# Patient Record
Sex: FEMALE | Race: WHITE | Employment: OTHER | URBAN - METROPOLITAN AREA
[De-identification: names, ages, dates, MRNs, and addresses within clinical notes are randomized per-mention and may not be internally consistent; named-entity substitution may affect disease eponyms.]

---

## 2017-03-20 ENCOUNTER — HOSPITAL ENCOUNTER (OUTPATIENT)
Dept: GENERAL RADIOLOGY | Age: 72
Discharge: HOME | End: 2017-03-20
Attending: INTERNAL MEDICINE | Admitting: INTERNAL MEDICINE

## 2017-03-20 DIAGNOSIS — M25.462 SWELLING OF LEFT KNEE JOINT: ICD-10-CM

## 2017-03-20 DIAGNOSIS — M25.562 LEFT KNEE PAIN, UNSPECIFIED CHRONICITY: ICD-10-CM

## 2017-07-11 ENCOUNTER — HOSPITAL ENCOUNTER (OUTPATIENT)
Dept: GENERAL RADIOLOGY | Age: 72
Discharge: HOME | End: 2017-07-11
Attending: INTERNAL MEDICINE | Admitting: INTERNAL MEDICINE

## 2017-07-11 DIAGNOSIS — R07.81 RIB PAIN: ICD-10-CM

## 2017-08-22 ENCOUNTER — HOSPITAL ENCOUNTER (OUTPATIENT)
Dept: GENERAL RADIOLOGY | Age: 72
Discharge: HOME | End: 2017-08-22
Attending: INTERNAL MEDICINE | Admitting: INTERNAL MEDICINE

## 2017-08-22 DIAGNOSIS — R05.9 COUGH: ICD-10-CM

## 2017-09-07 ENCOUNTER — HOSPITAL ENCOUNTER (OUTPATIENT)
Dept: ULTRASOUND IMAGING | Age: 72
Discharge: HOME | End: 2017-09-07
Attending: INTERNAL MEDICINE | Admitting: INTERNAL MEDICINE

## 2017-09-07 DIAGNOSIS — M79.605 PAIN AND SWELLING OF LOWER EXTREMITY, LEFT: ICD-10-CM

## 2017-09-07 DIAGNOSIS — M79.89 PAIN AND SWELLING OF LOWER EXTREMITY, RIGHT: ICD-10-CM

## 2017-09-07 DIAGNOSIS — M79.604 PAIN AND SWELLING OF LOWER EXTREMITY, RIGHT: ICD-10-CM

## 2017-09-07 DIAGNOSIS — M79.89 PAIN AND SWELLING OF LOWER EXTREMITY, LEFT: ICD-10-CM

## 2017-09-07 DIAGNOSIS — M79.89 OTHER DISORDERS OF SOFT TISSUE: ICD-10-CM

## 2017-09-25 ENCOUNTER — HOSPITAL ENCOUNTER (OUTPATIENT)
Dept: GENERAL RADIOLOGY | Age: 72
Discharge: HOME | End: 2017-09-25
Attending: INTERNAL MEDICINE | Admitting: INTERNAL MEDICINE

## 2017-09-25 DIAGNOSIS — J18.9 PNEUMONIA OF LEFT LOWER LOBE DUE TO INFECTIOUS ORGANISM: ICD-10-CM

## 2017-09-27 ENCOUNTER — HOSPITAL ENCOUNTER (OUTPATIENT)
Dept: WOMENS IMAGING | Age: 72
Discharge: HOME | End: 2017-09-27
Attending: INTERNAL MEDICINE | Admitting: INTERNAL MEDICINE

## 2017-09-27 DIAGNOSIS — N95.1 PERIMENOPAUSE: ICD-10-CM

## 2017-09-27 DIAGNOSIS — Z12.31 ENCOUNTER FOR SCREENING MAMMOGRAM FOR BREAST CANCER: ICD-10-CM

## 2017-10-10 ENCOUNTER — HOSPITAL ENCOUNTER (OUTPATIENT)
Dept: CARDIOLOGY | Age: 72
Discharge: HOME | End: 2017-10-10
Attending: INTERNAL MEDICINE | Admitting: INTERNAL MEDICINE

## 2017-10-10 DIAGNOSIS — I27.0 PRIMARY PULMONARY HYPERTENSION (HCC): ICD-10-CM

## 2017-10-10 LAB
LV EF: 50 %
LVEF MODALITY: NORMAL

## 2017-11-29 ENCOUNTER — HOSPITAL ENCOUNTER (OUTPATIENT)
Dept: GENERAL RADIOLOGY | Age: 72
Discharge: HOME | End: 2017-11-29
Attending: INTERNAL MEDICINE | Admitting: INTERNAL MEDICINE

## 2017-11-29 DIAGNOSIS — J18.9 PNEUMONIA DUE TO INFECTIOUS ORGANISM, UNSPECIFIED LATERALITY, UNSPECIFIED PART OF LUNG: ICD-10-CM

## 2017-12-07 ENCOUNTER — HOSPITAL ENCOUNTER (OUTPATIENT)
Dept: CT IMAGING | Age: 72
Discharge: HOME | End: 2017-12-07
Attending: INTERNAL MEDICINE | Admitting: INTERNAL MEDICINE

## 2017-12-07 DIAGNOSIS — R93.89 ABNORMAL FINDING ON RADIOLOGY EXAM: ICD-10-CM

## 2017-12-07 RX ORDER — SODIUM CHLORIDE 0.9 % (FLUSH) 0.9 %
10 SYRINGE (ML) INJECTION ONCE
Status: COMPLETED | OUTPATIENT
Start: 2017-12-07 | End: 2017-12-07

## 2017-12-07 RX ADMIN — Medication 10 ML: at 14:34

## 2017-12-20 ENCOUNTER — HOSPITAL ENCOUNTER (OUTPATIENT)
Dept: CT IMAGING | Age: 72
Discharge: HOME | End: 2017-12-20
Attending: INTERNAL MEDICINE | Admitting: INTERNAL MEDICINE

## 2017-12-20 DIAGNOSIS — N28.89 RIGHT RENAL MASS: ICD-10-CM

## 2017-12-20 DIAGNOSIS — D41.20: ICD-10-CM

## 2017-12-20 LAB
GFR AFRICAN AMERICAN: >60 ML/MIN/1.73M2
GFR NON-AFRICAN AMERICAN: >60 ML/MIN/1.73M2
POC CREATININE: 0.6 MG/DL (ref 0.6–1.1)

## 2017-12-20 RX ORDER — SODIUM CHLORIDE 0.9 % (FLUSH) 0.9 %
10 SYRINGE (ML) INJECTION ONCE
Status: COMPLETED | OUTPATIENT
Start: 2017-12-20 | End: 2017-12-20

## 2017-12-20 RX ADMIN — Medication 10 ML: at 12:09

## 2018-04-23 ENCOUNTER — HOSPITAL ENCOUNTER (OUTPATIENT)
Dept: ULTRASOUND IMAGING | Age: 73
Discharge: HOME | End: 2018-04-23
Attending: INTERNAL MEDICINE | Admitting: INTERNAL MEDICINE

## 2018-04-23 DIAGNOSIS — M79.89 SWELLING OF LIMB: ICD-10-CM

## 2018-04-23 DIAGNOSIS — M79.89 OTHER SPECIFIED SOFT TISSUE DISORDERS (CODE): ICD-10-CM

## 2018-04-23 DIAGNOSIS — R52 PAIN: ICD-10-CM

## 2018-05-31 ENCOUNTER — HOSPITAL ENCOUNTER (OUTPATIENT)
Dept: OTHER | Age: 73
Discharge: HOME | End: 2018-05-31
Attending: ORTHOPAEDIC SURGERY | Admitting: ORTHOPAEDIC SURGERY

## 2018-06-07 ENCOUNTER — PROCEDURE VISIT (OUTPATIENT)
Dept: CARDIOLOGY CLINIC | Age: 73
End: 2018-06-07

## 2018-06-07 ENCOUNTER — OFFICE VISIT (OUTPATIENT)
Dept: CARDIOLOGY CLINIC | Age: 73
End: 2018-06-07

## 2018-06-07 VITALS
WEIGHT: 179.2 LBS | BODY MASS INDEX: 30.59 KG/M2 | DIASTOLIC BLOOD PRESSURE: 80 MMHG | SYSTOLIC BLOOD PRESSURE: 134 MMHG | HEART RATE: 72 BPM | HEIGHT: 64 IN

## 2018-06-07 DIAGNOSIS — I10 ESSENTIAL HYPERTENSION: ICD-10-CM

## 2018-06-07 DIAGNOSIS — E78.2 MIXED HYPERLIPIDEMIA: ICD-10-CM

## 2018-06-07 DIAGNOSIS — I48.91 ATRIAL FIBRILLATION, NEW ONSET (HCC): ICD-10-CM

## 2018-06-07 DIAGNOSIS — I38 VHD (VALVULAR HEART DISEASE): ICD-10-CM

## 2018-06-07 DIAGNOSIS — R94.31 ABNORMAL EKG: ICD-10-CM

## 2018-06-07 DIAGNOSIS — R94.31 ABNORMAL EKG: Primary | ICD-10-CM

## 2018-06-07 LAB
LV EF: 47 %
LV EF: 55 %
LVEF MODALITY: NORMAL
LVEF MODALITY: NORMAL

## 2018-06-07 PROCEDURE — A9500 TC99M SESTAMIBI: HCPCS | Performed by: INTERNAL MEDICINE

## 2018-06-07 PROCEDURE — 99204 OFFICE O/P NEW MOD 45 MIN: CPT | Performed by: INTERNAL MEDICINE

## 2018-06-07 PROCEDURE — 93015 CV STRESS TEST SUPVJ I&R: CPT | Performed by: INTERNAL MEDICINE

## 2018-06-07 PROCEDURE — 93306 TTE W/DOPPLER COMPLETE: CPT | Performed by: INTERNAL MEDICINE

## 2018-06-07 PROCEDURE — G8417 CALC BMI ABV UP PARAM F/U: HCPCS | Performed by: INTERNAL MEDICINE

## 2018-06-07 PROCEDURE — 78452 HT MUSCLE IMAGE SPECT MULT: CPT | Performed by: INTERNAL MEDICINE

## 2018-06-07 PROCEDURE — 1123F ACP DISCUSS/DSCN MKR DOCD: CPT | Performed by: INTERNAL MEDICINE

## 2018-06-07 PROCEDURE — G8399 PT W/DXA RESULTS DOCUMENT: HCPCS | Performed by: INTERNAL MEDICINE

## 2018-06-07 PROCEDURE — 1090F PRES/ABSN URINE INCON ASSESS: CPT | Performed by: INTERNAL MEDICINE

## 2018-06-07 PROCEDURE — 3017F COLORECTAL CA SCREEN DOC REV: CPT | Performed by: INTERNAL MEDICINE

## 2018-06-07 PROCEDURE — 1036F TOBACCO NON-USER: CPT | Performed by: INTERNAL MEDICINE

## 2018-06-07 PROCEDURE — G8427 DOCREV CUR MEDS BY ELIG CLIN: HCPCS | Performed by: INTERNAL MEDICINE

## 2018-06-07 PROCEDURE — 4040F PNEUMOC VAC/ADMIN/RCVD: CPT | Performed by: INTERNAL MEDICINE

## 2018-06-07 RX ORDER — NAPROXEN 500 MG/1
TABLET ORAL
Refills: 5 | Status: ON HOLD | COMMUNITY
Start: 2018-05-28 | End: 2018-06-25 | Stop reason: HOSPADM

## 2018-06-07 RX ORDER — HYDROCHLOROTHIAZIDE 12.5 MG/1
TABLET ORAL
Refills: 5 | Status: ON HOLD | COMMUNITY
Start: 2018-05-06 | End: 2018-07-09

## 2018-06-07 RX ORDER — CITALOPRAM 40 MG/1
TABLET ORAL
Refills: 5 | COMMUNITY
Start: 2018-05-28

## 2018-06-07 RX ORDER — MELATONIN
1 DAILY
COMMUNITY

## 2018-06-07 RX ORDER — ASPIRIN 325 MG
325 TABLET ORAL DAILY
Status: ON HOLD | COMMUNITY
End: 2018-07-20 | Stop reason: HOSPADM

## 2018-06-07 RX ORDER — TRAMADOL HYDROCHLORIDE 50 MG/1
50 TABLET ORAL EVERY 6 HOURS PRN
COMMUNITY
End: 2021-01-03 | Stop reason: ALTCHOICE

## 2018-06-07 RX ORDER — DONEPEZIL HYDROCHLORIDE 5 MG/1
10 TABLET, FILM COATED ORAL DAILY
Refills: 2 | COMMUNITY
Start: 2018-05-28

## 2018-06-07 RX ORDER — ROSUVASTATIN CALCIUM 40 MG/1
40 TABLET, COATED ORAL EVERY EVENING
COMMUNITY
End: 2022-04-19 | Stop reason: SDUPTHER

## 2018-06-07 RX ORDER — DOXAZOSIN MESYLATE 1 MG/1
TABLET ORAL
Refills: 5 | Status: ON HOLD | COMMUNITY
Start: 2018-05-28 | End: 2018-07-09

## 2018-06-11 ENCOUNTER — TELEPHONE (OUTPATIENT)
Dept: CARDIOLOGY CLINIC | Age: 73
End: 2018-06-11

## 2018-06-18 ENCOUNTER — OFFICE VISIT (OUTPATIENT)
Dept: CARDIOLOGY CLINIC | Age: 73
End: 2018-06-18

## 2018-06-18 VITALS
BODY MASS INDEX: 30.66 KG/M2 | SYSTOLIC BLOOD PRESSURE: 132 MMHG | HEIGHT: 64 IN | HEART RATE: 82 BPM | WEIGHT: 179.6 LBS | DIASTOLIC BLOOD PRESSURE: 80 MMHG

## 2018-06-18 DIAGNOSIS — I48.91 ATRIAL FIBRILLATION, NEW ONSET (HCC): Primary | ICD-10-CM

## 2018-06-18 DIAGNOSIS — I10 ESSENTIAL HYPERTENSION: ICD-10-CM

## 2018-06-18 PROCEDURE — 1123F ACP DISCUSS/DSCN MKR DOCD: CPT | Performed by: INTERNAL MEDICINE

## 2018-06-18 PROCEDURE — 3017F COLORECTAL CA SCREEN DOC REV: CPT | Performed by: INTERNAL MEDICINE

## 2018-06-18 PROCEDURE — G8399 PT W/DXA RESULTS DOCUMENT: HCPCS | Performed by: INTERNAL MEDICINE

## 2018-06-18 PROCEDURE — 1090F PRES/ABSN URINE INCON ASSESS: CPT | Performed by: INTERNAL MEDICINE

## 2018-06-18 PROCEDURE — 99214 OFFICE O/P EST MOD 30 MIN: CPT | Performed by: INTERNAL MEDICINE

## 2018-06-18 PROCEDURE — 1036F TOBACCO NON-USER: CPT | Performed by: INTERNAL MEDICINE

## 2018-06-18 PROCEDURE — 4040F PNEUMOC VAC/ADMIN/RCVD: CPT | Performed by: INTERNAL MEDICINE

## 2018-06-18 PROCEDURE — G8417 CALC BMI ABV UP PARAM F/U: HCPCS | Performed by: INTERNAL MEDICINE

## 2018-06-18 PROCEDURE — G8427 DOCREV CUR MEDS BY ELIG CLIN: HCPCS | Performed by: INTERNAL MEDICINE

## 2018-06-22 ENCOUNTER — HOSPITAL ENCOUNTER (OUTPATIENT)
Dept: GENERAL RADIOLOGY | Age: 73
Discharge: HOME | End: 2018-06-22
Attending: INTERNAL MEDICINE | Admitting: INTERNAL MEDICINE

## 2018-06-22 DIAGNOSIS — Z01.818 PRE-PROCEDURAL EXAMINATION: ICD-10-CM

## 2018-06-22 LAB
ANION GAP SERPL CALCULATED.3IONS-SCNC: 17 MMOL/L (ref 4–16)
APTT: 44.5 SECONDS (ref 21.2–33)
BASOPHILS ABSOLUTE: 0 K/CU MM
BASOPHILS RELATIVE PERCENT: 0.7 % (ref 0–1)
BUN BLDV-MCNC: 16 MG/DL (ref 6–23)
CALCIUM SERPL-MCNC: 9.8 MG/DL (ref 8.3–10.6)
CHLORIDE BLD-SCNC: 96 MMOL/L (ref 99–110)
CO2: 23 MMOL/L (ref 21–32)
CREAT SERPL-MCNC: 0.7 MG/DL (ref 0.6–1.1)
DIFFERENTIAL TYPE: ABNORMAL
EKG ATRIAL RATE: 326 BPM
EKG DIAGNOSIS: NORMAL
EKG Q-T INTERVAL: 416 MS
EKG QRS DURATION: 90 MS
EKG QTC CALCULATION (BAZETT): 452 MS
EKG R AXIS: 65 DEGREES
EKG T AXIS: 69 DEGREES
EKG VENTRICULAR RATE: 71 BPM
EOSINOPHILS ABSOLUTE: 0.1 K/CU MM
EOSINOPHILS RELATIVE PERCENT: 1.1 % (ref 0–3)
GFR AFRICAN AMERICAN: >60 ML/MIN/1.73M2
GFR NON-AFRICAN AMERICAN: >60 ML/MIN/1.73M2
GLUCOSE BLD-MCNC: 67 MG/DL (ref 70–99)
HCT VFR BLD CALC: 38.9 % (ref 37–47)
HEMOGLOBIN: 12.5 GM/DL (ref 12.5–16)
IMMATURE NEUTROPHIL %: 0.2 % (ref 0–0.43)
INR BLD: 1.32 INDEX
LYMPHOCYTES ABSOLUTE: 1.7 K/CU MM
LYMPHOCYTES RELATIVE PERCENT: 31.3 % (ref 24–44)
MCH RBC QN AUTO: 27.8 PG (ref 27–31)
MCHC RBC AUTO-ENTMCNC: 32.1 % (ref 32–36)
MCV RBC AUTO: 86.4 FL (ref 78–100)
MONOCYTES ABSOLUTE: 0.4 K/CU MM
MONOCYTES RELATIVE PERCENT: 8 % (ref 0–4)
NUCLEATED RBC %: 0 %
PDW BLD-RTO: 12.7 % (ref 11.7–14.9)
PLATELET # BLD: 191 K/CU MM (ref 140–440)
PMV BLD AUTO: 9.5 FL (ref 7.5–11.1)
POTASSIUM SERPL-SCNC: 4.5 MMOL/L (ref 3.5–5.1)
PROTHROMBIN TIME: 15 SECONDS (ref 9.12–12.5)
RBC # BLD: 4.5 M/CU MM (ref 4.2–5.4)
SEGMENTED NEUTROPHILS ABSOLUTE COUNT: 3.1 K/CU MM
SEGMENTED NEUTROPHILS RELATIVE PERCENT: 58.7 % (ref 36–66)
SODIUM BLD-SCNC: 136 MMOL/L (ref 135–145)
TOTAL IMMATURE NEUTOROPHIL: 0.01 K/CU MM
TOTAL NUCLEATED RBC: 0 K/CU MM
WBC # BLD: 5.4 K/CU MM (ref 4–10.5)

## 2018-06-29 ENCOUNTER — HOSPITAL ENCOUNTER (OUTPATIENT)
Dept: PULMONOLOGY | Age: 73
Discharge: HOME | End: 2018-06-29
Attending: SURGERY | Admitting: SURGERY

## 2018-06-29 ENCOUNTER — HOSPITAL ENCOUNTER (OUTPATIENT)
Dept: PREADMISSION TESTING | Age: 73
Discharge: HOME | End: 2018-06-29
Attending: SURGERY | Admitting: SURGERY

## 2018-06-29 VITALS
DIASTOLIC BLOOD PRESSURE: 81 MMHG | WEIGHT: 179 LBS | RESPIRATION RATE: 16 BRPM | SYSTOLIC BLOOD PRESSURE: 161 MMHG | HEIGHT: 63 IN | HEART RATE: 76 BPM | TEMPERATURE: 97.8 F | OXYGEN SATURATION: 99 % | BODY MASS INDEX: 31.71 KG/M2

## 2018-06-29 RX ORDER — CHLORHEXIDINE GLUCONATE 0.12 MG/ML
30 RINSE ORAL ONCE
Status: CANCELLED | OUTPATIENT
Start: 2018-07-03

## 2018-06-29 RX ORDER — CARVEDILOL 3.12 MG/1
3.12 TABLET ORAL ONCE
Status: CANCELLED | OUTPATIENT
Start: 2018-07-03

## 2018-07-03 PROBLEM — I25.10 CAD IN NATIVE ARTERY: Status: ACTIVE | Noted: 2018-07-03

## 2018-07-10 PROBLEM — I10 UNCOMPLICATED HYPERTENSION: Status: ACTIVE | Noted: 2018-07-03

## 2018-07-10 PROBLEM — I97.130 CHF FOLLOWING CARDIAC SURGERY, POSTOP: Status: ACTIVE | Noted: 2018-07-10

## 2018-07-10 PROBLEM — D62 ACUTE BLOOD LOSS ANEMIA: Status: ACTIVE | Noted: 2018-07-10

## 2018-07-10 PROBLEM — R26.9 GAIT DISTURBANCE: Status: ACTIVE | Noted: 2018-07-10

## 2018-07-10 PROBLEM — I48.0 PAROXYSMAL ATRIAL FIBRILLATION (HCC): Status: ACTIVE | Noted: 2018-06-07

## 2018-07-10 PROBLEM — R52 UNCONTROLLED PAIN: Status: ACTIVE | Noted: 2018-07-10

## 2018-07-18 PROBLEM — J18.9 PNEUMONIA DUE TO INFECTIOUS ORGANISM: Status: ACTIVE | Noted: 2018-07-18

## 2018-07-31 ENCOUNTER — HOSPITAL ENCOUNTER (OUTPATIENT)
Dept: OTHER | Age: 73
Discharge: HOME | End: 2018-09-20
Attending: INTERNAL MEDICINE | Admitting: INTERNAL MEDICINE

## 2018-08-06 ENCOUNTER — TELEPHONE (OUTPATIENT)
Dept: CARDIOLOGY CLINIC | Age: 73
End: 2018-08-06

## 2018-08-06 NOTE — TELEPHONE ENCOUNTER
Patient called requesting 5 mg samples of Eliquis, I advised patient that if available samples should be ready for  by tomorrow afternoon, please call with any problems at ph# [phone].

## 2018-08-11 DIAGNOSIS — I48.0 PAROXYSMAL ATRIAL FIBRILLATION (HCC): Primary | ICD-10-CM

## 2018-08-14 ENCOUNTER — OFFICE VISIT (OUTPATIENT)
Dept: CARDIOLOGY CLINIC | Age: 73
End: 2018-08-14

## 2018-08-14 VITALS
WEIGHT: 169.8 LBS | SYSTOLIC BLOOD PRESSURE: 120 MMHG | BODY MASS INDEX: 28.99 KG/M2 | DIASTOLIC BLOOD PRESSURE: 70 MMHG | HEIGHT: 64 IN | HEART RATE: 80 BPM

## 2018-08-14 DIAGNOSIS — Z95.1 S/P CABG (CORONARY ARTERY BYPASS GRAFT): Primary | ICD-10-CM

## 2018-08-14 DIAGNOSIS — I38 VHD (VALVULAR HEART DISEASE): ICD-10-CM

## 2018-08-14 DIAGNOSIS — I48.0 PAROXYSMAL ATRIAL FIBRILLATION (HCC): ICD-10-CM

## 2018-08-14 PROCEDURE — 1111F DSCHRG MED/CURRENT MED MERGE: CPT | Performed by: INTERNAL MEDICINE

## 2018-08-14 PROCEDURE — 1090F PRES/ABSN URINE INCON ASSESS: CPT | Performed by: INTERNAL MEDICINE

## 2018-08-14 PROCEDURE — G8598 ASA/ANTIPLAT THER USED: HCPCS | Performed by: INTERNAL MEDICINE

## 2018-08-14 PROCEDURE — 4040F PNEUMOC VAC/ADMIN/RCVD: CPT | Performed by: INTERNAL MEDICINE

## 2018-08-14 PROCEDURE — 99214 OFFICE O/P EST MOD 30 MIN: CPT | Performed by: INTERNAL MEDICINE

## 2018-08-14 PROCEDURE — 3017F COLORECTAL CA SCREEN DOC REV: CPT | Performed by: INTERNAL MEDICINE

## 2018-08-14 PROCEDURE — 1101F PT FALLS ASSESS-DOCD LE1/YR: CPT | Performed by: INTERNAL MEDICINE

## 2018-08-14 PROCEDURE — G8427 DOCREV CUR MEDS BY ELIG CLIN: HCPCS | Performed by: INTERNAL MEDICINE

## 2018-08-14 PROCEDURE — 93000 ELECTROCARDIOGRAM COMPLETE: CPT | Performed by: INTERNAL MEDICINE

## 2018-08-14 PROCEDURE — 1123F ACP DISCUSS/DSCN MKR DOCD: CPT | Performed by: INTERNAL MEDICINE

## 2018-08-14 PROCEDURE — G8417 CALC BMI ABV UP PARAM F/U: HCPCS | Performed by: INTERNAL MEDICINE

## 2018-08-14 PROCEDURE — 1036F TOBACCO NON-USER: CPT | Performed by: INTERNAL MEDICINE

## 2018-08-14 PROCEDURE — G8399 PT W/DXA RESULTS DOCUMENT: HCPCS | Performed by: INTERNAL MEDICINE

## 2018-08-14 NOTE — PROGRESS NOTES
CARDIOLOGY NOTE      8/14/2018    RE: Alysa Dias  (1945)                               TO:  Dr. Monica Bautista MD      Thank you for involving me in taking care of your  patient Alysa Dias, who is a  74y.o. year old      female with past medical  history of  htn, hyperlipidimea , CAD, recent CABG,CABG x 3 LIMA to LAD, SVG to OM, SVG to PDA, mitral valve re is  seen today Patient  during this  visit  Has no cardiac comaplins  C/o Rt sided CP    Vitals:    08/14/18 1406   BP: 120/70   Pulse: 80       Current Outpatient Prescriptions   Medication Sig Dispense Refill    apixaban (ELIQUIS) 5 MG TABS tablet Take 1 tablet by mouth 2 times daily for 6 doses 6 tablet 0    apixaban (ELIQUIS) 5 MG TABS tablet Take 1 tablet by mouth 2 times daily START ONCE OK WITH CTS 56 tablet 0    aspirin 81 MG EC tablet Take 1 tablet by mouth daily 30 tablet 3    furosemide (LASIX) 20 MG tablet Take 1 tablet by mouth daily 30 tablet 0    docusate sodium (COLACE, DULCOLAX) 100 MG CAPS Take 100 mg by mouth 2 times daily      polyethylene glycol (GLYCOLAX) packet Take 17 g by mouth daily as needed for Constipation 527 g 1    senna (SENOKOT) 8.6 MG tablet Take 2 tablets by mouth 2 times daily 120 tablet 0    Multiple Vitamins-Minerals (THERAPEUTIC MULTIVITAMIN-MINERALS) tablet Take 1 tablet by mouth daily      metFORMIN (GLUCOPHAGE) 500 MG tablet Take 500 mg by mouth 2 times daily (with meals)      donepezil (ARICEPT) 5 MG tablet TAKE 1 TABLET BY MOUTH EVERY DAY  2    citalopram (CELEXA) 40 MG tablet TAKE 1 TABLET EVERY DAY FOR ANXIETY AND STRESS  5    Cholecalciferol (VITAMIN D3) 5000 units TABS Take 1 tablet by mouth daily       CALCIUM CARBONATE PO Take 500 mg by mouth daily       traMADol (ULTRAM) 50 MG tablet Take 50 mg by mouth every 6 hours as needed for Pain. Loren Alvarado rosuvastatin (CRESTOR) 40 MG tablet Take 40 mg by mouth every evening       No current facility-administered medications for this visit. Allergies: Patient has no known allergies.   Past Medical History:   Diagnosis Date    Anxiety     Arthritis     knees    Atrial fibrillation ([facility] [address]     CAD (coronary artery disease)     Sees Dr. Belkis Kumari    Diabetes mellitus ([facility] [address]    Eze Davistery H/O cardiac catheterization 2018    severe 3 vessel disease, refer to CV surgery for CABG and MAZE    H/O cardiovascular stress test 2018    EF47%  fixed perfusion defect ant wall, pt in afib    H/O echocardiogram 2018    EF55% mod MR    Hyperlipidemia     Hypertension     Memory loss     on Aricept    Missing teeth, acquired     Pneumonia     pneumococcal pneumonia twice at end of     Risk for falls     uses walker    TIA (transient ischemic attack)     family doctors said she has had mini-strokes    Urinary leakage     UTI (urinary tract infection)     recent --just stopped antibiotic last week as of -18    Wears glasses      Past Surgical History:   Procedure Laterality Date    CARDIAC CATHETERIZATION  2018    EYE SURGERY Bilateral 2018    Cataracts    THORACENTESIS Bilateral 2018    IR Dr. Destinee Sol, right 56, left 26 all s/s    TONSILLECTOMY      WISDOM TOOTH EXTRACTION       Family History   Problem Relation Age of Onset   Eze Mallory Stroke Mother     Heart Disease Father     Early Death Father     Breast Cancer Sister    Eze Davisteralex Parkinsonism Brother     Heart Disease Sister     Other Sister         fibromyalgia    Diabetes Sister     Early Death Brother          at birth     Social History   Substance Use Topics    Smoking status: Never Smoker    Smokeless tobacco: Never Used    Alcohol use No          Review of systems:  HEENT: Neg  Card:SOB, CP   GI;Neg  : Neg  Neuro: Neg  Psych: Neg  Derm: Neg  MS; Neg  All: Documented  Constitutional: Neg    Objective:      Physical Exam:  /70   Pulse 80   Ht 5' 4\" (1.626 m)   Wt 169 lb 12.8 oz (77 kg)   BMI 29.15 kg/m²   Wt Readings from Last 3 Encounters:   08/14/18 169 lb 12.8 oz (77 kg)   08/07/18 167 lb (75.8 kg)   07/21/18 179 lb (81.2 kg)     Body mass index is 29.15 kg/m². GENERAL - Alert, oriented, pleasant, in no apparent distress. Head unremarkable  Eyes  Not injected conjunctiva  ENT  normal mucosa  Neck - Supple. No jugular venous distention noted. No carotid bruits. Cardiovascular  Normal S1 and S2 without obvious murmur or gallop. Extremities - No cyanosis, clubbing, or significant edema. Pulmonary  No respiratory distress. No wheezes or rales. Pulses: Bilateral radial and pedal pulses normal  Abdomen  no tenderness  Musculoskeletal  normal strength  Neurologic    There are  no gross focal neurologic abnormalities. Skin-  No rash  Affect; normal mood    DATA:  No results found for: CKTOTAL, CKMB, CKMBINDEX, TROPONINI  BNP:  No results found for: BNP  PT/INR:  No results found for: PTINR  Lab Results   Component Value Date    LABA1C 5.8 06/25/2018     No results found for: CHOL, TRIG, HDL, LDLCALC, LDLDIRECT  No results found for: ALT, AST  TSH:  No results found for: TSH    QUALITY MEASURES:    CHOL LOWERING:  No- if No Why  ANTIPLATELET:  No - if No why  BETA BLOCKER    no  IF  NO WHY  SMOKING HISTORY No COUNSELLED  No  ATRIAL FIBRILLATION  Yes   ANTICOAG: Yes        ssessment/Recommendations:                -     CORONARY ARTERY DISEASE:  asymptomatic     All available  tests in chart reviewed. Management discussed . Testing ordered  no                               CABG x 3 LIMA to LAD, SVG to OM, SVG to PDA, mitral valve re  rehab    - A flutter   . chadsvasc of 3 , on eliquis  EP cons    -  Hypertension: Patients blood pressure is normal. Patient is advised about low sodium diet. Present medical regimen will not be changed. -  LIPID MANAGEMENT:  Available lipid  lab data reviewed  and patient was given dietary advice. NCEP- ATP III guidelines reviewed with patient.     -   Changes  in medicines made: No         - Wait on knee surgery                     Callie Diaz MD, 8/14/2018 2:22 PM

## 2018-08-14 NOTE — PATIENT INSTRUCTIONS
Please remember to bring all medication bottles or a medication list with you to your appointment. If you have any questions, please call our office at [phone].

## 2018-08-16 ENCOUNTER — TELEPHONE (OUTPATIENT)
Dept: CARDIOLOGY CLINIC | Age: 73
End: 2018-08-16

## 2018-08-28 ENCOUNTER — PROCEDURE VISIT (OUTPATIENT)
Dept: CARDIOLOGY CLINIC | Age: 73
End: 2018-08-28

## 2018-08-28 DIAGNOSIS — I38 VHD (VALVULAR HEART DISEASE): Primary | ICD-10-CM

## 2018-08-28 DIAGNOSIS — Z95.1 S/P CABG (CORONARY ARTERY BYPASS GRAFT): ICD-10-CM

## 2018-08-28 DIAGNOSIS — I48.0 PAROXYSMAL ATRIAL FIBRILLATION (HCC): ICD-10-CM

## 2018-08-28 LAB
LV EF: 50 %
LVEF MODALITY: NORMAL

## 2018-08-28 PROCEDURE — 93306 TTE W/DOPPLER COMPLETE: CPT | Performed by: INTERNAL MEDICINE

## 2018-08-30 ENCOUNTER — TELEPHONE (OUTPATIENT)
Dept: CARDIOLOGY CLINIC | Age: 73
End: 2018-08-30

## 2018-08-30 NOTE — TELEPHONE ENCOUNTER
Left Ventricular size is Normal .   LV function is normal, EF 50%.   Normal left ventricular wall thickness.   Diastolic dysfunction could not be evaluated due to arrhythmia.   Mildly dilated left atrium.   Sclerotic, but non-stenotic aortic valve.   Mild aortic regurgitation is noted with a pressure half time of 536 msec.   Mitral valve repair noted.   Mild tricuspid regurgitation.   Right ventricular systolic pressure of 35 mm Hg consistent with mild   pulmonary hypertension.   No evidence of pericardial effusion.     Results to sister

## 2018-08-31 ENCOUNTER — INITIAL CONSULT (OUTPATIENT)
Dept: CARDIOLOGY CLINIC | Age: 73
End: 2018-08-31

## 2018-08-31 DIAGNOSIS — I47.1 PAT (PAROXYSMAL ATRIAL TACHYCARDIA) (HCC): Primary | ICD-10-CM

## 2018-08-31 PROCEDURE — G8427 DOCREV CUR MEDS BY ELIG CLIN: HCPCS | Performed by: INTERNAL MEDICINE

## 2018-08-31 PROCEDURE — G8417 CALC BMI ABV UP PARAM F/U: HCPCS | Performed by: INTERNAL MEDICINE

## 2018-08-31 PROCEDURE — 1090F PRES/ABSN URINE INCON ASSESS: CPT | Performed by: INTERNAL MEDICINE

## 2018-08-31 PROCEDURE — 93000 ELECTROCARDIOGRAM COMPLETE: CPT | Performed by: INTERNAL MEDICINE

## 2018-08-31 PROCEDURE — 1036F TOBACCO NON-USER: CPT | Performed by: INTERNAL MEDICINE

## 2018-08-31 PROCEDURE — 4040F PNEUMOC VAC/ADMIN/RCVD: CPT | Performed by: INTERNAL MEDICINE

## 2018-08-31 PROCEDURE — G8399 PT W/DXA RESULTS DOCUMENT: HCPCS | Performed by: INTERNAL MEDICINE

## 2018-08-31 PROCEDURE — 1101F PT FALLS ASSESS-DOCD LE1/YR: CPT | Performed by: INTERNAL MEDICINE

## 2018-08-31 PROCEDURE — G8598 ASA/ANTIPLAT THER USED: HCPCS | Performed by: INTERNAL MEDICINE

## 2018-08-31 PROCEDURE — 99204 OFFICE O/P NEW MOD 45 MIN: CPT | Performed by: INTERNAL MEDICINE

## 2018-08-31 PROCEDURE — 1123F ACP DISCUSS/DSCN MKR DOCD: CPT | Performed by: INTERNAL MEDICINE

## 2018-08-31 PROCEDURE — 3017F COLORECTAL CA SCREEN DOC REV: CPT | Performed by: INTERNAL MEDICINE

## 2018-08-31 RX ORDER — CEFADROXIL 500 MG/1
500 CAPSULE ORAL 2 TIMES DAILY
COMMUNITY
End: 2019-11-20

## 2018-08-31 NOTE — PROGRESS NOTES
daily 60 tablet 11     No current facility-administered medications on file prior to visit. Review of Systems:   Review of Systems   Constitutional: Positive for fatigue. Negative for activity change, chills and fever. HENT: Negative for congestion, ear pain and tinnitus. Eyes: Negative for photophobia, pain and visual disturbance. Respiratory: Positive for shortness of breath. Negative for cough, chest tightness and wheezing. Cardiovascular: Positive for palpitations. Negative for chest pain and leg swelling. Gastrointestinal: Negative for abdominal pain, blood in stool, nausea and vomiting. Endocrine: Negative for cold intolerance and heat intolerance. Genitourinary: Negative for dysuria, flank pain and hematuria. Musculoskeletal: Positive for arthralgias. Negative for back pain, myalgias and neck stiffness. Skin: Negative for color change and rash. Allergic/Immunologic: Negative for food allergies. Neurological: Negative for dizziness, light-headedness, numbness and headaches. Hematological: Does not bruise/bleed easily. Psychiatric/Behavioral: Negative for agitation, behavioral problems and confusion. Physical Examination:    /68   Pulse 72   Temp 98 °F (36.7 °C)   Resp 12   Ht 5' 4\" (1.626 m)   Wt 168 lb 12.8 oz (76.6 kg)   SpO2 98%   BMI 28.97 kg/m²    Wt Readings from Last 3 Encounters:   08/31/18 168 lb 12.8 oz (76.6 kg)   08/14/18 169 lb 12.8 oz (77 kg)   08/07/18 167 lb (75.8 kg)     Body mass index is 28.97 kg/m². Physical Exam   Constitutional: She is oriented to person, place, and time and well-developed, well-nourished, and in no distress. HENT:   Head: Normocephalic and atraumatic. Eyes: Pupils are equal, round, and reactive to light. Conjunctivae and EOM are normal. Right eye exhibits no discharge. Neck: Normal range of motion. No JVD present. No thyromegaly present. Cardiovascular: Normal rate and regular rhythm.   Exam reveals no

## 2018-09-09 VITALS
HEART RATE: 72 BPM | DIASTOLIC BLOOD PRESSURE: 68 MMHG | RESPIRATION RATE: 12 BRPM | BODY MASS INDEX: 28.82 KG/M2 | TEMPERATURE: 98 F | HEIGHT: 64 IN | OXYGEN SATURATION: 98 % | SYSTOLIC BLOOD PRESSURE: 124 MMHG | WEIGHT: 168.8 LBS

## 2018-09-09 ASSESSMENT — ENCOUNTER SYMPTOMS
NAUSEA: 0
WHEEZING: 0
COUGH: 0
VOMITING: 0
CHEST TIGHTNESS: 0
SHORTNESS OF BREATH: 1
COLOR CHANGE: 0
BACK PAIN: 0
ABDOMINAL PAIN: 0
EYE PAIN: 0
PHOTOPHOBIA: 0
BLOOD IN STOOL: 0

## 2018-10-01 ENCOUNTER — HOSPITAL ENCOUNTER (OUTPATIENT)
Dept: CARDIAC REHAB | Age: 73
Setting detail: THERAPIES SERIES
Discharge: HOME | End: 2018-10-01
Payer: MEDICARE

## 2018-10-01 ENCOUNTER — TELEPHONE (OUTPATIENT)
Dept: CARDIOLOGY CLINIC | Age: 73
End: 2018-10-01

## 2018-10-01 LAB
GLUCOSE BLD-MCNC: 87 MG/DL (ref 70–99)
GLUCOSE BLD-MCNC: 90 MG/DL (ref 70–99)

## 2018-10-01 PROCEDURE — 93798 PHYS/QHP OP CAR RHAB W/ECG: CPT

## 2018-10-01 PROCEDURE — 82962 GLUCOSE BLOOD TEST: CPT

## 2018-10-02 ENCOUNTER — APPOINTMENT (OUTPATIENT)
Dept: CARDIAC REHAB | Age: 73
End: 2018-10-02
Payer: MEDICARE

## 2018-10-04 ENCOUNTER — APPOINTMENT (OUTPATIENT)
Dept: CARDIAC REHAB | Age: 73
End: 2018-10-04
Payer: MEDICARE

## 2018-10-08 ENCOUNTER — APPOINTMENT (OUTPATIENT)
Dept: CARDIAC REHAB | Age: 73
End: 2018-10-08
Payer: MEDICARE

## 2018-10-09 ENCOUNTER — APPOINTMENT (OUTPATIENT)
Dept: CARDIAC REHAB | Age: 73
End: 2018-10-09
Payer: MEDICARE

## 2018-10-11 ENCOUNTER — APPOINTMENT (OUTPATIENT)
Dept: CARDIAC REHAB | Age: 73
End: 2018-10-11
Payer: MEDICARE

## 2018-10-15 ENCOUNTER — APPOINTMENT (OUTPATIENT)
Dept: CARDIAC REHAB | Age: 73
End: 2018-10-15
Payer: MEDICARE

## 2018-10-16 ENCOUNTER — TELEPHONE (OUTPATIENT)
Dept: CARDIOLOGY CLINIC | Age: 73
End: 2018-10-16

## 2018-10-16 ENCOUNTER — APPOINTMENT (OUTPATIENT)
Dept: CARDIAC REHAB | Age: 73
End: 2018-10-16
Payer: MEDICARE

## 2018-10-16 DIAGNOSIS — I48.0 PAROXYSMAL ATRIAL FIBRILLATION (HCC): Primary | ICD-10-CM

## 2018-10-16 DIAGNOSIS — I49.9 CARDIAC ARRHYTHMIA, UNSPECIFIED CARDIAC ARRHYTHMIA TYPE: ICD-10-CM

## 2018-10-18 ENCOUNTER — APPOINTMENT (OUTPATIENT)
Dept: CARDIAC REHAB | Age: 73
End: 2018-10-18
Payer: MEDICARE

## 2018-10-22 ENCOUNTER — APPOINTMENT (OUTPATIENT)
Dept: CARDIAC REHAB | Age: 73
End: 2018-10-22
Payer: MEDICARE

## 2018-10-23 ENCOUNTER — APPOINTMENT (OUTPATIENT)
Dept: CARDIAC REHAB | Age: 73
End: 2018-10-23
Payer: MEDICARE

## 2018-10-23 ENCOUNTER — NURSE ONLY (OUTPATIENT)
Dept: CARDIOLOGY CLINIC | Age: 73
End: 2018-10-23
Payer: MEDICARE

## 2018-10-23 DIAGNOSIS — I48.0 PAROXYSMAL ATRIAL FIBRILLATION (HCC): ICD-10-CM

## 2018-10-23 DIAGNOSIS — I48.0 PAF (PAROXYSMAL ATRIAL FIBRILLATION) (HCC): Primary | ICD-10-CM

## 2018-10-23 DIAGNOSIS — I49.9 CARDIAC ARRHYTHMIA, UNSPECIFIED CARDIAC ARRHYTHMIA TYPE: ICD-10-CM

## 2018-10-23 PROCEDURE — 0296T PR EXT ECG > 48HR TO 21 DAY RCRD W/CONECT INTL RCRD: CPT | Performed by: INTERNAL MEDICINE

## 2018-10-23 NOTE — PROGRESS NOTES
Applied Zio monitor for 14 days. XP#[id number]. Instructed patient to avoid showering in the first 24 hours. Press the button on the monitor when you feel a symptom and write it in your diary. Do not submerge in water. No lotion or power near the patch. Please return the monitor in the box provided. Patient verbalized understanding.

## 2018-10-25 ENCOUNTER — APPOINTMENT (OUTPATIENT)
Dept: CARDIAC REHAB | Age: 73
End: 2018-10-25
Payer: MEDICARE

## 2018-10-29 ENCOUNTER — APPOINTMENT (OUTPATIENT)
Dept: CARDIAC REHAB | Age: 73
End: 2018-10-29
Payer: MEDICARE

## 2018-10-30 ENCOUNTER — APPOINTMENT (OUTPATIENT)
Dept: CARDIAC REHAB | Age: 73
End: 2018-10-30
Payer: MEDICARE

## 2018-11-28 PROCEDURE — 0298T PR EXT ECG > 48HR TO 21 DAY REVIEW AND INTERPRETATN: CPT | Performed by: INTERNAL MEDICINE

## 2018-11-28 NOTE — TELEPHONE ENCOUNTER
Patient needs samples of Eliquis 5 mg and she needs them them to be ready this afternoon 11/28/2018 because she is completely out of her meds.

## 2018-11-29 ENCOUNTER — TELEPHONE (OUTPATIENT)
Dept: CARDIOLOGY CLINIC | Age: 73
End: 2018-11-29

## 2018-12-05 ENCOUNTER — OFFICE VISIT (OUTPATIENT)
Dept: CARDIOLOGY CLINIC | Age: 73
End: 2018-12-05
Payer: MEDICARE

## 2018-12-05 VITALS
BODY MASS INDEX: 28.34 KG/M2 | HEIGHT: 64 IN | SYSTOLIC BLOOD PRESSURE: 122 MMHG | HEART RATE: 52 BPM | DIASTOLIC BLOOD PRESSURE: 70 MMHG | WEIGHT: 166 LBS

## 2018-12-05 DIAGNOSIS — I49.5 TACHYCARDIA-BRADYCARDIA (HCC): Primary | ICD-10-CM

## 2018-12-05 PROCEDURE — G8399 PT W/DXA RESULTS DOCUMENT: HCPCS | Performed by: INTERNAL MEDICINE

## 2018-12-05 PROCEDURE — G8484 FLU IMMUNIZE NO ADMIN: HCPCS | Performed by: INTERNAL MEDICINE

## 2018-12-05 PROCEDURE — 4040F PNEUMOC VAC/ADMIN/RCVD: CPT | Performed by: INTERNAL MEDICINE

## 2018-12-05 PROCEDURE — 1123F ACP DISCUSS/DSCN MKR DOCD: CPT | Performed by: INTERNAL MEDICINE

## 2018-12-05 PROCEDURE — 3017F COLORECTAL CA SCREEN DOC REV: CPT | Performed by: INTERNAL MEDICINE

## 2018-12-05 PROCEDURE — G8417 CALC BMI ABV UP PARAM F/U: HCPCS | Performed by: INTERNAL MEDICINE

## 2018-12-05 PROCEDURE — 1101F PT FALLS ASSESS-DOCD LE1/YR: CPT | Performed by: INTERNAL MEDICINE

## 2018-12-05 PROCEDURE — G8427 DOCREV CUR MEDS BY ELIG CLIN: HCPCS | Performed by: INTERNAL MEDICINE

## 2018-12-05 PROCEDURE — 99213 OFFICE O/P EST LOW 20 MIN: CPT | Performed by: INTERNAL MEDICINE

## 2018-12-05 PROCEDURE — 1090F PRES/ABSN URINE INCON ASSESS: CPT | Performed by: INTERNAL MEDICINE

## 2018-12-05 PROCEDURE — G8598 ASA/ANTIPLAT THER USED: HCPCS | Performed by: INTERNAL MEDICINE

## 2018-12-05 PROCEDURE — 1036F TOBACCO NON-USER: CPT | Performed by: INTERNAL MEDICINE

## 2018-12-05 ASSESSMENT — ENCOUNTER SYMPTOMS
VOMITING: 0
PHOTOPHOBIA: 0
NAUSEA: 0
COUGH: 0
EYE PAIN: 0
BACK PAIN: 0
COLOR CHANGE: 0
WHEEZING: 0
BLOOD IN STOOL: 0
CHEST TIGHTNESS: 0
ABDOMINAL PAIN: 0
SHORTNESS OF BREATH: 1

## 2018-12-05 NOTE — PROGRESS NOTES
ANXIETY AND STRESS  5    Cholecalciferol (VITAMIN D3) 5000 units TABS Take 1 tablet by mouth daily       CALCIUM CARBONATE PO Take 500 mg by mouth daily       traMADol (ULTRAM) 50 MG tablet Take 50 mg by mouth every 6 hours as needed for Pain. Myron Fisher rosuvastatin (CRESTOR) 40 MG tablet Take 40 mg by mouth every evening       No current facility-administered medications on file prior to visit. Review of Systems:   Review of Systems   Constitutional: Positive for fatigue. Negative for activity change, chills and fever. HENT: Negative for congestion, ear pain and tinnitus. Eyes: Negative for photophobia, pain and visual disturbance. Respiratory: Positive for shortness of breath. Negative for cough, chest tightness and wheezing. Cardiovascular: Positive for palpitations. Negative for chest pain and leg swelling. Gastrointestinal: Negative for abdominal pain, blood in stool, nausea and vomiting. Endocrine: Negative for cold intolerance and heat intolerance. Genitourinary: Negative for dysuria, flank pain and hematuria. Musculoskeletal: Positive for arthralgias. Negative for back pain, myalgias and neck stiffness. Skin: Negative for color change and rash. Allergic/Immunologic: Negative for food allergies. Neurological: Negative for dizziness, light-headedness, numbness and headaches. Hematological: Does not bruise/bleed easily. Psychiatric/Behavioral: Negative for agitation, behavioral problems and confusion. Physical Examination:    /70   Pulse 52   Ht 5' 4\" (1.626 m)   Wt 166 lb (75.3 kg)   BMI 28.49 kg/m²    Wt Readings from Last 3 Encounters:   12/05/18 166 lb (75.3 kg)   08/31/18 168 lb 12.8 oz (76.6 kg)   08/14/18 169 lb 12.8 oz (77 kg)     Body mass index is 28.49 kg/m². Physical Exam   Constitutional: She is oriented to person, place, and time and well-developed, well-nourished, and in no distress. HENT:   Head: Normocephalic and atraumatic.    Eyes: first  Then consider medical therapy      Thanks again for allowing me to participate in care of this patient. Please call me if you have any questions. With best regards.       Nataliya Mckinnon MD

## 2018-12-07 ENCOUNTER — HOSPITAL ENCOUNTER (OUTPATIENT)
Age: 73
Discharge: HOME | End: 2018-12-07
Payer: MEDICARE

## 2018-12-07 ENCOUNTER — HOSPITAL ENCOUNTER (OUTPATIENT)
Dept: GENERAL RADIOLOGY | Age: 73
Discharge: HOME | End: 2018-12-07
Payer: MEDICARE

## 2018-12-07 DIAGNOSIS — Z01.811 PRE-OP CHEST EXAM: ICD-10-CM

## 2018-12-07 LAB
ANION GAP SERPL CALCULATED.3IONS-SCNC: 11 MMOL/L (ref 4–16)
APTT: 54.5 SECONDS (ref 21.2–33)
BUN BLDV-MCNC: 15 MG/DL (ref 6–23)
CALCIUM SERPL-MCNC: 10.1 MG/DL (ref 8.3–10.6)
CHLORIDE BLD-SCNC: 97 MMOL/L (ref 99–110)
CO2: 32 MMOL/L (ref 21–32)
CREAT SERPL-MCNC: 0.8 MG/DL (ref 0.6–1.1)
GFR AFRICAN AMERICAN: >60 ML/MIN/1.73M2
GFR NON-AFRICAN AMERICAN: >60 ML/MIN/1.73M2
GLUCOSE BLD-MCNC: 84 MG/DL (ref 70–99)
HCT VFR BLD CALC: 40.2 % (ref 37–47)
HEMOGLOBIN: 12.5 GM/DL (ref 12.5–16)
INR BLD: 1.52 INDEX
MAGNESIUM: 2.1 MG/DL (ref 1.8–2.4)
MCH RBC QN AUTO: 25.8 PG (ref 27–31)
MCHC RBC AUTO-ENTMCNC: 31.1 % (ref 32–36)
MCV RBC AUTO: 83.1 FL (ref 78–100)
PDW BLD-RTO: 13.6 % (ref 11.7–14.9)
PHOSPHORUS: 3.7 MG/DL (ref 2.5–4.9)
PLATELET # BLD: 218 K/CU MM (ref 140–440)
PMV BLD AUTO: 9.3 FL (ref 7.5–11.1)
POTASSIUM SERPL-SCNC: 3.4 MMOL/L (ref 3.5–5.1)
PROTHROMBIN TIME: 17.3 SECONDS (ref 9.12–12.5)
RBC # BLD: 4.84 M/CU MM (ref 4.2–5.4)
SODIUM BLD-SCNC: 140 MMOL/L (ref 135–145)
WBC # BLD: 4.2 K/CU MM (ref 4–10.5)

## 2018-12-07 PROCEDURE — 71046 X-RAY EXAM CHEST 2 VIEWS: CPT

## 2018-12-07 PROCEDURE — 85730 THROMBOPLASTIN TIME PARTIAL: CPT

## 2018-12-07 PROCEDURE — 83735 ASSAY OF MAGNESIUM: CPT

## 2018-12-07 PROCEDURE — 85610 PROTHROMBIN TIME: CPT

## 2018-12-07 PROCEDURE — 84100 ASSAY OF PHOSPHORUS: CPT

## 2018-12-07 PROCEDURE — 36415 COLL VENOUS BLD VENIPUNCTURE: CPT

## 2018-12-07 PROCEDURE — 80048 BASIC METABOLIC PNL TOTAL CA: CPT

## 2018-12-07 PROCEDURE — 85027 COMPLETE CBC AUTOMATED: CPT

## 2018-12-11 ENCOUNTER — HOSPITAL ENCOUNTER (OUTPATIENT)
Dept: CARDIAC CATH/INVASIVE PROCEDURES | Age: 73
Discharge: HOME | End: 2018-12-12
Attending: INTERNAL MEDICINE | Admitting: INTERNAL MEDICINE
Payer: MEDICARE

## 2018-12-11 ENCOUNTER — APPOINTMENT (OUTPATIENT)
Dept: GENERAL RADIOLOGY | Age: 73
End: 2018-12-11
Attending: INTERNAL MEDICINE
Payer: MEDICARE

## 2018-12-11 PROBLEM — I49.5 SSS (SICK SINUS SYNDROME) (HCC): Status: ACTIVE | Noted: 2018-12-11

## 2018-12-11 LAB — GLUCOSE BLD-MCNC: 102 MG/DL (ref 70–99)

## 2018-12-11 PROCEDURE — 6370000000 HC RX 637 (ALT 250 FOR IP): Performed by: INTERNAL MEDICINE

## 2018-12-11 PROCEDURE — C1785 PMKR, DUAL, RATE-RESP: HCPCS

## 2018-12-11 PROCEDURE — 86901 BLOOD TYPING SEROLOGIC RH(D): CPT

## 2018-12-11 PROCEDURE — 2580000003 HC RX 258: Performed by: INTERNAL MEDICINE

## 2018-12-11 PROCEDURE — 33208 INSRT HEART PM ATRIAL & VENT: CPT | Performed by: INTERNAL MEDICINE

## 2018-12-11 PROCEDURE — 6360000004 HC RX CONTRAST MEDICATION

## 2018-12-11 PROCEDURE — 33208 INSRT HEART PM ATRIAL & VENT: CPT

## 2018-12-11 PROCEDURE — 6360000002 HC RX W HCPCS

## 2018-12-11 PROCEDURE — 94761 N-INVAS EAR/PLS OXIMETRY MLT: CPT

## 2018-12-11 PROCEDURE — 71045 X-RAY EXAM CHEST 1 VIEW: CPT

## 2018-12-11 PROCEDURE — 82962 GLUCOSE BLOOD TEST: CPT

## 2018-12-11 PROCEDURE — C1898 LEAD, PMKR, OTHER THAN TRANS: HCPCS

## 2018-12-11 PROCEDURE — C1894 INTRO/SHEATH, NON-LASER: HCPCS

## 2018-12-11 PROCEDURE — 2709999900 HC NON-CHARGEABLE SUPPLY

## 2018-12-11 PROCEDURE — G0378 HOSPITAL OBSERVATION PER HR: HCPCS

## 2018-12-11 PROCEDURE — 86850 RBC ANTIBODY SCREEN: CPT

## 2018-12-11 PROCEDURE — 2500000003 HC RX 250 WO HCPCS

## 2018-12-11 PROCEDURE — 86900 BLOOD TYPING SEROLOGIC ABO: CPT

## 2018-12-11 RX ORDER — DONEPEZIL HYDROCHLORIDE 5 MG/1
1 TABLET, FILM COATED ORAL DAILY
Status: DISCONTINUED | OUTPATIENT
Start: 2018-12-11 | End: 2018-12-11 | Stop reason: ALTCHOICE

## 2018-12-11 RX ORDER — SODIUM CHLORIDE 0.9 % (FLUSH) 0.9 %
10 SYRINGE (ML) INJECTION EVERY 12 HOURS SCHEDULED
Status: DISCONTINUED | OUTPATIENT
Start: 2018-12-11 | End: 2018-12-12 | Stop reason: HOSPADM

## 2018-12-11 RX ORDER — ATORVASTATIN CALCIUM 40 MG/1
80 TABLET, FILM COATED ORAL EVERY EVENING
Status: DISCONTINUED | OUTPATIENT
Start: 2018-12-11 | End: 2018-12-12 | Stop reason: HOSPADM

## 2018-12-11 RX ORDER — SODIUM CHLORIDE 0.9 % (FLUSH) 0.9 %
10 SYRINGE (ML) INJECTION PRN
Status: DISCONTINUED | OUTPATIENT
Start: 2018-12-11 | End: 2018-12-12 | Stop reason: HOSPADM

## 2018-12-11 RX ORDER — TRAMADOL HYDROCHLORIDE 50 MG/1
50 TABLET ORAL EVERY 6 HOURS PRN
Status: DISCONTINUED | OUTPATIENT
Start: 2018-12-11 | End: 2018-12-12 | Stop reason: HOSPADM

## 2018-12-11 RX ORDER — DOCUSATE SODIUM 100 MG/1
100 CAPSULE, LIQUID FILLED ORAL 2 TIMES DAILY
Status: DISCONTINUED | OUTPATIENT
Start: 2018-12-11 | End: 2018-12-12 | Stop reason: HOSPADM

## 2018-12-11 RX ORDER — POTASSIUM CHLORIDE 20 MEQ/1
40 TABLET, EXTENDED RELEASE ORAL ONCE
Status: COMPLETED | OUTPATIENT
Start: 2018-12-11 | End: 2018-12-11

## 2018-12-11 RX ORDER — CITALOPRAM 20 MG/1
20 TABLET ORAL DAILY
Status: DISCONTINUED | OUTPATIENT
Start: 2018-12-11 | End: 2018-12-12 | Stop reason: HOSPADM

## 2018-12-11 RX ORDER — FUROSEMIDE 20 MG/1
20 TABLET ORAL DAILY
Status: DISCONTINUED | OUTPATIENT
Start: 2018-12-11 | End: 2018-12-12 | Stop reason: HOSPADM

## 2018-12-11 RX ORDER — CEFAZOLIN SODIUM 1 G/50ML
1 INJECTION, SOLUTION INTRAVENOUS EVERY 8 HOURS
Status: COMPLETED | OUTPATIENT
Start: 2018-12-12 | End: 2018-12-12

## 2018-12-11 RX ORDER — M-VIT,TX,IRON,MINS/CALC/FOLIC 27MG-0.4MG
1 TABLET ORAL DAILY
Status: DISCONTINUED | OUTPATIENT
Start: 2018-12-11 | End: 2018-12-12 | Stop reason: HOSPADM

## 2018-12-11 RX ORDER — ACETAMINOPHEN 325 MG/1
650 TABLET ORAL EVERY 8 HOURS PRN
Status: DISCONTINUED | OUTPATIENT
Start: 2018-12-11 | End: 2018-12-12 | Stop reason: HOSPADM

## 2018-12-11 RX ORDER — ASPIRIN 81 MG/1
81 TABLET ORAL DAILY
Status: DISCONTINUED | OUTPATIENT
Start: 2018-12-12 | End: 2018-12-12 | Stop reason: HOSPADM

## 2018-12-11 RX ORDER — DONEPEZIL HYDROCHLORIDE 5 MG/1
5 TABLET, FILM COATED ORAL NIGHTLY
Status: DISCONTINUED | OUTPATIENT
Start: 2018-12-11 | End: 2018-12-12 | Stop reason: HOSPADM

## 2018-12-11 RX ADMIN — POTASSIUM CHLORIDE 40 MEQ: 1500 TABLET, EXTENDED RELEASE ORAL at 13:56

## 2018-12-11 RX ADMIN — ATORVASTATIN CALCIUM 80 MG: 40 TABLET, FILM COATED ORAL at 18:56

## 2018-12-11 RX ADMIN — DOCUSATE SODIUM 100 MG: 100 CAPSULE, LIQUID FILLED ORAL at 19:08

## 2018-12-11 RX ADMIN — DONEPEZIL HYDROCHLORIDE 5 MG: 5 TABLET, FILM COATED ORAL at 19:08

## 2018-12-11 RX ADMIN — SODIUM CHLORIDE, PRESERVATIVE FREE 10 ML: 5 INJECTION INTRAVENOUS at 19:08

## 2018-12-11 ASSESSMENT — PAIN SCALES - GENERAL: PAINLEVEL_OUTOF10: 0

## 2018-12-11 NOTE — OP NOTE
[facility]     Electrophysiology Procedure Note       Date of Procedure: 12/11/2018  Patient's Name: Derek Fonseca  YOB: 1945   Medical Record Number: [id number]    Procedure Performed by: Waylon Kang MD    Procedures performed:  · Insertion of right ventricular pacing lead under fluoroscopy. · Insertion of right atrial lead under fluoroscopy  · Insertion of a Dual chamber Pacemaker. · Electronic analysis of lead and device. · IV sedation. · Selective venography of left upper extremity. Sedation : Versed, Fentanyl      Indication of the procedure: Tachy ceasar episodes    Brief History:      Derek Fonseca is a 74 y.o. female with CAD sp CABG, Mitral valve repair - tachy ceasar episodes here for pacemaker implantation      Details of procedure: The patient was brought to the electrophysiology laboratory in stable condition. The patient was in a fasting and non-sedated state. The risks, benefits and alternatives of the procedure were discussed with the patient. The risks including, but not limited to, the risks of vascular injury, bleeding, infection, device malfunction, lead dislodgement, radiation exposure, injury to cardiac and surrounding structures (including pneumothorax), stroke, myocardial infarction and death were discussed in detail. The patient opted to proceed with the device implantation. Written informed consent was signed and placed in the chart. Prophylactic antibiotic was given. The patient was prepped and draped in a sterile fashion. A timeout protocol was completed to identify the patient and the procedure being performed. IV sedation was provided with IV Versed, Fentanyl. Left upper extremity venogram was obtained to assess the patency of the subclavian vein and for access under fluoroscopic guidance. An incision was made in the left pectoral area after administration of lidocaine.  Using electrocautery and blunt dissection, a pocket was

## 2018-12-11 NOTE — H&P
Electrophysiology H&p Note      Reason for consultation:  ATRIAL ARRHYTHMIA    Chief complaint : Shortness of breath    Referring physician:  Ike Maynard      Primary care physician: Hali Kent MD      History of Present Illness: Today visit (12/11/18)    Patient here for Pacemaker implantation. No change in H&P noted from previous clinic visit. Previous visit (12/5/2018)    Patient here for follow up. Patient reporting no energy and short of breath with exertion. No palpitations feeling. Has chest pain at the site of incision. Patient had on and off dizziness. Previous visit:    Chief Complaint   Patient presents with    Atrial Flutter      referred patient for possible A flutter CABGx3. Patient denies CP, but does report soreness to area following reent ECHO. Patient denies palpitations, dizziness. Does report swelling in left knee as well as SOB on exertion. Also reports feeling tired and weak. She had double pnuemonia last winter. No cardiac hx prior to CABG x3 recently and dx of Afib. May drink a glass of hot tea once or twice weekly. Denies syncope  Denies fever or chills      Past medical history:   Past Medical History:   Diagnosis Date    Anxiety     Arthritis     knees    Atrial fibrillation ([facility] [address]     CAD (coronary artery disease)     Sees Dr. Michelle Almodovar    Diabetes mellitus ([facility] [address]     H/O cardiac catheterization 06/25/2018    severe 3 vessel disease, refer to CV surgery for CABG and MAZE    H/O cardiovascular stress test 06/06/2018    EF47%  fixed perfusion defect ant wall, pt in afib    H/O echocardiogram 06/06/2018    EF55% mod MR    H/O echocardiogram 08/28/2018    EF50% sclerotic non stenotic AV, mold AR,TR, phrn, MV repair noted    History of Holter monitoring 10/23/2018    Multiple PAF episodes noted. Tachy-Dinesh episodes noted.     Hyperlipidemia     Hypertension     Memory loss     on Aricept    Missing teeth,

## 2018-12-12 VITALS
HEIGHT: 64 IN | OXYGEN SATURATION: 98 % | WEIGHT: 165 LBS | BODY MASS INDEX: 28.17 KG/M2 | TEMPERATURE: 98.6 F | SYSTOLIC BLOOD PRESSURE: 123 MMHG | HEART RATE: 69 BPM | DIASTOLIC BLOOD PRESSURE: 50 MMHG | RESPIRATION RATE: 10 BRPM

## 2018-12-12 LAB
ALBUMIN SERPL-MCNC: 4.5 GM/DL (ref 3.4–5)
ANION GAP SERPL CALCULATED.3IONS-SCNC: 14 MMOL/L (ref 4–16)
BUN BLDV-MCNC: 13 MG/DL (ref 6–23)
CALCIUM SERPL-MCNC: 9.7 MG/DL (ref 8.3–10.6)
CHLORIDE BLD-SCNC: 99 MMOL/L (ref 99–110)
CO2: 27 MMOL/L (ref 21–32)
CREAT SERPL-MCNC: 0.8 MG/DL (ref 0.6–1.1)
GFR AFRICAN AMERICAN: >60 ML/MIN/1.73M2
GFR NON-AFRICAN AMERICAN: >60 ML/MIN/1.73M2
GLUCOSE BLD-MCNC: 96 MG/DL (ref 70–99)
HCT VFR BLD CALC: 34.7 % (ref 37–47)
HEMOGLOBIN: 11.5 GM/DL (ref 12.5–16)
MAGNESIUM: 2.2 MG/DL (ref 1.8–2.4)
MCH RBC QN AUTO: 26.5 PG (ref 27–31)
MCHC RBC AUTO-ENTMCNC: 33.1 % (ref 32–36)
MCV RBC AUTO: 80 FL (ref 78–100)
PDW BLD-RTO: 13.7 % (ref 11.7–14.9)
PHOSPHORUS: 4 MG/DL (ref 2.5–4.9)
PLATELET # BLD: 195 K/CU MM (ref 140–440)
PMV BLD AUTO: 9.4 FL (ref 7.5–11.1)
POTASSIUM SERPL-SCNC: 3.5 MMOL/L (ref 3.5–5.1)
RBC # BLD: 4.34 M/CU MM (ref 4.2–5.4)
SODIUM BLD-SCNC: 140 MMOL/L (ref 135–145)
WBC # BLD: 6.5 K/CU MM (ref 4–10.5)

## 2018-12-12 PROCEDURE — 84100 ASSAY OF PHOSPHORUS: CPT

## 2018-12-12 PROCEDURE — 6370000000 HC RX 637 (ALT 250 FOR IP): Performed by: INTERNAL MEDICINE

## 2018-12-12 PROCEDURE — 90670 PCV13 VACCINE IM: CPT | Performed by: INTERNAL MEDICINE

## 2018-12-12 PROCEDURE — 80069 RENAL FUNCTION PANEL: CPT

## 2018-12-12 PROCEDURE — 85027 COMPLETE CBC AUTOMATED: CPT

## 2018-12-12 PROCEDURE — 83735 ASSAY OF MAGNESIUM: CPT

## 2018-12-12 PROCEDURE — G0378 HOSPITAL OBSERVATION PER HR: HCPCS

## 2018-12-12 PROCEDURE — 36415 COLL VENOUS BLD VENIPUNCTURE: CPT

## 2018-12-12 PROCEDURE — 99024 POSTOP FOLLOW-UP VISIT: CPT | Performed by: NURSE PRACTITIONER

## 2018-12-12 PROCEDURE — 2580000003 HC RX 258: Performed by: INTERNAL MEDICINE

## 2018-12-12 PROCEDURE — 6360000002 HC RX W HCPCS: Performed by: INTERNAL MEDICINE

## 2018-12-12 PROCEDURE — 6370000000 HC RX 637 (ALT 250 FOR IP): Performed by: NURSE PRACTITIONER

## 2018-12-12 PROCEDURE — 93280 PM DEVICE PROGR EVAL DUAL: CPT | Performed by: INTERNAL MEDICINE

## 2018-12-12 PROCEDURE — G0009 ADMIN PNEUMOCOCCAL VACCINE: HCPCS | Performed by: INTERNAL MEDICINE

## 2018-12-12 RX ORDER — DIPHENHYDRAMINE HCL 25 MG
25 TABLET ORAL EVERY 6 HOURS PRN
Status: DISCONTINUED | OUTPATIENT
Start: 2018-12-12 | End: 2018-12-12 | Stop reason: HOSPADM

## 2018-12-12 RX ORDER — METOPROLOL TARTRATE 50 MG/1
50 TABLET, FILM COATED ORAL 2 TIMES DAILY
Status: DISCONTINUED | OUTPATIENT
Start: 2018-12-12 | End: 2018-12-12 | Stop reason: HOSPADM

## 2018-12-12 RX ORDER — METOPROLOL TARTRATE 50 MG/1
50 TABLET, FILM COATED ORAL 2 TIMES DAILY
Qty: 180 TABLET | Refills: 1 | Status: SHIPPED | OUTPATIENT
Start: 2018-12-12 | End: 2020-11-16 | Stop reason: DRUGHIGH

## 2018-12-12 RX ADMIN — FUROSEMIDE 20 MG: 20 TABLET ORAL at 08:18

## 2018-12-12 RX ADMIN — DOCUSATE SODIUM 100 MG: 100 CAPSULE, LIQUID FILLED ORAL at 08:18

## 2018-12-12 RX ADMIN — DIPHENHYDRAMINE HCL 25 MG: 25 TABLET ORAL at 01:29

## 2018-12-12 RX ADMIN — PNEUMOCOCCAL 13-VALENT CONJUGATE VACCINE 0.5 ML: 2.2; 2.2; 2.2; 2.2; 2.2; 4.4; 2.2; 2.2; 2.2; 2.2; 2.2; 2.2; 2.2 INJECTION, SUSPENSION INTRAMUSCULAR at 10:46

## 2018-12-12 RX ADMIN — SODIUM CHLORIDE, PRESERVATIVE FREE 10 ML: 5 INJECTION INTRAVENOUS at 08:18

## 2018-12-12 RX ADMIN — CEFAZOLIN SODIUM 1 G: 1 INJECTION, SOLUTION INTRAVENOUS at 01:29

## 2018-12-12 RX ADMIN — MULTIPLE VITAMINS W/ MINERALS TAB 1 TABLET: TAB at 08:18

## 2018-12-12 RX ADMIN — Medication 5000 UNITS: at 08:18

## 2018-12-12 RX ADMIN — ASPIRIN 81 MG: 81 TABLET, COATED ORAL at 08:18

## 2018-12-12 RX ADMIN — METOPROLOL TARTRATE 50 MG: 50 TABLET ORAL at 10:48

## 2018-12-12 RX ADMIN — TRAMADOL HYDROCHLORIDE 50 MG: 50 TABLET, FILM COATED ORAL at 01:29

## 2018-12-12 RX ADMIN — CITALOPRAM 20 MG: 20 TABLET, FILM COATED ORAL at 08:19

## 2018-12-12 RX ADMIN — CEFAZOLIN SODIUM 1 G: 1 INJECTION, SOLUTION INTRAVENOUS at 08:22

## 2018-12-12 ASSESSMENT — PAIN SCALES - GENERAL: PAINLEVEL_OUTOF10: 8

## 2018-12-17 ENCOUNTER — TELEPHONE (OUTPATIENT)
Dept: CARDIOLOGY CLINIC | Age: 73
End: 2018-12-17

## 2018-12-21 ENCOUNTER — NURSE ONLY (OUTPATIENT)
Dept: CARDIOLOGY CLINIC | Age: 73
End: 2018-12-21

## 2018-12-21 ENCOUNTER — TELEPHONE (OUTPATIENT)
Dept: CARDIOLOGY CLINIC | Age: 73
End: 2018-12-21

## 2018-12-21 VITALS — TEMPERATURE: 98.1 F

## 2018-12-21 DIAGNOSIS — Z95.0 STATUS POST PLACEMENT OF CARDIAC PACEMAKER: Primary | ICD-10-CM

## 2018-12-21 PROCEDURE — 99024 POSTOP FOLLOW-UP VISIT: CPT | Performed by: INTERNAL MEDICINE

## 2018-12-21 NOTE — PROGRESS NOTES
Patient seen for site check post pacer implant. Dressing removed. No signs of inflammation or infection noted. Edges well approximated. Patient has no complaints of pain or discomfort. Single steri strip applied. Patient instructed to no lift arm higher than shoulder level. Instructions given on the use of [employer] monitor.

## 2019-01-03 ENCOUNTER — TELEPHONE (OUTPATIENT)
Dept: CARDIOLOGY CLINIC | Age: 74
End: 2019-01-03

## 2019-01-18 ENCOUNTER — HOSPITAL ENCOUNTER (OUTPATIENT)
Dept: GENERAL RADIOLOGY | Age: 74
Discharge: HOME | End: 2019-01-18
Payer: MEDICARE

## 2019-01-18 ENCOUNTER — HOSPITAL ENCOUNTER (OUTPATIENT)
Age: 74
Discharge: HOME | End: 2019-01-18
Payer: MEDICARE

## 2019-01-18 DIAGNOSIS — R05.9 COUGH: ICD-10-CM

## 2019-01-18 PROCEDURE — 71046 X-RAY EXAM CHEST 2 VIEWS: CPT

## 2019-01-22 ENCOUNTER — TELEPHONE (OUTPATIENT)
Dept: CARDIOLOGY CLINIC | Age: 74
End: 2019-01-22

## 2019-02-05 ENCOUNTER — OFFICE VISIT (OUTPATIENT)
Dept: CARDIOLOGY CLINIC | Age: 74
End: 2019-02-05
Payer: MEDICARE

## 2019-02-05 VITALS
HEART RATE: 66 BPM | BODY MASS INDEX: 28.53 KG/M2 | HEIGHT: 63 IN | WEIGHT: 161 LBS | DIASTOLIC BLOOD PRESSURE: 70 MMHG | SYSTOLIC BLOOD PRESSURE: 120 MMHG

## 2019-02-05 DIAGNOSIS — Z95.0 S/P PLACEMENT OF CARDIAC PACEMAKER: Primary | ICD-10-CM

## 2019-02-05 DIAGNOSIS — I49.5 TACHYCARDIA-BRADYCARDIA (HCC): ICD-10-CM

## 2019-02-05 PROCEDURE — 1101F PT FALLS ASSESS-DOCD LE1/YR: CPT | Performed by: NURSE PRACTITIONER

## 2019-02-05 PROCEDURE — 99212 OFFICE O/P EST SF 10 MIN: CPT | Performed by: NURSE PRACTITIONER

## 2019-02-05 PROCEDURE — 4040F PNEUMOC VAC/ADMIN/RCVD: CPT | Performed by: NURSE PRACTITIONER

## 2019-02-05 PROCEDURE — 3017F COLORECTAL CA SCREEN DOC REV: CPT | Performed by: NURSE PRACTITIONER

## 2019-02-05 PROCEDURE — 1036F TOBACCO NON-USER: CPT | Performed by: NURSE PRACTITIONER

## 2019-02-05 PROCEDURE — G8428 CUR MEDS NOT DOCUMENT: HCPCS | Performed by: NURSE PRACTITIONER

## 2019-02-05 PROCEDURE — 1090F PRES/ABSN URINE INCON ASSESS: CPT | Performed by: NURSE PRACTITIONER

## 2019-02-05 PROCEDURE — G8484 FLU IMMUNIZE NO ADMIN: HCPCS | Performed by: NURSE PRACTITIONER

## 2019-02-05 PROCEDURE — 1123F ACP DISCUSS/DSCN MKR DOCD: CPT | Performed by: NURSE PRACTITIONER

## 2019-02-05 PROCEDURE — G8417 CALC BMI ABV UP PARAM F/U: HCPCS | Performed by: NURSE PRACTITIONER

## 2019-02-05 PROCEDURE — G8598 ASA/ANTIPLAT THER USED: HCPCS | Performed by: NURSE PRACTITIONER

## 2019-02-05 PROCEDURE — G8399 PT W/DXA RESULTS DOCUMENT: HCPCS | Performed by: NURSE PRACTITIONER

## 2019-02-05 ASSESSMENT — ENCOUNTER SYMPTOMS
CHEST TIGHTNESS: 0
ABDOMINAL PAIN: 0
COUGH: 0
COLOR CHANGE: 0
VOMITING: 0
WHEEZING: 0
NAUSEA: 0
EYE PAIN: 0
BACK PAIN: 0
SHORTNESS OF BREATH: 0
PHOTOPHOBIA: 0
BLOOD IN STOOL: 0

## 2019-02-20 ENCOUNTER — TELEPHONE (OUTPATIENT)
Dept: CARDIOLOGY CLINIC | Age: 74
End: 2019-02-20

## 2019-03-14 ENCOUNTER — TELEPHONE (OUTPATIENT)
Dept: CARDIOLOGY CLINIC | Age: 74
End: 2019-03-14

## 2019-04-12 ENCOUNTER — TELEPHONE (OUTPATIENT)
Dept: CARDIOLOGY CLINIC | Age: 74
End: 2019-04-12

## 2019-05-06 ENCOUNTER — OFFICE VISIT (OUTPATIENT)
Dept: CARDIOLOGY CLINIC | Age: 74
End: 2019-05-06
Payer: MEDICARE

## 2019-05-06 VITALS
BODY MASS INDEX: 28.7 KG/M2 | DIASTOLIC BLOOD PRESSURE: 78 MMHG | SYSTOLIC BLOOD PRESSURE: 120 MMHG | HEIGHT: 63 IN | WEIGHT: 162 LBS | HEART RATE: 70 BPM

## 2019-05-06 DIAGNOSIS — I48.0 PAROXYSMAL ATRIAL FIBRILLATION (HCC): ICD-10-CM

## 2019-05-06 DIAGNOSIS — Z95.1 S/P CABG (CORONARY ARTERY BYPASS GRAFT): Primary | ICD-10-CM

## 2019-05-06 PROCEDURE — 4040F PNEUMOC VAC/ADMIN/RCVD: CPT | Performed by: INTERNAL MEDICINE

## 2019-05-06 PROCEDURE — G8417 CALC BMI ABV UP PARAM F/U: HCPCS | Performed by: INTERNAL MEDICINE

## 2019-05-06 PROCEDURE — G8427 DOCREV CUR MEDS BY ELIG CLIN: HCPCS | Performed by: INTERNAL MEDICINE

## 2019-05-06 PROCEDURE — 1123F ACP DISCUSS/DSCN MKR DOCD: CPT | Performed by: INTERNAL MEDICINE

## 2019-05-06 PROCEDURE — G8399 PT W/DXA RESULTS DOCUMENT: HCPCS | Performed by: INTERNAL MEDICINE

## 2019-05-06 PROCEDURE — 1036F TOBACCO NON-USER: CPT | Performed by: INTERNAL MEDICINE

## 2019-05-06 PROCEDURE — 1090F PRES/ABSN URINE INCON ASSESS: CPT | Performed by: INTERNAL MEDICINE

## 2019-05-06 PROCEDURE — G8598 ASA/ANTIPLAT THER USED: HCPCS | Performed by: INTERNAL MEDICINE

## 2019-05-06 PROCEDURE — 99214 OFFICE O/P EST MOD 30 MIN: CPT | Performed by: INTERNAL MEDICINE

## 2019-05-06 RX ORDER — ACETAMINOPHEN 325 MG/1
650 TABLET ORAL EVERY 6 HOURS PRN
COMMUNITY
End: 2019-11-20

## 2019-05-06 RX ORDER — ESTRADIOL 0.1 MG/G
2 CREAM VAGINAL 3 TIMES DAILY PRN
COMMUNITY
End: 2022-11-02

## 2019-05-06 RX ORDER — LANOLIN ALCOHOL/MO/W.PET/CERES
3 CREAM (GRAM) TOPICAL DAILY
COMMUNITY

## 2019-05-06 NOTE — PROGRESS NOTES
[id number] Score for Atrial Fibrillation Stroke Risk   Risk   Factors  Component Value   C CHF Yes 1   H HTN Yes 1   A2 Age >= 76 Yes,  (75 y.o.) 2   D DM No 0   S2 Prior Stroke/TIA No 0   V Vascular Disease No 0   A Age 74-69 No,  (75 y.o.) 0   Sc Sex female 1    [id number]  Score  5   Score last updated 3/9/89 7:80 PM    Click here for a link to the UpToDate guideline \"Atrial Fibrillation: Anticoagulation therapy to prevent embolization    Disclaimer: Risk Score calculation is dependent on accuracy of patient problem list and past encounter diagnosis.

## 2019-05-06 NOTE — PROGRESS NOTES
CARDIOLOGY NOTE      5/6/2019    RE: Laurel Zamora  (1945)                               TO:  Dr. Kimberly Anderson MD            Moni Lacy is a 66 y.o. female who was seen today for management of  cad                                    HPI:   Patient is here for    - Coronary artery disease, does not have chest pain. Patient is  compliant with prescribed medicines. - Hyperlipidimea, lipids are in acceptable range. Pt  is  compliant with medicines  - Atrial fibrillation, pt is  compliant with meds.  Patient does not have symptoms from atrial fibrillation  - VHD  MV repair OK  - PPM                The patient does not have cardiac complaints    Laurel Zamora has the following history recorded in care path:  Patient Active Problem List    Diagnosis Date Noted    Angina pectoris ([facility] [address]      Priority: High    S/P placement of cardiac pacemaker     Tachycardia-bradycardia ([facility] [address]     SSS (sick sinus syndrome) ([facility] [address] 12/11/2018    Pneumonia due to infectious organism 07/18/2018    CHF following cardiac surgery, postop 07/10/2018    Acute blood loss anemia 07/10/2018    Uncontrolled pain 07/10/2018    Gait disturbance 07/10/2018    Uncontrolled hypertension 07/03/2018    Paroxysmal atrial fibrillation ([address] 06/07/2018    Essential hypertension 06/07/2018    Mixed hyperlipidemia 06/07/2018    VHD (valvular heart disease) 06/07/2018    Abnormal EKG 06/07/2018     Current Outpatient Medications   Medication Sig Dispense Refill    melatonin 3 MG TABS tablet Take 3 mg by mouth daily      acetaminophen (TYLENOL) 325 MG tablet Take 650 mg by mouth every 6 hours as needed for Pain      estradiol (ESTRACE) 0.1 MG/GM vaginal cream Place 2 g vaginally 3 times daily      apixaban (ELIQUIS) 5 MG TABS tablet Take 1 tablet by mouth 2 times daily 56 tablet 0    metoprolol tartrate (LOPRESSOR) 50 MG tablet Take 1 tablet by mouth 2 times daily 180 tablet 1    cefadroxil (DURICEF) 500 MG capsule Take 500 mg by mouth 2 times daily      aspirin 81 MG EC tablet Take 1 tablet by mouth daily 30 tablet 3    furosemide (LASIX) 20 MG tablet Take 1 tablet by mouth daily (Patient taking differently: Take 20 mg by mouth daily Starting tomorrow 9/1/18, will take every other day.) 30 tablet 0    docusate sodium (COLACE, DULCOLAX) 100 MG CAPS Take 100 mg by mouth 2 times daily      Multiple Vitamins-Minerals (THERAPEUTIC MULTIVITAMIN-MINERALS) tablet Take 1 tablet by mouth daily      metFORMIN (GLUCOPHAGE) 500 MG tablet Take 500 mg by mouth 2 times daily (with meals)      donepezil (ARICEPT) 5 MG tablet TAKE 1 TABLET BY MOUTH EVERY DAY  2    citalopram (CELEXA) 40 MG tablet TAKE 1 TABLET EVERY DAY FOR ANXIETY AND STRESS  5    Cholecalciferol (VITAMIN D3) 5000 units TABS Take 1 tablet by mouth daily       CALCIUM CARBONATE PO Take 500 mg by mouth daily       traMADol (ULTRAM) 50 MG tablet Take 50 mg by mouth every 6 hours as needed for Pain. Nile Dark rosuvastatin (CRESTOR) 40 MG tablet Take 40 mg by mouth every evening       No current facility-administered medications for this visit. Allergies: Patient has no known allergies. Past Medical History:   Diagnosis Date    Anxiety     Arthritis     knees    Atrial fibrillation ([facility] [address]     CAD (coronary artery disease)     Sees Dr. Remi Reynolds    Diabetes mellitus ([facility] [address]     H/O cardiac catheterization 06/25/2018    severe 3 vessel disease, refer to CV surgery for CABG and MAZE    H/O cardiovascular stress test 06/06/2018    EF47%  fixed perfusion defect ant wall, pt in afib    H/O echocardiogram 06/06/2018    EF55% mod MR    H/O echocardiogram 08/28/2018    EF50% sclerotic non stenotic AV, mold AR,TR, phrn, MV repair noted    History of Holter monitoring 10/23/2018    Multiple PAF episodes noted. Tachy-Dinesh episodes noted.     Hyperlipidemia     Hypertension     Memory loss     on Aricept    Missing teeth, acquired     Pneumonia     pneumococcal pneumonia twice at end of 2017    Risk for falls     uses walker    TIA (transient ischemic attack)     family doctors said she has had mini-strokes    Urinary leakage     UTI (urinary tract infection)     recent --just stopped antibiotic last week as of -18    Wears glasses      Past Surgical History:   Procedure Laterality Date    CARDIAC CATHETERIZATION  2018    EYE SURGERY Bilateral 2018    Cataracts    PACEMAKER INSERTION Left 2018    [employer] Kaia XT DR SCHWARTZ SureScan     THORACENTESIS Bilateral 2018    IR Dr. Wendy Tello, right 56, left 26 all s/s    TONSILLECTOMY  1940's    WISDOM TOOTH EXTRACTION        As reviewed   Family History   Problem Relation Age of Onset    Stroke Mother     Heart Disease Father     Early Death Father     Breast Cancer Sister    Dennis Pepper Parkinsonism Brother     Heart Disease Sister     Other Sister         fibromyalgia    Diabetes Sister     Early Death Brother          at birth     Social History     Tobacco Use    Smoking status: Never Smoker    Smokeless tobacco: Never Used   Substance Use Topics    Alcohol use: No      Review of Systems:    Constitutional: Negative for diaphoresis and fatigue  Psychological:Negative for anxiety or depression  HENT: Negative for headaches, nasal congestion, sinus pain or vertigo  Eyes: Negative for visual disturbance.    Endocrine: Negative for polydipsia/polyuria  Respiratory: Negative for shortness of breath  Cardiovascular: Negative for chest pain, dyspnea on exertion, claudication, edema, irregular heartbeat, murmur, palpitations or shortness of breath  Gastrointestinal: Negative for abdominal pain or heartburn  Genito-Urinary: Negative for urinary frequency/urgency  Musculoskeletal: Negative for muscle pain, muscular weakness, negative for pain in arm and leg or swelling in foot and leg  Neurological: Negative for dizziness, headaches, memory loss, numbness/tingling, visual changes, syncope  Dermatological: Negative for rash    Objective:  /78   Pulse 70   Ht 5' 3\" (1.6 m)   Wt 162 lb (73.5 kg)   BMI 28.70 kg/m²   Wt Readings from Last 3 Encounters:   05/06/19 162 lb (73.5 kg)   02/05/19 161 lb (73 kg)   12/12/18 165 lb (74.8 kg)     Body mass index is 28.7 kg/m². GENERAL - Alert, oriented, pleasant, in no apparent distress. EYES: No jaundice, no conjunctival pallor. SKIN: It is warm & dry. No rashes. No Echhymosis    HEENT - No clinically significant abnormalities seen. Neck - Supple. No jugular venous distention noted. No carotid bruits. Cardiovascular - Normal S1 and S2 without obvious murmur or gallop. Extremities - No cyanosis, clubbing, or significant edema. Pulmonary - No respiratory distress. No wheezes or rales. Abdomen - No masses, tenderness, or organomegaly. Musculoskeletal - No significant edema. No joint deformities. No muscle wasting. Neurologic - Cranial nerves II through XII are grossly intact. There were no gross focal neurologic abnormalities. Lab Review   No results found for: CKTOTAL, CKMB, CKMBINDEX, TROPONINT  BNP:  No results found for: BNP  PT/INR:    Lab Results   Component Value Date    INR 1.52 12/07/2018     Lab Results   Component Value Date    LABA1C 5.8 06/25/2018     Lab Results   Component Value Date    WBC 6.5 12/12/2018    HCT 34.7 (L) 12/12/2018    MCV 80.0 12/12/2018     12/12/2018     No results found for: CHOL, TRIG, HDL, LDLCALC, LDLDIRECT, CHOLHDLRATIO  No results found for: ALT, AST  BMP:    Lab Results   Component Value Date     12/12/2018    K 3.5 12/12/2018    CL 99 12/12/2018    CO2 27 12/12/2018    BUN 13 12/12/2018    CREATININE 0.8 12/12/2018     CMP:   Lab Results   Component Value Date     12/12/2018    K 3.5 12/12/2018    CL 99 12/12/2018    CO2 27 12/12/2018    BUN 13 12/12/2018     TSH:  No results found for: TSH, TSHHS      Impression:    No diagnosis found.    Patient Active Problem List Diagnosis Code    Paroxysmal atrial fibrillation (HCC) I48.0    Essential hypertension I10    Mixed hyperlipidemia E78.2    VHD (valvular heart disease) I38    Abnormal EKG R94.31    Angina pectoris (HCC) I20.9    Uncontrolled hypertension I10    CHF following cardiac surgery, postop I97.130    Acute blood loss anemia D62    Uncontrolled pain R52    Gait disturbance R26.9    Pneumonia due to infectious organism J18.9    SSS (sick sinus syndrome) ([facility]) I49.5    S/P placement of cardiac pacemaker Z95.0    Tachycardia-bradycardia ([facility]) I49.5       Assessment & Plan:               -     CORONARY ARTERY DISEASE:  asymptomatic     All available  tests in chart reviewed. Management discussed . Testing ordered  no                               -  LIPID MANAGEMENT:  Available lipid  lab data reviewed  and patient was given dietary advice. NCEP- ATP III guidelines reviewed with patient. -   Changes  in medicines made: No          - A Fib   On anticoag          - VHD  SP  MV repair    - PFO closure     - PPM  carelink reviewed  · PACER  ANALYSIS:    Pacer analysis is reviewed and filed in the pacer chart. Analysis is consistent with normal  function with stable leads and appropriate battery status for the age of the device. Remaining average battery life is 13 yrs. Patient is using pacer A pace93%, V pace. 1%. Recommend continued every three month check and follow up office visit as scheduled.     Crystal Morley MD, 5/6/2019 2:10 PM             [facility]manThe [facility] [place]

## 2019-05-13 ENCOUNTER — PROCEDURE VISIT (OUTPATIENT)
Dept: CARDIOLOGY CLINIC | Age: 74
End: 2019-05-13
Payer: MEDICARE

## 2019-05-13 DIAGNOSIS — I44.0 AV BLOCK, 1ST DEGREE: ICD-10-CM

## 2019-05-13 DIAGNOSIS — I49.5 SINUS NODE DYSFUNCTION (HCC): ICD-10-CM

## 2019-05-13 DIAGNOSIS — Z95.0 CARDIAC PACEMAKER IN SITU: Primary | ICD-10-CM

## 2019-05-13 PROCEDURE — 93294 REM INTERROG EVL PM/LDLS PM: CPT | Performed by: INTERNAL MEDICINE

## 2019-05-13 PROCEDURE — 93296 REM INTERROG EVL PM/IDS: CPT | Performed by: INTERNAL MEDICINE

## 2019-06-11 ENCOUNTER — TELEPHONE (OUTPATIENT)
Dept: CARDIOLOGY CLINIC | Age: 74
End: 2019-06-11

## 2019-06-11 NOTE — TELEPHONE ENCOUNTER
The patient needs samples of eliquis 5 mg   She will pick them up tomorrow she stated unless were out and I explained that we will give her a call

## 2019-07-11 ENCOUNTER — TELEPHONE (OUTPATIENT)
Dept: CARDIOLOGY CLINIC | Age: 74
End: 2019-07-11

## 2019-08-06 ENCOUNTER — TELEPHONE (OUTPATIENT)
Dept: CARDIOLOGY CLINIC | Age: 74
End: 2019-08-06

## 2019-08-19 ENCOUNTER — PROCEDURE VISIT (OUTPATIENT)
Dept: CARDIOLOGY CLINIC | Age: 74
End: 2019-08-19
Payer: MEDICARE

## 2019-08-19 DIAGNOSIS — Z95.0 CARDIAC PACEMAKER IN SITU: Primary | ICD-10-CM

## 2019-08-19 DIAGNOSIS — I49.5 SINUS NODE DYSFUNCTION (HCC): ICD-10-CM

## 2019-08-19 DIAGNOSIS — I44.0 AV BLOCK, 1ST DEGREE: ICD-10-CM

## 2019-08-19 PROCEDURE — 93296 REM INTERROG EVL PM/IDS: CPT | Performed by: INTERNAL MEDICINE

## 2019-08-19 PROCEDURE — 93294 REM INTERROG EVL PM/LDLS PM: CPT | Performed by: INTERNAL MEDICINE

## 2019-09-03 ENCOUNTER — TELEPHONE (OUTPATIENT)
Dept: CARDIOLOGY CLINIC | Age: 74
End: 2019-09-03

## 2019-09-04 ENCOUNTER — HOSPITAL ENCOUNTER (OUTPATIENT)
Dept: GENERAL RADIOLOGY | Age: 74
Discharge: HOME | End: 2019-09-04
Payer: MEDICARE

## 2019-09-04 DIAGNOSIS — R13.10 PROBLEMS WITH SWALLOWING AND MASTICATION: ICD-10-CM

## 2019-09-04 PROCEDURE — 74220 X-RAY XM ESOPHAGUS 1CNTRST: CPT

## 2019-10-04 ENCOUNTER — TELEPHONE (OUTPATIENT)
Dept: CARDIOLOGY CLINIC | Age: 74
End: 2019-10-04

## 2019-10-11 ENCOUNTER — APPOINTMENT (OUTPATIENT)
Dept: GENERAL RADIOLOGY | Age: 74
End: 2019-10-11
Payer: MEDICARE

## 2019-10-11 ENCOUNTER — HOSPITAL ENCOUNTER (EMERGENCY)
Age: 74
Discharge: HOME | End: 2019-10-11
Attending: EMERGENCY MEDICINE
Payer: MEDICARE

## 2019-10-11 ENCOUNTER — APPOINTMENT (OUTPATIENT)
Dept: ULTRASOUND IMAGING | Age: 74
End: 2019-10-11
Payer: MEDICARE

## 2019-10-11 VITALS
RESPIRATION RATE: 16 BRPM | BODY MASS INDEX: 29.19 KG/M2 | OXYGEN SATURATION: 100 % | HEART RATE: 62 BPM | HEIGHT: 64 IN | DIASTOLIC BLOOD PRESSURE: 95 MMHG | TEMPERATURE: 98.2 F | WEIGHT: 171 LBS | SYSTOLIC BLOOD PRESSURE: 138 MMHG

## 2019-10-11 DIAGNOSIS — M79.604 RIGHT LEG PAIN: Primary | ICD-10-CM

## 2019-10-11 PROCEDURE — 99283 EMERGENCY DEPT VISIT LOW MDM: CPT

## 2019-10-11 PROCEDURE — 73560 X-RAY EXAM OF KNEE 1 OR 2: CPT

## 2019-10-11 PROCEDURE — 6370000000 HC RX 637 (ALT 250 FOR IP): Performed by: EMERGENCY MEDICINE

## 2019-10-11 PROCEDURE — 93971 EXTREMITY STUDY: CPT

## 2019-10-11 RX ORDER — ACETAMINOPHEN 325 MG/1
650 TABLET ORAL ONCE
Status: COMPLETED | OUTPATIENT
Start: 2019-10-11 | End: 2019-10-11

## 2019-10-11 RX ADMIN — ACETAMINOPHEN 650 MG: 325 TABLET ORAL at 17:41

## 2019-10-11 ASSESSMENT — PAIN SCALES - GENERAL
PAINLEVEL_OUTOF10: 7
PAINLEVEL_OUTOF10: 7

## 2019-10-11 ASSESSMENT — PAIN DESCRIPTION - ORIENTATION: ORIENTATION: RIGHT

## 2019-10-11 ASSESSMENT — PAIN DESCRIPTION - LOCATION: LOCATION: LEG

## 2019-10-11 ASSESSMENT — PAIN DESCRIPTION - PAIN TYPE: TYPE: CHRONIC PAIN;ACUTE PAIN

## 2019-10-29 ENCOUNTER — TELEPHONE (OUTPATIENT)
Dept: CARDIOLOGY CLINIC | Age: 74
End: 2019-10-29

## 2019-11-20 ENCOUNTER — OFFICE VISIT (OUTPATIENT)
Dept: CARDIOLOGY CLINIC | Age: 74
End: 2019-11-20
Payer: MEDICARE

## 2019-11-20 VITALS
WEIGHT: 178 LBS | BODY MASS INDEX: 30.39 KG/M2 | DIASTOLIC BLOOD PRESSURE: 70 MMHG | SYSTOLIC BLOOD PRESSURE: 130 MMHG | HEART RATE: 68 BPM | HEIGHT: 64 IN

## 2019-11-20 DIAGNOSIS — I48.0 PAROXYSMAL ATRIAL FIBRILLATION (HCC): Primary | ICD-10-CM

## 2019-11-20 PROCEDURE — G8417 CALC BMI ABV UP PARAM F/U: HCPCS | Performed by: INTERNAL MEDICINE

## 2019-11-20 PROCEDURE — G8399 PT W/DXA RESULTS DOCUMENT: HCPCS | Performed by: INTERNAL MEDICINE

## 2019-11-20 PROCEDURE — G8427 DOCREV CUR MEDS BY ELIG CLIN: HCPCS | Performed by: INTERNAL MEDICINE

## 2019-11-20 PROCEDURE — 99214 OFFICE O/P EST MOD 30 MIN: CPT | Performed by: INTERNAL MEDICINE

## 2019-11-20 PROCEDURE — 4040F PNEUMOC VAC/ADMIN/RCVD: CPT | Performed by: INTERNAL MEDICINE

## 2019-11-20 PROCEDURE — 1090F PRES/ABSN URINE INCON ASSESS: CPT | Performed by: INTERNAL MEDICINE

## 2019-11-20 PROCEDURE — G8484 FLU IMMUNIZE NO ADMIN: HCPCS | Performed by: INTERNAL MEDICINE

## 2019-11-20 PROCEDURE — 1123F ACP DISCUSS/DSCN MKR DOCD: CPT | Performed by: INTERNAL MEDICINE

## 2019-11-20 PROCEDURE — G8598 ASA/ANTIPLAT THER USED: HCPCS | Performed by: INTERNAL MEDICINE

## 2019-11-20 PROCEDURE — 93000 ELECTROCARDIOGRAM COMPLETE: CPT | Performed by: INTERNAL MEDICINE

## 2019-11-20 PROCEDURE — 1036F TOBACCO NON-USER: CPT | Performed by: INTERNAL MEDICINE

## 2019-11-20 RX ORDER — OMEPRAZOLE 40 MG/1
40 CAPSULE, DELAYED RELEASE ORAL DAILY
COMMUNITY

## 2019-11-20 RX ORDER — FAMOTIDINE 40 MG/1
40 TABLET, FILM COATED ORAL NIGHTLY PRN
COMMUNITY
Start: 2019-10-31 | End: 2021-02-03 | Stop reason: ALTCHOICE

## 2019-11-20 RX ORDER — IPRATROPIUM BROMIDE 21 UG/1
1 SPRAY, METERED NASAL AS NEEDED
COMMUNITY
Start: 2019-09-27

## 2019-11-20 RX ORDER — DULOXETIN HYDROCHLORIDE 60 MG/1
60 CAPSULE, DELAYED RELEASE ORAL DAILY
COMMUNITY
Start: 2019-09-17 | End: 2021-02-03 | Stop reason: HOSPADM

## 2019-11-20 RX ORDER — GABAPENTIN 600 MG/1
TABLET ORAL
Status: ON HOLD | COMMUNITY
Start: 2019-10-30 | End: 2019-12-05 | Stop reason: HOSPADM

## 2019-11-20 RX ORDER — METOCLOPRAMIDE 5 MG/1
5 TABLET ORAL 3 TIMES DAILY
COMMUNITY
End: 2021-02-03 | Stop reason: ALTCHOICE

## 2019-11-20 RX ORDER — PREGABALIN 150 MG/1
150 CAPSULE ORAL 2 TIMES DAILY
Status: ON HOLD | COMMUNITY
Start: 2019-10-30 | End: 2022-07-12 | Stop reason: HOSPADM

## 2019-11-25 ENCOUNTER — PROCEDURE VISIT (OUTPATIENT)
Dept: CARDIOLOGY CLINIC | Age: 74
End: 2019-11-25
Payer: MEDICARE

## 2019-11-25 ENCOUNTER — TELEPHONE (OUTPATIENT)
Dept: CARDIOLOGY CLINIC | Age: 74
End: 2019-11-25

## 2019-11-25 DIAGNOSIS — Z95.0 CARDIAC PACEMAKER IN SITU: Primary | ICD-10-CM

## 2019-11-25 DIAGNOSIS — I44.0 AV BLOCK, 1ST DEGREE: ICD-10-CM

## 2019-11-25 DIAGNOSIS — I49.5 SINUS NODE DYSFUNCTION (HCC): ICD-10-CM

## 2019-11-25 PROCEDURE — 93294 REM INTERROG EVL PM/LDLS PM: CPT | Performed by: INTERNAL MEDICINE

## 2019-11-25 PROCEDURE — 93296 REM INTERROG EVL PM/IDS: CPT | Performed by: INTERNAL MEDICINE

## 2019-11-30 ENCOUNTER — HOSPITAL ENCOUNTER (EMERGENCY)
Age: 74
Discharge: HOME | End: 2019-11-30
Attending: EMERGENCY MEDICINE
Payer: MEDICARE

## 2019-11-30 ENCOUNTER — APPOINTMENT (OUTPATIENT)
Dept: CT IMAGING | Age: 74
End: 2019-11-30
Payer: MEDICARE

## 2019-11-30 VITALS
SYSTOLIC BLOOD PRESSURE: 121 MMHG | DIASTOLIC BLOOD PRESSURE: 68 MMHG | OXYGEN SATURATION: 100 % | WEIGHT: 175 LBS | HEIGHT: 64 IN | BODY MASS INDEX: 29.88 KG/M2 | TEMPERATURE: 97.5 F | HEART RATE: 61 BPM | RESPIRATION RATE: 14 BRPM
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
## 2019-11-30 DIAGNOSIS — S00.03XA HEMATOMA OF FRONTAL SCALP, INITIAL ENCOUNTER: ICD-10-CM

## 2019-11-30 PROCEDURE — 72125 CT NECK SPINE W/O DYE: CPT

## 2019-11-30 PROCEDURE — 6370000000 HC RX 637 (ALT 250 FOR IP): Performed by: EMERGENCY MEDICINE

## 2019-11-30 PROCEDURE — 99284 EMERGENCY DEPT VISIT MOD MDM: CPT

## 2019-11-30 PROCEDURE — 70450 CT HEAD/BRAIN W/O DYE: CPT

## 2019-11-30 RX ORDER — ACETAMINOPHEN 325 MG/1
TABLET ORAL
Status: DISCONTINUED
Start: 2019-11-30 | End: 2019-11-30 | Stop reason: HOSPADM

## 2019-11-30 RX ORDER — ACETAMINOPHEN 325 MG/1
650 TABLET ORAL ONCE
Status: COMPLETED | OUTPATIENT
Start: 2019-11-30 | End: 2019-11-30

## 2019-11-30 RX ADMIN — ACETAMINOPHEN 650 MG: 325 TABLET ORAL at 15:38

## 2019-11-30 ASSESSMENT — PAIN SCALES - GENERAL: PAINLEVEL_OUTOF10: 6

## 2019-12-04 ENCOUNTER — APPOINTMENT (OUTPATIENT)
Dept: CT IMAGING | Age: 74
End: 2019-12-04
Payer: MEDICARE

## 2019-12-04 ENCOUNTER — HOSPITAL ENCOUNTER (OUTPATIENT)
Age: 74
Setting detail: OBSERVATION
Discharge: HOME | End: 2019-12-05
Attending: EMERGENCY MEDICINE | Admitting: INTERNAL MEDICINE
Payer: MEDICARE

## 2019-12-04 DIAGNOSIS — H53.8 BLURRY VISION, RIGHT EYE: ICD-10-CM

## 2019-12-04 DIAGNOSIS — R42 DIZZINESS: Primary | ICD-10-CM
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
## 2019-12-04 PROCEDURE — G0378 HOSPITAL OBSERVATION PER HR: HCPCS

## 2019-12-04 PROCEDURE — 70450 CT HEAD/BRAIN W/O DYE: CPT

## 2019-12-04 PROCEDURE — 99285 EMERGENCY DEPT VISIT HI MDM: CPT

## 2019-12-04 PROCEDURE — 72125 CT NECK SPINE W/O DYE: CPT

## 2019-12-04 PROCEDURE — 70486 CT MAXILLOFACIAL W/O DYE: CPT

## 2019-12-04 RX ORDER — ASPIRIN 81 MG/1
81 TABLET ORAL DAILY
Status: DISCONTINUED | OUTPATIENT
Start: 2019-12-05 | End: 2019-12-05 | Stop reason: HOSPADM

## 2019-12-04 RX ORDER — DEXTROSE MONOHYDRATE 25 G/50ML
12.5 INJECTION, SOLUTION INTRAVENOUS PRN
Status: DISCONTINUED | OUTPATIENT
Start: 2019-12-04 | End: 2019-12-05 | Stop reason: HOSPADM

## 2019-12-04 RX ORDER — ROSUVASTATIN CALCIUM 20 MG/1
40 TABLET, COATED ORAL EVERY EVENING
Status: DISCONTINUED | OUTPATIENT
Start: 2019-12-04 | End: 2019-12-05 | Stop reason: HOSPADM

## 2019-12-04 RX ORDER — FAMOTIDINE 20 MG/1
20 TABLET, FILM COATED ORAL DAILY
Status: DISCONTINUED | OUTPATIENT
Start: 2019-12-05 | End: 2019-12-05 | Stop reason: HOSPADM

## 2019-12-04 RX ORDER — DONEPEZIL HYDROCHLORIDE 5 MG/1
5 TABLET, FILM COATED ORAL DAILY
Status: DISCONTINUED | OUTPATIENT
Start: 2019-12-05 | End: 2019-12-05 | Stop reason: HOSPADM

## 2019-12-04 RX ORDER — DULOXETIN HYDROCHLORIDE 30 MG/1
60 CAPSULE, DELAYED RELEASE ORAL DAILY
Status: DISCONTINUED | OUTPATIENT
Start: 2019-12-05 | End: 2019-12-05 | Stop reason: HOSPADM

## 2019-12-04 RX ORDER — PANTOPRAZOLE SODIUM 40 MG/1
40 TABLET, DELAYED RELEASE ORAL
Status: DISCONTINUED | OUTPATIENT
Start: 2019-12-05 | End: 2019-12-05 | Stop reason: HOSPADM

## 2019-12-04 RX ORDER — CITALOPRAM 40 MG/1
40 TABLET ORAL DAILY
Status: DISCONTINUED | OUTPATIENT
Start: 2019-12-05 | End: 2019-12-05 | Stop reason: HOSPADM

## 2019-12-04 RX ORDER — DOCUSATE SODIUM 100 MG/1
100 CAPSULE, LIQUID FILLED ORAL 2 TIMES DAILY
Status: DISCONTINUED | OUTPATIENT
Start: 2019-12-04 | End: 2019-12-05 | Stop reason: HOSPADM

## 2019-12-04 RX ORDER — TRAMADOL HYDROCHLORIDE 50 MG/1
50 TABLET ORAL EVERY 6 HOURS PRN
Status: DISCONTINUED | OUTPATIENT
Start: 2019-12-04 | End: 2019-12-05 | Stop reason: HOSPADM

## 2019-12-04 RX ORDER — METOPROLOL TARTRATE 50 MG/1
50 TABLET, FILM COATED ORAL 2 TIMES DAILY
Status: DISCONTINUED | OUTPATIENT
Start: 2019-12-04 | End: 2019-12-05 | Stop reason: HOSPADM

## 2019-12-04 RX ORDER — DEXTROSE MONOHYDRATE 50 MG/ML
100 INJECTION, SOLUTION INTRAVENOUS PRN
Status: DISCONTINUED | OUTPATIENT
Start: 2019-12-04 | End: 2019-12-05 | Stop reason: HOSPADM

## 2019-12-04 RX ORDER — LANOLIN ALCOHOL/MO/W.PET/CERES
3 CREAM (GRAM) TOPICAL NIGHTLY
Status: DISCONTINUED | OUTPATIENT
Start: 2019-12-04 | End: 2019-12-05 | Stop reason: HOSPADM

## 2019-12-04 RX ORDER — CALCIUM CARBONATE 500(1250)
500 TABLET ORAL DAILY
Status: DISCONTINUED | OUTPATIENT
Start: 2019-12-05 | End: 2019-12-05 | Stop reason: HOSPADM

## 2019-12-04 RX ORDER — NICOTINE POLACRILEX 4 MG
15 LOZENGE BUCCAL PRN
Status: DISCONTINUED | OUTPATIENT
Start: 2019-12-04 | End: 2019-12-05 | Stop reason: HOSPADM

## 2019-12-04 ASSESSMENT — VISUAL ACUITY
OU: 20/40
OS: 20/36

## 2019-12-05 ENCOUNTER — APPOINTMENT (OUTPATIENT)
Dept: MRI IMAGING | Age: 74
End: 2019-12-05
Payer: MEDICARE

## 2019-12-05 VITALS
HEART RATE: 84 BPM | OXYGEN SATURATION: 96 % | SYSTOLIC BLOOD PRESSURE: 155 MMHG | RESPIRATION RATE: 18 BRPM | DIASTOLIC BLOOD PRESSURE: 73 MMHG | TEMPERATURE: 98 F | BODY MASS INDEX: 30.64 KG/M2 | HEIGHT: 64 IN | WEIGHT: 179.45 LBS

## 2019-12-05 LAB
ANION GAP SERPL CALCULATED.3IONS-SCNC: 10 MMOL/L (ref 4–16)
BASOPHILS ABSOLUTE: 0.1 K/CU MM
BASOPHILS RELATIVE PERCENT: 0.8 % (ref 0–1)
BUN BLDV-MCNC: 13 MG/DL (ref 6–23)
CALCIUM SERPL-MCNC: 9.5 MG/DL (ref 8.3–10.6)
CHLORIDE BLD-SCNC: 100 MMOL/L (ref 99–110)
CO2: 30 MMOL/L (ref 21–32)
CREAT SERPL-MCNC: 0.8 MG/DL (ref 0.6–1.1)
DIFFERENTIAL TYPE: ABNORMAL
EOSINOPHILS ABSOLUTE: 0.1 K/CU MM
EOSINOPHILS RELATIVE PERCENT: 2.4 % (ref 0–3)
ESTIMATED AVERAGE GLUCOSE: 123 MG/DL
GFR AFRICAN AMERICAN: >60 ML/MIN/1.73M2
GFR NON-AFRICAN AMERICAN: >60 ML/MIN/1.73M2
GLUCOSE BLD-MCNC: 104 MG/DL (ref 70–99)
GLUCOSE BLD-MCNC: 127 MG/DL (ref 70–99)
GLUCOSE BLD-MCNC: 91 MG/DL (ref 70–99)
GLUCOSE BLD-MCNC: 99 MG/DL (ref 70–99)
HBA1C MFR BLD: 5.9 % (ref 4.2–6.3)
HCT VFR BLD CALC: 33.4 % (ref 37–47)
HEMOGLOBIN: 10.9 GM/DL (ref 12.5–16)
IMMATURE NEUTROPHIL %: 0.5 % (ref 0–0.43)
LYMPHOCYTES ABSOLUTE: 2 K/CU MM
LYMPHOCYTES RELATIVE PERCENT: 33.7 % (ref 24–44)
MAGNESIUM: 2 MG/DL (ref 1.8–2.4)
MCH RBC QN AUTO: 27 PG (ref 27–31)
MCHC RBC AUTO-ENTMCNC: 32.6 % (ref 32–36)
MCV RBC AUTO: 82.9 FL (ref 78–100)
MONOCYTES ABSOLUTE: 0.5 K/CU MM
MONOCYTES RELATIVE PERCENT: 7.9 % (ref 0–4)
NUCLEATED RBC %: 0 %
PDW BLD-RTO: 12.9 % (ref 11.7–14.9)
PLATELET # BLD: 166 K/CU MM (ref 140–440)
PMV BLD AUTO: 9.1 FL (ref 7.5–11.1)
POTASSIUM SERPL-SCNC: 3.9 MMOL/L (ref 3.5–5.1)
RBC # BLD: 4.03 M/CU MM (ref 4.2–5.4)
SEGMENTED NEUTROPHILS ABSOLUTE COUNT: 3.3 K/CU MM
SEGMENTED NEUTROPHILS RELATIVE PERCENT: 54.7 % (ref 36–66)
SODIUM BLD-SCNC: 140 MMOL/L (ref 135–145)
TOTAL IMMATURE NEUTOROPHIL: 0.03 K/CU MM
TOTAL NUCLEATED RBC: 0 K/CU MM
WBC # BLD: 5.9 K/CU MM (ref 4–10.5)

## 2019-12-05 PROCEDURE — 6370000000 HC RX 637 (ALT 250 FOR IP): Performed by: INTERNAL MEDICINE

## 2019-12-05 PROCEDURE — G0378 HOSPITAL OBSERVATION PER HR: HCPCS

## 2019-12-05 PROCEDURE — 70551 MRI BRAIN STEM W/O DYE: CPT

## 2019-12-05 PROCEDURE — 82962 GLUCOSE BLOOD TEST: CPT

## 2019-12-05 PROCEDURE — 99205 OFFICE O/P NEW HI 60 MIN: CPT | Performed by: PSYCHIATRY & NEUROLOGY

## 2019-12-05 PROCEDURE — 97530 THERAPEUTIC ACTIVITIES: CPT

## 2019-12-05 PROCEDURE — 94761 N-INVAS EAR/PLS OXIMETRY MLT: CPT

## 2019-12-05 PROCEDURE — 83735 ASSAY OF MAGNESIUM: CPT

## 2019-12-05 PROCEDURE — 85025 COMPLETE CBC W/AUTO DIFF WBC: CPT

## 2019-12-05 PROCEDURE — 80048 BASIC METABOLIC PNL TOTAL CA: CPT

## 2019-12-05 PROCEDURE — 83036 HEMOGLOBIN GLYCOSYLATED A1C: CPT

## 2019-12-05 PROCEDURE — 97162 PT EVAL MOD COMPLEX 30 MIN: CPT

## 2019-12-05 PROCEDURE — 36415 COLL VENOUS BLD VENIPUNCTURE: CPT

## 2019-12-05 RX ADMIN — ROSUVASTATIN CALCIUM 40 MG: 20 TABLET, COATED ORAL at 00:17

## 2019-12-05 RX ADMIN — METOPROLOL TARTRATE 50 MG: 50 TABLET, FILM COATED ORAL at 00:17

## 2019-12-05 RX ADMIN — CALCIUM 500 MG: 500 TABLET ORAL at 09:25

## 2019-12-05 RX ADMIN — CITALOPRAM HYDROBROMIDE 40 MG: 40 TABLET ORAL at 09:25

## 2019-12-05 RX ADMIN — Medication 5000 UNITS: at 09:24

## 2019-12-05 RX ADMIN — DONEPEZIL HYDROCHLORIDE 5 MG: 5 TABLET, FILM COATED ORAL at 09:24

## 2019-12-05 RX ADMIN — METOPROLOL TARTRATE 50 MG: 50 TABLET, FILM COATED ORAL at 09:25

## 2019-12-05 RX ADMIN — MELATONIN 3 MG: at 00:17

## 2019-12-05 RX ADMIN — FAMOTIDINE 20 MG: 20 TABLET, FILM COATED ORAL at 09:24

## 2019-12-05 RX ADMIN — DULOXETINE HYDROCHLORIDE 60 MG: 30 CAPSULE, DELAYED RELEASE ORAL at 09:24

## 2019-12-05 RX ADMIN — ASPIRIN 81 MG: 81 TABLET, COATED ORAL at 09:25

## 2019-12-05 RX ADMIN — PANTOPRAZOLE SODIUM 40 MG: 40 TABLET, DELAYED RELEASE ORAL at 05:14

## 2019-12-05 ASSESSMENT — PAIN SCALES - GENERAL: PAINLEVEL_OUTOF10: 0

## 2019-12-09 ENCOUNTER — TELEPHONE (OUTPATIENT)
Dept: CARDIOLOGY CLINIC | Age: 74
End: 2019-12-09

## 2020-01-24 ENCOUNTER — TELEPHONE (OUTPATIENT)
Dept: CARDIOLOGY CLINIC | Age: 75
End: 2020-01-24

## 2020-02-10 ENCOUNTER — TELEPHONE (OUTPATIENT)
Dept: CARDIOLOGY CLINIC | Age: 75
End: 2020-02-10

## 2020-02-12 ENCOUNTER — TELEPHONE (OUTPATIENT)
Dept: CARDIOLOGY CLINIC | Age: 75
End: 2020-02-12

## 2020-02-13 ENCOUNTER — TELEPHONE (OUTPATIENT)
Dept: CARDIOLOGY CLINIC | Age: 75
End: 2020-02-13

## 2020-03-02 ENCOUNTER — PROCEDURE VISIT (OUTPATIENT)
Dept: CARDIOLOGY CLINIC | Age: 75
End: 2020-03-02
Payer: MEDICARE

## 2020-03-02 PROCEDURE — 93294 REM INTERROG EVL PM/LDLS PM: CPT | Performed by: INTERNAL MEDICINE

## 2020-03-02 PROCEDURE — 93296 REM INTERROG EVL PM/IDS: CPT | Performed by: INTERNAL MEDICINE

## 2020-03-07 ENCOUNTER — OFFICE VISIT (OUTPATIENT)
Dept: CARDIOLOGY CLINIC | Age: 75
End: 2020-03-07
Payer: MEDICARE

## 2020-03-07 ENCOUNTER — TELEPHONE (OUTPATIENT)
Dept: CARDIOLOGY CLINIC | Age: 75
End: 2020-03-07

## 2020-03-07 VITALS
DIASTOLIC BLOOD PRESSURE: 60 MMHG | SYSTOLIC BLOOD PRESSURE: 112 MMHG | HEIGHT: 64 IN | WEIGHT: 177.8 LBS | HEART RATE: 61 BPM | BODY MASS INDEX: 30.35 KG/M2

## 2020-03-07 PROCEDURE — G8399 PT W/DXA RESULTS DOCUMENT: HCPCS | Performed by: INTERNAL MEDICINE

## 2020-03-07 PROCEDURE — 1036F TOBACCO NON-USER: CPT | Performed by: INTERNAL MEDICINE

## 2020-03-07 PROCEDURE — 1123F ACP DISCUSS/DSCN MKR DOCD: CPT | Performed by: INTERNAL MEDICINE

## 2020-03-07 PROCEDURE — G8427 DOCREV CUR MEDS BY ELIG CLIN: HCPCS | Performed by: INTERNAL MEDICINE

## 2020-03-07 PROCEDURE — G8484 FLU IMMUNIZE NO ADMIN: HCPCS | Performed by: INTERNAL MEDICINE

## 2020-03-07 PROCEDURE — 4040F PNEUMOC VAC/ADMIN/RCVD: CPT | Performed by: INTERNAL MEDICINE

## 2020-03-07 PROCEDURE — 99214 OFFICE O/P EST MOD 30 MIN: CPT | Performed by: INTERNAL MEDICINE

## 2020-03-07 PROCEDURE — 1090F PRES/ABSN URINE INCON ASSESS: CPT | Performed by: INTERNAL MEDICINE

## 2020-03-07 PROCEDURE — 93000 ELECTROCARDIOGRAM COMPLETE: CPT | Performed by: INTERNAL MEDICINE

## 2020-03-07 PROCEDURE — G8417 CALC BMI ABV UP PARAM F/U: HCPCS | Performed by: INTERNAL MEDICINE

## 2020-03-07 RX ORDER — ACETAMINOPHEN 325 MG/1
650 TABLET ORAL EVERY 6 HOURS PRN
COMMUNITY
End: 2020-12-01

## 2020-03-07 RX ORDER — GABAPENTIN 600 MG/1
600 TABLET ORAL 3 TIMES DAILY
Status: ON HOLD | COMMUNITY
End: 2022-07-12 | Stop reason: SDUPTHER

## 2020-03-07 NOTE — PROGRESS NOTES
CARDIOLOGY NOTE      3/7/2020    RE: [place] Service  (1945)                               TO:  Dr. Monica Brower MD            Hao Hair is a 66 y.o. female who was seen today for management of  cad            Here for preop cl                        HPI:   Patient is here for    - Coronary artery disease, does not have chest pain. Patient is  compliant with prescribed medicines. - Hypertension,is  well controlled, pt is  compliant with medicines  - Hyperlipidimea, lipids are in acceptable range. Pt  is  compliant with medicines  - PPM  stable  - VHD sp MVR stable                The patient does not have cardiac complaints          [place] Service has the following history recorded in care path:  Patient Active Problem List    Diagnosis Date Noted    Angina pectoris [facility])      Priority: High    Fall at home, subsequent encounter 12/04/2019    S/P placement of cardiac pacemaker     Tachycardia-bradycardia ([address]     SSS (sick sinus syndrome) ([facility] [address] 12/11/2018    Pneumonia due to infectious organism 07/18/2018    CHF following cardiac surgery, postop 07/10/2018    Acute blood loss anemia 07/10/2018    Uncontrolled pain 07/10/2018    Gait disturbance 07/10/2018    Uncontrolled hypertension 07/03/2018    Paroxysmal atrial fibrillation ([address] 06/07/2018    Essential hypertension 06/07/2018    Mixed hyperlipidemia 06/07/2018    VHD (valvular heart disease) 06/07/2018    Abnormal EKG 06/07/2018     Current Outpatient Medications   Medication Sig Dispense Refill    apixaban (ELIQUIS) 5 MG TABS tablet Take 1 tablet by mouth 2 times daily 56 tablet 0    metoclopramide (REGLAN) 5 MG tablet Take 5 mg by mouth 4 times daily      pregabalin (LYRICA) 50 MG capsule 75 mg 2 times daily.        DULoxetine (CYMBALTA) 60 MG extended release capsule       famotidine (PEPCID) 40 MG tablet       ipratropium (ATROVENT) 0.03 % nasal spray       methylcellulose (CITRUCEL) 500 MG TABS Take by mouth      omeprazole (PRILOSEC) 40 MG delayed release capsule Take 40 mg by mouth daily      melatonin 3 MG TABS tablet Take 3 mg by mouth daily      estradiol (ESTRACE) 0.1 MG/GM vaginal cream Place 2 g vaginally 3 times daily as needed       metoprolol tartrate (LOPRESSOR) 50 MG tablet Take 1 tablet by mouth 2 times daily 180 tablet 1    aspirin 81 MG EC tablet Take 1 tablet by mouth daily 30 tablet 3    furosemide (LASIX) 20 MG tablet Take 1 tablet by mouth daily (Patient taking differently: Take 20 mg by mouth daily Starting tomorrow 9/1/18, will take every other day.) 30 tablet 0    docusate sodium (COLACE, DULCOLAX) 100 MG CAPS Take 100 mg by mouth 2 times daily      metFORMIN (GLUCOPHAGE) 500 MG tablet Take 500 mg by mouth 2 times daily (with meals)      donepezil (ARICEPT) 5 MG tablet TAKE 1 TABLET BY MOUTH EVERY DAY  2    citalopram (CELEXA) 40 MG tablet TAKE 1 TABLET EVERY DAY FOR ANXIETY AND STRESS  5    Cholecalciferol (VITAMIN D3) 5000 units TABS Take 1 tablet by mouth daily       CALCIUM CARBONATE PO Take 500 mg by mouth daily       traMADol (ULTRAM) 50 MG tablet Take 50 mg by mouth every 6 hours as needed for Pain. April Tee rosuvastatin (CRESTOR) 40 MG tablet Take 40 mg by mouth every evening       No current facility-administered medications for this visit. Allergies: Patient has no known allergies.   Past Medical History:   Diagnosis Date    Anxiety     Arthritis     knees    Atrial fibrillation ([facility] [address]     CAD (coronary artery disease)     Sees Dr. Elijah Mullins    Diabetes mellitus ([facility] [address]     H/O cardiac catheterization 06/25/2018    severe 3 vessel disease, refer to CV surgery for CABG and MAZE    H/O cardiovascular stress test 06/06/2018    EF47%  fixed perfusion defect ant wall, pt in afib    H/O echocardiogram 06/06/2018    EF55% mod MR    H/O echocardiogram 08/28/2018    EF50% sclerotic non stenotic AV, mold AR,TR, phrn, MV repair noted    H/O three vessel coronary artery bypass 2018    Dr. Piter Monson History of Holter monitoring 10/23/2018    Multiple PAF episodes noted. Tachy-Dinesh episodes noted.  Hyperlipidemia     Hypertension     Memory loss     on Aricept    Missing teeth, acquired     Pneumonia     pneumococcal pneumonia twice at end of 2017   [address] for falls     uses walker    TIA (transient ischemic attack)     family doctors said she has had mini-strokes    Urinary leakage     UTI (urinary tract infection)     recent --just stopped antibiotic last week as of 18    Wears glasses      Past Surgical History:   Procedure Laterality Date    CABG WITH MITRAL VALVE REPAIR  2018    CABG X 3 [place]>LAD, SVG>CX M1, SVG>M2, MVR repair w/#28 CG ring, PFO closure, MAZE    CARDIAC CATHETERIZATION  2018    EYE SURGERY Bilateral 2018    Cataracts    MITRAL VALVE MAZE PROCEDURE  2018    Dr. Vale Chatman Left 2018    [employer] Kaia XT DR SCHWARTZ SureScan     THORACENTESIS Bilateral 2018    IR Dr. Krystian Hudson, right 56, left 26 all s/s    TONSILLECTOMY  1940's    WISDOM TOOTH EXTRACTION        As reviewed   Family History   Problem Relation Age of Onset    Stroke Mother     Heart Disease Father     Early Death Father     Breast Cancer Sister    Bedelia Fruits Parkinsonism Brother     Heart Disease Sister     Other Sister         fibromyalgia    Diabetes Sister     Early Death Brother          at birth     Social History     Tobacco Use    Smoking status: Never Smoker    Smokeless tobacco: Never Used   Substance Use Topics    Alcohol use: No      Review of Systems:    Constitutional: Negative for diaphoresis and fatigue  Psychological:Negative for anxiety or depression  HENT: Negative for headaches, nasal congestion, sinus pain or vertigo  Eyes: Negative for visual disturbance.    Endocrine: Negative for polydipsia/polyuria  Respiratory: Negative for shortness of breath  Cardiovascular: Negative for chest pain, dyspnea on exertion, claudication, edema, irregular heartbeat, murmur, palpitations or shortness of breath  Gastrointestinal: Negative for abdominal pain or heartburn  Genito-Urinary: Negative for urinary frequency/urgency  Musculoskeletal: Negative for muscle pain, muscular weakness, negative for pain in arm and leg or swelling in foot and leg  Neurological: Negative for dizziness, headaches, memory loss, numbness/tingling, visual changes, syncope  Dermatological: Negative for rash    Objective: There were no vitals taken for this visit. Wt Readings from Last 3 Encounters:   12/05/19 179 lb 7.3 oz (81.4 kg)   11/30/19 175 lb (79.4 kg)   11/20/19 178 lb (80.7 kg)     There is no height or weight on file to calculate BMI. GENERAL - Alert, oriented, pleasant, in no apparent distress. EYES: No jaundice, no conjunctival pallor. SKIN: It is warm & dry. No rashes. No Echhymosis    HEENT - No clinically significant abnormalities seen. Neck - Supple. No jugular venous distention noted. No carotid bruits. Cardiovascular - Normal S1 and S2 without obvious murmur or gallop. Extremities - No cyanosis, clubbing, or significant edema. Pulmonary - No respiratory distress. No wheezes or rales. Abdomen - No masses, tenderness, or organomegaly. Musculoskeletal - No significant edema. No joint deformities. No muscle wasting. Neurologic - Cranial nerves II through XII are grossly intact. There were no gross focal neurologic abnormalities.     Lab Review   No results found for: CKTOTAL, CKMB, CKMBINDEX, TROPONINT  BNP:  No results found for: BNP  PT/INR:    Lab Results   Component Value Date    INR 1.52 12/07/2018     Lab Results   Component Value Date    LABA1C 5.9 12/05/2019    LABA1C 5.8 06/25/2018     Lab Results   Component Value Date    WBC 5.9 12/05/2019    HCT 33.4 (L) 12/05/2019    MCV 82.9 12/05/2019     12/05/2019     No results found for: CHOL, TRIG, HDL, LDLCALC, LDLDIRECT, CHOLHDLRATIO  No results found for: ALT, AST  BMP:    Lab Results   Component Value Date     12/05/2019    K 3.9 12/05/2019     12/05/2019    CO2 30 12/05/2019    BUN 13 12/05/2019    CREATININE 0.8 12/05/2019     CMP:   Lab Results   Component Value Date     12/05/2019    K 3.9 12/05/2019     12/05/2019    CO2 30 12/05/2019    BUN 13 12/05/2019     TSH:  No results found for: TSH, TSHHS    QUALITY MEASURES REVIEWED:    Impression:    No diagnosis found. Patient Active Problem List   Diagnosis Code    [facility]oxysmal [facility] ([facility] I48.0    Essential hypertension I10    Mixed hyperlipidemia E78.2    VHD (valvular heart disease) I38    Abnormal EKG R94.31    Angina pectoris ([facility]) I20.9    Uncontrolled hypertension I10    CHF following cardiac surgery, postop I97.130    Acute blood loss anemia D62    Uncontrolled pain R52    Gait disturbance R26.9    Pneumonia due to infectious organism J18.9    SSS (sick sinus syndrome) ([facility]) I49.5    S/P placement of cardiac pacemaker Z95.0    Tachycardia-bradycardia ([address] I49.5    Fall at home, subsequent encounter W19. XXXD, Y92.009       Assessment & Plan:    - Echo and marlo for preop cl           -     CORONARY ARTERY DISEASE:  asymptomatic     All available  tests in chart reviewed. Management discussed . Testing ordered  no                                 -  Hypertension: Patients blood pressure is normal. Patient is advised about low sodium diet. Present medical regimen will not be changed. -  LIPID MANAGEMENT:  Available lipid  lab data reviewed  and patient was given dietary advice. NCEP- ATP III guidelines reviewed with patient. -   Changes  in medicines made:  No                                - PPM  carelink revieweed    - SP  MVR  stable            [facility]

## 2020-03-07 NOTE — LETTER
CLINICAL STAFF DOCUMENTATION    Charmaine Carrington  1945  [id number]    Have you had any Chest Pain - No    Have you had any Shortness of Breath - No    Have you had any dizziness - No    Have you had any palpitations - No    Do you have any edema - yes knee     Do you have a surgery or procedure scheduled in the near future -yes   If Yes- DO EKG  If Yes - Who is the surgery with?  Dr Tena Mata   Phone number of physician   Fax number of physician   Type of surgery Total Knee replacement left knee  BE SURE TO ASK PATIENT IF THEY NEED MEDICATION REFILLS

## 2020-03-13 ENCOUNTER — PROCEDURE VISIT (OUTPATIENT)
Dept: CARDIOLOGY CLINIC | Age: 75
End: 2020-03-13

## 2020-03-13 ENCOUNTER — PROCEDURE VISIT (OUTPATIENT)
Dept: CARDIOLOGY CLINIC | Age: 75
End: 2020-03-13
Payer: MEDICARE

## 2020-03-13 LAB
LV EF: 53 %
LV EF: 55 %
LVEF MODALITY: NORMAL
LVEF MODALITY: NORMAL

## 2020-03-13 PROCEDURE — 93306 TTE W/DOPPLER COMPLETE: CPT | Performed by: INTERNAL MEDICINE

## 2020-03-13 PROCEDURE — A9500 TC99M SESTAMIBI: HCPCS | Performed by: INTERNAL MEDICINE

## 2020-03-13 PROCEDURE — 78452 HT MUSCLE IMAGE SPECT MULT: CPT | Performed by: INTERNAL MEDICINE

## 2020-03-13 PROCEDURE — 93015 CV STRESS TEST SUPVJ I&R: CPT | Performed by: INTERNAL MEDICINE

## 2020-03-17 ENCOUNTER — TELEPHONE (OUTPATIENT)
Dept: CARDIOLOGY CLINIC | Age: 75
End: 2020-03-17

## 2020-03-18 ENCOUNTER — TELEPHONE (OUTPATIENT)
Dept: CARDIOLOGY CLINIC | Age: 75
End: 2020-03-18

## 2020-04-06 ENCOUNTER — TELEPHONE (OUTPATIENT)
Dept: CARDIOLOGY CLINIC | Age: 75
End: 2020-04-06

## 2020-06-08 ENCOUNTER — PROCEDURE VISIT (OUTPATIENT)
Dept: CARDIOLOGY CLINIC | Age: 75
End: 2020-06-08
Payer: MEDICARE

## 2020-06-08 PROCEDURE — 93296 REM INTERROG EVL PM/IDS: CPT | Performed by: INTERNAL MEDICINE

## 2020-06-08 PROCEDURE — 93294 REM INTERROG EVL PM/LDLS PM: CPT | Performed by: INTERNAL MEDICINE

## 2020-06-16 ENCOUNTER — OFFICE VISIT (OUTPATIENT)
Dept: CARDIOLOGY CLINIC | Age: 75
End: 2020-06-16
Payer: MEDICARE

## 2020-06-16 VITALS
WEIGHT: 176.4 LBS | HEART RATE: 73 BPM | HEIGHT: 64 IN | BODY MASS INDEX: 30.11 KG/M2 | DIASTOLIC BLOOD PRESSURE: 76 MMHG | SYSTOLIC BLOOD PRESSURE: 118 MMHG

## 2020-06-16 PROCEDURE — 1036F TOBACCO NON-USER: CPT | Performed by: INTERNAL MEDICINE

## 2020-06-16 PROCEDURE — 4040F PNEUMOC VAC/ADMIN/RCVD: CPT | Performed by: INTERNAL MEDICINE

## 2020-06-16 PROCEDURE — 99214 OFFICE O/P EST MOD 30 MIN: CPT | Performed by: INTERNAL MEDICINE

## 2020-06-16 PROCEDURE — G8399 PT W/DXA RESULTS DOCUMENT: HCPCS | Performed by: INTERNAL MEDICINE

## 2020-06-16 PROCEDURE — 1090F PRES/ABSN URINE INCON ASSESS: CPT | Performed by: INTERNAL MEDICINE

## 2020-06-16 PROCEDURE — G8427 DOCREV CUR MEDS BY ELIG CLIN: HCPCS | Performed by: INTERNAL MEDICINE

## 2020-06-16 PROCEDURE — 1123F ACP DISCUSS/DSCN MKR DOCD: CPT | Performed by: INTERNAL MEDICINE

## 2020-06-16 PROCEDURE — G8417 CALC BMI ABV UP PARAM F/U: HCPCS | Performed by: INTERNAL MEDICINE

## 2020-09-13 PROCEDURE — 93296 REM INTERROG EVL PM/IDS: CPT | Performed by: INTERNAL MEDICINE

## 2020-09-13 PROCEDURE — 93294 REM INTERROG EVL PM/LDLS PM: CPT | Performed by: INTERNAL MEDICINE

## 2020-09-14 ENCOUNTER — PROCEDURE VISIT (OUTPATIENT)
Dept: CARDIOLOGY CLINIC | Age: 75
End: 2020-09-14
Payer: MEDICARE

## 2020-11-03 PROBLEM — I10 ESSENTIAL HYPERTENSION: Status: RESOLVED | Noted: 2018-06-07 | Resolved: 2020-11-03

## 2020-11-16 ENCOUNTER — OFFICE VISIT (OUTPATIENT)
Dept: CARDIOLOGY CLINIC | Age: 75
End: 2020-11-16
Payer: MEDICARE

## 2020-11-16 VITALS
SYSTOLIC BLOOD PRESSURE: 124 MMHG | BODY MASS INDEX: 27.26 KG/M2 | HEIGHT: 66 IN | HEART RATE: 66 BPM | WEIGHT: 169.6 LBS | DIASTOLIC BLOOD PRESSURE: 80 MMHG

## 2020-11-16 PROCEDURE — G8399 PT W/DXA RESULTS DOCUMENT: HCPCS | Performed by: INTERNAL MEDICINE

## 2020-11-16 PROCEDURE — 99214 OFFICE O/P EST MOD 30 MIN: CPT | Performed by: INTERNAL MEDICINE

## 2020-11-16 PROCEDURE — 93000 ELECTROCARDIOGRAM COMPLETE: CPT | Performed by: INTERNAL MEDICINE

## 2020-11-16 PROCEDURE — 4040F PNEUMOC VAC/ADMIN/RCVD: CPT | Performed by: INTERNAL MEDICINE

## 2020-11-16 PROCEDURE — 1123F ACP DISCUSS/DSCN MKR DOCD: CPT | Performed by: INTERNAL MEDICINE

## 2020-11-16 PROCEDURE — G8417 CALC BMI ABV UP PARAM F/U: HCPCS | Performed by: INTERNAL MEDICINE

## 2020-11-16 PROCEDURE — 1090F PRES/ABSN URINE INCON ASSESS: CPT | Performed by: INTERNAL MEDICINE

## 2020-11-16 PROCEDURE — 1036F TOBACCO NON-USER: CPT | Performed by: INTERNAL MEDICINE

## 2020-11-16 PROCEDURE — G8428 CUR MEDS NOT DOCUMENT: HCPCS | Performed by: INTERNAL MEDICINE

## 2020-11-16 PROCEDURE — G8484 FLU IMMUNIZE NO ADMIN: HCPCS | Performed by: INTERNAL MEDICINE

## 2020-11-16 RX ORDER — METOPROLOL TARTRATE 50 MG/1
25 TABLET, FILM COATED ORAL 2 TIMES DAILY
Qty: 180 TABLET | Refills: 1 | Status: SHIPPED
Start: 2020-11-16 | End: 2022-04-19 | Stop reason: SDUPTHER

## 2020-11-16 NOTE — PROGRESS NOTES
[id number] Score for Atrial Fibrillation Stroke Risk   Risk   Factors  Component Value   C CHF Yes 1   H HTN Yes 1   A2 Age >= 76 Yes,  (77 y.o.) 2   D DM No 0   S2 Prior Stroke/TIA No 0   V Vascular Disease No 0   A Age 74-69 No,  (77 y.o.) 0   Sc Sex female 1    [id number]  Score  5   Score last updated 11/16/20 4:32 PM EST

## 2020-11-16 NOTE — PROGRESS NOTES
CARDIOLOGY NOTE      11/16/2020    RE: Yomaira Mckeon  (1945)                               TO:  Dr. Everardo Wan MD            Silvia Portillo is a 66 y.o. female who was seen today for management of  cad                           :   Patient is here for    fre falls has no LOC  - Coronary artery disease, does not have chest pain. Patient is  compliant with prescribed medicines. - Hypertension,is  well controlled, pt is  compliant with medicines  - Hyperlipidimea, lipids are in acceptable range. Pt  is  compliant with medicines  - PPM  Stable  - Diabetes mellitus, blood glucose level is  well controlled. Pt is compliant with meds and diet  - VHD sp MVR stable                The patient does not have cardiac complaints          Yomaira Mckeon has the following history recorded in care path:  Patient Active Problem List    Diagnosis Date Noted    Angina pectoris [facility])      Priority: High    Fall at home, subsequent encounter 12/04/2019    S/P placement of cardiac pacemaker     Tachycardia-bradycardia ([address]     SSS (sick sinus syndrome) ([facility] 12/11/2018    Pneumonia due to infectious organism 07/18/2018    CHF following cardiac surgery, postop 07/10/2018    Acute blood loss anemia 07/10/2018    Uncontrolled pain 07/10/2018    Gait disturbance 07/10/2018    Uncontrolled hypertension 07/03/2018    Paroxysmal atrial fibrillation (HCC) 06/07/2018    Mixed hyperlipidemia 06/07/2018    VHD (valvular heart disease) 06/07/2018    Abnormal EKG 06/07/2018     Current Outpatient Medications   Medication Sig Dispense Refill    metoprolol tartrate (LOPRESSOR) 50 MG tablet Take 0.5 tablets by mouth 2 times daily 180 tablet 1    apixaban (ELIQUIS) 5 MG TABS tablet Take 1 tablet by mouth 2 times daily 42 tablet 0    gabapentin (NEURONTIN) 600 MG tablet Take 600 mg by mouth 3 times daily.       metoclopramide (REGLAN) 5 MG tablet Take 5 mg by mouth 3 times daily       pregabalin (LYRICA) 75 MG capsule 75 mg 2 times daily.  DULoxetine (CYMBALTA) 60 MG extended release capsule Take 60 mg by mouth daily       famotidine (PEPCID) 40 MG tablet Take 40 mg by mouth nightly as needed       ipratropium (ATROVENT) 0.03 % nasal spray       methylcellulose (CITRUCEL) 500 MG TABS Take by mouth      omeprazole (PRILOSEC) 40 MG delayed release capsule Take 40 mg by mouth daily      melatonin 3 MG TABS tablet Take 3 mg by mouth daily      estradiol (ESTRACE) 0.1 MG/GM vaginal cream Place 2 g vaginally 3 times daily as needed       aspirin 81 MG EC tablet Take 1 tablet by mouth daily 30 tablet 3    furosemide (LASIX) 20 MG tablet Take 1 tablet by mouth daily (Patient taking differently: Take 20 mg by mouth daily Starting tomorrow 9/1/18, will take every other day.) 30 tablet 0    docusate sodium (COLACE, DULCOLAX) 100 MG CAPS Take 100 mg by mouth 2 times daily      metFORMIN (GLUCOPHAGE) 500 MG tablet Take 500 mg by mouth 2 times daily (with meals)      donepezil (ARICEPT) 5 MG tablet Take 10 mg by mouth daily   2    citalopram (CELEXA) 40 MG tablet TAKE 1 TABLET EVERY DAY FOR ANXIETY AND STRESS  5    Cholecalciferol (VITAMIN D3) 5000 units TABS Take 1 tablet by mouth daily       CALCIUM CARBONATE PO Take 500 mg by mouth daily       traMADol (ULTRAM) 50 MG tablet Take 50 mg by mouth every 6 hours as needed for Pain. [place] rosuvastatin (CRESTOR) 40 MG tablet Take 40 mg by mouth every evening      acetaminophen (TYLENOL) 325 MG tablet Take 650 mg by mouth every 6 hours as needed for Pain       No current facility-administered medications for this visit. Allergies: Patient has no known allergies.   Past Medical History:   Diagnosis Date    Anxiety     Arthritis     knees    Atrial fibrillation (HCC)     CAD (coronary artery disease)     Sees Dr. Miranda Skinner    Diabetes mellitus [facility]     H/O cardiac catheterization 06/25/2018    severe 3 vessel disease, refer to CV surgery for CABG and MAZE    H/O cardiovascular stress test 2018    EF47%  fixed perfusion defect ant wall, pt in afib    H/O echocardiogram 2018    EF55% mod MR    H/O echocardiogram 2018    EF 50-55%, Mild : AR, MR & TR.    H/O echocardiogram 2020    EF 55%, Breast artifact noted.  H/O three vessel coronary artery bypass 2018    Dr. Alma Laura History of Holter monitoring 10/23/2018    Multiple PAF episodes noted. Tachy-Dinesh episodes noted.  History of nuclear stress test 2020    EF 55%, Breast artifact.     Hyperlipidemia     Hypertension     Memory loss     on Aricept    Missing teeth, acquired     Pneumonia     pneumococcal pneumonia twice at end of     Risk for falls     uses walker    TIA (transient ischemic attack)     family doctors said she has had mini-strokes    Urinary leakage     UTI (urinary tract infection)     recent --just stopped antibiotic last week as of -    Wears glasses      Past Surgical History:   Procedure Laterality Date    CABG WITH MITRAL VALVE REPAIR  2018    CABG X 3 [place]>LAD, SVG>CX M1, SVG>M2, MVR repair w/#28 CG ring, PFO closure, MAZE    CARDIAC CATHETERIZATION  2018    EYE SURGERY Bilateral 2018    Cataracts    MITRAL VALVE MAZE PROCEDURE  2018    Dr. Bipin Robertson Left 2018    [employer] [place] XT DR SCHWARTZ SureScan     THORACENTESIS Bilateral 2018    IR Dr. Jorge Roche, right 56, left 26 all s/s    TONSILLECTOMY  1940's    WISDOM TOOTH EXTRACTION        As reviewed   Family History   Problem Relation Age of Onset    Stroke Mother     Heart Disease Father     Early Death Father     Breast Cancer Sister    Mode Milligan Parkinsonism Brother     Heart Disease Sister     Other Sister         fibromyalgia    Diabetes Sister     Early Death Brother          at birth     Social History     Tobacco Use    Smoking status: Never Smoker    Smokeless tobacco: Never Used   Substance Use Topics    Alcohol use: No      Review of Systems:    Constitutional: Negative for diaphoresis and fatigue  Psychological:Negative for anxiety or depression  HENT: Negative for headaches, nasal congestion, sinus pain or vertigo  Eyes: Negative for visual disturbance. Endocrine: Negative for polydipsia/polyuria  Respiratory: Negative for shortness of breath  Cardiovascular: Negative for chest pain, dyspnea on exertion, claudication, edema, irregular heartbeat, murmur, palpitations or shortness of breath  Gastrointestinal: Negative for abdominal pain or heartburn  Genito-Urinary: Negative for urinary frequency/urgency  Musculoskeletal: Negative for muscle pain, muscular weakness, negative for pain in arm and leg or swelling in foot and leg  Neurological: fall  Dermatological: Negative for rash    Objective:  /80 (Position: Sitting)   Pulse 66   Ht 5' 6\" (1.676 m)   Wt 169 lb 9.6 oz (76.9 kg)   BMI 27.37 kg/m²   Wt Readings from Last 3 Encounters:   11/16/20 169 lb 9.6 oz (76.9 kg)   06/16/20 176 lb 6.4 oz (80 kg)   03/07/20 177 lb 12.8 oz (80.6 kg)     Body mass index is 27.37 kg/m². GENERAL - Alert, oriented, pleasant, in no apparent distress. EYES: No jaundice, no conjunctival pallor. SKIN: It is warm & dry. No rashes. No Echhymosis    HEENT - No clinically significant abnormalities seen. Neck - Supple. No jugular venous distention noted. No carotid bruits. Cardiovascular - Normal S1 and S2 without obvious murmur or gallop. Extremities - No cyanosis, clubbing, or significant edema. Pulmonary - No respiratory distress. No wheezes or rales. Abdomen - No masses, tenderness, or organomegaly. Musculoskeletal - No significant edema. No joint deformities. No muscle wasting. Neurologic - Cranial nerves II through XII are grossly intact. There were no gross focal neurologic abnormalities.     Lab Review   No results found for: CKTOTAL, CKMB, CKMBINDEX, TROPONINT  BNP:  No results found for: BNP  PT/INR:    Lab Results   Component Value Date    INR 1.52 12/07/2018     Lab Results   Component Value Date    LABA1C 5.9 12/05/2019    LABA1C 5.8 06/25/2018     Lab Results   Component Value Date    WBC 5.9 12/05/2019    HCT 33.4 (L) 12/05/2019    MCV 82.9 12/05/2019     12/05/2019     No results found for: CHOL, TRIG, HDL, LDLCALC, LDLDIRECT, CHOLHDLRATIO  No results found for: ALT, AST  BMP:    Lab Results   Component Value Date     12/05/2019    K 3.9 12/05/2019     12/05/2019    CO2 30 12/05/2019    BUN 13 12/05/2019    CREATININE 0.8 12/05/2019     CMP:   Lab Results   Component Value Date     12/05/2019    K 3.9 12/05/2019     12/05/2019    CO2 30 12/05/2019    BUN 13 12/05/2019     TSH:  No results found for: TSH, TSHHS    QUALITY MEASURES REVIEWED:    Impression:    1. Paroxysmal atrial fibrillation [facility])       Patient Active Problem List   Diagnosis Code    Paroxysmal atrial fibrillation ([facility]) I48.0    Mixed hyperlipidemia E78.2    VHD (valvular heart disease) I38    Abnormal EKG R94.31    Angina pectoris ([facility]) I20.9    Uncontrolled hypertension I10    CHF following cardiac surgery, postop I97.130    Acute blood loss anemia D62    Uncontrolled pain R52    Gait disturbance R26.9    Pneumonia due to infectious organism J18.9    SSS (sick sinus syndrome) ([facility]) I49.5    S/P placement of cardiac pacemaker Z95.0    Tachycardia-bradycardia ([address] I49.5    Fall at home, subsequent encounter W19. XXXD, Y92.009       Assessment & Plan:               -     CORONARY ARTERY DISEASE:  asymptomatic     All available  tests in chart reviewed. Management discussed . Testing ordered  no                                 -  Hypertension: Patients blood pressure is normal. Patient is advised about low sodium diet. Present medical regimen will not be changed.            -  freq falls: CT per PCP  Check orthos  Mildly  , decrease BB               -  LIPID MANAGEMENT:  Available lipid  lab data reviewed  and patient was given dietary advice. NCEP- ATP III guidelines reviewed with patient. -   Changes  in medicines made: No                                - PPM   Check reviewed  12.1 yrs    - SP  MVR  stable      -   DIABETES MELLITUS: Available pertinent lab data reviewed   and  patient was given dietary advice . Advised to check blood glucose level on a regular basis. -   Changes  in medicines made:  Mary Jane Mccall MA  [facility]

## 2020-12-01 ENCOUNTER — APPOINTMENT (OUTPATIENT)
Dept: GENERAL RADIOLOGY | Age: 75
End: 2020-12-01
Payer: MEDICARE

## 2020-12-01 ENCOUNTER — APPOINTMENT (OUTPATIENT)
Dept: CT IMAGING | Age: 75
End: 2020-12-01
Payer: MEDICARE

## 2020-12-01 ENCOUNTER — HOSPITAL ENCOUNTER (EMERGENCY)
Age: 75
Discharge: HOME | End: 2020-12-01
Payer: MEDICARE

## 2020-12-01 VITALS
HEART RATE: 65 BPM | RESPIRATION RATE: 18 BRPM | TEMPERATURE: 98.1 F | OXYGEN SATURATION: 100 % | DIASTOLIC BLOOD PRESSURE: 63 MMHG | SYSTOLIC BLOOD PRESSURE: 114 MMHG

## 2020-12-01 PROCEDURE — 73502 X-RAY EXAM HIP UNI 2-3 VIEWS: CPT

## 2020-12-01 PROCEDURE — 99285 EMERGENCY DEPT VISIT HI MDM: CPT

## 2020-12-01 PROCEDURE — 70450 CT HEAD/BRAIN W/O DYE: CPT

## 2020-12-01 PROCEDURE — 6370000000 HC RX 637 (ALT 250 FOR IP): Performed by: PHYSICIAN ASSISTANT

## 2020-12-01 PROCEDURE — 72125 CT NECK SPINE W/O DYE: CPT

## 2020-12-01 PROCEDURE — 73564 X-RAY EXAM KNEE 4 OR MORE: CPT

## 2020-12-01 PROCEDURE — 71045 X-RAY EXAM CHEST 1 VIEW: CPT

## 2020-12-01 RX ORDER — ACETAMINOPHEN 500 MG
500 TABLET ORAL ONCE
Status: COMPLETED | OUTPATIENT
Start: 2020-12-01 | End: 2020-12-01

## 2020-12-01 RX ORDER — LIDOCAINE 4 G/G
1 PATCH TOPICAL DAILY PRN
Qty: 15 PATCH | Refills: 0 | Status: SHIPPED | OUTPATIENT
Start: 2020-12-01 | End: 2020-12-31

## 2020-12-01 RX ORDER — ACETAMINOPHEN 500 MG
500 TABLET ORAL EVERY 6 HOURS PRN
Qty: 20 TABLET | Refills: 0 | Status: SHIPPED | OUTPATIENT
Start: 2020-12-01

## 2020-12-01 RX ORDER — LIDOCAINE 4 G/G
1 PATCH TOPICAL ONCE
Status: DISCONTINUED | OUTPATIENT
Start: 2020-12-01 | End: 2020-12-01 | Stop reason: HOSPADM

## 2020-12-01 RX ADMIN — ACETAMINOPHEN 500 MG: 500 TABLET ORAL at 19:44

## 2020-12-01 ASSESSMENT — PAIN SCALES - GENERAL: PAINLEVEL_OUTOF10: 10

## 2020-12-01 NOTE — ED PROVIDER NOTES
additional information     PAST MEDICAL & SURGICAL HISTORY    Past Medical History:   Diagnosis Date    Anxiety     Arthritis     knees    Atrial fibrillation ([facility]     CAD (coronary artery disease)     Sees Dr. Antwan Lacy    Diabetes mellitus ([facility]     H/O cardiac catheterization 06/25/2018    severe 3 vessel disease, refer to CV surgery for CABG and MAZE    H/O cardiovascular stress test 06/06/2018    EF47%  fixed perfusion defect ant wall, pt in afib    H/O echocardiogram 06/06/2018    EF55% mod MR    H/O echocardiogram 08/28/2018    EF 50-55%, Mild : AR, MR & TR.    H/O echocardiogram 03/13/2020    EF 55%, Breast artifact noted. 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 William Jacob History of Holter monitoring 10/23/2018    Multiple PAF episodes noted. Tachy-Dinesh episodes noted.  History of nuclear stress test 03/13/2020    EF 55%, Breast artifact.     Hyperlipidemia     Hypertension     Memory loss     on Aricept    Missing teeth, acquired     Pneumonia     pneumococcal pneumonia twice at end of 2017    Risk for falls     uses walker    TIA (transient ischemic attack)     family doctors said she has had mini-strokes    Urinary leakage     UTI (urinary tract infection)     recent --just stopped antibiotic last week as of 6-29-18    Wears glasses      Past Surgical History:   Procedure Laterality Date    CABG WITH MITRAL VALVE REPAIR  07/09/2018    CABG X 3 [place]>LAD, SVG>CX M1, SVG>M2, MVR repair w/#28 CG ring, PFO closure, MAZE    CARDIAC CATHETERIZATION  06/25/2018    EYE SURGERY Bilateral 2018    Cataracts    MITRAL VALVE MAZE PROCEDURE  07/19/2018    Dr. Rene Deng Left 12/11/2018    [employer] [place] XT DR LANA Bartlett     THORACENTESIS Bilateral 07/13/2018    IR Dr. Andrew Brain, right 1300, left 900 all s/s    TONSILLECTOMY  1940's    WISDOM TOOTH EXTRACTION         CURRENT MEDICATIONS    Current Outpatient Rx   Medication Sig Dispense Refill    acetaminophen (APAP EXTRA STRENGTH) 500 MG tablet Take 1 tablet by mouth every 6 hours as needed for Pain 20 tablet 0    lidocaine 4 % external patch Place 1 patch onto the skin daily as needed (for pain) 12 hrs on, 12 hrs off. 15 patch 0    metoprolol tartrate (LOPRESSOR) 50 MG tablet Take 0.5 tablets by mouth 2 times daily 180 tablet 1    apixaban (ELIQUIS) 5 MG TABS tablet Take 1 tablet by mouth 2 times daily 42 tablet 0    gabapentin (NEURONTIN) 600 MG tablet Take 600 mg by mouth 3 times daily.  metoclopramide (REGLAN) 5 MG tablet Take 5 mg by mouth 3 times daily       pregabalin (LYRICA) 75 MG capsule 75 mg 2 times daily.        DULoxetine (CYMBALTA) 60 MG extended release capsule Take 60 mg by mouth daily       famotidine (PEPCID) 40 MG tablet Take 40 mg by mouth nightly as needed       ipratropium (ATROVENT) 0.03 % nasal spray       methylcellulose (CITRUCEL) 500 MG TABS Take by mouth      omeprazole (PRILOSEC) 40 MG delayed release capsule Take 40 mg by mouth daily      melatonin 3 MG TABS tablet Take 3 mg by mouth daily      estradiol (ESTRACE) 0.1 MG/GM vaginal cream Place 2 g vaginally 3 times daily as needed       aspirin 81 MG EC tablet Take 1 tablet by mouth daily 30 tablet 3    furosemide (LASIX) 20 MG tablet Take 1 tablet by mouth daily (Patient taking differently: Take 20 mg by mouth daily Starting tomorrow 9/1/18, will take every other day.) 30 tablet 0    docusate sodium (COLACE, DULCOLAX) 100 MG CAPS Take 100 mg by mouth 2 times daily      metFORMIN (GLUCOPHAGE) 500 MG tablet Take 500 mg by mouth 2 times daily (with meals)      donepezil (ARICEPT) 5 MG tablet Take 10 mg by mouth daily   2    citalopram (CELEXA) 40 MG tablet TAKE 1 TABLET EVERY DAY FOR ANXIETY AND STRESS  5    Cholecalciferol (VITAMIN D3) 5000 units TABS Take 1 tablet by mouth daily       CALCIUM CARBONATE PO Take 500 mg by mouth daily       traMADol (ULTRAM) 50 MG tablet Take 50 mg by mouth every 6 hours Strength 5/5 in upper and lower extremities bilaterally  - Normal finger to nose test bilaterally  - Rapid alternating movements intact  - Light touch sensation intact throughout. - Upper and lower extremity DTRs 2+ bilaterally. - No truncal ataxia    PSYCHIATRIC: Normal mood and affect. RADIOLOGY/PROCEDURES    CT CERVICAL SPINE WO CONTRAST   Final Result   No acute abnormality of the cervical spine. CT HEAD WO CONTRAST   Final Result   No acute intracranial abnormality. Small old focal lacunar type infarct in the right cerebellar hemisphere. Moderate vermian and moderate cerebral cortical atrophy. Large areas of age related white matter changes as described above. XR HIP 2-3 VW W PELVIS LEFT   Final Result   No evidence of an acute injury. XR CHEST PORTABLE   Final Result   Stable chest x-ray. No acute cardiopulmonary disease or evidence of an acute   injury. XR KNEE LEFT (MIN 4 VIEWS)   Final Result   Osteopenia. Small left knee effusion without fracture or cortical erosion. Moderate to marked degenerative osteo arthritic changes involve the   patellofemoral and femoral tibial joint compartments. ED COURSE & MEDICAL DECISION MAKING       Vital signs and nursing notes reviewed during ED course. I have independently evaluated this patient. Supervising physician present in the Emergency Department, available for consultation, throughout entirety of patient care. History and exam is consistent with musculoskeletal pain of left knee after mechanical fall. I discussed the possibility of internal derangement the pain may be simply from contusion. While in the ED today, CT head and CT cervical spine were obtained as patient fell given her age and that she is on aspirin. No acute abnormalities on CT imaging per radiologist report. Chest xray obtained today to screen for chest injury as fall was not witnessed.   Per radiologist report chest x-ray reveals no acute cardiopulmonary process. No acute bony finding. No pneumothorax, no consolidation concerning for pneumonia, no pleural effusion. Pelvis imaging obtained as fall was unwitnessed to screen for injury and reveals no acute fracture or dislocation. Left knee xray obtained and reveals no acute osseous abnormality but does show small left knee effusion without fracture or cortical erosion. While in the ED, patient received topical lidocaine patch and Tylenol with improvement in pain. Patient now reports. Affected extremity is distally neurovascularly intact. I have low clinical suspicion for septic joint, nerve injury, vascular injury. Patient is able to ambulate in the ED and feels better and wants to go home. Presentation not consistent with DVT. Patient received prescriptions for topical lidocaine patches and Tylenol- we discussed medications. Patient is nontoxic appearing. Vital signs stable. Patient is stable for outpatient management at this time. All pertinent Lab data and radiographic results reviewed with patient at bedside. The patient and/or the family were informed of the results of any tests/labs/imaging, the treatment plan, and time was allotted to answer questions. Diagnosis, disposition, and plan discussed in detail with patient and/or family who understands and agrees. Patient agrees to follow up with PCP for recheck in 2-3 days, and agrees to follow-up with her orthopedist, Dr. Jane Payton, if patient has no improvement over the next 5-7 days. Patient agrees to return to emergency department if symptoms worsen or any new symptoms develop including but not limited to numbness, tingling, weakness, pain out of proportion, pallor of limb, coolness of limb, slow cap refill (brisk cap refill was demonstrated to patient today), warmth of joints, redness of joints, fever, chills. Clinical  IMPRESSION    1. Acute pain of left knee    2.  Fall on same level, unspecified, initial encounter    3. Effusion, left knee          Disposition referral (if applicable): Krystina Aranda MD  [address] [place] [address]  [phone]    Call today  Recheck in 2-3 days    Glory Doyle MD  [address] [place] [address],[address]  [facility] [phone]      [address] in 5-7 days, As needed    [facility] Emergency Department  [place] [id number]  [phone]  Go to   Return to ED if symptoms worsen or new symptoms      Disposition medications (if applicable):  Discharge Medication List as of 12/1/2020  9:40 PM      START taking these medications    Details   lidocaine 4 % external patch Place 1 patch onto the skin daily as needed (for pain) 12 hrs on, 12 hrs off., Transdermal, DAILY PRN Starting Tue 12/1/2020, Until Thu 12/31/2020, For 30 days, Disp-15 patch,R-0, Print               Comment: Please note this report has been produced using speech recognition software and may contain errors related to that system including errors in grammar, punctuation, and spelling, as well as words and phrases that may be inappropriate. If there are any questions or concerns please feel free to contact the dictating provider for clarification.          Lolis Do PA-C  12/01/20 [id number]

## 2020-12-02 NOTE — ED NOTES
Pt stood at the bedside with this nurse and states her knee felt better. She walked a few steps to the bedside commode and states she doesn't have any pain in her knee.      Pascual Rivera RN  12/01/20 9515

## 2020-12-02 NOTE — ED NOTES
Sister Sasha Glover called for a ride home. She said she would be on her way.       Kenneth Arreguin RN  12/01/20 0497

## 2020-12-02 NOTE — ED NOTES
Discharge instructions understood as stated by patient. All questions answered.      Dalila aKtz RN  12/01/20 9019

## 2020-12-29 NOTE — TELEPHONE ENCOUNTER
Patient called requesting samples of Eliquis, I advised patient that if available samples should be ready for  by tomorrow afternoon, please call with any problems at ph# [phone].

## 2020-12-30 ENCOUNTER — TELEPHONE (OUTPATIENT)
Dept: CARDIOLOGY CLINIC | Age: 75
End: 2020-12-30

## 2020-12-30 ENCOUNTER — PROCEDURE VISIT (OUTPATIENT)
Dept: CARDIOLOGY CLINIC | Age: 75
End: 2020-12-30
Payer: MEDICARE

## 2020-12-30 PROCEDURE — 93294 REM INTERROG EVL PM/LDLS PM: CPT | Performed by: INTERNAL MEDICINE

## 2020-12-30 PROCEDURE — 93296 REM INTERROG EVL PM/IDS: CPT | Performed by: INTERNAL MEDICINE

## 2020-12-30 NOTE — LETTER
Cardiology [address]  Phone: [phone]  Fax: [phone]    12/30/2020        Eber Pleitez  [address] [facility] [address]            Dear Joby Vincent: This is your [facility] schedule. You can elise your calendar with these dates. Remember that your device is wireless and should automatically do these checks while you are sleeping. If for any reason I do not get your transmission then I will call you and ask that you send a manual transmission. If you have any questions or concerns, please call and ask for ERNESTO ABERNATHY at [phone]. Thank you.

## 2021-01-03 ENCOUNTER — APPOINTMENT (OUTPATIENT)
Dept: GENERAL RADIOLOGY | Age: 76
End: 2021-01-03
Payer: COMMERCIAL

## 2021-01-03 ENCOUNTER — APPOINTMENT (OUTPATIENT)
Dept: CT IMAGING | Age: 76
End: 2021-01-03
Payer: COMMERCIAL

## 2021-01-03 ENCOUNTER — HOSPITAL ENCOUNTER (EMERGENCY)
Age: 76
Discharge: HOME | End: 2021-01-03
Attending: EMERGENCY MEDICINE
Payer: COMMERCIAL

## 2021-01-03 VITALS
HEIGHT: 65 IN | OXYGEN SATURATION: 100 % | DIASTOLIC BLOOD PRESSURE: 61 MMHG | HEART RATE: 61 BPM | SYSTOLIC BLOOD PRESSURE: 129 MMHG | BODY MASS INDEX: 32.32 KG/M2 | WEIGHT: 194 LBS | TEMPERATURE: 98.3 F | RESPIRATION RATE: 18 BRPM

## 2021-01-03 DIAGNOSIS — S42.214A CLOSED NONDISPLACED FRACTURE OF SURGICAL NECK OF RIGHT HUMERUS, UNSPECIFIED FRACTURE MORPHOLOGY, INITIAL ENCOUNTER: Primary | ICD-10-CM

## 2021-01-03 LAB
ALBUMIN SERPL-MCNC: 4.1 GM/DL (ref 3.4–5)
ALP BLD-CCNC: 90 IU/L (ref 40–129)
ALT SERPL-CCNC: 11 U/L (ref 10–40)
ANION GAP SERPL CALCULATED.3IONS-SCNC: 10 MMOL/L (ref 4–16)
AST SERPL-CCNC: 21 IU/L (ref 15–37)
BASOPHILS ABSOLUTE: 0 K/CU MM
BASOPHILS RELATIVE PERCENT: 0.4 % (ref 0–1)
BILIRUB SERPL-MCNC: 0.7 MG/DL (ref 0–1)
BUN BLDV-MCNC: 15 MG/DL (ref 6–23)
CALCIUM SERPL-MCNC: 9.6 MG/DL (ref 8.3–10.6)
CHLORIDE BLD-SCNC: 102 MMOL/L (ref 99–110)
CO2: 27 MMOL/L (ref 21–32)
CREAT SERPL-MCNC: 0.9 MG/DL (ref 0.6–1.1)
DIFFERENTIAL TYPE: ABNORMAL
EOSINOPHILS ABSOLUTE: 0.1 K/CU MM
EOSINOPHILS RELATIVE PERCENT: 1.1 % (ref 0–3)
GFR AFRICAN AMERICAN: >60 ML/MIN/1.73M2
GFR NON-AFRICAN AMERICAN: >60 ML/MIN/1.73M2
GLUCOSE BLD-MCNC: 77 MG/DL (ref 70–99)
HCT VFR BLD CALC: 32.4 % (ref 37–47)
HEMOGLOBIN: 10.6 GM/DL (ref 12.5–16)
IMMATURE NEUTROPHIL %: 0.4 % (ref 0–0.43)
LYMPHOCYTES ABSOLUTE: 1 K/CU MM
LYMPHOCYTES RELATIVE PERCENT: 13.4 % (ref 24–44)
MCH RBC QN AUTO: 27.9 PG (ref 27–31)
MCHC RBC AUTO-ENTMCNC: 32.7 % (ref 32–36)
MCV RBC AUTO: 85.3 FL (ref 78–100)
MONOCYTES ABSOLUTE: 0.3 K/CU MM
MONOCYTES RELATIVE PERCENT: 4.4 % (ref 0–4)
NUCLEATED RBC %: 0 %
PDW BLD-RTO: 13.3 % (ref 11.7–14.9)
PLATELET # BLD: 166 K/CU MM (ref 140–440)
PMV BLD AUTO: 9.2 FL (ref 7.5–11.1)
POTASSIUM SERPL-SCNC: 3.9 MMOL/L (ref 3.5–5.1)
RBC # BLD: 3.8 M/CU MM (ref 4.2–5.4)
SEGMENTED NEUTROPHILS ABSOLUTE COUNT: 6 K/CU MM
SEGMENTED NEUTROPHILS RELATIVE PERCENT: 80.3 % (ref 36–66)
SODIUM BLD-SCNC: 139 MMOL/L (ref 135–145)
TOTAL IMMATURE NEUTOROPHIL: 0.03 K/CU MM
TOTAL NUCLEATED RBC: 0 K/CU MM
TOTAL PROTEIN: 6.6 GM/DL (ref 6.4–8.2)
WBC # BLD: 7.5 K/CU MM (ref 4–10.5)

## 2021-01-03 PROCEDURE — 71045 X-RAY EXAM CHEST 1 VIEW: CPT

## 2021-01-03 PROCEDURE — 70450 CT HEAD/BRAIN W/O DYE: CPT

## 2021-01-03 PROCEDURE — 72170 X-RAY EXAM OF PELVIS: CPT

## 2021-01-03 PROCEDURE — 73030 X-RAY EXAM OF SHOULDER: CPT

## 2021-01-03 PROCEDURE — 99285 EMERGENCY DEPT VISIT HI MDM: CPT

## 2021-01-03 PROCEDURE — 72125 CT NECK SPINE W/O DYE: CPT

## 2021-01-03 PROCEDURE — 6370000000 HC RX 637 (ALT 250 FOR IP): Performed by: EMERGENCY MEDICINE

## 2021-01-03 PROCEDURE — 85025 COMPLETE CBC W/AUTO DIFF WBC: CPT

## 2021-01-03 PROCEDURE — 80053 COMPREHEN METABOLIC PANEL: CPT

## 2021-01-03 RX ORDER — HYDROCODONE BITARTRATE AND ACETAMINOPHEN 5; 325 MG/1; MG/1
1 TABLET ORAL ONCE
Status: COMPLETED | OUTPATIENT
Start: 2021-01-03 | End: 2021-01-03

## 2021-01-03 RX ORDER — HYDROCODONE BITARTRATE AND ACETAMINOPHEN 5; 325 MG/1; MG/1
1 TABLET ORAL EVERY 6 HOURS PRN
Qty: 12 TABLET | Refills: 0 | Status: SHIPPED | OUTPATIENT
Start: 2021-01-03 | End: 2021-01-06

## 2021-01-03 RX ADMIN — HYDROCODONE BITARTRATE AND ACETAMINOPHEN 1 TABLET: 5; 325 TABLET ORAL at 20:55

## 2021-01-03 ASSESSMENT — PAIN SCALES - GENERAL: PAINLEVEL_OUTOF10: 0

## 2021-01-03 ASSESSMENT — ENCOUNTER SYMPTOMS
EYE DISCHARGE: 0
EYE PAIN: 0
SHORTNESS OF BREATH: 0
RHINORRHEA: 0
ABDOMINAL PAIN: 0
BACK PAIN: 0
SORE THROAT: 0
NAUSEA: 0
VOMITING: 0
COUGH: 0

## 2021-01-03 NOTE — ED PROVIDER NOTES
Negative for back pain and neck pain. Right arm pain   Skin: Negative for pallor and wound. Neurological: Negative for dizziness, facial asymmetry, light-headedness, numbness and headaches. Psychiatric/Behavioral: Negative for confusion. Except as noted above the remainder of the review of systems was reviewed and negative. PAST MEDICAL HISTORY     Past Medical History:   Diagnosis Date    Anxiety     Arthritis     knees    Atrial fibrillation ([facility]     CAD (coronary artery disease)     Sees Dr. Jairon Randle    Diabetes mellitus ([facility] [facility]     H/O cardiac catheterization 06/25/2018    severe 3 vessel disease, refer to CV surgery for CABG and MAZE    H/O cardiovascular stress test 06/06/2018    EF47%  fixed perfusion defect ant wall, pt in afib    H/O echocardiogram 06/06/2018    EF55% mod MR    H/O echocardiogram 08/28/2018    EF 50-55%, Mild : AR, MR & TR.    H/O echocardiogram 03/13/2020    EF 55%, Breast artifact noted. 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 Joanna Low History of Holter monitoring 10/23/2018    Multiple PAF episodes noted. Tachy-Dinesh episodes noted.  History of nuclear stress test 03/13/2020    EF 55%, Breast artifact.  Hyperlipidemia     Hypertension     Memory loss     on Aricept    Missing teeth, acquired     Pneumonia     pneumococcal pneumonia twice at end of 2017    Risk for falls     uses walker    TIA (transient ischemic attack)     family doctors said she has had mini-strokes    Urinary leakage     UTI (urinary tract infection)     recent --just stopped antibiotic last week as of 6-29-18    Wears glasses        Prior to Admission medications    Medication Sig Start Date End Date Taking? Authorizing Provider   HYDROcodone-acetaminophen (NORCO) 5-325 MG per tablet Take 1 tablet by mouth every 6 hours as needed for Pain for up to 3 days. Intended supply: 3 days.  Take lowest dose possible to manage pain 1/3/21 1/6/21 Yes Emiliano Hawkins Raquel West MD   apixaban (ELIQUIS) 5 MG TABS tablet Take 1 tablet by mouth 2 times daily 12/30/20   Jason Eduardo MD   acetaminophen (APAP EXTRA STRENGTH) 500 MG tablet Take 1 tablet by mouth every 6 hours as needed for Pain 12/1/20   Xiomara Jenkins PA-C   metoprolol tartrate (LOPRESSOR) 50 MG tablet Take 0.5 tablets by mouth 2 times daily 11/16/20   Jason Eduardo MD   gabapentin (NEURONTIN) 600 MG tablet Take 600 mg by mouth 3 times daily. Historical Provider, MD   metoclopramide (REGLAN) 5 MG tablet Take 5 mg by mouth 3 times daily     Historical Provider, MD   pregabalin (LYRICA) 75 MG capsule 75 mg 2 times daily. 10/30/19   Historical Provider, MD   DULoxetine (CYMBALTA) 60 MG extended release capsule Take 60 mg by mouth daily  9/17/19   Historical Provider, MD   famotidine (PEPCID) 40 MG tablet Take 40 mg by mouth nightly as needed  10/31/19   Historical Provider, MD   ipratropium (ATROVENT) 0.03 % nasal spray  9/27/19   Historical Provider, MD   methylcellulose (CITRUCEL) 500 MG TABS Take by mouth    Historical Provider, MD   omeprazole (PRILOSEC) 40 MG delayed release capsule Take 40 mg by mouth daily    Historical Provider, MD   melatonin 3 MG TABS tablet Take 3 mg by mouth daily    Historical Provider, MD   estradiol (ESTRACE) 0.1 MG/GM vaginal cream Place 2 g vaginally 3 times daily as needed     Historical Provider, MD   aspirin 81 MG EC tablet Take 1 tablet by mouth daily 7/21/18   TRISTON Morales MD   furosemide (LASIX) 20 MG tablet Take 1 tablet by mouth daily  Patient taking differently: Take 20 mg by mouth daily Starting tomorrow 9/1/18, will take every other day.  7/21/18   Aaliyah Tripp MD   docusate sodium (COLACE, DULCOLAX) 100 MG CAPS Take 100 mg by mouth 2 times daily 7/20/18   TRISTON Morales MD   metFORMIN (GLUCOPHAGE) 500 MG tablet Take 500 mg by mouth 2 times daily (with meals)    Historical Provider, MD   donepezil (ARICEPT) 5 MG tablet Take 10 mg by mouth daily  5/28/18 Historical Provider, MD   citalopram (CELEXA) 40 MG tablet TAKE 1 TABLET EVERY DAY FOR ANXIETY AND STRESS 5/28/18   Historical Provider, MD   Cholecalciferol (VITAMIN D3) 5000 units TABS Take 1 tablet by mouth daily     Historical Provider, MD   CALCIUM CARBONATE PO Take 500 mg by mouth daily     Historical Provider, MD   rosuvastatin (CRESTOR) 40 MG tablet Take 40 mg by mouth every evening    Historical Provider, MD        Patient Active Problem List   Diagnosis    Paroxysmal atrial fibrillation (HCC)    Mixed hyperlipidemia    VHD (valvular heart disease)    Abnormal EKG    Angina pectoris (HCC)    Uncontrolled hypertension    CHF following cardiac surgery, postop    Acute blood loss anemia    Uncontrolled pain    Gait disturbance    Pneumonia due to infectious organism    SSS (sick sinus syndrome) ([address]    S/P placement of cardiac pacemaker    [facility]cardia[facility]adycardia ([facility] [address] [facility]    Fall at home, subsequent encounter         SURGICAL HISTORY       Past Surgical History:   Procedure Laterality Date    CABG WITH MITRAL VALVE REPAIR  07/09/2018    CABG X 3 [place]>LAD, SVG>CX M1, SVG>M2, MVR repair w/#28 CG ring, PFO closure, MAZE    CARDIAC CATHETERIZATION  06/25/2018    EYE SURGERY Bilateral 2018    Cataracts    MITRAL VALVE MAZE PROCEDURE  07/19/2018    Dr. Anjali Cheung Left 12/11/2018    [employer] [facility] XT DR MRI [facility]     THORACENTESIS Bilateral 07/13/2018    IR Dr. Shasta Lundborg, right 1300, left 900 all s/s    TONSILLECTOMY  1940's    WISDOM TOOTH EXTRACTION           CURRENT MEDICATIONS       Discharge Medication List as of 1/3/2021  9:00 PM      CONTINUE these medications which have NOT CHANGED    Details   apixaban (ELIQUIS) 5 MG TABS tablet Take 1 tablet by mouth 2 times daily, Disp-42 tablet, R-0JOHN# [id number] EXP:9/22  3 boxes #42Sample      acetaminophen (APAP EXTRA STRENGTH) 500 MG tablet Take 1 tablet by mouth every 6 hours as needed for Pain, Disp-20 tablet,R-0Print metoprolol tartrate (LOPRESSOR) 50 MG tablet Take 0.5 tablets by mouth 2 times daily, Disp-180 tablet,R-1Adjust Sig      gabapentin (NEURONTIN) 600 MG tablet Take 600 mg by mouth 3 times daily. Historical Med      metoclopramide (REGLAN) 5 MG tablet Take 5 mg by mouth 3 times daily Historical Med      pregabalin (LYRICA) 75 MG capsule 75 mg 2 times daily. Historical Med      DULoxetine (CYMBALTA) 60 MG extended release capsule Take 60 mg by mouth daily Historical Med      famotidine (PEPCID) 40 MG tablet Take 40 mg by mouth nightly as needed Historical Med      ipratropium (ATROVENT) 0.03 % nasal spray Historical Med      methylcellulose (CITRUCEL) 500 MG TABS Take by mouthHistorical Med      omeprazole (PRILOSEC) 40 MG delayed release capsule Take 40 mg by mouth dailyHistorical Med      melatonin 3 MG TABS tablet Take 3 mg by mouth dailyHistorical Med      estradiol (ESTRACE) 0.1 MG/GM vaginal cream Place 2 g vaginally 3 times daily as needed Historical Med      aspirin 81 MG EC tablet Take 1 tablet by mouth daily, Disp-30 tablet, R-3OTC      furosemide (LASIX) 20 MG tablet Take 1 tablet by mouth daily, Disp-30 tablet, R-0Print      docusate sodium (COLACE, DULCOLAX) 100 MG CAPS Take 100 mg by mouth 2 times dailyOTC      metFORMIN (GLUCOPHAGE) 500 MG tablet Take 500 mg by mouth 2 times daily (with meals)Historical Med      donepezil (ARICEPT) 5 MG tablet Take 10 mg by mouth daily , R-2Historical Med      citalopram (CELEXA) 40 MG tablet TAKE 1 TABLET EVERY DAY FOR ANXIETY AND STRESS, R-5Historical Med      Cholecalciferol (VITAMIN D3) 5000 units TABS Take 1 tablet by mouth daily Historical Med      CALCIUM CARBONATE PO Take 500 mg by mouth daily Historical Med      rosuvastatin (CRESTOR) 40 MG tablet Take 40 mg by mouth every eveningHistorical Med             ALLERGIES     Patient has no known allergies.     FAMILY HISTORY       Family History   Problem Relation Age of Onset    Stroke Mother     Heart Disease Father     Early Death Father     Breast Cancer Sister    [facility] Parkinsonism Brother     Heart Disease Sister     Other Sister         fibromyalgia    Diabetes Sister     Early Death Brother          at birth          SOCIAL HISTORY       Social History     Socioeconomic History    Marital status: Single     Spouse name: Not on file    Number of children: Not on file    Years of education: Not on file    Highest education level: Not on file   Occupational History    Not on file   Social Needs    Financial resource strain: Not on file    Food insecurity     Worry: Not on file     Inability: Not on file   [employer] needs     Medical: Not on file     Non-medical: Not on file   Tobacco Use    Smoking status: Never Smoker    Smokeless tobacco: Never Used   Substance and Sexual Activity    Alcohol use: No    Drug use: No    Sexual activity: Not on file   Lifestyle    Physical activity     Days per week: Not on file     Minutes per session: Not on file    Stress: Not on file   Relationships    Social connections     Talks on phone: Not on file     Gets together: Not on file     Attends Muslim service: Not on file     Active member of club or organization: Not on file     Attends meetings of clubs or organizations: Not on file     Relationship status: Not on file    Intimate partner violence     Fear of current or ex partner: Not on file     Emotionally abused: Not on file     Physically abused: Not on file     Forced sexual activity: Not on file   Other Topics Concern    Not on file   Social History Narrative    Not on file       SCREENINGS    [place] Coma Scale  Eye Opening: Spontaneous  Best Verbal Response: Oriented  Best Motor Response: Obeys commands  [place] Coma Scale Score: 15          PHYSICAL EXAM    (up to 7 for level 4, 8 or more for level 5)     ED Triage Vitals   BP Temp Temp Source Pulse Resp SpO2 Height Weight   21 1728 21 1729 21 1729 21 1729 01/03/21 1729 01/03/21 1729 01/03/21 1729 01/03/21 1729   115/68 98.3 °F (36.8 °C) Oral 61 18 100 % 5' 5\" (1.651 m) 194 lb (88 kg)       Physical Exam  Vitals signs reviewed. Constitutional:       Appearance: She is not toxic-appearing. HENT:      Head: Normocephalic and atraumatic. Nose: Nose normal. No congestion or rhinorrhea. Mouth/Throat:      Mouth: Mucous membranes are moist.      Pharynx: No oropharyngeal exudate or posterior oropharyngeal erythema. Eyes:      General:         Right eye: No discharge. Left eye: No discharge. Extraocular Movements: Extraocular movements intact. Pupils: Pupils are equal, round, and reactive to light. Neck:      Musculoskeletal: Normal range of motion and neck supple. No muscular tenderness. Comments: No midline cervical spine tenderness to palpation  Cardiovascular:      Rate and Rhythm: Normal rate. Heart sounds: No friction rub. No gallop. Pulmonary:      Effort: Pulmonary effort is normal.      Breath sounds: No stridor. Comments: B/l breath sounds  Respirations unlabored  Chest:      Chest wall: No tenderness. Abdominal:      Palpations: Abdomen is soft. Tenderness: There is no abdominal tenderness. There is no guarding. Comments: Abdomen soft, non-tender, non-peritoneal  No guarding on abdominal exam   Musculoskeletal:         General: Tenderness and deformity present. Comments: Pelvis stable  RUE tenderness to palpation; deformity noted at right humerus  RUE neurovascularly intact with 2+ radial pulses   Skin:     General: Skin is warm. Capillary Refill: Capillary refill takes less than 2 seconds. Findings: No erythema or rash. Neurological:      General: No focal deficit present. Mental Status: She is alert. Mental status is at baseline.       Comments: GCS 15  Moving all extremities spontaneously         DIAGNOSTIC RESULTS     Labs Reviewed   CBC WITH AUTO DIFFERENTIAL - Abnormal; Notable for the following components:       Result Value    RBC 3.80 (*)     Hemoglobin 10.6 (*)     Hematocrit 32.4 (*)     Segs Relative 80.3 (*)     Lymphocytes % 13.4 (*)     Monocytes % 4.4 (*)     All other components within normal limits   COMPREHENSIVE METABOLIC PANEL W/ REFLEX TO MG FOR LOW K              RADIOLOGY:     Non-plain film images such as CT, Ultrasound and MRI are read by the radiologist. Plain radiographic images are visualized and preliminarily interpreted by the emergency physician. Interpretation per the Radiologist below, if available at the time of this note:    XR PELVIS (1-2 VIEWS)   Final Result   1. No acute pelvic fracture identified. XR CHEST PORTABLE   Final Result   1. No acute cardiopulmonary process identified. 2. Acute fracture of the proximal right humerus centered at the surgical neck   with extension to involve the greater tuberosity. XR SHOULDER RIGHT (MIN 2 VIEWS)   Final Result   1. No acute cardiopulmonary process identified. 2. Acute fracture of the proximal right humerus centered at the surgical neck   with extension to involve the greater tuberosity. CT CERVICAL SPINE WO CONTRAST   Final Result   No acute abnormality of the cervical spine. CT HEAD WO CONTRAST   Final Result   No acute intracranial abnormality. Chronic microvascular ischemic changes and global cerebral atrophy. ED BEDSIDE ULTRASOUND:   Performed by ED Physician Georgi Alaniz MD       LABS:  Labs Reviewed   CBC WITH AUTO DIFFERENTIAL - Abnormal; Notable for the following components:       Result Value    RBC 3.80 (*)     Hemoglobin 10.6 (*)     Hematocrit 32.4 (*)     Segs Relative 80.3 (*)     Lymphocytes % 13.4 (*)     Monocytes % 4.4 (*)     All other components within normal limits   COMPREHENSIVE METABOLIC PANEL W/ REFLEX TO MG FOR LOW K       All other labs were within normal range or not returned as of this dictation.     EMERGENCY DEPARTMENT COURSE and DIFFERENTIAL DIAGNOSIS/MDM:   Vitals:    Vitals:    01/03/21 1932 01/03/21 1958 01/03/21 2031 01/03/21 2050   BP: (!) 141/90  129/61    Pulse: 60 62 60 61   Resp:       Temp:       TempSrc:       SpO2: 99% 100% 95% 100%   Weight:       Height:               MDM  Number of Diagnoses or Management Options  Closed nondisplaced fracture of surgical neck of right humerus, unspecified fracture morphology, initial encounter  Diagnosis management comments: 66 yof presents with right arm pain after mechanical fall. She does use a walker at baseline. She was using her walker outside and stepped onto a curb and fell and injured her right arm. She is left arm dominant. She presents to the emergency department evaluation. She did not have a loss of consciousness. She is not amnestic about events. She presents with unremarkable vitals. She does have noted deformity in her right upper arm. Right upper extremity is neurovascular intact. No overlying skin issues or tears. No clinical findings of any kind of open injury. She no midline cervical spine tenderness to palpation. She is on Eliquis. Did obtain x-rays of chest, pelvis, humerus as well as CT of the head and C-spine. Imaging does show a right humerus fracture. Imaging is otherwise nonacute from a traumatic perspective. Results are discussed with patient. Given that she does use the walker at baseline we did discuss appropriate disposition. She would like to go home. Case management was consulted. Patient does live at home with a family member and the phone number is comfortable with helping patient with her ADLs. Home health is also being arranged. Patient treated for pain in the emergency department. She does have an orthopedic physician with whom she will follow. She will be discharged home. Return precautions for worsening concerning symptoms are discussed.   She is discharged home in stable condition.          -  Patient seen and evaluated in the emergency department. -  Triage and nursing notes reviewed and incorporated. -  Old chart records reviewed and incorporated. -  Work-up included:  See above  -  Results discussed with patient. CONSULTS:  IP CONSULT TO CASE MANAGEMENT  IP CONSULT TO HOME CARE NEEDS    PROCEDURES:  None performed unless otherwise noted below     Procedures        FINAL IMPRESSION      1. Closed nondisplaced fracture of surgical neck of right humerus, unspecified fracture morphology, initial encounter          DISPOSITION/PLAN   DISPOSITION Decision To Discharge 01/03/2021 09:21:51 PM      PATIENT REFERRED TO:  Manjinder German MD  [address] [place] [address] 51 [phone]    Schedule an appointment as soon as possible for a visit in 2 days      [address]  [address] [place] [address] Dr  [phone]            DISCHARGE MEDICATIONS:  Discharge Medication List as of 1/3/2021  9:00 PM      START taking these medications    Details   HYDROcodone-acetaminophen (NORCO) 5-325 MG per tablet Take 1 tablet by mouth every 6 hours as needed for Pain for up to 3 days. Intended supply: 3 days. Take lowest dose possible to manage pain, Disp-12 tablet, R-0Print             ED Provider Disposition Time  DISPOSITION Decision To Discharge 01/03/2021 09:21:51 PM      Appropriate personal protective equipment was worn during the patient's evaluation. These included surgical, eye protection, surgical mask or in 95 respirator and gloves. The patient was also placed in a surgical mask for the prevention of possible spread of respiratory viral illnesses. The Patient was instructed to read the package inserts with any medication that was prescribed. Major potential reactions and medication interactions were discussed. The Patient understands that there are numerous possible adverse reactions not covered.     The patient was also instructed to arrange follow-up with his or her primary care provider for review of any pending labwork or incidental findings on any radiology results that were obtained. All efforts were made to discuss any incidental findings that require further monitoring. Controlled Substances Monitoring:     No flowsheet data found.     (Please note that portions of this note were completed with a voice recognition program.  Efforts were made to edit the dictations but occasionally words are mis-transcribed.)    Aureliano Saleem MD (electronically signed)  Attending Emergency Physician           Aureliano Saleem MD  01/03/21 8369

## 2021-01-04 NOTE — ED NOTES
Pts sister Radha Bliss can be reached at [phone]. Pt lives with sister.       Marco A Bass  01/03/21 1940

## 2021-01-04 NOTE — ED NOTES
Case management at [facility]     Johnna Brady, [facility]  01/03/21 2017

## 2021-01-04 NOTE — CARE COORDINATION
CM received consult from Dr Gloria Molina for patient in trauma room #3 to assist with discharge planning. CM met with patient to begin discharge planning. CM introduced self and CM role. Patient states she presents to ER with c/o head injury following a fall. Patient states she was trying to walk into the garage and she caught her foot causing her to fall. Patient denies loss of consciousness. Patient has PCP and insurance which assists with medication affordability. Patient states she lives with her sisters Og Law and Kelsey Arteaga) in a condo in [facility]. Patient has the following DME: wheelchair, walker, shower seat, grab bars and bedside commode. Patient states one sister drives her to and from doctor's appointments and the other sister cooks. Patient states \"we take care of each other\". CM discussed possible HHC with patient. Patient states she would like to discuss with her sister prior to deciding on agency. 2005 CM provided patient with portable telephone to speak with sister. CM will check back with patient to determine agency choice. 2025 CM in room with patient to determine choice for [facility] agency. Patient states she forgot to ask her sister what agency to choose. States CM can contact Kelsey Arteaga at [phone] discuss agency choices. 2030 CM placed telephone call to Kelsey Arteaga to discuss [facility]. States she cannot remember name of agency, but states [facility] agency was through the hospital. CM discussed [address] [place] [address] Rd. Marcelina and patient in agreement with [address] [place] [address] Rd. 2692 CM contacted [address] [place] [address] Rd [phone] to provide referral. CM faxed H&P, facesheet, and [facility] order [phone].

## 2021-01-19 NOTE — TELEPHONE ENCOUNTER
Patient called requesting samples of Eliquis, patient was advised that samples should be ready for  by tomorrow afternoon, please call with any problems at ph# [phone].

## 2021-02-03 ENCOUNTER — OFFICE VISIT (OUTPATIENT)
Dept: CARDIOLOGY CLINIC | Age: 76
End: 2021-02-03
Payer: MEDICARE

## 2021-02-03 VITALS — SYSTOLIC BLOOD PRESSURE: 132 MMHG | DIASTOLIC BLOOD PRESSURE: 68 MMHG | TEMPERATURE: 96.8 F | HEART RATE: 68 BPM

## 2021-02-03 DIAGNOSIS — Z95.0 CARDIAC PACEMAKER IN SITU: Primary | ICD-10-CM

## 2021-02-03 DIAGNOSIS — I48.0 PAROXYSMAL ATRIAL FIBRILLATION (HCC): ICD-10-CM

## 2021-02-03 PROCEDURE — 99214 OFFICE O/P EST MOD 30 MIN: CPT | Performed by: INTERNAL MEDICINE

## 2021-02-03 PROCEDURE — 1036F TOBACCO NON-USER: CPT | Performed by: INTERNAL MEDICINE

## 2021-02-03 PROCEDURE — G8417 CALC BMI ABV UP PARAM F/U: HCPCS | Performed by: INTERNAL MEDICINE

## 2021-02-03 PROCEDURE — G8484 FLU IMMUNIZE NO ADMIN: HCPCS | Performed by: INTERNAL MEDICINE

## 2021-02-03 PROCEDURE — G8427 DOCREV CUR MEDS BY ELIG CLIN: HCPCS | Performed by: INTERNAL MEDICINE

## 2021-02-03 PROCEDURE — 1090F PRES/ABSN URINE INCON ASSESS: CPT | Performed by: INTERNAL MEDICINE

## 2021-02-03 PROCEDURE — 1123F ACP DISCUSS/DSCN MKR DOCD: CPT | Performed by: INTERNAL MEDICINE

## 2021-02-03 PROCEDURE — 4040F PNEUMOC VAC/ADMIN/RCVD: CPT | Performed by: INTERNAL MEDICINE

## 2021-02-03 PROCEDURE — G8399 PT W/DXA RESULTS DOCUMENT: HCPCS | Performed by: INTERNAL MEDICINE

## 2021-02-03 RX ORDER — POTASSIUM CHLORIDE 1.5 G/1.77G
20 POWDER, FOR SOLUTION ORAL DAILY
COMMUNITY

## 2021-02-03 RX ORDER — SENNA PLUS 8.6 MG/1
1 TABLET ORAL DAILY
COMMUNITY

## 2021-02-03 NOTE — PROGRESS NOTES
Sonia Calle  is a  Established patient  ,66 y.o.   female here for evaluation of the following chief complaint(s):    cad        SUBJECTIVE/OBJECTIVE:  HPI : h/o  Cad, htn, hyperlipidimea, VHD, MVR now here  Has no cardiac complains    Review of Systems    # shoulder    Vitals:    02/03/21 1339   BP: 132/68   Pulse: 68   Temp: 96.8 °F (36 °C)   TempSrc: Temporal     /68   Pulse 68   Temp 96.8 °F (36 °C) (Temporal)   No flowsheet data found. Wt Readings from Last 3 Encounters:   01/03/21 194 lb (88 kg)   11/16/20 169 lb 9.6 oz (76.9 kg)   06/16/20 176 lb 6.4 oz (80 kg)     There is no height or weight on file to calculate BMI. Physical Exam     Neck: JVD      Lungs : clear    Cardio : Si and S2 audilble      Ext: edema      All pertinent data reviewed      Meds : reviewed         Tests ordered        ASSESSMENT/PLAN:               -     CORONARY ARTERY DISEASE:  asymptomatic     All available  tests in chart reviewed. Management discussed . Testing ordered  no                                 -  Hypertension: Patients blood pressure is normal. Patient is advised about low sodium diet. Present medical regimen will not be changed. - VHD  SP  MVR      - PPM:  carelink reviewed    - PAF on anticoag   Has been falling, decrease 2.5 BID  Risks DW pt and family      -   DIABETES MELLITUS: Available pertinent lab data reviewed   and  patient was given dietary advice . Advised to check blood glucose level on a regular basis. -   Changes  in medicines made: No                  -  LIPID MANAGEMENT:  Importance of lipid levels discussed with patient   and patient was given dietary advice. NCEP- ATP III guidelines reviewed with patient. -   Changes  in medicines made: No                                    An electronic signature was used to authenticate this note.     --Tania Callahan MD

## 2021-02-08 ENCOUNTER — TELEPHONE (OUTPATIENT)
Dept: CARDIOLOGY CLINIC | Age: 76
End: 2021-02-08

## 2021-03-22 ENCOUNTER — TELEPHONE (OUTPATIENT)
Dept: CARDIOLOGY CLINIC | Age: 76
End: 2021-03-22

## 2021-04-08 ENCOUNTER — PROCEDURE VISIT (OUTPATIENT)
Dept: CARDIOLOGY CLINIC | Age: 76
End: 2021-04-08
Payer: MEDICARE

## 2021-04-08 DIAGNOSIS — Z95.0 CARDIAC PACEMAKER IN SITU: Primary | ICD-10-CM

## 2021-04-08 DIAGNOSIS — I49.5 SINUS NODE DYSFUNCTION (HCC): ICD-10-CM

## 2021-04-08 DIAGNOSIS — I44.0 AV BLOCK, 1ST DEGREE: ICD-10-CM

## 2021-05-10 ENCOUNTER — TELEPHONE (OUTPATIENT)
Dept: CARDIOLOGY CLINIC | Age: 76
End: 2021-05-10

## 2021-06-06 ENCOUNTER — APPOINTMENT (OUTPATIENT)
Dept: CT IMAGING | Age: 76
DRG: 086 | End: 2021-06-06
Payer: MEDICARE

## 2021-06-06 ENCOUNTER — APPOINTMENT (OUTPATIENT)
Dept: GENERAL RADIOLOGY | Age: 76
DRG: 086 | End: 2021-06-06
Payer: MEDICARE

## 2021-06-06 ENCOUNTER — HOSPITAL ENCOUNTER (EMERGENCY)
Age: 76
Discharge: ANOTHER HOSPITAL | DRG: 086 | End: 2021-06-06
Attending: EMERGENCY MEDICINE | Admitting: INTERNAL MEDICINE
Payer: MEDICARE

## 2021-06-06 VITALS
BODY MASS INDEX: 30.48 KG/M2 | TEMPERATURE: 98.1 F | OXYGEN SATURATION: 96 % | SYSTOLIC BLOOD PRESSURE: 119 MMHG | HEIGHT: 63 IN | DIASTOLIC BLOOD PRESSURE: 67 MMHG | WEIGHT: 172 LBS | HEART RATE: 62 BPM | RESPIRATION RATE: 17 BRPM

## 2021-06-06 DIAGNOSIS — S09.8XXA BLUNT HEAD TRAUMA, INITIAL ENCOUNTER: ICD-10-CM

## 2021-06-06 DIAGNOSIS — S06.320A: ICD-10-CM

## 2021-06-06 DIAGNOSIS — D68.9 COAGULOPATHY (HCC): ICD-10-CM

## 2021-06-06 DIAGNOSIS — S32.010A CLOSED WEDGE COMPRESSION FRACTURE OF L1 VERTEBRA, INITIAL ENCOUNTER (HCC): ICD-10-CM
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
## 2021-06-06 DIAGNOSIS — I16.0 HYPERTENSIVE URGENCY: ICD-10-CM

## 2021-06-06 PROBLEM — E87.1 HYPONATREMIA: Status: ACTIVE | Noted: 2021-06-06

## 2021-06-06 PROBLEM — I62.9 ACUTE INTRACRANIAL HEMORRHAGE (HCC): Status: ACTIVE | Noted: 2021-06-06

## 2021-06-06 LAB
ALBUMIN SERPL-MCNC: 4.1 GM/DL (ref 3.4–5)
ALP BLD-CCNC: 90 IU/L (ref 40–129)
ALT SERPL-CCNC: 8 U/L (ref 10–40)
ANION GAP SERPL CALCULATED.3IONS-SCNC: 13 MMOL/L (ref 4–16)
APTT: 44 SECONDS (ref 25.1–37.1)
AST SERPL-CCNC: 18 IU/L (ref 15–37)
BASOPHILS ABSOLUTE: 0 K/CU MM
BASOPHILS RELATIVE PERCENT: 0.3 % (ref 0–1)
BILIRUB SERPL-MCNC: 0.6 MG/DL (ref 0–1)
BUN BLDV-MCNC: 12 MG/DL (ref 6–23)
CALCIUM SERPL-MCNC: 9.2 MG/DL (ref 8.3–10.6)
CHLORIDE BLD-SCNC: 95 MMOL/L (ref 99–110)
CO2: 23 MMOL/L (ref 21–32)
CREAT SERPL-MCNC: 0.6 MG/DL (ref 0.6–1.1)
DIFFERENTIAL TYPE: ABNORMAL
EOSINOPHILS ABSOLUTE: 0 K/CU MM
EOSINOPHILS RELATIVE PERCENT: 0 % (ref 0–3)
ESTIMATED AVERAGE GLUCOSE: 114 MG/DL
GFR AFRICAN AMERICAN: >60 ML/MIN/1.73M2
GFR NON-AFRICAN AMERICAN: >60 ML/MIN/1.73M2
GLUCOSE BLD-MCNC: 106 MG/DL (ref 70–99)
GLUCOSE BLD-MCNC: 141 MG/DL
GLUCOSE BLD-MCNC: 141 MG/DL (ref 70–99)
HBA1C MFR BLD: 5.6 % (ref 4.2–6.3)
HCT VFR BLD CALC: 31.6 % (ref 37–47)
HEMOGLOBIN: 10.6 GM/DL (ref 12.5–16)
IMMATURE NEUTROPHIL %: 0.5 % (ref 0–0.43)
INR BLD: 1.33 INDEX
LYMPHOCYTES ABSOLUTE: 0.9 K/CU MM
LYMPHOCYTES RELATIVE PERCENT: 13.3 % (ref 24–44)
MCH RBC QN AUTO: 27.1 PG (ref 27–31)
MCHC RBC AUTO-ENTMCNC: 33.5 % (ref 32–36)
MCV RBC AUTO: 80.8 FL (ref 78–100)
MONOCYTES ABSOLUTE: 0.3 K/CU MM
MONOCYTES RELATIVE PERCENT: 4.9 % (ref 0–4)
NUCLEATED RBC %: 0 %
PDW BLD-RTO: 13.8 % (ref 11.7–14.9)
PLATELET # BLD: 189 K/CU MM (ref 140–440)
PMV BLD AUTO: 9.8 FL (ref 7.5–11.1)
POTASSIUM SERPL-SCNC: 4.1 MMOL/L (ref 3.5–5.1)
PROTHROMBIN TIME: 16.1 SECONDS (ref 11.7–14.5)
RBC # BLD: 3.91 M/CU MM (ref 4.2–5.4)
SEGMENTED NEUTROPHILS ABSOLUTE COUNT: 5.3 K/CU MM
SEGMENTED NEUTROPHILS RELATIVE PERCENT: 81 % (ref 36–66)
SODIUM BLD-SCNC: 131 MMOL/L (ref 135–145)
TOTAL IMMATURE NEUTOROPHIL: 0.03 K/CU MM
TOTAL NUCLEATED RBC: 0 K/CU MM
TOTAL PROTEIN: 6.2 GM/DL (ref 6.4–8.2)
WBC # BLD: 6.5 K/CU MM (ref 4–10.5)

## 2021-06-06 PROCEDURE — 72131 CT LUMBAR SPINE W/O DYE: CPT

## 2021-06-06 PROCEDURE — 6370000000 HC RX 637 (ALT 250 FOR IP): Performed by: INTERNAL MEDICINE

## 2021-06-06 PROCEDURE — 1200000000 HC SEMI PRIVATE

## 2021-06-06 PROCEDURE — 71046 X-RAY EXAM CHEST 2 VIEWS: CPT

## 2021-06-06 PROCEDURE — 80053 COMPREHEN METABOLIC PANEL: CPT

## 2021-06-06 PROCEDURE — C9132 KCENTRA, PER I.U.: HCPCS | Performed by: EMERGENCY MEDICINE

## 2021-06-06 PROCEDURE — 85610 PROTHROMBIN TIME: CPT

## 2021-06-06 PROCEDURE — 99223 1ST HOSP IP/OBS HIGH 75: CPT | Performed by: INTERNAL MEDICINE

## 2021-06-06 PROCEDURE — 82962 GLUCOSE BLOOD TEST: CPT

## 2021-06-06 PROCEDURE — 94761 N-INVAS EAR/PLS OXIMETRY MLT: CPT

## 2021-06-06 PROCEDURE — 85730 THROMBOPLASTIN TIME PARTIAL: CPT

## 2021-06-06 PROCEDURE — 83036 HEMOGLOBIN GLYCOSYLATED A1C: CPT

## 2021-06-06 PROCEDURE — 70450 CT HEAD/BRAIN W/O DYE: CPT

## 2021-06-06 PROCEDURE — 85025 COMPLETE CBC W/AUTO DIFF WBC: CPT

## 2021-06-06 PROCEDURE — 96365 THER/PROPH/DIAG IV INF INIT: CPT

## 2021-06-06 PROCEDURE — 6360000002 HC RX W HCPCS: Performed by: EMERGENCY MEDICINE

## 2021-06-06 PROCEDURE — 72170 X-RAY EXAM OF PELVIS: CPT

## 2021-06-06 PROCEDURE — 2580000003 HC RX 258: Performed by: EMERGENCY MEDICINE

## 2021-06-06 PROCEDURE — 99283 EMERGENCY DEPT VISIT LOW MDM: CPT

## 2021-06-06 PROCEDURE — 72125 CT NECK SPINE W/O DYE: CPT

## 2021-06-06 PROCEDURE — 96368 THER/DIAG CONCURRENT INF: CPT

## 2021-06-06 PROCEDURE — 2580000003 HC RX 258: Performed by: INTERNAL MEDICINE

## 2021-06-06 PROCEDURE — 2500000003 HC RX 250 WO HCPCS: Performed by: INTERNAL MEDICINE

## 2021-06-06 RX ORDER — SODIUM CHLORIDE 9 MG/ML
25 INJECTION, SOLUTION INTRAVENOUS PRN
Status: DISCONTINUED | OUTPATIENT
Start: 2021-06-06 | End: 2021-06-07 | Stop reason: HOSPADM

## 2021-06-06 RX ORDER — ONDANSETRON 2 MG/ML
4 INJECTION INTRAMUSCULAR; INTRAVENOUS EVERY 6 HOURS PRN
Status: DISCONTINUED | OUTPATIENT
Start: 2021-06-06 | End: 2021-06-07 | Stop reason: HOSPADM

## 2021-06-06 RX ORDER — NICOTINE POLACRILEX 4 MG
15 LOZENGE BUCCAL PRN
Status: DISCONTINUED | OUTPATIENT
Start: 2021-06-06 | End: 2021-06-06 | Stop reason: SDUPTHER

## 2021-06-06 RX ORDER — METOPROLOL TARTRATE 50 MG/1
25 TABLET, FILM COATED ORAL 2 TIMES DAILY
Status: DISCONTINUED | OUTPATIENT
Start: 2021-06-06 | End: 2021-06-07 | Stop reason: HOSPADM

## 2021-06-06 RX ORDER — ALBUTEROL SULFATE 90 UG/1
2 AEROSOL, METERED RESPIRATORY (INHALATION)
Status: DISCONTINUED | OUTPATIENT
Start: 2021-06-06 | End: 2021-06-07 | Stop reason: HOSPADM

## 2021-06-06 RX ORDER — ONDANSETRON 4 MG/1
4 TABLET, ORALLY DISINTEGRATING ORAL EVERY 8 HOURS PRN
Status: DISCONTINUED | OUTPATIENT
Start: 2021-06-06 | End: 2021-06-06 | Stop reason: SDUPTHER

## 2021-06-06 RX ORDER — LANOLIN ALCOHOL/MO/W.PET/CERES
3 CREAM (GRAM) TOPICAL DAILY
Status: DISCONTINUED | OUTPATIENT
Start: 2021-06-06 | End: 2021-06-07 | Stop reason: HOSPADM

## 2021-06-06 RX ORDER — DEXTROSE MONOHYDRATE 50 MG/ML
100 INJECTION, SOLUTION INTRAVENOUS PRN
Status: DISCONTINUED | OUTPATIENT
Start: 2021-06-06 | End: 2021-06-06 | Stop reason: SDUPTHER

## 2021-06-06 RX ORDER — LIDOCAINE 4 G/G
2 PATCH TOPICAL DAILY
Status: DISCONTINUED | OUTPATIENT
Start: 2021-06-06 | End: 2021-06-07 | Stop reason: HOSPADM

## 2021-06-06 RX ORDER — ONDANSETRON 4 MG/1
4 TABLET, ORALLY DISINTEGRATING ORAL EVERY 8 HOURS PRN
Status: DISCONTINUED | OUTPATIENT
Start: 2021-06-06 | End: 2021-06-07 | Stop reason: HOSPADM

## 2021-06-06 RX ORDER — MAGNESIUM SULFATE IN WATER 40 MG/ML
2000 INJECTION, SOLUTION INTRAVENOUS PRN
Status: DISCONTINUED | OUTPATIENT
Start: 2021-06-06 | End: 2021-06-07 | Stop reason: HOSPADM

## 2021-06-06 RX ORDER — SODIUM CHLORIDE 0.9 % (FLUSH) 0.9 %
5-40 SYRINGE (ML) INJECTION PRN
Status: DISCONTINUED | OUTPATIENT
Start: 2021-06-06 | End: 2021-06-07 | Stop reason: HOSPADM

## 2021-06-06 RX ORDER — PREDNISONE 10 MG/1
10 TABLET ORAL DAILY
Status: DISCONTINUED | OUTPATIENT
Start: 2021-06-06 | End: 2021-06-07 | Stop reason: HOSPADM

## 2021-06-06 RX ORDER — POTASSIUM CHLORIDE 29.8 MG/ML
20 INJECTION INTRAVENOUS PRN
Status: DISCONTINUED | OUTPATIENT
Start: 2021-06-06 | End: 2021-06-07 | Stop reason: HOSPADM

## 2021-06-06 RX ORDER — SENNA AND DOCUSATE SODIUM 50; 8.6 MG/1; MG/1
2 TABLET, FILM COATED ORAL 2 TIMES DAILY
Status: DISCONTINUED | OUTPATIENT
Start: 2021-06-06 | End: 2021-06-07 | Stop reason: HOSPADM

## 2021-06-06 RX ORDER — FUROSEMIDE 20 MG/1
20 TABLET ORAL DAILY
Status: DISCONTINUED | OUTPATIENT
Start: 2021-06-06 | End: 2021-06-07 | Stop reason: HOSPADM

## 2021-06-06 RX ORDER — DEXTROSE MONOHYDRATE 25 G/50ML
12.5 INJECTION, SOLUTION INTRAVENOUS PRN
Status: DISCONTINUED | OUTPATIENT
Start: 2021-06-06 | End: 2021-06-07 | Stop reason: HOSPADM

## 2021-06-06 RX ORDER — PANTOPRAZOLE SODIUM 40 MG/1
40 TABLET, DELAYED RELEASE ORAL
Status: DISCONTINUED | OUTPATIENT
Start: 2021-06-07 | End: 2021-06-07 | Stop reason: HOSPADM

## 2021-06-06 RX ORDER — ONDANSETRON 2 MG/ML
4 INJECTION INTRAMUSCULAR; INTRAVENOUS EVERY 6 HOURS PRN
Status: DISCONTINUED | OUTPATIENT
Start: 2021-06-06 | End: 2021-06-06 | Stop reason: SDUPTHER

## 2021-06-06 RX ORDER — MORPHINE SULFATE 4 MG/ML
2 INJECTION, SOLUTION INTRAMUSCULAR; INTRAVENOUS
Status: DISCONTINUED | OUTPATIENT
Start: 2021-06-06 | End: 2021-06-07 | Stop reason: HOSPADM

## 2021-06-06 RX ORDER — SODIUM CHLORIDE 9 MG/ML
50 INJECTION, SOLUTION INTRAVENOUS ONCE
Status: COMPLETED | OUTPATIENT
Start: 2021-06-06 | End: 2021-06-06

## 2021-06-06 RX ORDER — CALCIUM CARBONATE 200(500)MG
1000 TABLET,CHEWABLE ORAL DAILY
Status: DISCONTINUED | OUTPATIENT
Start: 2021-06-06 | End: 2021-06-07 | Stop reason: HOSPADM

## 2021-06-06 RX ORDER — GABAPENTIN 300 MG/1
600 CAPSULE ORAL 3 TIMES DAILY
Status: DISCONTINUED | OUTPATIENT
Start: 2021-06-06 | End: 2021-06-07 | Stop reason: HOSPADM

## 2021-06-06 RX ORDER — DONEPEZIL HYDROCHLORIDE 10 MG/1
10 TABLET, FILM COATED ORAL DAILY
Status: DISCONTINUED | OUTPATIENT
Start: 2021-06-06 | End: 2021-06-07 | Stop reason: HOSPADM

## 2021-06-06 RX ORDER — ACETAMINOPHEN 325 MG/1
650 TABLET ORAL EVERY 6 HOURS PRN
Status: DISCONTINUED | OUTPATIENT
Start: 2021-06-06 | End: 2021-06-07 | Stop reason: HOSPADM

## 2021-06-06 RX ORDER — POTASSIUM CHLORIDE 20 MEQ/1
20 TABLET, EXTENDED RELEASE ORAL DAILY
Status: DISCONTINUED | OUTPATIENT
Start: 2021-06-06 | End: 2021-06-07 | Stop reason: HOSPADM

## 2021-06-06 RX ORDER — ROSUVASTATIN CALCIUM 40 MG/1
40 TABLET, COATED ORAL EVERY EVENING
Status: DISCONTINUED | OUTPATIENT
Start: 2021-06-06 | End: 2021-06-07 | Stop reason: HOSPADM

## 2021-06-06 RX ORDER — ACETAMINOPHEN 650 MG/1
650 SUPPOSITORY RECTAL EVERY 6 HOURS PRN
Status: DISCONTINUED | OUTPATIENT
Start: 2021-06-06 | End: 2021-06-07 | Stop reason: HOSPADM

## 2021-06-06 RX ORDER — CITALOPRAM 40 MG/1
40 TABLET ORAL DAILY
Status: DISCONTINUED | OUTPATIENT
Start: 2021-06-06 | End: 2021-06-07 | Stop reason: HOSPADM

## 2021-06-06 RX ORDER — DEXTROSE MONOHYDRATE 50 MG/ML
100 INJECTION, SOLUTION INTRAVENOUS PRN
Status: DISCONTINUED | OUTPATIENT
Start: 2021-06-06 | End: 2021-06-07 | Stop reason: HOSPADM

## 2021-06-06 RX ORDER — DEXTROSE MONOHYDRATE 25 G/50ML
12.5 INJECTION, SOLUTION INTRAVENOUS PRN
Status: DISCONTINUED | OUTPATIENT
Start: 2021-06-06 | End: 2021-06-06 | Stop reason: SDUPTHER

## 2021-06-06 RX ORDER — ALBUTEROL SULFATE 2.5 MG/3ML
2.5 SOLUTION RESPIRATORY (INHALATION) EVERY 4 HOURS
Status: DISCONTINUED | OUTPATIENT
Start: 2021-06-06 | End: 2021-06-06

## 2021-06-06 RX ORDER — SODIUM CHLORIDE 0.9 % (FLUSH) 0.9 %
5-40 SYRINGE (ML) INJECTION EVERY 12 HOURS SCHEDULED
Status: DISCONTINUED | OUTPATIENT
Start: 2021-06-06 | End: 2021-06-07 | Stop reason: HOSPADM

## 2021-06-06 RX ORDER — CYCLOBENZAPRINE HCL 10 MG
5 TABLET ORAL 3 TIMES DAILY PRN
Status: DISCONTINUED | OUTPATIENT
Start: 2021-06-06 | End: 2021-06-07 | Stop reason: HOSPADM

## 2021-06-06 RX ORDER — NICOTINE POLACRILEX 4 MG
15 LOZENGE BUCCAL PRN
Status: DISCONTINUED | OUTPATIENT
Start: 2021-06-06 | End: 2021-06-07 | Stop reason: HOSPADM

## 2021-06-06 RX ORDER — PREGABALIN 75 MG/1
150 CAPSULE ORAL 2 TIMES DAILY
Status: DISCONTINUED | OUTPATIENT
Start: 2021-06-06 | End: 2021-06-07 | Stop reason: HOSPADM

## 2021-06-06 RX ADMIN — GABAPENTIN 600 MG: 300 CAPSULE ORAL at 16:54

## 2021-06-06 RX ADMIN — Medication 1000 UNITS: at 16:54

## 2021-06-06 RX ADMIN — LEVETIRACETAM 1000 MG: 100 INJECTION, SOLUTION INTRAVENOUS at 13:40

## 2021-06-06 RX ADMIN — POTASSIUM CHLORIDE 20 MEQ: 1500 TABLET, EXTENDED RELEASE ORAL at 16:54

## 2021-06-06 RX ADMIN — DONEPEZIL HYDROCHLORIDE 10 MG: 10 TABLET, FILM COATED ORAL at 22:12

## 2021-06-06 RX ADMIN — PREDNISONE 10 MG: 10 TABLET ORAL at 16:54

## 2021-06-06 RX ADMIN — FUROSEMIDE 20 MG: 20 TABLET ORAL at 16:54

## 2021-06-06 RX ADMIN — SODIUM CHLORIDE 50 ML: 9 INJECTION, SOLUTION INTRAVENOUS at 13:53

## 2021-06-06 RX ADMIN — PROTHROMBIN, COAGULATION FACTOR VII HUMAN, COAGULATION FACTOR IX HUMAN, COAGULATION FACTOR X HUMAN, PROTEIN C, PROTEIN S HUMAN, AND WATER 3900 UNITS: KIT at 13:48

## 2021-06-06 RX ADMIN — DEXTROSE MONOHYDRATE 5 MG/HR: 50 INJECTION, SOLUTION INTRAVENOUS at 17:39

## 2021-06-06 RX ADMIN — CALCIUM CARBONATE (ANTACID) CHEW TAB 500 MG 1000 MG: 500 CHEW TAB at 22:02

## 2021-06-06 ASSESSMENT — PAIN DESCRIPTION - FREQUENCY: FREQUENCY: INTERMITTENT

## 2021-06-06 ASSESSMENT — PAIN DESCRIPTION - LOCATION: LOCATION: BACK;HIP

## 2021-06-06 ASSESSMENT — PAIN DESCRIPTION - PAIN TYPE: TYPE: ACUTE PAIN

## 2021-06-06 ASSESSMENT — PAIN SCALES - GENERAL: PAINLEVEL_OUTOF10: 6

## 2021-06-06 NOTE — RT PROTOCOL NOTE
RT Inhaler-Nebulizer Bronchodilator Protocol Note    There is a bronchodilator order in the chart from a provider indicating to follow the RT Bronchodilator Protocol and there is an Initiate RT Bronchodilator Protocol order as well (see protocol at bottom of note). The findings from the last RT Protocol Assessment were as follows:  Smoking: Non smoker  Surgical Status: No surgery  Xray: Clear  Respiratory Pattern: RR 12-20  Mental Status: Alert and Oriented  Breath Sounds: Clear  Cough: Strong, spontaneous, non-productive  Activity Level: Walking with assistance  Oxygen Requirement: Room Air - 2LNC/28% or home setting  Indication for Bronchodilator Therapy: None  Bronchodilator Assessment Score: 0    Aerosolized bronchodilator medication orders have been revised according to the RT Bronchodilator Protocol. RT Inhaler-Nebulizer Bronchodilator Protocol:    Respiratory Therapist to perform RT Therapy Protocol Assessment then follow the protocol. No Indications - adjust the frequency to every 6 hours PRN wheezing or bronchospasm, if no treatments needed after 48 hours then discontinue using Per Protocol order mode. If indication present, adjust the RT bronchodilator orders based on the Bronchodilator Assessment Score as follows:    0-6 - enter or revise RT bronchodilator order to Albuterol Inhaler order with frequency of every 2 hours PRN for wheezing or increased work of breathing using Per Protocol order mode. If Albuterol Inhaler not tolerated or not effective, then discontinue the Albuterol Inhaler order and enter Albuterol Nebulizer order with same frequency and PRN reasons. Repeat RT Therapy Protocol Assessment as needed. 7-10 - discontinue any other Inpatient aerosolized bronchodilator medication orders and enter or revise two Albuterol Inhaler orders, one with BID frequency and one with frequency of every 2 hours PRN wheezing or increased work of breathing using Per Protocol order mode. Repeat RT Therapy Protocol Assessment with second treatment then BID and as needed. If Albuterol Inhaler not tolerated or not effective, then discontinue the Albuterol Inhaler orders and enter two Albuterol Nebulizer orders with same frequencies and PRN reasons. 11-13 - discontinue any other Inpatient aerosolized bronchodilator medication orders and enter DuoNeb Nebulizer orders QID frequency and an Albuterol Nebulizer order every 2 hours PRN wheezing or increased work of breathing using Per Protocol order mode. Repeat RT Therapy Protocol Assessment with second treatment then QID and as needed. Greater than 13 - discontinue any other Inpatient bronchodilator aerosolized medication orders and enter DuoNeb Nebulizer order every 4 hours frequency and Albuterol Nebulizer every 2 hours PRN wheezing or increased work of breathing using Per Protocol order mode. Repeat RT Therapy Protocol Assessment with second treatment then every 4 hours and as needed. RT to enter RT Home Evaluation for COPD & MDI Assessment order using Per Protocol order mode.     Electronically signed by Brian Conley RCP on 6/6/2021 at 4:33 PM

## 2021-06-06 NOTE — ED TRIAGE NOTES
Pt arrived via medic. Pt fell on Wednesday on two separate occasion but did not pass out. Pt said her back hurts today so she called the squad. Pt does take blood thinners. Pt currently alert and oriented x4.

## 2021-06-06 NOTE — H&P
Department of Internal Medicine  [facility] MEDICINE  Attending Physician History and Physical    PCP: Bertin Harris MD  MRN: [id number]    CHIEF COMPLAINT:  Intractable Lower Back Pain. Reason for Admission: For further work-up and management of intracranial hemorrhage. History Obtained From:  patient, family member - sister, electronic medical record, ED provider. HISTORY OF PRESENT ILLNESS:    The patient Bala Salguero is a 64 y.o. female with significant past medical history of essential hypertension on antihypertensive regimen, dyslipidemia on statin, diabetes type 2 on oral hypoglycemics, CAD status post CABG and MVR in 2018, Alzheimer's dementia, paroxysmal A. fib on Eliquis; who presents with worsening lower back pain after a fall on Wednesday, 5 days ago. Patient sister was at the bedside and gave most of the history. Patient had had a fracture of the right shoulder and therefore was wheelchair-bound. However according to the sister at the bedside, one of her sisters had wanted the patient to be active and therefore took away her wheelchair. Patient sustained falls on 2 different locations on Wednesday. She did have swelling of the right scalp and no other complaints. However over the days, she had been complaining of severe low back pain. Patient was brought to the emergency department today and work-up did show compressive L1 fracture and right-sided care for hematoma with intraparenchymal hemorrhage and intraventricular hemorrhage. Of note patient denied any headache, neck pain or stiffness, blurry vision or diplopia, dizziness or diaphoresis, nausea or vomiting. Patient was given a dose of Kcentra by the ED provider, and CT findings was discussed with neurosurgeon Dr. Elba Mccabe who directed patient to be managed as:  Obtain repeat CT brain in 6 hours, maintain patient on Keppra twice daily and Cardene drip with parameters.   Patient was subsequently admitted to the hospitalist service for further work-up and management. Past Medical History:        Diagnosis Date    Anxiety     Arthritis     knees    Atrial fibrillation ([facility] [address]     CAD (coronary artery disease)     Sees Dr. Bri Tee    Diabetes mellitus ([facility] [address]     H/O cardiac catheterization 06/25/2018    severe 3 vessel disease, refer to CV surgery for CABG and MAZE    H/O cardiovascular stress test 06/06/2018    EF47%  fixed perfusion defect ant wall, pt in afib    H/O echocardiogram 06/06/2018    EF55% mod MR    H/O echocardiogram 08/28/2018    EF 50-55%, Mild : AR, MR & TR.    H/O echocardiogram 03/13/2020    EF 55%, Breast artifact noted. 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 Sailaja Ibrahim History of Holter monitoring 10/23/2018    Multiple PAF episodes noted. Tachy-Dinesh episodes noted.  History of nuclear stress test 03/13/2020    EF 55%, Breast artifact.     Hyperlipidemia     Hypertension     Memory loss     on Aricept    Missing teeth, acquired     Pneumonia     pneumococcal pneumonia twice at end of 2017    Risk for falls     uses walker    TIA (transient ischemic attack)     family doctors said she has had mini-strokes    Urinary leakage     UTI (urinary tract infection)     recent --just stopped antibiotic last week as of 6-29-18    Wears glasses        Past Surgical History:        Procedure Laterality Date    CABG WITH MITRAL VALVE REPAIR  07/09/2018    CABG X 3 [place]>LAD, SVG>CX M1, SVG>M2, MVR repair w/#28 CG ring, PFO closure, MAZE    CARDIAC CATHETERIZATION  06/25/2018    EYE SURGERY Bilateral 2018    Cataracts    MITRAL VALVE MAZE PROCEDURE  07/19/2018    Dr. Lj Pepe Left 12/11/2018    [employer] [facility] XT DR LANA Bartlett     THORACENTESIS Bilateral 07/13/2018    IR Dr. Noe Mead, right 1300, left 900 all s/s    TONSILLECTOMY  1940's    WISDOM TOOTH EXTRACTION         Immunizations:              Influenza:  Up-to-date            Pneumococcal Polysaccharide: Ordered    Medications Prior to Admission:    Prior to Admission medications    Medication Sig Start Date End Date Taking? Authorizing Provider   apixaban (ELIQUIS) 5 MG TABS tablet Take 1 tablet by mouth 2 times daily 6/1/21   Abby Avilez MD   potassium chloride (KLOR-CON) 20 MEQ packet Take 20 mEq by mouth daily    Historical Provider, MD   senna (SENOKOT) 8.6 MG tablet Take 1 tablet by mouth daily    Historical Provider, MD   acetaminophen (APAP EXTRA STRENGTH) 500 MG tablet Take 1 tablet by mouth every 6 hours as needed for Pain 12/1/20   Cheng Fernandes PA-C   metoprolol tartrate (LOPRESSOR) 50 MG tablet Take 0.5 tablets by mouth 2 times daily 11/16/20   Abby Avilez MD   gabapentin (NEURONTIN) 600 MG tablet Take 600 mg by mouth 3 times daily. Historical Provider, MD   pregabalin (LYRICA) 150 MG capsule 150 mg 2 times daily.   10/30/19   Historical Provider, MD   ipratropium (ATROVENT) 0.03 % nasal spray  9/27/19   Historical Provider, MD   methylcellulose (CITRUCEL) 500 MG TABS Take 2,000 mg by mouth daily prn    Historical Provider, MD   omeprazole (PRILOSEC) 40 MG delayed release capsule Take 40 mg by mouth daily    Historical Provider, MD   melatonin 3 MG TABS tablet Take 3 mg by mouth daily    Historical Provider, MD   estradiol (ESTRACE) 0.1 MG/GM vaginal cream Place 2 g vaginally 3 times daily as needed     Historical Provider, MD   aspirin 81 MG EC tablet Take 1 tablet by mouth daily 7/21/18   C Ashly Villa MD   furosemide (LASIX) 20 MG tablet Take 1 tablet by mouth daily 7/21/18   C Ashly Villa MD   docusate sodium (COLACE, DULCOLAX) 100 MG CAPS Take 100 mg by mouth 2 times daily 7/20/18   C Ashly Villa MD   metFORMIN (GLUCOPHAGE) 500 MG tablet Take 500 mg by mouth 2 times daily (with meals)    Historical Provider, MD   donepezil (ARICEPT) 5 MG tablet Take 10 mg by mouth daily  5/28/18   Historical Provider, MD   citalopram (CELEXA) 40 MG tablet TAKE 1 TABLET EVERY DAY FOR ANXIETY AND STRESS 18   Historical Provider, MD   vitamin D3 (CHOLECALCIFEROL) 25 MCG (1000 UT) TABS tablet Take 1 tablet by mouth daily     Historical Provider, MD   CALCIUM CARBONATE PO Take 1,000 mg by mouth daily     Historical Provider, MD   rosuvastatin (CRESTOR) 40 MG tablet Take 40 mg by mouth every evening    Historical Provider, MD       Allergies:  Patient has no known allergies. Social History:   TOBACCO:   reports that she has never smoked. She has never used smokeless tobacco.  ETOH:   reports no history of alcohol use. DRUGS:   reports no history of drug use. DOMESTIC VIOLENCE:  no  ACTIVITIES OF DAILY LIVING:  Patient is not able to bathe self, dress self and use toilet without assistance. INSTRUMENTAL ACTIVITIES OF DAILY LIVING:  Patient is unable to. prepare meals, do housework and handle finances. OCCUPATION:  Retired  Patient currently lives with family: Sister    Code Status:  Full code    Family History:       Problem Relation Age of Onset    Stroke Mother     Heart Disease Father     Early Death Father     Breast Cancer Sister     Parkinsonism Brother     Heart Disease Sister     Other Sister         fibromyalgia    Diabetes Sister     Early Death Brother          at birth       Review of Systems:  ALL SYSTEMS WERE REVIEWED AND WERE UNREMARKABLE, WITH THE PERTINENT POSITIVES AND NEGATIVES AS DOCUMENTED IN THE HPI. PHYSICAL EXAM:  Vital Signs: BP (!) 145/59   Pulse 60   Temp 98.1 °F (36.7 °C) (Oral)   Resp 15   Ht 5' 3\" (1.6 m)   Wt 172 lb (78 kg)   SpO2 94%   BMI 30.47 kg/m²     General appearance: alert, appears older than stated age, cooperative, distracted, fatigued, no distress, pale and slowed mentation  Head: scalp contusion, Cephalohematoma involving the right temporal and parietal scalp tracking into the right side of the neck  Eyes: conjunctivae/corneas clear. PERRL, EOM's intact. Fundi benign. , Drooping of the right upper eyelid  Ears: normal TM's and external ear canals both ears  Nose: Nares normal. Septum midline. Mucosa normal. No drainage or sinus tenderness. Throat: lips, mucosa, and tongue normal; teeth and gums normal  Neck: no adenopathy, no carotid bruit, no JVD, supple, symmetrical, trachea midline, thyroid not enlarged, symmetric, no tenderness/mass/nodules and Hematoma of the right side  Back: symmetric, no curvature. ROM normal. No CVA tenderness. , Tenderness on palpating the midline upper back  Lungs: clear to auscultation bilaterally  Heart: regular rate and rhythm, S1, S2 normal, no murmur, click, rub or gallop  Abdomen: soft, non-tender; bowel sounds normal; no masses,  no organomegaly  Extremities: extremities normal, atraumatic, no cyanosis or edema  Pulses: 2+ and symmetric  Skin: Skin color, texture, turgor normal. No rashes or lesions  Neurologic: Slowed mentation but patient was alert and oriented to self and place as well as time, no focal neurological deficits observed      DATA:  Old records have been requested  CBC with Differential:    Lab Results   Component Value Date    WBC 6.5 06/06/2021    RBC 3.91 06/06/2021    HGB 10.6 06/06/2021    HCT 31.6 06/06/2021     06/06/2021    MCV 80.8 06/06/2021    MCH 27.1 06/06/2021    MCHC 33.5 06/06/2021    RDW 13.8 06/06/2021    SEGSPCT 81.0 06/06/2021    LYMPHOPCT 13.3 06/06/2021    MONOPCT 4.9 06/06/2021    BASOPCT 0.3 06/06/2021    MONOSABS 0.3 06/06/2021    LYMPHSABS 0.9 06/06/2021    EOSABS 0.0 06/06/2021    BASOSABS 0.0 06/06/2021    DIFFTYPE AUTOMATED DIFFERENTIAL 06/06/2021     CMP:    Lab Results   Component Value Date     06/06/2021    K 4.1 06/06/2021    CL 95 06/06/2021    CO2 23 06/06/2021    BUN 12 06/06/2021    CREATININE 0.6 06/06/2021    GFRAA >60 06/06/2021    LABGLOM >60 06/06/2021    GLUCOSE 106 06/06/2021    PROT 6.2 06/06/2021    LABALBU 4.1 06/06/2021    CALCIUM 9.2 06/06/2021    BILITOT 0.6 06/06/2021    ALKPHOS 90 06/06/2021    AST 18 06/06/2021    ALT 8 06/06/2021 Ionized Calcium:    Lab Results   Component Value Date    IONCA 1.09 07/06/2018     Magnesium:    Lab Results   Component Value Date    MG 2.0 12/05/2019     Phosphorus:    Lab Results   Component Value Date    PHOS 4.0 12/12/2018     PT/INR:    Lab Results   Component Value Date    PROTIME 16.1 06/06/2021    INR 1.33 06/06/2021     PTT:    Lab Results   Component Value Date    APTT 44.0 06/06/2021   [APTT}  Troponin:  No results found for: TROPONINI  U/A:    Lab Results   Component Value Date    COLORU STRAW 06/29/2018    PROTEINU NEGATIVE 06/29/2018    WBCUA 4 06/29/2018    RBCUA <1 06/29/2018    TRICHOMONAS NONE SEEN 06/29/2018    BACTERIA RARE 06/29/2018    CLARITYU CLEAR 06/29/2018    SPECGRAV 1.005 06/29/2018    LEUKOCYTESUR SMALL 06/29/2018    UROBILINOGEN NORMAL 06/29/2018    BILIRUBINUR NEGATIVE 06/29/2018    BLOODU NEGATIVE 06/29/2018       ASSESSMENT / PLAN:   Principal Problem:    Acute intracranial hemorrhage ([address]  Patient was given a dose of Kcentra at the ED, hold all anticoagulation and antiplatelets, follow-up repeat CT brain in 6 hours, neurosurgeon Dr. Katja Jeong to follow with further recommendations, continue supportive management, admit patient to the ICU. Optimize blood pressure control with Cardene drip as recommended by neurosurgeon. Also maintain patient on Keppra for seizure prevention as also recommended by neurosurgeon. Compression fracture of L1 lumbar vertebra [facility])  Neurosurgery consult following, maintain patient on narcotic pain medications, empiric steroids, Lidoderm patch, as needed muscle relaxation with Flexeril. May benefit from kyphoplasty if pain not improved. Active Problems:    Paroxysmal atrial fibrillation (HCC)  Rate is currently under control, hold Eliquis until cleared by neurosurgery consult, closely monitor for control of heart rate. Essential hypertension  Resume home antihypertensive regimen, optimize blood pressure control.       Mixed hyperlipidemia  Resume home dose of statin, follow-up lipid panel. SSS (sick sinus syndrome) ([facility] [address]  Status post pacemaker placement with good capture, monitor patient on telemetry. CAD status post CABG/MVR  Patient is hemodynamically medically stable and currently asymptomatic. Resume home regimen but hold antiplatelets because of the intracranial bleed. Hyponatremia  Mild hyponatremia and patient asymptomatic. Probably secondary to dehydration with reference to diuretic use. Continue to monitor closely. Right Humeral Surgical neck fracture  This is an old fracture with nonunion. May need to be followed by the orthopedic surgeon on discharge. Resolved Problems:    * No resolved hospital problems. *      Management as outlined above discussed with patient and sister at the bedside and all questions and concerns were addressed. Patient will be maintained in the ICU at least overnight for close observation and further management. Appreciate input by neurosurgery consult.     Electronically signed by Chuy Lo MD, MD.

## 2021-06-06 NOTE — Clinical Note
Patient Class: Inpatient [101]   REQUIRED: Diagnosis: Acute intracranial hemorrhage ([facility] [address] [[id number]]   Estimated Length of Stay: Estimated stay of more than 2 midnights   Admitting Provider: Manuel Villalobos [[id number]]   Telemetry/Cardiac Monitoring Required?: Yes

## 2021-06-06 NOTE — ED PROVIDER NOTES
Emergency Department Encounter    Patient: Meek Garcia  MRN: [id number]  : 1945  Date of Evaluation: 2021  ED Provider:  Darline Monroe DO    Triage Chief Complaint:   Fall (Two falls on Wednesday and on blood thinners )      [place]:  Meek Garcia is a 64 y.o. female that presents to the emergency department for evaluation of multiple falls. Early Wednesday morning, patient had a fall from standing. She was able to get up and ambulate afterwards. Every Wednesday, patient goes to [employer]  for dinner. Prior to her sister picking out for dinner, patient fell again. Did not make much of symptoms, went out for dinner. Over the past couple days, patient has had worsening back pain which prompted visit to the emergency department today. Does have ecchymosis on the top of the head. Denies syncope, dizziness, lightheadedness. Denies double vision, blurry vision, change in vision but no flashes or floaters. No tinnitus, buzzing, ringing. No facial droop, slurred speech, aphasia. No chest pain or pleuritic pain. Denies abdominal pain, nausea, vomiting. Does endorse low back pain. No loss of bowel bladder function or urine retention. No saddle anesthesia. No recent weight loss. No IV drug use. No fevers or chills. No history of bone or connective tissue disorders. Denies additional precipitating, modifying, alleviating features. ROS - see HPI, below listed is current ROS at time of my eval:  A complete 14 point review of systems was performed and is as dictated above, otherwise negative.     Past Medical History:   Diagnosis Date    Anxiety     Arthritis     knees    Atrial fibrillation ([facility]     CAD (coronary artery disease)     Sees Dr. Torey Muhammad    Diabetes mellitus ([facility]     H/O cardiac catheterization 2018    severe 3 vessel disease, refer to CV surgery for CABG and MAZE    H/O cardiovascular stress test 2018    EF47%  fixed perfusion defect ant wall, pt in afib    H/O echocardiogram 2018    EF55% mod MR    H/O echocardiogram 2018    EF 50-55%, Mild : AR, MR & TR.    H/O echocardiogram 2020    EF 55%, Breast artifact noted.  H/O three vessel coronary artery bypass 2018    Dr. Kaur Morales History of Holter monitoring 10/23/2018    Multiple PAF episodes noted. Tachy-Dinesh episodes noted.  History of nuclear stress test 2020    EF 55%, Breast artifact.     Hyperlipidemia     Hypertension     Memory loss     on Aricept    Missing teeth, acquired     Pneumonia     pneumococcal pneumonia twice at end of     Risk for falls     uses walker    TIA (transient ischemic attack)     family doctors said she has had mini-strokes    Urinary leakage     UTI (urinary tract infection)     recent --just stopped antibiotic last week as of -    Wears glasses        Past Surgical History:   Procedure Laterality Date    CABG WITH MITRAL VALVE REPAIR  2018    CABG X 3 [place]>LAD, SVG>CX M1, SVG>M2, MVR repair w/#28 CG ring, PFO closure, MAZE    CARDIAC CATHETERIZATION  2018    EYE SURGERY Bilateral 2018    Cataracts    MITRAL VALVE MAZE PROCEDURE  2018    Dr. King Kinney Left 2018    [employer] [place] XT DR SCHWARTZ SureScan     THORACENTESIS Bilateral 2018    IR Dr. Khloe Jones, right 56, left 900 all s/s    TONSILLECTOMY  1940's    WISDOM TOOTH EXTRACTION         Family History   Problem Relation Age of Onset   [facility] Stroke Mother     Heart Disease Father     Early Death Father     Breast Cancer Sister    [facility] Parkinsonism Brother     Heart Disease Sister     Other Sister         fibromyalgia    Diabetes Sister     Early Death Brother          at birth       Social History     Socioeconomic History    Marital status: Single     Spouse name: Not on file    Number of children: Not on file    Years of education: Not on file    Highest education level: Not on file   Occupational History    tablet 1    gabapentin (NEURONTIN) 600 MG tablet Take 600 mg by mouth 3 times daily.  pregabalin (LYRICA) 150 MG capsule 150 mg 2 times daily.  ipratropium (ATROVENT) 0.03 % nasal spray       methylcellulose (CITRUCEL) 500 MG TABS Take 2,000 mg by mouth daily prn      omeprazole (PRILOSEC) 40 MG delayed release capsule Take 40 mg by mouth daily      melatonin 3 MG TABS tablet Take 3 mg by mouth daily      estradiol (ESTRACE) 0.1 MG/GM vaginal cream Place 2 g vaginally 3 times daily as needed       aspirin 81 MG EC tablet Take 1 tablet by mouth daily 30 tablet 3    furosemide (LASIX) 20 MG tablet Take 1 tablet by mouth daily 30 tablet 0    docusate sodium (COLACE, DULCOLAX) 100 MG CAPS Take 100 mg by mouth 2 times daily      metFORMIN (GLUCOPHAGE) 500 MG tablet Take 500 mg by mouth 2 times daily (with meals)      donepezil (ARICEPT) 5 MG tablet Take 10 mg by mouth daily   2    citalopram (CELEXA) 40 MG tablet TAKE 1 TABLET EVERY DAY FOR ANXIETY AND STRESS  5    vitamin D3 (CHOLECALCIFEROL) 25 MCG (1000 UT) TABS tablet Take 1 tablet by mouth daily       CALCIUM CARBONATE PO Take 1,000 mg by mouth daily       rosuvastatin (CRESTOR) 40 MG tablet Take 40 mg by mouth every evening         No Known Allergies    Nursing Notes Reviewed      Physical Exam:  Triage VS:    ED Triage Vitals [06/06/21 1218]   Enc Vitals Group      BP (!) 167/63      Pulse 63      Resp 12      Temp 98.1 °F (36.7 °C)      Temp Source Oral      SpO2 96 %      Weight 172 lb (78 kg)      Height 5' 3\" (1.6 m)        My pulse ox interpretation is -  WNL    Primary exam:  Airway: Intact. Speaking in normal voice. Breathing: Spontaneous. Equal chest rise and breath sounds. Circulation: Heart RRR. Pulses 2+. Secondary exam:   GENERAL APPEARANCE: Awake and alert. Cooperative. No acute distress. Following commands and answering questions. GCS is 15. Nontoxic in appearance. HEAD: Normocephalic.   Patient has an area of ecchymosis on the right temporal and upper occipital, lower parietal regions of the scalp. No external masses or lesions. No depressed skull fractures. EYES: Pupils are equal, round, and reactive. EOM's grossly intact. No nystagmus. No gaze deviation. Sclera anicteric. Funduscopic exam reveals no papilledema. No periorbital ecchymosis. No [place] Eyes. ENT: No nasal drainage. Pharynx is clear. Airway is patent. Oral mucosa moist, Tolerates saliva. No Potter sign. No hemotympanum. No CSF otorrhea or rhinorrhea. No missing dentition or dental trauma. No tongue or lip lacerations. No [place] fracture. No Earl sign. NECK: Supple. No nuchal rigidity. Trachea midline. No masses, thyromegaly or lymphadenopathy. No JVD. No carotid thrills or bruits. No midline tenderness to palpation in the cervical spine. CHEST/LUNGS: Lungs clear to auscultation bilaterally. Respirations nonlabored. No flail segments. No chest wall brusing, ecchymosis, or hematoma. No tenting of the skin. HEART: Regular rate and rhythm. Audible S1 and S2. No audible murmurs, rubs, gallops. ABDOMEN: Soft. Nontender. Nondistended. No signs of peritonitis. No guarding or rebound. Normal bowel sounds in all 4 quadrants. No periumbilical or flank ecchymosis. Bowel sounds are auscultated in all 4 quadrants. BACK: No significant cervical or thoracic midline tenderness to palpation. Patient does have tenderness to palpation at the L1-L2 vertebral bodies. No step-offs or deformities. No clinical signs of cord compression or cauda equina syndrome. EXTREMITIES: Upper and lower extremities have no acute deformities and are non-tender to palpation. Good ROM in all 4 extremities. Patient is able to move bilateral upper lower extremities with full strength in hip flexion, knee extension, ankle and great toe plantar dorsiflexion. 2/4 patellar and Achilles deep tendon reflexes. SKIN: Warm and dry and intact. . No acute wounds. No lacerations. No deformities. No open fractures. NEUROLOGICAL: Alert and oriented. No focal or lateralizing neurologic deficits. Cranial nerves II through XII are grossly intact. PERFUSION:  pulses intact and equal in all extremities. Brisk capillary refill.        I have reviewed and interpreted all of the currently available diagnostic results from this visit:    Labs:  Results for orders placed or performed during the hospital encounter of 06/06/21   CBC Auto Differential   Result Value Ref Range    WBC 6.5 4.0 - 10.5 K/CU MM    RBC 3.91 (L) 4.2 - 5.4 M/CU MM    Hemoglobin 10.6 (L) 12.5 - 16.0 GM/DL    Hematocrit 31.6 (L) 37 - 47 %    MCV 80.8 78 - 100 FL    MCH 27.1 27 - 31 PG    MCHC 33.5 32.0 - 36.0 %    RDW 13.8 11.7 - 14.9 %    Platelets 468 872 - 103 K/CU MM    MPV 9.8 7.5 - 11.1 FL    Differential Type AUTOMATED DIFFERENTIAL     Segs Relative 81.0 (H) 36 - 66 %    Lymphocytes % 13.3 (L) 24 - 44 %    Monocytes % 4.9 (H) 0 - 4 %    Eosinophils % 0.0 0 - 3 %    Basophils % 0.3 0 - 1 %    Segs Absolute 5.3 K/CU MM    Lymphocytes Absolute 0.9 K/CU MM    Monocytes Absolute 0.3 K/CU MM    Eosinophils Absolute 0.0 K/CU MM    Basophils Absolute 0.0 K/CU MM    Nucleated RBC % 0.0 %    Total Nucleated RBC 0.0 K/CU MM    Total Immature Neutrophil 0.03 K/CU MM    Immature Neutrophil % 0.5 (H) 0 - 0.43 %   Comprehensive Metabolic Panel w/ Reflex to MG   Result Value Ref Range    Sodium 131 (L) 135 - 145 MMOL/L    Potassium 4.1 3.5 - 5.1 MMOL/L    Chloride 95 (L) 99 - 110 mMol/L    CO2 23 21 - 32 MMOL/L    BUN 12 6 - 23 MG/DL    CREATININE 0.6 0.6 - 1.1 MG/DL    Glucose 106 (H) 70 - 99 MG/DL    Calcium 9.2 8.3 - 10.6 MG/DL    Albumin 4.1 3.4 - 5.0 GM/DL    Total Protein 6.2 (L) 6.4 - 8.2 GM/DL    Total Bilirubin 0.6 0.0 - 1.0 MG/DL    ALT 8 (L) 10 - 40 U/L    AST 18 15 - 37 IU/L    Alkaline Phosphatase 90 40 - 129 IU/L    GFR Non-African American >60 >60 mL/min/1.73m2    GFR African American >60 >60 mL/min/1.73m2 Anion Gap 13 4 - 16   Protime-INR   Result Value Ref Range    Protime 16.1 (H) 11.7 - 14.5 SECONDS    INR 1.33 INDEX   APTT   Result Value Ref Range    aPTT 44.0 (H) 25.1 - 37.1 SECONDS           Radiographs:  XR CHEST (2 VW)    Result Date: 6/6/2021  EXAMINATION: TWO XRAY VIEWS OF THE CHEST; ONE XRAY VIEW OF THE PELVIS 6/6/2021 1:04 pm COMPARISON: Chest radiograph 01/03/2021. HISTORY: ORDERING SYSTEM PROVIDED HISTORY: FFS TECHNOLOGIST PROVIDED HISTORY: Reason for exam:->FFS Reason for Exam: fall Acuity: Acute Type of Exam: Initial Mechanism of Injury: fall Relevant Medical/Surgical History: CAD, A-fib, PNA FINDINGS: Chest: Two views provided. Mild rotation to the left. Stable mediastinal and cardiac silhouettes with enlarged cardiac silhouette with prior valve repair. Stable enlarged pulmonary arteries and aortic/coronary atherosclerosis. Left-sided dual lead pacemaker. Normal lung volumes. No acute consolidation or effusion. No pneumothorax. No interstitial edema or free subdiaphragmatic air. 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 No acute displaced rib fracture. Stable mild upper thoracic kyphosis and mild anterior wedge configuration compared to prior 2019 chest radiograph. Pelvis: Single view provided. Decreased bone mineral density. Lower lumbar degenerative disc and joint disease. Normal bilateral sacroiliac alignment. Normal pubic symphysis alignment. Normal bilateral hip alignment. No acute fracture. 1. No acute traumatic thoracic abnormality. 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 2. Normal pelvic alignment. No acute fracture. XR PELVIS (1-2 VIEWS)    Result Date: 6/6/2021  EXAMINATION: TWO XRAY VIEWS OF THE CHEST; ONE XRAY VIEW OF THE PELVIS 6/6/2021 1:04 pm COMPARISON: Chest radiograph 01/03/2021.  HISTORY: ORDERING SYSTEM PROVIDED HISTORY: FFS TECHNOLOGIST PROVIDED HISTORY: Reason for exam:->FFS Reason for Exam: fall Acuity: Acute Type of Exam: Initial Mechanism of Injury: fall Relevant Medical/Surgical History: CAD, A-fib, PNA FINDINGS: Chest: Two views provided. Mild rotation to the left. Stable mediastinal and cardiac silhouettes with enlarged cardiac silhouette with prior valve repair. Stable enlarged pulmonary arteries and aortic/coronary atherosclerosis. Left-sided dual lead pacemaker. Normal lung volumes. No acute consolidation or effusion. No pneumothorax. No interstitial edema or free subdiaphragmatic air. 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 No acute displaced rib fracture. Stable mild upper thoracic kyphosis and mild anterior wedge configuration compared to prior 2019 chest radiograph. Pelvis: Single view provided. Decreased bone mineral density. Lower lumbar degenerative disc and joint disease. Normal bilateral sacroiliac alignment. Normal pubic symphysis alignment. Normal bilateral hip alignment. No acute fracture. 1. No acute traumatic thoracic abnormality. 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 2. Normal pelvic alignment. No acute fracture. CT Head WO Contrast    Addendum Date: 6/6/2021    ADDENDUM: Critical results were called by Dr. Justin Thibodeaux MD to Dr. Allie Guzman  on 6/6/2021 at 13:28. Result Date: 6/6/2021  EXAMINATION: CT OF THE HEAD WITHOUT CONTRAST  6/6/2021 12:26 pm TECHNIQUE: CT of the head was performed without the administration of intravenous contrast. Dose modulation, iterative reconstruction, and/or weight based adjustment of the mA/kV was utilized to reduce the radiation dose to as low as reasonably achievable. COMPARISON: None. HISTORY: ORDERING SYSTEM PROVIDED HISTORY: [facility] TECHNOLOGIST PROVIDED HISTORY: Reason for exam:->FFS Has a \"code stroke\" or \"stroke alert\" been called? ->No Decision Support Exception - unselect if not a suspected or confirmed emergency medical condition->Emergency Medical Condition (MA) Reason for Exam: FALL  PT ON BLOOD THINNERS Acuity: Acute Type of Exam: Initial FINDINGS: BRAIN/VENTRICLES: Acute intraparenchymal hemorrhage involving the left temporal lobe measuring approximately 1.9 x 2.4 x 1.9 cm (image 26 series 2; image 47 series 602). Small amount of surrounding edema present. Acute blood products are also seen layering within the posterior horns of the lateral ventricles (image 37 series 2). No midline shift or significant mass effect. The gray-white differentiation is maintained without evidence of an acute infarct. There is no evidence of hydrocephalus. There is moderate to severe periventricular and subcortical white matter hypoattenuation most consistent with microvascular ischemic changes. There is mild age appropriate global cerebral atrophy. ORBITS: The visualized portion of the orbits demonstrate no acute abnormality. SINUSES: The visualized paranasal sinuses and mastoid air cells demonstrate no acute abnormality. SOFT TISSUES/SKULL:  No acute abnormality of the visualized skull. Small cephalhematoma of the vertex of the scalp. 1. Acute intraparenchymal hemorrhage involving the left temporal lobe measuring approximately 1.9 x 2.4 x 1.9 cm. 2. Acute intraventricular blood products layering within the lateral ventricles. 3. Small cephalhematoma of the vertex of the scalp. 4. Chronic microvascular ischemic changes and global cerebral atrophy. CT Cervical Spine WO Contrast    Result Date: 6/6/2021  EXAMINATION: CT OF THE CERVICAL SPINE WITHOUT CONTRAST 6/6/2021 12:36 pm TECHNIQUE: CT of the cervical spine was performed without the administration of intravenous contrast. Multiplanar reformatted images are provided for review. Dose modulation, iterative reconstruction, and/or weight based adjustment of the mA/kV was utilized to reduce the radiation dose to as low as reasonably achievable.  COMPARISON: CT cervical spine January 3, 2021 HISTORY: ORDERING SYSTEM PROVIDED HISTORY: FFS TECHNOLOGIST PROVIDED HISTORY: Reason for exam:->FFS Decision Support Exception - unselect if not a suspected or confirmed emergency medical condition->Emergency Medical Condition (MA) Reason for Exam: FALL   PT ON BLOOD THINNERS Acuity: Acute Type of Exam: Initial FINDINGS: BONES/ALIGNMENT: There is no acute fracture or traumatic malalignment of the cervical spine. Mild height loss at the superior endplate of T2 and T3 appears grossly stable when compared with prior CT from January 3, 2021. DEGENERATIVE CHANGES: Stable multilevel degenerative change of the cervical spine solid osseous fusion noted at the posterior elements of C3 through C5. Multilevel moderate to severe facet arthropathy. SOFT TISSUES: There is no prevertebral soft tissue swelling. Partially imaged left-sided cardiac pacer leads in place. Atherosclerotic changes of bilateral carotid bulbs     No acute abnormality of the cervical spine. CT Lumbar Spine WO Contrast    Result Date: 6/6/2021  EXAMINATION: CT OF THE LUMBAR SPINE WITHOUT CONTRAST  6/6/2021 TECHNIQUE: CT of the lumbar spine was performed without the administration of intravenous contrast. Multiplanar reformatted images are provided for review. Dose modulation, iterative reconstruction, and/or weight based adjustment of the mA/kV was utilized to reduce the radiation dose to as low as reasonably achievable. COMPARISON: None HISTORY: ORDERING SYSTEM PROVIDED HISTORY: [employer] TECHNOLOGIST PROVIDED HISTORY: Reason for exam:->FFS Decision Support Exception - unselect if not a suspected or confirmed emergency medical condition->Emergency Medical Condition (MA) Reason for Exam: FALL Acuity: Acute Type of Exam: Initial FINDINGS: BONES/ALIGNMENT: There is normal alignment of the spine. Acute superior endplate compression fracture of the L1 vertebral body with mild height loss. This is estimated at approximately 10%. No retropulsion.   No measuring 1.9 x 2.4 x 1.9 cm. She has some blood products layering in the lateral ventricles. Cephalohematoma of the vertex of the of the scalp. Chronic microvascular ischemic changes and global cerebral atrophy. Patient brought back to the resuscitation bay. Connected to continuous cardiopulmonary monitoring per rhythm strip interpreted by myself demonstrating sinus rhythm. Vital signs are noted. Patient is initially hypertensive 167/63. Pulse of 63. Respirations 12. O2 saturation 96%. Patient has no focal or lateralizing neurologic deficits. She is awake and alert. Following commands and answering questions. GCS is 15. Case is immediately discussed with neurosurgeon on-call Dr. Miguelina Bhardwaj who is agreeable with keeping the patient at our hospital.  Is agreeable with initiating Kcentra for anticoagulant reversal.  Is agreeable with Keppra for seizure prophylaxis. No recommendation for hypertonic saline. No recommendation for emergent surgical intervention. Is agreeable with repeat CT brain at [address] [place] [address]. Ernie Burkitt is initiated. Repeat blood pressure is 167/117. Cardene drip is also initiated with a goal blood pressure below 563 systolic. CT cervical spine read as reports no acute abnormality of the cervical spine. CT lumbar spine without contrast weighted report reads acute superior endplate compression deformity of the L1 vertebral body with approximately 10% height loss. No retropulsion. Chest x-ray radiology report reads no acute traumatic thoracic abnormality. 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 Pelvic x-ray radiology report readsNormal pelvic alignment. No acute fracture. Labs demonstrate no leukocytosis. Patient does have normocytic anemia hemoglobin 10.6. No thrombocytopenia. Electrolytes demonstrate hyponatremia sodium 131. Patient is hypochloremic with a chloride of 95. Glucose 106. AST and ALT are unremarkable.     On repeat evaluations, patient remains hemodynamically stable. Repeat secondary to remained stable with any additional traumatic injuries identified. Results of laboratory graphic data along differential diagnosis and treatment plan are discussed with the patient. Patient presents status post fall on 6/2/2021 work-up is concerning for fall from standing with blunt head trauma. Patient has an intraparenchymal hematoma in the left temporal lobe of the brain. Does have hypertensive urgency, Cardene is seen. Does have Eliquis induced coagulopathy which is reversed with Kcentra. Does also have a closed L1 vertebral body fracture. Has a chronic right proximal humeral surgical neck fracture. Given her symptoms, I recommend admission. Patient is agreeable. Case is discussed with admitting hospitalist Dr. So Stevens who is agreeable with treatment plan and accepts admission. Patient is updated bedside. Questions were answered. Patient is currently in the emergency department, stable condition, waiting admission orders and bed placement. Clinical Impression:  1. Fall from standing, initial encounter    2. Blunt head trauma, initial encounter    3. Intraparenchymal hematoma of left side of brain due to trauma, without loss of consciousness, initial encounter ([facility] [address]    4. Hypertensive urgency    5. Eliquis induced coagulopathy    6. Closed wedge compression fracture of L1 vertebra, initial encounter [facility])        Procedures:  ATLS protocol      Critical Care Note:  Total critical care time provided today was 32 minutes. This excludes seperately billable procedures and family discussion time. Critical care time provided for obtaining history, conducting a physical exam, performing and monitoring interventions, ordering, collecting and interpreting tests, and establishing medical decision-making. There was a potential for life/limb threatening pathology requiring close evaluation and intervention with concern for patient decompensation.       ED Provider Disposition:  DISPOSITION Decision To Admit 06/06/2021 01:33:59 PM        Comment: Please note this report has been produced using speech recognition software and may contain errors related to that system including errors in grammar, punctuation, and spelling, as well as words and phrases that may be inappropriate. Efforts were made to edit the dictations.        [facility],   06/06/21 9987

## 2021-06-06 NOTE — ED NOTES
Pt repositioned and put on bedpan. Pts blood pressure is 150/66. Pt is alert and oriented x4.       Jonh Mills, RN  06/06/21 0621

## 2021-06-07 PROCEDURE — 93296 REM INTERROG EVL PM/IDS: CPT | Performed by: INTERNAL MEDICINE

## 2021-06-07 PROCEDURE — 93294 REM INTERROG EVL PM/LDLS PM: CPT | Performed by: INTERNAL MEDICINE

## 2021-06-07 NOTE — ED NOTES
Hospital at bedside speaking with family regarding transfer.  hospitalist working on [facility], and then [employer], RN  06/06/21 [id number]

## 2021-06-07 NOTE — ED NOTES
MICU 45 min per [facility] transport Team. Radha Roberts called and spoke with [facility] and notified this Inessa Lipscomb.  Nayeli  06/06/21 5002

## 2021-06-07 NOTE — ED NOTES
Pt family member took pt belongings with her. Pt onto [facility] cot and wheeled out MICU without incident.       Mary Gillis RN  06/06/21 3750

## 2021-06-07 NOTE — ED NOTES
[facility] MICU in ED to transport pt to [facility] ER     Lorena Aguillon [facility]  06/06/21 [id number]

## 2021-06-07 NOTE — DISCHARGE SUMMARY
HOSPITALIST DISCHARGE SUMMARY     Patient ID: Elizabeth Pak 1945                 [id number]      PCP:  Dali Lepe MD    Admit date: 6/6/2021   Discharge date: 6/6/2021      Admitting Physician: Grady Conklin MD  Attending Physician(s): Grady Conklin MD, MD;     Discharge Physician: Grady Conklin MD, MD.  Consultant(s): Neurosurgeon Dr. Vanessa Rojo    Admitting Diagnoses: Acute intracranial hemorrhage  Discharge Diagnoses: Principal Problem:    Acute intracranial hemorrhage [facility])  Active Problems:    Paroxysmal atrial fibrillation ([facility])    Essential hypertension    Mixed hyperlipidemia    SSS (sick sinus syndrome) ([facility])    Hyponatremia    Compression fracture of L1 lumbar vertebra ([facility] [address]  Resolved Problems:    * No resolved hospital problems. *    Discharged Condition: fair  Disposition: [facility] in transfer    Procedures: None  Complications: Not applicable    Brief History: Patient apparently had fallen about 5 days ago, however was kept at home until today when patient had severe back pain that could not be controlled. An incidental finding of a right intraparenchymal and intraventricular bleed had been found, patient had been discussed with neurosurgeon and per his direction, patient was being kept in the hospital for close observation with a repeat of the CT brain for further intervention. Hospital Course: I was notified to call and talk to nursing supervisor, who indicated there is no ICU bed available and does not know when a bed will be available for the patient, and therefore the recommendation to transfer the patient out of this facility. A call was placed to the [facility] transfer line with phone number [phone], and patient was accepted to the emergency department under Dr. Sol Mary, from our ED and they are providing transportation. Patient to be transferred out day whenever the transportation team arrives.   Request for pushing the scans obtained in the facility as well as face sheet will be coordinated by the [facility] in the ED and will be transferred to them in the right time. Prognosis: Fair    Physical Examination:   General appearance: alert, appears older than stated age, cooperative, distracted, fatigued, no distress, pale and slowed mentation  Head: scalp contusion, Cephalohematoma involving the right temporal and parietal scalp tracking into the right side of the neck  Eyes: conjunctivae/corneas clear. PERRL, EOM's intact. Fundi benign. , Drooping of the right upper eyelid  Ears: normal TM's and external ear canals both ears  Nose: Nares normal. Septum midline. Mucosa normal. No drainage or sinus tenderness. Throat: lips, mucosa, and tongue normal; teeth and gums normal  Neck: no adenopathy, no carotid bruit, no JVD, supple, symmetrical, trachea midline, thyroid not enlarged, symmetric, no tenderness/mass/nodules and Hematoma of the right side  Back: symmetric, no curvature. ROM normal. No CVA tenderness. , Tenderness on palpating the midline upper back  Lungs: clear to auscultation bilaterally  Heart: regular rate and rhythm, S1, S2 normal, no murmur, click, rub or gallop  Abdomen: soft, non-tender; bowel sounds normal; no masses,  no organomegaly  Extremities: extremities normal, atraumatic, no cyanosis or edema  Pulses: 2+ and symmetric  Skin: Skin color, texture, turgor normal. No rashes or lesions  Neurologic: Slowed mentation but patient was alert and oriented to self and place as well as time, no focal neurological deficits observed    Significant Diagnostic Studies: CT brain:  Impression:        1. Acute left temporal intraparenchymal hematoma stable to slightly increased   in size compared with the previous study.  No significant mass effect.  No   herniation. 2. Small amount of acute hemorrhage layering dependently within the lateral   ventricles without significant change.        Pending Investigation/labs: None    Recent Labs:  CBC with mouth 3 times daily. ipratropium (ATROVENT) 0.03 % nasal spray               melatonin 3 MG TABS tablet  Take 3 mg by mouth daily             metFORMIN (GLUCOPHAGE) 500 MG tablet  Take 500 mg by mouth 2 times daily (with meals)             methylcellulose (CITRUCEL) 500 MG TABS  Take 2,000 mg by mouth daily prn             metoprolol tartrate (LOPRESSOR) 50 MG tablet  Take 0.5 tablets by mouth 2 times daily             omeprazole (PRILOSEC) 40 MG delayed release capsule  Take 40 mg by mouth daily             potassium chloride (KLOR-CON) 20 MEQ packet  Take 20 mEq by mouth daily             pregabalin (LYRICA) 150 MG capsule  150 mg 2 times daily. rosuvastatin (CRESTOR) 40 MG tablet  Take 40 mg by mouth every evening             senna (SENOKOT) 8.6 MG tablet  Take 1 tablet by mouth daily             vitamin D3 (CHOLECALCIFEROL) 25 MCG (1000 UT) TABS tablet  Take 1 tablet by mouth daily                   Activity: Absolute bedrest  Diet: cardiac diet  Wound Care: none needed  Follow-up with: To be determined upon discharge  Services:  To be determined upon discharge      Electronically Signed by: Grady Conklin MD, MD.    Time spent on discharge/dictation: 40 minutes

## 2021-06-22 ENCOUNTER — TELEPHONE (OUTPATIENT)
Dept: CARDIOLOGY CLINIC | Age: 76
End: 2021-06-22

## 2021-07-12 ENCOUNTER — PROCEDURE VISIT (OUTPATIENT)
Dept: CARDIOLOGY CLINIC | Age: 76
End: 2021-07-12

## 2021-07-12 DIAGNOSIS — I49.5 SINUS NODE DYSFUNCTION (HCC): ICD-10-CM

## 2021-07-12 DIAGNOSIS — Z95.0 CARDIAC PACEMAKER IN SITU: Primary | ICD-10-CM

## 2021-07-12 DIAGNOSIS — I44.0 AV BLOCK, 1ST DEGREE: ICD-10-CM

## 2021-07-12 PROCEDURE — 93294 REM INTERROG EVL PM/LDLS PM: CPT | Performed by: INTERNAL MEDICINE

## 2021-07-12 PROCEDURE — 93296 REM INTERROG EVL PM/IDS: CPT | Performed by: INTERNAL MEDICINE

## 2021-08-17 ENCOUNTER — OFFICE VISIT (OUTPATIENT)
Dept: CARDIOLOGY CLINIC | Age: 76
End: 2021-08-17
Payer: MEDICARE

## 2021-08-17 VITALS
HEIGHT: 65 IN | WEIGHT: 164 LBS | BODY MASS INDEX: 27.32 KG/M2 | DIASTOLIC BLOOD PRESSURE: 74 MMHG | HEART RATE: 72 BPM | SYSTOLIC BLOOD PRESSURE: 118 MMHG

## 2021-08-17 DIAGNOSIS — I38 VHD (VALVULAR HEART DISEASE): Primary | ICD-10-CM

## 2021-08-17 PROCEDURE — 1123F ACP DISCUSS/DSCN MKR DOCD: CPT | Performed by: INTERNAL MEDICINE

## 2021-08-17 PROCEDURE — 4040F PNEUMOC VAC/ADMIN/RCVD: CPT | Performed by: INTERNAL MEDICINE

## 2021-08-17 PROCEDURE — G8399 PT W/DXA RESULTS DOCUMENT: HCPCS | Performed by: INTERNAL MEDICINE

## 2021-08-17 PROCEDURE — 1036F TOBACCO NON-USER: CPT | Performed by: INTERNAL MEDICINE

## 2021-08-17 PROCEDURE — G8427 DOCREV CUR MEDS BY ELIG CLIN: HCPCS | Performed by: INTERNAL MEDICINE

## 2021-08-17 PROCEDURE — 99214 OFFICE O/P EST MOD 30 MIN: CPT | Performed by: INTERNAL MEDICINE

## 2021-08-17 PROCEDURE — 1090F PRES/ABSN URINE INCON ASSESS: CPT | Performed by: INTERNAL MEDICINE

## 2021-08-17 PROCEDURE — G8417 CALC BMI ABV UP PARAM F/U: HCPCS | Performed by: INTERNAL MEDICINE

## 2021-08-17 NOTE — PATIENT INSTRUCTIONS
**It is YOUR responsibilty to bring medication bottles and/or updated medication list to [address] [place] [address]. This will allow us to better serve you and all your healthcare needs**      Please be informed that if you contact our office outside of normal business hours the physician on call cannot help with any scheduling or rescheduling issues, procedure instruction questions or any type of medication issue. We advise you for any urgent/emergency that you go to the nearest emergency room!     PLEASE CALL OUR OFFICE DURING NORMAL BUSINESS HOURS    Monday - Friday   8 am to 5 pm    [place]: [place] [address]: [phone]    [place]:  [phone]

## 2021-08-17 NOTE — PROGRESS NOTES
Lena Aldana  is a  Established patient  ,64 y.o.   female here for evaluation of the following chief complaint(s):    cad  Has IP bleed  Had a fall      SUBJECTIVE/OBJECTIVE:  HPI : h/o  Cad, htn, hyperlipidimea, VHD, MVR now here  Has no cardiac complains   had IP bleed  [facility] brain was at [facility]      Review of Systems    No cp    Vitals:    08/17/21 1333   BP: 118/74   Pulse: 72   Weight: 164 lb (74.4 kg)   Height: 5' 5\" (1.651 m)     /74   Pulse 72   Ht 5' 5\" (1.651 m)   Wt 164 lb (74.4 kg)   BMI 27.29 kg/m²   No flowsheet data found. Wt Readings from Last 3 Encounters:   08/17/21 164 lb (74.4 kg)   06/06/21 172 lb (78 kg)   01/03/21 194 lb (88 kg)     Body mass index is 27.29 kg/m². Physical Exam     Neck: JVD      Lungs : clear    Cardio : Si and S2 audilble      Ext: edema      All pertinent data reviewed      Meds : reviewed         Tests ordered    n    ASSESSMENT/PLAN:               -     CORONARY ARTERY DISEASE:  asymptomatic     All available  tests in chart reviewed. Management discussed . Testing ordered  no             On asa                    -  Hypertension: Patients blood pressure is normal. Patient is advised about low sodium diet. Present medical regimen will not be changed. - VHD  SP  MVR  stable      - PPM:  carelink reviewed    - PAF on anticoag   Has been falling, decrease 2.5 BID  Risks DW pt and family    -   DIABETES MELLITUS: Available pertinent lab data reviewed   and  patient was given dietary advice . Advised to check blood glucose level on a regular basis. -   Changes  in medicines made: No        On metfornin                       -  LIPID MANAGEMENT:  Importance of lipid levels discussed with patient   and patient was given dietary advice. NCEP- ATP III guidelines reviewed with patient.     -   Changes  in medicines made: No     On crestor                    Had IP bleed , eliquis and ASA on hold:  Restart after neuro clerance  Acute left temporal intraparenchymal hematoma stable to slightly increased   in size compared with the previous study.  No significant mass effect.  No   herniation. An electronic signature was used to authenticate this note.     --Tisha Solomon MD

## 2021-09-19 ENCOUNTER — HOSPITAL ENCOUNTER (INPATIENT)
Age: 76
LOS: 4 days | Discharge: NURSING, REHAB OR LONG-TERM CARE | DRG: 177 | End: 2021-09-23
Attending: STUDENT IN AN ORGANIZED HEALTH CARE EDUCATION/TRAINING PROGRAM | Admitting: INTERNAL MEDICINE
Payer: MEDICARE

## 2021-09-19 ENCOUNTER — APPOINTMENT (OUTPATIENT)
Dept: CT IMAGING | Age: 76
DRG: 177 | End: 2021-09-19
Payer: MEDICARE

## 2021-09-19 ENCOUNTER — APPOINTMENT (OUTPATIENT)
Dept: GENERAL RADIOLOGY | Age: 76
DRG: 177 | End: 2021-09-19
Payer: MEDICARE

## 2021-09-19 DIAGNOSIS — U07.1 COVID-19: ICD-10-CM

## 2021-09-19 DIAGNOSIS — R53.83 OTHER FATIGUE: Primary | ICD-10-CM
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
## 2021-09-19 PROBLEM — S06.33AA INTRAPARENCHYMAL HEMATOMA OF BRAIN: Status: ACTIVE | Noted: 2021-06-07

## 2021-09-19 LAB
ALBUMIN SERPL-MCNC: 4 GM/DL (ref 3.4–5)
ALP BLD-CCNC: 99 IU/L (ref 40–128)
ALT SERPL-CCNC: 10 U/L (ref 10–40)
ANION GAP SERPL CALCULATED.3IONS-SCNC: 15 MMOL/L (ref 4–16)
AST SERPL-CCNC: 21 IU/L (ref 15–37)
BACTERIA: ABNORMAL /HPF
BASOPHILS ABSOLUTE: 0 K/CU MM
BASOPHILS RELATIVE PERCENT: 0.3 % (ref 0–1)
BILIRUB SERPL-MCNC: 0.4 MG/DL (ref 0–1)
BILIRUBIN URINE: NEGATIVE MG/DL
BLOOD, URINE: ABNORMAL
BUN BLDV-MCNC: 8 MG/DL (ref 6–23)
CALCIUM SERPL-MCNC: 8.6 MG/DL (ref 8.3–10.6)
CHLORIDE BLD-SCNC: 95 MMOL/L (ref 99–110)
CLARITY: ABNORMAL
CO2: 22 MMOL/L (ref 21–32)
COLOR: YELLOW
CREAT SERPL-MCNC: 0.6 MG/DL (ref 0.6–1.1)
DIFFERENTIAL TYPE: ABNORMAL
EOSINOPHILS ABSOLUTE: 0 K/CU MM
EOSINOPHILS RELATIVE PERCENT: 0 % (ref 0–3)
GFR AFRICAN AMERICAN: >60 ML/MIN/1.73M2
GFR NON-AFRICAN AMERICAN: >60 ML/MIN/1.73M2
GLUCOSE BLD-MCNC: 92 MG/DL (ref 70–99)
GLUCOSE BLD-MCNC: 94 MG/DL (ref 70–99)
GLUCOSE, URINE: NEGATIVE MG/DL
HCT VFR BLD CALC: 34 % (ref 37–47)
HEMOGLOBIN: 11 GM/DL (ref 12.5–16)
IMMATURE NEUTROPHIL %: 0.3 % (ref 0–0.43)
INR BLD: 0.9 INDEX
KETONES, URINE: ABNORMAL MG/DL
LEUKOCYTE ESTERASE, URINE: ABNORMAL
LYMPHOCYTES ABSOLUTE: 0.5 K/CU MM
LYMPHOCYTES RELATIVE PERCENT: 16.3 % (ref 24–44)
MCH RBC QN AUTO: 26.6 PG (ref 27–31)
MCHC RBC AUTO-ENTMCNC: 32.4 % (ref 32–36)
MCV RBC AUTO: 82.3 FL (ref 78–100)
MONOCYTES ABSOLUTE: 0.3 K/CU MM
MONOCYTES RELATIVE PERCENT: 8.6 % (ref 0–4)
MUCUS: ABNORMAL HPF
NITRITE URINE, QUANTITATIVE: POSITIVE
NON SQUAM EPI CELLS: <1 /HPF
NUCLEATED RBC %: 0 %
PDW BLD-RTO: 13.9 % (ref 11.7–14.9)
PH, URINE: 5 (ref 5–8)
PLATELET # BLD: 177 K/CU MM (ref 140–440)
PMV BLD AUTO: 9.1 FL (ref 7.5–11.1)
POTASSIUM SERPL-SCNC: 3.7 MMOL/L (ref 3.5–5.1)
PRO-BNP: 2201 PG/ML
PROTEIN UA: 30 MG/DL
PROTHROMBIN TIME: 11.6 SECONDS (ref 11.7–14.5)
RBC # BLD: 4.13 M/CU MM (ref 4.2–5.4)
RBC URINE: 15 /HPF (ref 0–6)
SARS-COV-2, NAAT: DETECTED
SEGMENTED NEUTROPHILS ABSOLUTE COUNT: 2.4 K/CU MM
SEGMENTED NEUTROPHILS RELATIVE PERCENT: 74.5 % (ref 36–66)
SODIUM BLD-SCNC: 132 MMOL/L (ref 135–145)
SOURCE: ABNORMAL
SPECIFIC GRAVITY UA: 1.02 (ref 1–1.03)
SQUAMOUS EPITHELIAL: <1 /HPF
TOTAL IMMATURE NEUTOROPHIL: 0.01 K/CU MM
TOTAL NUCLEATED RBC: 0 K/CU MM
TOTAL PROTEIN: 6.1 GM/DL (ref 6.4–8.2)
TRICHOMONAS: ABNORMAL /HPF
TROPONIN T: <0.01 NG/ML
UROBILINOGEN, URINE: NEGATIVE MG/DL (ref 0.2–1)
WBC # BLD: 3.3 K/CU MM (ref 4–10.5)
WBC UA: 47 /HPF (ref 0–5)

## 2021-09-19 PROCEDURE — 80053 COMPREHEN METABOLIC PANEL: CPT

## 2021-09-19 PROCEDURE — 84484 ASSAY OF TROPONIN QUANT: CPT

## 2021-09-19 PROCEDURE — 81001 URINALYSIS AUTO W/SCOPE: CPT

## 2021-09-19 PROCEDURE — 1200000000 HC SEMI PRIVATE

## 2021-09-19 PROCEDURE — 2580000003 HC RX 258: Performed by: STUDENT IN AN ORGANIZED HEALTH CARE EDUCATION/TRAINING PROGRAM

## 2021-09-19 PROCEDURE — 6360000002 HC RX W HCPCS: Performed by: STUDENT IN AN ORGANIZED HEALTH CARE EDUCATION/TRAINING PROGRAM

## 2021-09-19 PROCEDURE — 71045 X-RAY EXAM CHEST 1 VIEW: CPT

## 2021-09-19 PROCEDURE — 83880 ASSAY OF NATRIURETIC PEPTIDE: CPT

## 2021-09-19 PROCEDURE — 85610 PROTHROMBIN TIME: CPT

## 2021-09-19 PROCEDURE — 83036 HEMOGLOBIN GLYCOSYLATED A1C: CPT

## 2021-09-19 PROCEDURE — 96361 HYDRATE IV INFUSION ADD-ON: CPT

## 2021-09-19 PROCEDURE — 99284 EMERGENCY DEPT VISIT MOD MDM: CPT

## 2021-09-19 PROCEDURE — 87086 URINE CULTURE/COLONY COUNT: CPT

## 2021-09-19 PROCEDURE — 87635 SARS-COV-2 COVID-19 AMP PRB: CPT

## 2021-09-19 PROCEDURE — 93005 ELECTROCARDIOGRAM TRACING: CPT | Performed by: STUDENT IN AN ORGANIZED HEALTH CARE EDUCATION/TRAINING PROGRAM

## 2021-09-19 PROCEDURE — 2580000003 HC RX 258: Performed by: NURSE PRACTITIONER

## 2021-09-19 PROCEDURE — 82962 GLUCOSE BLOOD TEST: CPT

## 2021-09-19 PROCEDURE — 87186 SC STD MICRODIL/AGAR DIL: CPT

## 2021-09-19 PROCEDURE — 96360 HYDRATION IV INFUSION INIT: CPT

## 2021-09-19 PROCEDURE — 6370000000 HC RX 637 (ALT 250 FOR IP): Performed by: NURSE PRACTITIONER

## 2021-09-19 PROCEDURE — 85025 COMPLETE CBC W/AUTO DIFF WBC: CPT

## 2021-09-19 PROCEDURE — 6370000000 HC RX 637 (ALT 250 FOR IP): Performed by: INTERNAL MEDICINE

## 2021-09-19 PROCEDURE — 87077 CULTURE AEROBIC IDENTIFY: CPT

## 2021-09-19 RX ORDER — OXYCODONE HYDROCHLORIDE 5 MG/1
5 TABLET ORAL EVERY 4 HOURS PRN
Status: DISCONTINUED | OUTPATIENT
Start: 2021-09-19 | End: 2021-09-23 | Stop reason: HOSPADM

## 2021-09-19 RX ORDER — DEXTROSE MONOHYDRATE 25 G/50ML
12.5 INJECTION, SOLUTION INTRAVENOUS PRN
Status: DISCONTINUED | OUTPATIENT
Start: 2021-09-19 | End: 2021-09-23 | Stop reason: HOSPADM

## 2021-09-19 RX ORDER — BENZONATATE 100 MG/1
100 CAPSULE ORAL 3 TIMES DAILY PRN
Status: DISCONTINUED | OUTPATIENT
Start: 2021-09-19 | End: 2021-09-23 | Stop reason: HOSPADM

## 2021-09-19 RX ORDER — FUROSEMIDE 20 MG/1
20 TABLET ORAL DAILY
Status: DISCONTINUED | OUTPATIENT
Start: 2021-09-19 | End: 2021-09-19

## 2021-09-19 RX ORDER — ZINC SULFATE 50(220)MG
50 CAPSULE ORAL 2 TIMES DAILY
Status: DISCONTINUED | OUTPATIENT
Start: 2021-09-19 | End: 2021-09-23 | Stop reason: HOSPADM

## 2021-09-19 RX ORDER — ACETAMINOPHEN 325 MG/1
650 TABLET ORAL EVERY 6 HOURS PRN
Status: DISCONTINUED | OUTPATIENT
Start: 2021-09-19 | End: 2021-09-23 | Stop reason: HOSPADM

## 2021-09-19 RX ORDER — ONDANSETRON 4 MG/1
4 TABLET, ORALLY DISINTEGRATING ORAL EVERY 8 HOURS PRN
Status: DISCONTINUED | OUTPATIENT
Start: 2021-09-19 | End: 2021-09-23 | Stop reason: HOSPADM

## 2021-09-19 RX ORDER — ONDANSETRON 2 MG/ML
4 INJECTION INTRAMUSCULAR; INTRAVENOUS EVERY 6 HOURS PRN
Status: DISCONTINUED | OUTPATIENT
Start: 2021-09-19 | End: 2021-09-23 | Stop reason: HOSPADM

## 2021-09-19 RX ORDER — SODIUM CHLORIDE 0.9 % (FLUSH) 0.9 %
5-40 SYRINGE (ML) INJECTION PRN
Status: DISCONTINUED | OUTPATIENT
Start: 2021-09-19 | End: 2021-09-23 | Stop reason: HOSPADM

## 2021-09-19 RX ORDER — DEXAMETHASONE SODIUM PHOSPHATE 10 MG/ML
6 INJECTION, SOLUTION INTRAMUSCULAR; INTRAVENOUS EVERY 24 HOURS
Status: DISCONTINUED | OUTPATIENT
Start: 2021-09-20 | End: 2021-09-19

## 2021-09-19 RX ORDER — OXYCODONE HYDROCHLORIDE 5 MG/1
10 TABLET ORAL EVERY 4 HOURS PRN
Status: DISCONTINUED | OUTPATIENT
Start: 2021-09-19 | End: 2021-09-23 | Stop reason: HOSPADM

## 2021-09-19 RX ORDER — ROSUVASTATIN CALCIUM 40 MG/1
40 TABLET, COATED ORAL EVERY EVENING
Status: DISCONTINUED | OUTPATIENT
Start: 2021-09-19 | End: 2021-09-23 | Stop reason: HOSPADM

## 2021-09-19 RX ORDER — DEXAMETHASONE SODIUM PHOSPHATE 10 MG/ML
6 INJECTION, SOLUTION INTRAMUSCULAR; INTRAVENOUS EVERY 24 HOURS
Status: DISCONTINUED | OUTPATIENT
Start: 2021-09-19 | End: 2021-09-19

## 2021-09-19 RX ORDER — IPRATROPIUM BROMIDE AND ALBUTEROL SULFATE 2.5; .5 MG/3ML; MG/3ML
1 SOLUTION RESPIRATORY (INHALATION)
Status: DISCONTINUED | OUTPATIENT
Start: 2021-09-20 | End: 2021-09-20

## 2021-09-19 RX ORDER — DOCUSATE SODIUM 100 MG/1
100 CAPSULE, LIQUID FILLED ORAL 2 TIMES DAILY
Status: DISCONTINUED | OUTPATIENT
Start: 2021-09-19 | End: 2021-09-23 | Stop reason: HOSPADM

## 2021-09-19 RX ORDER — OXYCODONE HYDROCHLORIDE 5 MG/1
5 TABLET ORAL EVERY 4 HOURS PRN
Status: DISCONTINUED | OUTPATIENT
Start: 2021-09-19 | End: 2021-09-19

## 2021-09-19 RX ORDER — OXYBUTYNIN CHLORIDE 5 MG/1
1 TABLET ORAL DAILY
COMMUNITY
Start: 2021-06-07

## 2021-09-19 RX ORDER — OXYCODONE HYDROCHLORIDE 5 MG/1
5 TABLET ORAL 2 TIMES DAILY PRN
COMMUNITY
Start: 2021-06-07 | End: 2022-08-24

## 2021-09-19 RX ORDER — 0.9 % SODIUM CHLORIDE 0.9 %
1000 INTRAVENOUS SOLUTION INTRAVENOUS ONCE
Status: COMPLETED | OUTPATIENT
Start: 2021-09-19 | End: 2021-09-19

## 2021-09-19 RX ORDER — CALCIUM CARBONATE 200(500)MG
1000 TABLET,CHEWABLE ORAL DAILY
Status: DISCONTINUED | OUTPATIENT
Start: 2021-09-19 | End: 2021-09-23 | Stop reason: HOSPADM

## 2021-09-19 RX ORDER — ASPIRIN 81 MG/1
81 TABLET ORAL DAILY
Status: DISCONTINUED | OUTPATIENT
Start: 2021-09-19 | End: 2021-09-23 | Stop reason: HOSPADM

## 2021-09-19 RX ORDER — ACETAMINOPHEN 650 MG/1
650 SUPPOSITORY RECTAL EVERY 6 HOURS PRN
Status: DISCONTINUED | OUTPATIENT
Start: 2021-09-19 | End: 2021-09-23 | Stop reason: HOSPADM

## 2021-09-19 RX ORDER — NICOTINE POLACRILEX 4 MG
15 LOZENGE BUCCAL PRN
Status: DISCONTINUED | OUTPATIENT
Start: 2021-09-19 | End: 2021-09-23 | Stop reason: HOSPADM

## 2021-09-19 RX ORDER — DONEPEZIL HYDROCHLORIDE 10 MG/1
10 TABLET, FILM COATED ORAL DAILY
Status: DISCONTINUED | OUTPATIENT
Start: 2021-09-19 | End: 2021-09-23 | Stop reason: HOSPADM

## 2021-09-19 RX ORDER — AZITHROMYCIN 250 MG/1
500 TABLET, FILM COATED ORAL EVERY 24 HOURS
Status: DISCONTINUED | OUTPATIENT
Start: 2021-09-19 | End: 2021-09-19

## 2021-09-19 RX ORDER — PANTOPRAZOLE SODIUM 40 MG/1
40 TABLET, DELAYED RELEASE ORAL
Status: DISCONTINUED | OUTPATIENT
Start: 2021-09-20 | End: 2021-09-23 | Stop reason: HOSPADM

## 2021-09-19 RX ORDER — ASCORBIC ACID 500 MG
1000 TABLET ORAL 2 TIMES DAILY
Status: DISCONTINUED | OUTPATIENT
Start: 2021-09-19 | End: 2021-09-23 | Stop reason: HOSPADM

## 2021-09-19 RX ORDER — SODIUM CHLORIDE 9 MG/ML
25 INJECTION, SOLUTION INTRAVENOUS PRN
Status: DISCONTINUED | OUTPATIENT
Start: 2021-09-19 | End: 2021-09-23 | Stop reason: HOSPADM

## 2021-09-19 RX ORDER — OMEPRAZOLE 20 MG/1
20 TABLET, DELAYED RELEASE ORAL DAILY
COMMUNITY
End: 2021-09-19

## 2021-09-19 RX ORDER — DEXTROSE MONOHYDRATE 50 MG/ML
100 INJECTION, SOLUTION INTRAVENOUS PRN
Status: DISCONTINUED | OUTPATIENT
Start: 2021-09-19 | End: 2021-09-23 | Stop reason: HOSPADM

## 2021-09-19 RX ORDER — LANOLIN ALCOHOL/MO/W.PET/CERES
3 CREAM (GRAM) TOPICAL NIGHTLY
Status: DISCONTINUED | OUTPATIENT
Start: 2021-09-19 | End: 2021-09-23 | Stop reason: HOSPADM

## 2021-09-19 RX ORDER — POLYETHYLENE GLYCOL 3350 17 G/17G
17 POWDER, FOR SOLUTION ORAL DAILY
COMMUNITY
Start: 2021-06-08 | End: 2022-11-02

## 2021-09-19 RX ORDER — PREGABALIN 75 MG/1
150 CAPSULE ORAL 2 TIMES DAILY
Status: DISCONTINUED | OUTPATIENT
Start: 2021-09-19 | End: 2021-09-23 | Stop reason: HOSPADM

## 2021-09-19 RX ORDER — METOPROLOL TARTRATE 50 MG/1
25 TABLET, FILM COATED ORAL 2 TIMES DAILY
Status: DISCONTINUED | OUTPATIENT
Start: 2021-09-19 | End: 2021-09-23 | Stop reason: HOSPADM

## 2021-09-19 RX ORDER — POLYETHYLENE GLYCOL 3350 17 G/17G
17 POWDER, FOR SOLUTION ORAL DAILY
Status: DISCONTINUED | OUTPATIENT
Start: 2021-09-19 | End: 2021-09-23 | Stop reason: HOSPADM

## 2021-09-19 RX ORDER — CITALOPRAM 20 MG/1
40 TABLET ORAL DAILY
Status: DISCONTINUED | OUTPATIENT
Start: 2021-09-19 | End: 2021-09-23 | Stop reason: HOSPADM

## 2021-09-19 RX ORDER — ALBUTEROL SULFATE 2.5 MG/3ML
2.5 SOLUTION RESPIRATORY (INHALATION)
Status: DISCONTINUED | OUTPATIENT
Start: 2021-09-19 | End: 2021-09-23 | Stop reason: HOSPADM

## 2021-09-19 RX ORDER — POLYETHYLENE GLYCOL 3350 17 G/17G
17 POWDER, FOR SOLUTION ORAL DAILY PRN
Status: DISCONTINUED | OUTPATIENT
Start: 2021-09-19 | End: 2021-09-23 | Stop reason: HOSPADM

## 2021-09-19 RX ORDER — OXYBUTYNIN CHLORIDE 5 MG/1
5 TABLET ORAL DAILY
Status: DISCONTINUED | OUTPATIENT
Start: 2021-09-19 | End: 2021-09-23 | Stop reason: HOSPADM

## 2021-09-19 RX ORDER — SENNA PLUS 8.6 MG/1
1 TABLET ORAL DAILY
Status: DISCONTINUED | OUTPATIENT
Start: 2021-09-19 | End: 2021-09-23 | Stop reason: HOSPADM

## 2021-09-19 RX ORDER — SODIUM CHLORIDE 0.9 % (FLUSH) 0.9 %
5-40 SYRINGE (ML) INJECTION EVERY 12 HOURS SCHEDULED
Status: DISCONTINUED | OUTPATIENT
Start: 2021-09-19 | End: 2021-09-23 | Stop reason: HOSPADM

## 2021-09-19 RX ADMIN — ROSUVASTATIN 40 MG: 40 TABLET, FILM COATED ORAL at 22:01

## 2021-09-19 RX ADMIN — ACETAMINOPHEN 650 MG: 325 TABLET ORAL at 23:59

## 2021-09-19 RX ADMIN — ZINC SULFATE 220 MG (50 MG) CAPSULE 50 MG: CAPSULE at 22:01

## 2021-09-19 RX ADMIN — OXYCODONE HYDROCHLORIDE AND ACETAMINOPHEN 1000 MG: 500 TABLET ORAL at 22:01

## 2021-09-19 RX ADMIN — APIXABAN 5 MG: 5 TABLET, FILM COATED ORAL at 22:00

## 2021-09-19 RX ADMIN — POTASSIUM BICARBONATE 20 MEQ: 782 TABLET, EFFERVESCENT ORAL at 22:01

## 2021-09-19 RX ADMIN — PREGABALIN 150 MG: 75 CAPSULE ORAL at 22:00

## 2021-09-19 RX ADMIN — Medication 3 MG: at 22:01

## 2021-09-19 RX ADMIN — SODIUM CHLORIDE, PRESERVATIVE FREE 10 ML: 5 INJECTION INTRAVENOUS at 22:18

## 2021-09-19 RX ADMIN — CEFTRIAXONE SODIUM 1000 MG: 1 INJECTION, POWDER, FOR SOLUTION INTRAMUSCULAR; INTRAVENOUS at 22:07

## 2021-09-19 RX ADMIN — METOPROLOL TARTRATE 25 MG: 50 TABLET, FILM COATED ORAL at 22:04

## 2021-09-19 RX ADMIN — SODIUM CHLORIDE 1000 ML: 9 INJECTION, SOLUTION INTRAVENOUS at 17:50

## 2021-09-19 ASSESSMENT — PAIN SCALES - GENERAL
PAINLEVEL_OUTOF10: 4
PAINLEVEL_OUTOF10: 0

## 2021-09-19 ASSESSMENT — ENCOUNTER SYMPTOMS
COUGH: 1
DIARRHEA: 0
NAUSEA: 0
SHORTNESS OF BREATH: 1

## 2021-09-19 NOTE — ED PROVIDER NOTES
EMERGENCY DEPARTMENT ENCOUNTER      CHIEF COMPLAINT    Chief Complaint   Patient presents with    Fatigue    Concern For COVID-19     both sisters are positive     Altered Mental Status     pt's sister states she is confused       HPI    Anup Capps is a 64 y.o. female with history significant for A. fib on Eliquis, CAD status post CABG who presents with fatigue concern for COVID-19 and possible altered mental status. For the last week patient sister was concerned the patient not able to take good care of herself has been eating and drinking very much and has been having multiple episodes of urinary incontinence and both sister has COVID-19 test to be positive and concern patient may be Covid positive as well, sister was concerned the patient may be confused with patient is alert oriented participatory of history and stated that she has been feeling some shortness of breath and some cough and also pain with urination but denies any other symptoms denies any fall or any head injury. REVIEW OF SYSTEMS    Constitutional: Denies chills, fatigue, unexpected weight loss or fever. HENT: Denies sore throat or rhinorrhea. Eyes: Denies vision changes. Respiratory: shortness of breath or cough. Cardiovascular: Denies chest pain, leg swelling or palpitations. Gastrointestinal: Denies abdominal pain, diarrhea, nausea and vomiting. Genitourinary: dysuria no hematuria  Skin: Denies rashes or wounds. MSK: Denies joint pain. Neurologic: Denies headache, lightheadedness, numbness, or weakness.    Hematologic/lymphatic: Denies unexpected weight loss, night sweats  Endocrine: No polyuria, polydipsia, or polyphagia      Pertinent positives and negatives are delineated in HPI and ROS above, all other systems are reviewed and are negative    PAST MEDICAL HISTORY    Past Medical History:   Diagnosis Date    Anxiety     Arthritis     knees    [facility]rial fibrillation ([facility] [address]     CAD (coronary artery disease) Sees Dr. Jason Silva    [facility] mellitus ([facility] [address]     H/O cardiac catheterization 06/25/2018    severe 3 vessel disease, refer to CV surgery for CABG and MAZE    H/O cardiovascular stress test 06/06/2018    EF47%  fixed perfusion defect ant wall, pt in afib    H/O echocardiogram 06/06/2018    EF55% mod MR    H/O echocardiogram 08/28/2018    EF 50-55%, Mild : AR, MR & TR.    H/O echocardiogram 03/13/2020    EF 55%, Breast artifact noted. 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 Anita Barraza History of Holter monitoring 10/23/2018    Multiple PAF episodes noted. Tachy-Dinesh episodes noted.  History of nuclear stress test 03/13/2020    EF 55%, Breast artifact.     Hyperlipidemia     Hypertension     Memory loss     on Aricept    Missing teeth, acquired     Pneumonia     pneumococcal pneumonia twice at end of 2017    Risk for falls     uses walker    TIA (transient ischemic attack)     family doctors said she has had mini-strokes    Urinary leakage     UTI (urinary tract infection)     recent --just stopped antibiotic last week as of 6-29-18    Wears glasses      Medical history reviewed and no pertinent past medical history other than the ones mentioned above    SURGICAL HISTORY    Past Surgical History:   Procedure Laterality Date    CABG WITH MITRAL VALVE REPAIR  07/09/2018    CABG X 3 [place]>LAD, SVG>CX M1, SVG>M2, MVR repair w/#28 CG ring, PFO closure, MAZE    CARDIAC CATHETERIZATION  06/25/2018    EYE SURGERY Bilateral 2018    Cataracts    MITRAL VALVE MAZE PROCEDURE  07/19/2018    Dr. Minoo Rivas Left 12/11/2018    [employer] [facility] XT DR LANA Bartlett     THORACENTESIS Bilateral 07/13/2018    IR Dr. David Saavedra, right 1300, left 900 all s/s    TONSILLECTOMY  1940's    WISDOM TOOTH EXTRACTION       Surgical history reviewed and no pertinent surgical history other than the ones mentioned above    CURRENT MEDICATIONS    Current Outpatient Rx   Medication Sig Dispense Refill    apixaban (ELIQUIS) 5 MG TABS tablet Take 1 tablet by mouth 2 times daily (Patient not taking: Reported on 8/17/2021) 42 tablet 0    potassium chloride (KLOR-CON) 20 MEQ packet Take 20 mEq by mouth daily      senna (SENOKOT) 8.6 MG tablet Take 1 tablet by mouth daily      acetaminophen (APAP EXTRA STRENGTH) 500 MG tablet Take 1 tablet by mouth every 6 hours as needed for Pain 20 tablet 0    metoprolol tartrate (LOPRESSOR) 50 MG tablet Take 0.5 tablets by mouth 2 times daily 180 tablet 1    gabapentin (NEURONTIN) 600 MG tablet Take 600 mg by mouth 3 times daily.  pregabalin (LYRICA) 150 MG capsule 150 mg 2 times daily.        ipratropium (ATROVENT) 0.03 % nasal spray       methylcellulose (CITRUCEL) 500 MG TABS Take 2,000 mg by mouth daily prn      omeprazole (PRILOSEC) 40 MG delayed release capsule Take 40 mg by mouth daily      melatonin 3 MG TABS tablet Take 3 mg by mouth daily      estradiol (ESTRACE) 0.1 MG/GM vaginal cream Place 2 g vaginally 3 times daily as needed       aspirin 81 MG EC tablet Take 1 tablet by mouth daily (Patient not taking: Reported on 8/17/2021) 30 tablet 3    furosemide (LASIX) 20 MG tablet Take 1 tablet by mouth daily 30 tablet 0    docusate sodium (COLACE, DULCOLAX) 100 MG CAPS Take 100 mg by mouth 2 times daily      metFORMIN (GLUCOPHAGE) 500 MG tablet Take 500 mg by mouth 2 times daily (with meals)      donepezil (ARICEPT) 5 MG tablet Take 10 mg by mouth daily   2    citalopram (CELEXA) 40 MG tablet TAKE 1 TABLET EVERY DAY FOR ANXIETY AND STRESS  5    vitamin D3 (CHOLECALCIFEROL) 25 MCG (1000 UT) TABS tablet Take 1 tablet by mouth daily       CALCIUM CARBONATE PO Take 1,000 mg by mouth daily       rosuvastatin (CRESTOR) 40 MG tablet Take 40 mg by mouth every evening       Medication is reviewed    ALLERGIES    No Known Allergies  Allergy is reviewed    FAMILY HISTORY    Family History   Problem Relation Age of Onset    Stroke Mother    [place] Heart Disease Father     Early Death Father     Breast Cancer Sister     Parkinsonism Brother     Heart Disease Sister     Other Sister         fibromyalgia    Diabetes Sister     Early Death Brother          at birth     Family history reviewed and no pertinent family history other than the ones mentioned above    SOCIAL HISTORY    Social History     Socioeconomic History    Marital status: Single     Spouse name: Not on file    Number of children: Not on file    Years of education: Not on file    Highest education level: Not on file   Occupational History    Not on file   Tobacco Use    Smoking status: Never Smoker    Smokeless tobacco: Never Used   Vaping Use    Vaping Use: Never used   Substance and Sexual Activity    Alcohol use: No    Drug use: No    Sexual activity: Not on file   Other Topics Concern    Not on file   Social History Narrative    Not on file     Social Determinants of Health     Financial Resource Strain:     Difficulty of Paying Living Expenses:    Food Insecurity:     Worried About Running Out of Food in the Last Year:     [address] in the Last Year:    Transportation Needs:     Lack of Transportation (Medical):      Lack of Transportation (Non-Medical):    Physical Activity:     Days of Exercise per Week:     Minutes of Exercise per Session:    Stress:     Feeling of Stress :    Social Connections:     Frequency of Communication with Friends and Family:     Frequency of Social Gatherings with Friends and Family:     Attends Anab[employer]ist Services:     Active Member of Clubs or Organizations:     Attends Club or Organization Meetings:     Marital Status:    Intimate Partner Violence:     Fear of Current or Ex-Partner:     Emotionally Abused:     Physically Abused:     Sexually Abused:      Live with her sisters  Alcohol and recreational drug use: Denies  Social history reviewed and no pertinent social history other than the ones mentioned above    PHYSICAL EXAM    Vital Signs:BP (!) 115/48   Pulse 68   Temp 99 °F (37.2 °C) (Oral)   Resp 18   SpO2 94%   I have reviewed the triage vital signs. Constitutional: Well nourished, well developed, appears stated age  Eyes: PERRL, no conjunctival injection  HENT: NCAT, Neck supple without meningismus   CV: RRR, Warm, no edema,   RESP: Normal RR, no increased respiratory efforts, faint crackles on bilateral lung fields in the basilar area, no respiratory distress  GI: soft, non-distended, non-tender  MSK: No gross deformities  Skin: Warm, dry. No rashes  Neuro: Alert, CNs II-XII grossly intact. Moving all 4 extremities, strength sensation normal finger-to-nose normal, oriented x3, CAM negative  Psych: Appropriate mood and affect. Labs:   Labs Reviewed   COVID-19, RAPID   URINE RT REFLEX TO CULTURE   CBC WITH AUTO DIFFERENTIAL   COMPREHENSIVE METABOLIC PANEL W/ REFLEX TO MG FOR LOW K   TROPONIN   BRAIN NATRIURETIC PEPTIDE   PROTIME-INR       Radiology:  XR CHEST PORTABLE    (Results Pending)   CT Head WO Contrast    (Results Pending)       I directly reviewed the images and radiology interpretation    EKG interpreted by myself: See ED course    ED COURSE  Assessment & Medical Decision Making:  Yomaira Mckeon is a 64 y.o. female who presents with possible altered mental status patient actually not altered, is not delirious, but does appear to be and fatigued and uncomfortable appearing, will perform chest x-ray electrolytes cardiac work-up by urinalysis and patient is not altered and with normal neurological exam I do not think a scanning of the head will be necessary, final disposition is pending patient's work-up.     ED Course as of Sep 19 1956   Sun Sep 19, 2021   1901 Pro-BNP(!): 2,201 [YQ]   1928 On bedside ambulation patient was barely able to stand patient is not able to ambulate at all which is not her baseline patient demonstrated some profound weakness, unable to perform the walk of life due to the lack of ambulation in this case patient will require admission for PT OT evaluation, given patient did not really truly desaturate did not give her any steroids for COVID-19, patient did have some pulmonary congestion on x-ray may benefit from echocardiogram just to make sure the heart function did not decline.    [YQ]   1930 \" She has a UTI as well we will give ceftriaxone. [YQ]      ED Course User Index  [YQ] Francine Sampson MD        DDx includes but not limited to:  Hypoperfusion from vital sign abnormalities, endocrine metabolic such as hypoglycemia/electrolyte/dehydration/thyroid, toxic, polypharmacy, structural brain such as bleed/stroke, psychogenic, infectious such as PNA/UTI/meningitis,s    Workup includes but not limited to:x-ray, urine, labs    Treatment includes but not limited to: IV fluids    Critical care time: NA    Impression:   Fatigue  Shortness of breath  Altered mental status, resolved    Disposition: Pending work-up    Update: Patient work-up overall positive for Covid infiltrates on x-ray likely reflective of Covid pneumonia versus volume overload, patient's last echocardiogram was fairly a year ago that was unremarkable, I think is more likely due to pneumonia from COVID-19 will perform bedside walk of life. This note dictated using Dragon medical voice recognition software. Attempts at proofreading were made, but errors may occasionally still occur.            Francine Sampson MD  09/19/21 1085 [facility] 114 MD ERIC  09/19/21 [id number]       Francine Sampson MD  09/19/21 [id number]

## 2021-09-19 NOTE — ED NOTES
Attempted to ambulate patient and she had an incredibly difficult time even getting up into the bed. Pt needed almost all of this RN's ability to get up and then standing was nearly impossible, when she stood up she tried to step forward and this RN did not feel comfortable nearly carrying patient with the walker. Pt sat down and this RN had to full assist her back into the bed. pts oxygen the entire time getting up in bed and standing up, remained above 95%. Pt did not appear in any distress she was just extremely weak. Pt states she doesn't want to stay but her family does not want her to come home because they cannot care for her because they also have covid. Pt then states, ok I guess ill stay for a day or two.       Gwen Simeon, JEFF  09/19/21 1945

## 2021-09-19 NOTE — ED TRIAGE NOTES
Per EMS, pt's sister called d/t pt not \"acting like herself\". Pt' sister reports pt is confused, not eating, and using the bathroom on herself. Pt's two sisters are positive for COVID.

## 2021-09-20 LAB
ALBUMIN SERPL-MCNC: 4.1 GM/DL (ref 3.4–5)
ALP BLD-CCNC: 111 IU/L (ref 40–128)
ALT SERPL-CCNC: 10 U/L (ref 10–40)
ANION GAP SERPL CALCULATED.3IONS-SCNC: 14 MMOL/L (ref 4–16)
AST SERPL-CCNC: 24 IU/L (ref 15–37)
BILIRUB SERPL-MCNC: 0.3 MG/DL (ref 0–1)
BUN BLDV-MCNC: 11 MG/DL (ref 6–23)
CALCIUM SERPL-MCNC: 8.6 MG/DL (ref 8.3–10.6)
CHLORIDE BLD-SCNC: 93 MMOL/L (ref 99–110)
CO2: 23 MMOL/L (ref 21–32)
CREAT SERPL-MCNC: 0.8 MG/DL (ref 0.6–1.1)
D DIMER: 348 NG/ML(DDU)
ESTIMATED AVERAGE GLUCOSE: 111 MG/DL
FIBRINOGEN LEVEL: 394 MG/DL (ref 196.9–442.1)
GFR AFRICAN AMERICAN: >60 ML/MIN/1.73M2
GFR NON-AFRICAN AMERICAN: >60 ML/MIN/1.73M2
GLUCOSE BLD-MCNC: 137 MG/DL (ref 70–99)
GLUCOSE BLD-MCNC: 139 MG/DL (ref 70–99)
GLUCOSE BLD-MCNC: 78 MG/DL (ref 70–99)
GLUCOSE BLD-MCNC: 79 MG/DL (ref 70–99)
HBA1C MFR BLD: 5.5 % (ref 4.2–6.3)
LACTATE: 1.8 MMOL/L (ref 0.4–2)
POTASSIUM SERPL-SCNC: 4 MMOL/L (ref 3.5–5.1)
PROCALCITONIN: 0.12
SODIUM BLD-SCNC: 130 MMOL/L (ref 135–145)
TOTAL PROTEIN: 6.1 GM/DL (ref 6.4–8.2)

## 2021-09-20 PROCEDURE — 82962 GLUCOSE BLOOD TEST: CPT

## 2021-09-20 PROCEDURE — 2700000000 HC OXYGEN THERAPY PER DAY

## 2021-09-20 PROCEDURE — 97162 PT EVAL MOD COMPLEX 30 MIN: CPT

## 2021-09-20 PROCEDURE — 6370000000 HC RX 637 (ALT 250 FOR IP): Performed by: INTERNAL MEDICINE

## 2021-09-20 PROCEDURE — 97530 THERAPEUTIC ACTIVITIES: CPT

## 2021-09-20 PROCEDURE — 6370000000 HC RX 637 (ALT 250 FOR IP): Performed by: NURSE PRACTITIONER

## 2021-09-20 PROCEDURE — 84145 PROCALCITONIN (PCT): CPT

## 2021-09-20 PROCEDURE — 85379 FIBRIN DEGRADATION QUANT: CPT

## 2021-09-20 PROCEDURE — 86141 C-REACTIVE PROTEIN HS: CPT

## 2021-09-20 PROCEDURE — 2580000003 HC RX 258: Performed by: NURSE PRACTITIONER

## 2021-09-20 PROCEDURE — 85384 FIBRINOGEN ACTIVITY: CPT

## 2021-09-20 PROCEDURE — 82728 ASSAY OF FERRITIN: CPT

## 2021-09-20 PROCEDURE — 1200000000 HC SEMI PRIVATE

## 2021-09-20 PROCEDURE — 36415 COLL VENOUS BLD VENIPUNCTURE: CPT

## 2021-09-20 PROCEDURE — 80053 COMPREHEN METABOLIC PANEL: CPT

## 2021-09-20 PROCEDURE — 94761 N-INVAS EAR/PLS OXIMETRY MLT: CPT

## 2021-09-20 PROCEDURE — 97166 OT EVAL MOD COMPLEX 45 MIN: CPT

## 2021-09-20 PROCEDURE — 94640 AIRWAY INHALATION TREATMENT: CPT

## 2021-09-20 PROCEDURE — 6360000002 HC RX W HCPCS: Performed by: NURSE PRACTITIONER

## 2021-09-20 PROCEDURE — 94664 DEMO&/EVAL PT USE INHALER: CPT

## 2021-09-20 PROCEDURE — 83605 ASSAY OF LACTIC ACID: CPT

## 2021-09-20 RX ORDER — IPRATROPIUM BROMIDE AND ALBUTEROL SULFATE 2.5; .5 MG/3ML; MG/3ML
1 SOLUTION RESPIRATORY (INHALATION) EVERY 4 HOURS PRN
Status: DISCONTINUED | OUTPATIENT
Start: 2021-09-20 | End: 2021-09-23 | Stop reason: HOSPADM

## 2021-09-20 RX ORDER — ALBUTEROL SULFATE 90 UG/1
2 AEROSOL, METERED RESPIRATORY (INHALATION) EVERY 4 HOURS
Status: DISCONTINUED | OUTPATIENT
Start: 2021-09-20 | End: 2021-09-21

## 2021-09-20 RX ADMIN — METOPROLOL TARTRATE 25 MG: 50 TABLET, FILM COATED ORAL at 11:44

## 2021-09-20 RX ADMIN — DOCUSATE SODIUM 100 MG: 100 CAPSULE, LIQUID FILLED ORAL at 11:46

## 2021-09-20 RX ADMIN — PREGABALIN 150 MG: 75 CAPSULE ORAL at 21:23

## 2021-09-20 RX ADMIN — PANTOPRAZOLE SODIUM 40 MG: 40 TABLET, DELAYED RELEASE ORAL at 05:23

## 2021-09-20 RX ADMIN — Medication 2 PUFF: at 16:35

## 2021-09-20 RX ADMIN — ASPIRIN 81 MG: 81 TABLET, COATED ORAL at 11:45

## 2021-09-20 RX ADMIN — APIXABAN 5 MG: 5 TABLET, FILM COATED ORAL at 11:47

## 2021-09-20 RX ADMIN — ALBUTEROL SULFATE 2 PUFF: 90 AEROSOL, METERED RESPIRATORY (INHALATION) at 20:19

## 2021-09-20 RX ADMIN — POTASSIUM BICARBONATE 20 MEQ: 782 TABLET, EFFERVESCENT ORAL at 11:47

## 2021-09-20 RX ADMIN — PREGABALIN 150 MG: 75 CAPSULE ORAL at 11:47

## 2021-09-20 RX ADMIN — ZINC SULFATE 220 MG (50 MG) CAPSULE 50 MG: CAPSULE at 21:23

## 2021-09-20 RX ADMIN — SODIUM CHLORIDE, PRESERVATIVE FREE 10 ML: 5 INJECTION INTRAVENOUS at 21:24

## 2021-09-20 RX ADMIN — METOPROLOL TARTRATE 25 MG: 50 TABLET, FILM COATED ORAL at 21:23

## 2021-09-20 RX ADMIN — Medication 2 PUFF: at 11:59

## 2021-09-20 RX ADMIN — Medication 3 MG: at 21:24

## 2021-09-20 RX ADMIN — ALBUTEROL SULFATE 2 PUFF: 90 AEROSOL, METERED RESPIRATORY (INHALATION) at 16:34

## 2021-09-20 RX ADMIN — BENZONATATE 100 MG: 100 CAPSULE ORAL at 11:44

## 2021-09-20 RX ADMIN — CEFTRIAXONE 1000 MG: 1 INJECTION, POWDER, FOR SOLUTION INTRAMUSCULAR; INTRAVENOUS at 21:27

## 2021-09-20 RX ADMIN — DOCUSATE SODIUM 100 MG: 100 CAPSULE, LIQUID FILLED ORAL at 21:23

## 2021-09-20 RX ADMIN — ALBUTEROL SULFATE 2 PUFF: 90 AEROSOL, METERED RESPIRATORY (INHALATION) at 08:32

## 2021-09-20 RX ADMIN — ALBUTEROL SULFATE 2 PUFF: 90 AEROSOL, METERED RESPIRATORY (INHALATION) at 12:00

## 2021-09-20 RX ADMIN — Medication 2 PUFF: at 08:33

## 2021-09-20 RX ADMIN — CITALOPRAM HYDROBROMIDE 40 MG: 20 TABLET ORAL at 11:46

## 2021-09-20 RX ADMIN — APIXABAN 5 MG: 5 TABLET, FILM COATED ORAL at 21:23

## 2021-09-20 RX ADMIN — DONEPEZIL HYDROCHLORIDE 10 MG: 10 TABLET, FILM COATED ORAL at 11:46

## 2021-09-20 RX ADMIN — Medication 1000 UNITS: at 11:48

## 2021-09-20 RX ADMIN — OXYCODONE HYDROCHLORIDE AND ACETAMINOPHEN 1000 MG: 500 TABLET ORAL at 11:47

## 2021-09-20 RX ADMIN — OXYCODONE HYDROCHLORIDE AND ACETAMINOPHEN 1000 MG: 500 TABLET ORAL at 21:23

## 2021-09-20 RX ADMIN — OXYBUTYNIN CHLORIDE 5 MG: 5 TABLET ORAL at 11:47

## 2021-09-20 RX ADMIN — ZINC SULFATE 220 MG (50 MG) CAPSULE 50 MG: CAPSULE at 11:46

## 2021-09-20 RX ADMIN — ROSUVASTATIN 40 MG: 40 TABLET, FILM COATED ORAL at 18:06

## 2021-09-20 RX ADMIN — Medication 2 PUFF: at 20:19

## 2021-09-20 ASSESSMENT — PAIN SCALES - GENERAL
PAINLEVEL_OUTOF10: 0
PAINLEVEL_OUTOF10: 4
PAINLEVEL_OUTOF10: 0

## 2021-09-20 ASSESSMENT — PULMONARY FUNCTION TESTS: PEFR_L/MIN: 0

## 2021-09-20 NOTE — PROGRESS NOTES
Hospitalist Progress Note      Patient:  Ana Carrion    Unit/Bed:3028/3028-A  YOB: 1945  MRN: [id number]   Acct: [de-identified]   PCP: Tania Garcia MD  Date of Admission: 9/19/2021    Assessment and Plan (New + last one as below consecutively):        9/20/21: Imaging, labs, vitals reviewed, urinalysis positive for nitrate and leukocyte esterase, patient currently on ceftriaxone that we will continue, monitor urine culture results to narrow antibiotic choices according to sensitivity/susceptibility results. Need to be evaluated by PT OT for placement. Continue supportive measures for COVID-19 infection. Also Legionella and strep urine and process. Currently on 2 L nasal cannula oxygen satting 98%, continue wean off oxygen as tolerated. Generalized weakness  COVID-19 infection  -IV fluids, hold Lasix, PT/OT  -Check TSH, optimize electrolytes  -Continue IV fluid resuscitation  -Check inflammatory markers. Start supplements. -May be a candidate for antibiotic treatment after discharge     Urinary tract infection  -No specific urinary symptoms but due to the above will trial antibiotics  -Continue Rocephin  -Follow-up culture results    Paroxysmal A. fib on Eliquis  -Resume Eliquis     Diabetes mellitus type 2:  -Hold Metformin and SSI     Continue home medications for: Atherosclerotic heart disease status post CABG  Mitral valve replacement  Alzheimer's dementia    PMH, PSH, SH, FHX reviewed in chart review snap shot in EPIC    CC: UTI, physical debility, acute hypoxic respiratory failure secondary to COVID-19 virus infection. HPI: Initial admission HPI by admitting physician reviewed as below:  56-year-old female who sisters reportedly tested positive for Covid about a week ago, presents to the emergency department with reports of altered mental status per EMS, increased urination and overall weakness.   Patient is a poor historian and her sister is sick at home with her due to be positive for COVID-19 so most information is obtained from review of chart. Denies lightheadedness or dizziness. Denies chest pain but endorses shortness of breath with exertion. For further details and updates please refer to assessment and plan at the beginning of the note. ROS (10 point review of systems completed. Pertinent positives noted. Otherwise ROS is negative) : Shortness of breath  PMH:  Per HPI and reviewed in the chart  SHX: Reviewed in the chart, also the patient  FHX: Reviewed in the chart, also with the patient  Allergies: Reviewed in the chart  Medications:     sodium chloride      dextrose        albuterol sulfate HFA  2 puff Inhalation Q4H    ipratropium  2 puff Inhalation Q4H WA    donepezil  10 mg Oral Daily    calcium carbonate  1,000 mg Oral Daily    rosuvastatin  40 mg Oral QPM    aspirin  81 mg Oral Daily    docusate sodium  100 mg Oral BID    melatonin  3 mg Oral Nightly    pregabalin  150 mg Oral BID    pantoprazole  40 mg Oral QAM AC    metoprolol tartrate  25 mg Oral BID    potassium bicarb-citric acid  20 mEq Oral Daily    senna  1 tablet Oral Daily    apixaban  5 mg Oral BID    oxybutynin  5 mg Oral Daily    polyethylene glycol  17 g Oral Daily    citalopram  40 mg Oral Daily    sodium chloride flush  5-40 mL IntraVENous 2 times per day    insulin lispro  0.08 Units/kg SubCUTAneous TID WC    insulin lispro  0-6 Units SubCUTAneous TID WC    insulin lispro  0-3 Units SubCUTAneous Nightly    cefTRIAXone (ROCEPHIN) IV  1,000 mg IntraVENous Q24H    vitamin C  1,000 mg Oral BID    zinc sulfate  50 mg Oral BID    vitamin D  1,000 Units Oral Daily       Subjective 9/20/2021: Still requiring 2 L nasal cannula oxygen, feeling weak, need PT OT to evaluate the patient for possible placement, continue ceftriaxone and narrow antibiotic choices according to urine culture results. Monitor closely.     For further information please refer to my assessment and plan at the beginning of the note. Nursing notes, labs, imaging, and vitals reviewed. Vital Signs:   Vitals reviewed and compared with the previous ones  BP (!) 113/55   Pulse 60   Temp 98.8 °F (37.1 °C) (Oral)   Resp 23   Ht 5' 6\" (1.676 m)   Wt 163 lb 12.8 oz (74.3 kg)   SpO2 99%   BMI 26.44 kg/m²    No intake or output data in the 24 hours ending 09/20/21 0731     General:   Age-appropriate, in no acute distress, on 2 L nasal cannula oxygen  HEENT:  normocephalic and atraumatic. No scleral icterus. PERRLA. Neck: supple. No thyromegaly. Lungs: Distant breath sounds, no crackles   cardiac: S1, S2, RRR without murmur. No JVD. Abdomen: soft. Increased abdominal girth Nontender. Bowel sounds positive. Extremities:  No clubbing, cyanosis, or edema in all extremities. Vasculature: capillary refill < 3 seconds. Pedal pulses intact bilaterally. Skin:  warm and dry. Not clammy. Psych:  Alert to time, person and place. Communicable. Affect appropriate  Lymph:  No supraclavicular ,and no anterior cervical lymphadenopathy. Neurologic:  No focal deficit. CN II-XII grossly intact. Motor and Sensory capacity intact in all extremities. Labs:   Recent Labs     09/19/21  1745   WBC 3.3*   HGB 11.0*   HCT 34.0*        Recent Labs     09/19/21  1745   *   K 3.7   CL 95*   CO2 22   BUN 8   CREATININE 0.6   CALCIUM 8.6     Recent Labs     09/19/21  1745   AST 21   ALT 10   BILITOT 0.4   ALKPHOS 99     Recent Labs     09/19/21  1745   INR 0.90     No results for input(s): Cade More in the last 72 hours.     Microbiology:    Blood culture #1: No results found for: BC    Blood culture #2:No results found for: [address]    Organism:No results found for: ORG    No results found for: LABGRAM    MRSA culture only:No results found for: [address] [place] [address]    Urine culture: No results found for: LABURIN    Respiratory culture: No results found for: CULTRESP    Aerobic and Anaerobic :  No results found for: LABAERO  No results found for: LABANAE    Urinalysis:      Lab Results   Component Value Date    NITRU POSITIVE 09/19/2021    WBCUA 47 09/19/2021    BACTERIA MANY 09/19/2021    RBCUA 15 09/19/2021    BLOODU SMALL 09/19/2021    SPECGRAV 1.019 09/19/2021       Radiology:  XR CHEST PORTABLE   Final Result   1. Mildly enlarged cardiac silhouette and mild vascular congestion. 2. Unchanged prominence of the left hilum that may represent enlarged   pulmonary arteries or lymphadenopathy. XR CHEST PORTABLE    Result Date: 9/19/2021  EXAMINATION: ONE XRAY VIEW OF THE CHEST 9/19/2021 6:06 pm COMPARISON: Chest radiograph 06/06/2021. HISTORY: ORDERING SYSTEM PROVIDED HISTORY: SOB, COVID exposure TECHNOLOGIST PROVIDED HISTORY: Reason for exam:->SOB, COVID exposure Reason for Exam: Fatigue; Concern For COVID-19; Altered Mental Status Acuity: Acute Type of Exam: Initial FINDINGS: Status post median sternotomy and cardiac valve replacement. The cardiomediastinal silhouette is unchanged with prominence of the left hilum. Mild vascular congestion. No pneumothorax, consolidation, or pleural effusion. 1. Mildly enlarged cardiac silhouette and mild vascular congestion. 2. Unchanged prominence of the left hilum that may represent enlarged pulmonary arteries or lymphadenopathy. Discussed plan with patient and nurse. Patient and nurse verbalized understanding and agree. All questions addressed with concerns. Please excuse my TYPOS!     Electronically signed by Kassie Brooks MD on 9/20/2021 at 7:31 AM

## 2021-09-20 NOTE — CONSULTS
Assistance: Independent  Transfer Assistance: Needs assistance  Additional Comments: Sister Lorraine Osman helps with transfers and showers, household chores    Examination of body systems (includes body structures/functions, activity/participation limitations):  · Observation:  Pt is awake in supine upon arrival  · Vision:  Glasses  · Hearing:  Lummi  · Cardiopulmonary: On supplemental 02 via n/c   · Cognition: WFL, pt is alert and oriented, pleasant, see OT/SLP note for further evaluation. Musculoskeletal  · ROM R/L:  WFL BLE. · Strength R/L:  Generally 4-/5, decreased in function and endurance. · Neuro:  H/o TIA    · Gait pattern: Pt unable at this time d/t fatigue     Mobility:  · Rolling L/R:  Mod Ax2  · Supine to sit:  Mod A x2  · Transfers: Mod x2  · Sitting balance:  Mod-CGA. · Standing balance: Mod A x2. [facility] 6 Clicks Inpatient Mobility:  AM-PAC Inpatient Mobility Raw Score : 11    Treatment:  Bed mobility: PT/OT provides frequent v/c throughout transfer for sequencing and reaching for bed rail for assist to side-lying. Pt requires Mod A x2 via kelsea pad for trunk and hips to EOB d/t weakness and increased difficulty. Pt able to particiapte in scooting to EOB with BUE support and Mod A for advancement. Return to supine end of session Mod A x2 for trunk and LE advancement. Max A x2 for scooting to [address]. Sitting balance: Initial heavy retro lean with Mod A constantly, PT implements reaching tasks for encourage Anterior WS. V/c for increased activation of abdominal muscles with fair carryover. Pt performs scooting to EOB Mod A feet to floor, pt improves sititng balance with \"mini sit-up\" v/c good carryover    STS: Performed x1 STS from EOB with HHA x2 for support and Mod Ax2 d/t LE weakness, with increased v/c and t/c for glute activation, hip extension, upright posture at pt tends to lean posteriorly. Pt able to make minimal improvements with these cues.  Pt tolerates standing x45 seconds at EOB and HHA x2 for stability, pt requires return to seated d/t fatigue, decreased 02 saturation to ~89%, recovers with rest.    Education: PT discusses role of PT, d/c planning     Safety: patient left in supine, call light within reach, RN notified, gait belt used. Assessment:    Pt is a 65 y/o female admitted to ED 9/19 with c/o  Altered mental status and concern for COVID-19. Patient with significant h/o CABG, AFIB, TIA, see chart. Per pt report pt has been performing ADLs/IADLs with assist from sister Hugo Lim. At this time pt appears to be functioning below baseline. Pt is now presenting with impairments in BLE strength, functional endurance, safety awareness, balance, decreased tolerance to mobility, fatigue. At this time pt is unsafe to return home d/t difficulty with mobility and fall risk3. Pt would benefit from skilled PT services in order to address impairments and promote return to PLOF. PT to recommend d/c to SNF for rehab services. Complexity: Moderate  Prognosis: Good, no significant barriers to participation at this time. Plan Times per week: 3+/week, 1 week,   Discharge Recommendations: Subacute/Skilled Nursing Facility  Equipment: Defer     Goals:  Short term goals  Time Frame for Short term goals: 1 week  Short term goal 1: Pt will AMB x5 ft with RW, chair follow, Mod A.   Short term goal 2: Pt will perform x3 STS from EOB to RW with Mod A  Short term goal 3: Pt will perform supine>sit with Mod A x1       Treatment plan:  Bed mobility, transfers, balance, gait, TA, TX    Recommendations for NURSING mobility: Mod A x2 for bed mobility, STS from EOB Mod x2    Time:   Time in: 15:15  Time out: 15:40  Timed treatment minutes: 12  Total time: 25    Electronically signed by:    Clare Marc, PT  0/29/6580, 56:56 PM  PT Lic #: [id number]

## 2021-09-20 NOTE — H&P
HISTORY AND PHYSICAL             Date: 9/19/2021        Patient Name: Aquilino Arriaga     YOB: 1945      Age:  64 y.o. Chief Complaint     Chief Complaint   Patient presents with    Fatigue    Concern For COVID-19     both sisters are positive     Altered Mental Status     pt's sister states she is confused           History Obtained From   electronic medical record, reason patient could not give history:  altered mental status    History of Present Illness   78-year-old female who sisters reportedly tested positive for Covid about a week ago, presents to the emergency department with reports of altered mental status per EMS, increased urination and overall weakness. Patient is a poor historian and her sister is sick at home with her due to be positive for COVID-19 so most information is obtained from review of chart. Denies lightheadedness or dizziness. Denies chest pain but endorses shortness of breath with exertion. Additional 10 point ROS negative unless otherwise noted below       Past Medical History     Past Medical History:   Diagnosis Date    Anxiety     Arthritis     knees    [facility]rial fibrillation [facility] [address]     CAD (coronary artery disease)     Sees Dr. Kusum Casper    Diabetes mellitus ([facility] [address]     H/O cardiac catheterization 06/25/2018    severe 3 vessel disease, refer to CV surgery for CABG and MAZE    H/O cardiovascular stress test 06/06/2018    EF47%  fixed perfusion defect ant wall, pt in afib    H/O echocardiogram 06/06/2018    EF55% mod MR    H/O echocardiogram 08/28/2018    EF 50-55%, Mild : AR, MR & TR.    H/O echocardiogram 03/13/2020    EF 55%, Breast artifact noted. 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 Parr Gerlaw History of Holter monitoring 10/23/2018    Multiple PAF episodes noted. Tachy-Dinesh episodes noted.  History of nuclear stress test 03/13/2020    EF 55%, Breast artifact.     Hyperlipidemia     Hypertension     Memory loss     on Aricept    Missing teeth, acquired     Pneumonia     pneumococcal pneumonia twice at end of 2017    Risk for falls     uses walker    TIA (transient ischemic attack)     family doctors said she has had mini-strokes    Urinary leakage     UTI (urinary tract infection)     recent --just stopped antibiotic last week as of 6-29-18    Wears glasses         Past Surgical History     Past Surgical History:   Procedure Laterality Date    CABG WITH MITRAL VALVE REPAIR  07/09/2018    CABG X 3 [place]ima>LAD, SVG>CX M1, SVG>M2, MVR repair w/#28 CG ring, PFO closure, MAZE    CARDIAC CATHETERIZATION  06/25/2018    EYE SURGERY Bilateral 2018    Cataracts    MITRAL VALVE MAZE PROCEDURE  07/19/2018    Dr. Bipin Robertson Left 12/11/2018    [employer] XT DR LANA SureScan     THORACENTESIS Bilateral 07/13/2018    IR Dr. Jorge Roche, right 1300, left 900 all s/s    TONSILLECTOMY  1940's    WISDOM TOOTH EXTRACTION          Medications Prior to Admission     Prior to Admission medications    Medication Sig Start Date End Date Taking? Authorizing Provider   bisacodyl (DULCOLAX) 5 MG EC tablet Take 5 mg by mouth as needed 6/9/21  Yes Historical Provider, MD   oxybutynin (DITROPAN) 5 MG tablet Take 1 tablet by mouth daily 6/7/21  Yes Historical Provider, MD   oxyCODONE (ROXICODONE) 5 MG immediate release tablet Take 5 mg by mouth 2 times daily as needed.  6/7/21  Yes Historical Provider, MD   polyethylene glycol (GLYCOLAX) 17 GM/SCOOP powder Take 17 g by mouth daily 6/8/21  Yes Historical Provider, MD   apixaban (ELIQUIS) 5 MG TABS tablet Take 1 tablet by mouth 2 times daily  Patient not taking: Reported on 8/17/2021 6/1/21   Zita Kurtz MD   potassium chloride (KLOR-CON) 20 MEQ packet Take 20 mEq by mouth daily    Historical Provider, MD   senna (SENOKOT) 8.6 MG tablet Take 1 tablet by mouth daily    Historical Provider, MD   acetaminophen (APAP EXTRA STRENGTH) 500 MG tablet Take 1 tablet by mouth every 6 hours as Smokeless Tobacco Use  Never Used          Alcohol History     Alcohol Use Status  No          Drug Use     Drug Use Status  No          Sexual Activity     Sexually Active  Not Asked                Family History     Family History   Problem Relation Age of Onset    Stroke Mother     Heart Disease Father     Early Death Father     Breast Cancer Sister     Parkinsonism Brother     Heart Disease Sister     Other Sister         fibromyalgia    Diabetes Sister     Early Death Brother          at birth       Review of Systems   Review of Systems   Unable to perform ROS: Mental status change   Constitutional: Positive for activity change. Respiratory: Positive for cough and shortness of breath. Gastrointestinal: Negative for diarrhea and nausea. Genitourinary: Positive for frequency. Physical Exam   /65   Pulse 62   Temp 99 °F (37.2 °C) (Oral)   Resp 20   SpO2 95%     Physical Exam  Vitals and nursing note reviewed. Constitutional:       General: She is not in acute distress. Appearance: Normal appearance. HENT:      Head: Normocephalic. Cardiovascular:      Pulses: Normal pulses. Pulmonary:      Effort: Pulmonary effort is normal. No tachypnea or respiratory distress. Breath sounds: Examination of the right-upper field reveals wheezing. Examination of the right-lower field reveals rhonchi. Examination of the left-lower field reveals rhonchi. Wheezing and rhonchi present. Abdominal:      General: Abdomen is flat. Bowel sounds are normal. There is no distension. Musculoskeletal:         General: Normal range of motion. Skin:     General: Skin is warm and dry. Capillary Refill: Capillary refill takes 2 to 3 seconds. Neurological:      Mental Status: She is alert and oriented to person, place, and time. She is confused. GCS: GCS eye subscore is 4. GCS verbal subscore is 5. GCS motor subscore is 6. Motor: Weakness present.          Labs      Recent Results (from the past 24 hour(s))   Urinalysis Reflex to Culture    Collection Time: 09/19/21  5:42 PM    Specimen: Urine   Result Value Ref Range    Color, UA YELLOW YELLOW    Clarity, UA HAZY (A) CLEAR    Glucose, Urine NEGATIVE NEGATIVE MG/DL    Bilirubin Urine NEGATIVE NEGATIVE MG/DL    Ketones, Urine SMALL (A) NEGATIVE MG/DL    Specific Gravity, UA 1.019 1.001 - 1.035    Blood, Urine SMALL (A) NEGATIVE    pH, Urine 5.0 5.0 - 8.0    Protein, UA 30 (A) NEGATIVE MG/DL    Urobilinogen, Urine NEGATIVE 0.2 - 1.0 MG/DL    Nitrite Urine, Quantitative POSITIVE (A) NEGATIVE    Leukocyte Esterase, Urine LARGE (A) NEGATIVE    RBC, UA 15 (H) 0 - 6 /HPF    WBC, UA 47 (H) 0 - 5 /HPF    Bacteria, UA MANY (A) NEGATIVE /HPF    Squam Epithel, UA <1 /HPF    Mucus, UA RARE (A) NEGATIVE HPF    Trichomonas, UA NONE SEEN NONE SEEN /HPF    non squam epi cells <1 /HPF   CBC Auto Differential    Collection Time: 09/19/21  5:45 PM   Result Value Ref Range    WBC 3.3 (L) 4.0 - 10.5 K/CU MM    RBC 4.13 (L) 4.2 - 5.4 M/CU MM    Hemoglobin 11.0 (L) 12.5 - 16.0 GM/DL    Hematocrit 34.0 (L) 37 - 47 %    MCV 82.3 78 - 100 FL    MCH 26.6 (L) 27 - 31 PG    MCHC 32.4 32.0 - 36.0 %    RDW 13.9 11.7 - 14.9 %    Platelets 374 044 - 692 K/CU MM    MPV 9.1 7.5 - 11.1 FL    Differential Type AUTOMATED DIFFERENTIAL     Segs Relative 74.5 (H) 36 - 66 %    Lymphocytes % 16.3 (L) 24 - 44 %    Monocytes % 8.6 (H) 0 - 4 %    Eosinophils % 0.0 0 - 3 %    Basophils % 0.3 0 - 1 %    Segs Absolute 2.4 K/CU MM    Lymphocytes Absolute 0.5 K/CU MM    Monocytes Absolute 0.3 K/CU MM    Eosinophils Absolute 0.0 K/CU MM    Basophils Absolute 0.0 K/CU MM    Nucleated RBC % 0.0 %    Total Nucleated RBC 0.0 K/CU MM    Total Immature Neutrophil 0.01 K/CU MM    Immature Neutrophil % 0.3 0 - 0.43 %   Comprehensive Metabolic Panel w/ Reflex to MG    Collection Time: 09/19/21  5:45 PM   Result Value Ref Range    Sodium 132 (L) 135 - 145 MMOL/L    Potassium 3.7 3.5 - 5.1 MMOL/L Chloride 95 (L) 99 - 110 mMol/L    CO2 22 21 - 32 MMOL/L    BUN 8 6 - 23 MG/DL    CREATININE 0.6 0.6 - 1.1 MG/DL    Glucose 94 70 - 99 MG/DL    Calcium 8.6 8.3 - 10.6 MG/DL    Albumin 4.0 3.4 - 5.0 GM/DL    Total Protein 6.1 (L) 6.4 - 8.2 GM/DL    Total Bilirubin 0.4 0.0 - 1.0 MG/DL    ALT 10 10 - 40 U/L    AST 21 15 - 37 IU/L    Alkaline Phosphatase 99 40 - 128 IU/L    GFR Non-African American >60 >60 mL/min/1.73m2    GFR African American >60 >60 mL/min/1.73m2    Anion Gap 15 4 - 16   Troponin    Collection Time: 09/19/21  5:45 PM   Result Value Ref Range    Troponin T <0.010 <0.01 NG/ML   Brain Natriuretic Peptide    Collection Time: 09/19/21  5:45 PM   Result Value Ref Range    Pro-BNP 2,201 (H) <300 PG/ML   Protime-INR    Collection Time: 09/19/21  5:45 PM   Result Value Ref Range    Protime 11.6 (L) 11.7 - 14.5 SECONDS    INR 0.90 INDEX   COVID-19, Rapid    Collection Time: 09/19/21  5:47 PM    Specimen: Nasopharyngeal   Result Value Ref Range    Source THROAT     SARS-CoV-2, NAAT DETECTED (A) NOT DETECTED   EKG 12 Lead    Collection Time: 09/19/21  5:53 PM   Result Value Ref Range    Ventricular Rate 61 BPM    Atrial Rate 61 BPM    P-R Interval 174 ms    QRS Duration 140 ms    Q-T Interval 480 ms    QTc Calculation (Bazett) 483 ms    P Axis 89 degrees    R Axis -1 degrees    T Axis -77 degrees    Diagnosis       Atrial-paced rhythm  Right bundle branch block  T wave abnormality, consider inferolateral ischemia  Abnormal ECG  When compared with ECG of 07-JUL-2018 08:28,  Electronic atrial pacemaker has replaced Sinus rhythm  Right bundle branch block is now present          Imaging/Diagnostics Last 24 Hours   XR CHEST PORTABLE    Result Date: 9/19/2021  EXAMINATION: ONE XRAY VIEW OF THE CHEST 9/19/2021 6:06 pm COMPARISON: Chest radiograph 06/06/2021.  HISTORY: ORDERING SYSTEM PROVIDED HISTORY: SOB, COVID exposure TECHNOLOGIST PROVIDED HISTORY: Reason for exam:->SOB, COVID exposure Reason for Exam: Fatigue; Concern For COVID-19; Altered Mental Status Acuity: Acute Type of Exam: Initial FINDINGS: Status post median sternotomy and cardiac valve replacement. The cardiomediastinal silhouette is unchanged with prominence of the left hilum. Mild vascular congestion. No pneumothorax, consolidation, or pleural effusion. 1. Mildly enlarged cardiac silhouette and mild vascular congestion. 2. Unchanged prominence of the left hilum that may represent enlarged pulmonary arteries or lymphadenopathy. Assessment      Hospital Problems         Last Modified POA    COVID-19 9/19/2021 Yes          Plan   #COVID 23   #Acute hypoxic respiratory failure 2/2 COVID-19 pneumonia   Admit to inpatient, med surg. COVID 19 unit    Symptom onset -approximately 5 days  Imaging chest x-ray with mildly enlarged cardiac silhouette with mild vascular congestion   Procalcitonin pending   Daily CBC, CMP, CRP, INR, D-dimer   Telemetry and continuous pulse ox   Droplet plus precaution   Start Emperic rocephin   -Does not meet criteria for remdesivir or  Decadron at this time      #UTI    -UA positive for blood leukocytes and nitrates, culture sent   -Empiric Rocephin as noted above    #Delirium   -Likely secondary to hypoxia+ UTI. Bebo Smith Patient is alert and oriented for me but is fuzzy on history and is not a good historian.    -Treatment as noted above. Fall precautions  Paroxysmal atrial fibrillation   -Continue home Eliquis, dose recently decreased due to recurrence of falls per Dr. Ni aguilar from August 2021    #Diabetes mellitus   -Hold home Metformin, insulin sliding scale  Continue home medications once otherwise noted above for chronic medical conditions including but not [id number] to hyperlipidemia, coronary artery disease, hypertension, valvular heart disease post MVR,    Diet ADULT DIET;  Regular; 5 carb choices (75 gm/meal)     DVT Prophylaxis [] Lovenox, []  Heparin, [] SCDs, [] Ambulation Eliquis    GI Prophylaxis [x] PPI,  [] H2

## 2021-09-20 NOTE — PROGRESS NOTES
Occupational Therapy  [facility] OCCUPATIONAL THERAPY EVALUATION    History  [place]:  The primary encounter diagnosis was Other fatigue. A diagnosis of COVID-19 was also pertinent to this visit. Restrictions:                           Communication with other providers: PT    Subjective:  Patient states:  \"you girls are good\"  Pain:  No c/o  Patient goal:  home    Occupational profile (relevant social history and personal factors):    Social/Functional History  Lives With: Family (two other sisters)  Type of Home:  ([address] [place] [address] handicapped equipped)  Home Layout: One level  Home Access: Level entry  Bathroom Shower/Tub: Walk-in shower  Bathroom Toilet: Handicap height  Bathroom Equipment: Shower chair  Bathroom Accessibility: Accessible  Home Equipment: Rolling walker, [employer]  ADL Assistance: Independent  Homemaking Assistance: Needs assistance  Homemaking Responsibilities: No  Ambulation Assistance: Independent  Transfer Assistance: Needs assistance  Additional Comments: Sister Vinny Amezquita helps with transfers and showers, household chores    Examination of body systems (includes body structures/functions, activity/participation limitations):  · Orientation: WFL   · Cognition:  WFL for command following and attention, reduced memory  · Observation:  Received pt in bed. Alert and cooperative. .   · Vision:  [employer]   · Hearing:  WFL   · ROM:  WFL BUE  · Strength: WFL BUE  · Sensation: WFL BUE  · Cardiopulm: nasal cannula, VSS    ADLs  Feeding: setup    Grooming: setup in supported sitting    Dressing: UB CGA in seated LB DEP    Bathing: UB CGA in seated LB MaxA    Toileting: MaxA/DEP    *Some ADL determined per observation of actual ADL performance, functional mobility, balance, activity tolerance, and cognition.      AM-PAC 6 click short form for inpatient daily activity:  Raw Score: 15  24/24 = unimpaired  23/24 = 1-20% impaired   20/24-22/24 = 21-40% impaired  15/24-19/24 = 41-59% impaired   10/24-14/24 = 60%-79% impaired  7/24-9/24 = 80%-99% impaired  6/24 = 100% impaired    Functional Mobility  Bed mobility:   Supine to sit: MaxA  Sit to supine: MaxA    Sitting balance: sat EOB ~15 mins, initially has recurrent posterior lean requring ModA to correct, with time and manual facilitation of limb placement pt improves to close SBA and can correct occass posterior lean    Transfers:   Sit to stand: ModAx2 and bilateral hand held up from EOB  Stand to sit: ModAx2 to EOB    Standing balance: ModAx2 ~60 seconds standing EOB , posterior lean, manual facilitation to correct SAURAV, ineffective standing balance to improve SAURAV and COG    Ambulation:  unable    Activity tolerance  WFL, but certainly below baseline    Assessment:  Assessment  Performance deficits / Impairments: Decreased high-level IADLs, Decreased ADL status, Decreased functional mobility , Decreased strength, Decreased endurance, Decreased posture, Decreased balance, Decreased safe awareness  Treatment Diagnosis: covid-19 infection  Prognosis: Good  Decision Making: Medium Complexity  REQUIRES OT FOLLOW UP: Yes  Discharge Recommendations: Subacute/Skilled Nursing Facility        Goals:  By d/c or goals met:     Pt will perform all bed mobility with Dennis in prep for EOB/OOB activity. Pt will perform all functional transfers with Dennis and appropriate use of LRD to bed, toilet, chair in prep for increased functional independence. Pt will perform UB ADLs with CGA to increase functional independence. Pt will perform LB ADLs with ModA to increase functional independence. Pt will perform all aspects of toileting with ModA to increase functional independence. Pt will participate in therex/therax c emphasis on strength, activity tolerance,  safety, NOAH tasks.     Plan:  Plan  Times per week: 2+x      Recommendation for activity with nursing staff:  pivot only    Treatment today:      Therapeutic Activity Training:   Therapeutic activity training was instructed today.  Cues were given for safety, sequence, UE/LE placement, visual cues, and balance. Activities performed today included bed mobility training, sup-sit, sitting balance, sit-stand. Education: Role of OT, OT POC, d/c needs, home safety    Safety: Left in bed with all needs in reach. bed alarm applied. Gait belt used for transfer and mobility. Time in:  1515  Time out:  1540  Timed treatment minutes:  10  Total treatment time:  25    Electronically signed by:    [id number] Jair Wilson, OTR/L, [facility]   [id number]   10:13 AM, 9/21/2021

## 2021-09-20 NOTE — ED NOTES
Bed: ED-26  Expected date:   Expected time:   Means of arrival:   Comments:  [facility] [address], RN  09/19/21 2030

## 2021-09-21 LAB
FERRITIN: 128 NG/ML (ref 15–150)
GLUCOSE BLD-MCNC: 102 MG/DL (ref 70–99)
GLUCOSE BLD-MCNC: 113 MG/DL (ref 70–99)
GLUCOSE BLD-MCNC: 117 MG/DL (ref 70–99)
GLUCOSE BLD-MCNC: 99 MG/DL (ref 70–99)
HIGH SENSITIVE C-REACTIVE PROTEIN: 57.6 MG/L

## 2021-09-21 PROCEDURE — 1200000000 HC SEMI PRIVATE

## 2021-09-21 PROCEDURE — 82962 GLUCOSE BLOOD TEST: CPT

## 2021-09-21 PROCEDURE — 6360000002 HC RX W HCPCS: Performed by: NURSE PRACTITIONER

## 2021-09-21 PROCEDURE — 94618 PULMONARY STRESS TESTING: CPT

## 2021-09-21 PROCEDURE — 6370000000 HC RX 637 (ALT 250 FOR IP): Performed by: NURSE PRACTITIONER

## 2021-09-21 PROCEDURE — 94640 AIRWAY INHALATION TREATMENT: CPT

## 2021-09-21 PROCEDURE — 6370000000 HC RX 637 (ALT 250 FOR IP): Performed by: INTERNAL MEDICINE

## 2021-09-21 PROCEDURE — 6370000000 HC RX 637 (ALT 250 FOR IP): Performed by: FAMILY MEDICINE

## 2021-09-21 PROCEDURE — 2580000003 HC RX 258: Performed by: NURSE PRACTITIONER

## 2021-09-21 PROCEDURE — 94761 N-INVAS EAR/PLS OXIMETRY MLT: CPT

## 2021-09-21 RX ORDER — ALBUTEROL SULFATE 90 UG/1
2 AEROSOL, METERED RESPIRATORY (INHALATION)
Status: DISCONTINUED | OUTPATIENT
Start: 2021-09-21 | End: 2021-09-23 | Stop reason: HOSPADM

## 2021-09-21 RX ADMIN — ZINC SULFATE 220 MG (50 MG) CAPSULE 50 MG: CAPSULE at 09:50

## 2021-09-21 RX ADMIN — OXYCODONE HYDROCHLORIDE AND ACETAMINOPHEN 1000 MG: 500 TABLET ORAL at 09:50

## 2021-09-21 RX ADMIN — CALCIUM CARBONATE 1000 MG: 500 TABLET, CHEWABLE ORAL at 09:49

## 2021-09-21 RX ADMIN — Medication 1000 UNITS: at 09:49

## 2021-09-21 RX ADMIN — ASPIRIN 81 MG: 81 TABLET, COATED ORAL at 09:50

## 2021-09-21 RX ADMIN — APIXABAN 5 MG: 5 TABLET, FILM COATED ORAL at 21:47

## 2021-09-21 RX ADMIN — DOCUSATE SODIUM 100 MG: 100 CAPSULE, LIQUID FILLED ORAL at 21:47

## 2021-09-21 RX ADMIN — METOPROLOL TARTRATE 25 MG: 50 TABLET, FILM COATED ORAL at 09:50

## 2021-09-21 RX ADMIN — CEFTRIAXONE 1000 MG: 1 INJECTION, POWDER, FOR SOLUTION INTRAMUSCULAR; INTRAVENOUS at 21:49

## 2021-09-21 RX ADMIN — PREGABALIN 150 MG: 75 CAPSULE ORAL at 09:49

## 2021-09-21 RX ADMIN — PREGABALIN 150 MG: 75 CAPSULE ORAL at 21:47

## 2021-09-21 RX ADMIN — OXYCODONE HYDROCHLORIDE AND ACETAMINOPHEN 1000 MG: 500 TABLET ORAL at 21:48

## 2021-09-21 RX ADMIN — ALBUTEROL SULFATE 2 PUFF: 90 AEROSOL, METERED RESPIRATORY (INHALATION) at 21:50

## 2021-09-21 RX ADMIN — SENNOSIDES 8.6 MG: 8.6 TABLET, COATED ORAL at 09:50

## 2021-09-21 RX ADMIN — PANTOPRAZOLE SODIUM 40 MG: 40 TABLET, DELAYED RELEASE ORAL at 06:07

## 2021-09-21 RX ADMIN — DOCUSATE SODIUM 100 MG: 100 CAPSULE, LIQUID FILLED ORAL at 09:50

## 2021-09-21 RX ADMIN — ROSUVASTATIN 40 MG: 40 TABLET, FILM COATED ORAL at 17:26

## 2021-09-21 RX ADMIN — Medication 2 PUFF: at 15:16

## 2021-09-21 RX ADMIN — CITALOPRAM HYDROBROMIDE 40 MG: 20 TABLET ORAL at 09:49

## 2021-09-21 RX ADMIN — ACETAMINOPHEN 650 MG: 650 SUPPOSITORY RECTAL at 02:24

## 2021-09-21 RX ADMIN — APIXABAN 5 MG: 5 TABLET, FILM COATED ORAL at 09:49

## 2021-09-21 RX ADMIN — Medication 2 PUFF: at 21:51

## 2021-09-21 RX ADMIN — ZINC SULFATE 220 MG (50 MG) CAPSULE 50 MG: CAPSULE at 21:48

## 2021-09-21 RX ADMIN — ALBUTEROL SULFATE 2 PUFF: 90 AEROSOL, METERED RESPIRATORY (INHALATION) at 15:15

## 2021-09-21 RX ADMIN — OXYBUTYNIN CHLORIDE 5 MG: 5 TABLET ORAL at 09:50

## 2021-09-21 RX ADMIN — POLYETHYLENE GLYCOL (3350) 17 G: 17 POWDER, FOR SOLUTION ORAL at 09:49

## 2021-09-21 RX ADMIN — SODIUM CHLORIDE, PRESERVATIVE FREE 10 ML: 5 INJECTION INTRAVENOUS at 09:48

## 2021-09-21 RX ADMIN — POTASSIUM BICARBONATE 20 MEQ: 782 TABLET, EFFERVESCENT ORAL at 09:49

## 2021-09-21 RX ADMIN — DONEPEZIL HYDROCHLORIDE 10 MG: 10 TABLET, FILM COATED ORAL at 09:50

## 2021-09-21 RX ADMIN — Medication 3 MG: at 21:48

## 2021-09-21 ASSESSMENT — PAIN SCALES - GENERAL
PAINLEVEL_OUTOF10: 0

## 2021-09-21 NOTE — PROGRESS NOTES
9/21/2021 11:28 AM  Patient Room #: 7880/0820-Z  Patient Name: Alexander Moreno    (Step 1 Done by RN if possible otherwise call Pulmonary Diagnostics)  1. Place patient on room air at rest for at least 30 minutes. If patient falls below 88% before 30 minutes then you can record the level and stop. Record room air saturation level _85_ %. If patient is at 88% or below, they will qualify for home oxygen and you can stop. If level does not fall below 88%, fill in level above. If indicated continue to Step 2. Signature:_Janneth Galan, RRT_ Date: __09/21/2021_  (Step 2&3 Done by P)  2. Ambulate patient on room air until saturation falls below 89%. Record level of room air saturation with ambulation___ %. Next, place patient back on ___lpm oxygen and ambulate, record level __%. (Note:  this level must show improvement from room air level done with ambulation.)  If patients saturation on room air with ambulation is 88% or below AND patient shows improvement with oxygen during ambulation, they will qualify for home oxygen and you can stop. If patient does not drop below 89%, then patient should have an overnight oximetry trending on room air to see if level falls below 88%. Complete level in Step 3 below. 3. Room air overnight oximetry level 88 % for___  cumulative minutes. If patients room air oxygen level is < 89% for at least 5 cumulative minutes, patient will qualify for home oxygen and you can stop.         (Attach Night Trending Report)    Complete order below: Diagnosis:__Covid 19__  Home oxygen at:  Length of Need: ? Lifetime X 3 Months     __2_lpm or __%   via  [x] nasal cannula  []mask  [] other:___         [x]continuous [x]  with activity  [x]  Nocturnal   [x] Portable Tanks [x]  Concentrator        Therapist Signature:Janneth Galan, RRT__     Date:  _09/21/2021__  Physician Signature:  __Electronically Signed in EMR_    Date:___  Physician Printed Name:  José Miguel Mcmillan MD NPI: [id number]    [x] Patient Qualifies      [] Patient Does NOT qualify

## 2021-09-21 NOTE — CARE COORDINATION
COVID+ 9/19. O2 on at 1 lmin  CM called  Pt's sister  to initiate a safe discharge plan. Cm introduced self and explained role of CM. CM has been working with Edd Wolf pts sister for her other sister. CM informed that pt lives with her two sisters in a one floor condo. She has a walker. She is assisted for any needed ADL's per her sister. Pt has a PCP. Pt has insurance and able to obtain her medications. Transportation , meals and groceries per sister Sandoval. Currently pt is on O2 at 1 lmin. Discharge plan if pt improves and able to help care for self she could return home. Unfortunately, all three sisters are Covid Positive. The primary caregiver for all of them is now calling her physician for decrease sats. Discharge plan at this time is pending the pts needs at time of discharge. pts sister here in the hospital will be going to [facility]. . CM provided card and encouraged to call for any needs or concern. CM is available if any needs arise.

## 2021-09-21 NOTE — PROGRESS NOTES
Hospital Medicine Progress Note     Date:  9/21/2021    PCP: Alfred Ring MD (Tel: [phone])    Date of Admission: 9/19/2021    Chief complaint:   Chief Complaint   Patient presents with    Fatigue    Concern For COVID-19     both sisters are positive     Altered Mental Status     pt's sister states she is confused         Assessment:  Active Problems:    COVID-19  Resolved Problems:    * No resolved hospital problems. *      Plan:  1. Generalized weakness  Likely secondary to COVID-19 infection/urinary tract infection  PT/OT eval for disposition    2. COVID-19 infection  No hypoxia, currently on room air  No need for Decadron  Continue aspiration precautions  Multivitamin     3. Urinary tract infection   continue Rocephin  Follow urine cultures     4. Paroxysmal A. fib on Eliquis   on Eliquis     5. Diabetes mellitus type 2:   Accu-Chek, sliding scale insulin       Diet: ADULT DIET; Regular; 5 carb choices (75 gm/meal)    Code status: Full Code   ----------  Subjective  Patient seen and examined at bedside, intubated, not in acute distress. Patient appears quite weak today, will get PT/OT evaluation. Objective  Physical exam:  Vitals: BP (!) 118/30   Pulse 59   Temp 99.6 °F (37.6 °C) (Axillary)   Resp 24   Ht 5' 6\" (1.676 m)   Wt 171 lb 1.6 oz (77.6 kg)   SpO2 90%   BMI 27.62 kg/m²   Gen/overall appearance: Not in acute distress. Alert. Head: Normocephalic, atraumatic  Eyes: EOMI, good acuity  ENT: Oral mucosa moist  Neck: No JVD, thyromegaly  CVS: Nml S1S2, no MRG, RRR  Pulm: Clear bilaterally. No crackles/wheezes  Gastrointestinal: Soft, NT/ND, +BS  Musculoskeletal: No edema. Warm  Neuro: No focal deficit. Moves extremity spontaneously. Psychiatry: Appropriate affect. Not agitated. Skin: Warm, dry with normal turgor.  No rash  Capillary refill: Brisk,< 3 seconds   Peripheral Pulses: +2 palpable, equal bilaterally     24HR INTAKE/OUTPUT:      Intake/Output Summary (Last 24 hours) at 9/21/2021 1715  Last data filed at 9/21/2021 0116  Gross per 24 hour   Intake 60 ml   Output --   Net 60 ml     I/O last 3 completed shifts: In: 61 [P.O.:60]  Out: -   No intake/output data recorded.   Meds:    albuterol sulfate HFA  2 puff Inhalation Q4H WA    ipratropium  2 puff Inhalation Q4H WA    donepezil  10 mg Oral Daily    calcium carbonate  1,000 mg Oral Daily    rosuvastatin  40 mg Oral QPM    aspirin  81 mg Oral Daily    docusate sodium  100 mg Oral BID    melatonin  3 mg Oral Nightly    pregabalin  150 mg Oral BID    pantoprazole  40 mg Oral QAM AC    metoprolol tartrate  25 mg Oral BID    potassium bicarb-citric acid  20 mEq Oral Daily    senna  1 tablet Oral Daily    apixaban  5 mg Oral BID    oxybutynin  5 mg Oral Daily    polyethylene glycol  17 g Oral Daily    citalopram  40 mg Oral Daily    sodium chloride flush  5-40 mL IntraVENous 2 times per day    insulin lispro  0.08 Units/kg SubCUTAneous TID WC    insulin lispro  0-6 Units SubCUTAneous TID WC    insulin lispro  0-3 Units SubCUTAneous Nightly    cefTRIAXone (ROCEPHIN) IV  1,000 mg IntraVENous Q24H    vitamin C  1,000 mg Oral BID    zinc sulfate  50 mg Oral BID    vitamin D  1,000 Units Oral Daily     Infusions:    sodium chloride      dextrose       PRN Meds: ipratropium-albuterol, bisacodyl, sodium chloride flush, sodium chloride, ondansetron **OR** ondansetron, polyethylene glycol, acetaminophen **OR** acetaminophen, glucose, dextrose, glucagon (rDNA), dextrose, oxyCODONE **OR** oxyCODONE, albuterol, benzonatate    Labs/imaging:  CBC:   Recent Labs     09/19/21  1745   WBC 3.3*   HGB 11.0*        BMP:    Recent Labs     09/19/21  1745 09/20/21  1806   * 130*   K 3.7 4.0   CL 95* 93*   CO2 22 23   BUN 8 11   CREATININE 0.6 0.8   GLUCOSE 94 139*     Hepatic:   Recent Labs     09/19/21 1745 09/20/21  1806   AST 21 24   ALT 10 10   BILITOT 0.4 0.3   ALKPHOS 99 111       Nonso Ezema, MD  -------------------------------  Rounding hospitalist

## 2021-09-21 NOTE — PROGRESS NOTES
Doctor Please copy and paste below in your progress note per DME requirements. Patient was seen in hospital for  COVID-19,  . I am prescribing oxygen because the diagnosis and testing requires the patient to have oxygen in the home. Conditions will improve or be benefited by oxygen use. The patient is able to perform good mobility and therefore requires the use of a portable oxygen system for ambulation.

## 2021-09-22 ENCOUNTER — APPOINTMENT (OUTPATIENT)
Dept: GENERAL RADIOLOGY | Age: 76
DRG: 177 | End: 2021-09-22
Payer: MEDICARE

## 2021-09-22 LAB
ANION GAP SERPL CALCULATED.3IONS-SCNC: 13 MMOL/L (ref 4–16)
BUN BLDV-MCNC: 12 MG/DL (ref 6–23)
CALCIUM SERPL-MCNC: 8.2 MG/DL (ref 8.3–10.6)
CHLORIDE BLD-SCNC: 93 MMOL/L (ref 99–110)
CO2: 24 MMOL/L (ref 21–32)
CREAT SERPL-MCNC: 0.5 MG/DL (ref 0.6–1.1)
CULTURE: ABNORMAL
CULTURE: ABNORMAL
EKG ATRIAL RATE: 61 BPM
EKG DIAGNOSIS: NORMAL
EKG P AXIS: 89 DEGREES
EKG P-R INTERVAL: 174 MS
EKG Q-T INTERVAL: 480 MS
EKG QRS DURATION: 140 MS
EKG QTC CALCULATION (BAZETT): 483 MS
EKG R AXIS: -1 DEGREES
EKG T AXIS: -77 DEGREES
EKG VENTRICULAR RATE: 61 BPM
GFR AFRICAN AMERICAN: >60 ML/MIN/1.73M2
GFR NON-AFRICAN AMERICAN: >60 ML/MIN/1.73M2
GLUCOSE BLD-MCNC: 117 MG/DL (ref 70–99)
GLUCOSE BLD-MCNC: 118 MG/DL (ref 70–99)
GLUCOSE BLD-MCNC: 150 MG/DL (ref 70–99)
GLUCOSE BLD-MCNC: 157 MG/DL (ref 70–99)
GLUCOSE BLD-MCNC: 89 MG/DL (ref 70–99)
HCT VFR BLD CALC: 32.9 % (ref 37–47)
HEMOGLOBIN: 10.6 GM/DL (ref 12.5–16)
Lab: ABNORMAL
MCH RBC QN AUTO: 26.6 PG (ref 27–31)
MCHC RBC AUTO-ENTMCNC: 32.2 % (ref 32–36)
MCV RBC AUTO: 82.7 FL (ref 78–100)
PDW BLD-RTO: 14.1 % (ref 11.7–14.9)
PLATELET # BLD: 158 K/CU MM (ref 140–440)
PMV BLD AUTO: 9.3 FL (ref 7.5–11.1)
POTASSIUM SERPL-SCNC: 4.3 MMOL/L (ref 3.5–5.1)
RBC # BLD: 3.98 M/CU MM (ref 4.2–5.4)
SODIUM BLD-SCNC: 130 MMOL/L (ref 135–145)
SPECIMEN: ABNORMAL
WBC # BLD: 2.7 K/CU MM (ref 4–10.5)

## 2021-09-22 PROCEDURE — 2700000000 HC OXYGEN THERAPY PER DAY

## 2021-09-22 PROCEDURE — 1200000000 HC SEMI PRIVATE

## 2021-09-22 PROCEDURE — 94761 N-INVAS EAR/PLS OXIMETRY MLT: CPT

## 2021-09-22 PROCEDURE — 2580000003 HC RX 258: Performed by: NURSE PRACTITIONER

## 2021-09-22 PROCEDURE — 80048 BASIC METABOLIC PNL TOTAL CA: CPT

## 2021-09-22 PROCEDURE — 6370000000 HC RX 637 (ALT 250 FOR IP): Performed by: INTERNAL MEDICINE

## 2021-09-22 PROCEDURE — 71045 X-RAY EXAM CHEST 1 VIEW: CPT

## 2021-09-22 PROCEDURE — 36415 COLL VENOUS BLD VENIPUNCTURE: CPT

## 2021-09-22 PROCEDURE — 6360000002 HC RX W HCPCS: Performed by: NURSE PRACTITIONER

## 2021-09-22 PROCEDURE — 6370000000 HC RX 637 (ALT 250 FOR IP): Performed by: NURSE PRACTITIONER

## 2021-09-22 PROCEDURE — 85027 COMPLETE CBC AUTOMATED: CPT

## 2021-09-22 PROCEDURE — 93010 ELECTROCARDIOGRAM REPORT: CPT | Performed by: INTERNAL MEDICINE

## 2021-09-22 PROCEDURE — 6360000002 HC RX W HCPCS: Performed by: HOSPITALIST

## 2021-09-22 PROCEDURE — 82962 GLUCOSE BLOOD TEST: CPT

## 2021-09-22 PROCEDURE — 94640 AIRWAY INHALATION TREATMENT: CPT

## 2021-09-22 RX ORDER — FUROSEMIDE 10 MG/ML
20 INJECTION INTRAMUSCULAR; INTRAVENOUS ONCE
Status: COMPLETED | OUTPATIENT
Start: 2021-09-22 | End: 2021-09-22

## 2021-09-22 RX ORDER — DEXAMETHASONE 4 MG/1
6 TABLET ORAL DAILY
Status: DISCONTINUED | OUTPATIENT
Start: 2021-09-22 | End: 2021-09-23 | Stop reason: HOSPADM

## 2021-09-22 RX ADMIN — DONEPEZIL HYDROCHLORIDE 10 MG: 10 TABLET, FILM COATED ORAL at 09:39

## 2021-09-22 RX ADMIN — Medication 2 PUFF: at 08:51

## 2021-09-22 RX ADMIN — Medication 2 PUFF: at 12:22

## 2021-09-22 RX ADMIN — CEFTRIAXONE 1000 MG: 1 INJECTION, POWDER, FOR SOLUTION INTRAMUSCULAR; INTRAVENOUS at 21:30

## 2021-09-22 RX ADMIN — OXYCODONE HYDROCHLORIDE AND ACETAMINOPHEN 1000 MG: 500 TABLET ORAL at 21:28

## 2021-09-22 RX ADMIN — METOPROLOL TARTRATE 25 MG: 50 TABLET, FILM COATED ORAL at 09:39

## 2021-09-22 RX ADMIN — CITALOPRAM HYDROBROMIDE 40 MG: 20 TABLET ORAL at 09:39

## 2021-09-22 RX ADMIN — PREGABALIN 150 MG: 75 CAPSULE ORAL at 09:40

## 2021-09-22 RX ADMIN — Medication 2 PUFF: at 19:53

## 2021-09-22 RX ADMIN — SODIUM CHLORIDE, PRESERVATIVE FREE 10 ML: 5 INJECTION INTRAVENOUS at 09:38

## 2021-09-22 RX ADMIN — APIXABAN 5 MG: 5 TABLET, FILM COATED ORAL at 09:39

## 2021-09-22 RX ADMIN — INSULIN LISPRO 1 UNITS: 100 INJECTION, SOLUTION INTRAVENOUS; SUBCUTANEOUS at 17:08

## 2021-09-22 RX ADMIN — CALCIUM CARBONATE 1000 MG: 500 TABLET, CHEWABLE ORAL at 09:38

## 2021-09-22 RX ADMIN — DOCUSATE SODIUM 100 MG: 100 CAPSULE, LIQUID FILLED ORAL at 21:28

## 2021-09-22 RX ADMIN — SODIUM CHLORIDE, PRESERVATIVE FREE 10 ML: 5 INJECTION INTRAVENOUS at 21:27

## 2021-09-22 RX ADMIN — DEXAMETHASONE 6 MG: 4 TABLET ORAL at 09:57

## 2021-09-22 RX ADMIN — ROSUVASTATIN 40 MG: 40 TABLET, FILM COATED ORAL at 17:13

## 2021-09-22 RX ADMIN — POTASSIUM BICARBONATE 20 MEQ: 782 TABLET, EFFERVESCENT ORAL at 09:39

## 2021-09-22 RX ADMIN — OXYBUTYNIN CHLORIDE 5 MG: 5 TABLET ORAL at 09:39

## 2021-09-22 RX ADMIN — ZINC SULFATE 220 MG (50 MG) CAPSULE 50 MG: CAPSULE at 21:28

## 2021-09-22 RX ADMIN — Medication 1000 UNITS: at 09:39

## 2021-09-22 RX ADMIN — ZINC SULFATE 220 MG (50 MG) CAPSULE 50 MG: CAPSULE at 09:40

## 2021-09-22 RX ADMIN — ALBUTEROL SULFATE 2 PUFF: 90 AEROSOL, METERED RESPIRATORY (INHALATION) at 19:53

## 2021-09-22 RX ADMIN — Medication 2 PUFF: at 15:42

## 2021-09-22 RX ADMIN — Medication 3 MG: at 21:28

## 2021-09-22 RX ADMIN — ALBUTEROL SULFATE 2 PUFF: 90 AEROSOL, METERED RESPIRATORY (INHALATION) at 08:50

## 2021-09-22 RX ADMIN — SENNOSIDES 8.6 MG: 8.6 TABLET, COATED ORAL at 09:39

## 2021-09-22 RX ADMIN — APIXABAN 5 MG: 5 TABLET, FILM COATED ORAL at 21:28

## 2021-09-22 RX ADMIN — FUROSEMIDE 20 MG: 10 INJECTION, SOLUTION INTRAMUSCULAR; INTRAVENOUS at 09:58

## 2021-09-22 RX ADMIN — ASPIRIN 81 MG: 81 TABLET, COATED ORAL at 09:39

## 2021-09-22 RX ADMIN — ALBUTEROL SULFATE 2 PUFF: 90 AEROSOL, METERED RESPIRATORY (INHALATION) at 12:21

## 2021-09-22 RX ADMIN — PREGABALIN 150 MG: 75 CAPSULE ORAL at 21:28

## 2021-09-22 RX ADMIN — ALBUTEROL SULFATE 2 PUFF: 90 AEROSOL, METERED RESPIRATORY (INHALATION) at 15:41

## 2021-09-22 RX ADMIN — DOCUSATE SODIUM 100 MG: 100 CAPSULE, LIQUID FILLED ORAL at 09:39

## 2021-09-22 RX ADMIN — OXYCODONE HYDROCHLORIDE AND ACETAMINOPHEN 1000 MG: 500 TABLET ORAL at 11:52

## 2021-09-22 RX ADMIN — POLYETHYLENE GLYCOL (3350) 17 G: 17 POWDER, FOR SOLUTION ORAL at 09:38

## 2021-09-22 ASSESSMENT — PAIN SCALES - GENERAL
PAINLEVEL_OUTOF10: 0

## 2021-09-22 ASSESSMENT — PAIN SCALES - WONG BAKER
WONGBAKER_NUMERICALRESPONSE: 0

## 2021-09-22 NOTE — CARE COORDINATION
CM call to Pt room, no answer. CM call to Pt sister Shreyas Narayanan to follow up on discharge planning, agreeable with therapy recommendation of SNF. Pt sister Sd Dai was discharged to [facility] yesterday 9/21. CM call to Gabe Juarez for referral.  Facesheet faxed to [phone]. Pt will not require a precert. CM following.

## 2021-09-22 NOTE — PROGRESS NOTES
Hospital Medicine Progress Note     Date:  9/22/2021    PCP: Austin Yan MD (Tel: [phone])    Date of Admission: 9/19/2021    Chief complaint:   Chief Complaint   Patient presents with    Fatigue    Concern For COVID-19     both sisters are positive     Altered Mental Status     pt's sister states she is confused         Assessment:  Active Problems:    COVID-19  Resolved Problems:    * No resolved hospital problems. *      Plan:    1. Acute respiratory failure with hypoxia  Secondary to COVID-19 pneumonia  Currently on 2 L of nasal cannula oxygen, wean as tolerated  Start on Decadron, IV Lasix  Continue aspiration precautions  Multivitamin    2. Generalized weakness  Likely secondary to COVID-19 infection/urinary tract infection  PT/OT eval for disposition     3. Urinary tract infection   continue Rocephin  Follow urine cultures     4. Paroxysmal A. fib on Eliquis   on Eliquis     5. Diabetes mellitus type 2:   Accu-Chek, sliding scale insulin       Diet: ADULT DIET; Regular; 5 carb choices (75 gm/meal)    Code status: Full Code   ----------  Subjective  Patient seen and examined at bedside, intubated, not in acute distress. Patient appears quite weak today, will get PT/OT evaluation. Objective  Physical exam:  Vitals: BP (!) 107/43   Pulse 62   Temp 98.4 °F (36.9 °C) (Oral)   Resp 22   Ht 5' 6\" (1.676 m)   Wt 171 lb 1.6 oz (77.6 kg)   SpO2 94%   BMI 27.62 kg/m²   Gen/overall appearance: Not in acute distress. Alert. Head: Normocephalic, atraumatic  Eyes: EOMI, good acuity  ENT: Oral mucosa moist  Neck: No JVD, thyromegaly  CVS: Nml S1S2, no MRG, RRR  Pulm: Clear bilaterally. No crackles/wheezes  Gastrointestinal: Soft, NT/ND, +BS  Musculoskeletal: No edema. Warm  Neuro: No focal deficit. Moves extremity spontaneously. Psychiatry: Appropriate affect. Not agitated. Skin: Warm, dry with normal turgor.  No rash  Capillary refill: Brisk,< 3 seconds   Peripheral Pulses: +2 palpable, equal bilaterally     24HR INTAKE/OUTPUT:    No intake or output data in the 24 hours ending 09/22/21 1449  No intake/output data recorded. No intake/output data recorded.   Meds:    dexamethasone  6 mg Oral Daily    albuterol sulfate HFA  2 puff Inhalation Q4H WA    ipratropium  2 puff Inhalation Q4H WA    donepezil  10 mg Oral Daily    calcium carbonate  1,000 mg Oral Daily    rosuvastatin  40 mg Oral QPM    aspirin  81 mg Oral Daily    docusate sodium  100 mg Oral BID    melatonin  3 mg Oral Nightly    pregabalin  150 mg Oral BID    pantoprazole  40 mg Oral QAM AC    metoprolol tartrate  25 mg Oral BID    potassium bicarb-citric acid  20 mEq Oral Daily    senna  1 tablet Oral Daily    apixaban  5 mg Oral BID    oxybutynin  5 mg Oral Daily    polyethylene glycol  17 g Oral Daily    citalopram  40 mg Oral Daily    sodium chloride flush  5-40 mL IntraVENous 2 times per day    insulin lispro  0.08 Units/kg SubCUTAneous TID WC    insulin lispro  0-6 Units SubCUTAneous TID WC    insulin lispro  0-3 Units SubCUTAneous Nightly    cefTRIAXone (ROCEPHIN) IV  1,000 mg IntraVENous Q24H    vitamin C  1,000 mg Oral BID    zinc sulfate  50 mg Oral BID    vitamin D  1,000 Units Oral Daily     Infusions:    sodium chloride      dextrose       PRN Meds: ipratropium-albuterol, bisacodyl, sodium chloride flush, sodium chloride, ondansetron **OR** ondansetron, polyethylene glycol, acetaminophen **OR** acetaminophen, glucose, dextrose, glucagon (rDNA), dextrose, oxyCODONE **OR** oxyCODONE, albuterol, benzonatate    Labs/imaging:  CBC:   Recent Labs     09/19/21 1745 09/22/21  1021   WBC 3.3* 2.7*   HGB 11.0* 10.6*    158     BMP:    Recent Labs     09/19/21 1745 09/20/21  1806 09/22/21  1021   * 130* 130*   K 3.7 4.0 4.3   CL 95* 93* 93*   CO2 22 23 24   BUN 8 11 12   CREATININE 0.6 0.8 0.5*   GLUCOSE 94 139* 117*     Hepatic:   Recent Labs     09/19/21 1745 09/20/21  1806   AST 21 24   ALT 10 10   BILITOT 0.4 0.3   ALKPHOS 99 111       Maliko MD Dominic  -------------------------------  [facility]

## 2021-09-23 VITALS
HEART RATE: 67 BPM | BODY MASS INDEX: 27.5 KG/M2 | OXYGEN SATURATION: 96 % | WEIGHT: 171.1 LBS | RESPIRATION RATE: 21 BRPM | HEIGHT: 66 IN | DIASTOLIC BLOOD PRESSURE: 72 MMHG | SYSTOLIC BLOOD PRESSURE: 154 MMHG | TEMPERATURE: 98.9 F

## 2021-09-23 LAB
GLUCOSE BLD-MCNC: 124 MG/DL (ref 70–99)
GLUCOSE BLD-MCNC: 223 MG/DL (ref 70–99)

## 2021-09-23 PROCEDURE — 94761 N-INVAS EAR/PLS OXIMETRY MLT: CPT

## 2021-09-23 PROCEDURE — 6370000000 HC RX 637 (ALT 250 FOR IP): Performed by: NURSE PRACTITIONER

## 2021-09-23 PROCEDURE — 94640 AIRWAY INHALATION TREATMENT: CPT

## 2021-09-23 PROCEDURE — 82962 GLUCOSE BLOOD TEST: CPT

## 2021-09-23 PROCEDURE — 2700000000 HC OXYGEN THERAPY PER DAY

## 2021-09-23 PROCEDURE — 6370000000 HC RX 637 (ALT 250 FOR IP): Performed by: INTERNAL MEDICINE

## 2021-09-23 PROCEDURE — 2580000003 HC RX 258: Performed by: NURSE PRACTITIONER

## 2021-09-23 PROCEDURE — 6360000002 HC RX W HCPCS: Performed by: HOSPITALIST

## 2021-09-23 RX ORDER — BENZONATATE 100 MG/1
100 CAPSULE ORAL 3 TIMES DAILY PRN
Qty: 15 CAPSULE | Refills: 0 | Status: SHIPPED | OUTPATIENT
Start: 2021-09-23 | End: 2021-09-28

## 2021-09-23 RX ORDER — DEXAMETHASONE 6 MG/1
6 TABLET ORAL DAILY
Qty: 5 TABLET | Refills: 0 | Status: SHIPPED | OUTPATIENT
Start: 2021-09-24 | End: 2021-09-29

## 2021-09-23 RX ADMIN — OXYBUTYNIN CHLORIDE 5 MG: 5 TABLET ORAL at 08:45

## 2021-09-23 RX ADMIN — Medication 2 PUFF: at 08:19

## 2021-09-23 RX ADMIN — ASPIRIN 81 MG: 81 TABLET, COATED ORAL at 08:46

## 2021-09-23 RX ADMIN — APIXABAN 5 MG: 5 TABLET, FILM COATED ORAL at 08:45

## 2021-09-23 RX ADMIN — SENNOSIDES 8.6 MG: 8.6 TABLET, COATED ORAL at 08:45

## 2021-09-23 RX ADMIN — SODIUM CHLORIDE, PRESERVATIVE FREE 5 ML: 5 INJECTION INTRAVENOUS at 08:45

## 2021-09-23 RX ADMIN — METOPROLOL TARTRATE 25 MG: 50 TABLET, FILM COATED ORAL at 08:46

## 2021-09-23 RX ADMIN — CITALOPRAM HYDROBROMIDE 40 MG: 20 TABLET ORAL at 08:46

## 2021-09-23 RX ADMIN — ALBUTEROL SULFATE 2 PUFF: 90 AEROSOL, METERED RESPIRATORY (INHALATION) at 12:26

## 2021-09-23 RX ADMIN — DONEPEZIL HYDROCHLORIDE 10 MG: 10 TABLET, FILM COATED ORAL at 08:45

## 2021-09-23 RX ADMIN — PREGABALIN 150 MG: 75 CAPSULE ORAL at 08:46

## 2021-09-23 RX ADMIN — POTASSIUM BICARBONATE 20 MEQ: 782 TABLET, EFFERVESCENT ORAL at 08:45

## 2021-09-23 RX ADMIN — Medication 2 PUFF: at 12:26

## 2021-09-23 RX ADMIN — ZINC SULFATE 220 MG (50 MG) CAPSULE 50 MG: CAPSULE at 11:15

## 2021-09-23 RX ADMIN — PANTOPRAZOLE SODIUM 40 MG: 40 TABLET, DELAYED RELEASE ORAL at 06:04

## 2021-09-23 RX ADMIN — CALCIUM CARBONATE 1000 MG: 500 TABLET, CHEWABLE ORAL at 08:45

## 2021-09-23 RX ADMIN — POLYETHYLENE GLYCOL (3350) 17 G: 17 POWDER, FOR SOLUTION ORAL at 08:45

## 2021-09-23 RX ADMIN — Medication 1000 UNITS: at 08:45

## 2021-09-23 RX ADMIN — OXYCODONE HYDROCHLORIDE AND ACETAMINOPHEN 1000 MG: 500 TABLET ORAL at 08:45

## 2021-09-23 RX ADMIN — DOCUSATE SODIUM 100 MG: 100 CAPSULE, LIQUID FILLED ORAL at 08:46

## 2021-09-23 RX ADMIN — DEXAMETHASONE 6 MG: 4 TABLET ORAL at 08:46

## 2021-09-23 RX ADMIN — ALBUTEROL SULFATE 2 PUFF: 90 AEROSOL, METERED RESPIRATORY (INHALATION) at 08:19

## 2021-09-23 RX ADMIN — INSULIN LISPRO 2 UNITS: 100 INJECTION, SOLUTION INTRAVENOUS; SUBCUTANEOUS at 12:30

## 2021-09-23 ASSESSMENT — PAIN SCALES - WONG BAKER
WONGBAKER_NUMERICALRESPONSE: 0

## 2021-09-23 ASSESSMENT — PAIN SCALES - GENERAL: PAINLEVEL_OUTOF10: 0

## 2021-09-23 NOTE — CARE COORDINATION
NH Predict Tool-uploaded to Media tab    Prior level of functioning: Lives alone at home with two sisters who also have Covid and one is in the SNF. Pt is MOD I at home. NH Predict tool recommendation: SNF @ 15 days  P.T. recommendation: Skilled PT services (short term SNF)  in order to address impairments and promote return to PLOF. O.T. recommendation: Subacute/Skilled Nursing Facility  Current Discharge plan: SNF for skilled services. Pending referral to [facility].    NH Predict tool reviewed with patient/family: NA   Collaboration with Case Management: No as in alignment    Delmi Galicia RN CTN  [phone]

## 2021-09-23 NOTE — CARE COORDINATION
CM contacted by [facility]. Pt accepted and can dc when medically ready. PS to Dr. Damion Gillespie to update    9:48 AM   Pt on discharge. CM set stretcher transportation with [address] for 1430. CM call to Pt sister Zoila Cat to update,  box full. CM call to [facility], left  to update.

## 2021-09-23 NOTE — DISCHARGE SUMMARY
Discharge Summary    Name:  Radha Mallory /Age/Sex: 1945  (64 y.o. female)   MRN & CSN:  [id number] & [id number] Admission Date/Time: 2021  5:14 PM   Attending:  Michi Powers MD Discharging Physician: Michi Powers MD     Hospital Course:   Radha Mallory is a 64 y.o.  female who presented to the hospital due to altered mental status, was known to have been Covid positive about a week before presentation. Patient's family members also had come down with COVID-19 pneumonia. Patient was admitted to the hospital, noted to have overall generalized weakness. Was also found to have urinary tract infection for which she received antibiotics. Patient was not hypoxic, did not develop extensive respiratory symptoms therefore did not require oxygen or steroids. She has shown improvement, was evaluated by PT/OT, recommended for SNF placement.  has worked on this, currently patient has been accepted to SNF, will be discharged today. Patient does not need to continue antibiotics on discharge. The patient expressed appropriate understanding of and agreement with the discharge recommendations, medications, and plan.      Consults this admission:  Mey Goddard HOSPITALIST    Discharge Instruction:   Follow up appointments:   Primary care physician:  within 2 weeks    Diet:  cardiac diet   Activity: activity as tolerated  Disposition: Discharged to:   []Home, []C, [x]SNF, []Acute Rehab, []Hospice   Condition on discharge: Stable    Discharge Medications:      Ace Guevaraman   Home Medication Instructions CPH:[id number]    Printed on:21 1122   Medication Information                      acetaminophen (APAP EXTRA STRENGTH) 500 MG tablet  Take 1 tablet by mouth every 6 hours as needed for Pain             apixaban (ELIQUIS) 5 MG TABS tablet  Take 1 tablet by mouth 2 times daily             aspirin 81 MG EC tablet  Take 1 tablet by mouth daily             benzonatate (TESSALON) 100 MG capsule  Take 1 capsule by mouth 3 times daily as needed for Cough             bisacodyl (DULCOLAX) 5 MG EC tablet  Take 5 mg by mouth as needed             CALCIUM CARBONATE PO  Take 1,000 mg by mouth daily              citalopram (CELEXA) 40 MG tablet  TAKE 1 TABLET EVERY DAY FOR ANXIETY AND STRESS             dexamethasone (DECADRON) 6 MG tablet  Take 1 tablet by mouth daily for 5 days             docusate sodium (COLACE, DULCOLAX) 100 MG CAPS  Take 100 mg by mouth 2 times daily             donepezil (ARICEPT) 5 MG tablet  Take 10 mg by mouth daily              estradiol (ESTRACE) 0.1 MG/GM vaginal cream  Place 2 g vaginally 3 times daily as needed              furosemide (LASIX) 20 MG tablet  Take 1 tablet by mouth daily             gabapentin (NEURONTIN) 600 MG tablet  Take 600 mg by mouth 3 times daily. ipratropium (ATROVENT) 0.03 % nasal spray               melatonin 3 MG TABS tablet  Take 3 mg by mouth daily             metFORMIN (GLUCOPHAGE) 500 MG tablet  Take 500 mg by mouth 2 times daily (with meals)             methylcellulose (CITRUCEL) 500 MG TABS  Take 2,000 mg by mouth daily prn             metoprolol tartrate (LOPRESSOR) 50 MG tablet  Take 0.5 tablets by mouth 2 times daily             omeprazole (PRILOSEC) 40 MG delayed release capsule  Take 40 mg by mouth daily             oxybutynin (DITROPAN) 5 MG tablet  Take 1 tablet by mouth daily             oxyCODONE (ROXICODONE) 5 MG immediate release tablet  Take 5 mg by mouth 2 times daily as needed. polyethylene glycol (GLYCOLAX) 17 GM/SCOOP powder  Take 17 g by mouth daily             potassium chloride (KLOR-CON) 20 MEQ packet  Take 20 mEq by mouth daily             pregabalin (LYRICA) 150 MG capsule  150 mg 2 times daily.               rosuvastatin (CRESTOR) 40 MG tablet  Take 40 mg by mouth every evening             senna (SENOKOT) 8.6 MG tablet  Take 1 tablet by mouth daily             vitamin D3 (CHOLECALCIFEROL) 25 MCG (1000 UT) TABS tablet  Take 1 tablet by mouth daily                  Objective Findings at Discharge:   BP (!) 154/72   Pulse 67   Temp 98.9 °F (37.2 °C) (Axillary)   Resp 13   Ht 5' 6\" (1.676 m)   Wt 171 lb 1.6 oz (77.6 kg)   SpO2 98%   BMI 27.62 kg/m²            PHYSICAL EXAM   GEN Awake female, sitting upright in bed in no apparent distress. Appears given age. EYES Pupils are equally round. No scleral erythema, discharge, or conjunctivitis. HENT Mucous membranes are moist. Oral pharynx without exudates, no evidence of thrush. NECK Supple, no apparent thyromegaly or masses. RESP Clear to auscultation, no wheezes, rales or rhonchi. Symmetric chest movement while on room air. CARDIO/VASC S1/S2 auscultated. Regular rate without appreciable murmurs, rubs, or gallops. No JVD or carotid bruits. Peripheral pulses equal bilaterally and palpable. No peripheral edema. GI Abdomen is soft without significant tenderness, masses, or guarding. Bowel sounds are normoactive. Rectal exam deferred.  No costovertebral angle tenderness. Normal appearing external genitalia. Rodriguez catheter is not present. HEME/LYMPH No palpable cervical lymphadenopathy and no hepatosplenomegaly. No petechiae or ecchymoses. MSK No gross joint deformities. SKIN Normal coloration, warm, dry. NEURO Cranial nerves appear grossly intact, normal speech, no lateralizing weakness. PSYCH Awake, alert, oriented x 4. Affect appropriate.     BMP/CBC  Recent Labs     09/20/21  1806 09/22/21  1021   * 130*   K 4.0 4.3   CL 93* 93*   CO2 23 24   BUN 11 12   CREATININE 0.8 0.5*   WBC  --  2.7*   HCT  --  32.9*   PLT  --  158         Discharge Time of 38 minutes    Electronically signed by Aisha Pacheco MD on 9/23/2021 at 11:22 AM

## 2021-09-23 NOTE — DISCHARGE INSTR - COC
following cardiac surgery, postop I97.130    Acute blood loss anemia D62    Uncontrolled pain R52    Gait disturbance R26.9    Pneumonia due to infectious organism J18.9    SSS (sick sinus syndrome) ([facility]) I49.5    S/P placement of cardiac pacemaker Z95.0    Tachycardia-bradycardia ([facility] I49.5    Fall at home, subsequent encounter W19. XXXD, Y92.009    Acute intracranial hemorrhage ([facility]) I62.9    Hyponatremia E87.1    Compression fracture of L1 lumbar vertebra ([facility]) S32.010A    Fall W19. XXXA    Intraparenchymal hematoma of brain ([facility] S06.360A    COVID-19 U07.1       Isolation/Infection:   Isolation          Droplet Plus        Patient Infection Status     Infection Onset Added Last Indicated Last Indicated By Review Planned Expiration Resolved Resolved By    COVID-19 21 COVID-19, Rapid 09/26/21 10/03/21      Resolved    COVID-19 Rule Out 21 COVID-19, Rapid (Ordered)   21 Rule-Out Test Resulted          Nurse Assessment:  Last Vital Signs: BP (!) 154/72   Pulse 67   Temp 98.9 °F (37.2 °C) (Axillary)   Resp 13   Ht 5' 6\" (1.676 m)   Wt 171 lb 1.6 oz (77.6 kg)   SpO2 98%   BMI 27.62 kg/m²     Last documented pain score (0-10 scale): Pain Level: 0  Last Weight:   Wt Readings from Last 1 Encounters:   21 171 lb 1.6 oz (77.6 kg)     Mental Status:  oriented, alert and occasional confusion. short term memory issues.     IV Access:  - None    Nursing Mobility/ADLs:  Walking   Dependent  Transfer  Dependent  Bathing  Dependent  Dressing  Dependent  Toileting  Dependent  Feeding  Independent  Med Admin  Assisted  Med Delivery   whole and a few at a time    Wound Care Documentation and Therapy:        Elimination:  Continence:   · Bowel: {YES / EZ:51463}  · Bladder: {YES / G}  Urinary Catheter: None   Colostomy/Ileostomy/Ileal Conduit: {YES / SR:52698}       Date of Last BM: ***  No intake or output data in the 24 hours ending 21 1116  No intake/output data recorded. Safety Concerns:     occasional confusion    Impairments/Disabilities:      Vision    Nutrition Therapy:  Current Nutrition Therapy:   - Oral Diet:  Carb Control 5 carbs/meal (2000kcals/day)    Routes of Feeding: Oral  Liquids: {Slp liquid thickness:51548}  Daily Fluid Restriction: no  Last Modified Barium Swallow with Video (Video Swallowing Test): not done    Treatments at the Time of Hospital Discharge:   Respiratory Treatments: ***  Oxygen Therapy:  is on oxygen at 2 L/min per nasal cannula. Ventilator:    - No ventilator support    Rehab Therapies: {THERAPEUTIC INTERVENTION:2638157103}  Weight Bearing Status/Restrictions: No weight bearing restirctions  Other Medical Equipment (for information only, NOT a DME order):  {EQUIPMENT:604968463}  Other Treatments: ***    Patient's personal belongings (please select all that are sent with patient):  James    RN SIGNATURE:  Electronically signed by Colleen Beck on 9/23/21 at 11:24 AM EDT    CASE MANAGEMENT/SOCIAL WORK SECTION    Inpatient Status Date: ***    Readmission Risk Assessment Score:  Readmission Risk              Risk of Unplanned Readmission:  25           Discharging to Facility/ Agency   · Name:   · Address:  · Phone:  · Fax:    Dialysis Facility (if applicable)   · Name:  · Address:  · Dialysis Schedule:  · Phone:  · Fax:    / signature: {Esignature:[id number]}    PHYSICIAN SECTION    Prognosis: {Prognosis:5671519275}    Condition at Discharge: [address] Patient Condition:434131107}    Rehab Potential (if transferring to Rehab): {Prognosis:1596240851}    Recommended Labs or Other Treatments After Discharge: ***    Physician Certification: I certify the above information and transfer of Ivonne De La Cruz  is necessary for the continuing treatment of the diagnosis listed and that she requires {Admit to Appropriate Level of Care:00750} for {GREATER/LESS:871886352} 30 days.      Update Admission H&P: [facility]908[facility]}    PHYSICIAN SIGNATURE:  {Esignature:[id number]}

## 2021-09-23 NOTE — PROGRESS NOTES
Attempted to call report to [facility]. x4 with no answer. Unable to leave a voicemail. Paper report sent with transport.
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
## 2021-10-20 ENCOUNTER — TELEPHONE (OUTPATIENT)
Dept: CARDIOLOGY CLINIC | Age: 76
End: 2021-10-20

## 2021-10-22 ENCOUNTER — CARE COORDINATION (OUTPATIENT)
Dept: CARE COORDINATION | Age: 76
End: 2021-10-22

## 2021-10-22 NOTE — CARE COORDINATION
Per Nikhil Email:    ACO patient was at [facility] for skilled services & on 10/18 she transferred to [facility] in [place] for transition to LTC level of care. Landon Adams did not issue [facility] & transferred her skilled but she will ultimately stay LTC & only be skilled for a short time. Austin Helms RN  Skilled Inpatient Care Coordinator  M: [phone]  E: [email or web address]

## 2021-11-04 ENCOUNTER — HOSPITAL ENCOUNTER (OUTPATIENT)
Age: 76
Setting detail: SPECIMEN
Discharge: HOME | End: 2021-11-04
Payer: MEDICARE

## 2021-11-04 PROCEDURE — 82270 OCCULT BLOOD FECES: CPT

## 2021-11-05 LAB
HEMOCCULT SP1 STL QL: NEGATIVE
REASON FOR REJECTION: NORMAL
REJECTED TEST: NORMAL

## 2021-11-09 ENCOUNTER — HOSPITAL ENCOUNTER (OUTPATIENT)
Age: 76
Setting detail: SPECIMEN
Discharge: HOME | End: 2021-11-09
Payer: MEDICARE

## 2021-11-09 LAB
ALBUMIN SERPL-MCNC: 2.9 GM/DL (ref 3.4–5)
ALP BLD-CCNC: 365 IU/L (ref 40–129)
ALT SERPL-CCNC: 56 U/L (ref 10–40)
ANION GAP SERPL CALCULATED.3IONS-SCNC: 12 MMOL/L (ref 4–16)
AST SERPL-CCNC: 61 IU/L (ref 15–37)
BILIRUB SERPL-MCNC: 0.5 MG/DL (ref 0–1)
BUN BLDV-MCNC: 5 MG/DL (ref 6–23)
CALCIUM SERPL-MCNC: 8.4 MG/DL (ref 8.3–10.6)
CHLORIDE BLD-SCNC: 98 MMOL/L (ref 99–110)
CO2: 27 MMOL/L (ref 21–32)
CREAT SERPL-MCNC: 0.5 MG/DL (ref 0.6–1.1)
GFR AFRICAN AMERICAN: >60 ML/MIN/1.73M2
GFR NON-AFRICAN AMERICAN: >60 ML/MIN/1.73M2
GLUCOSE BLD-MCNC: 152 MG/DL (ref 70–99)
HCT VFR BLD CALC: 30.1 % (ref 37–47)
HEMOGLOBIN: 9.5 GM/DL (ref 12.5–16)
MCH RBC QN AUTO: 27.2 PG (ref 27–31)
MCHC RBC AUTO-ENTMCNC: 31.6 % (ref 32–36)
MCV RBC AUTO: 86.2 FL (ref 78–100)
PDW BLD-RTO: 15.3 % (ref 11.7–14.9)
PLATELET # BLD: 192 K/CU MM (ref 140–440)
PMV BLD AUTO: 9.2 FL (ref 7.5–11.1)
POTASSIUM SERPL-SCNC: 4.2 MMOL/L (ref 3.5–5.1)
RBC # BLD: 3.49 M/CU MM (ref 4.2–5.4)
SODIUM BLD-SCNC: 137 MMOL/L (ref 135–145)
TOTAL PROTEIN: 5.1 GM/DL (ref 6.4–8.2)
WBC # BLD: 4.1 K/CU MM (ref 4–10.5)

## 2021-11-09 PROCEDURE — 85027 COMPLETE CBC AUTOMATED: CPT

## 2021-11-09 PROCEDURE — 80053 COMPREHEN METABOLIC PANEL: CPT

## 2021-11-09 PROCEDURE — 36415 COLL VENOUS BLD VENIPUNCTURE: CPT

## 2021-11-18 ENCOUNTER — HOSPITAL ENCOUNTER (OUTPATIENT)
Age: 76
Setting detail: SPECIMEN
Discharge: HOME | End: 2021-11-18
Payer: MEDICARE

## 2021-11-18 LAB
ALT SERPL-CCNC: 18 U/L (ref 10–40)
AST SERPL-CCNC: 20 IU/L (ref 15–37)
FERRITIN: 463 NG/ML (ref 15–150)
FOLATE: 15.6 NG/ML (ref 3.1–17.5)
IRON: 28 UG/DL (ref 37–145)
MAGNESIUM: 1.9 MG/DL (ref 1.8–2.4)
PCT TRANSFERRIN: 15 % (ref 10–44)
PREALBUMIN: 16 MG/DL (ref 20–40)
TOTAL IRON BINDING CAPACITY: 181 UG/DL (ref 250–450)
UNSATURATED IRON BINDING CAPACITY: 153 UG/DL (ref 110–370)
VITAMIN B-12: 506.8 PG/ML (ref 211–911)

## 2021-11-18 PROCEDURE — 82728 ASSAY OF FERRITIN: CPT

## 2021-11-18 PROCEDURE — 82746 ASSAY OF FOLIC ACID SERUM: CPT

## 2021-11-18 PROCEDURE — 83735 ASSAY OF MAGNESIUM: CPT

## 2021-11-18 PROCEDURE — 84134 ASSAY OF PREALBUMIN: CPT

## 2021-11-18 PROCEDURE — 84460 ALANINE AMINO (ALT) (SGPT): CPT

## 2021-11-18 PROCEDURE — 84450 TRANSFERASE (AST) (SGOT): CPT

## 2021-11-18 PROCEDURE — 83540 ASSAY OF IRON: CPT

## 2021-11-18 PROCEDURE — 36415 COLL VENOUS BLD VENIPUNCTURE: CPT

## 2021-11-18 PROCEDURE — 82607 VITAMIN B-12: CPT

## 2021-11-18 PROCEDURE — 83550 IRON BINDING TEST: CPT

## 2021-11-22 ENCOUNTER — HOSPITAL ENCOUNTER (OUTPATIENT)
Age: 76
Setting detail: SPECIMEN
Discharge: HOME | End: 2021-11-22
Payer: MEDICARE

## 2021-11-22 LAB
ALBUMIN SERPL-MCNC: 3 GM/DL (ref 3.4–5)
ALP BLD-CCNC: 278 IU/L (ref 40–128)
ALT SERPL-CCNC: 13 U/L (ref 10–40)
ANION GAP SERPL CALCULATED.3IONS-SCNC: 10 MMOL/L (ref 4–16)
AST SERPL-CCNC: 21 IU/L (ref 15–37)
BILIRUB SERPL-MCNC: 0.2 MG/DL (ref 0–1)
BUN BLDV-MCNC: 6 MG/DL (ref 6–23)
CALCIUM SERPL-MCNC: 8.6 MG/DL (ref 8.3–10.6)
CHLORIDE BLD-SCNC: 99 MMOL/L (ref 99–110)
CO2: 28 MMOL/L (ref 21–32)
CREAT SERPL-MCNC: 0.6 MG/DL (ref 0.6–1.1)
GFR AFRICAN AMERICAN: >60 ML/MIN/1.73M2
GFR NON-AFRICAN AMERICAN: >60 ML/MIN/1.73M2
GLUCOSE BLD-MCNC: 77 MG/DL (ref 70–99)
HCT VFR BLD CALC: 31.5 % (ref 37–47)
HEMOGLOBIN: 10 GM/DL (ref 12.5–16)
MCH RBC QN AUTO: 27.2 PG (ref 27–31)
MCHC RBC AUTO-ENTMCNC: 31.7 % (ref 32–36)
MCV RBC AUTO: 85.8 FL (ref 78–100)
PDW BLD-RTO: 15 % (ref 11.7–14.9)
PLATELET # BLD: 244 K/CU MM (ref 140–440)
PMV BLD AUTO: 8.7 FL (ref 7.5–11.1)
POTASSIUM SERPL-SCNC: 4.7 MMOL/L (ref 3.5–5.1)
RBC # BLD: 3.67 M/CU MM (ref 4.2–5.4)
SODIUM BLD-SCNC: 137 MMOL/L (ref 135–145)
TOTAL PROTEIN: 5.2 GM/DL (ref 6.4–8.2)
WBC # BLD: 4.3 K/CU MM (ref 4–10.5)

## 2021-11-22 PROCEDURE — 36415 COLL VENOUS BLD VENIPUNCTURE: CPT

## 2021-11-22 PROCEDURE — 85027 COMPLETE CBC AUTOMATED: CPT

## 2021-11-22 PROCEDURE — 80053 COMPREHEN METABOLIC PANEL: CPT

## 2022-03-18 ENCOUNTER — TELEPHONE (OUTPATIENT)
Dept: CARDIOLOGY CLINIC | Age: 77
End: 2022-03-18

## 2022-03-18 NOTE — TELEPHONE ENCOUNTER
Patient called requesting samples of Eliquis, patient was advised that samples should be ready for  by Monday afternoon, please call with any problems at ph# [phone].

## 2022-04-18 ENCOUNTER — TELEPHONE (OUTPATIENT)
Dept: CARDIOLOGY CLINIC | Age: 77
End: 2022-04-18

## 2022-04-19 ENCOUNTER — OFFICE VISIT (OUTPATIENT)
Dept: CARDIOLOGY CLINIC | Age: 77
End: 2022-04-19
Payer: MEDICARE

## 2022-04-19 VITALS — DIASTOLIC BLOOD PRESSURE: 58 MMHG | SYSTOLIC BLOOD PRESSURE: 104 MMHG | HEART RATE: 60 BPM

## 2022-04-19 DIAGNOSIS — I48.0 PAROXYSMAL ATRIAL FIBRILLATION (HCC): Primary | ICD-10-CM

## 2022-04-19 DIAGNOSIS — I38 VHD (VALVULAR HEART DISEASE): ICD-10-CM

## 2022-04-19 DIAGNOSIS — I25.10 CORONARY ARTERY DISEASE INVOLVING NATIVE CORONARY ARTERY OF NATIVE HEART WITHOUT ANGINA PECTORIS: ICD-10-CM

## 2022-04-19 DIAGNOSIS — I10 ESSENTIAL HYPERTENSION: ICD-10-CM

## 2022-04-19 PROCEDURE — G8399 PT W/DXA RESULTS DOCUMENT: HCPCS | Performed by: NURSE PRACTITIONER

## 2022-04-19 PROCEDURE — 1036F TOBACCO NON-USER: CPT | Performed by: NURSE PRACTITIONER

## 2022-04-19 PROCEDURE — 99214 OFFICE O/P EST MOD 30 MIN: CPT | Performed by: NURSE PRACTITIONER

## 2022-04-19 PROCEDURE — G8417 CALC BMI ABV UP PARAM F/U: HCPCS | Performed by: NURSE PRACTITIONER

## 2022-04-19 PROCEDURE — 1090F PRES/ABSN URINE INCON ASSESS: CPT | Performed by: NURSE PRACTITIONER

## 2022-04-19 PROCEDURE — G8427 DOCREV CUR MEDS BY ELIG CLIN: HCPCS | Performed by: NURSE PRACTITIONER

## 2022-04-19 PROCEDURE — 4040F PNEUMOC VAC/ADMIN/RCVD: CPT | Performed by: NURSE PRACTITIONER

## 2022-04-19 PROCEDURE — 1123F ACP DISCUSS/DSCN MKR DOCD: CPT | Performed by: NURSE PRACTITIONER

## 2022-04-19 RX ORDER — METOPROLOL TARTRATE 50 MG/1
25 TABLET, FILM COATED ORAL 2 TIMES DAILY
Qty: 180 TABLET | Refills: 1 | Status: SHIPPED | OUTPATIENT
Start: 2022-04-19

## 2022-04-19 RX ORDER — ROSUVASTATIN CALCIUM 40 MG/1
40 TABLET, COATED ORAL EVERY EVENING
Qty: 90 TABLET | Refills: 3 | Status: SHIPPED | OUTPATIENT
Start: 2022-04-19

## 2022-04-19 ASSESSMENT — ENCOUNTER SYMPTOMS
HEMATOCHEZIA: 0
HEMATEMESIS: 0
COUGH: 0
SHORTNESS OF BREATH: 0

## 2022-04-19 NOTE — PROGRESS NOTES
4/19/2022  Primary cardiologist: Dr. Sabra Lancaster  is an established 76 y.o.  female here for a 6 month follow up on CAD- HTN VHD MVR PPM atrial fib       SUBJECTIVE/OBJECTIVE:      HPI : Remy Gunn is a 75-year-old female patient with a history of CAD s/p CABG x 3 , hypertension, hyperlipidemia, valvular heart disease, MVRepair, IP bleed s/p fall, paroxysmal atrial fibrillation h/o MAZE, sinus node dysfunction s/p dual chamber PPM and diabetes mellitus. Remy Gunn reports she is feeling well. She denies chest pain or shortness of breath. She denies palpitations. There have been no recent falls. She denies bleeding from bowel or bladder. She is with her niece today. Unfortunately she does not recall all of her medications. Review of Systems   Cardiovascular: Negative for chest pain and dyspnea on exertion. Respiratory: Negative for cough and shortness of breath. Gastrointestinal: Negative for hematemesis and hematochezia. Genitourinary: Negative for hematuria. Neurological: Negative for dizziness and loss of balance. Vitals:    04/19/22 1437   BP: (!) 104/58   Pulse: 60     No flowsheet data found. Wt Readings from Last 3 Encounters:   09/21/21 171 lb 1.6 oz (77.6 kg)   08/17/21 164 lb (74.4 kg)   06/06/21 172 lb (78 kg)     There is no height or weight on file to calculate BMI. Physical Exam  HENT:      Head: Normocephalic and atraumatic. Eyes:      Extraocular Movements: Extraocular movements intact. Neck:      Vascular: No carotid bruit. Cardiovascular:      Rate and Rhythm: Normal rate and regular rhythm. Pulses: Normal pulses. Heart sounds: Normal heart sounds. Pulmonary:      Effort: Pulmonary effort is normal. No respiratory distress. Breath sounds: Normal breath sounds. No wheezing. Abdominal:      General: There is no distension. Tenderness: There is no abdominal tenderness. Musculoskeletal:      Right lower leg: No edema.       Left lower leg: No edema.   Skin:     General: Skin is warm and dry. Capillary Refill: Capillary refill takes less than 2 seconds. Neurological:      General: No focal deficit present. Mental Status: She is alert. Current Outpatient Medications   Medication Sig Dispense Refill    apixaban (ELIQUIS) 5 MG TABS tablet Take 1 tablet by mouth 2 times daily 42 tablet 0    acetaminophen (APAP EXTRA STRENGTH) 500 MG tablet Take 1 tablet by mouth every 6 hours as needed for Pain 20 tablet 0    metoprolol tartrate (LOPRESSOR) 50 MG tablet Take 0.5 tablets by mouth 2 times daily 180 tablet 1    furosemide (LASIX) 20 MG tablet Take 1 tablet by mouth daily 30 tablet 0    docusate sodium (COLACE, DULCOLAX) 100 MG CAPS Take 100 mg by mouth 2 times daily      citalopram (CELEXA) 40 MG tablet TAKE 1 TABLET EVERY DAY FOR ANXIETY AND STRESS  5    CALCIUM CARBONATE PO Take 1,000 mg by mouth daily       rosuvastatin (CRESTOR) 40 MG tablet Take 40 mg by mouth every evening      bisacodyl (DULCOLAX) 5 MG EC tablet Take 5 mg by mouth as needed      oxybutynin (DITROPAN) 5 MG tablet Take 1 tablet by mouth daily      oxyCODONE (ROXICODONE) 5 MG immediate release tablet Take 5 mg by mouth 2 times daily as needed.  polyethylene glycol (GLYCOLAX) 17 GM/SCOOP powder Take 17 g by mouth daily      potassium chloride (KLOR-CON) 20 MEQ packet Take 20 mEq by mouth daily      senna (SENOKOT) 8.6 MG tablet Take 1 tablet by mouth daily      gabapentin (NEURONTIN) 600 MG tablet Take 600 mg by mouth 3 times daily. (Patient not taking: Reported on 4/19/2022)      pregabalin (LYRICA) 150 MG capsule 150 mg 2 times daily.        ipratropium (ATROVENT) 0.03 % nasal spray       methylcellulose (CITRUCEL) 500 MG TABS Take 2,000 mg by mouth daily prn      omeprazole (PRILOSEC) 40 MG delayed release capsule Take 40 mg by mouth daily      melatonin 3 MG TABS tablet Take 3 mg by mouth daily      estradiol (ESTRACE) 0.1 MG/GM vaginal cream Place 2 g vaginally 3 times daily as needed       aspirin 81 MG EC tablet Take 1 tablet by mouth daily (Patient not taking: Reported on 8/17/2021) 30 tablet 3    metFORMIN (GLUCOPHAGE) 500 MG tablet Take 500 mg by mouth 2 times daily (with meals)      donepezil (ARICEPT) 5 MG tablet Take 10 mg by mouth daily   2    vitamin D3 (CHOLECALCIFEROL) 25 MCG (1000 UT) TABS tablet Take 1 tablet by mouth daily        No current facility-administered medications for this visit. All pertinent data reviewed and discussed with patient       ASSESSMENT/PLAN:    1. Paroxysmal atrial fibrillation (HCC)  H/o Maze procedure  In RRR- no atrial fib noted on PPM report  Denies falls or bleeding: recommend to continue eliquis for stroke prevention      2. VHD (valvular heart disease)  H/o MV repair with a #28 CG ring  Echo reviewed from 03/13/2020: Stable valve-Recommend ongoing surveillance    3. Coronary artery disease involving native coronary artery of native heart without angina pectoris  History of coronary artery bypass grafting x3 with LIMA to the LAD and SVG to circumflex M1 and M2:   Clinically stable without anginal symptoms   Unfortunately she is unaware of all of her medications: Recommend she maintain aspirin 81 mg :metoprolol 25 mg twice daily and rosuvastatin 40 mg daily    4. Essential hypertension  Blood pressure stable: Again unfortunate she is unable to recall medications and no list with her today. Family is to phone in or bring in list of medications so than are medication list may be updated appropriately      5. PPM   H/o Med[employer]onic dual-chamber permanent pacemaker  On every 3-month remote monitoring  Stays analysis reveals stable battery status.   Recommend to continue every 3-month remote monitoring      History of PFO closure      Medications reviewed and confirmed with patient     Tests ordered:  none      Follow-up  6 months     Signed:  BIRD Singleton CNP, 4/19/2022, 2:55 PM    An electronic signature was used to authenticate this note. Please note this report has been partially produced using speech recognition software and may contain errors related to that system including errors in grammar, punctuation, and spelling, as well as words and phrases that may be inappropriate. If there are any questions or concerns please feel free to contact the dictating provider for clarification.

## 2022-04-19 NOTE — PATIENT INSTRUCTIONS
Please be informed that if you contact our office outside of normal business hours the physician on call cannot help with any scheduling or rescheduling issues, procedure instruction questions or any type of medication issue. We advise you for any urgent/emergency that you go to the nearest emergency room! PLEASE CALL OUR OFFICE DURING NORMAL BUSINESS HOURS    Monday - Friday   8 am to 5 pm    [place] [address]: [phone]    [place]:  [phone]  **It is YOUR responsibilty to bring medication bottles and/or updated medication list to [address] [place] [address]. This will allow us to better serve you and all your healthcare needs**  [facility] Laboratory Locations - No appointment necessary. Sites open Monday to Friday. Call your preferred location for test preparation, business hours and other information you need. [employer] accepts [employer]. [facility] [address], [address] [facility]  Phone: [phone] Deborah lombardo [facility] [address] [facility]  [address].   Deborah lombardo, [address] [facility]  Phone: [phone]

## 2022-04-20 ENCOUNTER — TELEPHONE (OUTPATIENT)
Dept: CARDIOLOGY CLINIC | Age: 77
End: 2022-04-20

## 2022-04-29 ENCOUNTER — TELEPHONE (OUTPATIENT)
Dept: CARDIOLOGY CLINIC | Age: 77
End: 2022-04-29

## 2022-05-09 ENCOUNTER — TELEPHONE (OUTPATIENT)
Dept: CARDIOLOGY CLINIC | Age: 77
End: 2022-05-09

## 2022-06-01 ENCOUNTER — TELEPHONE (OUTPATIENT)
Dept: CARDIOLOGY CLINIC | Age: 77
End: 2022-06-01

## 2022-06-21 ENCOUNTER — TELEPHONE (OUTPATIENT)
Dept: CARDIOLOGY CLINIC | Age: 77
End: 2022-06-21

## 2022-07-08 ENCOUNTER — HOSPITAL ENCOUNTER (INPATIENT)
Age: 77
LOS: 4 days | Discharge: HOME HEALTH | DRG: 092 | End: 2022-07-12
Attending: STUDENT IN AN ORGANIZED HEALTH CARE EDUCATION/TRAINING PROGRAM | Admitting: INTERNAL MEDICINE
Payer: MEDICARE

## 2022-07-08 ENCOUNTER — APPOINTMENT (OUTPATIENT)
Dept: GENERAL RADIOLOGY | Age: 77
DRG: 092 | End: 2022-07-08
Attending: STUDENT IN AN ORGANIZED HEALTH CARE EDUCATION/TRAINING PROGRAM
Payer: MEDICARE

## 2022-07-08 ENCOUNTER — APPOINTMENT (OUTPATIENT)
Dept: ULTRASOUND IMAGING | Age: 77
DRG: 092 | End: 2022-07-08
Attending: STUDENT IN AN ORGANIZED HEALTH CARE EDUCATION/TRAINING PROGRAM
Payer: MEDICARE

## 2022-07-08 PROBLEM — R41.82 ALTERED MENTAL STATUS: Status: ACTIVE | Noted: 2022-07-08

## 2022-07-08 PROBLEM — R41.82 AMS (ALTERED MENTAL STATUS): Status: ACTIVE | Noted: 2022-07-08

## 2022-07-08 LAB
ALBUMIN SERPL-MCNC: 4.2 GM/DL (ref 3.4–5)
ALBUMIN SERPL-MCNC: 4.2 GM/DL (ref 3.4–5)
ALP BLD-CCNC: 108 IU/L (ref 40–128)
ALP BLD-CCNC: 108 IU/L (ref 40–129)
ALT SERPL-CCNC: 7 U/L (ref 10–40)
ALT SERPL-CCNC: 7 U/L (ref 10–40)
ANION GAP SERPL CALCULATED.3IONS-SCNC: 12 MMOL/L (ref 4–16)
AST SERPL-CCNC: 14 IU/L (ref 15–37)
AST SERPL-CCNC: 14 IU/L (ref 15–37)
BASOPHILS ABSOLUTE: 0 K/CU MM
BASOPHILS RELATIVE PERCENT: 0.8 % (ref 0–1)
BILIRUB SERPL-MCNC: 0.5 MG/DL (ref 0–1)
BILIRUB SERPL-MCNC: 0.5 MG/DL (ref 0–1)
BILIRUBIN DIRECT: 0.2 MG/DL (ref 0–0.3)
BILIRUBIN, INDIRECT: 0.3 MG/DL (ref 0–0.7)
BUN BLDV-MCNC: 10 MG/DL (ref 6–23)
CALCIUM SERPL-MCNC: 9.1 MG/DL (ref 8.3–10.6)
CHLORIDE BLD-SCNC: 99 MMOL/L (ref 99–110)
CO2: 22 MMOL/L (ref 21–32)
CREAT SERPL-MCNC: 0.7 MG/DL (ref 0.6–1.1)
D DIMER: <200 NG/ML(DDU)
DIFFERENTIAL TYPE: ABNORMAL
EOSINOPHILS ABSOLUTE: 0.1 K/CU MM
EOSINOPHILS RELATIVE PERCENT: 1.4 % (ref 0–3)
ESTIMATED AVERAGE GLUCOSE: 108 MG/DL
GFR AFRICAN AMERICAN: >60 ML/MIN/1.73M2
GFR NON-AFRICAN AMERICAN: >60 ML/MIN/1.73M2
GLUCOSE BLD-MCNC: 111 MG/DL (ref 70–99)
GLUCOSE BLD-MCNC: 171 MG/DL (ref 70–99)
GLUCOSE BLD-MCNC: 96 MG/DL (ref 70–99)
HBA1C MFR BLD: 5.4 % (ref 4.2–6.3)
HCT VFR BLD CALC: 35.8 % (ref 37–47)
HEMOGLOBIN: 11.6 GM/DL (ref 12.5–16)
HIGH SENSITIVE C-REACTIVE PROTEIN: 1.5 MG/L
IMMATURE NEUTROPHIL %: 0 % (ref 0–0.43)
INR BLD: 1.13 INDEX
LIPASE: 24 IU/L (ref 13–60)
LV EF: 53 %
LVEF MODALITY: NORMAL
LYMPHOCYTES ABSOLUTE: 1 K/CU MM
LYMPHOCYTES RELATIVE PERCENT: 26.8 % (ref 24–44)
MCH RBC QN AUTO: 27 PG (ref 27–31)
MCHC RBC AUTO-ENTMCNC: 32.4 % (ref 32–36)
MCV RBC AUTO: 83.3 FL (ref 78–100)
MONOCYTES ABSOLUTE: 0.2 K/CU MM
MONOCYTES RELATIVE PERCENT: 6.5 % (ref 0–4)
NUCLEATED RBC %: 0 %
PDW BLD-RTO: 13.1 % (ref 11.7–14.9)
PLATELET # BLD: 178 K/CU MM (ref 140–440)
PMV BLD AUTO: 9.1 FL (ref 7.5–11.1)
POTASSIUM SERPL-SCNC: 4 MMOL/L (ref 3.5–5.1)
PRO-BNP: 1972 PG/ML
PROTHROMBIN TIME: 14.6 SECONDS (ref 11.7–14.5)
RBC # BLD: 4.3 M/CU MM (ref 4.2–5.4)
SEGMENTED NEUTROPHILS ABSOLUTE COUNT: 2.4 K/CU MM
SEGMENTED NEUTROPHILS RELATIVE PERCENT: 64.5 % (ref 36–66)
SODIUM BLD-SCNC: 133 MMOL/L (ref 135–145)
T4 FREE: 1.37 NG/DL (ref 0.9–1.8)
TOTAL IMMATURE NEUTOROPHIL: 0 K/CU MM
TOTAL NUCLEATED RBC: 0 K/CU MM
TOTAL PROTEIN: 6.1 GM/DL (ref 6.4–8.2)
TOTAL PROTEIN: 6.1 GM/DL (ref 6.4–8.2)
TROPONIN T: <0.01 NG/ML
TSH HIGH SENSITIVITY: 1.22 UIU/ML (ref 0.27–4.2)
WBC # BLD: 3.7 K/CU MM (ref 4–10.5)

## 2022-07-08 PROCEDURE — 1200000000 HC SEMI PRIVATE

## 2022-07-08 PROCEDURE — 92610 EVALUATE SWALLOWING FUNCTION: CPT

## 2022-07-08 PROCEDURE — 85610 PROTHROMBIN TIME: CPT

## 2022-07-08 PROCEDURE — 6370000000 HC RX 637 (ALT 250 FOR IP): Performed by: NURSE PRACTITIONER

## 2022-07-08 PROCEDURE — 82805 BLOOD GASES W/O2 SATURATION: CPT

## 2022-07-08 PROCEDURE — 84484 ASSAY OF TROPONIN QUANT: CPT

## 2022-07-08 PROCEDURE — 83036 HEMOGLOBIN GLYCOSYLATED A1C: CPT

## 2022-07-08 PROCEDURE — 80053 COMPREHEN METABOLIC PANEL: CPT

## 2022-07-08 PROCEDURE — 85379 FIBRIN DEGRADATION QUANT: CPT

## 2022-07-08 PROCEDURE — 82962 GLUCOSE BLOOD TEST: CPT

## 2022-07-08 PROCEDURE — 93306 TTE W/DOPPLER COMPLETE: CPT

## 2022-07-08 PROCEDURE — 83690 ASSAY OF LIPASE: CPT

## 2022-07-08 PROCEDURE — 86141 C-REACTIVE PROTEIN HS: CPT

## 2022-07-08 PROCEDURE — 36415 COLL VENOUS BLD VENIPUNCTURE: CPT

## 2022-07-08 PROCEDURE — 85025 COMPLETE CBC W/AUTO DIFF WBC: CPT

## 2022-07-08 PROCEDURE — 84443 ASSAY THYROID STIM HORMONE: CPT

## 2022-07-08 PROCEDURE — 84439 ASSAY OF FREE THYROXINE: CPT

## 2022-07-08 PROCEDURE — 93880 EXTRACRANIAL BILAT STUDY: CPT

## 2022-07-08 PROCEDURE — 71046 X-RAY EXAM CHEST 2 VIEWS: CPT

## 2022-07-08 PROCEDURE — 80076 HEPATIC FUNCTION PANEL: CPT

## 2022-07-08 PROCEDURE — 83880 ASSAY OF NATRIURETIC PEPTIDE: CPT

## 2022-07-08 RX ORDER — ENOXAPARIN SODIUM 100 MG/ML
40 INJECTION SUBCUTANEOUS DAILY
Status: DISCONTINUED | OUTPATIENT
Start: 2022-07-08 | End: 2022-07-08 | Stop reason: SDUPTHER

## 2022-07-08 RX ORDER — INSULIN LISPRO 100 [IU]/ML
0-3 INJECTION, SOLUTION INTRAVENOUS; SUBCUTANEOUS NIGHTLY
Status: DISCONTINUED | OUTPATIENT
Start: 2022-07-08 | End: 2022-07-12 | Stop reason: HOSPADM

## 2022-07-08 RX ORDER — ENOXAPARIN SODIUM 100 MG/ML
40 INJECTION SUBCUTANEOUS DAILY
Status: DISCONTINUED | OUTPATIENT
Start: 2022-07-08 | End: 2022-07-08

## 2022-07-08 RX ORDER — DONEPEZIL HYDROCHLORIDE 10 MG/1
10 TABLET, FILM COATED ORAL DAILY
Status: DISCONTINUED | OUTPATIENT
Start: 2022-07-08 | End: 2022-07-12 | Stop reason: HOSPADM

## 2022-07-08 RX ORDER — ACETAMINOPHEN 500 MG
1000 TABLET ORAL EVERY 8 HOURS
Status: DISCONTINUED | OUTPATIENT
Start: 2022-07-08 | End: 2022-07-12 | Stop reason: HOSPADM

## 2022-07-08 RX ORDER — ONDANSETRON 2 MG/ML
4 INJECTION INTRAMUSCULAR; INTRAVENOUS EVERY 6 HOURS PRN
Status: DISCONTINUED | OUTPATIENT
Start: 2022-07-08 | End: 2022-07-12 | Stop reason: HOSPADM

## 2022-07-08 RX ORDER — DEXTROSE MONOHYDRATE 50 MG/ML
100 INJECTION, SOLUTION INTRAVENOUS PRN
Status: DISCONTINUED | OUTPATIENT
Start: 2022-07-08 | End: 2022-07-12 | Stop reason: HOSPADM

## 2022-07-08 RX ORDER — OXYBUTYNIN CHLORIDE 5 MG/1
5 TABLET ORAL 3 TIMES DAILY
Status: DISCONTINUED | OUTPATIENT
Start: 2022-07-08 | End: 2022-07-12 | Stop reason: HOSPADM

## 2022-07-08 RX ORDER — PANTOPRAZOLE SODIUM 40 MG/1
40 TABLET, DELAYED RELEASE ORAL
Status: DISCONTINUED | OUTPATIENT
Start: 2022-07-09 | End: 2022-07-12 | Stop reason: HOSPADM

## 2022-07-08 RX ORDER — ACETAMINOPHEN 325 MG/1
650 TABLET ORAL EVERY 6 HOURS PRN
Status: DISCONTINUED | OUTPATIENT
Start: 2022-07-08 | End: 2022-07-12 | Stop reason: HOSPADM

## 2022-07-08 RX ORDER — ASPIRIN 81 MG/1
81 TABLET ORAL DAILY
Status: DISCONTINUED | OUTPATIENT
Start: 2022-07-08 | End: 2022-07-12 | Stop reason: HOSPADM

## 2022-07-08 RX ORDER — CITALOPRAM 40 MG/1
40 TABLET ORAL DAILY
Status: DISCONTINUED | OUTPATIENT
Start: 2022-07-08 | End: 2022-07-12 | Stop reason: HOSPADM

## 2022-07-08 RX ORDER — ONDANSETRON 4 MG/1
4 TABLET, ORALLY DISINTEGRATING ORAL EVERY 8 HOURS PRN
Status: DISCONTINUED | OUTPATIENT
Start: 2022-07-08 | End: 2022-07-12 | Stop reason: HOSPADM

## 2022-07-08 RX ORDER — SODIUM CHLORIDE 0.9 % (FLUSH) 0.9 %
5-40 SYRINGE (ML) INJECTION EVERY 12 HOURS SCHEDULED
Status: DISCONTINUED | OUTPATIENT
Start: 2022-07-08 | End: 2022-07-12 | Stop reason: HOSPADM

## 2022-07-08 RX ORDER — SODIUM CHLORIDE 9 MG/ML
25 INJECTION, SOLUTION INTRAVENOUS PRN
Status: DISCONTINUED | OUTPATIENT
Start: 2022-07-08 | End: 2022-07-12 | Stop reason: HOSPADM

## 2022-07-08 RX ORDER — SODIUM CHLORIDE 0.9 % (FLUSH) 0.9 %
5-40 SYRINGE (ML) INJECTION PRN
Status: DISCONTINUED | OUTPATIENT
Start: 2022-07-08 | End: 2022-07-12 | Stop reason: HOSPADM

## 2022-07-08 RX ORDER — ERYTHROMYCIN 5 MG/G
OINTMENT OPHTHALMIC NIGHTLY
Status: DISCONTINUED | OUTPATIENT
Start: 2022-07-08 | End: 2022-07-12 | Stop reason: HOSPADM

## 2022-07-08 RX ORDER — VITAMIN B COMPLEX
1000 TABLET ORAL DAILY
Status: DISCONTINUED | OUTPATIENT
Start: 2022-07-08 | End: 2022-07-12 | Stop reason: HOSPADM

## 2022-07-08 RX ORDER — ACETAMINOPHEN 650 MG/1
650 SUPPOSITORY RECTAL EVERY 6 HOURS PRN
Status: DISCONTINUED | OUTPATIENT
Start: 2022-07-08 | End: 2022-07-12 | Stop reason: HOSPADM

## 2022-07-08 RX ORDER — INSULIN LISPRO 100 [IU]/ML
0-6 INJECTION, SOLUTION INTRAVENOUS; SUBCUTANEOUS
Status: DISCONTINUED | OUTPATIENT
Start: 2022-07-08 | End: 2022-07-12 | Stop reason: HOSPADM

## 2022-07-08 RX ORDER — GABAPENTIN 300 MG/1
600 CAPSULE ORAL 2 TIMES DAILY
Status: DISCONTINUED | OUTPATIENT
Start: 2022-07-08 | End: 2022-07-12 | Stop reason: HOSPADM

## 2022-07-08 RX ORDER — SENNA PLUS 8.6 MG/1
1 TABLET ORAL NIGHTLY
Status: DISCONTINUED | OUTPATIENT
Start: 2022-07-08 | End: 2022-07-12 | Stop reason: HOSPADM

## 2022-07-08 RX ORDER — ROSUVASTATIN CALCIUM 20 MG/1
40 TABLET, COATED ORAL EVERY EVENING
Status: DISCONTINUED | OUTPATIENT
Start: 2022-07-08 | End: 2022-07-12 | Stop reason: HOSPADM

## 2022-07-08 RX ADMIN — CITALOPRAM 40 MG: 40 TABLET, FILM COATED ORAL at 14:46

## 2022-07-08 RX ADMIN — APIXABAN 5 MG: 5 TABLET, FILM COATED ORAL at 17:02

## 2022-07-08 RX ADMIN — DONEPEZIL HYDROCHLORIDE 10 MG: 10 TABLET ORAL at 14:46

## 2022-07-08 RX ADMIN — ASPIRIN 81 MG: 81 TABLET, COATED ORAL at 14:46

## 2022-07-08 RX ADMIN — OXYBUTYNIN CHLORIDE 5 MG: 5 TABLET ORAL at 20:35

## 2022-07-08 RX ADMIN — Medication 1000 UNITS: at 14:47

## 2022-07-08 RX ADMIN — SENNOSIDES 8.6 MG: 8.6 TABLET, FILM COATED ORAL at 20:35

## 2022-07-08 RX ADMIN — ROSUVASTATIN CALCIUM 40 MG: 20 TABLET, COATED ORAL at 17:02

## 2022-07-08 RX ADMIN — METOPROLOL TARTRATE 25 MG: 25 TABLET, FILM COATED ORAL at 16:50

## 2022-07-08 RX ADMIN — ERYTHROMYCIN: 5 OINTMENT OPHTHALMIC at 20:37

## 2022-07-08 RX ADMIN — INSULIN LISPRO 1 UNITS: 100 INJECTION, SOLUTION INTRAVENOUS; SUBCUTANEOUS at 20:39

## 2022-07-08 RX ADMIN — ACETAMINOPHEN 1000 MG: 500 TABLET ORAL at 14:46

## 2022-07-08 RX ADMIN — OXYBUTYNIN CHLORIDE 5 MG: 5 TABLET ORAL at 14:46

## 2022-07-08 RX ADMIN — ACETAMINOPHEN 1000 MG: 500 TABLET ORAL at 20:35

## 2022-07-08 RX ADMIN — GABAPENTIN 600 MG: 300 CAPSULE ORAL at 16:57

## 2022-07-08 ASSESSMENT — ENCOUNTER SYMPTOMS
DIARRHEA: 0
VOMITING: 0
EYE ITCHING: 0
COUGH: 0
EYE REDNESS: 1
SHORTNESS OF BREATH: 0
NAUSEA: 0
PHOTOPHOBIA: 0

## 2022-07-08 ASSESSMENT — PAIN SCALES - GENERAL
PAINLEVEL_OUTOF10: 1
PAINLEVEL_OUTOF10: 0

## 2022-07-08 ASSESSMENT — VISUAL ACUITY: OU: 1

## 2022-07-08 ASSESSMENT — PAIN DESCRIPTION - LOCATION: LOCATION: GENERALIZED

## 2022-07-08 ASSESSMENT — PAIN - FUNCTIONAL ASSESSMENT: PAIN_FUNCTIONAL_ASSESSMENT: ACTIVITIES ARE NOT PREVENTED

## 2022-07-08 NOTE — PROGRESS NOTES
Speech Language Pathology  Facility/Department: [address] [place] [address]   CLINICAL BEDSIDE SWALLOW EVALUATION    NAME: Flower Sandoval  : 1945  MRN: [id number]    ADMISSION DATE: 2022  ADMITTING DIAGNOSIS: has Paroxysmal atrial fibrillation ([address] Essential hypertension; Mixed hyperlipidemia; VHD (valvular heart disease); Abnormal EKG; Angina pectoris ([address] Uncontrolled hypertension; CHF following cardiac surgery, postop; Acute blood loss anemia; Uncontrolled pain; Gait disturbance; Pneumonia due to infectious organism; SSS (sick sinus syndrome) ([address] S/P placement of cardiac pacemaker; Tachycardia-bradycardia ([address] Fall at home, subsequent encounter; Acute intracranial hemorrhage ([address] Hyponatremia; Compression fracture of L1 lumbar vertebra ([address] Intraparenchymal hematoma of brain ([address] COVID-19; CAD (coronary artery disease); AMS (altered mental status); and Altered mental status on their problem list.      Impressions: Flower Sandoval was seen for a bedside swallowing evaluation after being admitted to [address] [place] [address] with AMS. Pt was alert and cooperative throughout assessment. Relevant medical hx includes COVID-19, anxiety, hypertension, TIA and pneumonia. Pt's sister at bedside denies pt having any hx of dysphagia, however does report frequent coughing d/t mucous management. Pt was positioned upright in bed and presented with a clear vocal quality and strong volitional cough. Oral mechanism examination was [facility] with no focal orofacial weakness. Noted thick secretions in oral cavity prior to PO intake. PO trials of puree, soft/regular solids and thin liquids by spoon/cup/straw sips were given. Prolonged mastication with adequate oral clearance was observed with regular solids trials. Suspect mild pharyngeal dysphagia characterized by delayed swallow initiation with adequate laryngeal elevation. Clear vocal quality and 0 overt s/s of aspiration were observed with all PO trials.   Question possible esophageal dysphagia d/t frequent belching with all PO intake. Recommend soft and bite sized solids/thin liquids with aspiration precautions. SLP will continue to follow Denisse Stinson for diet tolerance monitoring. ONSET DATE: this admission     Date of Eval: 7/8/2022  Evaluating Therapist: CRISSY Banks    Current Diet level:  Current Diet : NPO      Primary Complaint  Patient Complaint: weakness    Pain:  Pain Assessment  Pain Assessment: None - Denies Pain    Reason for Referral  Denisse Stinson was referred for a bedside swallow evaluation to assess the efficiency of her swallow function, identify signs and symptoms of aspiration and make recommendations regarding safe dietary consistencies, effective compensatory strategies, and safe eating environment. Impression  Dysphagia Diagnosis: Mild oral stage dysphagia;Mild pharyngeal stage dysphagia  Dysphagia Outcome Severity Scale: Level 5: Mild dysphagia- Distant supervision. May need one diet consistency restricted     Treatment Plan  Requires SLP Intervention: Yes  Duration of Treatment: 1-2xs weekly  D/C Recommendations: To be determined       Recommended Diet and Intervention        Recommended Form of Meds: PO     Therapeutic Interventions: Diet tolerance monitoring; Therapeutic PO trials with SLP    Compensatory Swallowing Strategies  Compensatory Swallowing Strategies : Upright as possible for all oral intake;Eat/Feed slowly; Remain upright for 30-45 minutes after meals    Treatment/Goals  Short-term Goals  Timeframe for Short-term Goals: LOS or until goals are met  Goal 1: Pt will tolerate soft and bite sized solids/thin liquids without clinical evidence of aspiration 100%  Goal 2: Pt/caregivers will demonstrate comprehension of recommendations/POC    General  Chart Reviewed: Yes  Behavior/Cognition: Alert; Cooperative;Pleasant mood  Respiratory Status: Room air  O2 Device: None (Room air)  Communication Observation: Functional  Follows Directions: Simple  Dentition: Adequate  Patient Positioning: Upright in bed  Baseline Vocal Quality: Normal  Volitional Cough: Strong  Prior Dysphagia History: Pt denies hx of dsyphagia           Vision/Hearing  Vision  Vision: Within Functional Limits  Hearing  Hearing: Within functional limits    Oral Motor Deficits       Oral Phase Dysfunction  Oral Phase  Oral Phase: Exceptions     Indicators of Pharyngeal Phase Dysfunction        Prognosis  Individuals consulted  Consulted and agree with results and recommendations: Patient; Family member  Family member consulted: sister at bedside    Education  Patient Education: recommendations/POC  Patient Education Response: Verbalizes understanding             Therapy Time  SLP Individual Minutes  Time In: 3728  Time Out: 7326  Minutes: [address], [address], Elsi Wells, 7/8/2022

## 2022-07-08 NOTE — H&P
V2.0  History and Physical      Name:  Gosia Brownlee /Age/Sex: 1945  (76 y.o. female)   MRN & CSN:  [id number] & [id number] Encounter Date/Time: 2022 12:11 PM EDT   Location:  0255/0710-X PCP: Sarah Mcguire MD       Hospital Day: 1    Assessment and Plan:   Gosia Brownlee is a 76 y.o. female with a pmh as noted below presents with reported altered mental status    #Altered mental status  #Delirium              -Suspect possibly  neuro related. . CT head at [facility] ED negative for acute intercranial abnormality. Neurochecks. Tele monitoring.                -Suspect 2/2 toxins/medications/polypharmacy/medication side effects. Hold sedating and hypnotic medications. Hold narcotic medications. Consider benzos, alcohol, narcotics, sleep aids as causes. Neurochecks. Hold home oxyccodone              -Check VBG, urine drug screen, UA, urine culture               -Treatment as noted above. Fall precautions              -Consult neurology for evaluation, MRI pending   -Check carotid duplex              -Reduce home gabapentin back to 600 mg. Dose recently increased  by PCP possibly etiology of AMS     #Dysphagia                           Tele monitoring              Consult speech for swallow eval   Check chest xray              Neuro checks              -Keep n.p.o. with strict bedrest, neurochecks, and strict fall precautions due to altered mental status                    -Hold all home sedating medications including baclofen, oxycodone, Risperdal,   Advance diet as tolerated  #Paroxysmal atrial fibrillation  #Essential Hypertension              -Continue home Eliquis, lopressor     #Diabetes mellitus              -Hold home Metformin, insulin sliding scale    #Blepharitis   PERRLA, visual acuity intact. Remote history of cataract surgery left eye.  Start Warm compresses, eyrethrmycin ointment   Continue home medications once otherwise noted above for chronic medical conditions including but not potassium chloride (KLOR-CON) 20 MEQ packet Take 20 mEq by mouth daily    Historical Provider, MD   senna (SENOKOT) 8.6 MG tablet Take 1 tablet by mouth daily    Historical Provider, MD   acetaminophen (APAP EXTRA STRENGTH) 500 MG tablet Take 1 tablet by mouth every 6 hours as needed for Pain 12/1/20   Otto Lu PA-C   gabapentin (NEURONTIN) 600 MG tablet Take 600 mg by mouth 3 times daily. Historical Provider, MD   pregabalin (LYRICA) 150 MG capsule 150 mg 2 times daily.    Patient not taking: Reported on 7/8/2022 10/30/19   Historical Provider, MD   ipratropium (ATROVENT) 0.03 % nasal spray  9/27/19   Historical Provider, MD   methylcellulose (CITRUCEL) 500 MG TABS Take 2,000 mg by mouth daily prn    Historical Provider, MD   omeprazole (PRILOSEC) 40 MG delayed release capsule Take 40 mg by mouth daily    Historical Provider, MD   melatonin 3 MG TABS tablet Take 3 mg by mouth daily    Historical Provider, MD   estradiol (ESTRACE) 0.1 MG/GM vaginal cream Place 2 g vaginally 3 times daily as needed     Historical Provider, MD   aspirin 81 MG EC tablet Take 1 tablet by mouth daily  Patient not taking: Reported on 8/17/2021 7/21/18   Lillian Monet MD   furosemide (LASIX) 20 MG tablet Take 1 tablet by mouth daily 7/21/18   C Sidney Ocasio MD   docusate sodium (COLACE, DULCOLAX) 100 MG CAPS Take 100 mg by mouth 2 times daily 7/20/18   TRISTON Ocasio MD   metFORMIN (GLUCOPHAGE) 500 MG tablet Take 500 mg by mouth 2 times daily (with meals)    Historical Provider, MD   donepezil (ARICEPT) 5 MG tablet Take 10 mg by mouth daily  5/28/18   Historical Provider, MD   citalopram (CELEXA) 40 MG tablet TAKE 1 TABLET EVERY DAY FOR ANXIETY AND STRESS 5/28/18   Historical Provider, MD   vitamin D3 (CHOLECALCIFEROL) 25 MCG (1000 UT) TABS tablet Take 1 tablet by mouth daily     Historical Provider, MD   CALCIUM CARBONATE PO Take 1,000 mg by mouth daily     Historical Provider, MD       Physical Exam: Need 8 Elements   Physical Exam  Vitals and nursing note reviewed. Constitutional:       General: She is not in acute distress. Appearance: Normal appearance. HENT:      Head: Normocephalic and atraumatic. Nose: Nose normal.      Mouth/Throat:      Pharynx: Oropharynx is clear. Eyes:      General: Lids are everted, no foreign bodies appreciated. Vision grossly intact. No visual field deficit. Left eye: Discharge present. Extraocular Movements: Extraocular movements intact. Pupils: Pupils are equal, round, and reactive to light. Cardiovascular:      Rate and Rhythm: Normal rate. Rhythm irregular. Pulses: Normal pulses. Pulmonary:      Effort: Pulmonary effort is normal. No respiratory distress. Breath sounds: No wheezing or rhonchi. Abdominal:      General: Abdomen is flat. Bowel sounds are normal. There is no distension. Musculoskeletal:         General: Normal range of motion. Cervical back: Normal range of motion. Skin:     General: Skin is warm and dry. Capillary Refill: Capillary refill takes less than 2 seconds. Neurological:      General: No focal deficit present. Mental Status: She is oriented to person, place, and time. She is lethargic. GCS: GCS eye subscore is 3. GCS verbal subscore is 5. GCS motor subscore is 6. Cranial Nerves: Cranial nerves are intact. No cranial nerve deficit, dysarthria or facial asymmetry. Sensory: Sensation is intact. Motor: Weakness present. No tremor, atrophy or pronator drift.       Coordination: Finger-Nose-Finger Test normal.   Psychiatric:         Mood and Affect: Mood normal.            Past Medical History:   PMHx   Past Medical History:   Diagnosis Date    Anxiety     Arthritis     knees    Atrial fibrillation ([facility] [address]     CAD (coronary artery disease)     Sees Dr. Saul Ivey COVID-19 09/19/2021    Diabetes mellitus ([facility] [address]     H/O cardiac catheterization 06/25/2018    severe 3 vessel disease, refer to CV surgery for CABG and MAZE    H/O cardiovascular stress test 06/06/2018    EF47%  fixed perfusion defect ant wall, pt in afib    H/O echocardiogram 06/06/2018    EF55% mod MR    H/O echocardiogram 08/28/2018    EF 50-55%, Mild : AR, MR & TR.    H/O echocardiogram 03/13/2020    EF 55%, Breast artifact noted. 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 Nithya Key History of Holter monitoring 10/23/2018    Multiple PAF episodes noted. Tachy-Dinesh episodes noted.  History of nuclear stress test 03/13/2020    EF 55%, Breast artifact.  Hyperlipidemia     Hypertension     Memory loss     on Aricept    Missing teeth, acquired     Pneumonia     pneumococcal pneumonia twice at end of 2017    Risk for falls     uses walker    TIA (transient ischemic attack)     family doctors said she has had mini-strokes    Urinary leakage     UTI (urinary tract infection)     recent --just stopped antibiotic last week as of 6-29-18    Wears glasses      PSHX:  has a past surgical history that includes Cardiac catheterization (06/25/2018); [place] tooth extraction; eye surgery (Bilateral, 2018); Tonsillectomy (1940's); thoracentesis (Bilateral, 07/13/2018); Pacemaker insertion (Left, 12/11/2018); CABG with Mitral Valve Repair (07/09/2018); and Mitral valve maze procedure (07/19/2018). Allergies: No Known Allergies  Fam HX:  family history includes Breast Cancer in her sister; Diabetes in her sister; Early Death in her brother and father; Heart Disease in her father and sister; Other in her sister; Parkinsonism in her brother; Stroke in her mother.   Soc HX:   Social History     Socioeconomic History    Marital status: Single     Spouse name: Not on file    Number of children: Not on file    Years of education: Not on file    Highest education level: Not on file   Occupational History    Not on file   Tobacco Use    Smoking status: Never Smoker    Smokeless tobacco: Never Used   Vaping Use    Vaping Use: Never used   Substance and Sexual Activity    Alcohol use: No    Drug use: No    Sexual activity: Not on file   Other Topics Concern    Not on file   Social History Narrative    Not on file     Social Determinants of Health     Financial Resource Strain:     Difficulty of Paying Living Expenses: Not on file   Food Insecurity:     Worried About Running Out of Food in the Last Year: Not on file    Pia of Food in the Last Year: Not on file   Transportation Needs:     Lack of Transportation (Medical): Not on file    Lack of Transportation (Non-Medical):  Not on file   Physical Activity:     Days of Exercise per Week: Not on file    Minutes of Exercise per Session: Not on file   Stress:     Feeling of Stress : Not on file   Social Connections:     Frequency of Communication with Friends and Family: Not on file    Frequency of Social Gatherings with Friends and Family: Not on file    Attends Yarsani Services: Not on file    Active Member of Clubs or Organizations: Not on file    Attends Club or Organization Meetings: Not on file    Marital Status: Not on file   Intimate Partner Violence:     Fear of Current or Ex-Partner: Not on file    Emotionally Abused: Not on file    Physically Abused: Not on file    Sexually Abused: Not on file   Housing Stability:     Unable to Pay for Housing in the Last Year: Not on file    Number of Places Lived in the Last Year: Not on file    Unstable Housing in the Last Year: Not on file       Medications:   Medications:    sodium chloride flush  5-40 mL IntraVENous 2 times per day    enoxaparin  40 mg SubCUTAneous Daily    acetaminophen  1,000 mg Oral Q8H    apixaban  5 mg Oral BID    aspirin  81 mg Oral Daily    citalopram  40 mg Oral Daily    donepezil  10 mg Oral Daily    metoprolol tartrate  25 mg Oral BID    [START ON 7/9/2022] pantoprazole  40 mg Oral QAM AC    oxybutynin  5 mg Oral TID    rosuvastatin  40 mg Oral QPM    CXR pending at time of note dictation. Transfer labs as remarked above      Electronically signed by BIRD Rodgers CNP on 7/8/2022 at 1:01 PM          This dictation was created with voice recognition software. While attempts have been made to review the dictation as it is transcribed, on occasion the spoken word can be misinterpreted by the technology leading to omissions or inappropriate words, phrases or sentences.      Electronically signed by BIRD Rodgers CNP on 7/8/2022 at 1:01 PM

## 2022-07-08 NOTE — PROGRESS NOTES
4 Eyes Skin Assessment     NAME:  Kinga Posey  YOB: 1945  MEDICAL RECORD NUMBER:  [id number]    The patient is being assess for  Admission    I agree that 2 RN's have performed a thorough Head to Toe Skin Assessment on the patient. ALL assessment sites listed below have been assessed. Areas assessed by both nurses:    Head, Face, Ears, Shoulders, Back, Chest, Arms, Elbows, Hands, Sacrum. Buttock, Coccyx, Ischium and Legs. Feet and Heels        Does the Patient have a Wound? No noted wound(s)       Brandon Prevention initiated:  Yes   Wound Care Orders initiated:  NA    Pressure Injury (Stage 3,4, Unstageable, DTI, NWPT, and Complex wounds) if present place referral/consult order under [de-identified] NA    New and Established Ostomies if present place consult order under : NA      Nurse 1 eSignature: Electronically signed by Mercedes Adams RN on 7/8/22 at 4:32 PM EDT    **SHARE this note so that the co-signing nurse is able to place an eSignature**    Nurse 2 eSignature:  Deb Varghese LPN

## 2022-07-09 LAB
ALBUMIN SERPL-MCNC: 4.1 GM/DL (ref 3.4–5)
ALP BLD-CCNC: 104 IU/L (ref 40–128)
ALT SERPL-CCNC: 6 U/L (ref 10–40)
AMPHETAMINES: NEGATIVE
ANION GAP SERPL CALCULATED.3IONS-SCNC: 10 MMOL/L (ref 4–16)
AST SERPL-CCNC: 14 IU/L (ref 15–37)
BACTERIA: ABNORMAL /HPF
BARBITURATE SCREEN URINE: NEGATIVE
BASE EXCESS MIXED: 1 (ref 0–2.3)
BASE EXCESS: 1 (ref 0–2.4)
BENZODIAZEPINE SCREEN, URINE: NEGATIVE
BILIRUB SERPL-MCNC: 0.5 MG/DL (ref 0–1)
BILIRUBIN URINE: NEGATIVE MG/DL
BLOOD, URINE: NEGATIVE
BUN BLDV-MCNC: 9 MG/DL (ref 6–23)
CALCIUM SERPL-MCNC: 9.5 MG/DL (ref 8.3–10.6)
CANNABINOID SCREEN URINE: NEGATIVE
CARBON MONOXIDE, BLOOD: 1.8 % (ref 0–5)
CHLORIDE BLD-SCNC: 99 MMOL/L (ref 99–110)
CHOLESTEROL: 111 MG/DL
CLARITY: ABNORMAL
CO2 CONTENT: 27.3 MMOL/L (ref 19–24)
CO2: 25 MMOL/L (ref 21–32)
COCAINE METABOLITE: NEGATIVE
COLOR: YELLOW
COMMENT: ABNORMAL
COMMENT: ABNORMAL
CREAT SERPL-MCNC: 0.6 MG/DL (ref 0.6–1.1)
ESTIMATED AVERAGE GLUCOSE: 114 MG/DL
GFR AFRICAN AMERICAN: >60 ML/MIN/1.73M2
GFR NON-AFRICAN AMERICAN: >60 ML/MIN/1.73M2
GLUCOSE BLD-MCNC: 104 MG/DL (ref 70–99)
GLUCOSE BLD-MCNC: 120 MG/DL (ref 70–99)
GLUCOSE BLD-MCNC: 91 MG/DL (ref 70–99)
GLUCOSE BLD-MCNC: 91 MG/DL (ref 70–99)
GLUCOSE BLD-MCNC: 94 MG/DL (ref 70–99)
GLUCOSE, URINE: NEGATIVE MG/DL
HBA1C MFR BLD: 5.6 % (ref 4.2–6.3)
HCO3 ARTERIAL: 26 MMOL/L (ref 18–23)
HCO3 VENOUS: 28.6 MMOL/L (ref 19–25)
HCT VFR BLD CALC: 33.7 % (ref 37–47)
HDLC SERPL-MCNC: 38 MG/DL
HEMOGLOBIN: 11.1 GM/DL (ref 12.5–16)
KETONES, URINE: NEGATIVE MG/DL
LACTATE: 0.8 MMOL/L (ref 0.4–2)
LDL CHOLESTEROL CALCULATED: 46 MG/DL
LEUKOCYTE ESTERASE, URINE: ABNORMAL
MCH RBC QN AUTO: 27.5 PG (ref 27–31)
MCHC RBC AUTO-ENTMCNC: 32.9 % (ref 32–36)
MCV RBC AUTO: 83.6 FL (ref 78–100)
METHEMOGLOBIN ARTERIAL: 0.8 %
MUCUS: ABNORMAL HPF
NITRITE URINE, QUANTITATIVE: NEGATIVE
O2 SAT, VEN: 71.8 % (ref 50–70)
O2 SATURATION: 95.4 % (ref 96–97)
OPIATES, URINE: NEGATIVE
OXYCODONE: NEGATIVE
PCO2 ARTERIAL: 42 MMHG (ref 32–45)
PCO2, VEN: 70 MMHG (ref 38–52)
PDW BLD-RTO: 12.9 % (ref 11.7–14.9)
PH BLOOD: 7.4 (ref 7.34–7.45)
PH VENOUS: 7.22 (ref 7.32–7.42)
PH, URINE: 6 (ref 5–8)
PHENCYCLIDINE, URINE: NEGATIVE
PLATELET # BLD: 169 K/CU MM (ref 140–440)
PMV BLD AUTO: 9 FL (ref 7.5–11.1)
PO2 ARTERIAL: 87 MMHG (ref 75–100)
PO2, VEN: 44 MMHG (ref 28–48)
POTASSIUM SERPL-SCNC: 3.9 MMOL/L (ref 3.5–5.1)
PROTEIN UA: NEGATIVE MG/DL
RBC # BLD: 4.03 M/CU MM (ref 4.2–5.4)
RBC URINE: 10 /HPF (ref 0–6)
SODIUM BLD-SCNC: 134 MMOL/L (ref 135–145)
SPECIFIC GRAVITY UA: <1.005 (ref 1–1.03)
SQUAMOUS EPITHELIAL: 2 /HPF
TOTAL PROTEIN: 5.8 GM/DL (ref 6.4–8.2)
TRICHOMONAS: ABNORMAL /HPF
TRIGL SERPL-MCNC: 133 MG/DL
UROBILINOGEN, URINE: 0.2 MG/DL (ref 0.2–1)
WBC # BLD: 5.1 K/CU MM (ref 4–10.5)
WBC UA: 13 /HPF (ref 0–5)

## 2022-07-09 PROCEDURE — 87086 URINE CULTURE/COLONY COUNT: CPT

## 2022-07-09 PROCEDURE — 36600 WITHDRAWAL OF ARTERIAL BLOOD: CPT

## 2022-07-09 PROCEDURE — 82803 BLOOD GASES ANY COMBINATION: CPT

## 2022-07-09 PROCEDURE — 82962 GLUCOSE BLOOD TEST: CPT

## 2022-07-09 PROCEDURE — 2580000003 HC RX 258: Performed by: NURSE PRACTITIONER

## 2022-07-09 PROCEDURE — 97535 SELF CARE MNGMENT TRAINING: CPT

## 2022-07-09 PROCEDURE — 80053 COMPREHEN METABOLIC PANEL: CPT

## 2022-07-09 PROCEDURE — 97530 THERAPEUTIC ACTIVITIES: CPT

## 2022-07-09 PROCEDURE — 1200000000 HC SEMI PRIVATE

## 2022-07-09 PROCEDURE — 6370000000 HC RX 637 (ALT 250 FOR IP): Performed by: NURSE PRACTITIONER

## 2022-07-09 PROCEDURE — 83605 ASSAY OF LACTIC ACID: CPT

## 2022-07-09 PROCEDURE — 97162 PT EVAL MOD COMPLEX 30 MIN: CPT

## 2022-07-09 PROCEDURE — 80061 LIPID PANEL: CPT

## 2022-07-09 PROCEDURE — 81001 URINALYSIS AUTO W/SCOPE: CPT

## 2022-07-09 PROCEDURE — 85027 COMPLETE CBC AUTOMATED: CPT

## 2022-07-09 PROCEDURE — 99223 1ST HOSP IP/OBS HIGH 75: CPT | Performed by: PSYCHIATRY & NEUROLOGY

## 2022-07-09 PROCEDURE — 97166 OT EVAL MOD COMPLEX 45 MIN: CPT

## 2022-07-09 PROCEDURE — 80307 DRUG TEST PRSMV CHEM ANLYZR: CPT

## 2022-07-09 PROCEDURE — 97116 GAIT TRAINING THERAPY: CPT

## 2022-07-09 PROCEDURE — 36415 COLL VENOUS BLD VENIPUNCTURE: CPT

## 2022-07-09 PROCEDURE — 94761 N-INVAS EAR/PLS OXIMETRY MLT: CPT

## 2022-07-09 PROCEDURE — 87077 CULTURE AEROBIC IDENTIFY: CPT

## 2022-07-09 PROCEDURE — 83036 HEMOGLOBIN GLYCOSYLATED A1C: CPT

## 2022-07-09 RX ADMIN — GABAPENTIN 600 MG: 300 CAPSULE ORAL at 09:53

## 2022-07-09 RX ADMIN — OXYBUTYNIN CHLORIDE 5 MG: 5 TABLET ORAL at 09:53

## 2022-07-09 RX ADMIN — ACETAMINOPHEN 1000 MG: 500 TABLET ORAL at 20:41

## 2022-07-09 RX ADMIN — METOPROLOL TARTRATE 25 MG: 25 TABLET, FILM COATED ORAL at 20:41

## 2022-07-09 RX ADMIN — ASPIRIN 81 MG: 81 TABLET, COATED ORAL at 09:53

## 2022-07-09 RX ADMIN — SODIUM CHLORIDE, PRESERVATIVE FREE 10 ML: 5 INJECTION INTRAVENOUS at 09:53

## 2022-07-09 RX ADMIN — ERYTHROMYCIN: 5 OINTMENT OPHTHALMIC at 20:42

## 2022-07-09 RX ADMIN — SENNOSIDES 8.6 MG: 8.6 TABLET, FILM COATED ORAL at 20:41

## 2022-07-09 RX ADMIN — ACETAMINOPHEN 1000 MG: 500 TABLET ORAL at 12:43

## 2022-07-09 RX ADMIN — OXYBUTYNIN CHLORIDE 5 MG: 5 TABLET ORAL at 14:39

## 2022-07-09 RX ADMIN — OXYBUTYNIN CHLORIDE 5 MG: 5 TABLET ORAL at 20:42

## 2022-07-09 RX ADMIN — Medication 1000 UNITS: at 09:53

## 2022-07-09 RX ADMIN — PANTOPRAZOLE SODIUM 40 MG: 40 TABLET, DELAYED RELEASE ORAL at 09:57

## 2022-07-09 RX ADMIN — METOPROLOL TARTRATE 25 MG: 25 TABLET, FILM COATED ORAL at 09:53

## 2022-07-09 RX ADMIN — DONEPEZIL HYDROCHLORIDE 10 MG: 10 TABLET ORAL at 09:53

## 2022-07-09 RX ADMIN — APIXABAN 5 MG: 5 TABLET, FILM COATED ORAL at 09:53

## 2022-07-09 RX ADMIN — SODIUM CHLORIDE, PRESERVATIVE FREE 10 ML: 5 INJECTION INTRAVENOUS at 20:43

## 2022-07-09 RX ADMIN — GABAPENTIN 600 MG: 300 CAPSULE ORAL at 20:41

## 2022-07-09 RX ADMIN — APIXABAN 5 MG: 5 TABLET, FILM COATED ORAL at 20:42

## 2022-07-09 RX ADMIN — CITALOPRAM 40 MG: 40 TABLET, FILM COATED ORAL at 09:53

## 2022-07-09 RX ADMIN — ROSUVASTATIN CALCIUM 40 MG: 20 TABLET, COATED ORAL at 17:21

## 2022-07-09 ASSESSMENT — PAIN DESCRIPTION - DESCRIPTORS: DESCRIPTORS: ACHING

## 2022-07-09 ASSESSMENT — PAIN SCALES - GENERAL
PAINLEVEL_OUTOF10: 0

## 2022-07-09 ASSESSMENT — PAIN - FUNCTIONAL ASSESSMENT: PAIN_FUNCTIONAL_ASSESSMENT: PREVENTS OR INTERFERES SOME ACTIVE ACTIVITIES AND ADLS

## 2022-07-09 ASSESSMENT — PAIN DESCRIPTION - ORIENTATION: ORIENTATION: LOWER

## 2022-07-09 ASSESSMENT — PAIN DESCRIPTION - LOCATION: LOCATION: BACK

## 2022-07-09 NOTE — PROGRESS NOTES
V2.0  [facility] [facility]      Name:  Denis Montana /Age/Sex: 1945  (76 y.o. female)   MRN & CSN:  [id number] & [id number] Encounter Date/Time: 2022 2:02 PM EDT    Location:  [address] [place] [address] PCP: João Pascal MD       Hospital Day: 2    Assessment and Plan:   Denis Montana is a 76 y.o. female with pmh of  who presents with AMS (altered mental status)      Plan:  Acute Metabolic Encephalopathy:  -No Fever/ tachycardia/ hypotension on admission/ no leukocytosis or clear source of infection  -Neurology recommends MRI Brain and EEG   -Pt's MS improving after holding home baclofen, risperidone, oxycodone and decreasing dose of Gabapentin  -VBG w PH 7.22/ PCO2 77/ HCO3 28- Will  Confirm with ABG and consider BiPAP  -Will avoid opioids  -Urine drug screen -ve  -UA with Mod LE+ , few bacteria, no leukocytosis/ Fever  -Lactate ordered    [email or web address]:  Insulin Lispro SS    #Afib:  Metoprolol 25 mg PO Q12H  Apixaban 5 mg PO Q12H    #HLD:  Rosuvastatin/ASA    #Constipation:  Senna QHS    #urinary incontinence:  -Oxybutynin    Misc:  C/w Pantoprazole/ Gabapentin reduced dose/Vitamin D      Diet ADULT DIET;  Dysphagia - Soft and Bite Sized   DVT Prophylaxis [] Lovenox, []  Heparin, [] SCDs, [] Ambulation,  [x] Eliquis, [] Xarelto  [] Coumadin   Code Status Full Code   Disposition From: Home  Expected Disposition: Home/ SNF  Estimated Date of Discharge:   Patient requires continued admission due to MRI/ EEG   Surrogate Decision Maker/ POA Sister     Subjective:     Pt denies any active complaints/ AAOx2/ unreliable historian/ denied anything on 10 point ROS     Past Medical History:   Diagnosis Date    Anxiety     Arthritis     knees    Atrial fibrillation ([facility] [address]     CAD (coronary artery disease)     Sees Dr. Jenise Wilson COVID-19 2021    Diabetes mellitus ([facility] [address] [facility]     H/O cardiac catheterization 2018    severe 3 vessel disease, refer to CV surgery for CABG and MAZE    H/O cardiovascular stress test 06/06/2018    EF47%  fixed perfusion defect ant wall, pt in afib    H/O echocardiogram 06/06/2018    EF55% mod MR    H/O echocardiogram 08/28/2018    EF 50-55%, Mild : AR, MR & TR.    H/O echocardiogram 03/13/2020    EF 55%, Breast artifact noted. 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 Christelle Newman History of Holter monitoring 10/23/2018    Multiple PAF episodes noted. Tachy-Dinesh episodes noted.  History of nuclear stress test 03/13/2020    EF 55%, Breast artifact.  Hyperlipidemia     Hypertension     Memory loss     on Aricept    Missing teeth, acquired     Pneumonia     pneumococcal pneumonia twice at end of 2017    Risk for falls     uses walker    TIA (transient ischemic attack)     family doctors said she has had mini-strokes    Urinary leakage     UTI (urinary tract infection)     recent --just stopped antibiotic last week as of 6-29-18    Wears glasses               Review of Systems:    Review of Systems     10 point ROS unremarkable except above    Objective: Intake/Output Summary (Last 24 hours) at 7/9/2022 1402  Last data filed at 7/9/2022 0500  Gross per 24 hour   Intake 650 ml   Output 650 ml   Net 0 ml        Vitals:   Vitals:    07/09/22 1249   BP: (!) 91/51   Pulse: 66   Resp: 18   Temp: 98.5 °F (36.9 °C)   SpO2: 99%       Physical Exam:   Gen/overall appearance: Not in acute distress. Alert. Oriented X2  Head: Normocephalic, atraumatic  Eyes: EOMI, good acuity  ENT: Oral mucosa moist  Neck: No JVD, thyromegaly  CVS: Nml S1S2, no MRG, RRR  Pulm: CTA b/l, no w/r/c  Gastrointestinal: Soft, NT/ND, +BS  Musculoskeletal: No edema. Warm  Neuro: No focal deficit. Moves extremity spontaneously. Psychiatry: Appropriate affect. Not agitated. Skin: Warm, dry with normal turgor.  No rash  Capillary refill: Brisk,< 3 seconds   Peripheral Pulses: +2 palpable, equal bilaterally       Medications:   Medications:    sodium chloride flush  5-40 mL RBC 4.30 4.2 - 5.4 M/CU MM    Hemoglobin 11.6 (L) 12.5 - 16.0 GM/DL    Hematocrit 35.8 (L) 37 - 47 %    MCV 83.3 78 - 100 FL    MCH 27.0 27 - 31 PG    MCHC 32.4 32.0 - 36.0 %    RDW 13.1 11.7 - 14.9 %    Platelets 414 630 - 196 K/CU MM    MPV 9.1 7.5 - 11.1 FL    Differential Type AUTOMATED DIFFERENTIAL     Segs Relative 64.5 36 - 66 %    Lymphocytes % 26.8 24 - 44 %    Monocytes % 6.5 (H) 0 - 4 %    Eosinophils % 1.4 0 - 3 %    Basophils % 0.8 0 - 1 %    Segs Absolute 2.4 K/CU MM    Lymphocytes Absolute 1.0 K/CU MM    Monocytes Absolute 0.2 K/CU MM    Eosinophils Absolute 0.1 K/CU MM    Basophils Absolute 0.0 K/CU MM    Nucleated RBC % 0.0 %    Total Nucleated RBC 0.0 K/CU MM    Total Immature Neutrophil 0.00 K/CU MM    Immature Neutrophil % 0.0 0 - 0.43 %   Comprehensive Metabolic Panel w/ Reflex to MG    Collection Time: 07/08/22  3:19 PM   Result Value Ref Range    Sodium 133 (L) 135 - 145 MMOL/L    Potassium 4.0 3.5 - 5.1 MMOL/L    Chloride 99 99 - 110 mMol/L    CO2 22 21 - 32 MMOL/L    BUN 10 6 - 23 MG/DL    CREATININE 0.7 0.6 - 1.1 MG/DL    Glucose 111 (H) 70 - 99 MG/DL    Calcium 9.1 8.3 - 10.6 MG/DL    Albumin 4.2 3.4 - 5.0 GM/DL    Total Protein 6.1 (L) 6.4 - 8.2 GM/DL    Total Bilirubin 0.5 0.0 - 1.0 MG/DL    ALT 7 (L) 10 - 40 U/L    AST 14 (L) 15 - 37 IU/L    Alkaline Phosphatase 108 40 - 128 IU/L    GFR Non-African American >60 >60 mL/min/1.73m2    GFR African American >60 >60 mL/min/1.73m2    Anion Gap 12 4 - 16   Hepatic Function Panel    Collection Time: 07/08/22  3:19 PM   Result Value Ref Range    Albumin 4.2 3.4 - 5.0 GM/DL    Total Bilirubin 0.5 0.0 - 1.0 MG/DL    Bilirubin, Direct 0.2 0.0 - 0.3 MG/DL    Bilirubin, Indirect 0.3 0 - 0.7 MG/DL    Alkaline Phosphatase 108 40 - 129 IU/L    AST 14 (L) 15 - 37 IU/L    ALT 7 (L) 10 - 40 U/L    Total Protein 6.1 (L) 6.4 - 8.2 GM/DL   TSH without Reflex    Collection Time: 07/08/22  3:19 PM   Result Value Ref Range    TSH, High Sensitivity 1.220 0.270 - 4. 20 uIu/ml   T4, free    Collection Time: 07/08/22  3:19 PM   Result Value Ref Range    T4 Free 1.37 0.9 - 1.8 NG/DL   Hemoglobin A1c    Collection Time: 07/08/22  3:19 PM   Result Value Ref Range    Hemoglobin A1C 5.4 4.2 - 6.3 %    eAG 108 mg/dL   POCT Glucose    Collection Time: 07/08/22  4:47 PM   Result Value Ref Range    POC Glucose 96 70 - 99 MG/DL   Blood Gas, Venous    Collection Time: 07/08/22  6:00 PM   Result Value Ref Range    pH, Johnnie 7.22 (L) 7.32 - 7.42    pCO2, Johnnie 70 (H) 38 - 52 mmHG    pO2, Johnnie 44 28 - 48 mmHG    Base Excess 1 0 - 2.4    HCO3, Venous 28.6 (H) 19 - 25 MMOL/L    O2 Sat, Johnnie 71.8 (H) 50 - 70 %    Comment VBG    POCT Glucose    Collection Time: 07/08/22  8:26 PM   Result Value Ref Range    POC Glucose 171 (H) 70 - 99 MG/DL   Comprehensive Metabolic Panel w/ Reflex to MG    Collection Time: 07/09/22  4:03 AM   Result Value Ref Range    Sodium 134 (L) 135 - 145 MMOL/L    Potassium 3.9 3.5 - 5.1 MMOL/L    Chloride 99 99 - 110 mMol/L    CO2 25 21 - 32 MMOL/L    BUN 9 6 - 23 MG/DL    CREATININE 0.6 0.6 - 1.1 MG/DL    Glucose 91 70 - 99 MG/DL    Calcium 9.5 8.3 - 10.6 MG/DL    Albumin 4.1 3.4 - 5.0 GM/DL    Total Protein 5.8 (L) 6.4 - 8.2 GM/DL    Total Bilirubin 0.5 0.0 - 1.0 MG/DL    ALT 6 (L) 10 - 40 U/L    AST 14 (L) 15 - 37 IU/L    Alkaline Phosphatase 104 40 - 128 IU/L    GFR Non-African American >60 >60 mL/min/1.73m2    GFR African American >60 >60 mL/min/1.73m2    Anion Gap 10 4 - 16   Hemoglobin A1c    Collection Time: 07/09/22  4:03 AM   Result Value Ref Range    Hemoglobin A1C 5.6 4.2 - 6.3 %    eAG 114 mg/dL   Lipid panel - fasting    Collection Time: 07/09/22  4:03 AM   Result Value Ref Range    Triglycerides 133 <150 MG/DL    Cholesterol 111 <200 MG/DL    HDL 38 (L) >40 MG/DL    LDL Calculated 46 <100 MG/DL   CBC    Collection Time: 07/09/22  4:03 AM   Result Value Ref Range    WBC 5.1 4.0 - 10.5 K/CU MM    RBC 4.03 (L) 4.2 - 5.4 M/CU MM    Hemoglobin 11.1 (L) 12.5 - 16.0 GM/DL    Hematocrit 33.7 (L) 37 - 47 %    MCV 83.6 78 - 100 FL    MCH 27.5 27 - 31 PG    MCHC 32.9 32.0 - 36.0 %    RDW 12.9 11.7 - 14.9 %    Platelets 482 140 - 338 K/CU MM    MPV 9.0 7.5 - 11.1 FL   POCT Glucose    Collection Time: 07/09/22  7:47 AM   Result Value Ref Range    POC Glucose 94 70 - 99 MG/DL   POCT Glucose    Collection Time: 07/09/22 11:28 AM   Result Value Ref Range    POC Glucose 91 70 - 99 MG/DL   Urinalysis with Reflex to Culture    Collection Time: 07/09/22  1:00 PM    Specimen: Urine   Result Value Ref Range    Color, UA YELLOW YELLOW    Clarity, UA SLIGHTLY CLOUDY (A) CLEAR    Glucose, Urine NEGATIVE NEGATIVE MG/DL    Bilirubin Urine NEGATIVE NEGATIVE MG/DL    Ketones, Urine NEGATIVE NEGATIVE MG/DL    Specific Gravity, UA <1.005 1.001 - 1.035    Blood, Urine NEGATIVE NEGATIVE    pH, Urine 6.0 5.0 - 8.0    Protein, UA NEGATIVE NEGATIVE MG/DL    Urobilinogen, Urine 0.2 0.2 - 1.0 MG/DL    Nitrite Urine, Quantitative NEGATIVE NEGATIVE    Leukocyte Esterase, Urine MODERATE (A) NEGATIVE    RBC, UA 10 (H) 0 - 6 /HPF    WBC, UA 13 (H) 0 - 5 /HPF    Bacteria, UA FEW (A) NEGATIVE /HPF    Squam Epithel, UA 2 /HPF    Mucus, UA RARE (A) NEGATIVE HPF    Trichomonas, UA NONE SEEN NONE SEEN /HPF   Drug screen multi urine    Collection Time: 07/09/22  1:00 PM   Result Value Ref Range    Cannabinoid Scrn, Ur NEGATIVE NEGATIVE    Amphetamines NEGATIVE NEGATIVE    Cocaine Metabolite NEGATIVE NEGATIVE    Benzodiazepine Screen, Urine NEGATIVE NEGATIVE    Barbiturate Screen, Ur NEGATIVE NEGATIVE    Opiates, Urine NEGATIVE NEGATIVE    Phencyclidine, Urine NEGATIVE NEGATIVE    Oxycodone NEGATIVE NEGATIVE        Imaging/Diagnostics Last 24 Hours   Echocardiogram complete 2D with doppler with color    Result Date: 7/8/2022  Transthoracic Echocardiography Report (TTE)  Demographics   Patient Name       Kacie Orantes      Date of Study       07/08/2022   Date of Birth      1945         Gender              Female Age                76 year(s)         Race                   Patient Number     [id number]         Room Number         5903   Visit Number       [id number]   Corporate ID       [id number]   Accession Number   [id number]         Spike Mckinley,                                                            [facility]   Ordering Physician Jena Spann                     CNP                Physician           Teja ROWE  Procedure Type of Study   TTE procedure:ECHOCARDIOGRAM COMPLETE 2D W DOPPLER W COLOR. Procedure Date Date: 07/08/2022 Start: 01:25 PM Study Location: Portable Technical Quality: Adequate visualization Indications:Atrial fibrillation. Patient Status: Routine Contrast Medium: See below. Height: 66 inches Weight: 156 pounds BSA: 1.8 m2 BMI: 25.18 kg/m2 HR: 60 bpm BP: 139/54 mmHg  Conclusions   Summary  Left ventricular systolic function is normal.  Ejection fraction is visually estimated at 50-55%. Mild left ventricular hypertrophy. PPM wiring visualized in right heart. Sclerotic, but non-stenotic aortic valve. Mild aortic regurgitation; PHT: 738 msec. Mitral annular calcification is present. No evidence of any pericardial effusion. Signature   ------------------------------------------------------------------  Electronically signed by Arlet Corral MD  (Interpreting physician) on 07/08/2022 at 03:03 PM  ------------------------------------------------------------------   Findings   Left Ventricle  Left ventricular systolic function is normal.  Ejection fraction is visually estimated at 50-55%. Left ventricle size is normal.  No regional wall motion abnormalities. Mild left ventricular hypertrophy. Indeterminate diastolic function. Left Atrium  Essentially normal left atrium. Right Atrium  PPM wiring visualized in right atrium. Essentially normal right atrium.    Right Ventricle  PPM wiring visualized in right ventricle. Aortic Valve  Sclerotic, but non-stenotic aortic valve. Mild aortic regurgitation; PHT: 738 msec. Mitral Valve  Mitral annular calcification is present. Tricuspid Valve  Trace tricuspid regurgitation; RVSP: 30 mmHg. Pulmonic Valve  The pulmonic valve was not well visualized. Pericardial Effusion  No evidence of any pericardial effusion. Pleural Effusion  No evidence of pleural effusion. Miscellaneous  IVC and abdominal are not clearly visualized.   M-Mode/2D Measurements & Calculations   LV Diastolic Dimension:  LV Systolic Dimension:  LA Dimension: 3.8 cmAO Root  3.59 cm                  2.44 cm                 Dimension: 3 cmLA Area:  LV FS:32 %               LV Volume Diastolic:    74.3 cm2  LV PW Diastolic: 2.55 cm 332 ml  Septum Diastolic: 2.81   LV Volume Systolic: 54  cm                       ml  CO: 3.77 l/min           LV EDV/LV EDV Index:  CI: 2.09 l/m*m2          124 ml/69 m2LV ESV/LV   LA/Aorta: 1.27                           ESV Index: 54 ml/30 m2  LV Area Diastolic: 74.8  EF Calculated (A4C):    LA volume/Index: 62 ml  cm2                      56.5 %                  /29M7  LV Area Systolic: 03.8   EF Calculated (2D):  cm2                      61.2 %                            LV Length: 8.04 cm                            LVOT: 2 cm  Doppler Measurements & Calculations   MV Peak E-Wave: 84.9 AV Peak Velocity: 105 cm/s    LVOT Peak Velocity: 94  cm/s                 AV Peak Gradient: 4.41 mmHg   cm/s  MV Peak A-Wave: 40   AV Mean Velocity: 69.3 cm/s   LVOT Mean Velocity: 59.8  cm/s                 AV Mean Gradient: 2 mmHg      cm/s  MV E/A Ratio: 2.12   AV VTI: 23.4 cm               LVOT Peak Gradient: 5  MV Peak Gradient:    AV Area (Continuity):2.68 cm2 mmHgLVOT Mean Gradient: 2  2.88 mmHg                                          mmHg                       LVOT VTI: 20 cm               Estimated RVSP: 30 mmHg  MV P1/2t: 101 msec   AV P1/2t: 738 msec            Estimated RAP:3 mmHg  MVA by PHT:2.18 cm2  Estimated PASP: 14.29 mmHg   MV E' Septal                                       TR Velocity:168 cm/s  Velocity: 5.92 cm/s                                TR Gradient:11.29 mmHg  MV E' Lateral                                      PV Peak Velocity: 153  Velocity: 12 cm/s                                  cm/s  MV E/E' septal:                                    PV Peak Gradient: 9.36  14.34                                              mmHg  MV E/E' lateral:                                   PV Mean Velocity: 89.3  7.08                                               cm/s                                                     PV Mean Gradient: 4 mmHg      XR CHEST (2 VW)    Result Date: 7/8/2022  EXAMINATION: TWO XRAY VIEWS OF THE CHEST 7/8/2022 6:07 pm COMPARISON: Chest 09/22/2021, CT lumbar spine 06/06/2021 HISTORY: ORDERING SYSTEM PROVIDED HISTORY: SOB, cough, failed swallow eval Additional signs and symptoms: none Relevant Medical/Surgical History: CAD FINDINGS: The cardiac silhouette is enlarged. Calcifications involving the aorta reflect atherosclerosis. The mediastinal and hilar silhouettes appear unremarkable. Senescent changes. No focal infiltrate is evident. No pleural effusion. No pneumothorax is seen. No acute osseous abnormality is identified. Bones appear osteoporotic. N8o definite thoracic compression fracture. Age indeterminate progression of L1 anterior wedge compression fracture (about 30% compared to 10% described previously). Stable sequela from CABG, heart valve surgery and pacemaker. 1. No acute pulmonary infiltrate. 2. Calcific atherosclerosis aorta. 3. Cardiomegaly. 4. Chronic L1 anterior wedge compression fracture, interval progression now measuring about 30%. 5. Bones appear osteoporotic.      Vascular carotid duplex bilateral    Result Date: 7/8/2022  EXAMINATION: ULTRASOUND EVALUATION OF THE CAROTID ARTERIES 7/8/2022 TECHNIQUE: Duplex ultrasound using B-mode/gray scaled imaging, Doppler spectral analysis and color flow Doppler was obtained of the carotid arteries. COMPARISON: June 29, 2018 HISTORY: ORDERING SYSTEM PROVIDED HISTORY: altered mental status, afib TECHNOLOGIST PROVIDED HISTORY: Reason for exam:->altered mental status, afib FINDINGS: RIGHT: The right common carotid artery demonstrates peak systolic velocities of 83 cm/sec in the proximal and distal segments respectively. The right internal carotid artery demonstrates the systolic velocities of 43, 74, 60 cm/sec in the proximal, mid and distal segments respectively. The external carotid artery is patent. The vertebral artery demonstrates normal antegrade flow. No evidence of focal atherosclerotic plaque. ICA/CCA ratio of 0.9 LEFT: The left common carotid artery demonstrates peak systolic velocities of 82 cm/sec in the proximal and distal segments respectively. The left internal carotid artery demonstrates the systolic velocities of 55, 80, 80 cm/sec in the proximal, mid and distal segments respectively. The external carotid artery is patent. The vertebral artery demonstrates normal antegrade flow. No evidence of focal atherosclerotic plaque. ICA/CCA ratio of 0.9     The right internal carotid artery demonstrates 0-50% stenosis. The left internal carotid artery demonstrates 0-50% stenosis. Bilateral vertebral arteries are patent with flow in the normal direction.        Electronically signed by Edilia Hernandez MD on 7/9/2022 at 2:02 PM

## 2022-07-09 NOTE — PROGRESS NOTES
Physical Therapy  Facility/Department: [address] [place] [address]  Physical Therapy Initial Assessment    Name: Frantz Hurst  : 1945  MRN: [id number]  Date of Service: 2022    Discharge Recommendations:  Home with Home health PT,Home with assist PRN   PT Equipment Recommendations  Equipment Needed: No      Patient Diagnosis(es): There were no encounter diagnoses. Past Medical History:  has a past medical history of Anxiety, Arthritis, Atrial fibrillation ([facility] [address] CAD (coronary artery disease), COVID-19, Diabetes mellitus ([facility] [address] H/O cardiac catheterization, H/O cardiovascular stress test, H/O echocardiogram, H/O echocardiogram, H/O echocardiogram, H/O three vessel coronary artery bypass, History of Holter monitoring, History of nuclear stress test, Hyperlipidemia, Hypertension, Memory loss, Missing teeth, acquired, Pneumonia, Risk for falls, TIA (transient ischemic attack), Urinary leakage, UTI (urinary tract infection), and Wears glasses. Past Surgical History:  has a past surgical history that includes Cardiac catheterization (2018); Cushing tooth extraction; eye surgery (Bilateral, ); Tonsillectomy (); thoracentesis (Bilateral, 2018); Pacemaker insertion (Left, 2018); CABG with Mitral Valve Repair (2018); and Mitral valve maze procedure (2018). Assessment   Body Structures, Functions, Activity Limitations Requiring Skilled Therapeutic Intervention: Decreased functional mobility ; Decreased strength;Decreased posture;Decreased safe awareness;Decreased cognition;Decreased ADL status; Decreased endurance;Decreased body mechanics; Decreased balance  Assessment: Pt presents 1 day s/p admission for AMS. She is from home, lives w/ sister, in level entry [address] [place] [address], assist for home mgmt, mod ind using rollator, aide services PTA; pt demonstrates confusion intermittently t/o session, information gathered during interview should be verfied for accuracy.  Pt presents w/ the above listed deficits, however, she is primarily limited by impaired balance and dec safety awareness. Light assist for completion of all OOB, dec strength/power affecting transfers/transitions, dec safety awareness, impaired cognition, dec endurance, impaired posture affecting balance, intermittent need for light assist to steady, dec endurance, operating below her baseline. Recommending home w/ assist PRN + HH PT w/ anticipated functional improvement; will cont to assess needs as cognition improves. Therapy Prognosis: Good  Decision Making: Medium Complexity  Requires PT Follow-Up: Yes  Activity Tolerance  Activity Tolerance: Treatment limited secondary to decreased cognition     Treatment:  Therapeutic Activity Training:   Therapeutic activity training was instructed today. Cues were given for safety, sequence, UE/LE placement, awareness, and balance. Activities performed today included bed mobility training, sup-sit, sit-stand, SPT. Gait Training:  Cues were given for safety, sequence, device management, balance, posture, awareness, path.       Plan   Plan  Plan:  (3+)  Plan weeks: 1  Current Treatment Recommendations: Strengthening,Endurance training,Balance training,ROM,Functional mobility training,Transfer training,Gait training,ADL/Self-care training,Patient/Caregiver education & training,Safety education & training,Pain management,Equipment evaluation, education, & procurement,Modalities,Therapeutic activities,Positioning  Safety Devices  Type of Devices: Call light within reach,Gait belt,Left in chair,Nurse notified,Patient at risk for falls,Chair alarm in place  Restraints  Restraints Initially in Place: No     Restrictions  Restrictions/Precautions  Restrictions/Precautions: Fall Risk,General Precautions  Required Braces or Orthoses?: No     Subjective   General  Chart Reviewed: Yes  Patient assessed for rehabilitation services?: Yes  Family / Caregiver Present: No  Follows Commands: Within Functional Limits  General Comment  Comments: no pain  Subjective  Subjective: I gotta go to the bathroom, before we do anything else. Social/Functional History  Social/Functional History  Lives With: Family,Other (comment) (sister recently passed away, lives w/sister in [facility])  Type of Home: [address] [facility] [place]: One level  Home Access: Level entry  Bathroom Shower/Tub: Walk-in shower  Bathroom Equipment: Grab bars around toilet,Grab bars in shower  Bathroom Accessibility: Accessible  Home Equipment: Walker, 4 wheeled,Wheelchair-manual  Has the patient had two or more falls in the past year or any fall with injury in the past year?: No  Receives Help From: Family  Homemaking Assistance: Needs assistance  Ambulation Assistance:  (mod I with walker)  Active : No  Mode of Transportation: Car  Vision/Hearing  Vision  Vision: Impaired  Vision Exceptions: Wears glasses at all times  Hearing  Hearing: Within functional limits    Cognition   Orientation  Overall Orientation Status: Impaired  Orientation Level: Oriented to place;Oriented to time;Disoriented to situation;Oriented to person  Cognition  Overall Cognitive Status: Exceptions  Arousal/Alertness: Appropriate responses to stimuli  Following Commands: Follows one step commands consistently; Follows multistep commands with increased time  Attention Span: Attends with cues to redirect  Memory: Appears intact  Safety Judgement: Good awareness of safety precautions  Problem Solving: Assistance required to identify errors made  Insights: Fully aware of deficits  Initiation: Requires cues for some  Sequencing: Requires cues for some  Cognition Comment: intermittent confusion and impaired short term memory affecting need for inc cues     Objective   Heart Rate: 67  Heart Rate Source: Apical  BP: (!) 125/55  BP Location: Right upper arm  BP Method: Automatic  MAP (Calculated): 78.33  Resp: 17  SpO2: 99 %  O2 Device: None (Room air)     Observation/Palpation  Posture: Poor (kyphotic, fwd head shoulders)  Observation: Supine, asleep, awakens to voice, agreeable. She is slightly confused, easily redirected, normotesive and fibrile t/o. Tele, BP cuff, peripheral IV port in place. AROM RLE (degrees)  RLE AROM: WFL  AROM LLE (degrees)  LLE AROM : WFL  Strength RLE  Strength RLE: WFL  Comment: grossly >3/5 as observed by functional mobility  Strength LLE  Strength LLE: WFL  Comment: grossly >3/5 as observed by functional mobility     Bed mobility  Supine to Sit: Contact guard assistance  Scooting: Contact guard assistance  Transfers  Sit to Stand: Minimal Assistance  Stand to sit: Minimal Assistance  Bed to Chair: Minimal assistance (stand step using FWW)  Ambulation  Surface: level tile  Device: Rolling Walker  Assistance: Minimal assistance  Gait Deviations: Slow Dali;Decreased step length;Decreased step height;Staggers; Deviated path  Distance: 50 ft  Comments: inc postural sway, dec safety awareness, inconsistent step placement, fwd flexed posture w/ intermittent retropulsion, poor attention to posture  More Ambulation?: No     Balance  Sitting - Static: Good  Sitting - Dynamic: Fair;+  Standing - Static: Fair;- (intermittent min A particularly on rising to attend to retropulsion)  Standing - Dynamic: Fair;- (fairly consistent unsteadiness requiring min A to attend to multidirectional LOB's)    AM-PAC Score  AM-PAC Inpatient Mobility Raw Score : 17 (07/09/22 1102)  AM-PAC Inpatient T-Scale Score : 42.13 (07/09/22 1102)  Mobility Inpatient CMS 0-100% Score: 50.57 (07/09/22 1102)  Mobility Inpatient CMS G-Code Modifier : CK (07/09/22 1102)    Goals  Short Term Goals  Time Frame for Short term goals: 1 week  Short term goal 1: pt will complete bed mobility at mod ind  Short term goal 2: pt will complete transfers at [facility]  Short term goal 3: pt will ambulate 100 ft using FWW at [facility]  Additional Goals?: No  Patient Goals   Patient goals : return home      Therapy Time   Individual Concurrent Group Co-treatment   Time In 0858         Time Out 0943         Minutes 45         Timed Code Treatment Minutes: 35 Minutes (TA, GT)       Jewels Barajas, PT, DPT

## 2022-07-09 NOTE — CONSULTS
ischemic attack)     family doctors said she has had mini-strokes    Urinary leakage     UTI (urinary tract infection)     recent --just stopped antibiotic last week as of 6-29-18    Wears glasses     :   Past Surgical History:   Procedure Laterality Date    CABG WITH MITRAL VALVE REPAIR  07/09/2018    CABG X 3 [place]>LAD, SVG>CX M1, SVG>M2, MVR repair w/#28 CG ring, PFO closure, MAZE    CARDIAC CATHETERIZATION  06/25/2018    EYE SURGERY Bilateral 2018    Cataracts    MITRAL VALVE MAZE PROCEDURE  07/19/2018    Dr. Lona Bernstein Left 12/11/2018    [employer] [place] XT DR SCHWARTZ SureScan     THORACENTESIS Bilateral 07/13/2018    IR Dr. Jean Marie Benavides, right 1300, left 900 all s/s    TONSILLECTOMY  1940's    WISDOM TOOTH EXTRACTION       No current facility-administered medications on file prior to encounter. Current Outpatient Medications on File Prior to Encounter   Medication Sig Dispense Refill    apixaban (ELIQUIS) 5 MG TABS tablet Take 1 tablet by mouth 2 times daily 42 tablet 0    metoprolol tartrate (LOPRESSOR) 50 MG tablet Take 0.5 tablets by mouth 2 times daily 180 tablet 1    rosuvastatin (CRESTOR) 40 MG tablet Take 1 tablet by mouth every evening 90 tablet 3    bisacodyl (DULCOLAX) 5 MG EC tablet Take 5 mg by mouth as needed      oxybutynin (DITROPAN) 5 MG tablet Take 1 tablet by mouth daily      oxyCODONE (ROXICODONE) 5 MG immediate release tablet Take 5 mg by mouth 2 times daily as needed.  polyethylene glycol (GLYCOLAX) 17 GM/SCOOP powder Take 17 g by mouth daily      potassium chloride (KLOR-CON) 20 MEQ packet Take 20 mEq by mouth daily      senna (SENOKOT) 8.6 MG tablet Take 1 tablet by mouth daily      acetaminophen (APAP EXTRA STRENGTH) 500 MG tablet Take 1 tablet by mouth every 6 hours as needed for Pain 20 tablet 0    gabapentin (NEURONTIN) 600 MG tablet Take 600 mg by mouth 3 times daily.  pregabalin (LYRICA) 150 MG capsule 150 mg 2 times daily.   (Patient not taking: Reported on 7/8/2022)      ipratropium (ATROVENT) 0.03 % nasal spray       methylcellulose (CITRUCEL) 500 MG TABS Take 2,000 mg by mouth daily prn      omeprazole (PRILOSEC) 40 MG delayed release capsule Take 40 mg by mouth daily      melatonin 3 MG TABS tablet Take 3 mg by mouth daily      estradiol (ESTRACE) 0.1 MG/GM vaginal cream Place 2 g vaginally 3 times daily as needed       aspirin 81 MG EC tablet Take 1 tablet by mouth daily (Patient not taking: Reported on 8/17/2021) 30 tablet 3    furosemide (LASIX) 20 MG tablet Take 1 tablet by mouth daily 30 tablet 0    docusate sodium (COLACE, DULCOLAX) 100 MG CAPS Take 100 mg by mouth 2 times daily      metFORMIN (GLUCOPHAGE) 500 MG tablet Take 500 mg by mouth 2 times daily (with meals)      donepezil (ARICEPT) 5 MG tablet Take 10 mg by mouth daily   2    citalopram (CELEXA) 40 MG tablet TAKE 1 TABLET EVERY DAY FOR ANXIETY AND STRESS  5    vitamin D3 (CHOLECALCIFEROL) 25 MCG (1000 UT) TABS tablet Take 1 tablet by mouth daily       CALCIUM CARBONATE PO Take 1,000 mg by mouth daily        Scheduled meds:  Current Facility-Administered Medications   Medication Dose Route Frequency Provider Last Rate Last Admin    sodium chloride flush 0.9 % injection 5-40 mL  5-40 mL IntraVENous 2 times per day BIRD Boykin CNP   10 mL at 07/09/22 0953    sodium chloride flush 0.9 % injection 5-40 mL  5-40 mL IntraVENous PRN BIRD Farrell CNP        0.9 % sodium chloride infusion  25 mL IntraVENous PRN BIRD Farrell CNP        ondansetron (ZOFRAN-ODT) disintegrating tablet 4 mg  4 mg Oral Q8H PRN BIRD Farrell CNP        Or    ondansetron (ZOFRAN) injection 4 mg  4 mg IntraVENous Q6H PRN BIRD Farrell CNP        acetaminophen (TYLENOL) tablet 650 mg  650 mg Oral Q6H PRN BIRD Farrell CNP        Or    acetaminophen (TYLENOL) suppository 650 mg  650 mg Rectal Q6H PRN BIRD Torres CNP        ondansetron (ZOFRAN-ODT) disintegrating tablet 4 mg  4 mg Oral Q8H PRN BIRD Farrell CNP        Or    ondansetron (ZOFRAN) injection 4 mg  4 mg IntraVENous Q6H PRN BIRD Farrell CNP        acetaminophen (TYLENOL) tablet 1,000 mg  1,000 mg Oral Q8H BIRD Farrell CNP   1,000 mg at 07/09/22 1243    apixaban (ELIQUIS) tablet 5 mg  5 mg Oral BID BIRD Torres CNP   5 mg at 07/09/22 0953    aspirin EC tablet 81 mg  81 mg Oral Daily BIRD Farrell CNP   81 mg at 07/09/22 0953    bisacodyl (DULCOLAX) EC tablet 5 mg  5 mg Oral PRN BIRD Torres CNP        citalopram (CELEXA) tablet 40 mg  40 mg Oral Daily BIRD Farrell CNP   40 mg at 07/09/22 0953    donepezil (ARICEPT) tablet 10 mg  10 mg Oral Daily BIRD Torres CNP   10 mg at 07/09/22 0953    pantoprazole (PROTONIX) tablet 40 mg  40 mg Oral QAM AC BIRD Farrell CNP   40 mg at 07/09/22 0957    oxybutynin (DITROPAN) tablet 5 mg  5 mg Oral TID BIRD Torres CNP   5 mg at 07/09/22 0953    rosuvastatin (CRESTOR) tablet 40 mg  40 mg Oral QPM BIRD Farrell CNP   40 mg at 07/08/22 1702    senna (SENOKOT) tablet 8.6 mg  1 tablet Oral Nightly BIRD Torres CNP   8.6 mg at 07/08/22 2035    Vitamin D (CHOLECALCIFEROL) tablet 1,000 Units  1,000 Units Oral Daily BIRD Torres CNP   1,000 Units at 07/09/22 8465    erythromycin [facility]) ophthalmic ointment   Left Eye Nightly BIRD Torres CNP   Given at 07/08/22 2037    gabapentin (NEURONTIN) capsule 600 mg  600 mg Oral BID BIRD Torres CNP   600 mg at 07/09/22 0953    glucose chewable tablet 16 g  4 tablet Oral PRN BIRD Farrell CNP        dextrose bolus 10% 125 mL  125 mL IntraVENous PRN BIRD Torres CNP        Or    dextrose bolus 10% 250 mL  250 mL IntraVENous PRN BIRD Farrell CNP        glucagon (rDNA) injection 1 mg  1 mg IntraMUSCular PRN BIRD Farrell CNP        dextrose 5 % solution  100 mL/hr IntraVENous PRN BIRD Farrell CNP        insulin lispro (HUMALOG) injection vial 0-6 Units  0-6 Units SubCUTAneous TID WC BIRD Farrell CNP        insulin lispro (HUMALOG) injection vial 0-3 Units  0-3 Units SubCUTAneous Nightly BIRD Farrell CNP   1 Units at 07/08/22 2039    metoprolol tartrate (LOPRESSOR) tablet 25 mg  25 mg Oral BID BIRD Santos CNP   25 mg at 07/09/22 0953       No Known Allergies    Social History     Socioeconomic History    Marital status: Single     Spouse name: Not on file    Number of children: Not on file    Years of education: Not on file    Highest education level: Not on file   Occupational History    Not on file   Tobacco Use    Smoking status: Never Smoker    Smokeless tobacco: Never Used   Vaping Use    Vaping Use: Never used   Substance and Sexual Activity    Alcohol use: No    Drug use: No    Sexual activity: Not on file   Other Topics Concern    Not on file   Social History Narrative    Not on file     Social Determinants of Health     Financial Resource Strain:     Difficulty of Paying Living Expenses: Not on file   Food Insecurity:     Worried About Running Out of Food in the Last Year: Not on file    Pia of Food in the Last Year: Not on file   Transportation Needs:     Lack of Transportation (Medical): Not on file    Lack of Transportation (Non-Medical):  Not on file   Physical Activity:     Days of Exercise per Week: Not on file    Minutes of Exercise per Session: Not on file   Stress:     Feeling of Stress : Not on file   Social Connections:     Frequency of Communication with Friends and Family: Not on file    Frequency of Social Gatherings with Friends and Family: Not on file  Attends Gnosticism Services: Not on file    Active Member of Clubs or Organizations: Not on file    Attends Club or Organization Meetings: Not on file    Marital Status: Not on file   Intimate Partner Violence:     Fear of Current or Ex-Partner: Not on file    Emotionally Abused: Not on file    Physically Abused: Not on file    Sexually Abused: Not on file   Housing Stability:     Unable to Pay for Housing in the Last Year: Not on file    Number of [place] in the Last Year: Not on file    Unstable Housing in the Last Year: Not on file      Family History   Problem Relation Age of Onset    Stroke Mother     Heart Disease Father     Early Death Father     Breast Cancer Sister     Parkinsonism Brother     Heart Disease Sister     Other Sister         fibromyalgia    Diabetes Sister     Early Death Brother          at birth       Review of Symptoms:  10-point system review completed. All of which are negative except as mentioned above. Vitals:    22 1249   BP: (!) 91/51   Pulse: 66   Resp: 18   Temp: 98.5 °F (36.9 °C)   SpO2: 99%       Exam: Gen: A&O x 4, NAD, cooperative  HEENT: NC/AT, EOMI, PERRL, mmm, no carotid bruits, neck supple, no meningeal signs  Heart: RRR  Lungs: CTAB  Abd: soft/NT/ND, +BS  Ext: no edema, no calf tenderness b/l  Endo: no thyromegaly  Psych: no depression or anxiety history  Skin: no rashes or lesions    NEUROLOGIC EXAM:  Mental Status: A&O x 4, NAD, speech clear, language fluent, comprehension intact, repetition and naming intact    Cranial Nerve Exam:   CN II-XII intact: PERRL, VFF, no nystagmus, no gaze paresis, sensation V1-V3 intact b/l, muscles of facial expression symmetric; hearing intact to conversational tone, palate elevates symmetrically, shoulder elevation symmetric and tongue protrudes midline with movement side to side.     Motor Exam:       Strength 5/5 UE's/LE's b/l  Tone and bulk normal   No pronator drift    Deep Tendon Reflexes: 2/4 biceps, triceps, brachioradialis, patellar, and achilles b/l, flexor plantar responses b/l    Sensation: Intact light touch UE's/LE's b/l, mild vibratory sense decreased in the distal lower extremities bilaterally    Coordination/Cerebellum:       Tremors--none      Rapidly alternating movements: no dysdiadochokinesia b/l                Finger-to-Nose: no dysmetria b/l    Gait and stance:      Gait: No ataxia      Romberg: not present    LABS:  Recent Labs     07/08/22  1519 07/09/22  0403   WBC 3.7* 5.1   * 134*   CL 99 99   CO2 22 25   BUN 10 9   CREATININE 0.7 0.6   GLUCOSE 111* 91   INR 1.13  --         IMAGING:     Carotid ultrasound  The right internal carotid artery demonstrates 0-50% stenosis.       The left internal carotid artery demonstrates 0-50% stenosis.       Bilateral vertebral arteries are patent with flow in the normal direction. ASSESSMENT/PLAN:  Principal Problem:    AMS (altered mental status)  Active Problems:    Altered mental status  Resolved Problems:    * No resolved hospital problems. *      This is a 64-year-old female with remote history of a traumatic left temporal lobe hematoma who presents with altered mental status described more as a disorientation/delirium. This is improved today and she is back to herself per her sister at the bedside. Unclear etiology of the spell. MRI of the brain is pending at this time to rule out acute ischemia. Carotid ultrasound was unremarkable. Focal seizure activity versus postictal state is in the differential diagnosis given her history of left temporal lobe hematoma which could serve as a nidus for epileptogenic cortical activity. Would recommend EEG. This will be performed Monday but can also be deferred outpatient if she becomes stable for discharge. Thank you for allowing us to participate in the care of your patient. If there are any questions regarding evaluation please feel free to contact us.      Electronically signed by: Jen Vidales DO, 7/9/2022 1:11 PM

## 2022-07-09 NOTE — PROGRESS NOTES
[facility] ACUTE CARE OCCUPATIONAL THERAPY EVALUATION    Rusty Borden, 1945, 3010/[id number]-A, 7/9/2022    Discharge Recommendation:  Monty Uriostegui    History:  [place]:  There were no encounter diagnoses. Subjective:  Patient states: Agreeable to therapy. \"I want to go to the bathroom\"  Pain: Pt denied pain this date (0/10).    Communication with other providers: PT, RN  Restrictions: General Precautions, Fall Risk    Home Setup/Prior level of function:  Social/Functional History  Lives With: Family,Other (comment) (sister recently passed away, lives w/sister in [facility])  Type of Home: [address] [facility] [place]: One level  Home Access: Level entry  Bathroom Shower/Tub: Walk-in shower  Bathroom Equipment: Grab bars around toilet,Grab bars in shower  Bathroom Accessibility: [facility]: [facility] Bud, 4 wheeled,Wheelchair-manual  Has the patient had two or more falls in the past year or any fall with injury in the past year?: No  Receives Help From: Family  Homemaking Assistance: Needs assistance  Ambulation Assistance:  (mod I with walker)  Active : No  Mode of Transportation: Car    Examination:  · Observation: Supine in bed upon arrival  · Vision: [facility]  · Hearing: [facility]  · Vitals: Stable vitals throughout session    Body Systems and functions:  · ROM: WFL   · Strength: 4/5   · Sensation: WFL  · Tone: Normal  · Coordination: WFL  · Perception: WNL    Activities of Daily Living (ADLs):  · Feeding: anticipate SBA for setup in sitting  · Grooming: Dennis completed facial, hand hygiene in standing at sink with 2WW placed to front  with extended time, verbal cues, balance/safety  · UB bathing: anticpate Dennis in supported sitting with extended time, verbal cues, balance/safety  · LB bathing: ModA completed in sitting on toilet with extended time, verbal cues, balance/safety  · UB dressing: Dennis in sitting for gown clothing management  · LB dressing: Dennis in sitting and standing to don/doff briefs with 2WW placed to front  · Toileting: Sal for pericare on toilet  extended time, verbal cues, balance/safety    Cognitive and Psychosocial Functioning:  · Overall cognitive status: pt oriented to person, place, city   · Affect: Normal     Balance:   · Sitting: SBA on toilet and EOB demo fair dynamic sitting balance). · Standing: Sal (pt stood with RW. Demo poor dynamic standing balance to complete ADLs at sink     Functional Mobility:   · Bed Mobility: Sal (pt performed supine to seated bed mobility   · Transfers: Sal  (pt performed STS from EOB with with RW. Pt completed STS from chair to with RW. Pt completed STS from toilet with RW. Required VC throughout for sequencing and increased time). Ambulation: Sal (pt ambulated approx 50ft with RW. Demo fair pace throughout and several LOB). See PT note for Further ambulation assessment although pt required max cues for balance due to posterior lean    AM-PAC 6 click short form for inpatient daily activity:   How much help from another person does the patient currently need. .. Unable  Dep A Lot  Max A A Lot   Mod A A Little  Min A A Little   CGA  SBA None   Mod I  Indep  Sup   1. Putting on and taking off regular lower body clothing? [] 1    [] 2   [] 2   [x] 3   [] 3   [] 4      2. Bathing (including washing, rinsing, drying)? [] 1   [] 2   [x] 2 [] 3 [] 3 [] 4   3. Toileting, which includes using toilet, bedpan, or urinal? [] 1    [] 2   [] 2   [x] 3   [] 3   [] 4     4. Putting on and taking off regular upper body clothing? [] 1   [] 2   [] 2   [x] 3   [] 3    [] 4      5. Taking care of personal grooming such as brushing teeth? [] 1   [] 2    [] 2 [x] 3    [] 3   [] 4      6. Eating meals?    [] 1   [] 2   [] 2   [] 3   [x] 3   [] 4      Raw Score:  17  [24=0% impaired(CH), 23=1-19%(CI), 20-22=20-39%(CJ), 15-19=40-59%(CK), 10-14=60-79%(CL), 7-9=80-99%(CM), 6=100%(CN)]    Treatment:    Therapeutic Activity Training:   Therapeutic activity training was instructed today. Cues were given for safety, sequence, UE/LE placement, awareness, and balance. Activities performed today included bed mobility training, sup-sit, sit-stand, ambulate approx. 20 ft to/from bathroom. Self Care Training:   Cues were given for safety, sequence, UE/LE placement, visual cues, and balance. Activities performed today included dressing, toileting, hand hygiene, and grooming. Safety Measures: Gait belt used, Left in chair, nursing present, call light within reach, Alarm in place    Assessment:    Assessment  Performance deficits / Impairments: Decreased functional mobility ,Decreased endurance,Decreased ADL status,Decreased cognition,Decreased balance,Decreased posture    Pt is a 76year old female admitted for altered mental status who  has a past medical history of Anxiety, Arthritis, Atrial fibrillation ([facility] [address] CAD (coronary artery disease), COVID-19, Diabetes mellitus ([facility] [address] H/O cardiac catheterization, H/O cardiovascular stress test, H/O echocardiogram, H/O echocardiogram, H/O echocardiogram, H/O three vessel coronary artery bypass, History of Holter monitoring, History of nuclear stress test, Hyperlipidemia, Hypertension, Memory loss, Missing teeth, acquired, Pneumonia, Risk for falls, TIA (transient ischemic attack), Urinary leakage, UTI (urinary tract infection), and Wears glasses. Pt reports that prior to admission she was Sal in ADLs, Sal in IADLs, and Kieran (with use of walker/ w/c) for functional mobility. Pt is currently functioning below baseline and would benefit from skilled OT services at SNF following discharge from acute care d/t balance and safety concerns. Complexity: Moderate  Prognosis: Good    Plan:  Plan  Times per Week: 3x  Times per Day: Daily     Goals:  1. Pt will complete all aspects of bed mobility for OOB ADLs CGA  2. Pt will complete UB/LB bathing with Sal and AE as needed.    3. Pt will complete all aspects of LB dressing with  and AE as needed. 4. Pt will complete all functional transfers to and from bed, chair, toilet, shower chair with CGA and AE. 5. Pt will ambulate HH distance to bathroom for toileting with CGA. 6. Pt will complete all aspects of toileting task with CGA. 7. Pt will complete oral hygiene/grooming routine in standing at sink with CGA demo good dynamic standing balance for approx 8 minutes. 8. Pt will complete ther ex/ther act with focus on UB strengthening.     Time:   Time in: 0858  Time out: 0943  Timed treatment minutes: 35  Total time: 45 minutes      Electronically signed by:    Fletcher Nieves, 7/9/2022, 11:02 AM [id number]

## 2022-07-10 ENCOUNTER — APPOINTMENT (OUTPATIENT)
Dept: CT IMAGING | Age: 77
DRG: 092 | End: 2022-07-10
Attending: STUDENT IN AN ORGANIZED HEALTH CARE EDUCATION/TRAINING PROGRAM
Payer: MEDICARE

## 2022-07-10 LAB
CULTURE: ABNORMAL
GLUCOSE BLD-MCNC: 110 MG/DL (ref 70–99)
GLUCOSE BLD-MCNC: 84 MG/DL (ref 70–99)
GLUCOSE BLD-MCNC: 90 MG/DL (ref 70–99)
Lab: ABNORMAL
SPECIMEN: ABNORMAL

## 2022-07-10 PROCEDURE — 94761 N-INVAS EAR/PLS OXIMETRY MLT: CPT

## 2022-07-10 PROCEDURE — 1200000000 HC SEMI PRIVATE

## 2022-07-10 PROCEDURE — 6370000000 HC RX 637 (ALT 250 FOR IP): Performed by: NURSE PRACTITIONER

## 2022-07-10 PROCEDURE — 97535 SELF CARE MNGMENT TRAINING: CPT

## 2022-07-10 PROCEDURE — 82962 GLUCOSE BLOOD TEST: CPT

## 2022-07-10 PROCEDURE — 72131 CT LUMBAR SPINE W/O DYE: CPT

## 2022-07-10 PROCEDURE — 99222 1ST HOSP IP/OBS MODERATE 55: CPT | Performed by: PHYSICIAN ASSISTANT

## 2022-07-10 PROCEDURE — 97530 THERAPEUTIC ACTIVITIES: CPT

## 2022-07-10 PROCEDURE — 2580000003 HC RX 258: Performed by: NURSE PRACTITIONER

## 2022-07-10 RX ADMIN — ACETAMINOPHEN 1000 MG: 500 TABLET ORAL at 07:25

## 2022-07-10 RX ADMIN — OXYBUTYNIN CHLORIDE 5 MG: 5 TABLET ORAL at 08:07

## 2022-07-10 RX ADMIN — ASPIRIN 81 MG: 81 TABLET, COATED ORAL at 08:07

## 2022-07-10 RX ADMIN — Medication 1000 UNITS: at 08:07

## 2022-07-10 RX ADMIN — PANTOPRAZOLE SODIUM 40 MG: 40 TABLET, DELAYED RELEASE ORAL at 07:23

## 2022-07-10 RX ADMIN — ACETAMINOPHEN 1000 MG: 500 TABLET ORAL at 12:42

## 2022-07-10 RX ADMIN — METOPROLOL TARTRATE 25 MG: 25 TABLET, FILM COATED ORAL at 08:07

## 2022-07-10 RX ADMIN — SODIUM CHLORIDE, PRESERVATIVE FREE 10 ML: 5 INJECTION INTRAVENOUS at 08:08

## 2022-07-10 RX ADMIN — CITALOPRAM 40 MG: 40 TABLET, FILM COATED ORAL at 08:07

## 2022-07-10 RX ADMIN — DONEPEZIL HYDROCHLORIDE 10 MG: 10 TABLET ORAL at 08:07

## 2022-07-10 RX ADMIN — ROSUVASTATIN CALCIUM 40 MG: 20 TABLET, COATED ORAL at 19:08

## 2022-07-10 RX ADMIN — OXYBUTYNIN CHLORIDE 5 MG: 5 TABLET ORAL at 12:42

## 2022-07-10 RX ADMIN — GABAPENTIN 600 MG: 300 CAPSULE ORAL at 08:07

## 2022-07-10 RX ADMIN — APIXABAN 5 MG: 5 TABLET, FILM COATED ORAL at 08:07

## 2022-07-10 ASSESSMENT — PAIN DESCRIPTION - ORIENTATION: ORIENTATION: LOWER

## 2022-07-10 ASSESSMENT — PAIN SCALES - GENERAL
PAINLEVEL_OUTOF10: 0

## 2022-07-10 ASSESSMENT — PAIN DESCRIPTION - DESCRIPTORS: DESCRIPTORS: DISCOMFORT

## 2022-07-10 ASSESSMENT — PAIN DESCRIPTION - LOCATION: LOCATION: BACK

## 2022-07-10 ASSESSMENT — PAIN - FUNCTIONAL ASSESSMENT: PAIN_FUNCTIONAL_ASSESSMENT: PREVENTS OR INTERFERES SOME ACTIVE ACTIVITIES AND ADLS

## 2022-07-10 NOTE — CONSULTS
Neurosurgery   Consult Note      Reason for Consult: L1 compression fracture   Consulting Physician: Judi Mullen MD  Attending Physician: Judi Mullen MD  Date of Admission: 7/8/2022  Subjective:   CHIEF COMPLAINT: confusion, back pain    HPI:  76 y.o. 1945  Who presented to [facility] from [facility] ED 7/8 with concerns of AMS. A CT of the head was negative for any acute intracranial pathology, did suggest enlarged ventricles and potential normal pressure hydrocephalus. The patient was transferred to [facility] at the family's request for further work-up. On chest x-ray it was suggested that her known L1 fracture had worsened when compared to a CT performed in June 2021. Our service was consulted to comment on this. Neurology has been following this patient since her arrival and is ordered an MRI brain tomorrow as well as an EEG. Patient's encephalopathy per hospitalist has improved. On my exam today she is sleepy, will wake up minimally to participate in exam and then will fall back asleep. She is oriented to self, place, time. She is not able to tell me why she is in the hospital.  One of her sisters is bedside, not the POA, she states that her sister usually goes to bed at midnight and sleeps until 2 PM.  She also states that the patient complains of back pain chronically while at home. She was diagnosed with an L1 fracture last June as well as a head bleed and was transferred to [place] for this. Her sister states that she was given a back brace but she did not wear it as prescribed. The patient denies any numbness/tingling, does tell me that she has had issues with urinary urgency and incontinence. Her sister states that this is not a new issue. Her sister is mainly concerned by increased confusion and very short-term memory. Patient is on eliquis and ASA. PMHx positive for A. fib, diabetes, hyperlipidemia, hypertension, history of COVID-19, UTI, history of TIA.   Past surgical history positive for CABG x3, bilateral cataract surgery, mitral valve and maze procedure, left pacemaker insertion, thoracentesis bilaterally. Past Medical and Surgical History:       Diagnosis Date    Anxiety     Arthritis     knees    Atrial fibrillation (HCC)     CAD (coronary artery disease)     Sees Dr. Kory CARID-19 09/19/2021    Diabetes mellitus ([facility] [address]    Harjit Sykes H/O cardiac catheterization 06/25/2018    severe 3 vessel disease, refer to CV surgery for CABG and MAZE    H/O cardiovascular stress test 06/06/2018    EF47%  fixed perfusion defect ant wall, pt in afib    H/O echocardiogram 06/06/2018    EF55% mod MR    H/O echocardiogram 08/28/2018    EF 50-55%, Mild : AR, MR & TR.    H/O echocardiogram 03/13/2020    EF 55%, Breast artifact noted. 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 Claudia Coto History of Holter monitoring 10/23/2018    Multiple PAF episodes noted. Tachy-Dinesh episodes noted.  History of nuclear stress test 03/13/2020    EF 55%, Breast artifact.     Hyperlipidemia     Hypertension     Memory loss     on Aricept    Missing teeth, acquired     Pneumonia     pneumococcal pneumonia twice at end of 2017    Risk for falls     uses walker    TIA (transient ischemic attack)     family doctors said she has had mini-strokes    Urinary leakage     UTI (urinary tract infection)     recent --just stopped antibiotic last week as of 6-29-18    Wears glasses          Procedure Laterality Date    CABG WITH MITRAL VALVE REPAIR  07/09/2018    CABG X 3 [place]>LAD, SVG>CX M1, SVG>M2, MVR repair w/#28 CG ring, PFO closure, MAZE    CARDIAC CATHETERIZATION  06/25/2018    EYE SURGERY Bilateral 2018    Cataracts    MITRAL VALVE MAZE PROCEDURE  07/19/2018    Dr. Enmanuel Chopra Left 12/11/2018    [employer] [place] XT DR MRI [facility]     THORACENTESIS Bilateral 07/13/2018    IR Dr. Jerad Nogueira, right 1300, left 900 all s/s    TONSILLECTOMY  0's    WISDOM TOOTH EXTRACTION         Social History:    TOBACCO:   reports that she has never smoked. She has never used smokeless tobacco.  ETOH:   reports no history of alcohol use.     Family History:       Problem Relation Age of Onset    Stroke Mother     Heart Disease Father     Early Death Father     Breast Cancer Sister     Parkinsonism Brother     Heart Disease Sister     Other Sister         fibromyalgia    Diabetes Sister     Early Death Brother          at birth       Current Medications:    Current Facility-Administered Medications: sodium chloride flush 0.9 % injection 5-40 mL, 5-40 mL, IntraVENous, 2 times per day  sodium chloride flush 0.9 % injection 5-40 mL, 5-40 mL, IntraVENous, PRN  0.9 % sodium chloride infusion, 25 mL, IntraVENous, PRN  ondansetron (ZOFRAN-ODT) disintegrating tablet 4 mg, 4 mg, Oral, Q8H PRN **OR** ondansetron (ZOFRAN) injection 4 mg, 4 mg, IntraVENous, Q6H PRN  acetaminophen (TYLENOL) tablet 650 mg, 650 mg, Oral, Q6H PRN **OR** acetaminophen (TYLENOL) suppository 650 mg, 650 mg, Rectal, Q6H PRN  ondansetron (ZOFRAN-ODT) disintegrating tablet 4 mg, 4 mg, Oral, Q8H PRN **OR** ondansetron (ZOFRAN) injection 4 mg, 4 mg, IntraVENous, Q6H PRN  acetaminophen (TYLENOL) tablet 1,000 mg, 1,000 mg, Oral, Q8H  apixaban (ELIQUIS) tablet 5 mg, 5 mg, Oral, BID  aspirin EC tablet 81 mg, 81 mg, Oral, Daily  bisacodyl (DULCOLAX) EC tablet 5 mg, 5 mg, Oral, PRN  citalopram (CELEXA) tablet 40 mg, 40 mg, Oral, Daily  donepezil (ARICEPT) tablet 10 mg, 10 mg, Oral, Daily  pantoprazole (PROTONIX) tablet 40 mg, 40 mg, Oral, QAM AC  oxybutynin (DITROPAN) tablet 5 mg, 5 mg, Oral, TID  rosuvastatin (CRESTOR) tablet 40 mg, 40 mg, Oral, QPM  senna (SENOKOT) tablet 8.6 mg, 1 tablet, Oral, Nightly  Vitamin D (CHOLECALCIFEROL) tablet 1,000 Units, 1,000 Units, Oral, Daily  erythromycin (ROMYCIN) ophthalmic ointment, , Left Eye, Nightly  gabapentin (NEURONTIN) capsule 600 mg, 600 mg, Oral, BID  glucose chewable tablet 16 g, 4 tablet, Oral, PRN  dextrose bolus 10% 125 mL, 125 mL, IntraVENous, PRN **OR** dextrose bolus 10% 250 mL, 250 mL, IntraVENous, PRN  glucagon (rDNA) injection 1 mg, 1 mg, IntraMUSCular, PRN  dextrose 5 % solution, 100 mL/hr, IntraVENous, PRN  insulin lispro (HUMALOG) injection vial 0-6 Units, 0-6 Units, SubCUTAneous, TID WC  insulin lispro (HUMALOG) injection vial 0-3 Units, 0-3 Units, SubCUTAneous, Nightly  metoprolol tartrate (LOPRESSOR) tablet 25 mg, 25 mg, Oral, BID    No Known Allergies     REVIEW OF SYSTEMS:    CONSTITUTIONAL: Positive for activity change negative for fevers, chills, diaphoresis, , appetite change, fatigue  EYES:  negative for blurred vision, eye discharge, visual disturbance and icterus  HEENT:  negative for hearing loss, tinnitus, ear drainage, sinus pressure, nasal congestion  RESPIRATORY:  No cough, shortness of breath, hemoptysis  GASTROINTESTINAL:  negative for nausea, vomiting, diarrhea, constipation, blood in stool and abdominal pain  GENITOURINARY:  negative for frequency, dysuria, urinary incontinence, decreased urine volume, and hematuria  HEMATOLOGIC/LYMPHATIC:  negative for easy bruising, bleeding and lymphadenopathy  MUSCULOSKELETAL: Positive for back pain, negative for joint swelling, decreased range of motion and muscle weakness  NEUROLOGICAL:  negative for headaches, slurred speech, unilateral weakness  PSYCHIATRIC/BEHAVIORAL: negative for hallucinations, behavioral problems, confusion and agitation.        Objective:   PHYSICAL EXAM:      VITALS:  BP (!) 155/76   Pulse 64   Temp 97.6 °F (36.4 °C) (Oral)   Resp 13   Ht 5' 6.02\" (1.677 m)   Wt 160 lb 11.5 oz (72.9 kg)   SpO2 100%   BMI 25.92 kg/m²      24HR INTAKE/OUTPUT:      Intake/Output Summary (Last 24 hours) at 7/10/2022 0931  Last data filed at 7/10/2022 0741  Gross per 24 hour   Intake --   Output 1070 ml   Net -1070 ml     CONSTITUTIONAL:  Awake briefly but falls AMS  Chronic L1 compression fracture  Concern for normal pressure hydrocephalus  Plan:   Patient presents to [facility] ER with concerns of altered mental status and increased lethargy per family. She lives in a condo with her sister. CT of the head at [facility] ED was without any acute intracranial pathology, did suggest potential ventricular enlargement is disproportionate to compared atrophy. Neurology has been following this patient and will obtain an MRI of the brain. There was concern for progression of her chronic L1 anterior wedge fractureon chest x-ray here in the hospital that she was diagnosed with last June. Her sister states that she does have a back brace, unclear if she followed up with [place] for her fracture. I have ordered a CT of the lumbar spine, images are completed but impression is not read yet. She does have a new L3 fracture when compared to CT scan in June 2021, she also does have progression of her L1 fracture. Awaiting radiologist read to and opinion on if this progression is subacute to acute or chronic. Patient does have midline tenderness to palpation at roughly the L1 area, no pain at the L3 area. She does not have any pain midline in her neck or upper thoracic spine. If her fractures are deemed subacute, do recommend that she wear her back brace. Her sister that is present in the room states that she did not wear her back brace as prescribed previously. Could consider kyphoplasty this hospitalization the patient's pain is significant. I did see notes from therapy yesterday that she worked with them and did not complain of any back pain. She is sleepy on my exam, can further evaluate tomorrow. Concerning her potential NPH, if her MRI is suggestive of normal pressure hydrocephalus we recommend that she follow-up with either [facility] or [facility] neurosurgery.      Electronically signed by Joe Rivera PA-C on 7/10/2022 at 9:31 AM    Supervising physician: Jose Luis Yung. Dillon Joiner MD.  Dr. Dillon Joiner was readily and continuously available by phone for direct consultation regarding the care of this patient. Time spent with patient in consultation, education, and collaboration with medical time is >50% of total time spent on case, including time spent in chart review and dictation. Total time spent: 40 minutes    Thank you for the opportunity to participate in the care of your patient.

## 2022-07-10 NOTE — PROGRESS NOTES
V2.0  [facility] Hospitalist Progress Note      Name:  Joselito Cerda /Age/Sex: 1945  (76 y.o. female)   MRN & CSN:  [id number] & [id number] Encounter Date/Time: 7/10/2022 5:48 PM EDT    Location:  [facility]0/3010-A PCP: Matt Jovel MD       Hospital Day: 3    Assessment and Plan:   Joselito Cerda is a 76 y.o. female with pmh of  who presents with AMS (altered mental status), stable VS, non focal neuro exam, likely iso polypharmacy. Patient's MS improving after adjustments made in home meds. Pt currently AAOx2-3 from AAOx1 in ED. Septic Workup -ve for far ( no leukocytosis / fever reported, UCx  w 75K Staph Hemolyticus/ <10k E coli, no symptoms of UTI) Neurology onboard and planning for MRI/EEG on Monday. Plan:  Plan:  Acute Metabolic Encephalopathy:  -No Fever/ tachycardia/ hypotension on admission/ no leukocytosis or clear source of infection  -Neurology recommends MRI Brain and EEG   -Pt's MS improving after holding home baclofen, risperidone, oxycodone and decreasing dose of Gabapentin  -VBG w PH 7.22/ PCO2 77/ HCO3 28- F/U ABG wnl  -Will avoid opioids  -Urine drug screen -ve  -UA with Mod LE+ , few bacteria, no leukocytosis/ Fever  -Lactate wnl     #Chronic L-1Compression Fx:  -Neurosurgery onboard, will appreciate recs  -If significant pain- Back brace/ Kyphoplasty  -MRI- NPH- Nsx rec to F/u with either [facility] or [facility] neurosurgery.   -Currently pt denies any pain    [email or web address]:  Insulin Lispro SS     #Afib:  Metoprolol 25 mg PO Q12H  Apixaban 5 mg PO Q12H     #HLD:  Rosuvastatin/ASA     #Constipation:  Senna QHS     #urinary incontinence:  -Oxybutynin     Misc:  C/w Pantoprazole/ Gabapentin reduced dose/Vitamin D    Diet ADULT DIET;  Dysphagia - Soft and Bite Sized   DVT Prophylaxis [] Lovenox, []  Heparin, [] SCDs, [] Ambulation,  [x] Eliquis, [] Xarelto  [] Coumadin   Code Status Full Code   Disposition From: Home  Expected Disposition: Home  Estimated Date of Discharge:   Patient requires continued admission due to MRI/EEG   Surrogate Decision Maker/ SHAINA Larsen     Subjective:     Chief Complaint: No chief complaint on file. Pt denies any active complaints    Review of Systems:    Review of Systems    10 point ROS is unremarkable as above    Objective: Intake/Output Summary (Last 24 hours) at 7/10/2022 1748  Last data filed at 7/10/2022 1246  Gross per 24 hour   Intake 360 ml   Output 670 ml   Net -310 ml        Vitals:   Vitals:    07/10/22 1500   BP: (!) 121/55   Pulse:    Resp:    Temp: 98.6 °F (37 °C)   SpO2:        Physical Exam:   Gen/overall appearance: Not in acute distress. Alert. Oriented X3  Head: Normocephalic, atraumatic  Eyes: EOMI, good acuity  ENT: Oral mucosa moist  Neck: No JVD, thyromegaly  CVS: Nml S1S2, no MRG, RRR  Pulm: CTA b/l, no w/r/c  Gastrointestinal: Soft, NT/ND, +BS  Musculoskeletal: No edema. Warm  Neuro: No focal deficit. Moves extremity spontaneously. Psychiatry: Appropriate affect. Not agitated. Skin: Warm, dry with normal turgor.  No rash  Capillary refill: Brisk,< 3 seconds   Peripheral Pulses: +2 palpable, equal bilaterally       Medications:   Medications:    sodium chloride flush  5-40 mL IntraVENous 2 times per day    acetaminophen  1,000 mg Oral Q8H    apixaban  5 mg Oral BID    aspirin  81 mg Oral Daily    citalopram  40 mg Oral Daily    donepezil  10 mg Oral Daily    pantoprazole  40 mg Oral QAM AC    oxybutynin  5 mg Oral TID    rosuvastatin  40 mg Oral QPM    senna  1 tablet Oral Nightly    Vitamin D  1,000 Units Oral Daily    erythromycin   Left Eye Nightly    gabapentin  600 mg Oral BID    insulin lispro  0-6 Units SubCUTAneous TID WC    insulin lispro  0-3 Units SubCUTAneous Nightly    metoprolol tartrate  25 mg Oral BID      Infusions:    sodium chloride      dextrose       PRN Meds: sodium chloride flush, 5-40 mL, PRN  sodium chloride, 25 mL, PRN  ondansetron, 4 mg, Q8H PRN   Or  ondansetron, 4 mg, Q6H PRN  acetaminophen, 650 mg, Q6H PRN   Or  acetaminophen, 650 mg, Q6H PRN  ondansetron, 4 mg, Q8H PRN   Or  ondansetron, 4 mg, Q6H PRN  bisacodyl, 5 mg, PRN  glucose, 4 tablet, PRN  dextrose bolus, 125 mL, PRN   Or  dextrose bolus, 250 mL, PRN  glucagon (rDNA), 1 mg, PRN  dextrose, 100 mL/hr, PRN        Labs      Recent Results (from the past 24 hour(s))   POCT Glucose    Collection Time: 07/09/22  8:35 PM   Result Value Ref Range    POC Glucose 104 (H) 70 - 99 MG/DL   POCT Glucose    Collection Time: 07/10/22  7:23 AM   Result Value Ref Range    POC Glucose 84 70 - 99 MG/DL   POCT Glucose    Collection Time: 07/10/22 11:42 AM   Result Value Ref Range    POC Glucose 90 70 - 99 MG/DL   POCT Glucose    Collection Time: 07/10/22  4:29 PM   Result Value Ref Range    POC Glucose 110 (H) 70 - 99 MG/DL        Imaging/Diagnostics Last 24 Hours   XR CHEST (2 VW)    Result Date: 7/8/2022  EXAMINATION: TWO XRAY VIEWS OF THE CHEST 7/8/2022 6:07 pm COMPARISON: Chest 09/22/2021, CT lumbar spine 06/06/2021 HISTORY: ORDERING SYSTEM PROVIDED HISTORY: SOB, cough, failed swallow eval Additional signs and symptoms: none Relevant Medical/Surgical History: CAD FINDINGS: The cardiac silhouette is enlarged. Calcifications involving the aorta reflect atherosclerosis. The mediastinal and hilar silhouettes appear unremarkable. Senescent changes. No focal infiltrate is evident. No pleural effusion. No pneumothorax is seen. No acute osseous abnormality is identified. Bones appear osteoporotic. No definite thoracic compression fracture. Age indeterminate progression of L1 anterior wedge compression fracture (about 30% compared to 10% described previously). Stable sequela from CABG, heart valve surgery and pacemaker. 1. No acute pulmonary infiltrate. 2. Calcific atherosclerosis aorta. 3. Cardiomegaly. 4. Chronic L1 anterior wedge compression fracture, interval progression now measuring about 30%. 5. Bones appear osteoporotic.      CT LUMBAR SPINE WO CONTRAST    Result Date: 7/10/2022  EXAMINATION: CT OF THE LUMBAR SPINE WITHOUT CONTRAST  7/10/2022 TECHNIQUE: CT of the lumbar spine was performed without the administration of intravenous contrast. Multiplanar reformatted images are provided for review. Adjustment of mA and/or kV according to patient size was utilized. Automated exposure control, iterative reconstruction, and/or weight based adjustment of the mA/kV was utilized to reduce the radiation dose to as low as reasonably achievable. COMPARISON: June 6, 2021. HISTORY: ORDERING SYSTEM PROVIDED HISTORY: Chronic L1 compression fracture? TECHNOLOGIST PROVIDED HISTORY: Reason for exam:->Chronic L1 compression fracture? Reason for Exam: Chronic L1 compression fracture? FINDINGS: BONES/ALIGNMENT: Compression fracture of L1 (20% of height loss) and L3 (30% of central height loss). 2 mm of bony fragment retropulsion into the central canal at L1.  3 mm of bony fragment retropulsion into the central canal at L3. The degree of compression of L1 has increased since the exam of June 6, 2021. The L3 compression fracture is new. Lumbar alignment is maintained. DEGENERATIVE CHANGES: Bones are osteopenic. Facet arthropathy at L4-L5 and L5-S1. SOFT TISSUES/RETROPERITONEUM: No paraspinal mass. Calcified uterine fibroids. 1. Anterior compression fracture of L1 results in 20% of height loss. The degree of compression has progressed since the exam of June 6, 2021. There is 2 mm of bony fragment retropulsion into the central canal. 2. L3 compression fracture involving the superior endplate with 95% of central height loss. This is new compared to the prior exam.  2 mm of bony fragment retropulsion into the central canal. 3. Bones are osteopenic.        Electronically signed by Marcello Maya MD on 7/10/2022 at 5:48 PM

## 2022-07-10 NOTE — PROGRESS NOTES
Occupational Therapy    Occupational Therapy Treatment Note    Date:  7/10/2022   Room:  [facility]    Denis Montana :   1945   MRN: [id number] Admission Date: 2022     Diagnosis:  There were no encounter diagnoses. Restrictions/Precautions:    Restrictions/Precautions  Restrictions/Precautions: Fall Risk,General Precautions  Required Braces or Orthoses?: No           Communication with other providers:  RN present at beginning of session and states appropriate for therapy    Subjective:  Patient states:  Agreeable to therapy. \"I'm not in any pain honey\"  Pain: Pt denied pain this date (0/10). Objective:    Orientation: Pt oriented alert and oriented x4  Observation: Pt received in supine with breakfast tray and RN present upon start of OT session  Objective Measures:  155/76 (91), 60 HR    Treatment, including education:    Therapeutic Activity Training:   Therapeutic activity training was instructed today. Extensive pt education and repeated practice provided for UE/LE placement for sit to stand from EOB with 2WW placed to front. Cues were given for safety, sequence, UE/LE placement, visual cues, and balance. Self Care Training:   Self care training was performed today. Pt agrees to complete ADLs in bed at EOB. Cues were given for safety, sequence, UE/LE placement, visual cues, and balance.       Supine to sit: CGA to EOB    Sit to stand: Sal from EOB x2with pt education for safety and UE placement d/t balance      Stand to sit: CGA from EOB x2 with pt education for safety and UE placement d/t balance    Bathing   UB: CGA for balance safety sitting EOB     Dressing   UB: SBA for clothing management and setup in sitting EOB   LB: CGA sitting EOB to doff/don socks d/t balance/safety     Grooming: CGA sitting EOB to complete oral, facial, hand hygiene and hair care with setup asisst and cueing    Sit to supine: SBA for safety to sit EOB and extended time    Assessment / Impression: Patient's tolerance of treatment: good  Significant change in status and impact:  None      Plan for Next Session:      Cont with POC addressing goals:    1. Pt will complete all aspects of bed mobility for OOB ADLs CGA  2. Pt will complete UB/LB bathing with Sal and AE as needed. 3. Pt will complete all aspects of LB dressing with  and AE as needed. 4. Pt will complete all functional transfers to and from bed, chair, toilet, shower chair with CGA and AE. 5. Pt will ambulate HH distance to bathroom for toileting with CGA. 6. Pt will complete all aspects of toileting task with CGA. 7. Pt will complete oral hygiene/grooming routine in standing at sink with CGA demo good dynamic standing balance for approx 8 minutes. 8. Pt will complete ther ex/ther act with focus on UB strengthening. Safety: Left in supine in bed with all needs met, call light within reach. bed alarm applied. Gait belt used for transfer and mobility.       Time in:  0806  Time out:  0846  Timed treatment minutes: 40   Total treatment time: 40 minutes    Electronically signed by:      Marco A Alanis OT, 7/10/2022, 8:20 AM [id number]    Previously filed values:

## 2022-07-11 ENCOUNTER — TELEPHONE (OUTPATIENT)
Dept: NEUROSURGERY | Age: 77
End: 2022-07-11

## 2022-07-11 ENCOUNTER — APPOINTMENT (OUTPATIENT)
Dept: MRI IMAGING | Age: 77
DRG: 092 | End: 2022-07-11
Attending: STUDENT IN AN ORGANIZED HEALTH CARE EDUCATION/TRAINING PROGRAM
Payer: MEDICARE

## 2022-07-11 DIAGNOSIS — S32.030A COMPRESSION FRACTURE OF L3 VERTEBRA, INITIAL ENCOUNTER (HCC): ICD-10-CM

## 2022-07-11 DIAGNOSIS — S32.010A COMPRESSION FRACTURE OF L1 VERTEBRA, INITIAL ENCOUNTER (HCC): Primary | ICD-10-CM

## 2022-07-11 LAB
ALBUMIN SERPL-MCNC: 4 GM/DL (ref 3.4–5)
ALP BLD-CCNC: 96 IU/L (ref 40–129)
ALT SERPL-CCNC: 6 U/L (ref 10–40)
ANION GAP SERPL CALCULATED.3IONS-SCNC: 9 MMOL/L (ref 4–16)
AST SERPL-CCNC: 15 IU/L (ref 15–37)
BASOPHILS ABSOLUTE: 0.1 K/CU MM
BASOPHILS RELATIVE PERCENT: 1 % (ref 0–1)
BILIRUB SERPL-MCNC: 0.3 MG/DL (ref 0–1)
BUN BLDV-MCNC: 11 MG/DL (ref 6–23)
CALCIUM SERPL-MCNC: 9.3 MG/DL (ref 8.3–10.6)
CHLORIDE BLD-SCNC: 102 MMOL/L (ref 99–110)
CO2: 28 MMOL/L (ref 21–32)
CREAT SERPL-MCNC: 0.8 MG/DL (ref 0.6–1.1)
DIFFERENTIAL TYPE: ABNORMAL
EOSINOPHILS ABSOLUTE: 0.2 K/CU MM
EOSINOPHILS RELATIVE PERCENT: 3 % (ref 0–3)
GFR AFRICAN AMERICAN: >60 ML/MIN/1.73M2
GFR NON-AFRICAN AMERICAN: >60 ML/MIN/1.73M2
GLUCOSE BLD-MCNC: 74 MG/DL (ref 70–99)
GLUCOSE BLD-MCNC: 85 MG/DL (ref 70–99)
GLUCOSE BLD-MCNC: 94 MG/DL (ref 70–99)
GLUCOSE BLD-MCNC: 94 MG/DL (ref 70–99)
GLUCOSE BLD-MCNC: 98 MG/DL (ref 70–99)
HCT VFR BLD CALC: 33.6 % (ref 37–47)
HEMOGLOBIN: 11 GM/DL (ref 12.5–16)
IMMATURE NEUTROPHIL %: 0.2 % (ref 0–0.43)
LYMPHOCYTES ABSOLUTE: 1.7 K/CU MM
LYMPHOCYTES RELATIVE PERCENT: 32.8 % (ref 24–44)
MAGNESIUM: 2 MG/DL (ref 1.8–2.4)
MCH RBC QN AUTO: 27.8 PG (ref 27–31)
MCHC RBC AUTO-ENTMCNC: 32.7 % (ref 32–36)
MCV RBC AUTO: 84.8 FL (ref 78–100)
MONOCYTES ABSOLUTE: 0.4 K/CU MM
MONOCYTES RELATIVE PERCENT: 7.4 % (ref 0–4)
NUCLEATED RBC %: 0 %
PDW BLD-RTO: 13 % (ref 11.7–14.9)
PHOSPHORUS: 3.9 MG/DL (ref 2.5–4.9)
PLATELET # BLD: 164 K/CU MM (ref 140–440)
PMV BLD AUTO: 8.9 FL (ref 7.5–11.1)
POTASSIUM SERPL-SCNC: 3.8 MMOL/L (ref 3.5–5.1)
RBC # BLD: 3.96 M/CU MM (ref 4.2–5.4)
SEGMENTED NEUTROPHILS ABSOLUTE COUNT: 2.8 K/CU MM
SEGMENTED NEUTROPHILS RELATIVE PERCENT: 55.6 % (ref 36–66)
SODIUM BLD-SCNC: 139 MMOL/L (ref 135–145)
TOTAL IMMATURE NEUTOROPHIL: 0.01 K/CU MM
TOTAL NUCLEATED RBC: 0 K/CU MM
TOTAL PROTEIN: 5.5 GM/DL (ref 6.4–8.2)
WBC # BLD: 5 K/CU MM (ref 4–10.5)

## 2022-07-11 PROCEDURE — 70551 MRI BRAIN STEM W/O DYE: CPT

## 2022-07-11 PROCEDURE — 97530 THERAPEUTIC ACTIVITIES: CPT

## 2022-07-11 PROCEDURE — 94761 N-INVAS EAR/PLS OXIMETRY MLT: CPT

## 2022-07-11 PROCEDURE — 36415 COLL VENOUS BLD VENIPUNCTURE: CPT

## 2022-07-11 PROCEDURE — 6370000000 HC RX 637 (ALT 250 FOR IP): Performed by: NURSE PRACTITIONER

## 2022-07-11 PROCEDURE — 1200000000 HC SEMI PRIVATE

## 2022-07-11 PROCEDURE — 2580000003 HC RX 258: Performed by: NURSE PRACTITIONER

## 2022-07-11 PROCEDURE — 82962 GLUCOSE BLOOD TEST: CPT

## 2022-07-11 PROCEDURE — 99233 SBSQ HOSP IP/OBS HIGH 50: CPT | Performed by: NURSE PRACTITIONER

## 2022-07-11 PROCEDURE — 85025 COMPLETE CBC W/AUTO DIFF WBC: CPT

## 2022-07-11 PROCEDURE — 80053 COMPREHEN METABOLIC PANEL: CPT

## 2022-07-11 PROCEDURE — 97116 GAIT TRAINING THERAPY: CPT

## 2022-07-11 PROCEDURE — 97535 SELF CARE MNGMENT TRAINING: CPT

## 2022-07-11 PROCEDURE — 99232 SBSQ HOSP IP/OBS MODERATE 35: CPT | Performed by: PHYSICIAN ASSISTANT

## 2022-07-11 PROCEDURE — 95819 EEG AWAKE AND ASLEEP: CPT

## 2022-07-11 PROCEDURE — 83735 ASSAY OF MAGNESIUM: CPT

## 2022-07-11 PROCEDURE — 72148 MRI LUMBAR SPINE W/O DYE: CPT

## 2022-07-11 PROCEDURE — 84100 ASSAY OF PHOSPHORUS: CPT

## 2022-07-11 RX ADMIN — GABAPENTIN 600 MG: 300 CAPSULE ORAL at 21:25

## 2022-07-11 RX ADMIN — METOPROLOL TARTRATE 25 MG: 25 TABLET, FILM COATED ORAL at 13:53

## 2022-07-11 RX ADMIN — SODIUM CHLORIDE, PRESERVATIVE FREE 10 ML: 5 INJECTION INTRAVENOUS at 21:28

## 2022-07-11 RX ADMIN — OXYBUTYNIN CHLORIDE 5 MG: 5 TABLET ORAL at 21:26

## 2022-07-11 RX ADMIN — ACETAMINOPHEN 1000 MG: 500 TABLET ORAL at 01:22

## 2022-07-11 RX ADMIN — ROSUVASTATIN CALCIUM 40 MG: 20 TABLET, COATED ORAL at 21:26

## 2022-07-11 RX ADMIN — DONEPEZIL HYDROCHLORIDE 10 MG: 10 TABLET ORAL at 13:54

## 2022-07-11 RX ADMIN — SENNOSIDES 8.6 MG: 8.6 TABLET, FILM COATED ORAL at 01:21

## 2022-07-11 RX ADMIN — PANTOPRAZOLE SODIUM 40 MG: 40 TABLET, DELAYED RELEASE ORAL at 06:29

## 2022-07-11 RX ADMIN — APIXABAN 5 MG: 5 TABLET, FILM COATED ORAL at 01:22

## 2022-07-11 RX ADMIN — SODIUM CHLORIDE, PRESERVATIVE FREE 10 ML: 5 INJECTION INTRAVENOUS at 14:09

## 2022-07-11 RX ADMIN — SENNOSIDES 8.6 MG: 8.6 TABLET, FILM COATED ORAL at 21:25

## 2022-07-11 RX ADMIN — METOPROLOL TARTRATE 25 MG: 25 TABLET, FILM COATED ORAL at 01:23

## 2022-07-11 RX ADMIN — OXYBUTYNIN CHLORIDE 5 MG: 5 TABLET ORAL at 13:54

## 2022-07-11 RX ADMIN — ASPIRIN 81 MG: 81 TABLET, COATED ORAL at 13:54

## 2022-07-11 RX ADMIN — GABAPENTIN 600 MG: 300 CAPSULE ORAL at 01:22

## 2022-07-11 RX ADMIN — CITALOPRAM 40 MG: 40 TABLET, FILM COATED ORAL at 13:53

## 2022-07-11 RX ADMIN — ERYTHROMYCIN: 5 OINTMENT OPHTHALMIC at 21:24

## 2022-07-11 RX ADMIN — OXYBUTYNIN CHLORIDE 5 MG: 5 TABLET ORAL at 01:22

## 2022-07-11 RX ADMIN — ACETAMINOPHEN 1000 MG: 500 TABLET ORAL at 21:25

## 2022-07-11 RX ADMIN — APIXABAN 5 MG: 5 TABLET, FILM COATED ORAL at 21:25

## 2022-07-11 RX ADMIN — ERYTHROMYCIN: 5 OINTMENT OPHTHALMIC at 01:28

## 2022-07-11 RX ADMIN — Medication 1000 UNITS: at 13:54

## 2022-07-11 RX ADMIN — APIXABAN 5 MG: 5 TABLET, FILM COATED ORAL at 13:54

## 2022-07-11 RX ADMIN — METOPROLOL TARTRATE 25 MG: 25 TABLET, FILM COATED ORAL at 21:26

## 2022-07-11 RX ADMIN — GABAPENTIN 600 MG: 300 CAPSULE ORAL at 13:54

## 2022-07-11 ASSESSMENT — PAIN SCALES - GENERAL
PAINLEVEL_OUTOF10: 0

## 2022-07-11 NOTE — PROGRESS NOTES
Discussed patient with Dr. Martha Prieto. MRI brain has been completed and is awaiting final read. Discussed that given patient neurologic improvement, if imaging is non-acute she is clear for discharge and can have EEG completed in the outpatient setting.

## 2022-07-11 NOTE — CARE COORDINATION
Chart reviewed and met w/ and sister at bedside. Pt is from home w/ her sister and was active with [facility] PTA. Pt has PCP and insurance with affordable RX copays. Pt reports that she plans to return home w/ [facility] at discharge and declined any other needs from CM at this time. Pt will need an inpatient consult for home care for nursing, PT & OT prior to discharge please. Thank you.

## 2022-07-11 NOTE — PROGRESS NOTES
V2.0  [facility] [place] [facility] Hospitalist Progress Note      Name:  Dasha Bond /Age/Sex: 1945  (76 y.o. female)   MRN & CSN:  [id number] & [id number] Encounter Date/Time: 2022 7:31 PM EDT    Location:  [facility]-A PCP: Emilee Soriano MD       Hospital Day: 4    Assessment and Plan:   Jess Muir a 76 y.o. female with pmh of  who presents with AMS (altered mental status), stable VS, non focal neuro exam, likely iso polypharmacy. Patient's MS improving after adjustments made in home meds. Pt currently AAOx2-3 from AAOx1 in ED. Septic Workup -ve for far ( no leukocytosis / fever reported, UCx  w 75K Staph Hemolyticus/ <10k E coli, no symptoms of UTI) Neurology onboard . MRI with no acute infarct. Pt awaiting Home Care. Pt planned for discharge home tomorrow s/p EEG report and Home Care setup.     Plan:    Acute Metabolic Encephalopathy:  -No Fever/ tachycardia/ hypotension on admission/ no leukocytosis or clear source of infection  -Neurology recommends MRI Brain and EEG   -Pt's MS improving after holding home baclofen, risperidone, oxycodone and decreasing dose of Gabapentin  -VBG w PH 7.22/ PCO2 77/ HCO3 28- F/U ABG wnl  -Will avoid opioids  -Urine drug screen -ve  -UA with Mod LE+ , few bacteria, no leukocytosis/ Fever  -Lactate wnl     #Chronic L-1Compression Fx:  -Neurosurgery onboard, will appreciate recs  -If significant pain- Back brace/ Kyphoplasty  -MRI- NPH- Nsx rec to F/u with either [facility] or [facility] neurosurgery.   -Currently pt denies any pain     [email or web address]:  Insulin Lispro SS     #Afib:  Metoprolol 25 mg PO Q12H  Apixaban 5 mg PO Q12H     #HLD:  Rosuvastatin/ASA     #Constipation:  Senna QHS     #urinary incontinence:  -Oxybutynin     Misc:  C/w Pantoprazole/ Gabapentin reduced dose/Vitamin D    Diet ADULT DIET;  Dysphagia - Soft and Bite Sized   DVT Prophylaxis [x] Lovenox, []  Heparin, [] SCDs, [] Ambulation,  [x] Eliquis, [] Xarelto  [] Coumadin   Code Status Full Code   Disposition From: Home  Expected Disposition: Home  Estimated Date of Discharge: 7/12/22  Patient requires continued admission due to [address] / EEG   Surrogate Decision Maker/ SHAINA Alaniz     Subjective:     Chief Complaint: No chief complaint on file. Pt denies any complaints         Review of Systems:    Review of Systems    10-point ROS unremarkable as above    Objective: Intake/Output Summary (Last 24 hours) at 7/11/2022 1931  Last data filed at 7/11/2022 1730  Gross per 24 hour   Intake 600 ml   Output 1500 ml   Net -900 ml        Vitals:   Vitals:    07/11/22 1411   BP: (!) 127/53   Pulse: 60   Resp: 18   Temp: 98 °F (36.7 °C)   SpO2: 99%       Physical Exam:     General: NAD  Eyes: EOMI  ENT: neck supple  Cardiovascular: Regular rate. Respiratory: Clear to auscultation  Gastrointestinal: Soft, non tender  Genitourinary: no suprapubic tenderness  Musculoskeletal: No edema  Skin: warm, dry  Neuro: Alert. Psych: Mood appropriate.      Medications:   Medications:    sodium chloride flush  5-40 mL IntraVENous 2 times per day    acetaminophen  1,000 mg Oral Q8H    apixaban  5 mg Oral BID    aspirin  81 mg Oral Daily    citalopram  40 mg Oral Daily    donepezil  10 mg Oral Daily    pantoprazole  40 mg Oral QAM AC    oxybutynin  5 mg Oral TID    rosuvastatin  40 mg Oral QPM    senna  1 tablet Oral Nightly    Vitamin D  1,000 Units Oral Daily    erythromycin   Left Eye Nightly    gabapentin  600 mg Oral BID    insulin lispro  0-6 Units SubCUTAneous TID WC    insulin lispro  0-3 Units SubCUTAneous Nightly    metoprolol tartrate  25 mg Oral BID      Infusions:    sodium chloride      dextrose       PRN Meds: sodium chloride flush, 5-40 mL, PRN  sodium chloride, 25 mL, PRN  ondansetron, 4 mg, Q8H PRN   Or  ondansetron, 4 mg, Q6H PRN  acetaminophen, 650 mg, Q6H PRN   Or  acetaminophen, 650 mg, Q6H PRN  ondansetron, 4 mg, Q8H PRN   Or  ondansetron, 4 mg, Q6H PRN  bisacodyl, 5 mg, PRN  glucose, 4 tablet, PRN  dextrose bolus, 125 mL, PRN   Or  dextrose bolus, 250 mL, PRN  glucagon (rDNA), 1 mg, PRN  dextrose, 100 mL/hr, PRN        Labs      Recent Results (from the past 24 hour(s))   POCT Glucose    Collection Time: 07/11/22  1:27 AM   Result Value Ref Range    POC Glucose 74 70 - 99 MG/DL   POCT Glucose    Collection Time: 07/11/22  6:27 AM   Result Value Ref Range    POC Glucose 85 70 - 99 MG/DL   CBC with Auto Differential    Collection Time: 07/11/22  7:06 AM   Result Value Ref Range    WBC 5.0 4.0 - 10.5 K/CU MM    RBC 3.96 (L) 4.2 - 5.4 M/CU MM    Hemoglobin 11.0 (L) 12.5 - 16.0 GM/DL    Hematocrit 33.6 (L) 37 - 47 %    MCV 84.8 78 - 100 FL    MCH 27.8 27 - 31 PG    MCHC 32.7 32.0 - 36.0 %    RDW 13.0 11.7 - 14.9 %    Platelets 877 330 - 015 K/CU MM    MPV 8.9 7.5 - 11.1 FL    Differential Type AUTOMATED DIFFERENTIAL     Segs Relative 55.6 36 - 66 %    Lymphocytes % 32.8 24 - 44 %    Monocytes % 7.4 (H) 0 - 4 %    Eosinophils % 3.0 0 - 3 %    Basophils % 1.0 0 - 1 %    Segs Absolute 2.8 K/CU MM    Lymphocytes Absolute 1.7 K/CU MM    Monocytes Absolute 0.4 K/CU MM    Eosinophils Absolute 0.2 K/CU MM    Basophils Absolute 0.1 K/CU MM    Nucleated RBC % 0.0 %    Total Nucleated RBC 0.0 K/CU MM    Total Immature Neutrophil 0.01 K/CU MM    Immature Neutrophil % 0.2 0 - 0.43 %   Comprehensive Metabolic Panel    Collection Time: 07/11/22  7:06 AM   Result Value Ref Range    Sodium 139 135 - 145 MMOL/L    Potassium 3.8 3.5 - 5.1 MMOL/L    Chloride 102 99 - 110 mMol/L    CO2 28 21 - 32 MMOL/L    BUN 11 6 - 23 MG/DL    CREATININE 0.8 0.6 - 1.1 MG/DL    Glucose 94 70 - 99 MG/DL    Calcium 9.3 8.3 - 10.6 MG/DL    Albumin 4.0 3.4 - 5.0 GM/DL    Total Protein 5.5 (L) 6.4 - 8.2 GM/DL    Total Bilirubin 0.3 0.0 - 1.0 MG/DL    ALT 6 (L) 10 - 40 U/L    AST 15 15 - 37 IU/L    Alkaline Phosphatase 96 40 - 129 IU/L    GFR Non-African American >60 >60 mL/min/1.73m2    GFR African American >60 >60 mL/min/1.73m2    Anion Gap 9 4 - 16 Magnesium    Collection Time: 07/11/22  7:06 AM   Result Value Ref Range    Magnesium 2.0 1.8 - 2.4 mg/dl   Phosphorus    Collection Time: 07/11/22  7:06 AM   Result Value Ref Range    Phosphorus 3.9 2.5 - 4.9 MG/DL   POCT Glucose    Collection Time: 07/11/22  1:52 PM   Result Value Ref Range    POC Glucose 98 70 - 99 MG/DL        Imaging/Diagnostics Last 24 Hours   CT LUMBAR SPINE WO CONTRAST    Result Date: 7/10/2022  EXAMINATION: CT OF THE LUMBAR SPINE WITHOUT CONTRAST  7/10/2022 TECHNIQUE: CT of the lumbar spine was performed without the administration of intravenous contrast. Multiplanar reformatted images are provided for review. Adjustment of mA and/or kV according to patient size was utilized. Automated exposure control, iterative reconstruction, and/or weight based adjustment of the mA/kV was utilized to reduce the radiation dose to as low as reasonably achievable. COMPARISON: June 6, 2021. HISTORY: ORDERING SYSTEM PROVIDED HISTORY: Chronic L1 compression fracture? TECHNOLOGIST PROVIDED HISTORY: Reason for exam:->Chronic L1 compression fracture? Reason for Exam: Chronic L1 compression fracture? FINDINGS: BONES/ALIGNMENT: Compression fracture of L1 (20% of height loss) and L3 (30% of central height loss). 2 mm of bony fragment retropulsion into the central canal at L1.  3 mm of bony fragment retropulsion into the central canal at L3. The degree of compression of L1 has increased since the exam of June 6, 2021. The L3 compression fracture is new. Lumbar alignment is maintained. DEGENERATIVE CHANGES: Bones are osteopenic. Facet arthropathy at L4-L5 and L5-S1. SOFT TISSUES/RETROPERITONEUM: No paraspinal mass. Calcified uterine fibroids. 1. Anterior compression fracture of L1 results in 20% of height loss. The degree of compression has progressed since the exam of June 6, 2021.   There is 2 mm of bony fragment retropulsion into the central canal. 2. L3 compression fracture involving the superior endplate with 00% of central height loss. This is new compared to the prior exam.  2 mm of bony fragment retropulsion into the central canal. 3. Bones are osteopenic. MRI LUMBAR SPINE WO CONTRAST    Result Date: 7/11/2022  EXAMINATION: MRI OF THE LUMBAR SPINE WITHOUT CONTRAST, 7/11/2022 8:50 am TECHNIQUE: Multiplanar multisequence MRI of the lumbar spine was performed without the administration of intravenous contrast. COMPARISON: 7/10/2022 HISTORY: ORDERING SYSTEM PROVIDED HISTORY: L1 and L3 burst fracture, evaluate acuity TECHNOLOGIST PROVIDED HISTORY: Reason for exam:->L1 and L3 burst fracture, evaluate acuity FINDINGS: BONES/ALIGNMENT: There is normal alignment of the lumbar spine. There is an acute L1 superior endplate fracture with approximately 50% loss of vertebral body height centrally. There is an acute fracture along the left L3 superior endplate, with edema asymmetric towards the left. No other areas of marrow edema. SPINAL CORD: The conus tip terminates near the level of the L2 vertebral body. The cauda equina nerve roots are unremarkable. SOFT TISSUES: This is a motion degraded examination. No paraspinal masses. L1-L2: There is a minimal disc bulge lateralizing to the left. No central spinal canal stenosis or foraminal narrowing. L2-L3: There is a disc bulge lateralizing to the left foramen. There is facet arthropathy and ligamentum flavum thickening. No central spinal canal stenosis. Mild left and minimal right foraminal narrowing. L3-L4: There is a disc bulge lateralizing towards the left. There is bilateral facet arthropathy. Minimal central spinal canal narrowing. Minimal right foraminal narrowing. No left foraminal narrowing. L4-L5: There is bilateral facet arthropathy. There is a disc bulge lateralizing to the left. Mild central spinal canal narrowing. Minimal right foraminal narrowing. No left foraminal narrowing. L5-S1: There is asymmetric left-sided facet arthropathy. intensity and craniocervical junction are unremarkable. Pituitary gland is normal in appearance. 1. No evidence of an acute infarct. 2. Cerebral parenchymal volume loss with severe chronic microvascular white matter ischemic disease. 3. There is an old intraparenchymal hemorrhage in the left temporal lobe with surrounding areas of gliosis. No new areas of hemorrhage are identified.        Electronically signed by Sonia Santoyo MD on 7/11/2022 at 7:31 PM

## 2022-07-11 NOTE — TELEPHONE ENCOUNTER
----- Message from Debbi Bellamy PA-C sent at 7/11/2022  1:46 PM EDT -----  Follow-up in 6 weeks with repeat thoracolumbar xray for L1 and L3 fracture

## 2022-07-11 NOTE — PROGRESS NOTES
Pt alert and oriented, sister at bedside, agreeable to plan. Pt to have TLSO brace, educated and encouraged to wear, patient declines to maintain rest without brace, educated about risks benefits, no complications. Pt resting in bed eyes closed.

## 2022-07-11 NOTE — PROGRESS NOTES
Physical Therapy  Name: Denisse Stinson MRN: [id number] :   1945   Date:  2022   Admission Date: 2022 Room:  [address] [place] [address]   Restrictions/Precautions:  Restrictions/Precautions  Restrictions/Precautions: Fall Risk,General Precautions  Required Braces or Orthoses?: No     RN states imaging found lumbar compression fx and she will be receiving back brace for OOB activity    Communication with other providers:  Jose RN states pt is ok to see for therapy and to do light activity today. RN states imaging found lumbar compression fx and she will be receiving back brace for OOB activity    Subjective:  Patient states:  Patient is agreeable for rehab  Pain:   Location, Type, Intensity (0/10 to 10/10):  denies    Objective:    Observation:  Patient is supine in bed with SNTA x2 for depends change. This therapist offers to take over for bed mobility training. Therapeutic activities includes, bed mobility, sit to stand, stand to sit and short distance gait training today. Therapeutic exercise sitting and education on spinal precautions with activity. Transfers with line management of Tele monitor  Rolling: Min A for full roll, SBA for sustained sidelying  Supine to sit :Min A with encouragement to fully log roll before trunk rise  Sit to supine : Mod A with patient not utilizing log roll to return to bed. Max cues for log roll  Scooting :SBA seated scoot to EOB and SBA for supine scoot to HOB  Sit to stand :CGA from low EOB, cues to avoid excess lumbar flexion with sequence. Min A from recliner with patient using momentum to stand  Stand to sit :Min A for eccentric control  SPT:Min A d/t required walker guidance from recliner to EOB      Gait:  Pt amb with RW for 10 ft + 15 ft inside room with CGA assist  Pt needed VC's for increase trunk extension and decrease cervical extension     Exercises  Sitting Exercises: Ankle pumps x 10  LAQ's x 10  Marching x 10     Therapeutic exercises were instructed today.   Cues were given for technique, safety, recruitment, and rationale. Cues were verbal and/or tactile. Safety  Patient left safely in the bed, with call light/phone in reach with alarm applied. Gait belt and mask were used for transfers and gait. Assessment / Impression:     Patient's tolerance of treatment:  good   Adverse Reaction: none  Significant change in status and impact:  7/9 imaging displays lumbar compression fx  Barriers to improvement:  Lumbar stability    Plan for Next Session:    Will cont to work towards pt's goals per patient tolerance  Time in:  1539  Time out:  1605  Timed treatment minutes:  26  Total treatment time:  26  Previously filed items:  Social/Functional History  Lives With: Jacobbud Gordon (comment) (sister recently passed away, lives w/sister in [facility])  Type of Home: [address] [place] [address] [place]: One level  Home Access: Level entry  Bathroom Shower/Tub: Walk-in shower  Bathroom Equipment: Grab bars around toilet,Grab bars in shower  Bathroom Accessibility: [facility]: Nan Garza, 4 wheeled,Wheelchair-manual  Has the patient had two or more falls in the past year or any fall with injury in the past year?: No  Receives Help From: Family  Homemaking Assistance: Needs assistance  Ambulation Assistance:  (mod I with walker)  Active : No  Mode of Transportation: Car  Short Term Goals  Time Frame for Short term goals: 1 week  Short term goal 1: pt will complete bed mobility at mod ind  Short term goal [facility]: pt will ambulate 100 ft using FWW at [facility]  Additional Goals?: No       Electronically signed by:     Elvia Villafuerte PTA  7/11/2022, 4:06 PM normal

## 2022-07-11 NOTE — PROGRESS NOTES
Neurological Services Progress Note  [facility]  Patient Name: Gosia Brownlee  : 1945      Subjective:  Patient was seen and assessed, chart was reviewed in detail. Patient was sleeping but wakes easily to voice. She is alert and oriented x 4 today. Able to follow command appropriately. Strength and sensation are intact to upper and lower extremities. Past Medical History:   Diagnosis Date    Anxiety     Arthritis     knees    Atrial fibrillation (HCC)     CAD (coronary artery disease)     Sees Dr. Ramirez Proffer COVID-19 2021    Diabetes mellitus ([address]    [facility] H/O cardiac catheterization 2018    severe 3 vessel disease, refer to CV surgery for CABG and MAZE    H/O cardiovascular stress test 2018    EF47%  fixed perfusion defect ant wall, pt in afib    H/O echocardiogram 2018    EF55% mod MR    H/O echocardiogram 2018    EF 50-55%, Mild : AR, MR & TR.    H/O echocardiogram 2020    EF 55%, Breast artifact noted.  H/O three vessel coronary artery bypass 2018    Dr. Romeo Snyder History of Holter monitoring 10/23/2018    Multiple PAF episodes noted. Tachy-Dinesh episodes noted.  History of nuclear stress test 2020    EF 55%, Breast artifact.     Hyperlipidemia     Hypertension     Memory loss     on Aricept    Missing teeth, acquired     Pneumonia     pneumococcal pneumonia twice at end of 2017    Risk for falls     uses walker    TIA (transient ischemic attack)     family doctors said she has had mini-strokes    Urinary leakage     UTI (urinary tract infection)     recent --just stopped antibiotic last week as of 18    Wears glasses     :   Past Surgical History:   Procedure Laterality Date    CABG WITH MITRAL VALVE REPAIR  2018    CABG X 3 [place]>LAD, SVG>CX M1, SVG>M2, MVR repair w/#28 CG ring, PFO closure, MAZE    CARDIAC CATHETERIZATION  2018    EYE SURGERY Bilateral 2018    Cataracts    MITRAL VALVE MAZE PROCEDURE  07/19/2018    Dr. Campos Emperor Left 12/11/2018    [employer] [facility] XT DR LANA Bartlett     THORACENTESIS Bilateral 07/13/2018    IR Dr. Piotr Patel, right 1300, left 900 all s/s    TONSILLECTOMY  1940's    WISDOM TOOTH EXTRACTION       No current facility-administered medications on file prior to encounter. Current Outpatient Medications on File Prior to Encounter   Medication Sig Dispense Refill    apixaban (ELIQUIS) 5 MG TABS tablet Take 1 tablet by mouth 2 times daily 42 tablet 0    metoprolol tartrate (LOPRESSOR) 50 MG tablet Take 0.5 tablets by mouth 2 times daily 180 tablet 1    rosuvastatin (CRESTOR) 40 MG tablet Take 1 tablet by mouth every evening 90 tablet 3    bisacodyl (DULCOLAX) 5 MG EC tablet Take 5 mg by mouth as needed      oxybutynin (DITROPAN) 5 MG tablet Take 1 tablet by mouth daily      oxyCODONE (ROXICODONE) 5 MG immediate release tablet Take 5 mg by mouth 2 times daily as needed.  polyethylene glycol (GLYCOLAX) 17 GM/SCOOP powder Take 17 g by mouth daily      potassium chloride (KLOR-CON) 20 MEQ packet Take 20 mEq by mouth daily      senna (SENOKOT) 8.6 MG tablet Take 1 tablet by mouth daily      acetaminophen (APAP EXTRA STRENGTH) 500 MG tablet Take 1 tablet by mouth every 6 hours as needed for Pain 20 tablet 0    gabapentin (NEURONTIN) 600 MG tablet Take 600 mg by mouth 3 times daily.  pregabalin (LYRICA) 150 MG capsule 150 mg 2 times daily.   (Patient not taking: Reported on 7/8/2022)      ipratropium (ATROVENT) 0.03 % nasal spray       methylcellulose (CITRUCEL) 500 MG TABS Take 2,000 mg by mouth daily prn      omeprazole (PRILOSEC) 40 MG delayed release capsule Take 40 mg by mouth daily      melatonin 3 MG TABS tablet Take 3 mg by mouth daily      estradiol (ESTRACE) 0.1 MG/GM vaginal cream Place 2 g vaginally 3 times daily as needed       aspirin 81 MG EC tablet Take 1 tablet by mouth daily (Patient not taking: Reported on  aspirin EC tablet 81 mg  81 mg Oral Daily Elba Flores APRN - CNP   81 mg at 07/11/22 1354    bisacodyl (DULCOLAX) EC tablet 5 mg  5 mg Oral PRN Felicia Saber, APRN - CNP        citalopram (CELEXA) tablet 40 mg  40 mg Oral Daily Felicia Saber, APRN - CNP   40 mg at 07/11/22 1353    donepezil (ARICEPT) tablet 10 mg  10 mg Oral Daily Felicia Saber, APRN - CNP   10 mg at 07/11/22 1354    pantoprazole (PROTONIX) tablet 40 mg  40 mg Oral QAM AC Elba I Mark, APRN - CNP   40 mg at 07/11/22 2664    oxybutynin (DITROPAN) tablet 5 mg  5 mg Oral TID Felicia Saber, APRN - CNP   5 mg at 07/11/22 1354    rosuvastatin (CRESTOR) tablet 40 mg  40 mg Oral QPM Felicia Saber, APRN - CNP   40 mg at 07/10/22 1908    senna (SENOKOT) tablet 8.6 mg  1 tablet Oral Nightly Felicia Saber, APRN - CNP   8.6 mg at 07/11/22 0121    Vitamin D (CHOLECALCIFEROL) tablet 1,000 Units  1,000 Units Oral Daily Felicia Saber, APRN - CNP   1,000 Units at 07/11/22 1354    erythromycin [facility]) ophthalmic ointment   Left Eye Nightly Elba Flores APRN - CNP   Given at 07/11/22 0128    gabapentin (NEURONTIN) capsule 600 mg  600 mg Oral BID Felicia Saber, APRN - CNP   600 mg at 07/11/22 1354    glucose chewable tablet 16 g  4 tablet Oral PRN Elba I Mark APRN - CNP        dextrose bolus 10% 125 mL  125 mL IntraVENous PRN BIRD Farrell - CNP        Or    dextrose bolus 10% 250 mL  250 mL IntraVENous PRN Elba I Mark, APRN - CNP        glucagon (rDNA) injection 1 mg  1 mg IntraMUSCular PRN Elba I BIRD Flores - CNP        dextrose 5 % solution  100 mL/hr IntraVENous PRN Elba I Mark, APRN - CNP        insulin lispro (HUMALOG) injection vial 0-6 Units  0-6 Units SubCUTAneous TID  BIRD Farrell CNP        insulin lispro (HUMALOG) injection vial 0-3 Units  0-3 Units SubCUTAneous Nightly Elba RAY the Last Year: Not on file    Unstable Housing in the Last Year: Not on file      Family History   Problem Relation Age of Onset    Stroke Mother     Heart Disease Father     Early Death Father     Breast Cancer Sister     Parkinsonism Brother     Heart Disease Sister     Other Sister         fibromyalgia    Diabetes Sister     Early Death Brother          at birth       Review of Symptoms:  10-point system review completed. All of which are negative except as mentioned above. Vitals:    22 1339   BP:    Pulse: 60   Resp: 18   Temp:    SpO2:        Exam: Gen: A&O x 4, NAD, cooperative  HEENT: NC/AT, EOMI, PERRL, mmm, no carotid bruits, neck supple, no meningeal signs  Heart: RRR  Lungs: CTAB  Abd: soft/NT/ND, +BS  Ext: no edema, no calf tenderness b/l  Endo: no thyromegaly  Psych: no depression or anxiety history  Skin: no rashes or lesions    NEUROLOGIC EXAM:  Mental Status: A&O x 4, NAD, speech clear, language fluent, comprehension intact, repetition and naming intact    Cranial Nerve Exam:   CN II-XII intact: PERRL, VFF, no nystagmus, no gaze paresis, sensation V1-V3 intact b/l, muscles of facial expression symmetric; hearing intact to conversational tone, palate elevates symmetrically, shoulder elevation symmetric and tongue protrudes midline with movement side to side.     Motor Exam:       Strength 5/5 UE's/LE's b/l  Tone and bulk normal   No pronator drift    Deep Tendon Reflexes: 2/4 biceps, triceps, brachioradialis, patellar, and achilles b/l, flexor plantar responses b/l    Sensation: Intact light touch UE's/LE's b/l, mild vibratory sense decreased in the distal lower extremities bilaterally    Coordination/Cerebellum:       Tremors--none      Rapidly alternating movements: no dysdiadochokinesia b/l                Finger-to-Nose: no dysmetria b/l    Gait and stance:      Gait: No ataxia      Romberg: not present    LABS:  Recent Labs     22  0403 22  0706   WBC 5.1 5.0 * 139   CL 99 102   CO2 25 28   BUN 9 11   CREATININE 0.6 0.8   GLUCOSE 91 94        IMAGING:     Carotid ultrasound  The right internal carotid artery demonstrates 0-50% stenosis.       The left internal carotid artery demonstrates 0-50% stenosis.       Bilateral vertebral arteries are patent with flow in the normal direction. MRI brain:   Impression   1. No evidence of an acute infarct. 2. Cerebral parenchymal volume loss with severe chronic microvascular white   matter ischemic disease. 3. There is an old intraparenchymal hemorrhage in the left temporal lobe with   surrounding areas of gliosis.  No new areas of hemorrhage are identified. ASSESSMENT/PLAN:  Principal Problem:    AMS (altered mental status)  Active Problems:    Altered mental status  Resolved Problems:    * No resolved hospital problems. *      3 77-year-old female with remote history of a traumatic left temporal lobe hematoma who presents with altered mental status secondary to delirium v polypharmacy superimposed on acute L1, L3 compression fracture Low suspicion for stroke or seizure  · Neuro exam  Patient is alert and oriented x 4  Able to follow all commands  Strength and sensation are intact to upper and lower extremities  Speech is clear and fluent    · Neurodiagnostics  MRI brain as above  EEG pending    · PT/OT/SP  Bracing for lumbar compression fractures as recommended by neurosurgery    Patient was discussed with attending neurologist Dr. Shanel Lucero      Thank you for allowing us to participate in the care of your patient. If there are any questions regarding evaluation please feel free to contact us.      Electronically signed by: BIRD Bryant CNP, 7/11/2022 3:57 PM

## 2022-07-11 NOTE — PROGRESS NOTES
PT is alert and oriented, cooperative with care. Medications admin completed without incident, pt eats breakfast 100%. PT to MRI with transported, and returned without complications. Pt is resting in bed, eyes closed, sister at bedside. Agreeable to plan. PT has all needs met, call light in reach, thanks staff for care, denies complaints.

## 2022-07-11 NOTE — PROGRESS NOTES
Occupational Therapy    Occupational Therapy Treatment Note    Name: Rayna Eaton MRN: [id number] :   1945   Date:  2022   Admission Date: 2022 [facility]         Restrictions/Precautions:  Restrictions/Precautions  Restrictions/Precautions: Fall Risk,General Precautions  Required Braces or Orthoses?: Yes, awaiting TLSO and received clearance from RN for [facility] t/f. Communication with other providers: Per RN Carlos Iqbal pt appropriate for therapeutic intervention. Subjective:  Patient states: \"Pt agreeable to session. \"  Pain:   Location, Type, Intensity (0/10 to 10/10):  denies    Objective:    Observation: Pt presented in semi-dunn's, sister at bedside. Objective Measures:  Tele     Treatment, including education:  Therapeutic Activity Training:   Therapeutic activity training was instructed today. Cues were given for safety, sequence, UE/LE placement, awareness, and balance. Activities performed today included bed mobility training, sup-sit, sit-stand, SPT. Supine to EOB with SBA with cues for technique    CGA to scoot hips forward. Sit to stand from EOB with use of FWW with max cues for UE placement with Min A. Step pivot transfer to [facility] with use of FWW with Min A for balance d/t retropulsion. EOB <> supine with Min A with cues for logroll technique, educated sister at bedside. Activities performed today included bed mobility training, transfers, functional mobility  to increase strength, activity tolerance to facilitate IND c ADL tasks, func transfers / mobility with emphasis safety awareness carryover. Self Care Training:   Cues were given for safety, sequence, UE/LE placement, visual cues, and balance. Activities performed today included dressing, toileting. Pt required Max A to doff soiled depend and don clean pull-up. BSC t/f with CGA with use of fWW. Max A for urine hygiene in standing. Max A to manage pull-up in standing.    Pt required increased time on [facility] and pt unable to void. Pt left supine in bed with call light within reach, sister at bedside, bed alarm on, and all needs met. Assessment / Impression:    Patient's tolerance of treatment: Good   Adverse Reaction: none  Significant change in status and impact:  Awaiting TLSO brace   Barriers to improvement: balance, safety      Plan for Next Session:    Cont OT POC     Time in:  1435  Time out:  1507  Timed treatment minutes:  32  Total treatment time:  32      Electronically signed by:     RYLAND Florez,   7/11/2022, 2:57 PM

## 2022-07-11 NOTE — PROGRESS NOTES
Neurosurgery Progress Note  7/11/2022 1:36 PM    Assessment and Plan:   1. Acute L1 and L3 compression fracture-noted to be acute on MRI of the lumbar spine obtained today. No significant central canal stenosis within the lumbar spine. Patient once again reiterates that she does not have any back pain currently while laying flat and she does not think that her pain worsens when she ambulates. I did explain to her that she should wear her back brace whenever she is upright and out of bed. I have attempted to reach both of her sisters multiple times without any success. Would want them to bring her back brace to the hospital.  She should obtain thoracolumbar x-rays standing with brace in place before discharge. She will follow-up with my office in 6 weeks. After speaking with her sister, Matthew Arriaga, she states that her sister does not have an orthopedic back brace but rather a brace that she found online and does not support her lumbar spine. TLSO brace ordered from [facility]. 2. Altered mental status/concern for NPH- noted on head CT at [facility] ER. MRI brain without any acute hemorrhage, severe chronic microvascular white matter disease noted. She is noted to have an old intraparenchymal hemorrhage in the left temporal lobe. Per chart review she sustained this head bleed in June of last year. I did speak with her sister yesterday about follow-up at [address] [place] [address] for NPH work-up. Her brain MRI does suggest that the ventricular enlargement is proportional to the cerebral sulci. EEG here in the hospital pending. 3. Osteoporosis- did discuss with the patient and her sister Matthew Arriaga about the importance of following up with her PCP regarding osteoporosis treatment. Matthew Arriaga feels that she is on medication for this already. Supervising physician: Fletcher Patterson. Arley Erazo MD.  Dr. Arley Erazo was readily and continuously available by phone for direct consultation regarding the care of this patient.     Subjective:   Admit Date: 7/8/2022  PCP: Giuliana Whitten MD    Patient lying in bed, just returned from MRI. She denies any back pain currently and does not feel that it worsens when she ambulates. She does complain of some chronic back pain that occurs when she sits edge of bed after getting up. I did tell her that she should wear her back brace and avoid heavy lifting. She states that she understands. Data:   Scheduled Meds:   sodium chloride flush  5-40 mL IntraVENous 2 times per day    acetaminophen  1,000 mg Oral Q8H    apixaban  5 mg Oral BID    aspirin  81 mg Oral Daily    citalopram  40 mg Oral Daily    donepezil  10 mg Oral Daily    pantoprazole  40 mg Oral QAM AC    oxybutynin  5 mg Oral TID    rosuvastatin  40 mg Oral QPM    senna  1 tablet Oral Nightly    Vitamin D  1,000 Units Oral Daily    erythromycin   Left Eye Nightly    gabapentin  600 mg Oral BID    insulin lispro  0-6 Units SubCUTAneous TID WC    insulin lispro  0-3 Units SubCUTAneous Nightly    metoprolol tartrate  25 mg Oral BID         I/O last 3 completed shifts:   In: 840 [P.O.:840]  Out: 2170 [Urine:2170]  I/O this shift:  In: 240 [P.O.:240]  Out: 700 [Urine:700]    Intake/Output Summary (Last 24 hours) at 7/11/2022 1336  Last data filed at 7/11/2022 0830  Gross per 24 hour   Intake 720 ml   Output 2200 ml   Net -1480 ml     CULTURE results: Invalid input(s): BLOOD CULTURE,  URINE CULTURE, SURGICAL CULTURE  CBC:   Recent Labs     07/08/22  1519 07/09/22  0403 07/11/22  0706   WBC 3.7* 5.1 5.0   HGB 11.6* 11.1* 11.0*    169 164     BMP:    Recent Labs     07/08/22  1519 07/09/22  0403 07/11/22  0706   * 134* 139   K 4.0 3.9 3.8   CL 99 99 102   CO2 22 25 28   BUN 10 9 11   CREATININE 0.7 0.6 0.8   GLUCOSE 111* 91 94     Hepatic:   Recent Labs     07/08/22  1519 07/09/22  0403 07/11/22  0706   AST 14*  14* 14* 15   ALT 7*  7* 6* 6*   BILITOT 0.5  0.5 0.5 0.3   ALKPHOS 108  108 104 96         IMAGING: available xray, CT, and MRI results reviewed    MRI brain 7/11/22  Impression   1. No evidence of an acute infarct. 2. Cerebral parenchymal volume loss with severe chronic microvascular white   matter ischemic disease. 3. There is an old intraparenchymal hemorrhage in the left temporal lobe with   surrounding areas of gliosis.  No new areas of hemorrhage are identified. MRI lumbar spine 7/11/2022  Impression   1. Acute L1 superior endplate fracture with 84% loss of vertebral body height. 2. Acute L3 superior endplate fracture with approximately 25% loss of   vertebral body height. 3. No other acute fractures.    4. Fibroid uterus.           Objective:   Vitals: BP (!) 93/56   Pulse 65   Temp 97.8 °F (36.6 °C) (Oral)   Resp 18   Ht 5' 6.02\" (1.677 m)   Wt 163 lb 8 oz (74.2 kg)   SpO2 100%   BMI 26.37 kg/m²   General appearance: Alert, laying in bed, no distress  HEENT: Normocephalic, atraumatic, EOM intact  Neurologic: Mental status: Alert oriented to self, date of birth, situation, speech clear and coherent, no facial droop, follows commands briskly, sensation intact in all extremities  Extremities: Bilateral upper extremities able to resist gravity, bilateral handgrip 5/5, bilateral lower extremities 5/5 throughout, bilateral DTR 2+  Incision: none  Drains: none      Electronically signed by Minerva Vega PA-C on 7/11/2022 at 1:36 PM

## 2022-07-12 ENCOUNTER — APPOINTMENT (OUTPATIENT)
Dept: GENERAL RADIOLOGY | Age: 77
DRG: 092 | End: 2022-07-12
Attending: STUDENT IN AN ORGANIZED HEALTH CARE EDUCATION/TRAINING PROGRAM
Payer: MEDICARE

## 2022-07-12 VITALS
RESPIRATION RATE: 18 BRPM | BODY MASS INDEX: 25.44 KG/M2 | SYSTOLIC BLOOD PRESSURE: 107 MMHG | WEIGHT: 158.29 LBS | OXYGEN SATURATION: 96 % | TEMPERATURE: 98.1 F | HEART RATE: 69 BPM | DIASTOLIC BLOOD PRESSURE: 96 MMHG | HEIGHT: 66 IN

## 2022-07-12 LAB
ALBUMIN SERPL-MCNC: 3.8 GM/DL (ref 3.4–5)
ALP BLD-CCNC: 101 IU/L (ref 40–128)
ALT SERPL-CCNC: 7 U/L (ref 10–40)
ANION GAP SERPL CALCULATED.3IONS-SCNC: 9 MMOL/L (ref 4–16)
AST SERPL-CCNC: 18 IU/L (ref 15–37)
BASOPHILS ABSOLUTE: 0 K/CU MM
BASOPHILS RELATIVE PERCENT: 0.8 % (ref 0–1)
BILIRUB SERPL-MCNC: 0.4 MG/DL (ref 0–1)
BUN BLDV-MCNC: 9 MG/DL (ref 6–23)
CALCIUM SERPL-MCNC: 9.6 MG/DL (ref 8.3–10.6)
CHLORIDE BLD-SCNC: 101 MMOL/L (ref 99–110)
CO2: 25 MMOL/L (ref 21–32)
CREAT SERPL-MCNC: 0.6 MG/DL (ref 0.6–1.1)
DIFFERENTIAL TYPE: ABNORMAL
EOSINOPHILS ABSOLUTE: 0.1 K/CU MM
EOSINOPHILS RELATIVE PERCENT: 2.7 % (ref 0–3)
GFR AFRICAN AMERICAN: >60 ML/MIN/1.73M2
GFR NON-AFRICAN AMERICAN: >60 ML/MIN/1.73M2
GLUCOSE BLD-MCNC: 127 MG/DL (ref 70–99)
GLUCOSE BLD-MCNC: 83 MG/DL (ref 70–99)
GLUCOSE BLD-MCNC: 95 MG/DL (ref 70–99)
HCT VFR BLD CALC: 38.3 % (ref 37–47)
HEMOGLOBIN: 12.3 GM/DL (ref 12.5–16)
IMMATURE NEUTROPHIL %: 0.2 % (ref 0–0.43)
LYMPHOCYTES ABSOLUTE: 1.6 K/CU MM
LYMPHOCYTES RELATIVE PERCENT: 32.1 % (ref 24–44)
MAGNESIUM: 2.1 MG/DL (ref 1.8–2.4)
MCH RBC QN AUTO: 28.1 PG (ref 27–31)
MCHC RBC AUTO-ENTMCNC: 32.1 % (ref 32–36)
MCV RBC AUTO: 87.4 FL (ref 78–100)
MONOCYTES ABSOLUTE: 0.3 K/CU MM
MONOCYTES RELATIVE PERCENT: 6.8 % (ref 0–4)
NUCLEATED RBC %: 0 %
PDW BLD-RTO: 13.2 % (ref 11.7–14.9)
PHOSPHORUS: 4.2 MG/DL (ref 2.5–4.9)
PLATELET # BLD: 167 K/CU MM (ref 140–440)
PMV BLD AUTO: 8.9 FL (ref 7.5–11.1)
POTASSIUM SERPL-SCNC: 3.9 MMOL/L (ref 3.5–5.1)
RBC # BLD: 4.38 M/CU MM (ref 4.2–5.4)
SEGMENTED NEUTROPHILS ABSOLUTE COUNT: 2.8 K/CU MM
SEGMENTED NEUTROPHILS RELATIVE PERCENT: 57.4 % (ref 36–66)
SODIUM BLD-SCNC: 135 MMOL/L (ref 135–145)
TOTAL IMMATURE NEUTOROPHIL: 0.01 K/CU MM
TOTAL NUCLEATED RBC: 0 K/CU MM
TOTAL PROTEIN: 6.2 GM/DL (ref 6.4–8.2)
WBC # BLD: 4.8 K/CU MM (ref 4–10.5)

## 2022-07-12 PROCEDURE — 99232 SBSQ HOSP IP/OBS MODERATE 35: CPT | Performed by: PHYSICIAN ASSISTANT

## 2022-07-12 PROCEDURE — 94761 N-INVAS EAR/PLS OXIMETRY MLT: CPT

## 2022-07-12 PROCEDURE — 2580000003 HC RX 258: Performed by: NURSE PRACTITIONER

## 2022-07-12 PROCEDURE — 72080 X-RAY EXAM THORACOLMB 2/> VW: CPT

## 2022-07-12 PROCEDURE — 97530 THERAPEUTIC ACTIVITIES: CPT

## 2022-07-12 PROCEDURE — 82962 GLUCOSE BLOOD TEST: CPT

## 2022-07-12 PROCEDURE — 83735 ASSAY OF MAGNESIUM: CPT

## 2022-07-12 PROCEDURE — 6370000000 HC RX 637 (ALT 250 FOR IP): Performed by: NURSE PRACTITIONER

## 2022-07-12 PROCEDURE — 84100 ASSAY OF PHOSPHORUS: CPT

## 2022-07-12 PROCEDURE — 80053 COMPREHEN METABOLIC PANEL: CPT

## 2022-07-12 PROCEDURE — 85025 COMPLETE CBC W/AUTO DIFF WBC: CPT

## 2022-07-12 PROCEDURE — 95819 EEG AWAKE AND ASLEEP: CPT | Performed by: PSYCHIATRY & NEUROLOGY

## 2022-07-12 PROCEDURE — 36415 COLL VENOUS BLD VENIPUNCTURE: CPT

## 2022-07-12 RX ORDER — GABAPENTIN 600 MG/1
600 TABLET ORAL 2 TIMES DAILY
Qty: 90 TABLET | Refills: 3 | Status: SHIPPED | OUTPATIENT
Start: 2022-07-12 | End: 2022-07-13 | Stop reason: SDUPTHER

## 2022-07-12 RX ADMIN — CITALOPRAM 40 MG: 40 TABLET, FILM COATED ORAL at 08:44

## 2022-07-12 RX ADMIN — OXYBUTYNIN CHLORIDE 5 MG: 5 TABLET ORAL at 08:44

## 2022-07-12 RX ADMIN — ACETAMINOPHEN 1000 MG: 500 TABLET ORAL at 06:20

## 2022-07-12 RX ADMIN — METOPROLOL TARTRATE 25 MG: 25 TABLET, FILM COATED ORAL at 08:44

## 2022-07-12 RX ADMIN — SODIUM CHLORIDE, PRESERVATIVE FREE 10 ML: 5 INJECTION INTRAVENOUS at 08:49

## 2022-07-12 RX ADMIN — APIXABAN 5 MG: 5 TABLET, FILM COATED ORAL at 08:44

## 2022-07-12 RX ADMIN — ASPIRIN 81 MG: 81 TABLET, COATED ORAL at 08:44

## 2022-07-12 RX ADMIN — ACETAMINOPHEN 1000 MG: 500 TABLET ORAL at 11:37

## 2022-07-12 RX ADMIN — Medication 1000 UNITS: at 08:44

## 2022-07-12 RX ADMIN — DONEPEZIL HYDROCHLORIDE 10 MG: 10 TABLET ORAL at 08:44

## 2022-07-12 RX ADMIN — GABAPENTIN 600 MG: 300 CAPSULE ORAL at 08:44

## 2022-07-12 RX ADMIN — PANTOPRAZOLE SODIUM 40 MG: 40 TABLET, DELAYED RELEASE ORAL at 06:21

## 2022-07-12 ASSESSMENT — PAIN SCALES - GENERAL
PAINLEVEL_OUTOF10: 0
PAINLEVEL_OUTOF10: 0

## 2022-07-12 NOTE — PROGRESS NOTES
Electroencephalography (EEG) [address] [facility]        Patient:  Mitzi Jarvis Procedure Date: 7/11/2022   YOB: 1945 Referring Practitioner: Daine Skiff, DO   MRN: [id number] Interpreting Physician: Dr. Mike Sanchez         HISTORY:    This is a 76 y.o. old female presenting for evaluation of AMS      REPORT:    With the patient alert, the background showed a well-developed, well-sustained alpha rhythm in the 5-6 Hz range. The background activity attenuated with eye opening. There was symmetric low voltage beta activity seen in the anterior electrodes. No focal slowing or epileptiform discharges were noted. The patient slept during the recording, achieving Stage II of sleep; this was characterized by well-defined sleep spindles and vertex waves. No abnormalities were seen during the sleep phase of the recording. Photic stimulation and hyperventilation was not performed. No abnormalities were seen in the EKG monitoring channel. IMPRESSION:    Abnormal EEG due to moderate diffuse slowing of the background. No focal slowing or epileptiform discharges were seen. This is a non-specific finding and can be seen in variety of encephalopathies. Clinical correlation advised.       Electronically signed by: Daine Skiff, DO 7/12/2022 2:40 PM

## 2022-07-12 NOTE — PLAN OF CARE
Problem: Discharge Planning  Goal: Discharge to home or other facility with appropriate resources  7/12/2022 1029 by Reyes Rail, RN  Outcome: Adequate for Discharge  7/12/2022 0158 by Grecia Morgan RN  Outcome: Progressing     Problem: Skin/Tissue Integrity  Goal: Absence of new skin breakdown  Description: 1. Monitor for areas of redness and/or skin breakdown  2. Assess vascular access sites hourly  3. Every 4-6 hours minimum:  Change oxygen saturation probe site  4. Every 4-6 hours:  If on nasal continuous positive airway pressure, respiratory therapy assess nares and determine need for appliance change or resting period.   7/12/2022 1029 by Reyes Rail, RN  Outcome: Adequate for Discharge  7/12/2022 0158 by Grecia Morgan RN  Outcome: Progressing     Problem: Safety - Adult  Goal: Free from fall injury  7/12/2022 1029 by Reyes Rail, RN  Outcome: Adequate for Discharge  7/12/2022 0158 by Grecia Morgan RN  Outcome: Progressing     Problem: Chronic Conditions and Co-morbidities  Goal: Patient's chronic conditions and co-morbidity symptoms are monitored and maintained or improved  7/12/2022 1029 by Reyes Rail, RN  Outcome: Adequate for Discharge  7/12/2022 0158 by Grecia Morgan RN  Outcome: Progressing     Problem: Neurosensory - Adult  Goal: Achieves stable or improved neurological status  7/12/2022 1029 by Reyes Rail, RN  Outcome: Adequate for Discharge  7/12/2022 0158 by Grecia Morgan RN  Outcome: Progressing  Goal: Achieves maximal functionality and self care  7/12/2022 1029 by Reyes Rail, RN  Outcome: Adequate for Discharge  7/12/2022 0158 by Grecia Morgan RN  Outcome: Progressing     Problem: Skin/Tissue Integrity - Adult  Goal: Skin integrity remains intact  7/12/2022 1029 by Reyes Rail, RN  Outcome: Adequate for Discharge  7/12/2022 0158 by Grecia Morgan RN  Outcome: Progressing     Problem: Musculoskeletal - Adult  Goal: Return mobility to safest level of function  7/12/2022 1029 by Reyes Rail,
Problem: Discharge Planning  Goal: Discharge to home or other facility with appropriate resources  Outcome: Progressing  Flowsheets (Taken 7/8/2022 1006 by Reyes Kim LPN)  Discharge to home or other facility with appropriate resources: Identify barriers to discharge with patient and caregiver     Problem: Skin/Tissue Integrity  Goal: Absence of new skin breakdown  Description: 1. Monitor for areas of redness and/or skin breakdown  2. Assess vascular access sites hourly  3. Every 4-6 hours minimum:  Change oxygen saturation probe site  4. Every 4-6 hours:  If on nasal continuous positive airway pressure, respiratory therapy assess nares and determine need for appliance change or resting period.   Outcome: Progressing     Problem: Safety - Adult  Goal: Free from fall injury  Outcome: Progressing
breakdown     Problem: Musculoskeletal - Adult  Goal: Return mobility to safest level of function  Outcome: Progressing  Flowsheets (Taken 7/11/2022 0830)  Return Mobility to Safest Level of Function: Assess patient stability and activity tolerance for standing, transferring and ambulating with or without assistive devices  Goal: Return ADL status to a safe level of function  Outcome: Progressing  Flowsheets (Taken 7/11/2022 0830)  Return ADL Status to a Safe Level of Function: Administer medication as ordered     Problem: Infection - Adult  Goal: Absence of infection at discharge  Outcome: Progressing  Flowsheets (Taken 7/11/2022 0830)  Absence of infection at discharge: Assess and monitor for signs and symptoms of infection  Goal: Absence of infection during hospitalization  Outcome: Progressing  Flowsheets (Taken 7/11/2022 0830)  Absence of infection during hospitalization: Assess and monitor for signs and symptoms of infection     Problem: Pain  Goal: Verbalizes/displays adequate comfort level or baseline comfort level  Outcome: Progressing     Problem: ABCDS Injury Assessment  Goal: Absence of physical injury  Outcome: Progressing

## 2022-07-12 NOTE — PROGRESS NOTES
Pt in bed, eyes closed, and sleeping. Pt went down for X-ray with brace on awaiting results/reading. Pt had bowel movement this morning. Good amount, brown and formed. Pt got up and used walker with a 1 assist. Pt was cleaned up and undergarments changed and cleaned up. Pt tolerated walking ell with brace and was educated how to use and fit into brace. Will continue to monitor.

## 2022-07-12 NOTE — PROGRESS NOTES
Awaiting EEG read however this should not hinder discharge, stable to go from our POV. Thank you. Eloisa Craig CNP

## 2022-07-12 NOTE — PROGRESS NOTES
Physical Therapy    Physical Therapy Treatment Note  Name: Grisel Mckinney MRN: [id number] :   1945   Date:  2022   Admission Date: 2022 Room:  [address] [place], [place] [address]   Restrictions/Precautions:  Restrictions/Precautions  Restrictions/Precautions: Fall Risk,General Precautions  Required Braces or Orthoses?: No  Subjective:  Patient states:  \"I'm listening to you! \"  Pain:   Location, Type, Intensity (0/10 to 10/10):  denies  Objective:    Observation: Supine, asleep, lethargic, agreeable. Pt is limited participator; family is present to assist in DC planning, they are confident in their ability to attend to pt safety at home. Tele in place. Treatment, including education/measures:  Therapeutic Activity Training:   Therapeutic activity training was instructed today. Cues were given for safety, sequence, UE/LE placement, awareness, and balance. Activities performed today included bed mobility training, sup-sit, sit-stand, SPT. Supine <-> sit: mod A for completion  Seated balance: fair - progressing to fair w/ family available to assist in inc engagement ; intermittent min A to attend to retropulsive posture  Sit <-> stand: min for initiation of transition and ant fwd WSing to improve ability to attend to balance/posture  Standing balance: fair to fair - to attend to balance ; inconsistent multidirectional balance distruptions  Stand <-> sit: min A  Positioned for comfort and pressure relief ; all needs met, left in chair, chair alarmed, gait belt for OOB, call light in reach ; discussed DC planning, pt safety, pt progress    Assessment / Impression:    Pt making neutral progress 2/2 limited participation and dec receptiveness to OOB. She cont to require light assist for completion of all OOB safety ; dec ability to attend to balance, dec awareness of safety/posture, dec endurance, dec strength/power affecting transfers/transitions.  Pt endorses confidence in ability to manage pt at home, will cont w/ est DC plan.    Patient's tolerance of treatment:  Fair +  Barriers to improvement:  Cognition; self-limiting  Plan for Next Session:    Cont w/ est DC plan   Progress ambulation, balance, stregnth, functional mobility activities    Time in:  1140  Time out:  1204  Timed treatment minutes:  23 (TA x 2)  Total treatment time:  24    Previously filed items:  Social/Functional History  Lives With: Giovanny Coffman (comment) (sister recently passed away, lives w/sister in [facility])  Type of Home: [address] [place] [address] [place]: One level  Home Access: Level entry  Bathroom Shower/Tub: Walk-in shower  Bathroom Equipment: Grab bars around toilet,Grab bars in shower  Bathroom Accessibility: Accessible  Home Equipment: Elverna Corti, 4 wheeled,Wheelchair-manual  Has the patient had two or more falls in the past year or any fall with injury in the past year?: No  Receives Help From: Family  Homemaking Assistance: Needs assistance  Ambulation Assistance:  (mod I with walker)  Active : No  Mode of Transportation: Car  Patient Goals   Patient goals : return home     Short Term Goals  Time Frame for Short term goals: 1 week  Short term goal 1: pt will complete bed mobility at mod ind  [facility]: pt will ambulate 100 ft using FWW at [facility]  Additional Goals?: No    Electronically signed by:    Vickie Oakes PT, DPT  7/12/2022, 1:47 PM

## 2022-07-12 NOTE — PROGRESS NOTES
Neurosurgery Progress Note  7/12/2022 11:46 AM    Assessment and Plan:   1. Acute L1 and L3 compression fracture-noted to be acute on MRI of the lumbar spine obtained today. No significant central canal stenosis within the lumbar spine. Patient once again reiterates that she does not have any back pain currently while laying flat and she does not think that her pain worsens when she ambulates. I did explain to her that she should wear her back brace whenever she is upright and out of bed. She should obtain thoracolumbar x-rays standing with brace in place before discharge. She will follow-up with my office in 6 weeks. After speaking with her sister, Luan Prieto, she states that her sister does not have an orthopedic back brace but rather a brace that she found online and does not support her lumbar spine. TLSO brace ordered from [facility] clinic. Thoracolumbar x-rays pending, okay to discharge from neurosurgical standpoint. 2. Altered mental status/concern for NPH- noted on head CT at [facility] ER. MRI brain without any acute hemorrhage, severe chronic microvascular white matter disease noted. She is noted to have an old intraparenchymal hemorrhage in the left temporal lobe. Per chart review she sustained this head bleed in June of last year. I did speak with her sister yesterday about follow-up at [facility] for NPH work-up. Her brain MRI does suggest that the ventricular enlargement is proportional to the cerebral sulci. EEG here in the hospital pending. Referral to [facility] sent. 3. Osteoporosis- did discuss with the patient and her sister Luan Prieto about the importance of following up with her PCP regarding osteoporosis treatment. Luan Prieto feels that she is on medication for this already. Supervising physician: Henrik Uriostegui. Kely Mix MD.  Dr. Kely Mix was readily and continuously available by phone for direct consultation regarding the care of this patient.     Subjective:   Admit Date: 7/8/2022  PCP: Isaura Hutson MD    Patient sitting up in bed, TLSO brace in place. She states that she feels comfortable in it. She reports no significant back pain or has any other concerns at this time. She is excited at the thought of going home today. Data:   Scheduled Meds:   sodium chloride flush  5-40 mL IntraVENous 2 times per day    acetaminophen  1,000 mg Oral Q8H    apixaban  5 mg Oral BID    aspirin  81 mg Oral Daily    citalopram  40 mg Oral Daily    donepezil  10 mg Oral Daily    pantoprazole  40 mg Oral QAM AC    oxybutynin  5 mg Oral TID    rosuvastatin  40 mg Oral QPM    senna  1 tablet Oral Nightly    Vitamin D  1,000 Units Oral Daily    erythromycin   Left Eye Nightly    gabapentin  600 mg Oral BID    insulin lispro  0-6 Units SubCUTAneous TID WC    insulin lispro  0-3 Units SubCUTAneous Nightly    metoprolol tartrate  25 mg Oral BID         I/O last 3 completed shifts: In: 56 [P.O.:600; I.V.:10]  Out: 1500 [Urine:1500]  No intake/output data recorded.     Intake/Output Summary (Last 24 hours) at 7/12/2022 1146  Last data filed at 7/11/2022 2128  Gross per 24 hour   Intake 370 ml   Output --   Net 370 ml     CULTURE results: Invalid input(s): BLOOD CULTURE,  URINE CULTURE, SURGICAL CULTURE  CBC:   Recent Labs     07/11/22  0706 07/12/22  0637   WBC 5.0 4.8   HGB 11.0* 12.3*    167     BMP:    Recent Labs     07/11/22  0706 07/12/22  0637    135   K 3.8 3.9    101   CO2 28 25   BUN 11 9   CREATININE 0.8 0.6   GLUCOSE 94 83     Hepatic:   Recent Labs     07/11/22  0706 07/12/22  0637   AST 15 18   ALT 6* 7*   BILITOT 0.3 0.4   ALKPHOS 96 101         IMAGING: available xray, CT, and MRI results reviewed    Objective:   Vitals: BP (!) 107/96   Pulse 69   Temp 98.1 °F (36.7 °C) (Oral)   Resp 18   Ht 5' 6.02\" (1.677 m)   Wt 158 lb 4.6 oz (71.8 kg)   SpO2 96%   BMI 25.53 kg/m²   General appearance: alert, laying in bed eyes close, easily awoken  HEENT: normocephalic, atraumatic,

## 2022-07-12 NOTE — DISCHARGE INSTR - COC
Continuity of Care Form    Patient Name: Theodore Waller   :  1945  MRN:  [id number]    Admit date:  2022  Discharge date:  ***    Code Status Order: Full Code   Advance Directives:      Admitting Physician:  Rufus Dominguez MD  PCP: Danyell Walker MD    Discharging Nurse: [facility] Unit/Room#: 8930/5311-N  Discharging Unit Phone Number: ***    Emergency Contact:   Extended Emergency Contact Information  Primary Emergency Contact: Marcelina Enriquez  Address: [address] [place] [address] [place]  [address] [facility] Phone: [phone]  Mobile Phone: [phone]  Relation: Brother/Sister  Secondary Emergency Contact: Midkiff,Linda MPOA  Address: [place] [address], [facility] [address] [facility] [address] [facility] Phone: [phone]  Relation: Brother/Sister    Past Surgical History:  Past Surgical History:   Procedure Laterality Date    CABG WITH MITRAL VALVE REPAIR  2018    CABG X 3 [place]>LAD, SVG>CX M1, SVG>M2, MVR repair w/#28 CG ring, PFO closure, MAZE    CARDIAC CATHETERIZATION  2018    EYE SURGERY Bilateral 2018    Cataracts    MITRAL VALVE MAZE PROCEDURE  2018    Dr. Herr Pel Left 2018    [employer] [place] XT DR LANA Bartlett     THORACENTESIS Bilateral 2018    IR Dr. Faviola Watson, right 56, left 900 all s/s    TONSILLECTOMY  's    WISDOM TOOTH EXTRACTION         Immunization History:   Immunization History   Administered Date(s) Administered    COVID-19, PFIZER PURPLE top, DILUTE for use, (age 15 y+), 30mcg/0.3mL 2021, 2021    Pneumococcal Conjugate 13-valent (Chelly Her) 2018       Active Problems:  Patient Active Problem List   Diagnosis Code    Paroxysmal atrial fibrillation (HCC) I48.0    Essential hypertension I10    Mixed hyperlipidemia E78.2    VHD (valvular heart disease) I38    Abnormal EKG R94.31    Angina pectoris (HCC) I20.9    Uncontrolled hypertension I10    CHF following cardiac surgery, postop I97.130    Acute blood loss anemia D62    Uncontrolled pain R52    Gait disturbance R26.9    Pneumonia due to infectious organism J18.9    SSS (sick sinus syndrome) ([facility]) I49.5    S/P placement of cardiac pacemaker Z95.0    Tachycardia-bradycardia ([facility] I49.5    Fall at home, subsequent encounter W19. XXXD, Y92.009    Acute intracranial hemorrhage ([facility]) I62.9    Hyponatremia E87.1    Compression fracture of L1 lumbar vertebra ([facility]) S32.010A    Intraparenchymal hematoma of brain ([facility] [address] S06.360A    COVID-19 U07.1    CAD (coronary artery disease) I25.10    AMS (altered mental status) R41.82    Altered mental status R41.82    Fall W19. XXXA    Concussion with no loss of consciousness S06.0X0A       Isolation/Infection:   Isolation            No Isolation          Patient Infection Status       Infection Onset Added Last Indicated Last Indicated By Review Planned Expiration Resolved Resolved By    None active    Resolved    C-diff Rule Out 21 Clostridium Difficile Toxin/Antigen (Ordered)   22 Joan Perrin RN    Previous visit    COVID-19 21 COVID-19, Rapid   10/03/21     COVID-19 (Rule Out) 21 COVID-19, Rapid (Ordered)   21 Rule-Out Test Resulted            Nurse Assessment:  Last Vital Signs: BP (!) 107/96   Pulse 69   Temp 98.1 °F (36.7 °C) (Oral)   Resp 18   Ht 5' 6.02\" (1.677 m)   Wt 158 lb 4.6 oz (71.8 kg)   SpO2 96%   BMI 25.53 kg/m²     Last documented pain score (0-10 scale): Pain Level: 0  Last Weight:   Wt Readings from Last 1 Encounters:   22 158 lb 4.6 oz (71.8 kg)     Mental Status:  {IP PT MENTAL STATUS:08566}    IV Access:  {[facility] [place]ESS:924103154}    Nursing Mobility/ADLs:  Walking   {[facility] DME DMQY:046498795}  Transfer  {[facility] DME FWAS:847414678}  Bathing  {[facility] DME ORIJ:440562911}  Dressing  {[facility] DME RSLJ:364682617}  Toileting  {[facility] DME [facility]:294144536}  Feeding  {CHP DME MDGB:104734933}  Med Admin  {P DME MUKV:695436245}  Med Delivery   { HANSA MED Delivery:255273280}    Wound Care Documentation and Therapy:        Elimination:  Continence: Bowel: {YES / OL:34047}  Bladder: {YES / PS:69043}  Urinary Catheter: {Urinary Catheter:707058341}   Colostomy/Ileostomy/Ileal Conduit: {YES / DC:57827}       Date of Last BM: ***    Intake/Output Summary (Last 24 hours) at 2022 1335  Last data filed at 2022 2128  Gross per 24 hour   Intake 250 ml   Output --   Net 250 ml     I/O last 3 completed shifts: In: 56 [P.O.:600;  I.V.:10]  Out: 1500 [Urine:1500]    Safety Concerns:     [employer] Safety Concerns:954332356}    Impairments/Disabilities:      { HANSA Impairments/Disabilities:735369864}    Nutrition Therapy:  Current Nutrition Therapy:   [employer] Diet List:948505782}    Routes of Feeding: {[facility] DME Other Feedings:401855957}  Liquids: {Slp liquid thickness:04198}  Daily Fluid Restriction: {[facility] DME Yes amt example:514233296}  Last Modified Barium Swallow with Video (Video Swallowing Test): {Done Not Done WJXU:886791018}    Treatments at the Time of Hospital Discharge:   Respiratory Treatments: ***  Oxygen Therapy:  {Therapy; copd oxygen:29250}  Ventilator:    { CC Vent SBSV:645144387}    Rehab Therapies: {THERAPEUTIC INTERVENTION:2730057902}  Weight Bearing Status/Restrictions: [employer] CC Weight Bearin}  Other Medical Equipment (for information only, NOT a DME order):  {EQUIPMENT:135461937}  Other Treatments: ***    Patient's personal belongings (please select all that are sent with patient):  {[facility] DME Belongings:385226671}    RN SIGNATURE:  {Esignature:833569199}    CASE MANAGEMENT/SOCIAL WORK SECTION    Inpatient Status Date: ***    Readmission Risk Assessment Score:  Readmission Risk              Risk of Unplanned Readmission:  18           Discharging to Facility/ Agency   Name:   Address:  Phone:  Fax:    Dialysis Facility (if applicable)   Name:  Address:  Dialysis Schedule:  Phone:  Fax:    / signature: {Esignature:173954232}    PHYSICIAN SECTION    Prognosis: Good    Condition at Discharge: Stable    Rehab Potential (if transferring to Rehab): Good    Recommended Labs or Other Treatments After Discharge:   PT/OT at home with home care    Physician Certification: I certify the above information and transfer of Dominique Kinney  is necessary for the continuing treatment of the diagnosis listed and that she requires Home Care for greater 30 days.      Update Admission H&P: No change in H&P    PHYSICIAN SIGNATURE:  Electronically signed by Mitch Guzman MD on 7/12/22 at 1:35 PM EDT

## 2022-07-12 NOTE — DISCHARGE SUMMARY
V2.0  Discharge Summary    Name:  Negar Calderon /Age/Sex: 1945 (41 y.o. female)   Admit Date: 2022  Discharge Date: 22    MRN & CSN:  [id number] & [id number] Encounter Date and Time 22 1:33 PM EDT    Attending:  Madhuri Malone, * Discharging Provider: Madhuri Malone MD       Hospital Course:     Brief HPI: Negar Calderon is a 76 y.o. female with pmh of  who presents with AMS (altered mental status). Patient's MS improving after adjustments made in home meds. Patient's mental status improved with decreasing her home medication and discontinuing of any pain medications. Patient was found to have a L1 compression fracture on imaging. Her MRI does not show an acute infarct. Since he had a prior hospitalization did have concern for NPH. Brief Problem Based Course:       Acute Metabolic Encephalopathy:  Swallow the patient did have a negative infectious work-up. She does take several medications that has been contributory to her presentation including oxycodone as well as gabapentin. She was not given any pain medications while in house that had her discussed with them but did decrease. She had a negative drug screen as well. Patient did have improvement in her mental status without any further information. MRI did not show any acute infarct. She was seen by both neurology and neurosurgery while in house. 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 recommended that medication such as gabapentin and any pain medications be titrated down to the lowest tolerable doses. Advised he avoid opiate pain medications in this patient for possible. L-1 L3 compression Fx:  Patient with numerous fractures of her L1 and L3. She was seen by neurosurgery and they reviewed acute on her MRI. No significant central canal stenosis. Patient was deemed not to require surgery and was placed in a brace.   Patient (1.677 m)   Wt 158 lb 4.6 oz (71.8 kg)   SpO2 96%   BMI 25.53 kg/m²       Physical Exam:   General: NAD, has back brace in place  Eyes: EOMI  ENT: neck supple  Cardiovascular: Regular rate. Respiratory: Clear to auscultation  Gastrointestinal: Soft, non tender  Genitourinary: no suprapubic tenderness  Musculoskeletal: No edema  Skin: warm, dry  Neuro: Alert. Psych: Mood appropriate. Labs and Imaging   Echocardiogram complete 2D with doppler with color    Result Date: 7/8/2022  Transthoracic Echocardiography Report (TTE)  Demographics   Patient Name       Ry Alcocer      Date of Study       07/08/2022   Date of Birth      1945         Gender              Female   Age                76 year(s)         Race                   Patient Number     [id number]         Room Number         3010   Visit Number       [id number]   Corporate ID       [id number]   Accession Number   [id number]         [facility],                                                            [facility]   Ordering Physician Josh Sanches Interpreting        Howie Akers                     CNP                Physician           Teja ROWE  Procedure Type of Study   TTE procedure:ECHOCARDIOGRAM COMPLETE 2D W DOPPLER W COLOR. Procedure Date Date: 07/08/2022 Start: 01:25 PM Study Location: Portable Technical Quality: Adequate visualization Indications:Atrial fibrillation. Patient Status: Routine Contrast Medium: See below. Height: 66 inches Weight: 156 pounds BSA: 1.8 m2 BMI: 25.18 kg/m2 HR: 60 bpm BP: 139/54 mmHg  Conclusions   Summary  Left ventricular systolic function is normal.  Ejection fraction is visually estimated at 50-55%. Mild left ventricular hypertrophy. PPM wiring visualized in right heart. Sclerotic, but non-stenotic aortic valve. Mild aortic regurgitation; PHT: 738 msec. Mitral annular calcification is present. No evidence of any pericardial effusion.    Signature ------------------------------------------------------------------  Electronically signed by Emily Major MD  (Interpreting physician) on 07/08/2022 at 03:03 PM  ------------------------------------------------------------------   Findings   Left Ventricle  Left ventricular systolic function is normal.  Ejection fraction is visually estimated at 50-55%. Left ventricle size is normal.  No regional wall motion abnormalities. Mild left ventricular hypertrophy. Indeterminate diastolic function. Left Atrium  Essentially normal left atrium. Right Atrium  PPM wiring visualized in right atrium. Essentially normal right atrium. Right Ventricle  PPM wiring visualized in right ventricle. Aortic Valve  Sclerotic, but non-stenotic aortic valve. Mild aortic regurgitation; PHT: 738 msec. Mitral Valve  Mitral annular calcification is present. Tricuspid Valve  Trace tricuspid regurgitation; RVSP: 30 mmHg. Pulmonic Valve  The pulmonic valve was not well visualized. Pericardial Effusion  No evidence of any pericardial effusion. Pleural Effusion  No evidence of pleural effusion. Miscellaneous  IVC and abdominal are not clearly visualized.   M-Mode/2D Measurements & Calculations   LV Diastolic Dimension:  LV Systolic Dimension:  LA Dimension: 3.8 cmAO Root  3.59 cm                  2.44 cm                 Dimension: 3 cmLA Area:  LV FS:32 %               LV Volume Diastolic:    40.7 cm2  LV PW Diastolic: 3.50 cm 503 ml  Septum Diastolic: 4.63   LV Volume Systolic: 54  cm                       ml  CO: 3.77 l/min           LV EDV/LV EDV Index:  CI: 2.09 l/m*m2          124 ml/69 m2LV ESV/LV   LA/Aorta: 1.27                           ESV Index: 54 ml/30 m2  LV Area Diastolic: 11.9  EF Calculated (A4C):    LA volume/Index: 62 ml  cm2                      56.5 %                  /01K1  LV Area Systolic: 30.5   EF Calculated (2D):  cm2                      61.2 %                            LV Length: 8.04 cm                            LVOT: 2 cm  Doppler Measurements & Calculations   MV Peak E-Wave: 84.9 AV Peak Velocity: 105 cm/s    LVOT Peak Velocity: 94  cm/s                 AV Peak Gradient: 4.41 mmHg   cm/s  MV Peak A-Wave: 40   AV Mean Velocity: 69.3 cm/s   LVOT Mean Velocity: 59.8  cm/s                 AV Mean Gradient: 2 mmHg      cm/s  MV E/A Ratio: 2.12   AV VTI: 23.4 cm               LVOT Peak Gradient: 5  MV Peak Gradient:    AV Area (Continuity):2.68 cm2 mmHgLVOT Mean Gradient: 2  2.88 mmHg                                          mmHg                       LVOT VTI: 20 cm               Estimated RVSP: 30 mmHg  MV P1/2t: 101 msec   AV P1/2t: 738 msec            Estimated RAP:3 mmHg  MVA by PHT:2.18 cm2  Estimated PASP: 14.29 mmHg   MV E' Septal                                       TR Velocity:168 cm/s  Velocity: 5.92 cm/s                                TR Gradient:11.29 mmHg  MV E' Lateral                                      PV Peak Velocity: 153  Velocity: 12 cm/s                                  cm/s  MV E/E' septal:                                    PV Peak Gradient: 9.36  14.34                                              mmHg  MV E/E' lateral:                                   PV Mean Velocity: 89.3  7.08                                               cm/s                                                     PV Mean Gradient: 4 mmHg      XR CHEST (2 VW)    Result Date: 7/8/2022  EXAMINATION: TWO XRAY VIEWS OF THE CHEST 7/8/2022 6:07 pm COMPARISON: Chest 09/22/2021, CT lumbar spine 06/06/2021 HISTORY: ORDERING SYSTEM PROVIDED HISTORY: SOB, cough, failed swallow eval Additional signs and symptoms: none Relevant Medical/Surgical History: CAD FINDINGS: The cardiac silhouette is enlarged. Calcifications involving the aorta reflect atherosclerosis. The mediastinal and hilar silhouettes appear unremarkable. Senescent changes. No focal infiltrate is evident. No pleural effusion. No pneumothorax is seen. No acute osseous abnormality is identified. Bones appear osteoporotic. No definite thoracic compression fracture. Age indeterminate progression of L1 anterior wedge compression fracture (about 30% compared to 10% described previously). Stable sequela from CABG, heart valve surgery and pacemaker. 1. No acute pulmonary infiltrate. 2. Calcific atherosclerosis aorta. 3. Cardiomegaly. 4. Chronic L1 anterior wedge compression fracture, interval progression now measuring about 30%. 5. Bones appear osteoporotic. XR THORACOLUMBAR SPINE (MIN 2 VIEWS)    Result Date: 7/12/2022  EXAMINATION: TWO XRAY VIEWS OF THE THORACIC AND LUMBAR SPINE 7/12/2022 9:32 am COMPARISON: 07/11/2022 HISTORY: ORDERING SYSTEM PROVIDED HISTORY: standing with TLSO brace. TECHNOLOGIST PROVIDED HISTORY: Reason for exam:->standing with TLSO brace. Reason for Exam: standing with TLSO brace. FINDINGS: Thoracolumbar brace is in place obscuring the AP radiograph. On the lateral radiograph, thoracolumbar alignment is maintained. No spondylolisthesis. Superior endplate fracture at L1 and L3 with mild vertebral body height loss similar to the recent MRI. Maintained thoracolumbar alignment. No spondylolisthesis. CT LUMBAR SPINE WO CONTRAST    Result Date: 7/10/2022  EXAMINATION: CT OF THE LUMBAR SPINE WITHOUT CONTRAST  7/10/2022 TECHNIQUE: CT of the lumbar spine was performed without the administration of intravenous contrast. Multiplanar reformatted images are provided for review. Adjustment of mA and/or kV according to patient size was utilized. Automated exposure control, iterative reconstruction, and/or weight based adjustment of the mA/kV was utilized to reduce the radiation dose to as low as reasonably achievable. COMPARISON: June 6, 2021. HISTORY: ORDERING SYSTEM PROVIDED HISTORY: Chronic L1 compression fracture? TECHNOLOGIST PROVIDED HISTORY: Reason for exam:->Chronic L1 compression fracture?  Reason for Exam: Chronic L1 compression fracture? FINDINGS: BONES/ALIGNMENT: Compression fracture of L1 (20% of height loss) and L3 (30% of central height loss). 2 mm of bony fragment retropulsion into the central canal at L1.  3 mm of bony fragment retropulsion into the central canal at L3. The degree of compression of L1 has increased since the exam of June 6, 2021. The L3 compression fracture is new. Lumbar alignment is maintained. DEGENERATIVE CHANGES: Bones are osteopenic. Facet arthropathy at L4-L5 and L5-S1. SOFT TISSUES/RETROPERITONEUM: No paraspinal mass. Calcified uterine fibroids. 1. Anterior compression fracture of L1 results in 20% of height loss. The degree of compression has progressed since the exam of June 6, 2021. There is 2 mm of bony fragment retropulsion into the central canal. 2. L3 compression fracture involving the superior endplate with 45% of central height loss. This is new compared to the prior exam.  2 mm of bony fragment retropulsion into the central canal. 3. Bones are osteopenic. MRI LUMBAR SPINE WO CONTRAST    Result Date: 7/11/2022  EXAMINATION: MRI OF THE LUMBAR SPINE WITHOUT CONTRAST, 7/11/2022 8:50 am TECHNIQUE: Multiplanar multisequence MRI of the lumbar spine was performed without the administration of intravenous contrast. COMPARISON: 7/10/2022 HISTORY: ORDERING SYSTEM PROVIDED HISTORY: L1 and L3 burst fracture, evaluate acuity TECHNOLOGIST PROVIDED HISTORY: Reason for exam:->L1 and L3 burst fracture, evaluate acuity FINDINGS: BONES/ALIGNMENT: There is normal alignment of the lumbar spine. There is an acute L1 superior endplate fracture with approximately 50% loss of vertebral body height centrally. There is an acute fracture along the left L3 superior endplate, with edema asymmetric towards the left. No other areas of marrow edema. SPINAL CORD: The conus tip terminates near the level of the L2 vertebral body. The cauda equina nerve roots are unremarkable.  SOFT TISSUES: This is a motion degraded examination. No paraspinal masses. L1-L2: There is a minimal disc bulge lateralizing to the left. No central spinal canal stenosis or foraminal narrowing. L2-L3: There is a disc bulge lateralizing to the left foramen. There is facet arthropathy and ligamentum flavum thickening. No central spinal canal stenosis. Mild left and minimal right foraminal narrowing. L3-L4: There is a disc bulge lateralizing towards the left. There is bilateral facet arthropathy. Minimal central spinal canal narrowing. Minimal right foraminal narrowing. No left foraminal narrowing. L4-L5: There is bilateral facet arthropathy. There is a disc bulge lateralizing to the left. Mild central spinal canal narrowing. Minimal right foraminal narrowing. No left foraminal narrowing. L5-S1: There is asymmetric left-sided facet arthropathy. There is no disc herniation, foraminal narrowing, or central spinal canal stenosis. Multiple uterine fibroids are noted. The bladder is distended with urine. 1. Acute L1 superior endplate fracture with 77% loss of vertebral body height. 2. Acute L3 superior endplate fracture with approximately 25% loss of vertebral body height. 3. No other acute fractures. 4. Fibroid uterus. Vascular carotid duplex bilateral    Result Date: 7/8/2022  EXAMINATION: ULTRASOUND EVALUATION OF THE CAROTID ARTERIES 7/8/2022 TECHNIQUE: Duplex ultrasound using B-mode/gray scaled imaging, Doppler spectral analysis and color flow Doppler was obtained of the carotid arteries. COMPARISON: June 29, 2018 HISTORY: ORDERING SYSTEM PROVIDED HISTORY: altered mental status, afib TECHNOLOGIST PROVIDED HISTORY: Reason for exam:->altered mental status, afib FINDINGS: RIGHT: The right common carotid artery demonstrates peak systolic velocities of 83 cm/sec in the proximal and distal segments respectively.  The right internal carotid artery demonstrates the systolic velocities of 43, 74, 60 cm/sec in the proximal, mid and distal segments respectively. The external carotid artery is patent. The vertebral artery demonstrates normal antegrade flow. No evidence of focal atherosclerotic plaque. ICA/CCA ratio of 0.9 LEFT: The left common carotid artery demonstrates peak systolic velocities of 82 cm/sec in the proximal and distal segments respectively. The left internal carotid artery demonstrates the systolic velocities of 55, 80, 80 cm/sec in the proximal, mid and distal segments respectively. The external carotid artery is patent. The vertebral artery demonstrates normal antegrade flow. No evidence of focal atherosclerotic plaque. ICA/CCA ratio of 0.9     The right internal carotid artery demonstrates 0-50% stenosis. The left internal carotid artery demonstrates 0-50% stenosis. Bilateral vertebral arteries are patent with flow in the normal direction. MRI brain without contrast    Result Date: 7/11/2022  EXAMINATION: MRI OF THE BRAIN WITHOUT CONTRAST  7/11/2022 8:49 am TECHNIQUE: Multiplanar multisequence MRI of the brain was performed without the administration of intravenous contrast. COMPARISON: 06/06/2021, 12/05/2019 HISTORY: ORDERING SYSTEM PROVIDED HISTORY: altered mental status, afib TECHNOLOGIST PROVIDED HISTORY: Reason for exam:->altered mental status, afib Reason for Exam: ams FINDINGS: INTRACRANIAL STRUCTURES/VENTRICLES: No areas of restricted diffusion to suggest an acute infarct. The cerebral and cerebellar parenchyma demonstrate volume loss. Scattered and confluent low-attenuation areas are noted supratentorially, compatible with severe chronic microvascular white matter ischemic disease. No abnormal extra-axial fluid collections. The ventricles are proportional to the cerebral sulci. 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  There is a large area of blooming artifact along the medial left temporal lobe, corresponding with the area of previous intraparenchymal hemorrhage with rupture into the ventricular system measuring 2.0 x 1.6 cm. There is a small amount of blooming artifact along the occipital horns of the ventricles which may represent sequela of old blood products. ORBITS: Lens implants from prior cataract surgery are noted. The orbits are otherwise unremarkable. SINUSES: The paranasal sinuses are well aerated. There is a trace right mastoid effusion. The left mastoid air cells are clear. BONES/SOFT TISSUES: The bone marrow signal intensity and craniocervical junction are unremarkable. Pituitary gland is normal in appearance. 1. No evidence of an acute infarct. 2. Cerebral parenchymal volume loss with severe chronic microvascular white matter ischemic disease. 3. There is an old intraparenchymal hemorrhage in the left temporal lobe with surrounding areas of gliosis. No new areas of hemorrhage are identified. CBC:   Recent Labs     07/11/22  0706 07/12/22  0637   WBC 5.0 4.8   HGB 11.0* 12.3*    167     BMP:    Recent Labs     07/11/22  0706 07/12/22  0637    135   K 3.8 3.9    101   CO2 28 25   BUN 11 9   CREATININE 0.8 0.6   GLUCOSE 94 83     Hepatic:   Recent Labs     07/11/22  0706 07/12/22  0637   AST 15 18   ALT 6* 7*   BILITOT 0.3 0.4   ALKPHOS 96 101     Lipids:   Lab Results   Component Value Date/Time    CHOL 111 07/09/2022 04:03 AM    HDL 38 07/09/2022 04:03 AM    TRIG 133 07/09/2022 04:03 AM     Hemoglobin A1C:   Lab Results   Component Value Date/Time    LABA1C 5.6 07/09/2022 04:03 AM     TSH: No results found for: TSH  Troponin:   Lab Results   Component Value Date/Time    TROPONINT <0.010 07/08/2022 03:19 PM    TROPONINT <0.010 09/19/2021 05:45 PM     Lactic Acid: No results for input(s): LACTA in the last 72 hours. BNP: No results for input(s): PROBNP in the last 72 hours.   UA:  Lab Results   Component Value Date/Time    NITRU NEGATIVE 07/09/2022 01:00 PM    COLORU YELLOW 07/09/2022 01:00 PM    WBCUA 13 07/09/2022 01:00 PM    RBCUA 10 07/09/2022 01:00 PM    MUCUS RARE 07/09/2022 01:00 PM    TRICHOMONAS NONE SEEN 07/09/2022 01:00 PM    BACTERIA FEW 07/09/2022 01:00 PM    CLARITYU SLIGHTLY CLOUDY 07/09/2022 01:00 PM    SPECGRAV <1.005 07/09/2022 01:00 PM    LEUKOCYTESUR MODERATE 07/09/2022 01:00 PM    UROBILINOGEN 0.2 07/09/2022 01:00 PM    BILIRUBINUR NEGATIVE 07/09/2022 01:00 PM    BLOODU NEGATIVE 07/09/2022 01:00 PM    KETUA NEGATIVE 07/09/2022 01:00 PM     Urine Cultures: No results found for: LABURIN  Blood Cultures: No results found for: BC  No results found for: BLOODCULT2  Organism: No results found for: ORG    Time Spent Discharging patient 40 minutes    Electronically signed by Toshia Corea MD on 7/12/2022 at 1:33 PM

## 2022-07-13 DIAGNOSIS — S32.010S COMPRESSION FRACTURE OF L1 VERTEBRA, SEQUELA: Primary | ICD-10-CM

## 2022-07-13 RX ORDER — GABAPENTIN 600 MG/1
600 TABLET ORAL 2 TIMES DAILY
Qty: 90 TABLET | Refills: 3 | OUTPATIENT
Start: 2022-07-13 | End: 2022-08-24

## 2022-07-13 NOTE — PROGRESS NOTES
Patient needed a prescription for her gabapentin. This was sent for 600 mg twice a day to her [facility].     Bertha Priest MD  Internal Medicine

## 2022-07-13 NOTE — PROGRESS NOTES
Received call from patient's sister stating she had gone to [facility] to get gabapentin prescription and they didn't have it. [employer] and said they did not have it. Verbal order given for refill after seeing physicians note. Notified patient's sister.  CW

## 2022-07-18 NOTE — TELEPHONE ENCOUNTER
Spoke to patient's sister Deepika Mckee. Appointment confirmed on 8/24/22 @ 120 pm. Location of appointment given to patient. Deepika Mckee also aware that XR Thoracolumbar Spine needs done 2 days prior to appointment. She was agreeable and verbalized understanding. Nothing further needed at this time.

## 2022-07-31 PROCEDURE — 93296 REM INTERROG EVL PM/IDS: CPT | Performed by: INTERNAL MEDICINE

## 2022-07-31 PROCEDURE — 93294 REM INTERROG EVL PM/LDLS PM: CPT | Performed by: INTERNAL MEDICINE

## 2022-08-02 ENCOUNTER — PROCEDURE VISIT (OUTPATIENT)
Dept: CARDIOLOGY CLINIC | Age: 77
End: 2022-08-02
Payer: MEDICARE

## 2022-08-02 DIAGNOSIS — I49.5 SINUS NODE DYSFUNCTION (HCC): ICD-10-CM

## 2022-08-02 DIAGNOSIS — I44.0 AV BLOCK, 1ST DEGREE: ICD-10-CM

## 2022-08-02 DIAGNOSIS — Z95.0 CARDIAC PACEMAKER IN SITU: Primary | ICD-10-CM

## 2022-08-22 NOTE — PROGRESS NOTES
Patient presents to the office today for a 6 week follow up on L1 and L3 compression fracture. Pain scale 0/10 today. Denies any numbness, tingling or weakness in any extremities. Patient states she has been wearing brace as ordered and has been compliant with restrictions. Patient has been ambulating using a wheelchair    She denies any new injuries or falls since last being seen. XR Lumbar Spine 8/23/22  Impression: Limited lateral only images provided secondary to patient limitations. AP alignment is preserved. There is age indeterminate superior endplate compression fracture at L1 with 20% height loss and no identified retropulsion. There is question superior endplate irregularity at L3. There is mild multilevel disc height loss. XR Thoracic Spine 8/23/22  Impression: Limited evaluation with only single lateral view provided secondary to patient rotation. AP alignment of the thoracic spine is preserved. Thoracic vertebral body heights are grossly maintained. There is age indeterminate L1 superior endplate compression fracture with 20% height loss. Moderate disc height loss of the thoracic spine is noted.

## 2022-08-22 NOTE — PATIENT INSTRUCTIONS
Avoid excessive bending/twisting of thoracolumbar spine. Wear brace whenever upright and out of bed. You can remove the brace when you are laying in bed or on the couch. Limit weight restrictions to less than 15 pounds for 3 months. Follow-up in office in 6 weeks with repeat xrays. Notify the neurosurgical office urgently if you begin to develop any numbness or tingling in extremities, weakness in extremities, or loss of bowel or bladder control.

## 2022-08-23 DIAGNOSIS — S32.030A COMPRESSION FRACTURE OF L3 VERTEBRA, INITIAL ENCOUNTER (HCC): ICD-10-CM

## 2022-08-23 DIAGNOSIS — S32.010A COMPRESSION FRACTURE OF L1 VERTEBRA, INITIAL ENCOUNTER (HCC): ICD-10-CM

## 2022-08-24 ENCOUNTER — OFFICE VISIT (OUTPATIENT)
Dept: NEUROSURGERY | Age: 77
End: 2022-08-24
Payer: MEDICARE

## 2022-08-24 VITALS
BODY MASS INDEX: 25.55 KG/M2 | SYSTOLIC BLOOD PRESSURE: 124 MMHG | HEART RATE: 65 BPM | RESPIRATION RATE: 16 BRPM | DIASTOLIC BLOOD PRESSURE: 60 MMHG | OXYGEN SATURATION: 98 % | HEIGHT: 66 IN

## 2022-08-24 DIAGNOSIS — S32.018D OTHER CLOSED FRACTURE OF FIRST LUMBAR VERTEBRA WITH ROUTINE HEALING, SUBSEQUENT ENCOUNTER: Primary | ICD-10-CM

## 2022-08-24 DIAGNOSIS — S32.038D OTHER CLOSED FRACTURE OF THIRD LUMBAR VERTEBRA WITH ROUTINE HEALING, SUBSEQUENT ENCOUNTER: ICD-10-CM

## 2022-08-24 PROCEDURE — 1090F PRES/ABSN URINE INCON ASSESS: CPT | Performed by: PHYSICIAN ASSISTANT

## 2022-08-24 PROCEDURE — G8417 CALC BMI ABV UP PARAM F/U: HCPCS | Performed by: PHYSICIAN ASSISTANT

## 2022-08-24 PROCEDURE — G8427 DOCREV CUR MEDS BY ELIG CLIN: HCPCS | Performed by: PHYSICIAN ASSISTANT

## 2022-08-24 PROCEDURE — 1036F TOBACCO NON-USER: CPT | Performed by: PHYSICIAN ASSISTANT

## 2022-08-24 PROCEDURE — 99213 OFFICE O/P EST LOW 20 MIN: CPT | Performed by: PHYSICIAN ASSISTANT

## 2022-08-24 PROCEDURE — G8399 PT W/DXA RESULTS DOCUMENT: HCPCS | Performed by: PHYSICIAN ASSISTANT

## 2022-08-24 PROCEDURE — 1123F ACP DISCUSS/DSCN MKR DOCD: CPT | Performed by: PHYSICIAN ASSISTANT

## 2022-08-24 NOTE — PROGRESS NOTES
Neurosurgery Office Note    HPI:  76 y.o. 1945  Who presents today for 6 week follow-up of an L1 and L3 compression fracture. Patient was seen in the hospital 7/10/2022 for this. She was apparently diagnosed with an L1 fracture in June 2021 and was treated at [place] via a back brace. Her sister reported that she did not wear it as prescribed. Today she is present with her sister. She reports back pain that is worse in the morning, otherwise does not have any back pain during the day. She expresses frustration with her brace and would like to have it removed. Did explain that she only needs to wear when she is upright and out of bed. Patient completed at home physical therapy several weeks ago. Her sister states that she is somewhat ambulatory around the home but that is something that they are trying to work on daily. She is currently in a wheelchair here in the office. She denies any saddle paresthesias or numbness/tingling in her lower extremities, denies any pain in her legs. Her sister states that she has been incontinent of urine, mainly overnight but that this is not a new issue, she is on a medication for this. She states that she is somewhat continent of stool. Patient is not on any antiplatelets or anticoagulants. PMHx positive for A. fib, CAD, type 2 diabetes, hypertension, pneumonia, UTI. Past Surgical history positive for CABG with mitral valve repair, cardiac catheterization, bilateral eye surgery, left pacemaker insertion, bilateral thoracentesis.                       Past Medical and Surgical History:       Diagnosis Date    Anxiety     Arthritis     knees    Atrial fibrillation (HCC)     CAD (coronary artery disease)     Sees Dr. Kendra Mackey    COVID-19 09/19/2021    Diabetes mellitus ([facility] [address]     H/O cardiac catheterization 06/25/2018    severe 3 vessel disease, refer to CV surgery for CABG and MAZE    H/O cardiovascular stress test 06/06/2018    EF47%  fixed perfusion defect ant wall, pt in afib    H/O echocardiogram 2018    EF55% mod MR    H/O echocardiogram 2018    EF 50-55%, Mild : AR, MR & TR.    H/O echocardiogram 2020    EF 55%, Breast artifact noted. H/O three vessel coronary artery bypass 2018    Dr. Claudine Portillo    History of Holter monitoring 10/23/2018    Multiple PAF episodes noted. Tachy-Dinesh episodes noted. History of nuclear stress test 2020    EF 55%, Breast artifact. Hyperlipidemia     Hypertension     Memory loss     on Aricept    Missing teeth, acquired     Pneumonia     pneumococcal pneumonia twice at end of     Risk for falls     uses walker    TIA (transient ischemic attack)     family doctors said she has had mini-strokes    Urinary leakage     UTI (urinary tract infection)     recent --just stopped antibiotic last week as of 18    Wears glasses          Procedure Laterality Date    CABG WITH MITRAL VALVE REPAIR  2018    CABG X 3 [place]>LAD, SVG>CX M1, SVG>M2, MVR repair w/#28 CG ring, PFO closure, MAZE    CARDIAC CATHETERIZATION  2018    EYE SURGERY Bilateral 2018    Cataracts    MITRAL VALVE MAZE PROCEDURE  2018    Dr. Scarlett Covington Left 2018    Medtronic Kaia XT DR SCHWARTZ SureScan     THORACENTESIS Bilateral 2018    IR Dr. Gera Hendrix, right 1300, left 900 all s/s    TONSILLECTOMY  1940's    WISDOM TOOTH EXTRACTION         Social History:    TOBACCO:   reports that she has never smoked. She has never used smokeless tobacco.  ETOH:   reports no history of alcohol use. Family History:       Problem Relation Age of Onset    Stroke Mother     Heart Disease Father     Early Death Father     Breast Cancer Sister     Parkinsonism Brother     Heart Disease Sister     Other Sister         fibromyalgia    Diabetes Sister     Early Death Brother          at birth       Current Medications:    No current facility-administered medications for this visit.     No Known Allergies gravity, bilateral handgrip 4/5  Lower extremity strength: Bilateral lower extremities 5/5 throughout    DATA:    Old records have been reviewed      Radiology Review:  All pertinent images / reports were reviewed as a part of this visit. XR Lumbar Spine 8/23/22  Impression: Limited lateral only images provided secondary to patient limitations. AP alignment is preserved. There is age indeterminate superior endplate compression fracture at L1 with 20% height loss and no identified retropulsion. There is question superior endplate irregularity at L3. There is mild multilevel disc height loss. XR Thoracic Spine 8/23/22  Impression: Limited evaluation with only single lateral view provided secondary to patient rotation. AP alignment of the thoracic spine is preserved. Thoracic vertebral body heights are grossly maintained. There is age indeterminate L1 superior endplate compression fracture with 20% height loss. Moderate disc height loss of the thoracic spine is noted. Assessment and plan:     1. Other closed fracture of first lumbar vertebra with routine healing, subsequent encounter  2. Other closed fracture of third lumbar vertebra with routine healing, subsequent encounter   -Patient has a disc of images obtained at [facility]. They were reviewed. Patient has a stable L1 and L3 fracture. She only reports pain early in the morning when she first gets out of bed. Otherwise she has no back pain, no red flag symptoms such as saddle paresthesias. Has been incontinent of urine, mainly overnight. This is a chronic issue. -     XR THORACOLUMBAR SPINE (MIN 2 VIEWS); Future   3. 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 [facility] did reach out to them but the sister declined follow-up at that time.   Patient did have an MRI in the hospital that suggested that the ventricles are proportional to the cerebral

## 2022-08-25 ENCOUNTER — TELEPHONE (OUTPATIENT)
Dept: NEUROSURGERY | Age: 77
End: 2022-08-25

## 2022-08-25 NOTE — LETTER
[place] [address] Neurosurgery  [facility] [address] [facility]  Phone: [phone]  Fax: [phone]    Bhupendra Santacruz        August 25, 2022    [address] [id number]      Dear Carol Tanner: This is just a reminder that you have a 6-week follow up appointment scheduled with Cat Merino PA-C on Tuesday, 10/4/22 @ 3 pm to follow up on the fracture in your back. She would like you to have an Xray done 2 days prior to this appointment. You can get this done on a walk-in basis at the [address] [place] [address] on [facility]. Their address is [address] [place] [address] in [place] [address]. Hours are Monday - Friday 7 am - 7 pm and Saturdays 8 am - 12 pm.     If you have any questions or concerns, or if you need to reschedule this appointment, please don't hesitate to call. Otherwise, we will see you on the day of your appointment.         Sincerely,        Jose Alberto Perera PA-C

## 2022-08-25 NOTE — TELEPHONE ENCOUNTER
Appointment reminder letter sent to patient for 10/4/22 @ 3 pm. Patient left the office without AVS given to her. traction/counter traction

## 2022-09-01 NOTE — TELEPHONE ENCOUNTER
Patient called requesting samples of Eliquis, patient was advised that samples should be ready for  by tomorrow afternoon, please call with any problems at ph# [phone].

## 2022-09-08 ENCOUNTER — TELEPHONE (OUTPATIENT)
Dept: NEUROSURGERY | Age: 77
End: 2022-09-08

## 2022-09-08 NOTE — TELEPHONE ENCOUNTER
Patient's sister Luan Prieto called today and requested that XR thoracolumbar order be faxed to [address] [place] [address]. Informed Luan Prieto that she needs to request the disc while she is there so that she can bring with her to appointment with Petty Peguero PA-C. Luan Prieto was agreeable and verbalized understanding. Order has been faxed to [phone]. Nothing further needed.

## 2022-10-03 DIAGNOSIS — S32.038D OTHER CLOSED FRACTURE OF THIRD LUMBAR VERTEBRA WITH ROUTINE HEALING, SUBSEQUENT ENCOUNTER: ICD-10-CM

## 2022-10-03 DIAGNOSIS — S32.018D OTHER CLOSED FRACTURE OF FIRST LUMBAR VERTEBRA WITH ROUTINE HEALING, SUBSEQUENT ENCOUNTER: ICD-10-CM

## 2022-10-04 ENCOUNTER — TELEPHONE (OUTPATIENT)
Dept: NEUROSURGERY | Age: 77
End: 2022-10-04

## 2022-10-04 NOTE — TELEPHONE ENCOUNTER
Patient called and left a message on voicemail after hours 10/3/22 stating she wants to cancel her appointment on 10/4/22 @ 3 pm with Caleb Mix PA-C. States she will call back at a later time to reschedule.

## 2022-11-02 ENCOUNTER — OFFICE VISIT (OUTPATIENT)
Dept: CARDIOLOGY CLINIC | Age: 77
End: 2022-11-02
Payer: MEDICARE

## 2022-11-02 VITALS
BODY MASS INDEX: 25.72 KG/M2 | HEART RATE: 66 BPM | HEIGHT: 65 IN | OXYGEN SATURATION: 98 % | SYSTOLIC BLOOD PRESSURE: 138 MMHG | DIASTOLIC BLOOD PRESSURE: 70 MMHG | WEIGHT: 154.4 LBS

## 2022-11-02 DIAGNOSIS — I48.0 PAROXYSMAL ATRIAL FIBRILLATION (HCC): ICD-10-CM

## 2022-11-02 DIAGNOSIS — I25.10 CORONARY ARTERY DISEASE INVOLVING NATIVE CORONARY ARTERY OF NATIVE HEART WITHOUT ANGINA PECTORIS: Primary | ICD-10-CM

## 2022-11-02 DIAGNOSIS — I10 ESSENTIAL HYPERTENSION: ICD-10-CM

## 2022-11-02 DIAGNOSIS — I38 VHD (VALVULAR HEART DISEASE): ICD-10-CM

## 2022-11-02 PROBLEM — I97.130 CHF FOLLOWING CARDIAC SURGERY, POSTOP: Status: RESOLVED | Noted: 2018-07-10 | Resolved: 2022-11-02

## 2022-11-02 PROCEDURE — 3078F DIAST BP <80 MM HG: CPT | Performed by: NURSE PRACTITIONER

## 2022-11-02 PROCEDURE — G8399 PT W/DXA RESULTS DOCUMENT: HCPCS | Performed by: NURSE PRACTITIONER

## 2022-11-02 PROCEDURE — 1036F TOBACCO NON-USER: CPT | Performed by: NURSE PRACTITIONER

## 2022-11-02 PROCEDURE — 3074F SYST BP LT 130 MM HG: CPT | Performed by: NURSE PRACTITIONER

## 2022-11-02 PROCEDURE — 99214 OFFICE O/P EST MOD 30 MIN: CPT | Performed by: NURSE PRACTITIONER

## 2022-11-02 PROCEDURE — 1123F ACP DISCUSS/DSCN MKR DOCD: CPT | Performed by: NURSE PRACTITIONER

## 2022-11-02 PROCEDURE — 1090F PRES/ABSN URINE INCON ASSESS: CPT | Performed by: NURSE PRACTITIONER

## 2022-11-02 PROCEDURE — G8427 DOCREV CUR MEDS BY ELIG CLIN: HCPCS | Performed by: NURSE PRACTITIONER

## 2022-11-02 PROCEDURE — G8484 FLU IMMUNIZE NO ADMIN: HCPCS | Performed by: NURSE PRACTITIONER

## 2022-11-02 PROCEDURE — G8417 CALC BMI ABV UP PARAM F/U: HCPCS | Performed by: NURSE PRACTITIONER

## 2022-11-02 RX ORDER — ACETAMINOPHEN, DIPHENHYDRAMINE HYDROCHLORIDE 500; 25 MG/1; MG/1
1 TABLET, FILM COATED ORAL NIGHTLY PRN
COMMUNITY

## 2022-11-02 ASSESSMENT — ENCOUNTER SYMPTOMS
COUGH: 0
SHORTNESS OF BREATH: 0
HEMATEMESIS: 0
HEMATOCHEZIA: 0

## 2022-11-02 NOTE — PROGRESS NOTES
11/2/2022  Primary cardiologist: Dr. Rickie Mg  is an established 76 y.o.  female here for a 6 month follow up on CAD- HTN VHD MVR PPM atrial fib       SUBJECTIVE/OBJECTIVE:      HPI : Paul Christian is a 49-year-old female patient with a history of CAD s/p CABG x 3 , hypertension, hyperlipidemia, valvular heart disease, MVRepair, s/p fall with intraparenchymal bleed, paroxysmal atrial fibrillation h/o MAZE, sinus node dysfunction s/p dual chamber PPM and diabetes mellitus. Paul Christian reports she is in the hospital in July status post fall with L1 and L3 compression fracture. She reports he has had no further falls. She is with her sister today and she states that she feels like Wanda Del Cid is doing well. He has a denies chest pain or shortness of breath. Has had no episodes of palpitations, lightheadedness or dizziness. Review of Systems   Cardiovascular:  Negative for chest pain and dyspnea on exertion. Respiratory:  Negative for cough and shortness of breath. Musculoskeletal:  Positive for falls. Gastrointestinal:  Negative for hematemesis and hematochezia. Genitourinary:  Negative for hematuria. Neurological:  Negative for dizziness and loss of balance. Vitals:    11/02/22 1518   BP: 138/70   Site: Left Upper Arm   Position: Sitting   Cuff Size: Medium Adult   Pulse: 66   SpO2: 98%   Weight: 154 lb 6.4 oz (70 kg)   Height: 5' 5\" (1.651 m)     No flowsheet data found. Wt Readings from Last 3 Encounters:   11/02/22 154 lb 6.4 oz (70 kg)   07/12/22 158 lb 4.6 oz (71.8 kg)   07/08/22 156 lb 15.5 oz (71.2 kg)     Body mass index is 25.69 kg/m². Physical Exam  HENT:      Head: Normocephalic and atraumatic. Eyes:      Extraocular Movements: Extraocular movements intact. Neck:      Vascular: No carotid bruit. Cardiovascular:      Rate and Rhythm: Normal rate and regular rhythm. Pulses: Normal pulses. Heart sounds: Normal heart sounds.       Comments: Left sided pacer pocket intact  Pulmonary:      Effort: Pulmonary effort is normal. No respiratory distress. Breath sounds: Normal breath sounds. No wheezing. Abdominal:      General: There is no distension. Tenderness: There is no abdominal tenderness. Musculoskeletal:      Right lower leg: No edema. Left lower leg: No edema. Skin:     General: Skin is warm and dry. Capillary Refill: Capillary refill takes less than 2 seconds. Neurological:      General: No focal deficit present. Mental Status: She is alert.               Current Outpatient Medications   Medication Sig Dispense Refill    diphenhydrAMINE-APAP, sleep, (ACETAMINOPHEN PM)  MG tablet Take 1 tablet by mouth nightly as needed for Sleep      apixaban (ELIQUIS) 5 MG TABS tablet Take 1 tablet by mouth 2 times daily 42 tablet 0    metoprolol tartrate (LOPRESSOR) 50 MG tablet Take 0.5 tablets by mouth 2 times daily 180 tablet 1    rosuvastatin (CRESTOR) 40 MG tablet Take 1 tablet by mouth every evening 90 tablet 3    oxybutynin (DITROPAN) 5 MG tablet Take 1 tablet by mouth daily      potassium chloride (KLOR-CON) 20 MEQ packet Take 20 mEq by mouth daily      ipratropium (ATROVENT) 0.03 % nasal spray 1 spray by Nasal route as needed      omeprazole (PRILOSEC) 40 MG delayed release capsule Take 40 mg by mouth daily      docusate sodium (COLACE, DULCOLAX) 100 MG CAPS Take 100 mg by mouth 2 times daily      donepezil (ARICEPT) 5 MG tablet Take 10 mg by mouth daily   2    citalopram (CELEXA) 40 MG tablet TAKE 1 TABLET EVERY DAY FOR ANXIETY AND STRESS  5    vitamin D3 (CHOLECALCIFEROL) 25 MCG (1000 UT) TABS tablet Take 1 tablet by mouth daily       CALCIUM CARBONATE PO Take 1,000 mg by mouth daily       bisacodyl (DULCOLAX) 5 MG EC tablet Take 5 mg by mouth as needed      polyethylene glycol (GLYCOLAX) 17 GM/SCOOP powder Take 17 g by mouth daily (Patient not taking: Reported on 11/2/2022)      senna (SENOKOT) 8.6 MG tablet Take 1 tablet by mouth daily (Patient not taking: Reported on 11/2/2022)      acetaminophen (APAP EXTRA STRENGTH) 500 MG tablet Take 1 tablet by mouth every 6 hours as needed for Pain (Patient not taking: Reported on 11/2/2022) 20 tablet 0    methylcellulose 500 MG TABS Take 2,000 mg by mouth daily prn (Patient not taking: Reported on 11/2/2022)      melatonin 3 MG TABS tablet Take 3 mg by mouth daily (Patient not taking: Reported on 11/2/2022)      estradiol (ESTRACE) 0.1 MG/GM vaginal cream Place 2 g vaginally 3 times daily as needed  (Patient not taking: Reported on 11/2/2022)      aspirin 81 MG EC tablet Take 1 tablet by mouth daily 30 tablet 3    furosemide (LASIX) 20 MG tablet Take 1 tablet by mouth daily (Patient not taking: Reported on 11/2/2022) 30 tablet 0    metFORMIN (GLUCOPHAGE) 500 MG tablet Take 500 mg by mouth 2 times daily (with meals) (Patient not taking: Reported on 11/2/2022)       No current facility-administered medications for this visit. All pertinent data reviewed and discussed with patient       ASSESSMENT/PLAN:    1. Paroxysmal atrial fibrillation (HCC)  H/o Maze procedure  In RRR- 1 episode of atrial fib noted on PPM report  Since last office visit she has had 1 mechanical fall. Denies bleeding from gums, bowel or bladder. Recommend to continue eliquis for stroke prevention however if falls increase may need to consider discontinuation of Eliquis for      2. VHD (valvular heart disease)  H/o MV repair with a #28 CG ring  Echo reviewed from 07/08/2022   Summary   Left ventricular systolic function is normal.   Ejection fraction is visually estimated at 50-55%. Mild left ventricular hypertrophy. PPM wiring visualized in right heart. Sclerotic, but non-stenotic aortic valve. Mild aortic regurgitation; PHT: 738 msec. Mitral annular calcification is present. No evidence of any pericardial effusion    Stable valve  Continue with ongoing surveillance    3.  Coronary artery disease involving native coronary artery of native heart without angina pectoris  History of coronary artery bypass grafting x3 with LIMA to the LAD and SVG to circumflex M1 and M2:   Remains asymptomatic from CAD. We will continue with medical management with metoprolol and rosuvastatin      4. Essential hypertension  Blood pressure is controlled    5. PPM   H/o Medtronic dual-chamber permanent pacemaker  Pacemaker analysis from today reveals stable leads and stable battery. 1 episode of atrial fibs noted on device  On every 3-month remote monitoring  Stays analysis reveals stable battery status. Recommend to continue every 3-month remote monitoring      History of PFO closure      Medications reviewed and confirmed with patient and with sister    Tests ordered:  none      Follow-up  9 months     Signed:  BIRD Ortiz CNP, 11/2/2022, 3:25 PM    An electronic signature was used to authenticate this note. Please note this report has been partially produced using speech recognition software and may contain errors related to that system including errors in grammar, punctuation, and spelling, as well as words and phrases that may be inappropriate. If there are any questions or concerns please feel free to contact the dictating provider for clarification.

## 2022-11-07 ENCOUNTER — PROCEDURE VISIT (OUTPATIENT)
Dept: CARDIOLOGY CLINIC | Age: 77
End: 2022-11-07

## 2022-11-07 ENCOUNTER — TELEPHONE (OUTPATIENT)
Dept: CARDIOLOGY CLINIC | Age: 77
End: 2022-11-07

## 2022-11-07 DIAGNOSIS — Z95.0 CARDIAC PACEMAKER IN SITU: Primary | ICD-10-CM

## 2022-11-07 DIAGNOSIS — I49.5 SINUS NODE DYSFUNCTION (HCC): ICD-10-CM

## 2022-11-07 DIAGNOSIS — I44.0 AV BLOCK, 1ST DEGREE: ICD-10-CM

## 2022-11-07 NOTE — LETTER
Cardiology [address]  [place]  Phone: [phone]  Fax: [phone]    11/7/2022        Mable Jones  [address] [id number]            Dear Tacho Mayorga: This is your [facility] schedule. You can elise your calendar with these dates. Remember that your device is wireless and should automatically do these checks while you are sleeping. If for any reason I do not get your transmission then I will call you and ask that you send a manual transmission. If you have any questions or concerns, please call and ask for Shobha Ramos at [phone]. Thank you.

## 2023-01-30 ENCOUNTER — APPOINTMENT (OUTPATIENT)
Dept: NUCLEAR MEDICINE | Age: 78
DRG: 853 | End: 2023-01-30
Payer: MEDICARE

## 2023-01-30 ENCOUNTER — APPOINTMENT (OUTPATIENT)
Dept: GENERAL RADIOLOGY | Age: 78
DRG: 853 | End: 2023-01-30
Payer: MEDICARE

## 2023-01-30 ENCOUNTER — APPOINTMENT (OUTPATIENT)
Dept: ULTRASOUND IMAGING | Age: 78
DRG: 853 | End: 2023-01-30
Payer: MEDICARE

## 2023-01-30 ENCOUNTER — APPOINTMENT (OUTPATIENT)
Dept: CT IMAGING | Age: 78
DRG: 853 | End: 2023-01-30
Payer: MEDICARE

## 2023-01-30 ENCOUNTER — HOSPITAL ENCOUNTER (INPATIENT)
Age: 78
LOS: 19 days | Discharge: NURSING, REHAB OR LONG-TERM CARE | DRG: 853 | End: 2023-02-20
Attending: EMERGENCY MEDICINE | Admitting: INTERNAL MEDICINE
Payer: MEDICARE

## 2023-01-30 DIAGNOSIS — K81.9 CHOLECYSTITIS: ICD-10-CM

## 2023-01-30 DIAGNOSIS — T81.49XA POSTOPERATIVE ABSCESS: ICD-10-CM

## 2023-01-30 DIAGNOSIS — R10.9 RIGHT SIDED ABDOMINAL PAIN: ICD-10-CM

## 2023-01-30 DIAGNOSIS — R07.9 CHEST PAIN, UNSPECIFIED TYPE: Primary | ICD-10-CM

## 2023-01-30 PROBLEM — R10.11 RIGHT UPPER QUADRANT PAIN: Status: ACTIVE | Noted: 2023-01-30

## 2023-01-30 LAB
ADENOVIRUS DETECTION BY PCR: NOT DETECTED
ALBUMIN SERPL-MCNC: 4.5 GM/DL (ref 3.4–5)
ALP BLD-CCNC: 129 IU/L (ref 40–129)
ALT SERPL-CCNC: 13 U/L (ref 10–40)
ANION GAP SERPL CALCULATED.3IONS-SCNC: 11 MMOL/L (ref 4–16)
AST SERPL-CCNC: 24 IU/L (ref 15–37)
BASOPHILS ABSOLUTE: 0 K/CU MM
BASOPHILS RELATIVE PERCENT: 0.5 % (ref 0–1)
BILIRUB SERPL-MCNC: 0.6 MG/DL (ref 0–1)
BORDETELLA PARAPERTUSSIS BY PCR: NOT DETECTED
BORDETELLA PERTUSSIS PCR: NOT DETECTED
BUN BLDV-MCNC: 12 MG/DL (ref 6–23)
CALCIUM SERPL-MCNC: 9.1 MG/DL (ref 8.3–10.6)
CHLAMYDOPHILA PNEUMONIA PCR: NOT DETECTED
CHLORIDE BLD-SCNC: 97 MMOL/L (ref 99–110)
CO2: 27 MMOL/L (ref 21–32)
CORONAVIRUS 229E PCR: NOT DETECTED
CORONAVIRUS HKU1 PCR: NOT DETECTED
CORONAVIRUS NL63 PCR: NOT DETECTED
CORONAVIRUS OC43 PCR: NOT DETECTED
CREAT SERPL-MCNC: 0.7 MG/DL (ref 0.6–1.1)
DIFFERENTIAL TYPE: ABNORMAL
EOSINOPHILS ABSOLUTE: 0 K/CU MM
EOSINOPHILS RELATIVE PERCENT: 0.5 % (ref 0–3)
GFR SERPL CREATININE-BSD FRML MDRD: >60 ML/MIN/1.73M2
GLUCOSE BLD-MCNC: 147 MG/DL (ref 70–99)
HCT VFR BLD CALC: 36 % (ref 37–47)
HEMOGLOBIN: 11.7 GM/DL (ref 12.5–16)
HUMAN METAPNEUMOVIRUS PCR: NOT DETECTED
IMMATURE NEUTROPHIL %: 0.4 % (ref 0–0.43)
INFLUENZA A BY PCR: NOT DETECTED
INFLUENZA A H1 (2009) PCR: NOT DETECTED
INFLUENZA A H1 PANDEMIC PCR: NOT DETECTED
INFLUENZA A H3 PCR: NOT DETECTED
INFLUENZA B BY PCR: NOT DETECTED
LIPASE: 21 IU/L (ref 13–60)
LYMPHOCYTES ABSOLUTE: 0.7 K/CU MM
LYMPHOCYTES RELATIVE PERCENT: 8.6 % (ref 24–44)
MAGNESIUM: 2.6 MG/DL (ref 1.8–2.4)
MCH RBC QN AUTO: 27.1 PG (ref 27–31)
MCHC RBC AUTO-ENTMCNC: 32.5 % (ref 32–36)
MCV RBC AUTO: 83.3 FL (ref 78–100)
MONOCYTES ABSOLUTE: 0.4 K/CU MM
MONOCYTES RELATIVE PERCENT: 4.3 % (ref 0–4)
MYCOPLASMA PNEUMONIAE PCR: NOT DETECTED
NUCLEATED RBC %: 0 %
PARAINFLUENZA 1 PCR: NOT DETECTED
PARAINFLUENZA 2 PCR: NOT DETECTED
PARAINFLUENZA 3 PCR: NOT DETECTED
PARAINFLUENZA 4 PCR: NOT DETECTED
PDW BLD-RTO: 12.5 % (ref 11.7–14.9)
PLATELET # BLD: 163 K/CU MM (ref 140–440)
PMV BLD AUTO: 9 FL (ref 7.5–11.1)
POTASSIUM SERPL-SCNC: 4.2 MMOL/L (ref 3.5–5.1)
PRO-BNP: 1838 PG/ML
RBC # BLD: 4.32 M/CU MM (ref 4.2–5.4)
RHINOVIRUS ENTEROVIRUS PCR: NOT DETECTED
RSV PCR: NOT DETECTED
SARS-COV-2: NOT DETECTED
SEGMENTED NEUTROPHILS ABSOLUTE COUNT: 7.1 K/CU MM
SEGMENTED NEUTROPHILS RELATIVE PERCENT: 85.7 % (ref 36–66)
SODIUM BLD-SCNC: 135 MMOL/L (ref 135–145)
TOTAL IMMATURE NEUTOROPHIL: 0.03 K/CU MM
TOTAL NUCLEATED RBC: 0 K/CU MM
TOTAL PROTEIN: 7.2 GM/DL (ref 6.4–8.2)
TROPONIN T: <0.01 NG/ML
TSH HIGH SENSITIVITY: 3.69 UIU/ML (ref 0.27–4.2)
WBC # BLD: 8.3 K/CU MM (ref 4–10.5)

## 2023-01-30 PROCEDURE — G0378 HOSPITAL OBSERVATION PER HR: HCPCS

## 2023-01-30 PROCEDURE — 74177 CT ABD & PELVIS W/CONTRAST: CPT

## 2023-01-30 PROCEDURE — 99222 1ST HOSP IP/OBS MODERATE 55: CPT | Performed by: SURGERY

## 2023-01-30 PROCEDURE — 0202U NFCT DS 22 TRGT SARS-COV-2: CPT

## 2023-01-30 PROCEDURE — 6370000000 HC RX 637 (ALT 250 FOR IP): Performed by: NURSE PRACTITIONER

## 2023-01-30 PROCEDURE — 99285 EMERGENCY DEPT VISIT HI MDM: CPT

## 2023-01-30 PROCEDURE — A9537 TC99M MEBROFENIN: HCPCS | Performed by: SURGERY

## 2023-01-30 PROCEDURE — 83735 ASSAY OF MAGNESIUM: CPT

## 2023-01-30 PROCEDURE — 93306 TTE W/DOPPLER COMPLETE: CPT

## 2023-01-30 PROCEDURE — 78226 HEPATOBILIARY SYSTEM IMAGING: CPT

## 2023-01-30 PROCEDURE — 6360000004 HC RX CONTRAST MEDICATION: Performed by: INTERNAL MEDICINE

## 2023-01-30 PROCEDURE — 71045 X-RAY EXAM CHEST 1 VIEW: CPT

## 2023-01-30 PROCEDURE — 80053 COMPREHEN METABOLIC PANEL: CPT

## 2023-01-30 PROCEDURE — 83690 ASSAY OF LIPASE: CPT

## 2023-01-30 PROCEDURE — 76705 ECHO EXAM OF ABDOMEN: CPT

## 2023-01-30 PROCEDURE — 84484 ASSAY OF TROPONIN QUANT: CPT

## 2023-01-30 PROCEDURE — 71260 CT THORAX DX C+: CPT

## 2023-01-30 PROCEDURE — 93005 ELECTROCARDIOGRAM TRACING: CPT | Performed by: EMERGENCY MEDICINE

## 2023-01-30 PROCEDURE — 83880 ASSAY OF NATRIURETIC PEPTIDE: CPT

## 2023-01-30 PROCEDURE — 6370000000 HC RX 637 (ALT 250 FOR IP): Performed by: EMERGENCY MEDICINE

## 2023-01-30 PROCEDURE — 84443 ASSAY THYROID STIM HORMONE: CPT

## 2023-01-30 PROCEDURE — 3430000000 HC RX DIAGNOSTIC RADIOPHARMACEUTICAL: Performed by: SURGERY

## 2023-01-30 PROCEDURE — 99223 1ST HOSP IP/OBS HIGH 75: CPT | Performed by: INTERNAL MEDICINE

## 2023-01-30 PROCEDURE — 2580000003 HC RX 258: Performed by: NURSE PRACTITIONER

## 2023-01-30 PROCEDURE — 85025 COMPLETE CBC W/AUTO DIFF WBC: CPT

## 2023-01-30 RX ORDER — PANTOPRAZOLE SODIUM 40 MG/1
40 TABLET, DELAYED RELEASE ORAL
Status: DISCONTINUED | OUTPATIENT
Start: 2023-01-31 | End: 2023-02-02

## 2023-01-30 RX ORDER — ATORVASTATIN CALCIUM 40 MG/1
80 TABLET, FILM COATED ORAL NIGHTLY
Status: DISCONTINUED | OUTPATIENT
Start: 2023-01-30 | End: 2023-01-30

## 2023-01-30 RX ORDER — ROSUVASTATIN CALCIUM 20 MG/1
40 TABLET, COATED ORAL EVERY EVENING
Status: DISCONTINUED | OUTPATIENT
Start: 2023-01-30 | End: 2023-02-04

## 2023-01-30 RX ORDER — ONDANSETRON 4 MG/1
4 TABLET, ORALLY DISINTEGRATING ORAL EVERY 8 HOURS PRN
Status: DISCONTINUED | OUTPATIENT
Start: 2023-01-30 | End: 2023-02-20 | Stop reason: HOSPADM

## 2023-01-30 RX ORDER — MEMANTINE HYDROCHLORIDE 10 MG/1
10 TABLET ORAL 2 TIMES DAILY
Status: DISCONTINUED | OUTPATIENT
Start: 2023-01-30 | End: 2023-02-04

## 2023-01-30 RX ORDER — SODIUM CHLORIDE 9 MG/ML
INJECTION, SOLUTION INTRAVENOUS PRN
Status: DISCONTINUED | OUTPATIENT
Start: 2023-01-30 | End: 2023-02-20 | Stop reason: HOSPADM

## 2023-01-30 RX ORDER — GABAPENTIN 600 MG/1
600 TABLET ORAL 2 TIMES DAILY
Status: ON HOLD | COMMUNITY
End: 2023-02-20 | Stop reason: HOSPADM

## 2023-01-30 RX ORDER — ASPIRIN 81 MG/1
324 TABLET, CHEWABLE ORAL ONCE
Status: COMPLETED | OUTPATIENT
Start: 2023-01-30 | End: 2023-01-30

## 2023-01-30 RX ORDER — ACETAMINOPHEN 650 MG/1
650 SUPPOSITORY RECTAL EVERY 6 HOURS PRN
Status: DISCONTINUED | OUTPATIENT
Start: 2023-01-30 | End: 2023-02-20 | Stop reason: HOSPADM

## 2023-01-30 RX ORDER — OXYBUTYNIN CHLORIDE 5 MG/1
5 TABLET ORAL 2 TIMES DAILY
Status: DISCONTINUED | OUTPATIENT
Start: 2023-01-30 | End: 2023-02-02

## 2023-01-30 RX ORDER — GABAPENTIN 300 MG/1
600 CAPSULE ORAL 2 TIMES DAILY
Status: DISCONTINUED | OUTPATIENT
Start: 2023-01-30 | End: 2023-02-04

## 2023-01-30 RX ORDER — FUROSEMIDE 20 MG/1
20 TABLET ORAL DAILY
Status: DISCONTINUED | OUTPATIENT
Start: 2023-01-31 | End: 2023-02-02

## 2023-01-30 RX ORDER — ACETAMINOPHEN 325 MG/1
650 TABLET ORAL EVERY 6 HOURS PRN
Status: DISCONTINUED | OUTPATIENT
Start: 2023-01-30 | End: 2023-02-20 | Stop reason: HOSPADM

## 2023-01-30 RX ORDER — POLYETHYLENE GLYCOL 3350 17 G/17G
17 POWDER, FOR SOLUTION ORAL DAILY PRN
Status: DISCONTINUED | OUTPATIENT
Start: 2023-01-30 | End: 2023-01-31

## 2023-01-30 RX ORDER — ASPIRIN 81 MG/1
81 TABLET ORAL DAILY
Status: DISCONTINUED | OUTPATIENT
Start: 2023-01-31 | End: 2023-01-31

## 2023-01-30 RX ORDER — MEMANTINE HYDROCHLORIDE 10 MG/1
10 TABLET ORAL 2 TIMES DAILY
COMMUNITY

## 2023-01-30 RX ORDER — SODIUM CHLORIDE 0.9 % (FLUSH) 0.9 %
5-40 SYRINGE (ML) INJECTION EVERY 12 HOURS SCHEDULED
Status: DISCONTINUED | OUTPATIENT
Start: 2023-01-30 | End: 2023-02-20 | Stop reason: HOSPADM

## 2023-01-30 RX ORDER — DONEPEZIL HYDROCHLORIDE 10 MG/1
10 TABLET, FILM COATED ORAL NIGHTLY
Status: DISCONTINUED | OUTPATIENT
Start: 2023-01-30 | End: 2023-02-20 | Stop reason: HOSPADM

## 2023-01-30 RX ORDER — SODIUM CHLORIDE 0.9 % (FLUSH) 0.9 %
5-40 SYRINGE (ML) INJECTION PRN
Status: DISCONTINUED | OUTPATIENT
Start: 2023-01-30 | End: 2023-02-20 | Stop reason: HOSPADM

## 2023-01-30 RX ORDER — DOCUSATE SODIUM 100 MG/1
100 CAPSULE, LIQUID FILLED ORAL 2 TIMES DAILY
Status: DISCONTINUED | OUTPATIENT
Start: 2023-01-30 | End: 2023-02-20 | Stop reason: HOSPADM

## 2023-01-30 RX ORDER — IPRATROPIUM BROMIDE 21 UG/1
1 SPRAY, METERED NASAL AS NEEDED
Status: DISCONTINUED | OUTPATIENT
Start: 2023-01-30 | End: 2023-02-20 | Stop reason: HOSPADM

## 2023-01-30 RX ORDER — ASPIRIN 81 MG/1
81 TABLET, CHEWABLE ORAL DAILY
Status: DISCONTINUED | OUTPATIENT
Start: 2023-01-31 | End: 2023-01-30

## 2023-01-30 RX ORDER — ONDANSETRON 2 MG/ML
4 INJECTION INTRAMUSCULAR; INTRAVENOUS EVERY 6 HOURS PRN
Status: DISCONTINUED | OUTPATIENT
Start: 2023-01-30 | End: 2023-02-20 | Stop reason: HOSPADM

## 2023-01-30 RX ADMIN — OXYBUTYNIN CHLORIDE 5 MG: 5 TABLET ORAL at 18:25

## 2023-01-30 RX ADMIN — IOPAMIDOL 80 ML: 755 INJECTION, SOLUTION INTRAVENOUS at 10:35

## 2023-01-30 RX ADMIN — DONEPEZIL HYDROCHLORIDE 10 MG: 10 TABLET ORAL at 21:28

## 2023-01-30 RX ADMIN — SODIUM CHLORIDE, PRESERVATIVE FREE 10 ML: 5 INJECTION INTRAVENOUS at 13:37

## 2023-01-30 RX ADMIN — DOCUSATE SODIUM 100 MG: 100 CAPSULE, LIQUID FILLED ORAL at 13:33

## 2023-01-30 RX ADMIN — APIXABAN 5 MG: 5 TABLET, FILM COATED ORAL at 21:28

## 2023-01-30 RX ADMIN — ROSUVASTATIN CALCIUM 40 MG: 20 TABLET, COATED ORAL at 18:25

## 2023-01-30 RX ADMIN — ASPIRIN 81 MG CHEWABLE TABLET 324 MG: 81 TABLET CHEWABLE at 09:25

## 2023-01-30 RX ADMIN — DOCUSATE SODIUM 100 MG: 100 CAPSULE, LIQUID FILLED ORAL at 21:28

## 2023-01-30 RX ADMIN — GABAPENTIN 600 MG: 300 CAPSULE ORAL at 21:28

## 2023-01-30 RX ADMIN — Medication 5 MILLICURIE: at 15:08

## 2023-01-30 RX ADMIN — MEMANTINE 10 MG: 10 TABLET ORAL at 21:28

## 2023-01-30 RX ADMIN — GABAPENTIN 600 MG: 300 CAPSULE ORAL at 13:33

## 2023-01-30 RX ADMIN — SODIUM CHLORIDE, PRESERVATIVE FREE 10 ML: 5 INJECTION INTRAVENOUS at 14:02

## 2023-01-30 RX ADMIN — SODIUM CHLORIDE, PRESERVATIVE FREE 10 ML: 5 INJECTION INTRAVENOUS at 21:28

## 2023-01-30 RX ADMIN — METOPROLOL TARTRATE 25 MG: 50 TABLET, FILM COATED ORAL at 13:33

## 2023-01-30 RX ADMIN — OXYBUTYNIN CHLORIDE 5 MG: 5 TABLET ORAL at 21:28

## 2023-01-30 RX ADMIN — MEMANTINE 10 MG: 10 TABLET ORAL at 18:25

## 2023-01-30 ASSESSMENT — ENCOUNTER SYMPTOMS
BACK PAIN: 1
EYES NEGATIVE: 1
VOMITING: 0
ABDOMINAL PAIN: 1
RESPIRATORY NEGATIVE: 1
NAUSEA: 0
ALLERGIC/IMMUNOLOGIC NEGATIVE: 1

## 2023-01-30 ASSESSMENT — PAIN SCALES - GENERAL: PAINLEVEL_OUTOF10: 3

## 2023-01-30 ASSESSMENT — PAIN DESCRIPTION - ORIENTATION: ORIENTATION: RIGHT

## 2023-01-30 ASSESSMENT — HEART SCORE: ECG: 1

## 2023-01-30 ASSESSMENT — PAIN DESCRIPTION - LOCATION: LOCATION: BACK;CHEST

## 2023-01-30 NOTE — CONSULTS
CARDIOLOGY CONSULT NOTE    Reason for consultation: Chest pain     Referring physician: Selene Tiwari MD      Primary care physician: Piter Morales MD        Dear Selene Tiwari MD   Thanks for the consult. History of present illness:Jaimie is a 76 y. o.year old who  presents with  chest pain for last few days, happening daily, intermittent for 15 to 20 mins and aggravated with activity substernal also,reproducible with palpation, radiated to shoulder, 6/10, tender to touch,associated with shortness of breath, + sweating, nausea, did not get NTG in ED. No fever, no chills, no nausea no vomiting. Blood pressure, cholesterol, blood glucose and weight are well controlled. Chief Complaint   Patient presents with    Chest Pain     X 3 days        Past medical history:    has a past medical history of Anxiety, Arthritis, Atrial fibrillation ([facility] [address] CAD (coronary artery disease), COVID-19, Diabetes mellitus ([facility] [address] H/O cardiac catheterization, H/O cardiovascular stress test, H/O echocardiogram, H/O echocardiogram, H/O echocardiogram, H/O three vessel coronary artery bypass, History of Holter monitoring, History of nuclear stress test, Hyperlipidemia, Hypertension, Memory loss, Missing teeth, acquired, Pneumonia, Risk for falls, TIA (transient ischemic attack), Urinary leakage, UTI (urinary tract infection), and Wears glasses. Past surgical history:   has a past surgical history that includes Cardiac catheterization (06/25/2018); [place] tooth extraction; eye surgery (Bilateral, 2018); Tonsillectomy (1940's); thoracentesis (Bilateral, 07/13/2018); Pacemaker insertion (Left, 12/11/2018); CABG with Mitral Valve Repair (07/09/2018); and Mitral valve maze procedure (07/19/2018). Social History:   reports that she has never smoked. She has never used smokeless tobacco. She reports that she does not drink alcohol and does not use drugs.   Family history:   no family history of CAD, STROKE of DM    sodium chloride flush 0.9 % injection 5-40 mL, 2 times per day  sodium chloride flush 0.9 % injection 5-40 mL, PRN  0.9 % sodium chloride infusion, PRN  ondansetron (ZOFRAN-ODT) disintegrating tablet 4 mg, Q8H PRN   Or  ondansetron (ZOFRAN) injection 4 mg, Q6H PRN  acetaminophen (TYLENOL) tablet 650 mg, Q6H PRN   Or  acetaminophen (TYLENOL) suppository 650 mg, Q6H PRN  polyethylene glycol (GLYCOLAX) packet 17 g, Daily PRN  [START ON 1/31/2023] aspirin chewable tablet 81 mg, Daily  atorvastatin (LIPITOR) tablet 80 mg, Nightly      Current Facility-Administered Medications   Medication Dose Route Frequency Provider Last Rate Last Admin    sodium chloride flush 0.9 % injection 5-40 mL  5-40 mL IntraVENous 2 times per day BIRD Casillas CNP        sodium chloride flush 0.9 % injection 5-40 mL  5-40 mL IntraVENous PRN BIRD Hicks CNP        0.9 % sodium chloride infusion   IntraVENous PRN BIRD Casillas CNP        ondansetron (ZOFRAN-ODT) disintegrating tablet 4 mg  4 mg Oral Q8H PRN BIRD Casillas CNP        Or    ondansetron (ZOFRAN) injection 4 mg  4 mg IntraVENous Q6H PRN BIRD Casillas CNP        acetaminophen (TYLENOL) tablet 650 mg  650 mg Oral Q6H PRN BIRD Casillas CNP        Or    acetaminophen (TYLENOL) suppository 650 mg  650 mg Rectal Q6H PRN BIRD Casillas CNP        polyethylene glycol (GLYCOLAX) packet 17 g  17 g Oral Daily PRN BIRD Casillas CNP        [START ON 1/31/2023] aspirin chewable tablet 81 mg  81 mg Oral Daily BIRD Hicks CNP        atorvastatin (LIPITOR) tablet 80 mg  80 mg Oral Nightly BIRD Win CNP         Current Outpatient Medications   Medication Sig Dispense Refill    gabapentin (NEURONTIN) 600 MG tablet Take 600 mg by mouth 2 times daily.       memantine (NAMENDA) 10 MG tablet Take 10 mg by mouth 2 times daily      apixaban (ELIQUIS) 5 MG TABS tablet Take 1 tablet by mouth 2 times daily 42 tablet 0    diphenhydrAMINE-APAP, sleep, (ACETAMINOPHEN PM)  MG tablet Take 1 tablet by mouth nightly as needed for Sleep      metoprolol tartrate (LOPRESSOR) 50 MG tablet Take 0.5 tablets by mouth 2 times daily 180 tablet 1    rosuvastatin (CRESTOR) 40 MG tablet Take 1 tablet by mouth every evening 90 tablet 3    bisacodyl (DULCOLAX) 5 MG EC tablet Take 5 mg by mouth daily as needed for Constipation      oxybutynin (DITROPAN) 5 MG tablet Take 5 mg by mouth 2 times daily      potassium chloride (KLOR-CON) 20 MEQ packet Take 20 mEq by mouth daily      senna (SENOKOT) 8.6 MG tablet Take 1 tablet by mouth daily (Patient not taking: Reported on 11/2/2022)      acetaminophen (APAP EXTRA STRENGTH) 500 MG tablet Take 1 tablet by mouth every 6 hours as needed for Pain (Patient not taking: Reported on 11/2/2022) 20 tablet 0    ipratropium (ATROVENT) 0.03 % nasal spray 1 spray by Nasal route as needed      omeprazole (PRILOSEC) 40 MG delayed release capsule Take 40 mg by mouth daily      melatonin 3 MG TABS tablet Take 3 mg by mouth daily (Patient not taking: Reported on 11/2/2022)      aspirin 81 MG EC tablet Take 1 tablet by mouth daily 30 tablet 3    furosemide (LASIX) 20 MG tablet Take 1 tablet by mouth daily 30 tablet 0    docusate sodium (COLACE, DULCOLAX) 100 MG CAPS Take 100 mg by mouth 2 times daily      donepezil (ARICEPT) 10 MG tablet Take 10 mg by mouth nightly  2    citalopram (CELEXA) 40 MG tablet Take 40 mg by mouth daily  5    vitamin D3 (CHOLECALCIFEROL) 25 MCG (1000 UT) TABS tablet Take 1 tablet by mouth daily       CALCIUM CARBONATE PO Take 1,000 mg by mouth daily             Review of Systems:    Constitutional: No Fever or Weight Loss    Eyes: No Decreased Vision  ENT: No Headaches, Hearing Loss or Vertigo  Cardiovascular: + chest pain, dyspnea on exertion, palpitations or loss of consciousness  Respiratory: No cough or wheezing    Gastrointestinal: No abdominal pain, appetite loss, blood in stools, constipation, diarrhea or heartburn  Genitourinary: No dysuria, trouble voiding, or hematuria  Musculoskeletal: No gait disturbance, weakness or joint complaints  Integumentary: No rash or pruritis  Neurological: No TIA or stroke symptoms  Psychiatric: No anxiety or depression  Endocrine: No malaise, fatigue or temperature intolerance  Hematologic/Lymphatic: No bleeding problems, blood clots or swollen lymph nodes  Allergic/Immunologic: No nasal congestion or hives  All systems negative except as marked. Physical Examination:    Vitals:    01/30/23 1114   BP: /59   Pulse: 61   Resp: 17   Temp: afebrile   SpO2: 95%      Wt Readings from Last 3 Encounters:   11/02/22 154 lb 6.4 oz (70 kg)   07/12/22 158 lb 4.6 oz (71.8 kg)   07/08/22 156 lb 15.5 oz (71.2 kg)     There is no height or weight on file to calculate BMI. General Appearance: No distress, conversant     Constitutional: Well developed, Well nourished, No acute distress, Non-toxic appearance. HENT:  Normocephalic, Atraumatic, Bilateral external ears normal, Oropharynx moist, No oral exudates, Nose normal. Neck- Normal range of motion, No tenderness, Supple, No stridor,no apical-carotid delay, no carotid bruit  Eyes: PERRL, EOMI, Conjunctiva normal, No discharge. Respiratory:  Normal breath sounds, No respiratory distress, No wheezing, No chest tenderness. ,no use of accessory muscles, diaphragm movement is normal  Cardiovascular: (PMI) apex non displaced,no lifts no thrills, no s3,no s4, Normal heart rate, Normal rhythm, No murmurs, No rubs, No gallops.  Carotid arteries pulse and amplitude are normal no bruit, no abdominal bruit noted ( normal abdominal aorta ausculation), femoral arteries pulse and amplitude are normal no bruit, pedal pulses are normal.  GI: Bowel sounds normal, Soft, No tenderness, No masses, No pulsatile masses, no hepatosplenomegally, no bruits  : External genitalia appear normal, No masses or lesions. No discharge. No CVA tenderness. Musculoskeletal: Intact distal pulses, No edema, No tenderness, No cyanosis, No clubbing. Good range of motion in all major joints. No tenderness to palpation or major deformities noted. Back- No tenderness. Integument: Warm, Dry, No erythema, No rash. Skin: no rash, no ulcers  Lymphatic: No lymphadenopathy noted. Neurologic: Alert & oriented x 3, Normal motor function, Normal sensory function, No focal deficits noted. Psychiatric: Affect normal, Judgment normal, Mood normal.   Lab Review        Recent Labs       09/28/16 0010    WBC  12.3*    HGB  14.4    HCT  43.8    PLT  316            Recent Labs       09/28/16 0010    NA  136    K  3.9    CL  95*    CO2  23    BUN  10    CREATININE  0.8           Recent Labs       09/28/16 0010    AST  12*    ALT  10    BILITOT  0.4    ALKPHOS  124       No results for input(s): TROPONINI in the last 72 hours. No results found for: BNP  No results found for: INR, PROTIME        EKG:nsr     Chest CT, shows distended gallbladder no pneumonia or heart failure     ECHO:pending  Labs, echo, meds reviewed  Assessment:      Recommendations:     Chest pain: Patient appears to have right-sided chest pain with coughing, CAT scan of the chest is suspicious for cholecystitis based on distended gallbladder will recommend to get GI or surgical consult, she may need HIDA  CAD, history of bypass in 2018, has LIMA to LAD SVG to circumflex OM1 and OM 2 stable for now, had history of intra.   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 fib history of maze, will get EP evaluation if we can discontinue Eliquis because she history of fall into the parenchymal bleed in the brain in past and if the pacemaker does not show any underlying A. fib may need may be able to stop Eliquis  Health maintenance: exerise and diet  All labs, medications and tests reviewed, continue all other medications of all above medical condition listed as is.           Gorge Marcial MD,   Gorge Marcial MD, 1/30/2023 11:32 AM

## 2023-01-30 NOTE — ED NOTES
[address] [place] nurse has been notified that pt is still in nuclear medicine and then will be receiving an ultrasound before going to [address] [place], JEFF  01/30/23 [id number]

## 2023-01-30 NOTE — ED NOTES
[id number] paged Dr Nancy Horton  01/30/23 [id number] Dr John Wynne returned call      Hal Leiva  01/30/23 [id number]

## 2023-01-30 NOTE — H&P
History and Physical      Name:  Cait Cotter /Age/Sex: 1945  (76 y.o. female)   MRN & CSN:  [id number] & [id number] Admission Date/Time: 2023  7:41 AM   Location:  ED25/ED-25 PCP: Brayan Taylor MD       Hospital Day: 1           Assessment and Plan:   # Right sided CW pain - Reproducible on exam, suspect MSK, has recent 2 week hx of coughing. Chest x-ray nonacute for infiltrate or consolidation. EKG Atrial paced rhythm with RBBB and t-wave abn similar to prior, trop neg x 1. Will obtain CT chest, TTE, repeat trops, continue pts anti-ischemic regimen, check tsh, lipids. Cardiology c/s in ED will f/u eval/recs, f/u CT abd/pelvis ordered in ED    # Cough-patient and family reporting cough x2 weeks, chest x-ray nonacute. We will follow-up CT chest    # CAD status post CABG -continue patient's anti-ischemic regimen, plan of care as noted above    # Paroxysmal atrial fibrillation, history Maze procedure, status post pacemaker placement -patient managed on Eliquis and beta-blocker, monitor on tele    # Essential hypertension-managed on metoprolol, resume    # Mixed hyperlipidemia on statin    Other chronic medical conditions-resume home medications unless contraindicated  # Valvular heart disease status post mitral valve repair, history of PFO closure  # Hx Diabetes mellitus type 2 -diet controlled, A1c 5. [id number]  ADA diet, A1c in a.m. # History of TIA-on platelets, statin  # Chronic debilitation-patient with limited mobility, stands and pivots to wheelchair, will order PT OT  # Early dementia-on Aricept  #Chronic anemia-hemoglobin within baseline range, monitor    Present on Admission:   Chest pain             Diet No diet orders on file   DVT Prophylaxis [] Lovenox, []  Heparin, [] SCDs, [] Ambulation  [x] Long term AC   GI Prophylaxis [] PPI,  [] H2 Blocker,  [] Carafate,  [] Diet/Tube Feeds   Code Status Full code    Disposition Admit to obsv.     Patient plans to return home upon discharge  Expected length of stay 1 day       -Patient assessment and plan discussed and reviewed with admitting physician: Damion Márquez MD.       Chief Complaint: Chest Pain       History obtained from EHR patient and daughter  History of Present Illness:   Magan Sales is a 76 y.o. female  with history of CAD status post CABG, valvular heart disease status post mitral valve repair, history of PFO closure, paroxysmal atrial fibrillation, essential hypertension, mixed hyperlipidemia, diabetes mellitus type 2, TIA, early dementia, chronic anemia who presents with chest pain. Family at bedside reports the patient had right-sided chest wall pain underneath right breast around 230 this morning. The patient rates her chest wall pain 7-8/10 sharp and intermittent in nature. She denies associated shortness of breath nausea vomiting diaphoresis. She and family denies recent falls or injuries. .  Heating pad and and cream was applied with no relief. She reports possibly coughing and activity aggravates pain. The patient has limited movement ability and pivots to wheelchair; does not exert herself. She denies prior history of this type pain. No reports of recent infections fever chills. She does note a persistent nonproductive cough that occurs at night for the past 2 weeks. She states she has been eating and drinking well; denies abdominal pain, diarrhea constipation or dysuria. No reports of her medication changes. No other acute issues reported. She was evaluated emergency department she presented afebrile pulse 83 respirations 18 blood pressure 162/67 O2 sat 95% room air. Chest x-ray performed showed low lung volumes with left basilar atelectasis. EKG showed atrial paced rhythm with right bundle branch block and T wave abnormality, troponin negative x1. BNP 1838. Chemistry panel showed chloride 97 magnesium 2.6 glucose 147 otherwise unremarkable. LFTs unremarkable.   CBC showed normal WBC hemoglobin 11.7, HCT 36.0, platelets 601. CT abdomen and pelvis is pending. Cardiology has been consulted. She was given 324 mg aspirin. She has been admitted for further management. Ten point ROS: reviewed and are negative, unless as noted in above HPI. Objective:   No intake or output data in the 24 hours ending 01/30/23 0959     Vitals:   Vitals:    01/30/23 0745 01/30/23 0801 01/30/23 0832 01/30/23 0915   BP: (!) 162/67 (!) 155/60 (!) 141/62 (!) 145/63   Pulse: 82 61 60 60   Resp: 18 21 20 23   Temp: 98.4 °F (36.9 °C)      TempSrc: Oral      SpO2: 95%  95% 96%       Physical Exam: 01/30/23     GEN -Awake  appearing female, in NAD. Appears given age. EYES -anicteric, conjunctiva pink. HENT -Head is normocephalic, atraumatic. MM are moist. No evidence of thrush. NECK -Supple, no apparent thyromegaly or masses. RESP -CTA, no wheezes, rales or rhonchi. Symmetric chest movement   C/V -S1/S2 auscultated. RRR without appreciable M/R/G. Tenderness to palpation of chest wall underneath right breast lateral aspect no JVD. Cap refill <3 sec. No peripheral edema. GI -Abdomen is soft non distended, non tender to palpation. + BS. No masses or guarding.  -No CVA/ flank tenderness. LYMPH- No petechiae or ecchymoses. MS -No gross joint deformities. SKIN -Normal coloration, warm, dry. NEURO-history of dementia, has memory deficits, cranial nerves appear grossly intact, normal speech, no lateralizing weakness. PSYC-Awake, alert, oriented person and place. Appropriate affect.     Past Medical History:      Past Medical History:   Diagnosis Date    Anxiety     Arthritis     knees    Atrial fibrillation (HCC)     CAD (coronary artery disease)     Sees Dr. Margaret Alcocer    COVID-19 09/19/2021    Diabetes mellitus ([facility] [address]     H/O cardiac catheterization 06/25/2018    severe 3 vessel disease, refer to CV surgery for CABG and MAZE    H/O cardiovascular stress test 06/06/2018    EF47%  fixed perfusion defect ant wall, pt in afib    H/O echocardiogram 06/06/2018    EF55% mod MR    H/O echocardiogram 08/28/2018    EF 50-55%, Mild : AR, MR & TR.    H/O echocardiogram 03/13/2020    EF 55%, Breast artifact noted. H/O three vessel coronary artery bypass 07/09/2018    Dr. Nola Culp    History of Holter monitoring 10/23/2018    Multiple PAF episodes noted. Tachy-Dinesh episodes noted. History of nuclear stress test 03/13/2020    EF 55%, Breast artifact. Hyperlipidemia     Hypertension     Memory loss     on Aricept    Missing teeth, acquired     Pneumonia     pneumococcal pneumonia twice at end of 2017    Risk for falls     uses walker    TIA (transient ischemic attack)     family doctors said she has had mini-strokes    Urinary leakage     UTI (urinary tract infection)     recent --just stopped antibiotic last week as of 6-29-18    Wears glasses        [facility] reviewed  Past Surgical  History:    has a past surgical history that includes Cardiac catheterization (06/25/2018); [place] tooth extraction; eye surgery (Bilateral, 2018); Tonsillectomy (1940's); thoracentesis (Bilateral, 07/13/2018); Pacemaker insertion (Left, 12/11/2018); CABG with Mitral Valve Repair (07/09/2018); and Mitral valve maze procedure (07/19/2018). Surgical history reviewed  Family  History:   family history includes Breast Cancer in her sister; Diabetes in her sister; Early Death in her brother and father; Heart Disease in her father and sister; Other in her sister; Parkinsonism in her brother; Stroke in her mother. Family history reviewed  Social History:     Social History     Socioeconomic History    Marital status: Single     Spouse name: None    Number of children: None    Years of education: None    Highest education level: None   Tobacco Use    Smoking status: Never    Smokeless tobacco: Never   Vaping Use    Vaping Use: Never used   Substance and Sexual Activity    Alcohol use: No    Drug use: No      reports that she has never smoked. She has never used smokeless tobacco.   reports no history of alcohol use. reports no history of drug use. Social history reviewed  Allergies:   No Known Allergies    Home Medications:     Prior to Admission medications    Medication Sig Start Date End Date Taking?  Authorizing Provider   apixaban (ELIQUIS) 5 MG TABS tablet Take 1 tablet by mouth 2 times daily 1/18/23   Flip Campo MD   diphenhydrAMINE-APAP, sleep, (ACETAMINOPHEN PM)  MG tablet Take 1 tablet by mouth nightly as needed for Sleep    Historical Provider, MD   metoprolol tartrate (LOPRESSOR) 50 MG tablet Take 0.5 tablets by mouth 2 times daily 4/19/22   Murl Humbles, APRN - CNP   rosuvastatin (CRESTOR) 40 MG tablet Take 1 tablet by mouth every evening 4/19/22   Murl Humbles, APRINGA - CNP   bisacodyl (DULCOLAX) 5 MG EC tablet Take 5 mg by mouth as needed 6/9/21   Historical Provider, MD   oxybutynin (DITROPAN) 5 MG tablet Take 1 tablet by mouth daily 6/7/21   Historical Provider, MD   potassium chloride (KLOR-CON) 20 MEQ packet Take 20 mEq by mouth daily    Historical Provider, MD   senna (SENOKOT) 8.6 MG tablet Take 1 tablet by mouth daily  Patient not taking: Reported on 11/2/2022    Historical Provider, MD   acetaminophen (APAP EXTRA STRENGTH) 500 MG tablet Take 1 tablet by mouth every 6 hours as needed for Pain  Patient not taking: Reported on 11/2/2022 12/1/20   Joceline Rojo PA-C   ipratropium (ATROVENT) 0.03 % nasal spray 1 spray by Nasal route as needed 9/27/19   Historical Provider, MD   methylcellulose 500 MG TABS Take 2,000 mg by mouth daily prn  Patient not taking: Reported on 11/2/2022    Historical Provider, MD   omeprazole (PRILOSEC) 40 MG delayed release capsule Take 40 mg by mouth daily    Historical Provider, MD   melatonin 3 MG TABS tablet Take 3 mg by mouth daily  Patient not taking: Reported on 11/2/2022    Historical Provider, MD   aspirin 81 MG EC tablet Take 1 tablet by mouth daily 7/21/18   Nancy Huston MD furosemide (LASIX) 20 MG tablet Take 1 tablet by mouth daily  Patient not taking: Reported on 11/2/2022 7/21/18   C Diana Walker MD   docusate sodium (COLACE, DULCOLAX) 100 MG CAPS Take 100 mg by mouth 2 times daily 7/20/18   C Diana Walker MD   metFORMIN (GLUCOPHAGE) 500 MG tablet Take 500 mg by mouth 2 times daily (with meals)  Patient not taking: Reported on 11/2/2022    Historical Provider, MD   donepezil (ARICEPT) 5 MG tablet Take 10 mg by mouth daily  5/28/18   Historical Provider, MD   citalopram (CELEXA) 40 MG tablet TAKE 1 TABLET EVERY DAY FOR ANXIETY AND STRESS 5/28/18   Historical Provider, MD   vitamin D3 (CHOLECALCIFEROL) 25 MCG (1000 UT) TABS tablet Take 1 tablet by mouth daily     Historical Provider, MD   CALCIUM CARBONATE PO Take 1,000 mg by mouth daily     Historical Provider, MD         Medications:   Medications:    Infusions:   PRN Meds:     Data:     Laboratory this visit:  Reviewed  Recent Labs     01/30/23  0800   WBC 8.3   HGB 11.7*   HCT 36.0*         Recent Labs     01/30/23  0800      K 4.2   CL 97*   CO2 27   BUN 12   CREATININE 0.7     Recent Labs     01/30/23  0800   AST 24   ALT 13   BILITOT 0.6   ALKPHOS 129     No results for input(s): INR in the last 72 hours. Radiology this visit:  Reviewed. XR CHEST PORTABLE    Result Date: 1/30/2023  EXAMINATION: ONE XRAY VIEW OF THE CHEST 1/30/2023 8:01 am COMPARISON: 07/08/2022. HISTORY: ORDERING SYSTEM PROVIDED HISTORY: Chest Pain TECHNOLOGIST PROVIDED HISTORY: Reason for exam:->Chest Pain Reason for Exam: Chest Pain FINDINGS: There are low lung volumes. There are postsurgical changes of median sternotomy. The heart size is enlarged but unchanged. The pulmonary vasculature is within normal limits. There is increased density involving the left lower lung zone. No pneumothoraces are seen. There is a left subclavian AICD. There are prior healed fractures involving the proximal humeri.      1. Low lung volumes with left basilar atelectasis.            EKG this visit:  personally reviewed         Electronically signed by BIRD Ortega CNP on 1/30/2023 at 9:59 AM

## 2023-01-30 NOTE — ED NOTES
ED TO INPATIENT SBAR HANDOFF    Patient Name: Juliana Mistry   :  1945  76 y.o. MRN:  [id number]  Preferred Name  [facility]  ED Room #:  ED25/ED-25  Family/Caregiver Present yes   Restraints no   Sitter no   Sepsis Risk Score Sepsis Risk Score: 1.33    Situation  Code Status: Prior No additional code details. Allergies: Patient has no known allergies. Weight: No data found. Arrived from: home  Chief Complaint:   Chief Complaint   Patient presents with    Chest Pain     X 3 days      Hospital Problem/Diagnosis:  Principal Problem:    Chest pain  Resolved Problems:    * No resolved hospital problems. *    Imaging:   XR CHEST PORTABLE   Final Result   1. Low lung volumes with left basilar atelectasis.          CT ABDOMEN PELVIS W IV CONTRAST Additional Contrast? None    (Results Pending)     Abnormal labs:   Abnormal Labs Reviewed   COMPREHENSIVE METABOLIC PANEL - Abnormal; Notable for the following components:       Result Value    Chloride 97 (*)     Glucose 147 (*)     All other components within normal limits   MAGNESIUM - Abnormal; Notable for the following components:    Magnesium 2.6 (*)     All other components within normal limits   CBC WITH AUTO DIFFERENTIAL - Abnormal; Notable for the following components:    Hemoglobin 11.7 (*)     Hematocrit 36.0 (*)     Segs Relative 85.7 (*)     Lymphocytes % 8.6 (*)     Monocytes % 4.3 (*)     All other components within normal limits   BRAIN NATRIURETIC PEPTIDE - Abnormal; Notable for the following components:    Pro-BNP 1,838 (*)     All other components within normal limits     Critical values: no     Abnormal Assessment Findings:     Background  History:   Past Medical History:   Diagnosis Date    Anxiety     Arthritis     knees    Atrial fibrillation (HCC)     CAD (coronary artery disease)     Sees Dr. De Luna Player COVID-19 2021    Diabetes mellitus ([facility] [address]     H/O cardiac catheterization 2018    severe 3 vessel disease, refer to CV surgery for CABG and MAZE    H/O cardiovascular stress test 06/06/2018    EF47%  fixed perfusion defect ant wall, pt in afib    H/O echocardiogram 06/06/2018    EF55% mod MR    H/O echocardiogram 08/28/2018    EF 50-55%, Mild : AR, MR & TR.    H/O echocardiogram 03/13/2020    EF 55%, Breast artifact noted. 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 Steven Prakash History of Holter monitoring 10/23/2018    Multiple PAF episodes noted. Tachy-Dinesh episodes noted.  History of nuclear stress test 03/13/2020    EF 55%, Breast artifact.     Hyperlipidemia     Hypertension     Memory loss     on Aricept    Missing teeth, acquired     Pneumonia     pneumococcal pneumonia twice at end of 2017    Risk for falls     uses walker    TIA (transient ischemic attack)     family doctors said she has had mini-strokes    Urinary leakage     UTI (urinary tract infection)     recent --just stopped antibiotic last week as of 6-29-18    Wears glasses        Assessment    Vitals/MEWS:        Vitals:    01/30/23 0745 01/30/23 0801 01/30/23 0832 01/30/23 0915   BP: (!) 162/67 (!) 155/60 (!) 141/62 (!) 145/63   Pulse: 82 61 60 60   Resp: 18 21 20 23   Temp: 98.4 °F (36.9 °C)      TempSrc: Oral      SpO2: 95%  95% 96%     FiO2 (%):   O2 Flow Rate:      Cardiac Rhythm:AV paced  Pain Assessment:  [x] Verbal [] Paulette Gallery Scale  Pain Scale:    Last documented pain score (0-10 scale)    Last documented pain medication administered:   Mental Status: oriented, alert, coherent and logical  NIH Score: NIH     C-SSRS: Risk of Suicide: No Risk  Bedside swallow:    Chong Coma Scale (GCS): Chong Coma Scale  Eye Opening: Spontaneous  Best Verbal Response: Oriented  Best Motor Response: Obeys commands  Chong Coma Scale Score: 15  Active LDA's:   Peripheral IV 01/30/23 Left Antecubital (Active)     PO Status: Regular- low fat  Pertinent or High Risk Medications/Drips: no   o If Yes, please provide details:   o   Pending Blood Product Administration: no     You may also review the ED PT Care Timeline found under the Summary Nursing Index tab. Recommendation    Pending orders repeat troponins  Plan for Discharge (if known): Additional Comments: pt is from home, c/o right side chest pain x 3 days. Denies injuries. Pt is alert and oriented with hx of memory loss. Sister is main caretaker and at bedside.  Pt is incontinent of bowels/urine   If any further questions, please call Sending RN at [phone]    Electronically signed by: Electronically signed by Isaac Stacy RN on 1/30/2023 at 9:48 AM     Isaac Stacy RN  01/30/23 [id number]

## 2023-01-30 NOTE — ED NOTES
Pt. Admitted Bed: 8132 / 4011-A, sister present. Personal belongings with sister.        Tyron Orellana, JEFF  01/30/23 0756

## 2023-01-30 NOTE — ED NOTES
Medication History  [facility]    Patient Name: Joan Alas 1945     Medication history has been completed by: Ronan Bolton CPhT    Source(s) of information: family supplied medication list and retail pharmacy    Primary Care Physician: Jessica Mulligan MD     Pharmacy: [place]    Allergies as of 01/30/2023    (No Known Allergies)        Prior to Admission medications    Medication Sig Start Date End Date Taking? Authorizing Provider   gabapentin (NEURONTIN) 600 MG tablet Take 600 mg by mouth 2 times daily.    Yes Historical Provider, MD   memantine (NAMENDA) 10 MG tablet Take 10 mg by mouth 2 times daily   Yes Historical Provider, MD   apixaban (ELIQUIS) 5 MG TABS tablet Take 1 tablet by mouth 2 times daily 1/18/23   Genevieve Mackay MD   diphenhydrAMINE-APAP, sleep, (ACETAMINOPHEN PM)  MG tablet Take 1 tablet by mouth nightly as needed for Sleep    Historical Provider, MD   metoprolol tartrate (LOPRESSOR) 50 MG tablet Take 0.5 tablets by mouth 2 times daily 4/19/22   BIRD Ervin - CNP   rosuvastatin (CRESTOR) 40 MG tablet Take 1 tablet by mouth every evening 4/19/22   BIRD Ervin - CNP   bisacodyl (DULCOLAX) 5 MG EC tablet Take 5 mg by mouth as needed 6/9/21   Historical Provider, MD   oxybutynin (DITROPAN) 5 MG tablet Take 5 mg by mouth 2 times daily 6/7/21   Historical Provider, MD   potassium chloride (KLOR-CON) 20 MEQ packet Take 20 mEq by mouth daily    Historical Provider, MD   senna (SENOKOT) 8.6 MG tablet Take 1 tablet by mouth daily    Historical Provider, MD   acetaminophen (APAP EXTRA STRENGTH) 500 MG tablet Take 1 tablet by mouth every 6 hours as needed for Pain  Patient not taking: Reported on 11/2/2022 12/1/20   Franko George PA-C   ipratropium (ATROVENT) 0.03 % nasal spray 1 spray by Nasal route as needed 9/27/19   Historical Provider, MD   omeprazole (PRILOSEC) 40 MG delayed release capsule Take 40 mg by mouth daily    Historical Provider, MD melatonin 3 MG TABS tablet Take 3 mg by mouth daily  Patient not taking: Reported on 11/2/2022    Historical Provider, MD   aspirin 81 MG EC tablet Take 1 tablet by mouth daily 7/21/18   TRISTON Hutton MD   furosemide (LASIX) 20 MG tablet Take 1 tablet by mouth daily 7/21/18   TRISTON Hutton MD   docusate sodium (COLACE, DULCOLAX) 100 MG CAPS Take 100 mg by mouth 2 times daily 7/20/18   TRISTON Hutton MD   donepezil (ARICEPT) 10 MG tablet Take 10 mg by mouth nightly 5/28/18   Historical Provider, MD   citalopram (CELEXA) 40 MG tablet Take 40 mg by mouth daily 5/28/18   Historical Provider, MD   vitamin D3 (CHOLECALCIFEROL) 25 MCG (1000 UT) TABS tablet Take 1 tablet by mouth daily     Historical Provider, MD   CALCIUM CARBONATE PO Take 1,000 mg by mouth daily     Historical Provider, MD     Medications added or changed (ex. new medication, dosage change, interval change, formulation change):  Gabapentin 600 mg BID (added)  Memantine 10 mg BID (added)    Medications removed from list (include reason, ex. noncompliance, medication cost, therapy complete etc.):   Metformin not taking  Methylcellulose not taking    Comments:  Medication list reviewed with patient's family and verified with retail pharmacy as patient with no recent claims. Per pharmacy patient uses discount card for medications.   Eliquis therapy updated, last dose 11/29/23    To my knowledge the above medication history is accurate as of 1/30/2023 11:11 AM.   Joya Sanchez CPhT   1/30/2023 11:11 AM

## 2023-01-30 NOTE — ED PROVIDER NOTES
Encounter    Patient: Sofia De La Cruz  MRN: [id number]  : 1945  Date of Evaluation: 2023  ED Provider:  Jerry Bergman DO    Triage Chief Complaint:   Chest Pain (X 3 days )    [place]:  Sofia De La Cruz is a 76 y.o. female that presents to the emergency department complaint of right-sided chest pain under the right breast.  Patient brought in via EMS. Pay states she has pain for a week worse last 3 days. States no falls injuries trauma no heavy lifting pulling or straining. Patient states she is left-hand dominant. Patient states pain is localized to the right side mild tenderness to palpation no radiation. Patient states she has taken Tylenol for pain last dose was this morning. Patient states some shortness of breath as well. Patient that she has a cough at night and takes cough medicine nonproductive. She denies any asthma COPD no home oxygen. Patient states her chest pain 7 out of 10 on the pain scale. Patient that she does have heart disease triple bypass surgery and has a pacemaker. Denies any swelling extremities no numbness and tingling extremities. Patient states she is on Eliquis blood thinner. She states she lives home with sister. She denies any nausea vomiting diarrhea.     ROS - see HPI, below listed is current ROS at time of my eval:  General:  No fevers, no chills, no weakness  Eyes:  No recent vison changes, no discharge  ENT:  No sore throat, no nasal congestion, no hearing changes  Cardiovascular: Positive for right-sided chest pain, no palpitations  Respiratory:  No shortness of breath, no cough, no wheezing  Gastrointestinal:  No pain, no nausea, no vomiting, no diarrhea  Musculoskeletal:  No muscle pain, no joint pain  Skin:  No rash, no pruritis, no easy bruising  Neurologic:  No speech problems, no headache, no extremity numbness, no extremity tingling, no extremity weakness  Psychiatric:  No anxiety  Genitourinary:  No dysuria, no hematuria  Endocrine:  No unexpected weight gain, no unexpected weight loss  Extremities:  no edema, no pain    Past Medical History:   Diagnosis Date    Anxiety     Arthritis     knees    Atrial fibrillation (HCC)     CAD (coronary artery disease)     Sees Dr. Usha Ng    COVID-19 09/19/2021    Diabetes mellitus ([address]     H/O cardiac catheterization 06/25/2018    severe 3 vessel disease, refer to CV surgery for CABG and MAZE    H/O cardiovascular stress test 06/06/2018    EF47%  fixed perfusion defect ant wall, pt in afib    H/O echocardiogram 06/06/2018    EF55% mod MR    H/O echocardiogram 08/28/2018    EF 50-55%, Mild : AR, MR & TR.    H/O echocardiogram 03/13/2020    EF 55%, Breast artifact noted. H/O three vessel coronary artery bypass 07/09/2018    Dr. Sybil Bautista    History of Holter monitoring 10/23/2018    Multiple PAF episodes noted. Tachy-Dinesh episodes noted. History of nuclear stress test 03/13/2020    EF 55%, Breast artifact.     Hyperlipidemia     Hypertension     Memory loss     on Aricept    Missing teeth, acquired     Pneumonia     pneumococcal pneumonia twice at end of 2017    Risk for falls     uses walker    TIA (transient ischemic attack)     family doctors said she has had mini-strokes    Urinary leakage     UTI (urinary tract infection)     recent --just stopped antibiotic last week as of 6-29-18    Wears glasses      Past Surgical History:   Procedure Laterality Date    CABG WITH MITRAL VALVE REPAIR  07/09/2018    CABG X 3 [place]>LAD, SVG>CX M1, SVG>M2, MVR repair w/#28 CG ring, PFO closure, MAZE    CARDIAC CATHETERIZATION  06/25/2018    EYE SURGERY Bilateral 2018    Cataracts    MITRAL VALVE MAZE PROCEDURE  07/19/2018    Dr. Brody Estimable Left 12/11/2018    [employer] [place] XT DR SCHWARTZ SureScan     THORACENTESIS Bilateral 07/13/2018    IR Dr. Paulina River, right 56, left 900 all s/s    TONSILLECTOMY  1940's    WISDOM TOOTH EXTRACTION       Family History   Problem Relation Age of Onset    Stroke Mother Heart Disease Father     Early Death Father     Breast Cancer Sister     Parkinsonism Brother     Heart Disease Sister     Other Sister         fibromyalgia    Diabetes Sister     Early Death Brother          at birth     Social History     Socioeconomic History    Marital status: Single     Spouse name: Not on file    Number of children: Not on file    Years of education: Not on file    Highest education level: Not on file   Occupational History    Not on file   Tobacco Use    Smoking status: Never    Smokeless tobacco: Never   Vaping Use    Vaping Use: Never used   Substance and Sexual Activity    Alcohol use: No    Drug use: No    Sexual activity: Not on file   Other Topics Concern    Not on file   Social History Narrative    Not on file     Social Determinants of Health     Financial Resource Strain: Not on file   Food Insecurity: Not on file   Transportation Needs: Not on file   Physical Activity: Not on file   Stress: Not on file   Social Connections: Not on file   Intimate Partner Violence: Not on file   Housing Stability: Not on file     No current facility-administered medications for this encounter.      Current Outpatient Medications   Medication Sig Dispense Refill    apixaban (ELIQUIS) 5 MG TABS tablet Take 1 tablet by mouth 2 times daily 42 tablet 0    diphenhydrAMINE-APAP, sleep, (ACETAMINOPHEN PM)  MG tablet Take 1 tablet by mouth nightly as needed for Sleep      metoprolol tartrate (LOPRESSOR) 50 MG tablet Take 0.5 tablets by mouth 2 times daily 180 tablet 1    rosuvastatin (CRESTOR) 40 MG tablet Take 1 tablet by mouth every evening 90 tablet 3    bisacodyl (DULCOLAX) 5 MG EC tablet Take 5 mg by mouth as needed      oxybutynin (DITROPAN) 5 MG tablet Take 1 tablet by mouth daily      potassium chloride (KLOR-CON) 20 MEQ packet Take 20 mEq by mouth daily      senna (SENOKOT) 8.6 MG tablet Take 1 tablet by mouth daily (Patient not taking: Reported on 2022)      acetaminophen (APAP EXTRA STRENGTH) 500 MG tablet Take 1 tablet by mouth every 6 hours as needed for Pain (Patient not taking: Reported on 11/2/2022) 20 tablet 0    ipratropium (ATROVENT) 0.03 % nasal spray 1 spray by Nasal route as needed      methylcellulose 500 MG TABS Take 2,000 mg by mouth daily prn (Patient not taking: Reported on 11/2/2022)      omeprazole (PRILOSEC) 40 MG delayed release capsule Take 40 mg by mouth daily      melatonin 3 MG TABS tablet Take 3 mg by mouth daily (Patient not taking: Reported on 11/2/2022)      aspirin 81 MG EC tablet Take 1 tablet by mouth daily 30 tablet 3    furosemide (LASIX) 20 MG tablet Take 1 tablet by mouth daily (Patient not taking: Reported on 11/2/2022) 30 tablet 0    docusate sodium (COLACE, DULCOLAX) 100 MG CAPS Take 100 mg by mouth 2 times daily      metFORMIN (GLUCOPHAGE) 500 MG tablet Take 500 mg by mouth 2 times daily (with meals) (Patient not taking: Reported on 11/2/2022)      donepezil (ARICEPT) 5 MG tablet Take 10 mg by mouth daily   2    citalopram (CELEXA) 40 MG tablet TAKE 1 TABLET EVERY DAY FOR ANXIETY AND STRESS  5    vitamin D3 (CHOLECALCIFEROL) 25 MCG (1000 UT) TABS tablet Take 1 tablet by mouth daily       CALCIUM CARBONATE PO Take 1,000 mg by mouth daily        No Known Allergies    Nursing Notes Reviewed        Current Facility-Administered Medications   Medication Dose Route Frequency Provider Last Rate Last Admin    aspirin chewable tablet 324 mg  324 mg Oral Once Susan Joseph,          Current Outpatient Medications   Medication Sig Dispense Refill    apixaban (ELIQUIS) 5 MG TABS tablet Take 1 tablet by mouth 2 times daily 42 tablet 0    diphenhydrAMINE-APAP, sleep, (ACETAMINOPHEN PM)  MG tablet Take 1 tablet by mouth nightly as needed for Sleep      metoprolol tartrate (LOPRESSOR) 50 MG tablet Take 0.5 tablets by mouth 2 times daily 180 tablet 1    rosuvastatin (CRESTOR) 40 MG tablet Take 1 tablet by mouth every evening 90 tablet 3    bisacodyl (DULCOLAX) 5 MG EC tablet Take 5 mg by mouth as needed      oxybutynin (DITROPAN) 5 MG tablet Take 1 tablet by mouth daily      potassium chloride (KLOR-CON) 20 MEQ packet Take 20 mEq by mouth daily      senna (SENOKOT) 8.6 MG tablet Take 1 tablet by mouth daily (Patient not taking: Reported on 11/2/2022)      acetaminophen (APAP EXTRA STRENGTH) 500 MG tablet Take 1 tablet by mouth every 6 hours as needed for Pain (Patient not taking: Reported on 11/2/2022) 20 tablet 0    ipratropium (ATROVENT) 0.03 % nasal spray 1 spray by Nasal route as needed      methylcellulose 500 MG TABS Take 2,000 mg by mouth daily prn (Patient not taking: Reported on 11/2/2022)      omeprazole (PRILOSEC) 40 MG delayed release capsule Take 40 mg by mouth daily      melatonin 3 MG TABS tablet Take 3 mg by mouth daily (Patient not taking: Reported on 11/2/2022)      aspirin 81 MG EC tablet Take 1 tablet by mouth daily 30 tablet 3    furosemide (LASIX) 20 MG tablet Take 1 tablet by mouth daily (Patient not taking: Reported on 11/2/2022) 30 tablet 0    docusate sodium (COLACE, DULCOLAX) 100 MG CAPS Take 100 mg by mouth 2 times daily      metFORMIN (GLUCOPHAGE) 500 MG tablet Take 500 mg by mouth 2 times daily (with meals) (Patient not taking: Reported on 11/2/2022)      donepezil (ARICEPT) 5 MG tablet Take 10 mg by mouth daily   2    citalopram (CELEXA) 40 MG tablet TAKE 1 TABLET EVERY DAY FOR ANXIETY AND STRESS  5    vitamin D3 (CHOLECALCIFEROL) 25 MCG (1000 UT) TABS tablet Take 1 tablet by mouth daily       CALCIUM CARBONATE PO Take 1,000 mg by mouth daily        No Known Allergies    Nursing Notes Reviewed    Physical Exam:  Triage VS:    ED Triage Vitals [01/30/23 0745]   Enc Vitals Group      BP (!) 162/67      Heart Rate 82      Resp 18      Temp 98.4 °F (36.9 °C)      Temp Source Oral      SpO2 95 %      Weight       Height       Head Circumference       Peak Flow       Pain Score       Pain Loc       Pain Edu? Excl. in [address]?     BP (!) 145/63   Pulse 60   Temp 98.4 °F (36.9 °C) (Oral)   Resp 23   SpO2 96%       My pulse ox interpretation is - normal    General appearance:  No acute distress. Skin:  Warm. Dry. Eye:  Extraocular movements intact. Ears, nose, mouth and throat:  Oral mucosa moist   Neck:  Trachea midline. Extremity:  No swelling. Normal ROM     Heart: Right anterior lower chest wall pain tender to palpation no signs of infection pus drainage no crepitus step-off, regular rate and rhythm, normal S1 & S2, no extra heart sounds. Perfusion:  intact  Respiratory:  Lungs clear to auscultation bilaterally. Respirations nonlabored. Abdominal:  Normal bowel sounds. Soft. Nontender. Non distended. Back:  No CVA tenderness to palpation     Neurological:  Alert and oriented times 3. No focal neuro deficits.              Psychiatric:  Appropriate    I have reviewed and interpreted all of the currently available lab results from this visit (if applicable):  Results for orders placed or performed during the hospital encounter of 01/30/23   Comprehensive Metabolic Panel   Result Value Ref Range    Sodium 135 135 - 145 MMOL/L    Potassium 4.2 3.5 - 5.1 MMOL/L    Chloride 97 (L) 99 - 110 mMol/L    CO2 27 21 - 32 MMOL/L    BUN 12 6 - 23 MG/DL    Creatinine 0.7 0.6 - 1.1 MG/DL    Est, Glom Filt Rate >60 >60 mL/min/1.73m2    Glucose 147 (H) 70 - 99 MG/DL    Calcium 9.1 8.3 - 10.6 MG/DL    Albumin 4.5 3.4 - 5.0 GM/DL    Total Protein 7.2 6.4 - 8.2 GM/DL    Total Bilirubin 0.6 0.0 - 1.0 MG/DL    ALT 13 10 - 40 U/L    AST 24 15 - 37 IU/L    Alkaline Phosphatase 129 40 - 129 IU/L    Anion Gap 11 4 - 16   Troponin   Result Value Ref Range    Troponin T <0.010 <0.01 NG/ML   Lipase   Result Value Ref Range    Lipase 21 13 - 60 IU/L   Magnesium   Result Value Ref Range    Magnesium 2.6 (H) 1.8 - 2.4 mg/dl   CBC with Auto Differential   Result Value Ref Range    WBC 8.3 4.0 - 10.5 K/CU MM    RBC 4.32 4.2 - 5.4 M/CU MM    Hemoglobin 11.7 (L) 12.5 - 16.0 GM/DL    Hematocrit 36.0 (L) 37 - 47 %    MCV 83.3 78 - 100 FL    MCH 27.1 27 - 31 PG    MCHC 32.5 32.0 - 36.0 %    RDW 12.5 11.7 - 14.9 %    Platelets 803 600 - 683 K/CU MM    MPV 9.0 7.5 - 11.1 FL    Differential Type AUTOMATED DIFFERENTIAL     Segs Relative 85.7 (H) 36 - 66 %    Lymphocytes % 8.6 (L) 24 - 44 %    Monocytes % 4.3 (H) 0 - 4 %    Eosinophils % 0.5 0 - 3 %    Basophils % 0.5 0 - 1 %    Segs Absolute 7.1 K/CU MM    Lymphocytes Absolute 0.7 K/CU MM    Monocytes Absolute 0.4 K/CU MM    Eosinophils Absolute 0.0 K/CU MM    Basophils Absolute 0.0 K/CU MM    Nucleated RBC % 0.0 %    Total Nucleated RBC 0.0 K/CU MM    Total Immature Neutrophil 0.03 K/CU MM    Immature Neutrophil % 0.4 0 - 0.43 %   Brain Natriuretic Peptide   Result Value Ref Range    Pro-BNP 1,838 (H) <300 PG/ML   EKG 12 Lead   Result Value Ref Range    Ventricular Rate 60 BPM    Atrial Rate 60 BPM    P-R Interval 220 ms    QRS Duration 134 ms    Q-T Interval 488 ms    QTc Calculation (Bazett) 488 ms    P Axis 83 degrees    R Axis 11 degrees    T Axis -85 degrees    Diagnosis       Atrial-paced rhythm with prolonged AV conduction  Right bundle branch block  T wave abnormality, consider inferolateral ischemia  Abnormal ECG  When compared with ECG of 19-SEP-2021 17:53,  No significant change was found        Radiographs (if obtained):  Radiologist's Report Reviewed:  CT ABDOMEN PELVIS W IV CONTRAST Additional Contrast? None   Final Result   1. Bibasilar atelectasis. 2. Cardiomegaly with enlarged central pulmonary arteries likely due to   pulmonary arterial hypertension. 3. Distended gallbladder. This could be just due to a nonfasting state. However, I do raise the possibility of acute cholecystitis. 4. Fibroid uterus. CT CHEST W CONTRAST   Final Result   1. Bibasilar atelectasis. 2. Cardiomegaly with enlarged central pulmonary arteries likely due to   pulmonary arterial hypertension.    3. Distended gallbladder. This could be just due to a nonfasting state. However, I do raise the possibility of acute cholecystitis. 4. Fibroid uterus. XR CHEST PORTABLE   Final Result   1. Low lung volumes with left basilar atelectasis. US ABDOMEN LIMITED    (Results Pending)   NM HEPATOBILIARY    (Results Pending)         EKG (if obtained): (All EKG's are interpreted by myself in the absence of a cardiologist)    Medications   aspirin chewable tablet 324 mg (324 mg Oral Given 1/30/23 0925)   '    MDM:  Patient is a 61-year-old female with history of coronary artery disease sick sinus syndrome status postcardiac pacemaker, paroxysmal atrial fibrillation hypertension hyperlipidemia valve disease who presents to the emergency department via EMS for chest pain on the right side. Patient gives a report. She states right-sided chest pain under the right breast last week worse last 3 days 7 out of 10 on the pain scale been taking Tylenol last dose this morning. States shortness of breath with it. History of triple bypass per patient. She states no radiation of the chest pain localized under the right breast.  She states no falls injuries trauma no heavy lifting pulling or straining up makes it worse or better. On physical exam patient right lower anterior chest wall pain no signs of infection no crepitus or step-off crackles in the lung bases bilaterally. Patient placed on cardiac monitor. EKG was performed and per my interpretation is atrial paced rhythm with prolonged AV conduction right bundle branch block T wave abnormality in the inferior lateral leads ventricular rate of 60, NH interval 220, QRS duration 134, QT/QTc 488/488 no ST elevation appreciated. Patient was ordered laboratory studies and chest x-ray and 324 mg p.o. aspirin. With patient history, risk factors she will need admitted for ACS rule out.   On physical exam crackles in the lung bases tenderness to the right lower anterior chest wall with no signs of infection no crepitus. Differential includes pneumothorax pneumonia pulmonary embolism myocardial infarction. Patient placed on cardiac monitor. Patient was ordered laboratory studies, chest x-ray and aspirin 324 mg p.o. Patient normal sodium potassium kidney function calcium normal liver enzymes. Patient glucose 147. Patient negative troponin normal lipase. Patient magnesium slightly elevated 2.6. Patient normal white blood cell count platelets. Patient chronic anemia hemoglobin 11.7. Patient BNP of 1838 which is her baseline. Chest x-ray no acute infiltrates per my read. Per radiology low lung volumes with left basilar atelectasis. Patient with a heart score of 5 she was updated on lab and imaging study findings she will need admitted for ACS rule out. Hospitalist consulted for admission. Abdomen pelvis that I ordered that showed distended gallbladder acute cholecystitis versus a fasting state. Did consult on-call neurosurgeon Dr. Jc Fine. He states non specific finding on CT scan. He states we can get a right upper quadrant ultrasound he will see patient in consultation. Clinical Impression:  1. Chest pain, unspecified type    2. Right sided abdominal pain          ED Provider Disposition Time  DISPOSITION Decision To Admit 01/30/2023 09:17:56 AM        Comment: Please note this report has been produced using speech recognition software and may contain errors related to that system including errors in grammar, punctuation, and spelling, as well as words and phrases that may be inappropriate. Efforts were made to edit the dictations.         Lyn Perrin, DO  01/30/23 [address] Quach, DO  01/30/23 0860

## 2023-01-30 NOTE — ED NOTES
[id number] paged Dr Croft Fruits  01/30/23 1345    1350 Dr Yarelis Torres returned call      [place] [facility]  01/30/23 1070

## 2023-01-30 NOTE — CONSULTS
Department of General Surgery   Surgical Service Dr. Ildefonso Brooks   Consult Note    Date of Consult: 1/30/23    Reason for Consult:  Distended GB on CT    Requesting Physician:  ED    CHIEF COMPLAINT:  Chest / abdominal pain    History Obtained From:  patient, electronic medical record    HISTORY OF PRESENT ILLNESS:      The patient is a 76 y.o. female who presented to the ED with complaints described as:      Location: right sided abdominal and chest  Quality: sharp  Severity: 7 /10  Duration: started this AM at ~ 6:30  Timing: acute  Context: previously without issues  Modifying factors: denies  Associated signs and symptoms: No F/C. No N/V. Still has appetite. Pt was worked up in ED. On CT she was found to have distended GB so a c/s was placed to Gen Surg. Past Medical History:    Past Medical History:   Diagnosis Date    Anxiety     Arthritis     knees    Atrial fibrillation (HCC)     CAD (coronary artery disease)     Sees Dr. Vinny Swan    COVID-19 09/19/2021    Diabetes mellitus ([address]     H/O cardiac catheterization 06/25/2018    severe 3 vessel disease, refer to CV surgery for CABG and MAZE    H/O cardiovascular stress test 06/06/2018    EF47%  fixed perfusion defect ant wall, pt in afib    H/O echocardiogram 06/06/2018    EF55% mod MR    H/O echocardiogram 08/28/2018    EF 50-55%, Mild : AR, MR & TR.    H/O echocardiogram 03/13/2020    EF 55%, Breast artifact noted. H/O three vessel coronary artery bypass 07/09/2018    Dr. Zahida Muller    History of Holter monitoring 10/23/2018    Multiple PAF episodes noted. Tachy-Dinesh episodes noted. History of nuclear stress test 03/13/2020    EF 55%, Breast artifact.     Hyperlipidemia     Hypertension     Memory loss     on Aricept    Missing teeth, acquired     Pneumonia     pneumococcal pneumonia twice at end of 2017    Risk for falls     uses walker    TIA (transient ischemic attack)     family doctors said she has had mini-strokes    Urinary leakage     UTI (urinary tract infection)     recent --just stopped antibiotic last week as of 6-29-18    Wears glasses        Past Surgical History:    Past Surgical History:   Procedure Laterality Date    CABG WITH MITRAL VALVE REPAIR  07/09/2018    CABG X 3 [place]>LAD, SVG>CX M1, SVG>M2, MVR repair w/#28 CG ring, PFO closure, MAZE    CARDIAC CATHETERIZATION  06/25/2018    EYE SURGERY Bilateral 2018    Cataracts    MITRAL VALVE MAZE PROCEDURE  07/19/2018    Dr. Merino Her Left 12/11/2018    [employer] [place] XT DR SCHWARTZ SureScan     THORACENTESIS Bilateral 07/13/2018    IR Dr. Lori Rojas, right 1300, left 900 all s/s    TONSILLECTOMY  1940's    WISDOM TOOTH EXTRACTION         Current Medications:   Current Facility-Administered Medications   Medication Dose Route Frequency Provider Last Rate Last Admin    sodium chloride flush 0.9 % injection 5-40 mL  5-40 mL IntraVENous 2 times per day Domitila Fox, APRN - CNP   10 mL at 01/30/23 1402    sodium chloride flush 0.9 % injection 5-40 mL  5-40 mL IntraVENous PRN Domitila Fox, APRN - CNP   10 mL at 01/30/23 1337    0.9 % sodium chloride infusion   IntraVENous PRN Domitila Fox, APRN - CNP        ondansetron (ZOFRAN-ODT) disintegrating tablet 4 mg  4 mg Oral Q8H PRN Domitila Fox, APRN - CNP        Or    ondansetron (ZOFRAN) injection 4 mg  4 mg IntraVENous Q6H PRN Domitila Fox, APRN - CNP        acetaminophen (TYLENOL) tablet 650 mg  650 mg Oral Q6H PRN Domitila Fox, APRN - CNP        Or    acetaminophen (TYLENOL) suppository 650 mg  650 mg Rectal Q6H PRN Domitila Fox, APRN - CNP        polyethylene glycol (GLYCOLAX) packet 17 g  17 g Oral Daily PRN Domitila Fox, APRN - CNP        atorvastatin (LIPITOR) tablet 80 mg  80 mg Oral Nightly Domitila Fox, APRN - CNP        apixaban (ELIQUIS) tablet 5 mg  5 mg Oral BID Domitila Fox, APRN - CNP        [START ON 1/31/2023] aspirin EC tablet 81 mg  81 mg Oral Daily Domitila Fox, APRN - CNP docusate sodium (COLACE) capsule 100 mg  100 mg Oral BID BIRD Jarrell CNP   100 mg at 01/30/23 1333    donepezil (ARICEPT) tablet 10 mg  10 mg Oral Nightly BIRD Jarrell CNP        [START ON 1/31/2023] furosemide (LASIX) tablet 20 mg  20 mg Oral Daily BIRD Jarrell CNP        gabapentin (NEURONTIN) capsule 600 mg  600 mg Oral BID BIRD Jarrell CNP   600 mg at 01/30/23 1333    ipratropium (ATROVENT) 0.03 % nasal spray 1 spray  1 spray Nasal PRN BIRD Jarrell CNP        memantine (NAMENDA) tablet 10 mg  10 mg Oral BID BIRD Jarrell CNP        metoprolol tartrate (LOPRESSOR) tablet 25 mg  25 mg Oral BID BIRD Jarrell CNP   25 mg at 01/30/23 1333    [START ON 1/31/2023] pantoprazole (PROTONIX) tablet 40 mg  40 mg Oral QAM AC BIRD Hicks CNP        oxybutynin (DITROPAN) tablet 5 mg  5 mg Oral BID BIRD Hicks CNP        rosuvastatin (CRESTOR) tablet 40 mg  40 mg Oral QPM BIRD Hicks CNP         Current Outpatient Medications   Medication Sig Dispense Refill    gabapentin (NEURONTIN) 600 MG tablet Take 600 mg by mouth 2 times daily.       memantine (NAMENDA) 10 MG tablet Take 10 mg by mouth 2 times daily      apixaban (ELIQUIS) 5 MG TABS tablet Take 1 tablet by mouth 2 times daily 42 tablet 0    diphenhydrAMINE-APAP, sleep, (ACETAMINOPHEN PM)  MG tablet Take 1 tablet by mouth nightly as needed for Sleep      metoprolol tartrate (LOPRESSOR) 50 MG tablet Take 0.5 tablets by mouth 2 times daily 180 tablet 1    rosuvastatin (CRESTOR) 40 MG tablet Take 1 tablet by mouth every evening 90 tablet 3    bisacodyl (DULCOLAX) 5 MG EC tablet Take 5 mg by mouth daily as needed for Constipation      oxybutynin (DITROPAN) 5 MG tablet Take 5 mg by mouth 2 times daily      potassium chloride (KLOR-CON) 20 MEQ packet Take 20 mEq by mouth daily      senna (SENOKOT) 8.6 MG tablet Take 1 tablet by mouth daily (Patient not taking: Reported on 2022)      acetaminophen (APAP EXTRA STRENGTH) 500 MG tablet Take 1 tablet by mouth every 6 hours as needed for Pain (Patient not taking: Reported on 2022) 20 tablet 0    ipratropium (ATROVENT) 0.03 % nasal spray 1 spray by Nasal route as needed      omeprazole (PRILOSEC) 40 MG delayed release capsule Take 40 mg by mouth daily      melatonin 3 MG TABS tablet Take 3 mg by mouth daily (Patient not taking: Reported on 2022)      aspirin 81 MG EC tablet Take 1 tablet by mouth daily 30 tablet 3    furosemide (LASIX) 20 MG tablet Take 1 tablet by mouth daily 30 tablet 0    docusate sodium (COLACE, DULCOLAX) 100 MG CAPS Take 100 mg by mouth 2 times daily      donepezil (ARICEPT) 10 MG tablet Take 10 mg by mouth nightly  2    citalopram (CELEXA) 40 MG tablet Take 40 mg by mouth daily  5    vitamin D3 (CHOLECALCIFEROL) 25 MCG (1000 UT) TABS tablet Take 1 tablet by mouth daily       CALCIUM CARBONATE PO Take 1,000 mg by mouth daily          Allergies:  Patient has no known allergies. Social History:   Social History     Socioeconomic History    Marital status: Single     Spouse name: None    Number of children: None    Years of education: None    Highest education level: None   Tobacco Use    Smoking status: Never    Smokeless tobacco: Never   Vaping Use    Vaping Use: Never used   Substance and Sexual Activity    Alcohol use: No    Drug use: No       Family History:   Family History   Problem Relation Age of Onset    Stroke Mother     Heart Disease Father     Early Death Father     Breast Cancer Sister     Parkinsonism Brother     Heart Disease Sister     Other Sister         fibromyalgia    Diabetes Sister     Early Death Brother          at birth       REVIEW OFSYSTEMS:    Review of Systems   Constitutional:  Negative for appetite change, chills and fever. HENT: Negative. Eyes: Negative. Respiratory: Negative. Cardiovascular:  Positive for chest pain. Gastrointestinal:  Positive for abdominal pain. Negative for nausea and vomiting. Endocrine: Negative. Genitourinary: Negative. Musculoskeletal:  Positive for back pain. Skin: Negative. Allergic/Immunologic: Negative. Neurological: Negative. Hematological: Negative. Psychiatric/Behavioral: Negative. All other systems reviewed and are negative. PHYSICAL EXAM:  Vitals:    01/30/23 1333 01/30/23 1335 01/30/23 1336 01/30/23 1402   BP: 139/62 139/62 139/62 (!) 141/60   Pulse: 65 60 60 60   Resp: 26 18 21 29   Temp:       TempSrc:       SpO2: 97% (!) 89% 97%        Physical Exam  Vitals reviewed. Constitutional:       General: She is not in acute distress. HENT:      Head: Normocephalic and atraumatic. Right Ear: External ear normal.      Left Ear: External ear normal.      Nose: Nose normal.   Eyes:      General:         Right eye: No discharge. Left eye: No discharge. Extraocular Movements: Extraocular movements intact. Cardiovascular:      Rate and Rhythm: Normal rate. Pulmonary:      Effort: No respiratory distress. Abdominal:      Palpations: Abdomen is soft. Tenderness: There is abdominal tenderness (RUQ). There is no guarding or rebound. Musculoskeletal:         General: No swelling. Skin:     General: Skin is warm. Coloration: Skin is not jaundiced. Neurological:      General: No focal deficit present. Mental Status: She is alert.    Psychiatric:         Mood and Affect: Mood normal.         DATA:    CBC:   Lab Results   Component Value Date/Time    WBC 8.3 01/30/2023 08:00 AM    RBC 4.32 01/30/2023 08:00 AM    HGB 11.7 01/30/2023 08:00 AM    HCT 36.0 01/30/2023 08:00 AM    MCV 83.3 01/30/2023 08:00 AM    MCH 27.1 01/30/2023 08:00 AM    MCHC 32.5 01/30/2023 08:00 AM    RDW 12.5 01/30/2023 08:00 AM     01/30/2023 08:00 AM    MPV 9.0 01/30/2023 08:00 AM     CBC with Differential:    Lab Results   Component Value Date/Time    WBC 8.3 01/30/2023 08:00 AM    RBC 4.32 01/30/2023 08:00 AM    HGB 11.7 01/30/2023 08:00 AM    HCT 36.0 01/30/2023 08:00 AM     01/30/2023 08:00 AM    MCV 83.3 01/30/2023 08:00 AM    MCH 27.1 01/30/2023 08:00 AM    MCHC 32.5 01/30/2023 08:00 AM    RDW 12.5 01/30/2023 08:00 AM    SEGSPCT 85.7 01/30/2023 08:00 AM    LYMPHOPCT 8.6 01/30/2023 08:00 AM    MONOPCT 4.3 01/30/2023 08:00 AM    BASOPCT 0.5 01/30/2023 08:00 AM    MONOSABS 0.4 01/30/2023 08:00 AM    LYMPHSABS 0.7 01/30/2023 08:00 AM    EOSABS 0.0 01/30/2023 08:00 AM    BASOSABS 0.0 01/30/2023 08:00 AM    DIFFTYPE AUTOMATED DIFFERENTIAL 01/30/2023 08:00 AM     WBC:    Lab Results   Component Value Date/Time    WBC 8.3 01/30/2023 08:00 AM     Platelets:    Lab Results   Component Value Date/Time     01/30/2023 08:00 AM     Hemoglobin/Hematocrit:    Lab Results   Component Value Date/Time    HGB 11.7 01/30/2023 08:00 AM    HCT 36.0 01/30/2023 08:00 AM     CMP:    Lab Results   Component Value Date/Time     01/30/2023 08:00 AM    K 4.2 01/30/2023 08:00 AM    CL 97 01/30/2023 08:00 AM    CO2 27 01/30/2023 08:00 AM    BUN 12 01/30/2023 08:00 AM    CREATININE 0.7 01/30/2023 08:00 AM    GFRAA >60 07/12/2022 06:37 AM    LABGLOM >60 01/30/2023 08:00 AM    GLUCOSE 147 01/30/2023 08:00 AM    PROT 7.2 01/30/2023 08:00 AM    LABALBU 4.5 01/30/2023 08:00 AM    CALCIUM 9.1 01/30/2023 08:00 AM    BILITOT 0.6 01/30/2023 08:00 AM    ALKPHOS 129 01/30/2023 08:00 AM    AST 24 01/30/2023 08:00 AM    ALT 13 01/30/2023 08:00 AM     BMP:    Lab Results   Component Value Date/Time     01/30/2023 08:00 AM    K 4.2 01/30/2023 08:00 AM    CL 97 01/30/2023 08:00 AM    CO2 27 01/30/2023 08:00 AM    BUN 12 01/30/2023 08:00 AM    LABALBU 4.5 01/30/2023 08:00 AM    CREATININE 0.7 01/30/2023 08:00 AM    CALCIUM 9.1 01/30/2023 08:00 AM    GFRAA >60 07/12/2022 06:37 AM    LABGLOM >60 01/30/2023 08:00 AM    GLUCOSE 147 01/30/2023 08:00 AM     Sodium:    Lab Results   Component Value Date/Time     01/30/2023 08:00 AM     Potassium:    Lab Results   Component Value Date/Time    K 4.2 01/30/2023 08:00 AM     BUN/Creatinine:    Lab Results   Component Value Date/Time    BUN 12 01/30/2023 08:00 AM    CREATININE 0.7 01/30/2023 08:00 AM     Hepatic Function Panel:    Lab Results   Component Value Date/Time    ALKPHOS 129 01/30/2023 08:00 AM    ALT 13 01/30/2023 08:00 AM    AST 24 01/30/2023 08:00 AM    PROT 7.2 01/30/2023 08:00 AM    BILITOT 0.6 01/30/2023 08:00 AM    BILIDIR 0.2 07/08/2022 03:19 PM    IBILI 0.3 07/08/2022 03:19 PM    LABALBU 4.5 01/30/2023 08:00 AM     Albumin:    Lab Results   Component Value Date/Time    LABALBU 4.5 01/30/2023 08:00 AM     Calcium:    Lab Results   Component Value Date/Time    CALCIUM 9.1 01/30/2023 08:00 AM     Ionized Calcium:    Lab Results   Component Value Date/Time    IONCA 1.09 07/06/2018 05:20 AM     Magnesium:    Lab Results   Component Value Date/Time    MG 2.6 01/30/2023 08:00 AM     Phosphorus:    Lab Results   Component Value Date/Time    PHOS 4.2 07/12/2022 06:37 AM     LDH:  No results found for: LDH  Uric Acid:  No results found for: LABURIC, URICACID  PT/INR:    Lab Results   Component Value Date/Time    PROTIME 14.6 07/08/2022 03:19 PM    INR 1.13 07/08/2022 03:19 PM     Warfarin PT/INR:  No components found for: PTPATWAR, PTINRWAR  PTT:    Lab Results   Component Value Date/Time    APTT 44.0 06/06/2021 01:46 PM   [APTT}  Troponin:  No results found for: TROPONINI  Last 3 Troponin:  No results found for: TROPONINI        CT C/A/P:      1. Bibasilar atelectasis. 2. Cardiomegaly with enlarged central pulmonary arteries likely due to   pulmonary arterial hypertension. 3. Distended gallbladder. This could be just due to a nonfasting state. However, I do raise the possibility of acute cholecystitis. 4. Fibroid uterus.            IMPRESSION:        Patient Active Problem List:     Paroxysmal atrial fibrillation [facility])     Essential hypertension     Mixed hyperlipidemia     VHD (valvular heart disease)     Abnormal EKG     Acute blood loss anemia     Uncontrolled pain     Gait disturbance     Pneumonia due to infectious organism     SSS (sick sinus syndrome) ([facility])     S/P placement of cardiac pacemaker     Tachycardia-bradycardia ([address]     Fall at home, subsequent encounter     Acute intracranial hemorrhage ([address]     Hyponatremia     Compression fracture of L1 lumbar vertebra ([facility])     Intraparenchymal hematoma of brain     COVID-19     CAD (coronary artery disease)     AMS (altered mental status)     Altered mental status     Concussion with no loss of consciousness     Chest pain      77 y/o F with abdominal / chest pain and distended GB on CT A/P    PLAN:    -Reviewed labs, imaging with pt   -Pt does have RUQ tenderness. There is no abnormalities in LFTs, WBC count. Distended GB nonspecific finding on CT. Check US and HIDA. If abnormal will proceed with lap pedro. -NPO at midnight.   -Above plan d/w pt and pt's friend in room.            Belinda Plata MD

## 2023-01-31 ENCOUNTER — ANESTHESIA (OUTPATIENT)
Dept: OPERATING ROOM | Age: 78
End: 2023-01-31
Payer: MEDICARE

## 2023-01-31 ENCOUNTER — ANESTHESIA EVENT (OUTPATIENT)
Dept: OPERATING ROOM | Age: 78
End: 2023-01-31
Payer: MEDICARE

## 2023-01-31 PROBLEM — K81.0 ACUTE CHOLECYSTITIS: Status: ACTIVE | Noted: 2023-01-31

## 2023-01-31 LAB
BACTERIA: NEGATIVE /HPF
BILIRUBIN URINE: NEGATIVE MG/DL
BLOOD, URINE: NEGATIVE
CHOLESTEROL: 103 MG/DL
CLARITY: CLEAR
COLOR: YELLOW
EKG ATRIAL RATE: 60 BPM
EKG DIAGNOSIS: NORMAL
EKG P AXIS: 83 DEGREES
EKG P-R INTERVAL: 220 MS
EKG Q-T INTERVAL: 488 MS
EKG QRS DURATION: 134 MS
EKG QTC CALCULATION (BAZETT): 488 MS
EKG R AXIS: 11 DEGREES
EKG T AXIS: -85 DEGREES
EKG VENTRICULAR RATE: 60 BPM
GLUCOSE BLD-MCNC: 111 MG/DL (ref 70–99)
GLUCOSE BLD-MCNC: 135 MG/DL (ref 70–99)
GLUCOSE, URINE: NEGATIVE MG/DL
HCT VFR BLD CALC: 33.5 % (ref 37–47)
HDLC SERPL-MCNC: 51 MG/DL
HEMOGLOBIN: 11 GM/DL (ref 12.5–16)
HYALINE CASTS: 2 /LPF
KETONES, URINE: NEGATIVE MG/DL
LDL CHOLESTEROL CALCULATED: 33 MG/DL
LEUKOCYTE ESTERASE, URINE: NEGATIVE
MCH RBC QN AUTO: 26.9 PG (ref 27–31)
MCHC RBC AUTO-ENTMCNC: 32.8 % (ref 32–36)
MCV RBC AUTO: 81.9 FL (ref 78–100)
MUCUS: ABNORMAL HPF
NITRITE URINE, QUANTITATIVE: NEGATIVE
PDW BLD-RTO: 13 % (ref 11.7–14.9)
PH, URINE: 5.5 (ref 5–8)
PLATELET # BLD: 158 K/CU MM (ref 140–440)
PMV BLD AUTO: 8.7 FL (ref 7.5–11.1)
PROTEIN UA: ABNORMAL MG/DL
RBC # BLD: 4.09 M/CU MM (ref 4.2–5.4)
RBC URINE: 1 /HPF (ref 0–6)
SPECIFIC GRAVITY UA: 1.02 (ref 1–1.03)
TRICHOMONAS: ABNORMAL /HPF
TRIGL SERPL-MCNC: 95 MG/DL
TROPONIN T: <0.01 NG/ML
UROBILINOGEN, URINE: 0.2 MG/DL (ref 0.2–1)
WBC # BLD: 8.6 K/CU MM (ref 4–10.5)
WBC UA: 2 /HPF (ref 0–5)

## 2023-01-31 PROCEDURE — 3700000001 HC ADD 15 MINUTES (ANESTHESIA): Performed by: SURGERY

## 2023-01-31 PROCEDURE — 80061 LIPID PANEL: CPT

## 2023-01-31 PROCEDURE — 2500000003 HC RX 250 WO HCPCS: Performed by: SURGERY

## 2023-01-31 PROCEDURE — 87086 URINE CULTURE/COLONY COUNT: CPT

## 2023-01-31 PROCEDURE — 51798 US URINE CAPACITY MEASURE: CPT

## 2023-01-31 PROCEDURE — 7100000000 HC PACU RECOVERY - FIRST 15 MIN: Performed by: SURGERY

## 2023-01-31 PROCEDURE — 85027 COMPLETE CBC AUTOMATED: CPT

## 2023-01-31 PROCEDURE — 36415 COLL VENOUS BLD VENIPUNCTURE: CPT

## 2023-01-31 PROCEDURE — 2500000003 HC RX 250 WO HCPCS

## 2023-01-31 PROCEDURE — 3600000014 HC SURGERY LEVEL 4 ADDTL 15MIN: Performed by: SURGERY

## 2023-01-31 PROCEDURE — 2580000003 HC RX 258: Performed by: SURGERY

## 2023-01-31 PROCEDURE — 6370000000 HC RX 637 (ALT 250 FOR IP): Performed by: NURSE PRACTITIONER

## 2023-01-31 PROCEDURE — 6370000000 HC RX 637 (ALT 250 FOR IP): Performed by: ANESTHESIOLOGY

## 2023-01-31 PROCEDURE — 6370000000 HC RX 637 (ALT 250 FOR IP): Performed by: SURGERY

## 2023-01-31 PROCEDURE — C1889 IMPLANT/INSERT DEVICE, NOC: HCPCS | Performed by: SURGERY

## 2023-01-31 PROCEDURE — 84484 ASSAY OF TROPONIN QUANT: CPT

## 2023-01-31 PROCEDURE — 6360000002 HC RX W HCPCS

## 2023-01-31 PROCEDURE — 93005 ELECTROCARDIOGRAM TRACING: CPT | Performed by: NURSE PRACTITIONER

## 2023-01-31 PROCEDURE — 3600000004 HC SURGERY LEVEL 4 BASE: Performed by: SURGERY

## 2023-01-31 PROCEDURE — 6360000002 HC RX W HCPCS: Performed by: ANESTHESIOLOGY

## 2023-01-31 PROCEDURE — 87081 CULTURE SCREEN ONLY: CPT

## 2023-01-31 PROCEDURE — 87430 STREP A AG IA: CPT

## 2023-01-31 PROCEDURE — 82962 GLUCOSE BLOOD TEST: CPT

## 2023-01-31 PROCEDURE — 99232 SBSQ HOSP IP/OBS MODERATE 35: CPT | Performed by: SURGERY

## 2023-01-31 PROCEDURE — G0378 HOSPITAL OBSERVATION PER HR: HCPCS

## 2023-01-31 PROCEDURE — 81001 URINALYSIS AUTO W/SCOPE: CPT

## 2023-01-31 PROCEDURE — APPNB60 APP NON BILLABLE TIME 46-60 MINS: Performed by: NURSE PRACTITIONER

## 2023-01-31 PROCEDURE — 3700000000 HC ANESTHESIA ATTENDED CARE: Performed by: SURGERY

## 2023-01-31 PROCEDURE — 51701 INSERT BLADDER CATHETER: CPT

## 2023-01-31 PROCEDURE — 47562 LAPAROSCOPIC CHOLECYSTECTOMY: CPT | Performed by: SURGERY

## 2023-01-31 PROCEDURE — 88304 TISSUE EXAM BY PATHOLOGIST: CPT

## 2023-01-31 PROCEDURE — 2580000003 HC RX 258: Performed by: ANESTHESIOLOGY

## 2023-01-31 PROCEDURE — 0FT44ZZ RESECTION OF GALLBLADDER, PERCUTANEOUS ENDOSCOPIC APPROACH: ICD-10-PCS | Performed by: SURGERY

## 2023-01-31 PROCEDURE — 7100000001 HC PACU RECOVERY - ADDTL 15 MIN: Performed by: SURGERY

## 2023-01-31 PROCEDURE — 2720000010 HC SURG SUPPLY STERILE: Performed by: SURGERY

## 2023-01-31 PROCEDURE — 6360000002 HC RX W HCPCS: Performed by: SURGERY

## 2023-01-31 PROCEDURE — 2709999900 HC NON-CHARGEABLE SUPPLY: Performed by: SURGERY

## 2023-01-31 PROCEDURE — 93010 ELECTROCARDIOGRAM REPORT: CPT | Performed by: INTERNAL MEDICINE

## 2023-01-31 DEVICE — CLIP INT XL YEL POLYMER HEM-O-LOK WECK: Type: IMPLANTABLE DEVICE | Site: BILE DUCT | Status: FUNCTIONAL

## 2023-01-31 DEVICE — CLIP INT L POLYMER LOK LIG HEM O LOK: Type: IMPLANTABLE DEVICE | Site: BILE DUCT | Status: FUNCTIONAL

## 2023-01-31 RX ORDER — IPRATROPIUM BROMIDE AND ALBUTEROL SULFATE 2.5; .5 MG/3ML; MG/3ML
1 SOLUTION RESPIRATORY (INHALATION)
Status: ACTIVE | OUTPATIENT
Start: 2023-01-31 | End: 2023-02-01

## 2023-01-31 RX ORDER — POLYETHYLENE GLYCOL 3350 17 G/17G
17 POWDER, FOR SOLUTION ORAL DAILY
Status: DISCONTINUED | OUTPATIENT
Start: 2023-01-31 | End: 2023-02-20 | Stop reason: HOSPADM

## 2023-01-31 RX ORDER — LIDOCAINE HYDROCHLORIDE 20 MG/ML
INJECTION, SOLUTION INTRAVENOUS PRN
Status: DISCONTINUED | OUTPATIENT
Start: 2023-01-31 | End: 2023-01-31 | Stop reason: SDUPTHER

## 2023-01-31 RX ORDER — ROCURONIUM BROMIDE 10 MG/ML
INJECTION, SOLUTION INTRAVENOUS PRN
Status: DISCONTINUED | OUTPATIENT
Start: 2023-01-31 | End: 2023-01-31 | Stop reason: SDUPTHER

## 2023-01-31 RX ORDER — FENTANYL CITRATE 50 UG/ML
25 INJECTION, SOLUTION INTRAMUSCULAR; INTRAVENOUS EVERY 5 MIN PRN
Status: DISCONTINUED | OUTPATIENT
Start: 2023-01-31 | End: 2023-01-31 | Stop reason: HOSPADM

## 2023-01-31 RX ORDER — SODIUM CHLORIDE, SODIUM LACTATE, POTASSIUM CHLORIDE, CALCIUM CHLORIDE 600; 310; 30; 20 MG/100ML; MG/100ML; MG/100ML; MG/100ML
INJECTION, SOLUTION INTRAVENOUS CONTINUOUS
Status: DISCONTINUED | OUTPATIENT
Start: 2023-01-31 | End: 2023-02-01

## 2023-01-31 RX ORDER — HYDRALAZINE HYDROCHLORIDE 20 MG/ML
10 INJECTION INTRAMUSCULAR; INTRAVENOUS
Status: DISCONTINUED | OUTPATIENT
Start: 2023-01-31 | End: 2023-01-31 | Stop reason: HOSPADM

## 2023-01-31 RX ORDER — SODIUM CHLORIDE 0.9 % (FLUSH) 0.9 %
5-40 SYRINGE (ML) INJECTION EVERY 12 HOURS SCHEDULED
Status: DISCONTINUED | OUTPATIENT
Start: 2023-01-31 | End: 2023-01-31 | Stop reason: HOSPADM

## 2023-01-31 RX ORDER — BUPIVACAINE HYDROCHLORIDE 5 MG/ML
INJECTION, SOLUTION EPIDURAL; INTRACAUDAL
Status: COMPLETED | OUTPATIENT
Start: 2023-01-31 | End: 2023-01-31

## 2023-01-31 RX ORDER — OXYCODONE HYDROCHLORIDE 5 MG/1
5 TABLET ORAL
Status: COMPLETED | OUTPATIENT
Start: 2023-01-31 | End: 2023-01-31

## 2023-01-31 RX ORDER — FENTANYL CITRATE 50 UG/ML
INJECTION, SOLUTION INTRAMUSCULAR; INTRAVENOUS PRN
Status: DISCONTINUED | OUTPATIENT
Start: 2023-01-31 | End: 2023-01-31 | Stop reason: SDUPTHER

## 2023-01-31 RX ORDER — GUAIFENESIN 600 MG/1
600 TABLET, EXTENDED RELEASE ORAL 2 TIMES DAILY
Status: DISCONTINUED | OUTPATIENT
Start: 2023-01-31 | End: 2023-02-12

## 2023-01-31 RX ORDER — DROPERIDOL 2.5 MG/ML
0.62 INJECTION, SOLUTION INTRAMUSCULAR; INTRAVENOUS EVERY 10 MIN PRN
Status: DISCONTINUED | OUTPATIENT
Start: 2023-01-31 | End: 2023-02-04 | Stop reason: HOSPADM

## 2023-01-31 RX ORDER — DIPHENHYDRAMINE HYDROCHLORIDE 50 MG/ML
12.5 INJECTION INTRAMUSCULAR; INTRAVENOUS
Status: ACTIVE | OUTPATIENT
Start: 2023-01-31 | End: 2023-02-01

## 2023-01-31 RX ORDER — SODIUM CHLORIDE 0.9 % (FLUSH) 0.9 %
5-40 SYRINGE (ML) INJECTION PRN
Status: DISCONTINUED | OUTPATIENT
Start: 2023-01-31 | End: 2023-01-31 | Stop reason: HOSPADM

## 2023-01-31 RX ORDER — SODIUM CHLORIDE, SODIUM LACTATE, POTASSIUM CHLORIDE, CALCIUM CHLORIDE 600; 310; 30; 20 MG/100ML; MG/100ML; MG/100ML; MG/100ML
INJECTION, SOLUTION INTRAVENOUS CONTINUOUS PRN
Status: DISCONTINUED | OUTPATIENT
Start: 2023-01-31 | End: 2023-01-31 | Stop reason: SDUPTHER

## 2023-01-31 RX ORDER — DEXAMETHASONE SODIUM PHOSPHATE 4 MG/ML
INJECTION, SOLUTION INTRA-ARTICULAR; INTRALESIONAL; INTRAMUSCULAR; INTRAVENOUS; SOFT TISSUE PRN
Status: DISCONTINUED | OUTPATIENT
Start: 2023-01-31 | End: 2023-01-31 | Stop reason: SDUPTHER

## 2023-01-31 RX ORDER — HYDROCODONE BITARTRATE AND ACETAMINOPHEN 5; 325 MG/1; MG/1
1 TABLET ORAL EVERY 6 HOURS PRN
Status: DISCONTINUED | OUTPATIENT
Start: 2023-01-31 | End: 2023-02-10

## 2023-01-31 RX ORDER — PROPOFOL 10 MG/ML
INJECTION, EMULSION INTRAVENOUS PRN
Status: DISCONTINUED | OUTPATIENT
Start: 2023-01-31 | End: 2023-01-31 | Stop reason: SDUPTHER

## 2023-01-31 RX ORDER — ONDANSETRON 2 MG/ML
4 INJECTION INTRAMUSCULAR; INTRAVENOUS
Status: DISCONTINUED | OUTPATIENT
Start: 2023-01-31 | End: 2023-01-31 | Stop reason: HOSPADM

## 2023-01-31 RX ORDER — MORPHINE SULFATE 2 MG/ML
2 INJECTION, SOLUTION INTRAMUSCULAR; INTRAVENOUS EVERY 4 HOURS PRN
Status: DISCONTINUED | OUTPATIENT
Start: 2023-01-31 | End: 2023-02-01

## 2023-01-31 RX ORDER — LABETALOL HYDROCHLORIDE 5 MG/ML
10 INJECTION, SOLUTION INTRAVENOUS
Status: DISCONTINUED | OUTPATIENT
Start: 2023-01-31 | End: 2023-01-31 | Stop reason: HOSPADM

## 2023-01-31 RX ORDER — ONDANSETRON 2 MG/ML
INJECTION INTRAMUSCULAR; INTRAVENOUS PRN
Status: DISCONTINUED | OUTPATIENT
Start: 2023-01-31 | End: 2023-01-31 | Stop reason: SDUPTHER

## 2023-01-31 RX ORDER — MEPERIDINE HYDROCHLORIDE 25 MG/ML
12.5 INJECTION INTRAMUSCULAR; INTRAVENOUS; SUBCUTANEOUS EVERY 5 MIN PRN
Status: DISCONTINUED | OUTPATIENT
Start: 2023-01-31 | End: 2023-01-31 | Stop reason: HOSPADM

## 2023-01-31 RX ADMIN — SODIUM CHLORIDE, SODIUM LACTATE, POTASSIUM CHLORIDE, CALCIUM CHLORIDE: 600; 310; 30; 20 INJECTION, SOLUTION INTRAVENOUS at 09:14

## 2023-01-31 RX ADMIN — OXYBUTYNIN CHLORIDE 5 MG: 5 TABLET ORAL at 20:00

## 2023-01-31 RX ADMIN — FENTANYL CITRATE 25 MCG: 50 INJECTION, SOLUTION INTRAMUSCULAR; INTRAVENOUS at 11:15

## 2023-01-31 RX ADMIN — SODIUM CHLORIDE, POTASSIUM CHLORIDE, SODIUM LACTATE AND CALCIUM CHLORIDE: 600; 310; 30; 20 INJECTION, SOLUTION INTRAVENOUS at 13:02

## 2023-01-31 RX ADMIN — LIDOCAINE HYDROCHLORIDE 100 MG: 20 INJECTION, SOLUTION INTRAVENOUS at 09:31

## 2023-01-31 RX ADMIN — GABAPENTIN 600 MG: 300 CAPSULE ORAL at 19:59

## 2023-01-31 RX ADMIN — POLYETHYLENE GLYCOL (3350) 17 G: 17 POWDER, FOR SOLUTION ORAL at 17:12

## 2023-01-31 RX ADMIN — DOCUSATE SODIUM 100 MG: 100 CAPSULE, LIQUID FILLED ORAL at 20:00

## 2023-01-31 RX ADMIN — FUROSEMIDE 20 MG: 20 TABLET ORAL at 13:08

## 2023-01-31 RX ADMIN — MEMANTINE 10 MG: 10 TABLET ORAL at 19:59

## 2023-01-31 RX ADMIN — ASPIRIN 81 MG: 81 TABLET, COATED ORAL at 13:07

## 2023-01-31 RX ADMIN — ONDANSETRON 4 MG: 2 INJECTION INTRAMUSCULAR; INTRAVENOUS at 09:42

## 2023-01-31 RX ADMIN — MEMANTINE 10 MG: 10 TABLET ORAL at 13:12

## 2023-01-31 RX ADMIN — PANTOPRAZOLE SODIUM 40 MG: 40 TABLET, DELAYED RELEASE ORAL at 05:51

## 2023-01-31 RX ADMIN — ROSUVASTATIN CALCIUM 40 MG: 20 TABLET, COATED ORAL at 17:12

## 2023-01-31 RX ADMIN — METOPROLOL TARTRATE 25 MG: 50 TABLET, FILM COATED ORAL at 13:08

## 2023-01-31 RX ADMIN — SUGAMMADEX 200 MG: 100 INJECTION, SOLUTION INTRAVENOUS at 10:37

## 2023-01-31 RX ADMIN — OXYBUTYNIN CHLORIDE 5 MG: 5 TABLET ORAL at 13:08

## 2023-01-31 RX ADMIN — DONEPEZIL HYDROCHLORIDE 10 MG: 10 TABLET ORAL at 19:59

## 2023-01-31 RX ADMIN — OXYCODONE HYDROCHLORIDE 5 MG: 5 TABLET ORAL at 14:35

## 2023-01-31 RX ADMIN — GABAPENTIN 600 MG: 300 CAPSULE ORAL at 13:07

## 2023-01-31 RX ADMIN — PROPOFOL 100 MG: 10 INJECTION, EMULSION INTRAVENOUS at 09:31

## 2023-01-31 RX ADMIN — DEXAMETHASONE SODIUM PHOSPHATE 4 MG: 4 INJECTION, SOLUTION INTRAMUSCULAR; INTRAVENOUS at 09:42

## 2023-01-31 RX ADMIN — DOCUSATE SODIUM 100 MG: 100 CAPSULE, LIQUID FILLED ORAL at 13:07

## 2023-01-31 RX ADMIN — APIXABAN 5 MG: 5 TABLET, FILM COATED ORAL at 13:07

## 2023-01-31 RX ADMIN — GUAIFENESIN 600 MG: 600 TABLET, EXTENDED RELEASE ORAL at 20:00

## 2023-01-31 RX ADMIN — FENTANYL CITRATE 100 MCG: 50 INJECTION, SOLUTION INTRAMUSCULAR; INTRAVENOUS at 09:42

## 2023-01-31 RX ADMIN — ROCURONIUM BROMIDE 50 MG: 10 INJECTION, SOLUTION INTRAVENOUS at 09:31

## 2023-01-31 RX ADMIN — CEFAZOLIN 2000 MG: 2 INJECTION, POWDER, FOR SOLUTION INTRAMUSCULAR; INTRAVENOUS at 09:47

## 2023-01-31 ASSESSMENT — PAIN SCALES - GENERAL
PAINLEVEL_OUTOF10: 3
PAINLEVEL_OUTOF10: 5
PAINLEVEL_OUTOF10: 8
PAINLEVEL_OUTOF10: 8
PAINLEVEL_OUTOF10: 5

## 2023-01-31 ASSESSMENT — PAIN DESCRIPTION - ORIENTATION: ORIENTATION: MID

## 2023-01-31 ASSESSMENT — ENCOUNTER SYMPTOMS: ABDOMINAL PAIN: 1

## 2023-01-31 ASSESSMENT — PAIN - FUNCTIONAL ASSESSMENT
PAIN_FUNCTIONAL_ASSESSMENT: 0-10
PAIN_FUNCTIONAL_ASSESSMENT: PREVENTS OR INTERFERES SOME ACTIVE ACTIVITIES AND ADLS

## 2023-01-31 ASSESSMENT — PAIN SCALES - WONG BAKER
WONGBAKER_NUMERICALRESPONSE: 0

## 2023-01-31 ASSESSMENT — PAIN DESCRIPTION - PAIN TYPE: TYPE: SURGICAL PAIN

## 2023-01-31 ASSESSMENT — PAIN DESCRIPTION - ONSET: ONSET: AWAKENED FROM SLEEP

## 2023-01-31 ASSESSMENT — PAIN DESCRIPTION - LOCATION
LOCATION: ABDOMEN;BACK
LOCATION: ABDOMEN
LOCATION: ABDOMEN

## 2023-01-31 ASSESSMENT — PAIN DESCRIPTION - DESCRIPTORS: DESCRIPTORS: DISCOMFORT

## 2023-01-31 ASSESSMENT — PAIN DESCRIPTION - FREQUENCY: FREQUENCY: CONTINUOUS

## 2023-01-31 NOTE — PROGRESS NOTES
General Surgery-Dr. Sharad Campbell Day: 2    Chief Complaint on Admission: RUQ pain      Subjective:     Had US and HIDA. Still with RUQ pain. Denies F/C. When at rest and not moving, pain improved. Denies chest pain or SOB. Has been NPO.         ROS:  Review of Systems   Gastrointestinal:  Positive for abdominal pain. All other systems reviewed and are negative. Allergies  Patient has no known allergies. Diagnosis Date    Anxiety     Arthritis     knees    Atrial fibrillation (HCC)     CAD (coronary artery disease)     Sees Dr. Marcela Black    COVID-19 2021    Diabetes mellitus ([address]     H/O cardiac catheterization 2018    severe 3 vessel disease, refer to CV surgery for CABG and MAZE    H/O cardiovascular stress test 2018    EF47%  fixed perfusion defect ant wall, pt in afib    H/O echocardiogram 2018    EF55% mod MR    H/O echocardiogram 2018    EF 50-55%, Mild : AR, MR & TR.    H/O echocardiogram 2020    EF 55%, Breast artifact noted. H/O three vessel coronary artery bypass 2018    Dr. Abigail Crockett    History of Holter monitoring 10/23/2018    Multiple PAF episodes noted. Tachy-Dinesh episodes noted. History of nuclear stress test 2020    EF 55%, Breast artifact. Hyperlipidemia     Hypertension     Memory loss     on Aricept    Missing teeth, acquired     Pneumonia     pneumococcal pneumonia twice at end of 2017    Risk for falls     uses walker    TIA (transient ischemic attack)     family doctors said she has had mini-strokes    Urinary leakage     UTI (urinary tract infection)     recent --just stopped antibiotic last week as of 18    Wears glasses        Objective:     Vitals:    23   BP: 104/70   Pulse: 60   Resp: 16   Temp: 98.1 °F (36.7 °C)   SpO2: 91%       TEMPERATURE:  Current -Temp: 98.1 °F (36.7 °C);  Max - Temp  Av °F (36.7 °C)  Min: 97.5 °F (36.4 °C)  Max: 98.1 °F (36.7 °C)    No intake/output data recorded. I/O last 3 completed shifts:  In: -   Out: 551 [Urine:551]      Physical Exam:  Physical Exam  Vitals reviewed. Constitutional:       General: She is not in acute distress. HENT:      Head: Normocephalic and atraumatic. Right Ear: External ear normal.      Left Ear: External ear normal.      Nose: Nose normal.   Eyes:      General:         Right eye: No discharge. Left eye: No discharge. Extraocular Movements: Extraocular movements intact. Cardiovascular:      Rate and Rhythm: Normal rate. Pulmonary:      Effort: No respiratory distress. Abdominal:      Palpations: Abdomen is soft. Tenderness: There is abdominal tenderness (RUQ to light and deep palpation). There is no guarding. Musculoskeletal:         General: No swelling. Skin:     General: Skin is warm. Coloration: Skin is not jaundiced. Neurological:      General: No focal deficit present. Mental Status: She is alert.          Scheduled Meds:   sodium chloride flush  5-40 mL IntraVENous 2 times per day    apixaban  5 mg Oral BID    aspirin  81 mg Oral Daily    docusate sodium  100 mg Oral BID    donepezil  10 mg Oral Nightly    furosemide  20 mg Oral Daily    gabapentin  600 mg Oral BID    memantine  10 mg Oral BID    metoprolol tartrate  25 mg Oral BID    pantoprazole  40 mg Oral QAM AC    oxybutynin  5 mg Oral BID    rosuvastatin  40 mg Oral QPM     ContinuousInfusions:   sodium chloride       PRN Meds:sodium chloride flush, sodium chloride, ondansetron **OR** ondansetron, acetaminophen **OR** acetaminophen, polyethylene glycol, ipratropium      Labs/Imaging Results:   Lab Results   Component Value Date    WBC 8.6 01/31/2023    HGB 11.0 (L) 01/31/2023    HCT 33.5 (L) 01/31/2023    MCV 81.9 01/31/2023     01/31/2023     Lab Results   Component Value Date     01/30/2023    K 4.2 01/30/2023    CL 97 (L) 01/30/2023    CO2 27 01/30/2023    BUN 12 01/30/2023    CREATININE 0.7 01/30/2023    GLUCOSE 147 (H) 01/30/2023    CALCIUM 9.1 01/30/2023    PROT 7.2 01/30/2023    LABALBU 4.5 01/30/2023    BILITOT 0.6 01/30/2023    ALKPHOS 129 01/30/2023    AST 24 01/30/2023    ALT 13 01/30/2023    LABGLOM >60 01/30/2023    GFRAA >60 07/12/2022       US:  Nonspecific gallbladder distension without shadowing calculus. However, when   correlated to the CT scan findings are suspicious for acute cholecystitis. Correlate with nuclear medicine hepatic biliary scan. HIDA:  The gallbladder is not visualized by a 3 hours post injection. The patient   refused further imaging (4 hour post-injection is a standard endpoint). This   can be seen with acute or chronic cholecystitis. Assessment:       75 y/o F with RUQ and image findings concerning for acute cholecystitis    Plan:       -Reviewed image study results with pt.   -To OR for lap pedro. Consent obtained. Reviewed in detail with the patient and/or family the expected pre-operative, operative, and post-operative courses including risks, benefits, and alternatives to the procedure. The patient's questions were answered in detail and agreed to proceed with the procedure.   -Pre op Abx ordered.  -Also updated pt's nurse as well as pt's sister, Amina Lopez, at [phone].       Electronically signed by Chelly Linares II, MD on 1/31/2023 at 8:10 AM

## 2023-01-31 NOTE — PROGRESS NOTES
Assess for need for anticoagulation  Acute on chronic cholecystitis sp lap cholecystectomy  PAF  History of CAD SP CABG  Mitral valve repair  MARU ligation  Closure of PFO  HTN      Will check patient pacemaker  Patient did not have any recent atrial fib on last check  Patient also had ligation but there was no follow up ALLEN post surgery to assess for MARU ligation. Some times post ligation there is remnant of the left atrial appendage still which can risk for stroke so ideally she should get ALLEN. But given that she is not having any atrial fibrillation we may be able to just do aspirin and watch for now      No atrial fibrillation on the device check in recent history  Can stop eliquis and we will follow as out patient  Recommend to continue aspiein    Addendum    Device Assessment:      The device is Medtronic pacemaker - Dual Chamber chamber      MRI Compatible : yes    Device interrogation was performed. Mode: AAIr --- DDDr     Sensing is normal. Impedence is normal.  Threshold is normal.     There has not been interval changes. Estimated battery life is 9.1 years     The underlying rhythm is ap, vs.  98 % atrial paced; 0.2 % ventricular paced. Atrial Arrhythmia : No    Non sustained VT episodes : No    Sustained VT episodes : No    Patient activity reported 0.3 hrs/day    The patient is atrial pacemaker dependent.       Full note to follow

## 2023-01-31 NOTE — PROGRESS NOTES
5887- This nurse attempted to call pt sister Alphonse Bangura RUBEN per chart, no answer and unable to leave message. Attempted to call pt sister Jenise Prader left message and call back number   Re Ripa called this nurse and stated her and brandi were coming to [facility] now.  Alphonsedoris BiggsLetart stated she is not MPOJAY, Consepcion Prader pt sister is

## 2023-01-31 NOTE — PROGRESS NOTES
5791- This nurse attempted to call pt [facility]  w/ Dr. Andrew Souza and Dr. Ismael James at bedside, no answer   1273- pt [facility] brought to bedside   0900- Pt [facility] Anais Blackwell and [facility] at bedside.  Dr. Andrew Souza and Dr. Ismael James notified

## 2023-01-31 NOTE — OP NOTE
Operative Note    Patient ID:    Candace Mccullough  [id number]  45 y.o.  1945      Indications: The above patient presented with symptomatic gallbladder disease and was worked up appropriately--including imaging and history and physical exam. After discussion with the patient, the decision was made to undergo laparoscopic, possible open, cholecystectomy with the possibility of intraoperative cholangiogram.     Pre-Operative Diagnosis:  1. Acute cholecystitis     Post-Operative Diagnosis:   1. Acute on chronic cholecystitis   2. Hydrops     Procedure:   Laparoscopic cholecystectomy     Surgeon: Catia Zafar MD, FACS, [facility]    First Assistant: Tarik Estes CNP The skilled assistance of the CNP was necessary for the successful completion of this case. She was essential for the proper positioning, manipulation of instruments, proper exposure, manipulation of tissue, and wound closure. Anesthesia: General endotracheal anesthesia    ASA: Per anesthesia     Findings: Consistent with post-operative diagnosis    Estimated Blood Loss:  57 mL           Drains: 15 [place] AVRIL           Total IV Fluids: 600 mL IV crystalloid           Specimens: Gallbladder and contents          Complications:  None; patient tolerated the procedure well. Disposition: PACU - hemodynamically stable. Opeartive Details: The patient was met in the pre-operative holding area. An informed consent was obtained after discussing the risks, benefits, complications, treatment options, and expected outcomes. The risks discussed included: adverse reaction to medication, pulmonary aspiration, perforation of viscus, bleeding, recurrent infection, finding a normal gallbladder, the need for additional procedures, failure to diagnose a condition, the possible need to convert to an open procedure, damage to nearby structures (specifically biliary structures) and creating a complication requiring transfusion or separate operation. The patient and/or family concurred with the proposed plan, giving informed consent. The patient was transported to the operating room. Once in the operating room, the patient was transferred onto the operating room table and placed in the supine position. The patient was secured to the operating room table with multiple straps. All pressure points were padded appropriately. General anesthesia was induced and tolerated without incident. The patient's abdomen was prepped and draped in the standard, sterile fashion. Antibiotic prophylaxis was administered in accordance with national protocol. A time-out was held with all members of the operating room team present and in agreement. Local anesthetic was injected into the skin of the left upper quadrant and an incision was made using an 11 blade scalpel. Using a 5 mm optical trocar, the peritoneum was entered without incidence or damage to nearby structures. Pneumoperitoneum was established to 15 mm Hg with CO2 and tolerated well without any adverse changes noted. Three additional trocars were introduced under direct vision. All skin incisions were infiltrated with local anesthetic prior to making the incisions and placing the trocars. The patient was then positioned in reverse trendelenburg with the right side up. The gallbladder was identified, the fundus grasped, and retracted cephalad. The gallbladder was grossly inflamed and thickened. Adhesions were taken down with a combination of blunt dissection and with electrocautery, taking care not to injure any adjacent organs or viscus. It was grossly distended. A laparoscopic needle was used to drain it. The returned fluid was clear consistent with hydrops. The infundibulum was grasped and retracted laterally, exposing the peritoneum overlying the triangle of Calot. This peritoneum was divided and exposed in a blunt fashion.   The cystic duct was clearly identified and bluntly dissected circumferentially. The junctions of the gallbladder, cystic duct and common bile duct were clearly identified. The cystic artery was clearly identified in a similar fashion. At the point, the critical view of safety was accomplished: the hepatocystic triangle was cleared of fat and fibrous tissue, the lower one third of the gallbladder was  from the liver to expose the cystic plate, and two and only two structures were seen entering the gallbladder. Surgical clips were applied to the cystic duct and cystic artery (two on the stay side and one on the specimen side for each structure) and the cystic duct and cystic artery were ligated with Endo Pedro. The gallbladder was dissected from the liver bed in retrograde fashion with the electrocautery. An Endo Catch specimen retrieval bag was introduced into the patient's abdomen and the gallbladder was placed into the bag. The gallbladder was removed from the patient's abdomen under direct vision. The patient was on Eliquis and did have some oozing from the liver bed. It was stopped with hemostatic agent and electrocautery. The liver bed was irrigated and inspected. Hemostasis was appropriate. A 15 [place]ian drain was placed. The port site the gallbladder was removed through was closed using a suture passer and multiple 0 vicryl sutures. Pneumoperitoneum was desufflated after viewing removal of the remaining trocars under direct vision. The wound was thoroughly irrigated and the skin was then closed with running absorbable suture. Dermabond and sterile dressings were applied. At the end of the case, instrument counts, sponge counts, and needle counts were correct times two. At the end of the case, the patient was extubated and transferred to the PACU in stable condition. At the end of the case, Dr. Thuan Bloom personally informed the patient's family of the outcome of the procedure.      Kade Kelley MD, FACS, [facility]

## 2023-01-31 NOTE — ANESTHESIA PRE PROCEDURE
Department of Anesthesiology  Preprocedure Note       Name:  Micah Templeton   Age:  76 y.o.  :  1945                                          MRN:  [id number]         Date:  2023      Surgeon: Parvez Panchal):  Inderjit Ford MD    Procedure: Procedure(s):  CHOLECYSTECTOMY LAPAROSCOPIC    Medications prior to admission:   Prior to Admission medications    Medication Sig Start Date End Date Taking? Authorizing Provider   gabapentin (NEURONTIN) 600 MG tablet Take 600 mg by mouth 2 times daily.    Yes Historical Provider, MD   memantine (NAMENDA) 10 MG tablet Take 10 mg by mouth 2 times daily   Yes Historical Provider, MD   apixaban (ELIQUIS) 5 MG TABS tablet Take 1 tablet by mouth 2 times daily 23   Jing Logan MD   diphenhydrAMINE-APAP, sleep, (ACETAMINOPHEN PM)  MG tablet Take 1 tablet by mouth nightly as needed for Sleep    Historical Provider, MD   metoprolol tartrate (LOPRESSOR) 50 MG tablet Take 0.5 tablets by mouth 2 times daily 22   BIRD Arreguin - CNP   rosuvastatin (CRESTOR) 40 MG tablet Take 1 tablet by mouth every evening 22   BIRD Arreguin - CNP   bisacodyl (DULCOLAX) 5 MG EC tablet Take 5 mg by mouth daily as needed for Constipation 21   Historical Provider, MD   oxybutynin (DITROPAN) 5 MG tablet Take 5 mg by mouth 2 times daily 21   Historical Provider, MD   potassium chloride (KLOR-CON) 20 MEQ packet Take 20 mEq by mouth daily    Historical Provider, MD   senna (SENOKOT) 8.6 MG tablet Take 1 tablet by mouth daily  Patient not taking: Reported on 2022    Historical Provider, MD   acetaminophen (APAP EXTRA STRENGTH) 500 MG tablet Take 1 tablet by mouth every 6 hours as needed for Pain  Patient not taking: Reported on 2022   Henry Vega PA-C   ipratropium (ATROVENT) 0.03 % nasal spray 1 spray by Nasal route as needed 19   Historical Provider, MD   omeprazole (PRILOSEC) 40 MG delayed release capsule Take 40 mg by mouth daily Historical Provider, MD   melatonin 3 MG TABS tablet Take 3 mg by mouth daily  Patient not taking: Reported on 11/2/2022    Historical Provider, MD   aspirin 81 MG EC tablet Take 1 tablet by mouth daily 7/21/18   TRISTON Rojas MD   furosemide (LASIX) 20 MG tablet Take 1 tablet by mouth daily 7/21/18   TRISTON Rojas MD   docusate sodium (COLACE, DULCOLAX) 100 MG CAPS Take 100 mg by mouth 2 times daily 7/20/18   TRISTON Rojas MD   donepezil (ARICEPT) 10 MG tablet Take 10 mg by mouth nightly 5/28/18   Historical Provider, MD   citalopram (CELEXA) 40 MG tablet Take 40 mg by mouth daily 5/28/18   Historical Provider, MD   vitamin D3 (CHOLECALCIFEROL) 25 MCG (1000 UT) TABS tablet Take 1 tablet by mouth daily     Historical Provider, MD   CALCIUM CARBONATE PO Take 1,000 mg by mouth daily     Historical Provider, MD       Current medications:    Current Facility-Administered Medications   Medication Dose Route Frequency Provider Last Rate Last Admin    sodium chloride flush 0.9 % injection 5-40 mL  5-40 mL IntraVENous 2 times per day BIRD Roblero - CNP   10 mL at 01/30/23 2128    sodium chloride flush 0.9 % injection 5-40 mL  5-40 mL IntraVENous PRN BIRD Roblero - CNP   10 mL at 01/30/23 1337    0.9 % sodium chloride infusion   IntraVENous PRN BIRD Roblero - YOU        ondansetron (ZOFRAN-ODT) disintegrating tablet 4 mg  4 mg Oral Q8H PRN BIRD Roblero CNP        Or    ondansetron (ZOFRAN) injection 4 mg  4 mg IntraVENous Q6H PRN BIRD Roblero - YOU        acetaminophen (TYLENOL) tablet 650 mg  650 mg Oral Q6H PRN BIRD Roblero - CNP        Or    acetaminophen (TYLENOL) suppository 650 mg  650 mg Rectal Q6H PRN BIRD Roblero - YOU        polyethylene glycol (GLYCOLAX) packet 17 g  17 g Oral Daily PRN BIRD Roblero - CNP        apixaban (ELIQUIS) tablet 5 mg  5 mg Oral BID BIRD Roblero CNP   5 mg at 01/30/23 2128  aspirin EC tablet 81 mg  81 mg Oral Daily Paula Forward, APRN - CNP        docusate sodium (COLACE) capsule 100 mg  100 mg Oral BID Paula Forward, APRN - CNP   100 mg at 01/30/23 2128    donepezil (ARICEPT) tablet 10 mg  10 mg Oral Nightly Paula Forward, APRN - CNP   10 mg at 01/30/23 2128    furosemide (LASIX) tablet 20 mg  20 mg Oral Daily Paula Forward, APRN - CNP        gabapentin (NEURONTIN) capsule 600 mg  600 mg Oral BID Paula Forward, APRN - CNP   600 mg at 01/30/23 2128    ipratropium (ATROVENT) 0.03 % nasal spray 1 spray  1 spray Nasal PRN Paula Forward, APRN - CNP        memantine (NAMENDA) tablet 10 mg  10 mg Oral BID Paula Forward, APRN - CNP   10 mg at 01/30/23 2128    metoprolol tartrate (LOPRESSOR) tablet 25 mg  25 mg Oral BID Paula Forward, APRN - CNP   25 mg at 01/30/23 1333    pantoprazole (PROTONIX) tablet 40 mg  40 mg Oral QAM AC Paula Forward, APRN - CNP   40 mg at 01/31/23 0551    oxybutynin (DITROPAN) tablet 5 mg  5 mg Oral BID Paula Forward, APRN - CNP   5 mg at 01/30/23 2128    rosuvastatin (CRESTOR) tablet 40 mg  40 mg Oral QPM Paula Forward, APRN - CNP   40 mg at 01/30/23 1825       Allergies:  No Known Allergies    Problem List:    Patient Active Problem List   Diagnosis Code    Paroxysmal atrial fibrillation (HCC) I48.0    Essential hypertension I10    Mixed hyperlipidemia E78.2    VHD (valvular heart disease) I38    Abnormal EKG R94.31    Acute blood loss anemia D62    Uncontrolled pain R52    Gait disturbance R26.9    Pneumonia due to infectious organism J18.9    SSS (sick sinus syndrome) ([facility] [address] I49.5    S/P placement of cardiac pacemaker Z95.0    Tachycardia-bradycardia ([facility] [address] I49.5    Fall at home, subsequent encounter W19. XXXD, Y92.009    Acute intracranial hemorrhage (HCC) I62.9    Hyponatremia E87.1    Compression fracture of L1 lumbar vertebra (HCC) S32.010A    Intraparenchymal hematoma of brain S06. 33AA    COVID-19 U07.1    CAD (coronary artery disease) I25.10    AMS (altered mental status) R41.82    Altered mental status R41.82    Concussion with no loss of consciousness S06.0X0A    Chest pain R07.9    Right upper quadrant pain R10.11       Past Medical History:        Diagnosis Date    Anxiety     Arthritis     knees    Atrial fibrillation (HCC)     CAD (coronary artery disease)     Sees Dr. Sarai Heredia COVID-19 09/19/2021    Diabetes mellitus ([facility] [address]    Winsome Reeks H/O cardiac catheterization 06/25/2018    severe 3 vessel disease, refer to CV surgery for CABG and MAZE    H/O cardiovascular stress test 06/06/2018    EF47%  fixed perfusion defect ant wall, pt in afib    H/O echocardiogram 06/06/2018    EF55% mod MR    H/O echocardiogram 08/28/2018    EF 50-55%, Mild : AR, MR & TR.    H/O echocardiogram 03/13/2020    EF 55%, Breast artifact noted. 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 David Chaves History of Holter monitoring 10/23/2018    Multiple PAF episodes noted. Tachy-Dinesh episodes noted.  History of nuclear stress test 03/13/2020    EF 55%, Breast artifact.     Hyperlipidemia     Hypertension     Memory loss     on Aricept    Missing teeth, acquired     Pneumonia     pneumococcal pneumonia twice at end of 2017    Risk for falls     uses walker    TIA (transient ischemic attack)     family doctors said she has had mini-strokes    Urinary leakage     UTI (urinary tract infection)     recent --just stopped antibiotic last week as of 6-29-18    Wears glasses        Past Surgical History:        Procedure Laterality Date    CABG WITH MITRAL VALVE REPAIR  07/09/2018    CABG X 3 [place]>LAD, SVG>CX M1, SVG>M2, MVR repair w/#28 CG ring, PFO closure, MAZE    CARDIAC CATHETERIZATION  06/25/2018    EYE SURGERY Bilateral 2018    Cataracts    MITRAL VALVE MAZE PROCEDURE  07/19/2018    Dr. Johann Macedo Left 12/11/2018    [employer] XT DR LANA Bartlett     THORACENTESIS Bilateral 07/13/2018    IR Dr. Kris Canseco, right 1300, left 900 all s/s    TONSILLECTOMY  1940's    WISDOM TOOTH EXTRACTION         Social History:    Social History     Tobacco Use    Smoking status: Never    Smokeless tobacco: Never   Substance Use Topics    Alcohol use: No                                Counseling given: Not Answered      Vital Signs (Current):   Vitals:    01/30/23 1431 01/30/23 1808 01/30/23 1946 01/30/23 2054   BP: (!) 147/61 125/68 104/70 104/70   Pulse: 60 59 60 60   Resp: 16 16 16 16   Temp:  36.4 °C (97.5 °F) 36.7 °C (98.1 °F) 36.7 °C (98.1 °F)   TempSrc:  Oral Oral Oral   SpO2:  93% 91% 91%   Weight:  173 lb 15.1 oz (78.9 kg)     Height:  5' 5\" (1.651 m)                                                BP Readings from Last 3 Encounters:   01/30/23 104/70   11/02/22 138/70   08/24/22 124/60       NPO Status:                                                                                 BMI:   Wt Readings from Last 3 Encounters:   01/30/23 173 lb 15.1 oz (78.9 kg)   11/02/22 154 lb 6.4 oz (70 kg)   07/12/22 158 lb 4.6 oz (71.8 kg)     Body mass index is 28.95 kg/m².     CBC:   Lab Results   Component Value Date/Time    WBC 8.6 01/31/2023 02:39 AM    RBC 4.09 01/31/2023 02:39 AM    HGB 11.0 01/31/2023 02:39 AM    HCT 33.5 01/31/2023 02:39 AM    MCV 81.9 01/31/2023 02:39 AM    RDW 13.0 01/31/2023 02:39 AM     01/31/2023 02:39 AM       CMP:   Lab Results   Component Value Date/Time     01/30/2023 08:00 AM    K 4.2 01/30/2023 08:00 AM    CL 97 01/30/2023 08:00 AM    CO2 27 01/30/2023 08:00 AM    BUN 12 01/30/2023 08:00 AM    CREATININE 0.7 01/30/2023 08:00 AM    GFRAA >60 07/12/2022 06:37 AM    LABGLOM >60 01/30/2023 08:00 AM    GLUCOSE 147 01/30/2023 08:00 AM    PROT 7.2 01/30/2023 08:00 AM    CALCIUM 9.1 01/30/2023 08:00 AM    BILITOT 0.6 01/30/2023 08:00 AM    ALKPHOS 129 01/30/2023 08:00 AM    AST 24 01/30/2023 08:00 AM    ALT 13 01/30/2023 08:00 AM       POC Tests: No results for input(s): POCGLU, POCNA, POCK, POCCL, POCBUN, POCHEMO, POCHCT in the last 72 hours. Coags:   Lab Results   Component Value Date/Time    PROTIME 14.6 07/08/2022 03:19 PM    INR 1.13 07/08/2022 03:19 PM    APTT 44.0 06/06/2021 01:46 PM       HCG (If Applicable): No results found for: PREGTESTUR, PREGSERUM, HCG, HCGQUANT     ABGs:   Lab Results   Component Value Date/Time    PO2ART 87 07/09/2022 02:15 PM    FJF5XOE 42.0 07/09/2022 02:15 PM    YKQ7XOY 26.0 07/09/2022 02:15 PM        Type & Screen (If Applicable):  No results found for: LABABO, LABRH    Drug/Infectious Status (If Applicable):  No results found for: HIV, HEPCAB    COVID-19 Screening (If Applicable):   Lab Results   Component Value Date/Time    COVID19 NOT DETECTED 01/30/2023 10:06 AM           Anesthesia Evaluation  Patient summary reviewed  Airway: Mallampati: II  TM distance: >3 FB   Neck ROM: full  Mouth opening: > = 3 FB and < 3 FB   Dental:          Pulmonary:normal exam    (+) pneumonia:                             Cardiovascular:  Exercise tolerance: poor (<4 METS),   (+) hypertension:, valvular problems/murmurs:, pacemaker: pacemaker, CAD:, CABG/stent:, dysrhythmias: atrial fibrillation and paced rhythm, hyperlipidemia      ECG reviewed      Echocardiogram reviewed               ROS comment:    Atrial-paced rhythm with prolonged AV conduction   Right bundle branch block   T wave abnormality, consider inferolateral ischemia   Abnormal ECG   When compared with ECG of 19-SEP-2021 17:53,   No significant change was found   1/30/23    Echo yesterday needs read. .. Neuro/Psych:   (+) neuromuscular disease:, TIA, dementia             ROS comment: Sp intracranial hemorrhage   Hx l1 lumbar comp fx  Altered mental status  GI/Hepatic/Renal:   (+) GERD:,           Endo/Other:    (+) Diabetes, blood dyscrasia: anticoagulation therapy, arthritis: OA., .                 Abdominal:             Vascular:           Other Findings: Anesthesia Plan      general     ASA 4     (Chart review only 1/31/22)  Induction: intravenous. Anesthetic plan and risks discussed with patient, healthcare power of  and sibling. Plan discussed with CRNA.                     BIRD Mena - CRNA   1/31/2023

## 2023-01-31 NOTE — PROGRESS NOTES
4 Eyes Skin Assessment      NAME:    DATE OF BIRTH:    MEDICAL RECORD NUMBER:       The patient is being assessed for  Admission     I agree that One RN have performed a thorough Head to Toe Skin Assessment on the patient. ALL assessment sites listed below have been assessed. Areas assessed by both nurses:     Head, Face, Ears, Shoulders, Back, Chest, Arms, Elbows, Hands, Sacrum. Buttock, Coccyx, Ischium, and Legs. Feet and Heels                              Does the Patient have a Wound?  No noted wound(s)       Brandon Prevention initiated by RN: Yes   Wound Care Orders initiated by RN: No     Pressure Injury (Stage 3,4, Unstageable, DTI, NWPT, and Complex wounds) if present place referral order by RN under : NA     New and Established Ostomies, if present place, referral order under : NA       Nurse 1 eSignature: Electronically signed by Omero Bundy RN on 1/30/23 at 7:52 PM EST     **SHARE this note so that the co-signing nurse is able to place an eSignature**     Nurse 2 eSignature: Electronically signed by Bekah Jasmine RN on 1/30/23 at 8:10 PM EST

## 2023-01-31 NOTE — CONSULTS
Electrophysiology Consult Note      Reason for consultation:  assess for need of anticoagulation    Chief complaint : Abdominal pain    Referring physician: eMlly Davey      Primary care physician: Pankaj Johnson MD      History of Present Illness:     James Hinds is a 76year old male with a history atrial fibrillation, CAD S/P CABG,  diabetes type 2,Hypertension, hyperlipidemia, valvular heart disease, s/p mitral valve repair, PFO closure, early dementia, tachycardia bradycardia s/p dual chamber pacemaker. She presents with complaints right sided chest/abdominal pain. She was found to have acute cholecystitis and under when laparoscopic cholecystectomy. Electrophysiology was consulted for management of anticoagulation. Patient has a history of atrial fibrillation. Patient had left atrial appendage ligation in 2018. Patient has remained on anticoagulation since the procedure. Pastmedical history:   Past Medical History:   Diagnosis Date    Anxiety     Arthritis     knees    Atrial fibrillation (HCC)     CAD (coronary artery disease)     Sees Dr. Jeremias Castillo    COVID-19 09/19/2021    Diabetes mellitus ([address]     H/O cardiac catheterization 06/25/2018    severe 3 vessel disease, refer to CV surgery for CABG and MAZE    H/O cardiovascular stress test 06/06/2018    EF47%  fixed perfusion defect ant wall, pt in afib    H/O echocardiogram 06/06/2018    EF55% mod MR    H/O echocardiogram 08/28/2018    EF 50-55%, Mild : AR, MR & TR.    H/O echocardiogram 03/13/2020    EF 55%, Breast artifact noted. H/O three vessel coronary artery bypass 07/09/2018    Dr. Kaylie Mobley    History of Holter monitoring 10/23/2018    Multiple PAF episodes noted. Tachy-Dinesh episodes noted. History of nuclear stress test 03/13/2020    EF 55%, Breast artifact.     Hyperlipidemia     Hypertension     Memory loss     on Aricept    Missing teeth, acquired     Pneumonia     pneumococcal pneumonia twice at end of 2017    Risk for falls     uses walker    TIA (transient ischemic attack)     family doctors said she has had mini-strokes    Urinary leakage     UTI (urinary tract infection)     recent --just stopped antibiotic last week as of 18    Wears glasses        Surgical history :   Past Surgical History:   Procedure Laterality Date    CABG WITH MITRAL VALVE REPAIR  2018    CABG X 3 [place]>LAD, SVG>CX M1, SVG>M2, MVR repair w/#28 CG ring, PFO closure, MAZE    CARDIAC CATHETERIZATION  2018    EYE SURGERY Bilateral 2018    Cataracts    MITRAL VALVE MAZE PROCEDURE  2018    Dr. Jen Matute Left 2018    [employer] [facility] XT DR LANA Bartlett     THORACENTESIS Bilateral 2018    IR Dr. Toshia Lombardi, right 56, left 900 all s/s    TONSILLECTOMY  1940's    WISDOM TOOTH EXTRACTION         Family history:   Family History   Problem Relation Age of Onset    Stroke Mother     Heart Disease Father     Early Death Father     Breast Cancer Sister     Parkinsonism Brother     Heart Disease Sister     Other Sister         fibromyalgia    Diabetes Sister     Early Death Brother          at birth         Social history :  reports that she has never smoked. She has never used smokeless tobacco. She reports that she does not drink alcohol and does not use drugs. No Known Allergies    No current facility-administered medications on file prior to encounter. Current Outpatient Medications on File Prior to Encounter   Medication Sig Dispense Refill    gabapentin (NEURONTIN) 600 MG tablet Take 600 mg by mouth 2 times daily.       memantine (NAMENDA) 10 MG tablet Take 10 mg by mouth 2 times daily      apixaban (ELIQUIS) 5 MG TABS tablet Take 1 tablet by mouth 2 times daily 42 tablet 0    diphenhydrAMINE-APAP, sleep, (ACETAMINOPHEN PM)  MG tablet Take 1 tablet by mouth nightly as needed for Sleep      metoprolol tartrate (LOPRESSOR) 50 MG tablet Take 0.5 tablets by mouth 2 times daily 180 tablet 1    rosuvastatin (CRESTOR) 40 MG tablet Take 1 tablet by mouth every evening 90 tablet 3    bisacodyl (DULCOLAX) 5 MG EC tablet Take 5 mg by mouth daily as needed for Constipation      oxybutynin (DITROPAN) 5 MG tablet Take 5 mg by mouth 2 times daily      potassium chloride (KLOR-CON) 20 MEQ packet Take 20 mEq by mouth daily      senna (SENOKOT) 8.6 MG tablet Take 1 tablet by mouth daily (Patient not taking: Reported on 11/2/2022)      acetaminophen (APAP EXTRA STRENGTH) 500 MG tablet Take 1 tablet by mouth every 6 hours as needed for Pain (Patient not taking: Reported on 11/2/2022) 20 tablet 0    ipratropium (ATROVENT) 0.03 % nasal spray 1 spray by Nasal route as needed      omeprazole (PRILOSEC) 40 MG delayed release capsule Take 40 mg by mouth daily      melatonin 3 MG TABS tablet Take 3 mg by mouth daily (Patient not taking: Reported on 11/2/2022)      aspirin 81 MG EC tablet Take 1 tablet by mouth daily 30 tablet 3    furosemide (LASIX) 20 MG tablet Take 1 tablet by mouth daily 30 tablet 0    docusate sodium (COLACE, DULCOLAX) 100 MG CAPS Take 100 mg by mouth 2 times daily      donepezil (ARICEPT) 10 MG tablet Take 10 mg by mouth nightly  2    citalopram (CELEXA) 40 MG tablet Take 40 mg by mouth daily  5    vitamin D3 (CHOLECALCIFEROL) 25 MCG (1000 UT) TABS tablet Take 1 tablet by mouth daily       CALCIUM CARBONATE PO Take 1,000 mg by mouth daily          Review of Systems:   Review of Systems   Constitutional:  Positive for activity change and fatigue. Negative for chills and fever. HENT:  Negative for congestion, ear pain and tinnitus. Eyes:  Negative for photophobia, pain and visual disturbance. Respiratory:  Negative for cough, chest tightness, shortness of breath and wheezing. Cardiovascular:  Negative for chest pain, palpitations and leg swelling. Gastrointestinal:  Positive for abdominal pain. Negative for blood in stool, constipation, diarrhea, nausea and vomiting. Endocrine: Negative for cold intolerance and heat intolerance. Genitourinary:  Negative for dysuria, flank pain and hematuria. Musculoskeletal:  Positive for arthralgias and gait problem. Negative for back pain, myalgias and neck stiffness. Skin:  Negative for color change and rash. Allergic/Immunologic: Negative for food allergies. Neurological:  Negative for dizziness, light-headedness, numbness and headaches. Hematological:  Does not bruise/bleed easily. Psychiatric/Behavioral:  Negative for agitation, behavioral problems and confusion. Examination:      Vitals:    01/31/23 1300 01/31/23 1304 01/31/23 1505 01/31/23 1523   BP: (!) 124/40   (!) 134/97   Pulse: 65 62  66   Resp: 18  18 16   Temp: 97.5 °F (36.4 °C)      TempSrc: Oral   Rectal   SpO2:    92%   Weight:       Height:            Body mass index is 28.95 kg/m². Physical Exam  Constitutional:       Appearance: Normal appearance. She is not ill-appearing. HENT:      Head: Normocephalic and atraumatic. Mouth/Throat:      Mouth: Mucous membranes are moist.   Eyes:      Conjunctiva/sclera: Conjunctivae normal.   Cardiovascular:      Rate and Rhythm: Normal rate and regular rhythm. Heart sounds: Murmur (grade 2/6 systolic and diastolic murmur) heard. Pulmonary:      Effort: Pulmonary effort is normal.      Breath sounds: No rales. Abdominal:      General: Abdomen is flat. Palpations: Abdomen is soft. Musculoskeletal:         General: No tenderness. Normal range of motion. Cervical back: Normal range of motion. Right lower leg: No edema. Left lower leg: No edema. Skin:     General: Skin is warm and dry. Neurological:      General: No focal deficit present. Mental Status: She is alert and oriented to person, place, and time.              CBC:   Lab Results   Component Value Date/Time    WBC 8.6 01/31/2023 02:39 AM    HGB 11.0 01/31/2023 02:39 AM    HCT 33.5 01/31/2023 02:39 AM     01/31/2023 02:39 AM     Lipids:  Lab Results   Component Value Date    CHOL 103 01/31/2023    TRIG 95 01/31/2023    HDL 51 01/31/2023    LDLCALC 33 01/31/2023     PT/INR:   Lab Results   Component Value Date/Time    INR 1.13 07/08/2022 03:19 PM        BMP:    Lab Results   Component Value Date     01/30/2023    K 4.2 01/30/2023    CL 97 (L) 01/30/2023    CO2 27 01/30/2023    BUN 12 01/30/2023     CMP:   Lab Results   Component Value Date    AST 24 01/30/2023    PROT 7.2 01/30/2023    BILITOT 0.6 01/30/2023    ALKPHOS 129 01/30/2023     TSH:  No results found for: TSH, T4    EKGINTERPRETATION - EKG Interpretation:        Echo  1/30/2023     Technically difficult examination. Left ventricular systolic function is normal.   Ejection fraction is visually estimated at 50%. Inferolateral wall and basal anteroseptal wall segments are hypokinetic. PPM wiring visualized within the right heart. Moderately dilated right ventricle. Sclerotic, but non-stenotic aortic valve. Moderate aortic regurgitation; PHT: 306 msec. Mitral annular calcification is present. Mild mitral regurgitation. Moderate tricuspid regurgitation; RVSP: 38 mmHg. No evidence of any pericardial effusion. Vitals:    01/31/23 1300 01/31/23 1304 01/31/23 1505 01/31/23 1523   BP: (!) 124/40   (!) 134/97   Pulse: 65 62  66   Resp: 18  18 16   Temp: 97.5 °F (36.4 °C)      TempSrc: Oral   Rectal   SpO2:    92%   Weight:       Height:              IMPRESSION / RECOMMENDATIONS:     Assess for need for anticoagulation  Acute on chronic cholecystitis sp lap cholecystectomy  PAF  History of CAD SP CABG  Mitral valve repair  MARU ligation  Closure of PFO  HTN        Will check patient pacemaker  Patient did not have any recent atrial fib on last check  Patient also had ligation but there was no follow up ALLEN post surgery to assess for MARU ligation.  Some times post ligation there is remnant of the left atrial appendage still which can risk for stroke so ideally she should get ALLEN. But given that she is not having any atrial fibrillation we may be able to just do aspirin and watch for now        No atrial fibrillation on the device check in recent history  Can stop eliquis and we will follow as out patient  Recommend to continue aspirin     Addendum     Device Assessment:        The device is [employer]tronic pacemaker - Dual Chamber chamber       MRI Compatible : yes     Device interrogation was performed. Mode: AAIr --- DDDr      Sensing is normal. Impedence is normal.  Threshold is normal.      There has not been interval changes. Estimated battery life is 9.1 years      The underlying rhythm is ap, vs.  98 % atrial paced; 0.2 % ventricular paced. Atrial Arrhythmia : No     Non sustained VT episodes : No     Sustained VT episodes : No     Patient activity reported 0.3 hrs/day     The patient is atrial pacemaker dependent          Thanks again for allowing me to participate in care of this patient. Please call me if you have any questions. With best regards. Brissa Tavares MD, 1/31/2023 4:42 PM     Please note this report has been partially produced using speech recognition software and may contain errors related to that system including errors in grammar, punctuation, and spelling, as well as words and phrases that may be inappropriate. If there are any questions or concerns please feel free to contact the dictating provider for clarification.

## 2023-01-31 NOTE — PROGRESS NOTES
1054 Patient arrived to PACU, monitors placed and alarms on. Received report from [facility] CRNA. Patient drowsy from anesthesia but arouses easily. 1115 Patient more wakeful, medicated for complaint of abd pain. 1140 Patient medicated for complaint of nausea. 1150 Patient turned and repositioned. Tolerated well. 1200 Patient dozing off and on. No distress noted. 1210 Report called to [facility]. Family not present in waiting area, called and no answer on cell phone. PACU care completed at this time. Awaiting transport at this time. 1220 Patient resting with eyes closed. No distress noted. 80 Transported to room at this time.

## 2023-01-31 NOTE — PROGRESS NOTES
V2.0  [facility] [place]      Name:  Cait Cotter /Age/Sex: 1945  (76 y.o. female)   MRN & CSN:  [id number] & [id number] Encounter Date/Time: 2023 9:12 AM EST    Location:  [address] [place] [address]-A PCP: Brayan Taylor MD       Hospital Day: 2    Assessment and Plan:   Cait Cotter is a 76 y.o. female with pmh of CAD s/p CABG, Valvular Heart Disease, s/p Mitral Valve Repair, PFO Closure, Paroxysmal A-Fib, HTN, HLD, DM, TIA, Early Dementia who presents with Chest pain Rt Sided and RUQ Abdominal Pain      This patient was discussed with Dr. Mary Joseph. He was agreeable with the plan and management as dictated above. Plan:    Rt ChestPain/RUQ Abd Pain 2/2 Acute on Chronic Cholecystitis: s/p Lap/Aline  Reproducible on exam, suspect MSK, has recent 2 week hx of coughing. Chest x-ray nonacute for infiltrate or consolidation. EKG Atrial paced rhythm with RBBB and t-wave abn similar to prior, trop neg x 1. Will obtain CT chest, TTE, repeat trops, continue pts anti-ischemic regimen, check tsh, lipids. Cardiology c/s in ED will f/u eval/recs  -CT Abd/Pelvis: bibasilar atelectasis, distended gallbladder, possible acute cholecystitis, fibroid uterus, cardiomegaly with enlarged central pulmonary arteries, likely pulmonary HTN  -US RUQ: nonspecific gallbladder distention w/out calculus  -HIDA Scan: gallbladder not visualized by 3 hours post infection, can be seen in in acute or chronic cholecystitis   -Dr. Tawanna Kamara, General Surgeon, took her to OR this am 23 for Lap/Aline, given IV Ancef. Has a drain in place with dark red blood. Her abdomen looks good, incisions are well approximated. -starting with Clear liquids, then to advance as tolerated. -lives at home with her sister, consider asking for PT eval for home PT after discharge due to weakness from hospitalization and surgery. Cough - patient and family reporting cough x2 weeks, chest x-ray nonacute.  CT chest shows bibasilar atelectasis, but no evidence of pneumonia, has cardiomegaly with enlarged central pulmonary arteries likely due to Pulm HTN. Her throat is red and has mucous visible, will check a Throat Culture for Strep A and add mucinex 600 mg BID. Had negative Viral Resp Panel w/ Covid-19 in the ED. CAD status post CABG -continue patient's anti-ischemic regimen, plan of care as noted above     Paroxysmal atrial fibrillation, history Maze procedure, status post pacemaker placement -patient managed on Eliquis and beta-blocker, monitor on tele. / per Dr. Sherlyn Gonzalez, she is to follow up outpatient after discharge regarding her pacemaker settings and/or placement. -HOLDING Elliquis due to her surgery      Essential hypertension-managed on metoprolol, resume     Mixed hyperlipidemia on statin     Other chronic medical conditions-resume home medications unless contraindicated  Valvular heart disease status post mitral valve repair, history of PFO closure  Hx Diabetes mellitus type 2 -diet controlled, A1c 5. [id number]  ADA diet, A1c in a.m. History of TIA-on platelets, statin  Chronic debilitation-patient with limited mobility, stands and pivots to wheelchair, will order PT OT  Early dementia-on Aricept  Chronic anemia-hemoglobin within baseline range, monitor    Diet ADULT DIET; Clear Liquid   DVT Prophylaxis [] Lovenox, []  Heparin, [x] SCDs, [] Ambulation,  [] Eliquis, [] Xarelto  [] Coumadin (Eliquis held due to surgery)   Code Status Full Code   Disposition From: Home  Expected Disposition: Home  Estimated Date of Discharge: 1-2 days  Patient requires continued admission due to Surgery today for Lap/Aline and surgical follow up   Surrogate Decision Maker/ POA Self     Subjective:     Chief Complaint: Rt Sided Chest Pain (X 3 days )       Doug Miles is a 76 y.o. female who presents with Family at bedside, reported the patient had right-sided chest wall pain underneath right breast around 230 this morning.   The patient rates her chest wall pain 7-8/10 sharp and intermittent in nature. She denies associated shortness of breath nausea vomiting diaphoresis. She and family denies recent falls or injuries. .  Heating pad and and cream was applied with no relief. She reports possibly coughing and activity aggravates pain. The patient has limited movement ability and pivots to wheelchair; does not exert herself. She denies prior history of this type pain. No reports of recent infections fever chills. She does note a persistent nonproductive cough that occurs at night for the past 2 weeks. She states she has been eating and drinking well; denies abdominal pain, diarrhea constipation or dysuria. No reports of her medication changes. No other acute issues reported. She was evaluated emergency department she presented afebrile pulse 83 respirations 18 blood pressure 162/67 O2 sat 95% room air. Chest x-ray performed showed low lung volumes with left basilar atelectasis. EKG showed atrial paced rhythm with right bundle branch block and T wave abnormality, troponin negative x2. BNP 1838. Chemistry panel showed chloride 97 magnesium 2.6 glucose 147 otherwise unremarkable. LFTs unremarkable. CBC showed normal WBC hemoglobin 11.7, HCT 36.0, platelets 229. Cardiology consulted and cleared her. She was given 324 mg aspirin. CT abdomen and pelvis showed possible cholecystitis, so General Surgery was consulted. Dr. Glen Bowers ordered U/S and HIDA scan were ordered and confirmed this finding. She had Lap/Aline on 1/31/23. Will keep her overnight to make sure she is able to tolerate PO and manage her pain. Consider Home PT after discharge for weakness due to hospitalization and surgery. She still complains of a cough today, has had it for 2 weeks. Was taking mucinex at home, will restart and check a throat Cx for Strep. Denies any shortness of breath, sinus pressure or ear fullness. Had a NEG Viral Resp Panel w/ Covid-19.      Review of Systems:    Ten point ROS is negative, unless otherwise noted above. Objective: Intake/Output Summary (Last 24 hours) at 1/31/2023 1502  Last data filed at 1/31/2023 1219  Gross per 24 hour   Intake 850 ml   Output 626 ml   Net 224 ml        Vitals:   Vitals:    01/31/23 1304   BP:    Pulse: 62   Resp:    Temp:    SpO2:        Physical Exam:     General: NAD  Eyes: EOMI  ENT: neck supple  Cardiovascular: Regular rate and rhythm, Normal S1/S2, no murmurs or gallops   Respiratory: Clear to auscultation bilaterally, no rales, rhonchi or wheezes, normal oxygenation on RA   Gastrointestinal: Soft, tenderness in RUQ with robotic surgical incisions and drain, normal BS x 4  Genitourinary: no suprapubic tenderness  Musculoskeletal: No edema  Skin: warm, dry  Neuro: Alert and oriented x 3, a little groggy from anesthesia  Psych: Mood appropriate.      Medications:   Medications:    guaiFENesin  600 mg Oral BID    sodium chloride flush  5-40 mL IntraVENous 2 times per day    docusate sodium  100 mg Oral BID    donepezil  10 mg Oral Nightly    furosemide  20 mg Oral Daily    gabapentin  600 mg Oral BID    memantine  10 mg Oral BID    metoprolol tartrate  25 mg Oral BID    pantoprazole  40 mg Oral QAM AC    oxybutynin  5 mg Oral BID    rosuvastatin  40 mg Oral QPM      Infusions:    lactated ringers IV soln 50 mL/hr at 01/31/23 1302    sodium chloride       PRN Meds: droperidol, 0.625 mg, Q10 Min PRN  diphenhydrAMINE, 12.5 mg, Once PRN  ipratropium-albuterol, 1 ampule, Once PRN  HYDROcodone-acetaminophen, 1 tablet, Q6H PRN  morphine, 2 mg, Q4H PRN  sodium chloride flush, 5-40 mL, PRN  sodium chloride, , PRN  ondansetron, 4 mg, Q8H PRN   Or  ondansetron, 4 mg, Q6H PRN  acetaminophen, 650 mg, Q6H PRN   Or  acetaminophen, 650 mg, Q6H PRN  polyethylene glycol, 17 g, Daily PRN  ipratropium, 1 spray, PRN      Labs      Recent Results (from the past 24 hour(s))   Troponin    Collection Time: 01/30/23 10:00 PM   Result Value Ref Range Troponin T <0.010 <0.01 NG/ML   CBC    Collection Time: 01/31/23  2:39 AM   Result Value Ref Range    WBC 8.6 4.0 - 10.5 K/CU MM    RBC 4.09 (L) 4.2 - 5.4 M/CU MM    Hemoglobin 11.0 (L) 12.5 - 16.0 GM/DL    Hematocrit 33.5 (L) 37 - 47 %    MCV 81.9 78 - 100 FL    MCH 26.9 (L) 27 - 31 PG    MCHC 32.8 32.0 - 36.0 %    RDW 13.0 11.7 - 14.9 %    Platelets 744 966 - 840 K/CU MM    MPV 8.7 7.5 - 11.1 FL   Lipid Panel    Collection Time: 01/31/23  2:39 AM   Result Value Ref Range    Triglycerides 95 <150 MG/DL    Cholesterol 103 <200 MG/DL    HDL 51 >40 MG/DL    LDL Calculated 33 <100 MG/DL   EKG 12 lead    Collection Time: 01/31/23  7:02 AM   Result Value Ref Range    Ventricular Rate 60 BPM    Atrial Rate 60 BPM    P-R Interval 188 ms    QRS Duration 132 ms    Q-T Interval 458 ms    QTc Calculation (Bazett) 458 ms    P Axis 90 degrees    R Axis -8 degrees    T Axis 133 degrees    Diagnosis       Atrial-paced rhythm  Right bundle branch block  T wave abnormality, consider lateral ischemia  Abnormal ECG  When compared with ECG of 30-JAN-2023 08:08,  Nonspecific T wave abnormality has replaced inverted T waves in Inferior leads     POCT Glucose    Collection Time: 01/31/23  8:36 AM   Result Value Ref Range    POC Glucose 111 (H) 70 - 99 MG/DL   POCT Glucose    Collection Time: 01/31/23 10:56 AM   Result Value Ref Range    POC Glucose 135 (H) 70 - 99 MG/DL        Imaging/Diagnostics Last 24 Hours   CT CHEST W CONTRAST    Result Date: 1/30/2023  EXAMINATION: CT OF THE ABDOMEN AND PELVIS WITH CONTRAST; CT OF THE CHEST WITH CONTRAST 1/30/2023 10:34 am TECHNIQUE: CT of the abdomen and pelvis was performed with the administration of intravenous contrast. Multiplanar reformatted images are provided for review.  Automated exposure control, iterative reconstruction, and/or weight based adjustment of the mA/kV was utilized to reduce the radiation dose to as low as reasonably achievable.; CT of the chest was performed with the administration of intravenous contrast. Multiplanar reformatted images are provided for review. Automated exposure control, iterative reconstruction, and/or weight based adjustment of the mA/kV was utilized to reduce the radiation dose to as low as reasonably achievable. COMPARISON: None HISTORY: ORDERING SYSTEM PROVIDED HISTORY: right sided abdominal pain TECHNOLOGIST PROVIDED HISTORY: Reason for exam:->right sided abdominal pain Additional Contrast?->None Decision Support Exception - unselect if not a suspected or confirmed emergency medical condition->Emergency Medical Condition (MA) Reason for Exam: right sided abdominal pain; ORDERING SYSTEM PROVIDED HISTORY: right lower sided chest pain TECHNOLOGIST PROVIDED HISTORY: Reason for exam:->right lower sided chest pain Reason for Exam: right lower sided chest pain FINDINGS: Chest: Mediastinum: There are a few less than 1 cm mediastinal lymph nodes but no lymphadenopathy. The thoracic aorta is not aneurysmal.  No dissection is seen. The heart size is enlarged. There is enlargement of the central pulmonary arteries. Lungs/pleura: The lung parenchyma demonstrates bibasilar atelectasis. No focal infiltrates or masses are seen. No pleural effusions or pneumothoraces are seen. Soft Tissues/Bones: No acute bony abnormalities are noted. There is a left subclavian vein AICD. Abdomen/Pelvis: Organs: The gallbladder is distended. The liver, spleen, pancreas and adrenal glands appear unremarkable. There is symmetric enhancement of the kidneys. No hydronephrosis is seen. No ureteral or bladder calculi are seen. GI/Bowel: Evaluation of the bowel is limited as no enteric contrast was given. No dilated loops of bowel are seen. Note is made of a small hiatal hernia. I do not see a dilated appendix. Pelvis: No pelvic masses or fluid collections are seen. The uterus somewhat lobulated with mixed density lesions including calcifications. Peritoneum/Retroperitoneum:  The abdominal aorta is not aneurysmal.  There are shotty mesenteric and retroperitoneal lymph nodes but no lymphadenopathy is seen. Bones/Soft Tissues: No acute bony abnormalities are noted. There are degenerative changes involving the spine. 1. Bibasilar atelectasis. 2. Cardiomegaly with enlarged central pulmonary arteries likely due to pulmonary arterial hypertension. 3. Distended gallbladder. This could be just due to a nonfasting state. However, I do raise the possibility of acute cholecystitis. 4. Fibroid uterus. NM HEPATOBILIARY    Result Date: 1/30/2023  EXAMINATION: NUCLEAR MEDICINE HEPATOBILIARY SCINTIGRAPHY (HIDA SCAN). 1/30/2023 3:09 pm TECHNIQUE: Approximately 5.0 mCi Tc-99m Mebrofenin (Choletec) was administered IV. Then, dynamic images of the abdomen were obtained in the anterior projection for 60 min(s). Imaging was continued until 3 hours post injection. COMPARISON: Ultrasound 01/30/2023 HISTORY: ORDERING SYSTEM PROVIDED HISTORY: distended GB on CT. evaluate for cholecystitis. thanks. TECHNOLOGIST PROVIDED HISTORY: Reason for exam:->distended GB on CT. evaluate for cholecystitis. thanks. Reason for Exam: RUQ pain FINDINGS: Prompt, homogenous uptake by the liver is noted with normal appearance of radiotracer excretion into the biliary system. Clearance of blood pool activity appears appropriate. Normal bowel activity is seen. The gallbladder is not visualized by the end of the examination. The gallbladder is not visualized by a 3 hours post injection. The patient refused further imaging (4 hour post-injection is a standard endpoint). This can be seen with acute or chronic cholecystitis.      CT ABDOMEN PELVIS W IV CONTRAST Additional Contrast? None    Result Date: 1/30/2023  EXAMINATION: CT OF THE ABDOMEN AND PELVIS WITH CONTRAST; CT OF THE CHEST WITH CONTRAST 1/30/2023 10:34 am TECHNIQUE: CT of the abdomen and pelvis was performed with the administration of intravenous contrast. Multiplanar reformatted images are provided for review. Automated exposure control, iterative reconstruction, and/or weight based adjustment of the mA/kV was utilized to reduce the radiation dose to as low as reasonably achievable.; CT of the chest was performed with the administration of intravenous contrast. Multiplanar reformatted images are provided for review. Automated exposure control, iterative reconstruction, and/or weight based adjustment of the mA/kV was utilized to reduce the radiation dose to as low as reasonably achievable. COMPARISON: None HISTORY: ORDERING SYSTEM PROVIDED HISTORY: right sided abdominal pain TECHNOLOGIST PROVIDED HISTORY: Reason for exam:->right sided abdominal pain Additional Contrast?->None Decision Support Exception - unselect if not a suspected or confirmed emergency medical condition->Emergency Medical Condition (MA) Reason for Exam: right sided abdominal pain; ORDERING SYSTEM PROVIDED HISTORY: right lower sided chest pain TECHNOLOGIST PROVIDED HISTORY: Reason for exam:->right lower sided chest pain Reason for Exam: right lower sided chest pain FINDINGS: Chest: Mediastinum: There are a few less than 1 cm mediastinal lymph nodes but no lymphadenopathy. The thoracic aorta is not aneurysmal.  No dissection is seen. The heart size is enlarged. There is enlargement of the central pulmonary arteries. Lungs/pleura: The lung parenchyma demonstrates bibasilar atelectasis. No focal infiltrates or masses are seen. No pleural effusions or pneumothoraces are seen. Soft Tissues/Bones: No acute bony abnormalities are noted. There is a left subclavian vein AICD. Abdomen/Pelvis: Organs: The gallbladder is distended. The liver, spleen, pancreas and adrenal glands appear unremarkable. There is symmetric enhancement of the kidneys. No hydronephrosis is seen. No ureteral or bladder calculi are seen. GI/Bowel: Evaluation of the bowel is limited as no enteric contrast was given.   No dilated loops of bowel are seen. Note is made of a small hiatal hernia. I do not see a dilated appendix. Pelvis: No pelvic masses or fluid collections are seen. The uterus somewhat lobulated with mixed density lesions including calcifications. Peritoneum/Retroperitoneum: The abdominal aorta is not aneurysmal.  There are shotty mesenteric and retroperitoneal lymph nodes but no lymphadenopathy is seen. Bones/Soft Tissues: No acute bony abnormalities are noted. There are degenerative changes involving the spine. 1. Bibasilar atelectasis. 2. Cardiomegaly with enlarged central pulmonary arteries likely due to pulmonary arterial hypertension. 3. Distended gallbladder. This could be just due to a nonfasting state. However, I do raise the possibility of acute cholecystitis. 4. Fibroid uterus. XR CHEST PORTABLE    Result Date: 1/30/2023  EXAMINATION: ONE XRAY VIEW OF THE CHEST 1/30/2023 8:01 am COMPARISON: 07/08/2022. HISTORY: ORDERING SYSTEM PROVIDED HISTORY: Chest Pain TECHNOLOGIST PROVIDED HISTORY: Reason for exam:->Chest Pain Reason for Exam: Chest Pain FINDINGS: There are low lung volumes. There are postsurgical changes of median sternotomy. The heart size is enlarged but unchanged. The pulmonary vasculature is within normal limits. There is increased density involving the left lower lung zone. No pneumothoraces are seen. There is a left subclavian AICD. There are prior healed fractures involving the proximal humeri. 1. Low lung volumes with left basilar atelectasis.      US ABDOMEN LIMITED    Result Date: 1/30/2023  EXAMINATION: RIGHT UPPER QUADRANT ULTRASOUND 1/30/2023 2:10 pm COMPARISON: CT abdomen pelvis 01/30/2023 HISTORY: ORDERING SYSTEM PROVIDED HISTORY: RUQ pain TECHNOLOGIST PROVIDED HISTORY: Reason for exam:->RUQ pain Reason for exam:->distended gallbladder on ct scan, further evaluate FINDINGS: LIVER:  The liver demonstrates normal echogenicity without evidence of intrahepatic biliary ductal dilatation. BILIARY SYSTEM:  Gallbladder is distended but otherwise unremarkable without evidence of pericholecystic fluid, wall thickening or stones. Negative sonographic Gibbons's sign. Common bile duct is within normal limits measuring 6 mm. RIGHT KIDNEY: The right kidney is grossly unremarkable without evidence of hydronephrosis. PANCREAS:  Visualized portions of the pancreas are unremarkable. OTHER: No evidence of right upper quadrant ascites. Nonspecific gallbladder distension without shadowing calculus. However, when correlated to the CT scan findings are suspicious for acute cholecystitis. Correlate with nuclear medicine hepatic biliary scan.        Electronically signed by BIRD Abbasi CNP on 1/31/2023 at 3:02 PM

## 2023-02-01 LAB
ALBUMIN SERPL-MCNC: 3.3 GM/DL (ref 3.4–5)
ALP BLD-CCNC: 102 IU/L (ref 40–128)
ALT SERPL-CCNC: 22 U/L (ref 10–40)
ANION GAP SERPL CALCULATED.3IONS-SCNC: 14 MMOL/L (ref 4–16)
ANION GAP SERPL CALCULATED.3IONS-SCNC: 15 MMOL/L (ref 4–16)
AST SERPL-CCNC: 57 IU/L (ref 15–37)
BASOPHILS ABSOLUTE: 0 K/CU MM
BASOPHILS RELATIVE PERCENT: 0.1 % (ref 0–1)
BILIRUB SERPL-MCNC: 0.8 MG/DL (ref 0–1)
BUN SERPL-MCNC: 29 MG/DL (ref 6–23)
BUN SERPL-MCNC: 30 MG/DL (ref 6–23)
CALCIUM SERPL-MCNC: 7.8 MG/DL (ref 8.3–10.6)
CALCIUM SERPL-MCNC: 8.1 MG/DL (ref 8.3–10.6)
CHLORIDE BLD-SCNC: 94 MMOL/L (ref 99–110)
CHLORIDE BLD-SCNC: 95 MMOL/L (ref 99–110)
CO2: 21 MMOL/L (ref 21–32)
CO2: 21 MMOL/L (ref 21–32)
CREAT SERPL-MCNC: 1.8 MG/DL (ref 0.6–1.1)
CREAT SERPL-MCNC: 2 MG/DL (ref 0.6–1.1)
CULTURE: NORMAL
DIFFERENTIAL TYPE: ABNORMAL
EOSINOPHILS ABSOLUTE: 0 K/CU MM
EOSINOPHILS RELATIVE PERCENT: 0 % (ref 0–3)
GFR SERPL CREATININE-BSD FRML MDRD: 25 ML/MIN/1.73M2
GFR SERPL CREATININE-BSD FRML MDRD: 28 ML/MIN/1.73M2
GLUCOSE BLD-MCNC: 178 MG/DL (ref 70–99)
GLUCOSE SERPL-MCNC: 164 MG/DL (ref 70–99)
GLUCOSE SERPL-MCNC: 193 MG/DL (ref 70–99)
HCT VFR BLD CALC: 27.9 % (ref 37–47)
HEMOGLOBIN: 8.8 GM/DL (ref 12.5–16)
IMMATURE NEUTROPHIL %: 0.3 % (ref 0–0.43)
LACTATE: 2 MMOL/L (ref 0.5–1.9)
LYMPHOCYTES ABSOLUTE: 0.6 K/CU MM
LYMPHOCYTES RELATIVE PERCENT: 4.9 % (ref 24–44)
Lab: NORMAL
MCH RBC QN AUTO: 26.7 PG (ref 27–31)
MCHC RBC AUTO-ENTMCNC: 31.5 % (ref 32–36)
MCV RBC AUTO: 84.8 FL (ref 78–100)
MONOCYTES ABSOLUTE: 0.9 K/CU MM
MONOCYTES RELATIVE PERCENT: 7.8 % (ref 0–4)
NUCLEATED RBC %: 0 %
PDW BLD-RTO: 13.3 % (ref 11.7–14.9)
PLATELET # BLD: 185 K/CU MM (ref 140–440)
PMV BLD AUTO: 9.2 FL (ref 7.5–11.1)
POTASSIUM SERPL-SCNC: 4.7 MMOL/L (ref 3.5–5.1)
POTASSIUM SERPL-SCNC: 4.8 MMOL/L (ref 3.5–5.1)
PRO-BNP: 6657 PG/ML
PROCALCITONIN SERPL-MCNC: 10.51 NG/ML
RBC # BLD: 3.29 M/CU MM (ref 4.2–5.4)
SEGMENTED NEUTROPHILS ABSOLUTE COUNT: 10.4 K/CU MM
SEGMENTED NEUTROPHILS RELATIVE PERCENT: 86.9 % (ref 36–66)
SODIUM BLD-SCNC: 130 MMOL/L (ref 135–145)
SODIUM BLD-SCNC: 130 MMOL/L (ref 135–145)
SPECIMEN: NORMAL
TOTAL IMMATURE NEUTOROPHIL: 0.04 K/CU MM
TOTAL NUCLEATED RBC: 0 K/CU MM
TOTAL PROTEIN: 5.1 GM/DL (ref 6.4–8.2)
WBC # BLD: 12 K/CU MM (ref 4–10.5)

## 2023-02-01 PROCEDURE — 51798 US URINE CAPACITY MEASURE: CPT

## 2023-02-01 PROCEDURE — 81003 URINALYSIS AUTO W/O SCOPE: CPT

## 2023-02-01 PROCEDURE — 36415 COLL VENOUS BLD VENIPUNCTURE: CPT

## 2023-02-01 PROCEDURE — 97530 THERAPEUTIC ACTIVITIES: CPT

## 2023-02-01 PROCEDURE — 82962 GLUCOSE BLOOD TEST: CPT

## 2023-02-01 PROCEDURE — 6370000000 HC RX 637 (ALT 250 FOR IP): Performed by: NURSE PRACTITIONER

## 2023-02-01 PROCEDURE — 83880 ASSAY OF NATRIURETIC PEPTIDE: CPT

## 2023-02-01 PROCEDURE — 80053 COMPREHEN METABOLIC PANEL: CPT

## 2023-02-01 PROCEDURE — 99233 SBSQ HOSP IP/OBS HIGH 50: CPT | Performed by: INTERNAL MEDICINE

## 2023-02-01 PROCEDURE — 1200000000 HC SEMI PRIVATE

## 2023-02-01 PROCEDURE — 6370000000 HC RX 637 (ALT 250 FOR IP): Performed by: SURGERY

## 2023-02-01 PROCEDURE — 99024 POSTOP FOLLOW-UP VISIT: CPT | Performed by: NURSE PRACTITIONER

## 2023-02-01 PROCEDURE — 97112 NEUROMUSCULAR REEDUCATION: CPT

## 2023-02-01 PROCEDURE — 80048 BASIC METABOLIC PNL TOTAL CA: CPT

## 2023-02-01 PROCEDURE — 83605 ASSAY OF LACTIC ACID: CPT

## 2023-02-01 PROCEDURE — 85025 COMPLETE CBC W/AUTO DIFF WBC: CPT

## 2023-02-01 PROCEDURE — 97162 PT EVAL MOD COMPLEX 30 MIN: CPT

## 2023-02-01 PROCEDURE — 2580000003 HC RX 258: Performed by: NURSE PRACTITIONER

## 2023-02-01 PROCEDURE — 97167 OT EVAL HIGH COMPLEX 60 MIN: CPT

## 2023-02-01 PROCEDURE — 2580000003 HC RX 258: Performed by: SURGERY

## 2023-02-01 PROCEDURE — 84145 PROCALCITONIN (PCT): CPT

## 2023-02-01 PROCEDURE — 94761 N-INVAS EAR/PLS OXIMETRY MLT: CPT

## 2023-02-01 RX ORDER — SODIUM CHLORIDE 9 MG/ML
INJECTION, SOLUTION INTRAVENOUS CONTINUOUS
Status: DISPENSED | OUTPATIENT
Start: 2023-02-01 | End: 2023-02-01

## 2023-02-01 RX ORDER — SODIUM CHLORIDE 9 MG/ML
INJECTION, SOLUTION INTRAVENOUS CONTINUOUS
Status: DISCONTINUED | OUTPATIENT
Start: 2023-02-01 | End: 2023-02-02

## 2023-02-01 RX ORDER — MORPHINE SULFATE 2 MG/ML
1 INJECTION, SOLUTION INTRAMUSCULAR; INTRAVENOUS EVERY 4 HOURS PRN
Status: DISCONTINUED | OUTPATIENT
Start: 2023-02-01 | End: 2023-02-10

## 2023-02-01 RX ORDER — 0.9 % SODIUM CHLORIDE 0.9 %
500 INTRAVENOUS SOLUTION INTRAVENOUS ONCE
Status: COMPLETED | OUTPATIENT
Start: 2023-02-01 | End: 2023-02-01

## 2023-02-01 RX ADMIN — SODIUM CHLORIDE 500 ML: 9 INJECTION, SOLUTION INTRAVENOUS at 05:24

## 2023-02-01 RX ADMIN — DOCUSATE SODIUM 100 MG: 100 CAPSULE, LIQUID FILLED ORAL at 09:14

## 2023-02-01 RX ADMIN — SODIUM CHLORIDE, PRESERVATIVE FREE 10 ML: 5 INJECTION INTRAVENOUS at 21:55

## 2023-02-01 RX ADMIN — DOCUSATE SODIUM 100 MG: 100 CAPSULE, LIQUID FILLED ORAL at 21:54

## 2023-02-01 RX ADMIN — GABAPENTIN 600 MG: 300 CAPSULE ORAL at 21:54

## 2023-02-01 RX ADMIN — GABAPENTIN 600 MG: 300 CAPSULE ORAL at 10:16

## 2023-02-01 RX ADMIN — GUAIFENESIN 600 MG: 600 TABLET, EXTENDED RELEASE ORAL at 21:54

## 2023-02-01 RX ADMIN — DONEPEZIL HYDROCHLORIDE 10 MG: 10 TABLET ORAL at 21:54

## 2023-02-01 RX ADMIN — MEMANTINE 10 MG: 10 TABLET ORAL at 21:54

## 2023-02-01 RX ADMIN — METOPROLOL TARTRATE 25 MG: 50 TABLET, FILM COATED ORAL at 21:54

## 2023-02-01 RX ADMIN — GUAIFENESIN 600 MG: 600 TABLET, EXTENDED RELEASE ORAL at 09:13

## 2023-02-01 RX ADMIN — SODIUM CHLORIDE: 9 INJECTION, SOLUTION INTRAVENOUS at 08:45

## 2023-02-01 RX ADMIN — OXYBUTYNIN CHLORIDE 5 MG: 5 TABLET ORAL at 21:54

## 2023-02-01 RX ADMIN — ROSUVASTATIN CALCIUM 40 MG: 20 TABLET, COATED ORAL at 17:33

## 2023-02-01 ASSESSMENT — PAIN SCALES - WONG BAKER
WONGBAKER_NUMERICALRESPONSE: 0
WONGBAKER_NUMERICALRESPONSE: 0

## 2023-02-01 NOTE — CONSULTS
Will defer consult  Patient seen and examined she  She had cholecystectomy  She is doing well  Advised patient to see surgery and follow-up with us as outpatient if needed    Joya Hurley

## 2023-02-01 NOTE — PROGRESS NOTES
Pt started desaturating to 86-88 %, paged the coveing ANP and agreed to put her o2 via nasal canula 2/l.    Pt now saturating at 94% on oxygen 2/l.    Relative sister Marcelina been updated on the change.

## 2023-02-01 NOTE — CONSULTS
[address] [facility] ACUTE CARE PHYSICAL THERAPY EVALUATION  Naila Galvan, 1945, [id number]/[id number]-A, 2/1/2023    History  [place]:  The primary encounter diagnosis was Chest pain, unspecified type. Diagnoses of Right sided abdominal pain and Cholecystitis were also pertinent to this visit. Patient  has a past medical history of Anxiety, Arthritis, Atrial fibrillation ([facility] [address] CAD (coronary artery disease), COVID-19, Diabetes mellitus ([facility] [address] H/O cardiac catheterization, H/O cardiovascular stress test, H/O echocardiogram, H/O echocardiogram, H/O echocardiogram, H/O three vessel coronary artery bypass, History of Holter monitoring, History of nuclear stress test, Hyperlipidemia, Hypertension, Memory loss, Missing teeth, acquired, Pneumonia, Risk for falls, TIA (transient ischemic attack), Urinary leakage, UTI (urinary tract infection), and Wears glasses. Patient  has a past surgical history that includes Cardiac catheterization (06/25/2018); [place] tooth extraction; eye surgery (Bilateral, 2018); Tonsillectomy (1940's); thoracentesis (Bilateral, 07/13/2018); Pacemaker insertion (Left, 12/11/2018); CABG with Mitral Valve Repair (07/09/2018); and Mitral valve maze procedure (07/19/2018). Subjective:  Patient states: Pt agreeable to session    Pain:  denies pain.     Communication with other providers:  Handoff to RN, OT  Restrictions: general precautions, fall risk, AVRIL drain    Home Setup/Prior level of function   Lives With: Family,Other (comment) (sister recently passed away, lives w/sister in [facility])  Type of Home: [facility] [place]: One level  Home Access: Level entry  Bathroom Shower/Tub: Walk-in shower  Bathroom Equipment: Grab bars around toilet,Grab bars in shower, shower chair  Bathroom Accessibility: Accessible  Home Equipment: Meggan [place]as, 4 wheeled,Wheelchair-manual  Has the patient had two or more falls in the past year or any fall with injury in the past year?: No  Receives Help From: Family  Homemaking Assistance: Needs assistance  Ambulation Assistance:  (mod I with walker)  Active : No (sister helps)   Mode of Transportation: Car    Examination of body systems (includes body structures/functions, activity/participation limitations):  Observation:  Pt supine in bed with sister present upon arrival and agreeable to therapy  Vision:  wears glasses  Hearing:  [place] [facility]  Cardiopulmonary: 2L O2  Cognition: appears slightly impaired, see OT/SLP note for further evaluation. Musculoskeletal  ROM R/L:  WFL. Strength R/L:  4/5, moderate impairment in function and endurance. Neuro:  L lateral lean, impaired balance, impaired gait      Mobility:  Rolling L/R:  mod A with cues for sequencing and hand placement  Supine to sit:  mod A x2 with assist at bilateral LEs, hips, and trunk. Cues provided for sequencing  Transfers: Pt completed stand pivot transfer from EOB to chair mod A x2 with cues for sequencing and hand placement  Sitting balance:  poor, pt sat EOB ~10 minutes mod A to max A with L lateral lean and retropulsion. Cues and assist provided to correct but pt unable to maintain. Standing balance:  poor. Gait: NT    [facility] 6 Clicks Inpatient Mobility:  AM-PAC Inpatient Mobility Raw Score : 11    Safety: patient left in chair with alarm on, sister in room, saud under pt, call light within reach, RN notified, gait belt used. Assessment:  Pt is a 76 y.o. female admitted to the hospital for chest pain. Pt underwent a laparoscopic cholecystectomy on 1/31/2023. Pt is typically ambulating with RW but has had more difficulty lately. Pt is currently performing bed mobility mod A x2, transferring mod A x2, and unable to ambulate at this time. Pt is presenting with decreased endurance, impaired transfers, impaired gait, impaired bed mobility, decreased strength, and impaired balance. Pt would benefit from continued acute care PT as well as SNF placement upon discharge to continue to address impairments. Complexity: moderate    Prognosis: Good, no significant barriers to participation at this time.      General Plan: 3-5 times per week/week     Equipment: TBD at next level of care    Goals:  Short Term Goals  Time Frame for Short Term Goals: 1 week  Short Term Goal 1: pt to complete all bed mobility mod A x1  Short Term Goal 2: pt to sit EOB 10 minutes CGA with no LOB throughout  Short Term Goal 3: pt to complete stand pivot transfer with LRAD mod A x1  Short Term Goal 4: pt to propel manual WC 25' with min A       Treatment plan:  Bed mobility, transfers, balance, gait, TA, TX    Recommendations for NURSING mobility: MELODY or x2 assist with gait belt    Time:   Time in: 1140  Time out: 1209  Timed treatment minutes: 15  Total time: 29    Electronically signed by:    Tevin Webber, PT  2/1/2023, 1:41 PM

## 2023-02-01 NOTE — CONSULTS
[address] [facility],2Nd Floor ACUTE CARE OCCUPATIONAL THERAPY EVALUATION    May Chase, 1945, 4011/4011-A, 2/1/2023      Discharge Recommendation: SNF    History:  [place]:  The primary encounter diagnosis was Chest pain, unspecified type. Diagnoses of Right sided abdominal pain and Cholecystitis were also pertinent to this visit. Past Medical History:   Diagnosis Date    Anxiety     Arthritis     knees    Atrial fibrillation (HCC)     CAD (coronary artery disease)     Sees Dr. Leatha Casiano    COVID-19 09/19/2021    Diabetes mellitus ([address]     H/O cardiac catheterization 06/25/2018    severe 3 vessel disease, refer to CV surgery for CABG and MAZE    H/O cardiovascular stress test 06/06/2018    EF47%  fixed perfusion defect ant wall, pt in afib    H/O echocardiogram 06/06/2018    EF55% mod MR    H/O echocardiogram 08/28/2018    EF 50-55%, Mild : AR, MR & TR.    H/O echocardiogram 03/13/2020    EF 55%, Breast artifact noted. H/O three vessel coronary artery bypass 07/09/2018    Dr. Beacher Sicard    History of Holter monitoring 10/23/2018    Multiple PAF episodes noted. Tachy-Dinesh episodes noted. History of nuclear stress test 03/13/2020    EF 55%, Breast artifact.     Hyperlipidemia     Hypertension     Memory loss     on Aricept    Missing teeth, acquired     Pneumonia     pneumococcal pneumonia twice at end of 2017    Risk for falls     uses walker    TIA (transient ischemic attack)     family doctors said she has had mini-strokes    Urinary leakage     UTI (urinary tract infection)     recent --just stopped antibiotic last week as of 6-29-18    Wears glasses          Subjective:  Patient states: Limited conversation this date   Pain: declined  Communication with other providers: RN ok'd klever, Coeval with PT for pt safety and tolerance, RN handoff   Restrictions: general precautions, fall risk, tele, AVRIL drain   Sister at bedside    Home Setup/Prior level of function:  Social/Functional History  Lives With: [employer] (comment) (sister recently passed away, lives w/sister in [facility])  Type of Home: [address] [facility] [place]: One level  Home Access: Level entry  Bathroom Shower/Tub: Walk-in shower  Bathroom Equipment: Grab bars around toilet,Grab bars in shower, shower chair  Bathroom Accessibility: Accessible  Home Equipment: Walker, 4 wheeled,Wheelchair-manual, chair lift   Has the patient had two or more falls in the past year or any fall with injury in the past year?: No  Receives Help From: Family  Homemaking Assistance: Needs assistance  Ambulation Assistance:  (mod I with walker)  Active : No (sister helps)   Mode of Transportation: Car    Examination:  Observation: Pt was in bed upon arrival, agreeable to session  Vision: Pt wears glasses. Pt appeared to have some difficulty with vision and finding bed rails. [facility] [employer] [facility]  Cardiopulmonary: on 2L O2 supp     Body Systems and functions:  ROM: WFL in BUE  Strength: 3+/5 in BUE  Sensation: WFL  Tone: normotonic in BUE  Coordination: Impaired   Posture: poor   Activity Tolerance: fair     Activities of Daily Living (ADLs):  Feeding: Dennis   Grooming: Dennis   Toileting: DEP   UB dressing: MaxA  LB dressing: DEP   UB bathing: MaxA   LB bathing: MaxA       *pt ADL function inferred from gross functional assessment of mobility, balance, posture, safety awareness, activity tolerance (unless otherwise indicated)    Cognitive and Psychosocial Functioning:  Overall cognitive status:    02/01/23 1406   Orientation   Orientation Level Oriented to person   Cognition   Overall Cognitive Status Exceptions   Arousal/Alertness Appropriate responses to stimuli   Following Commands Follows one step commands with repetition; Follows one step commands with increased time   Attention Span Attends with cues to redirect   Memory Decreased recall of recent events;Decreased short term memory   Safety Judgement Decreased awareness of need for assistance;Decreased awareness of need for safety   Problem Solving Decreased awareness of errors   Insights Not aware of deficits   Initiation Requires cues for all   Sequencing Requires cues for all   Cognition Comment pt demo confusion and dec arousal overall. Pt demo confusion with sequencing and initiation. Affect: flat     Balance:   Sitting: poor   Standing: poor     Functional Mobility:  Rolling Laterally in Bed: ModA with cues   Supine to Sit: ModAx2 for BLE, hips, and trunk   Sit to Stand: ModAx2    Ambulation: NT d/t safety concerns       AM-PAC 6 click short form for inpatient daily activity:   How much help from another person does the patient currently need. .. Unable  Dep A Lot  Max A A Lot   Mod A A Little  Min A A Little   CGA  SBA None   Mod I  Indep  Sup   1. Putting on and taking off regular lower body clothing? [x] 1    [] 2   [] 2   [] 3   [] 3   [] 4      2. Bathing (including washing, rinsing, drying)? [] 1   [x] 2   [] 2 [] 3 [] 3 [] 4   3. Toileting, which includes using toilet, bedpan, or urinal? [x] 1    [] 2   [] 2   [] 3   [] 3   [] 4     4. Putting on and taking off regular upper body clothing? [] 1   [x] 2   [] 2   [] 3   [] 3    [] 4      5. Taking care of personal grooming such as brushing teeth? [] 1   [] 2    [] 2 [x] 3    [] 3   [] 4      6. Eating meals? [] 1   [] 2   [] 2   [x] 3   [] 3   [] 4        Raw Score:  12      24/24 = unimpaired  23/24 = 1-20% impaired   20/24-22/24 = 21-40% impaired  15/24-19/24 = 41-59% impaired   10/24-14/24 = 60%-79% impaired  7/24-9/24 = 80%-99% impaired  6/24 = 100% impaired     Treatment:  Therapeutic Activity Training:   Therapeutic activity training was instructed today. Cues were given for safety, sequence, UE/LE placement, visual cues, and balance. Activities performed today included:    Bed mobility:  Pt completed sup to sit with ModAx2 at BLE, hips, and trunk.      SPT:  Pt completed from EOB to recliner with ModAx2 and BUE support on therapist.     Scooting:  Pt unable to complete anterior scooting and required MaxA to scoot hips anterior to EOB. Seated balance:  Pt demo poor seated balance with Mod-MaxA d/t significant retro and L lateral lean. Cues provided and pt able to maintain momentarily prior to diverting back to retro + L lateral lean. Standing balance:  Pt demo poor standing balance and unable to extend at hips to maintain upright with anterior lean. Self Care Training:   Self care training was performed today. Cues were given for safety, sequence, UE/LE placement, visual cues, and balance. Activities performed today included:    UB dressing:  Pt completed seated EOB with MaxA d/t poor seated balance and poor initiation/sequencing. LB dressing:  Pt donned bilat nonslip socks seated EOB with DEP assist d/t poor seated balance and poor initiation/sequencing. Education: Role of OT, OT POC, discharge needs, safety, benefits of EOB/OOB activity, AE needs, use of staff for mobility to dec fall risk     Safety Measures: Gait belt used for safety of pt and therapist, Left in recliner with saud beneath and RN aware, Alarm in place, call light and phone within reach, lines managed, needs met     Assessment:  Pt is a 76 yr old female from home who presents with Chest pain, unspecified type. Diagnoses of Right sided abdominal pain and Cholecystitis were also pertinent to this visit. Prior to admission, pt was independent of ADLs, ambulated with FWW, and received assist for IADLs. Pt currently performed bed mobility ModAx2, transferring ModAx2, unable to ambulate, and up to DEP assist for ADLs. Pt would benefit from SNF placement to [facility] to address impairments and safely return pt to typical baseline. Pt would benefit from continued IP OT services during their stay, and discharge to SNF.     Complexity: High  Prognosis: Good   Barriers: strength, endurance, seated balance, standing balance     Plan:  Plan: 3+/week    Treatment to include: Strengthening, ROM, Balance Training, Functional Mobility Training, Endurance Training, Gait Training, Pain Management, Safety Education and Training, Patient+Caregiver Education and Training, Equipment Evaluation Education and Procurement, Positioning, Self Care Training, Home Management Training, Coordination Training    Pt would benefit from continued edu on: falls prevention   Adaptive Equipment Recommendations: defer to next LOC     Goals:  Time frame for goals: 2 weeks    Pt will complete grooming tasks with SetupA at EOB   Pt will complete toileting tasks with MaxA using BSC  Pt will complete UB dressing tasks with ModA seated EOB   Pt will complete LB dressing tasks with MaxA seated EOB   Pt will complete UB bathing tasks with ModA seated and AE PRN   Pt will complete LB bathing tasks with ModA seated and AE PRN   Pt will complete therapeutic exercise/activity to increase independence in ADL/IADL function  Pt will practice functional transfers and mobility with AD for increased safety and independence    Time:   Time in: 1140  Time out: 1209  Treatment Minutes: 15  Evaluation Minutes: 10  Total time: 29    Electronically signed by:    NADEEM Chong/ELHAM   License: [id number]  3/2/4270, 11:42 AM

## 2023-02-01 NOTE — PROGRESS NOTES
Patient had not used the bathroom through out shift, this nurse bladder scanned patient, bladder scan picked up 238mls, this nurse got an order from provider to straight cath patient, also got an order to bladder scan patient every 6hrs, provider also gave an order for U/A C&S, provider stated that if patient retains through out the night an order for a sanchez will be placed

## 2023-02-01 NOTE — PROGRESS NOTES
Today's plan: S/p cholecystectomy doing better, EP consult appreciated, okay to stop Eliquis patient has history of MARU ligation and maze procedure and recent pacemaker check did not have any A. fib      Admit Date:  1/30/2023    Subjective: Okay      Chief complaints on admission  Chief Complaint   Patient presents with    Chest Pain     X 3 days          History of present illness:Jaimie is a 76 y. o.year old who  presents with had concerns including Chest Pain (X 3 days ). Past medical history:    has a past medical history of Anxiety, Arthritis, Atrial fibrillation ([address] CAD (coronary artery disease), COVID-19, Diabetes mellitus ([address] H/O cardiac catheterization, H/O cardiovascular stress test, H/O echocardiogram, H/O echocardiogram, H/O echocardiogram, H/O three vessel coronary artery bypass, History of Holter monitoring, History of nuclear stress test, Hyperlipidemia, Hypertension, Memory loss, Missing teeth, acquired, Pneumonia, Risk for falls, TIA (transient ischemic attack), Urinary leakage, UTI (urinary tract infection), and Wears glasses. Past surgical history:   has a past surgical history that includes Cardiac catheterization (06/25/2018); [place] tooth extraction; eye surgery (Bilateral, 2018); Tonsillectomy (1940's); thoracentesis (Bilateral, 07/13/2018); Pacemaker insertion (Left, 12/11/2018); CABG with Mitral Valve Repair (07/09/2018); and Mitral valve maze procedure (07/19/2018). Social History:   reports that she has never smoked. She has never used smokeless tobacco. She reports that she does not drink alcohol and does not use drugs. Family history:  family history includes Breast Cancer in her sister; Diabetes in her sister; Early Death in her brother and father; Heart Disease in her father and sister; Other in her sister; Parkinsonism in her brother; Stroke in her mother.     No Known Allergies      Objective:   BP (!) 118/53   Pulse 62   Temp 98.2 °F (36.8 °C)   Resp 19   Ht 5' 5\" (1.651 m)   Wt 173 lb 15.1 oz (78.9 kg)   SpO2 92%   BMI 28.95 kg/m²     Intake/Output Summary (Last 24 hours) at 2/1/2023 1517  Last data filed at 2/1/2023 0900  Gross per 24 hour   Intake --   Output 120 ml   Net -120 ml       TELEMETRY:      Physical Exam:  Constitutional:  Well developed, Well nourished, No acute distress, Non-toxic appearance. HENT:  Normocephalic, Atraumatic, Bilateral external ears normal, Oropharynx moist, No oral exudates, Nose normal. Neck- Normal range of motion, No tenderness, Supple, No stridor. Eyes:  PERRL, EOMI, Conjunctiva normal, No discharge. Respiratory:  Normal breath sounds, No respiratory distress, No wheezing, No chest tenderness. ,no use of accessory muscles, diaphragm movement is normal  Cardiovascular: (PMI) apex non displaced,no lifts no thrills, no s3,no s4, Normal heart rate, Normal rhythm, No murmurs, No rubs, No gallops. Carotid arteries pulse and amplitude are normal no bruit, no abdominal bruit noted ( normal abdominal aorta ausculation), femoral arteries pulse and amplitude are normal no bruit, pedal pulses are normal  GI:  Bowel sounds normal, Soft, No tenderness, No masses, No pulsatile masses. : External genitalia appear normal, No masses or lesions. No discharge. No CVA tenderness. Musculoskeletal:  Intact distal pulses, No edema, No tenderness, No cyanosis, No clubbing. Good range of motion in all major joints. No tenderness to palpation or major deformities noted. Back- No tenderness. Integument:  Warm, Dry, No erythema, No rash. Lymphatic:  No lymphadenopathy noted. Neurologic:  Alert & oriented x 3, Normal motor function, Normal sensory function, No focal deficits noted.    Psychiatric:  Affect normal, Judgment normal, Mood normal.     Medications:    guaiFENesin  600 mg Oral BID    polyethylene glycol  17 g Oral Daily    sodium chloride flush  5-40 mL IntraVENous 2 times per day    docusate sodium  100 mg Oral BID    donepezil  10 mg Oral Nightly    [Held by provider] furosemide  20 mg Oral Daily    gabapentin  600 mg Oral BID    memantine  10 mg Oral BID    metoprolol tartrate  25 mg Oral BID    pantoprazole  40 mg Oral QAM AC    oxybutynin  5 mg Oral BID    rosuvastatin  40 mg Oral QPM      sodium chloride 125 mL/hr at 02/01/23 0845    sodium chloride       morphine, droperidol, HYDROcodone-acetaminophen, sodium chloride flush, sodium chloride, ondansetron **OR** ondansetron, acetaminophen **OR** acetaminophen, ipratropium    Lab Data:  CBC:   Recent Labs     01/30/23  0800 01/31/23  0239 02/01/23  0450   WBC 8.3 8.6 12.0*   HGB 11.7* 11.0* 8.8*   HCT 36.0* 33.5* 27.9*   MCV 83.3 81.9 84.8    158 185     BMP:   Recent Labs     01/30/23  0800 02/01/23  0450 02/01/23  1005    130* 130*   K 4.2 4.8 4.7   CL 97* 94* 95*   CO2 27 21 21   BUN 12 29* 30*   CREATININE 0.7 2.0* 1.8*     LIVER PROFILE:   Recent Labs     01/30/23  0800 02/01/23  1005   AST 24 57*   ALT 13 22   LIPASE 21  --    BILITOT 0.6 0.8   ALKPHOS 129 102     PT/INR: No results for input(s): PROTIME, INR in the last 72 hours. APTT: No results for input(s): APTT in the last 72 hours. BNP:  No results for input(s): BNP in the last 72 hours. TROPONIN: @TROPONINI:3@      Assessment:  76 y. o.year old who is admitted for          Plan:  Chest pain: Patient appears to have right-sided chest pain with coughing, CAT scan of the chest is suspicious for cholecystitis based on distended gallbladder will recommend to get GI or surgical consult, she may need HIDA  CAD, history of bypass in 2018, has LIMA to LAD SVG to circumflex OM1 and OM 2 stable for now, had history of intra. 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 fib history of maze, will get EP evaluation if we can discontinue Eliquis because she history of fall into the parenchymal bleed in the brain in past and if the pacemaker does not show any underlying A. fib may need may be able to stop Eliquis  Health maintenance: exerise and   All labs, medications and tests reviewed, continue all other medications of all above medical condition listed as is.       Renee Maloney MD, MD 2/1/2023 3:17 PM

## 2023-02-01 NOTE — DISCHARGE INSTR - COC
Continuity of Care Form    Patient Name: Gerald Almodovar   :  1945  MRN:  [id number]    Admit date:  2023  Discharge date:  2023    Code Status Order: Full Code   Advance Directives:   [facility] Documentation       Date/Time Healthcare Directive Type of Healthcare Directive Copy in [address] Agent's Name Healthcare Agent's Phone Number    [phone] No, patient does not have an advance directive for healthcare treatment -- -- -- -- --            Admitting Physician:  Angelica Ramos MD  PCP: Pankaj Johnson MD    Discharging Nurse: ZENA HALE [facility] Unit/Room#: 8082/6861-S  Discharging Unit Phone Number: ***    Emergency Contact:   Extended Emergency Contact Information  Primary Emergency Contact: Marcelina Enriquez  Address: [address] [place] [address] Phone: [phone]  Mobile Phone: [phone]  Relation: Brother/Sister  Secondary Emergency Contact: Midkiff,Linda MPOA  Address: [address] Phone: [phone]  Relation: Brother/Sister    Past Surgical History:  Past Surgical History:   Procedure Laterality Date    CABG WITH MITRAL VALVE REPAIR  2018    CABG X 3 [place]>LAD, SVG>CX M1, SVG>M2, MVR repair w/#28 CG ring, PFO closure, MAZE    CARDIAC CATHETERIZATION  2018    EYE SURGERY Bilateral 2018    Cataracts    MITRAL VALVE MAZE PROCEDURE  2018    Dr. Justin Fletcher Left 2018    [employer] Kaia XT DR SCHWARTZ SureScan     THORACENTESIS Bilateral 2018    IR Dr. Goldman So, right 1300, left 900 all s/s    TONSILLECTOMY  's    WISDOM TOOTH EXTRACTION         Immunization History:   Immunization History   Administered Date(s) Administered    COVID-19, PFIZER PURPLE top, DILUTE for use, (age 15 y+), 30mcg/0.3mL 2021, 2021    Pneumococcal Conjugate 13-valent (Becky Strickland) 2018       Active Problems:  Patient Active Problem List   Diagnosis Code    Paroxysmal atrial fibrillation (HCC) I48.0    Essential hypertension I10    Mixed hyperlipidemia E78.2    VHD (valvular heart disease) I38    Abnormal EKG R94.31    Acute blood loss anemia D62    Uncontrolled pain R52    Gait disturbance R26.9    Pneumonia due to infectious organism J18.9    SSS (sick sinus syndrome) (HCC) I49.5    S/P placement of cardiac pacemaker Z95.0    Tachycardia-bradycardia ([address] I49.5    Fall at home, subsequent encounter W19. XXXD, Y92.009    Acute intracranial hemorrhage (HCC) I62.9    Hyponatremia E87.1    Compression fracture of L1 lumbar vertebra (HCC) S32.010A    Intraparenchymal hematoma of brain S06. 33AA    COVID-19 U07.1    CAD (coronary artery disease) I25.10    AMS (altered mental status) R41.82    Altered mental status R41.82    Concussion with no loss of consciousness S06.0X0A    Chest pain R07.9    Right upper quadrant pain R10.11    Acute cholecystitis K81.0       Isolation/Infection:   Isolation            No Isolation          Patient Infection Status       Infection Onset Added Last Indicated Last Indicated By Review Planned Expiration Resolved Resolved By    None active    Resolved    COVID-19 (Rule Out) 23 Respiratory Panel, Molecular, with COVID-19 (Restricted: peds pts or suitable admitted adults) (Ordered)   23 Rule-Out Test Resulted    C-diff Rule Out 21 Clostridium Difficile Toxin/Antigen (Ordered)   22 Homar Ervin RN    Previous visit    COVID-19 21 COVID-19, Rapid   10/03/21     COVID-19 (Rule Out) 21 COVID-19, Rapid (Ordered)   21 Rule-Out Test Resulted            Nurse Assessment:  Last Vital Signs: BP (!) 118/53   Pulse 62   Temp 98.2 °F (36.8 °C)   Resp 19   Ht 5' 5\" (1.651 m)   Wt 173 lb 15.1 oz (78.9 kg)   SpO2 92%   BMI 28.95 kg/m²     Last documented pain score (0-10 scale): Pain Level: 8  Last Weight:   Wt Readings from Last 1 Encounters:   01/30/23 173 lb 15.1 oz (78.9 kg)     Mental Status:  oriented, alert, and does get forgetful    IV Access:  - PICC - site  R Basilic, insertion date: 2/2/23    Nursing Mobility/ADLs:  Walking   Dependent  Transfer  Assisted  Bathing  Assisted  Dressing  Assisted  Toileting  Assisted  Feeding  Independent  Med Admin  Assisted  Med Delivery   crushed    Wound Care Documentation and Therapy:  Incision 01/31/23 Abdomen (Active)   Dressing Status Clean;Dry; Intact 02/01/23 0900   Dressing/Treatment Surgical glue 02/01/23 0900   Closure Sutures;Surgical glue 02/01/23 0900   Margins Approximated 02/01/23 0900   Drainage Amount None 02/01/23 0900   Odor None 02/01/23 0900   Number of days: 1        Elimination:  Continence: Bowel: Yes  Bladder: Yes  Urinary Catheter: None   Colostomy/Ileostomy/Ileal Conduit: No       Date of Last BM: 2/20/23      Intake/Output Summary (Last 24 hours) at 2/1/2023 1700  Last data filed at 2/1/2023 0900  Gross per 24 hour   Intake --   Output 120 ml   Net -120 ml     I/O last 3 completed shifts: In: 850 [I.V.:800; IV Piggyback:50]  Out: 696 [Urine:551; Drains:70; Blood:75]    Safety Concerns:      At Risk for Falls    Impairments/Disabilities:      confused      Patient's personal belongings (please select all that are sent with patient):  Glasses    RN SIGNATURE:  Electronically signed by Margoth Callahan RN on 2/20/23 at 2:52 PM EST    CASE MANAGEMENT/SOCIAL WORK SECTION    Inpatient Status Date: ***    Readmission Risk Assessment Score:  Readmission Risk              Risk of Unplanned Readmission:  21           Discharging to Facility/ Agency   Name:  [facility]  Address: [address]  Phone: [phone]  Fax: [phone]    Dialysis Facility (if applicable)   Name:  Address:  Dialysis Schedule:  Phone:  Fax:      PHYSICIAN SECTION  Nutrition Therapy:  Current Nutrition Therapy:   - Oral Diet:  General    Routes of Feeding: Oral, minced and moist food  Liquids: Nectar Thick Liquids  Daily Fluid Restriction: no  Last Modified Barium Swallow with Video (Video Swallowing Test): done on 02/17    Treatments at the Time of Hospital Discharge:   Respiratory Treatments:   Oxygen Therapy:  is not on home oxygen therapy. Ventilator:    - No ventilator support    Rehab Therapies: Physical Therapy, Occupational Therapy, and Speech/Language Therapy  Weight Bearing Status/Restrictions: No weight bearing restrictions  Other Medical Equipment (for information only, NOT a DME order):  as per PT/OT notes  Other Treatments:   Prognosis: Good    Condition at Discharge: Stable    Rehab Potential (if transferring to Rehab): Good    Recommended Labs or Other Treatments After Discharge:     Physician Certification: I certify the above information and transfer of Minh Jhaveri  is necessary for the continuing treatment of the diagnosis listed and that she requires [facility] for greater 30 days.      Update Admission H&P: No change in H&P    PHYSICIAN SIGNATURE:  Electronically signed by Lori Thomas MD on 2/20/23 at 1:20 PM EST

## 2023-02-01 NOTE — PROGRESS NOTES
General Surgery-Dr. Hever Fan Day: 3    Chief Complaint on Admission: RUQ pain      Subjective:     POD #1 s/p lap pedro. Doing okay this morning. Denies pain. Tolerating clears  Needs IS    ROS:  Review of Systems   Gastrointestinal:         Minimal incisional tenderness   All other systems reviewed and are negative. Allergies  Patient has no known allergies. Diagnosis Date    Anxiety     Arthritis     knees    Atrial fibrillation (HCC)     CAD (coronary artery disease)     Sees Dr. Alexandra December    COVID-19 2021    Diabetes mellitus ([facility] [address]     H/O cardiac catheterization 2018    severe 3 vessel disease, refer to CV surgery for CABG and MAZE    H/O cardiovascular stress test 2018    EF47%  fixed perfusion defect ant wall, pt in afib    H/O echocardiogram 2018    EF55% mod MR    H/O echocardiogram 2018    EF 50-55%, Mild : AR, MR & TR.    H/O echocardiogram 2020    EF 55%, Breast artifact noted. H/O three vessel coronary artery bypass 2018    Dr. Nola Culp    History of Holter monitoring 10/23/2018    Multiple PAF episodes noted. Tachy-Dinesh episodes noted. History of nuclear stress test 2020    EF 55%, Breast artifact. Hyperlipidemia     Hypertension     Memory loss     on Aricept    Missing teeth, acquired     Pneumonia     pneumococcal pneumonia twice at end of     Risk for falls     uses walker    TIA (transient ischemic attack)     family doctors said she has had mini-strokes    Urinary leakage     UTI (urinary tract infection)     recent --just stopped antibiotic last week as of 18    Wears glasses        Objective:     Vitals:    23 0807   BP: (!) 118/53   Pulse: 62   Resp: 19   Temp: 98.2 °F (36.8 °C)   SpO2: 92%       TEMPERATURE:  Current -Temp: 98.2 °F (36.8 °C); Max - Temp  Av.5 °F (36.9 °C)  Min: 97.5 °F (36.4 °C)  Max: 99.6 °F (37.6 °C)    No intake/output data recorded. I/O last 3 completed shifts:   In: 850 [I.V.:800; IV Piggyback:50]  Out: 696 [Urine:551; Drains:70; Blood:75]      Physical Exam:  Physical Exam  Vitals reviewed. Constitutional:       General: She is not in acute distress. HENT:      Head: Normocephalic and atraumatic. Right Ear: External ear normal.      Left Ear: External ear normal.      Nose: Nose normal.   Eyes:      General:         Right eye: No discharge. Left eye: No discharge. Extraocular Movements: Extraocular movements intact. Cardiovascular:      Rate and Rhythm: Normal rate. Pulmonary:      Effort: No respiratory distress. Abdominal:      Palpations: Abdomen is soft. Tenderness: There is abdominal tenderness (incisional tenderness and bruising). There is no guarding. Musculoskeletal:         General: No swelling. Skin:     General: Skin is warm. Coloration: Skin is not jaundiced. Findings: Bruising (bruising at incision sites) present. Neurological:      General: No focal deficit present. Mental Status: She is alert.          Scheduled Meds:   guaiFENesin  600 mg Oral BID    polyethylene glycol  17 g Oral Daily    sodium chloride flush  5-40 mL IntraVENous 2 times per day    docusate sodium  100 mg Oral BID    donepezil  10 mg Oral Nightly    [Held by provider] furosemide  20 mg Oral Daily    gabapentin  600 mg Oral BID    memantine  10 mg Oral BID    metoprolol tartrate  25 mg Oral BID    pantoprazole  40 mg Oral QAM AC    oxybutynin  5 mg Oral BID    rosuvastatin  40 mg Oral QPM     ContinuousInfusions:   sodium chloride 125 mL/hr at 02/01/23 0845    lactated ringers IV soln 50 mL/hr at 01/31/23 1302    sodium chloride       PRN Meds:droperidol, diphenhydrAMINE, ipratropium-albuterol, HYDROcodone-acetaminophen, morphine, sodium chloride flush, sodium chloride, ondansetron **OR** ondansetron, acetaminophen **OR** acetaminophen, ipratropium      Labs/Imaging Results:   Lab Results   Component Value Date    WBC 12.0 (H) 02/01/2023    HGB 8.8 (L) 02/01/2023    HCT 27.9 (L) 02/01/2023    MCV 84.8 02/01/2023     02/01/2023     Lab Results   Component Value Date     (L) 02/01/2023    K 4.8 02/01/2023    CL 94 (L) 02/01/2023    CO2 21 02/01/2023    BUN 29 (H) 02/01/2023    CREATININE 2.0 (H) 02/01/2023    GLUCOSE 193 (H) 02/01/2023    CALCIUM 8.1 (L) 02/01/2023    PROT 7.2 01/30/2023    LABALBU 4.5 01/30/2023    BILITOT 0.6 01/30/2023    ALKPHOS 129 01/30/2023    AST 24 01/30/2023    ALT 13 01/30/2023    LABGLOM 25 (L) 02/01/2023    GFRAA >60 07/12/2022       US:  Nonspecific gallbladder distension without shadowing calculus. However, when   correlated to the CT scan findings are suspicious for acute cholecystitis. Correlate with nuclear medicine hepatic biliary scan. HIDA:  The gallbladder is not visualized by a 3 hours post injection. The patient   refused further imaging (4 hour post-injection is a standard endpoint). This   can be seen with acute or chronic cholecystitis. Assessment:     77 y/o F with POD #1 s/p lap pedro    Plan:     - Okay for regular diet  - Continue drain care. Empty and record output each shift and as needed  - Okay for discharge from surgery standpoint. Will need to follow up in the office with Dr. Adamaris Hernandez in 1-2 weeks  - AVRIL drain can be removed prior to discharge.   - Call with questions or concerns      Electronically signed by BIRD Draper CNP on 2/1/2023 at 9:04 AM

## 2023-02-01 NOTE — PROGRESS NOTES
V2.0  [facility] Hospital[facility] Progress Note      Name:  Sofia De La Cruz /Age/Sex: 1945  (76 y.o. female)   MRN & CSN:  [id number] & [id number] Encounter Date/Time: 2023 9:12 AM EST    Location:  [address] [place] [address]-A PCP: Cheryl eMade MD       Hospital Day: 3    Assessment and Plan:   Sofia De La Cruz is a 76 y.o. female with pmh of CAD s/p CABG, Valvular Heart Disease, s/p Mitral Valve Repair, PFO Closure, Paroxysmal A-Fib, HTN, HLD, DM, TIA, Early Dementia who presents with Chest pain Rt Sided and RUQ Abdominal Pain      This patient was discussed with Dr. Tello Dennison. He was agreeable with the plan and management as dictated above. Plan:    Rt Chest / RUQ Abd Pain 2/2 Acute Cholecystitis: s/p Lap/Aline 23  -Cardiology evaluation was negative, EP wants to see her outpatient for a Pacemaker Evaluation   -CT Abd/Pelvis: bibasilar atelectasis, distended gallbladder, possible acute cholecystitis, fibroid uterus, cardiomegaly with enlarged central pulmonary arteries, likely pulmonary HTN  -US RUQ: nonspecific gallbladder distention w/out calculus  -HIDA Scan: gallbladder not visualized by 3 hours post infection, can be seen in in acute or chronic cholecystitis   -Dr. Glen Bowers, General Surgeon, took her to OR this am 23 for Lap/Aline, given IV Ancef preOp  Has a drain in place with dark red blood. Her abdomen looks good, incisions are well approximated. -starting with Clear liquids, then to advance as tolerated.    -lives at home with her sister, if she has difficulty ambulating prior to DC due to weakness, consider PT Eval  -Up with Assist and Ambulate per shift ordered 23    TAYLOR with elevated BNP:   -Creatinine 2.0 this am, rechecked is 1.8, from 0.7 baseline, low sodium 130, stopping LR 50 ml/hr and starting  ml/hr x 8 hours  -HOLD Lasix today, re-assess tomorrow   -BNP 2 days ago 1,838, today is 6,657, will trend BMP and BNP tomorrow am    Chronic debilitation-  patient with limited mobility, stands and pivots to wheelchair, ordered PT/OT    Leukocytosis, Elevated Procal and Lactate:   -WBC may be reactive from surgery, Procal 10.5 and Lactate 2.0, will repeat tomorrow, may be elevated from surgery yesterday. She is afebrile A & O x 3 and states her abd pain is better today. Cough - patient and family reporting cough x2 weeks, chest x-ray nonacute. CT chest shows bibasilar atelectasis, but no evidence of pneumonia, has cardiomegaly with enlarged central pulmonary arteries likely due to Pulm HTN. Her throat is red and has mucous visible, will check a Throat Culture for Strep A and add mucinex 600 mg BID. Had negative Viral Resp Panel w/ Covid-19 in the ED. CAD status post CABG -continue patient's anti-ischemic regimen, plan of care as noted above     Hx of Paroxysmal A-Fib: hx Maze procedure, s/p Pacemaker placement  -patient was managed on Eliquis and beta-blocker, per Dr. Stacy Myrick, she is to follow up outpatient after DC regarding her pacemaker settings and/or placement.    -no A Fib on her Pacemaker, so per Cardiology DC Eliquis permanently as she is a high risk fall patient and has hx of fall with parenchymal bleed in the brain      Essential hypertension-managed on metoprolol, resume     Mixed hyperlipidemia on statin     Other chronic medical conditions-resume home medications unless contraindicated  Valvular heart disease s/p mitral valve repair, history of PFO closure  Diabetes mellitus type 2 -diet controlled, ADA diet, last A1C 5.% 7/2022  History of TIA- cont home statin  Early Dementia- cont home Aricept and Namenda  Chronic anemia- hemoglobin within baseline range, monitor    Diet ADULT DIET;  Clear Liquid   DVT Prophylaxis [] Lovenox, []  Heparin, [x] SCDs, [] Ambulation,  [] Eliquis, [] Xarelto  [] Coumadin (High Risk for Falls & Bleeding)    Code Status Full Code   Disposition From: Home  Expected Disposition: Home  Estimated Date of Discharge: 1-2 days  Patient requires continued admission due to Medical management Post-op of TAYLOR and Elevated BNP    Surrogate Decision Maker/ POA Self     Subjective:     Chief Complaint: Rt Sided Chest Pain (X 3 days )/ RUQ Abdominal Pain        Maryann Palomo is a 76 y.o. female who presents with Family at bedside, reported the patient had right-sided chest wall pain underneath right breast around 230 this morning. The patient rates her chest wall pain 7-8/10 sharp and intermittent in nature. She denies associated shortness of breath nausea vomiting diaphoresis. She and family denies recent falls or injuries. .  Heating pad and and cream was applied with no relief. She reports possibly coughing and activity aggravates pain. The patient has limited movement ability and pivots to wheelchair; does not exert herself. She denies prior history of this type pain. No reports of recent infections fever chills. She does note a persistent nonproductive cough that occurs at night for the past 2 weeks. She states she has been eating and drinking well; denies abdominal pain, diarrhea constipation or dysuria. No reports of her medication changes. No other acute issues reported. She was evaluated emergency department she presented afebrile pulse 83 respirations 18 blood pressure 162/67 O2 sat 95% room air. Chest x-ray performed showed low lung volumes with left basilar atelectasis. EKG showed atrial paced rhythm with right bundle branch block and T wave abnormality, troponin negative x2. BNP 1838. Chemistry panel showed chloride 97 magnesium 2.6 glucose 147 otherwise unremarkable. LFTs unremarkable. CBC showed normal WBC hemoglobin 11.7, HCT 36.0, platelets 904. Cardiology consulted and cleared her. She was given 324 mg aspirin. CT abdomen and pelvis showed possible cholecystitis, so General Surgery was consulted. Dr. Marleny Jovel ordered U/S and HIDA scan were ordered and confirmed this finding. She had Lap/Aline on 1/31/23.  Will keep her overnight to make sure she is able to tolerate PO and manage her pain. Consider Home PT after discharge for weakness due to hospitalization and surgery. She still complains of a cough today, has had it for 2 weeks. Was taking mucinex at home, will restart and checked a throat Cx Negative for Strep. Denies any chest pain or shortness of breath, sinus pressure or ear fullness. Had a NEG Viral Resp Panel w/ Covid-19. States her Abdominal Pain is better today. Review of Systems:    Ten point ROS is negative, unless otherwise noted above. Objective: Intake/Output Summary (Last 24 hours) at 2/1/2023 0839  Last data filed at 2/1/2023 0515  Gross per 24 hour   Intake 850 ml   Output 145 ml   Net 705 ml          Vitals:   Vitals:    02/01/23 0807   BP: (!) 118/53   Pulse: 62   Resp: 19   Temp: 98.2 °F (36.8 °C)   SpO2: 92%       Physical Exam:     General: NAD  Eyes: EOMI  ENT: neck supple  Cardiovascular: Regular rate and rhythm, Normal S1/S2, no murmurs or gallops   Respiratory: Clear to auscultation bilaterally, no rales, or wheezes, mild rhonchi heard in upper anterior lobes, normal oxygenation on RA   Gastrointestinal: Soft, tenderness in RUQ with robotic surgical incisions and drain, normal BS x 4  Genitourinary: no suprapubic tenderness  Musculoskeletal: No edema  Skin: warm, dry, some purple ecchymoses around surgical incision sites on abdomen   Neuro: Alert and oriented x 3, a little groggy from anesthesia  Psych: Mood appropriate.      Medications:   Medications:    guaiFENesin  600 mg Oral BID    polyethylene glycol  17 g Oral Daily    sodium chloride flush  5-40 mL IntraVENous 2 times per day    docusate sodium  100 mg Oral BID    donepezil  10 mg Oral Nightly    [Held by provider] furosemide  20 mg Oral Daily    gabapentin  600 mg Oral BID    memantine  10 mg Oral BID    metoprolol tartrate  25 mg Oral BID    pantoprazole  40 mg Oral QAM AC    oxybutynin  5 mg Oral BID    rosuvastatin  40 mg Oral QPM      Infusions: sodium chloride      lactated ringers IV soln 50 mL/hr at 01/31/23 1302    sodium chloride       PRN Meds: droperidol, 0.625 mg, Q10 Min PRN  diphenhydrAMINE, 12.5 mg, Once PRN  ipratropium-albuterol, 1 ampule, Once PRN  HYDROcodone-acetaminophen, 1 tablet, Q6H PRN  morphine, 2 mg, Q4H PRN  sodium chloride flush, 5-40 mL, PRN  sodium chloride, , PRN  ondansetron, 4 mg, Q8H PRN   Or  ondansetron, 4 mg, Q6H PRN  acetaminophen, 650 mg, Q6H PRN   Or  acetaminophen, 650 mg, Q6H PRN  ipratropium, 1 spray, PRN      Labs      Recent Results (from the past 24 hour(s))   POCT Glucose    Collection Time: 01/31/23 10:56 AM   Result Value Ref Range    POC Glucose 135 (H) 70 - 99 MG/DL   Strep Screen Group A Throat    Collection Time: 01/31/23  3:00 PM    Specimen: Throat   Result Value Ref Range    Specimen THROAT     Special Requests NONE     Strep A Direct Screen NEGATIVE    Urinalysis    Collection Time: 01/31/23  8:00 PM   Result Value Ref Range    Color, UA YELLOW YELLOW    Clarity, UA CLEAR CLEAR    Glucose, Urine NEGATIVE NEGATIVE MG/DL    Bilirubin Urine NEGATIVE NEGATIVE MG/DL    Ketones, Urine NEGATIVE NEGATIVE MG/DL    Specific Gravity, UA 1.020 1.001 - 1.035    Blood, Urine NEGATIVE NEGATIVE    pH, Urine 5.5 5.0 - 8.0    Protein, UA TRACE (A) NEGATIVE MG/DL    Urobilinogen, Urine 0.2 0.2 - 1.0 MG/DL    Nitrite Urine, Quantitative NEGATIVE NEGATIVE    Leukocyte Esterase, Urine NEGATIVE NEGATIVE   Microscopic Urinalysis    Collection Time: 01/31/23  8:00 PM   Result Value Ref Range    RBC, UA 1 0 - 6 /HPF    WBC, UA 2 0 - 5 /HPF    Bacteria, UA NEGATIVE NEGATIVE /HPF    Mucus, UA RARE (A) NEGATIVE HPF    Trichomonas, UA NONE SEEN NONE SEEN /HPF    Hyaline Casts, UA 2 /LPF   CBC with Auto Differential    Collection Time: 02/01/23  4:50 AM   Result Value Ref Range    WBC 12.0 (H) 4.0 - 10.5 K/CU MM    RBC 3.29 (L) 4.2 - 5.4 M/CU MM    Hemoglobin 8.8 (L) 12.5 - 16.0 GM/DL    Hematocrit 27.9 (L) 37 - 47 %    MCV 84.8 78 - 100 FL    MCH 26.7 (L) 27 - 31 PG    MCHC 31.5 (L) 32.0 - 36.0 %    RDW 13.3 11.7 - 14.9 %    Platelets 644 131 - 107 K/CU MM    MPV 9.2 7.5 - 11.1 FL    Differential Type AUTOMATED DIFFERENTIAL     Segs Relative 86.9 (H) 36 - 66 %    Lymphocytes % 4.9 (L) 24 - 44 %    Monocytes % 7.8 (H) 0 - 4 %    Eosinophils % 0.0 0 - 3 %    Basophils % 0.1 0 - 1 %    Segs Absolute 10.4 K/CU MM    Lymphocytes Absolute 0.6 K/CU MM    Monocytes Absolute 0.9 K/CU MM    Eosinophils Absolute 0.0 K/CU MM    Basophils Absolute 0.0 K/CU MM    Nucleated RBC % 0.0 %    Total Nucleated RBC 0.0 K/CU MM    Total Immature Neutrophil 0.04 K/CU MM    Immature Neutrophil % 0.3 0 - 0.43 %   Basic Metabolic Panel    Collection Time: 02/01/23  4:50 AM   Result Value Ref Range    Sodium 130 (L) 135 - 145 MMOL/L    Potassium 4.8 3.5 - 5.1 MMOL/L    Chloride 94 (L) 99 - 110 mMol/L    CO2 21 21 - 32 MMOL/L    Anion Gap 15 4 - 16    BUN 29 (H) 6 - 23 MG/DL    Creatinine 2.0 (H) 0.6 - 1.1 MG/DL    Est, Glom Filt Rate 25 (L) >60 mL/min/1.73m2    Glucose 193 (H) 70 - 99 MG/DL    Calcium 8.1 (L) 8.3 - 10.6 MG/DL   POCT Glucose    Collection Time: 02/01/23  7:30 AM   Result Value Ref Range    POC Glucose 178 (H) 70 - 99 MG/DL        Imaging/Diagnostics Last 24 Hours   CT CHEST W CONTRAST    Result Date: 1/30/2023  EXAMINATION: CT OF THE ABDOMEN AND PELVIS WITH CONTRAST; CT OF THE CHEST WITH CONTRAST 1/30/2023 10:34 am TECHNIQUE: CT of the abdomen and pelvis was performed with the administration of intravenous contrast. Multiplanar reformatted images are provided for review. Automated exposure control, iterative reconstruction, and/or weight based adjustment of the mA/kV was utilized to reduce the radiation dose to as low as reasonably achievable.; CT of the chest was performed with the administration of intravenous contrast. Multiplanar reformatted images are provided for review.  Automated exposure control, iterative reconstruction, and/or weight based adjustment of the mA/kV was utilized to reduce the radiation dose to as low as reasonably achievable. COMPARISON: None HISTORY: ORDERING SYSTEM PROVIDED HISTORY: right sided abdominal pain TECHNOLOGIST PROVIDED HISTORY: Reason for exam:->right sided abdominal pain Additional Contrast?->None Decision Support Exception - unselect if not a suspected or confirmed emergency medical condition->Emergency Medical Condition ([place]) Reason for Exam: right sided abdominal pain; ORDERING SYSTEM PROVIDED HISTORY: right lower sided chest pain TECHNOLOGIST PROVIDED HISTORY: Reason for exam:->right lower sided chest pain Reason for Exam: right lower sided chest pain FINDINGS: Chest: Mediastinum: There are a few less than 1 cm mediastinal lymph nodes but no lymphadenopathy. The thoracic aorta is not aneurysmal.  No dissection is seen. The heart size is enlarged. There is enlargement of the central pulmonary arteries. Lungs/pleura: The lung parenchyma demonstrates bibasilar atelectasis. No focal infiltrates or masses are seen. No pleural effusions or pneumothoraces are seen. Soft Tissues/Bones: No acute bony abnormalities are noted. There is a left subclavian vein AICD. Abdomen/Pelvis: Organs: The gallbladder is distended. The liver, spleen, pancreas and adrenal glands appear unremarkable. There is symmetric enhancement of the kidneys. No hydronephrosis is seen. No ureteral or bladder calculi are seen. GI/Bowel: Evaluation of the bowel is limited as no enteric contrast was given. No dilated loops of bowel are seen. Note is made of a small hiatal hernia. I do not see a dilated appendix. Pelvis: No pelvic masses or fluid collections are seen. The uterus somewhat lobulated with mixed density lesions including calcifications. Peritoneum/Retroperitoneum: The abdominal aorta is not aneurysmal.  There are shotty mesenteric and retroperitoneal lymph nodes but no lymphadenopathy is seen.  Bones/Soft Tissues: No acute bony abnormalities are noted. There are degenerative changes involving the spine. 1. Bibasilar atelectasis. 2. Cardiomegaly with enlarged central pulmonary arteries likely due to pulmonary arterial hypertension. 3. Distended gallbladder. This could be just due to a nonfasting state. However, I do raise the possibility of acute cholecystitis. 4. Fibroid uterus. NM HEPATOBILIARY    Result Date: 1/30/2023  EXAMINATION: NUCLEAR MEDICINE HEPATOBILIARY SCINTIGRAPHY (HIDA SCAN). 1/30/2023 3:09 pm TECHNIQUE: Approximately 5.0 mCi Tc-99m Mebrofenin (Choletec) was administered IV. Then, dynamic images of the abdomen were obtained in the anterior projection for 60 min(s). Imaging was continued until 3 hours post injection. COMPARISON: Ultrasound 01/30/2023 HISTORY: ORDERING SYSTEM PROVIDED HISTORY: distended GB on CT. evaluate for cholecystitis. thanks. TECHNOLOGIST PROVIDED HISTORY: Reason for exam:->distended GB on CT. evaluate for cholecystitis. thanks. Reason for Exam: RUQ pain FINDINGS: Prompt, homogenous uptake by the liver is noted with normal appearance of radiotracer excretion into the biliary system. Clearance of blood pool activity appears appropriate. Normal bowel activity is seen. The gallbladder is not visualized by the end of the examination. The gallbladder is not visualized by a 3 hours post injection. The patient refused further imaging (4 hour post-injection is a standard endpoint). This can be seen with acute or chronic cholecystitis. CT ABDOMEN PELVIS W IV CONTRAST Additional Contrast? None    Result Date: 1/30/2023  EXAMINATION: CT OF THE ABDOMEN AND PELVIS WITH CONTRAST; CT OF THE CHEST WITH CONTRAST 1/30/2023 10:34 am TECHNIQUE: CT of the abdomen and pelvis was performed with the administration of intravenous contrast. Multiplanar reformatted images are provided for review.  Automated exposure control, iterative reconstruction, and/or weight based adjustment of the mA/kV was utilized to reduce the radiation dose to as low as reasonably achievable.; CT of the chest was performed with the administration of intravenous contrast. Multiplanar reformatted images are provided for review. Automated exposure control, iterative reconstruction, and/or weight based adjustment of the mA/kV was utilized to reduce the radiation dose to as low as reasonably achievable. COMPARISON: None HISTORY: ORDERING SYSTEM PROVIDED HISTORY: right sided abdominal pain TECHNOLOGIST PROVIDED HISTORY: Reason for exam:->right sided abdominal pain Additional Contrast?->None Decision Support Exception - unselect if not a suspected or confirmed emergency medical condition->Emergency Medical Condition ([place]) Reason for Exam: right sided abdominal pain; ORDERING SYSTEM PROVIDED HISTORY: right lower sided chest pain TECHNOLOGIST PROVIDED HISTORY: Reason for exam:->right lower sided chest pain Reason for Exam: right lower sided chest pain FINDINGS: Chest: Mediastinum: There are a few less than 1 cm mediastinal lymph nodes but no lymphadenopathy. The thoracic aorta is not aneurysmal.  No dissection is seen. The heart size is enlarged. There is enlargement of the central pulmonary arteries. Lungs/pleura: The lung parenchyma demonstrates bibasilar atelectasis. No focal infiltrates or masses are seen. No pleural effusions or pneumothoraces are seen. Soft Tissues/Bones: No acute bony abnormalities are noted. There is a left subclavian vein AICD. Abdomen/Pelvis: Organs: The gallbladder is distended. The liver, spleen, pancreas and adrenal glands appear unremarkable. There is symmetric enhancement of the kidneys. No hydronephrosis is seen. No ureteral or bladder calculi are seen. GI/Bowel: Evaluation of the bowel is limited as no enteric contrast was given. No dilated loops of bowel are seen. Note is made of a small hiatal hernia. I do not see a dilated appendix. Pelvis: No pelvic masses or fluid collections are seen.   The uterus somewhat lobulated with mixed density lesions including calcifications. Peritoneum/Retroperitoneum: The abdominal aorta is not aneurysmal.  There are shotty mesenteric and retroperitoneal lymph nodes but no lymphadenopathy is seen. Bones/Soft Tissues: No acute bony abnormalities are noted. There are degenerative changes involving the spine. 1. Bibasilar atelectasis. 2. Cardiomegaly with enlarged central pulmonary arteries likely due to pulmonary arterial hypertension. 3. Distended gallbladder. This could be just due to a nonfasting state. However, I do raise the possibility of acute cholecystitis. 4. Fibroid uterus. XR CHEST PORTABLE    Result Date: 1/30/2023  EXAMINATION: ONE XRAY VIEW OF THE CHEST 1/30/2023 8:01 am COMPARISON: 07/08/2022. HISTORY: ORDERING SYSTEM PROVIDED HISTORY: Chest Pain TECHNOLOGIST PROVIDED HISTORY: Reason for exam:->Chest Pain Reason for Exam: Chest Pain FINDINGS: There are low lung volumes. There are postsurgical changes of median sternotomy. The heart size is enlarged but unchanged. The pulmonary vasculature is within normal limits. There is increased density involving the left lower lung zone. No pneumothoraces are seen. There is a left subclavian AICD. There are prior healed fractures involving the proximal humeri. 1. Low lung volumes with left basilar atelectasis. US ABDOMEN LIMITED    Result Date: 1/30/2023  EXAMINATION: RIGHT UPPER QUADRANT ULTRASOUND 1/30/2023 2:10 pm COMPARISON: CT abdomen pelvis 01/30/2023 HISTORY: ORDERING SYSTEM PROVIDED HISTORY: RUQ pain TECHNOLOGIST PROVIDED HISTORY: Reason for exam:->RUQ pain Reason for exam:->distended gallbladder on ct scan, further evaluate FINDINGS: LIVER:  The liver demonstrates normal echogenicity without evidence of intrahepatic biliary ductal dilatation. BILIARY SYSTEM:  Gallbladder is distended but otherwise unremarkable without evidence of pericholecystic fluid, wall thickening or stones.   Negative sonographic Gibbons's sign. Common bile duct is within normal limits measuring 6 mm. RIGHT KIDNEY: The right kidney is grossly unremarkable without evidence of hydronephrosis. PANCREAS:  Visualized portions of the pancreas are unremarkable. OTHER: No evidence of right upper quadrant ascites. Nonspecific gallbladder distension without shadowing calculus. However, when correlated to the CT scan findings are suspicious for acute cholecystitis. Correlate with nuclear medicine hepatic biliary scan.        Electronically signed by BIRD Alfonso CNP on 2/1/2023 at 8:39 AM

## 2023-02-01 NOTE — CARE COORDINATION
Case Management Assessment  Initial Evaluation    Date/Time of Evaluation: 2/1/2023 5:04 PM  Assessment Completed by: MINNIE Morgan    If patient is discharged prior to next notation, then this note serves as note for discharge by case management. Patient Name: Candace Mccullough                   YOB: 1945  Diagnosis: Chest pain [R07.9]  Right sided abdominal pain [R10.9]  Chest pain, unspecified type [R07.9]  Acute cholecystitis [K81.0]                   Date / Time: 1/30/2023  7:41 AM    Patient Admission Status: Inpatient   Readmission Risk (Low < 19, Mod (19-27), High > 27): Readmission Risk Score: 19.1    Current PCP: Korina Neves MD  PCP verified by CM? Yes    Chart Reviewed: Yes      History Provided by: Patient, Child/Family, Medical Record (Pt's sister Christy Nayak)  Patient Orientation: Alert and Oriented, Other (see comment) (lethargic)    Patient Cognition: lethargic     Hospitalization in the last 30 days (Readmission):  No    If yes, Readmission Assessment in  Navigator will be completed. Advance Directives:      Code Status: Full Code   Patient's Primary Decision Maker is: Legal Next of Kin    Primary Decision Maker: Amanda Cagle [facility] - Brother/Sister - [phone]    Discharge Planning:    Patient lives with: Family Members Type of Home: Skilled Nursing Facility  Primary Care Giver: Family (Sister)  Patient Support Systems include: Family Members   Current Financial resources:    Current community resources:    Current services prior to admission: None            Current DME:              Type of Home Care services:  None    ADLS  Prior functional level: Assistance with the following:  Current functional level: Assistance with the following:    PT AM-PAC: 11 /24  OT AM-PAC:   /24    Family can provide assistance at DC: No  Would you like Case Management to discuss the discharge plan with any other family members/significant others, and if so, who?  Yes (Pt's sister Yessi Gordon)  Plans to Return to Present Housing: No  Other Identified Issues/Barriers to RETURNING to current housing: weakness/ physical debility   Potential Assistance needed at discharge: Monty Uriostegui            Potential DME: TBD   Patient expects to discharge to: Mike Barrow 34 for transportation at [facility] [address]: [address] / Plan: MEDICARE PART A AND B / Product Type: *No Product type* /     Does insurance require precert for SNF: No    Potential assistance Purchasing Medications: No  Meds-to-Beds request: Mary Jane Jenkins [phone]  Let[facility]ia [facility] [address] 1 [phone] - F [phone]  [facility] [address]  Phone: [phone] Fax: [phone]      Notes:    Factors facilitating achievement of predicted outcomes: Family support and Pleasant    Barriers to discharge: Decreased endurance, Upper extremity weakness, and Lower extremity weakness    Additional Case Management Notes: Met c pt and pt's sister Yessi Gordon at bedside to initiate discharge planning. Discussed therapy eval and rec'd for SNF. Pt and sister Yessi Gordon in agreement and chose [facility]. Referral called to Elba at [facility] to review. CM to cont to follow to finalize discharge plan. The Plan for Transition of Care is related to the following treatment goals of Chest pain [R07.9]  Right sided abdominal pain [R10.9]  Chest pain, unspecified type [R07.9]  Acute cholecystitis [D56.7]    IF APPLICABLE: The Patient and/or patient representative Florina Perez and her family were provided with a choice of provider and agrees with the discharge plan. Freedom of choice list with basic dialogue that supports the patient's individualized plan of care/goals and shares the quality data associated with the providers was provided to: Patient   Patient Representative Name:       The Patient and/or Patient Representative Agree with the Discharge Plan?  Yes    Janna Oconnor LSW  Case Management Department

## 2023-02-01 NOTE — ANESTHESIA POSTPROCEDURE EVALUATION
Department of Anesthesiology  Postprocedure Note    Patient: Keyla Marcial  MRN: [id number]  YOB: 1945  Date of evaluation: 2/1/2023      Procedure Summary     Date: 01/31/23 Room / Location: [address]    Anesthesia Start: 8667 Anesthesia Stop: 2369    Procedure: CHOLECYSTECTOMY LAPAROSCOPIC (Abdomen) Diagnosis:       Cholecystitis      (Cholecystitis [K81.9])    Surgeons: Radha Schroeder MD Responsible Provider: Tho Macedo MD    Anesthesia Type: General ASA Status: 4          Anesthesia Type: General    Kerri Phase I: Kerri Score: 10    Kerri Phase II:        Anesthesia Post Evaluation    Patient location during evaluation: PACU  Patient participation: complete - patient participated  Level of consciousness: awake and alert  Pain score: 0  Airway patency: patent  Nausea & Vomiting: no vomiting and nausea  Complications: no  Cardiovascular status: blood pressure returned to baseline  Respiratory status: acceptable  Hydration status: euvolemic

## 2023-02-01 NOTE — PROGRESS NOTES
Pt brief was dry all night, earlier on bladder scan was 20ml. This nurse has done a bladder scan right now and has only 38mls, informed the covering ANP.   Orderdedr for NACL 0.9 bolus 500ml

## 2023-02-02 ENCOUNTER — APPOINTMENT (OUTPATIENT)
Dept: GENERAL RADIOLOGY | Age: 78
DRG: 853 | End: 2023-02-02
Payer: MEDICARE

## 2023-02-02 LAB
ALBUMIN SERPL-MCNC: 3.4 GM/DL (ref 3.4–5)
ALP BLD-CCNC: 111 IU/L (ref 40–129)
ALT SERPL-CCNC: 15 U/L (ref 10–40)
ANION GAP SERPL CALCULATED.3IONS-SCNC: 11 MMOL/L (ref 4–16)
ANION GAP SERPL CALCULATED.3IONS-SCNC: 13 MMOL/L (ref 4–16)
ANION GAP SERPL CALCULATED.3IONS-SCNC: 13 MMOL/L (ref 4–16)
APTT: 54.1 SECONDS (ref 25.1–37.1)
AST SERPL-CCNC: 65 IU/L (ref 15–37)
BACTERIA: NEGATIVE /HPF
BASE EXCESS MIXED: 0.1 (ref 0–2.3)
BASE EXCESS: 1 (ref 0–2.4)
BASOPHILS ABSOLUTE: 0 K/CU MM
BASOPHILS ABSOLUTE: 0 K/CU MM
BASOPHILS RELATIVE PERCENT: 0.1 % (ref 0–1)
BASOPHILS RELATIVE PERCENT: 0.1 % (ref 0–1)
BILIRUB SERPL-MCNC: 0.8 MG/DL (ref 0–1)
BILIRUBIN DIRECT: 0.5 MG/DL (ref 0–0.3)
BILIRUBIN URINE: NEGATIVE MG/DL
BILIRUBIN, INDIRECT: 0.3 MG/DL (ref 0–0.7)
BLOOD, URINE: ABNORMAL
BUN SERPL-MCNC: 38 MG/DL (ref 6–23)
BUN SERPL-MCNC: 38 MG/DL (ref 6–23)
BUN SERPL-MCNC: 39 MG/DL (ref 6–23)
CALCIUM SERPL-MCNC: 7.3 MG/DL (ref 8.3–10.6)
CARBON MONOXIDE, BLOOD: 1.9 % (ref 0–5)
CARBON MONOXIDE, BLOOD: 2.2 % (ref 0–5)
CHLORIDE BLD-SCNC: 87 MMOL/L (ref 99–110)
CHLORIDE BLD-SCNC: 88 MMOL/L (ref 99–110)
CHLORIDE BLD-SCNC: 92 MMOL/L (ref 99–110)
CLARITY: ABNORMAL
CO2 CONTENT: 23.9 MMOL/L (ref 19–24)
CO2 CONTENT: 24.9 MMOL/L (ref 19–24)
CO2: 20 MMOL/L (ref 21–32)
CO2: 22 MMOL/L (ref 21–32)
CO2: 23 MMOL/L (ref 21–32)
COLOR: YELLOW
COMMENT: ABNORMAL
COMMENT: ABNORMAL
CREAT SERPL-MCNC: 1.7 MG/DL (ref 0.6–1.1)
CREAT SERPL-MCNC: 1.7 MG/DL (ref 0.6–1.1)
CREAT SERPL-MCNC: 1.8 MG/DL (ref 0.6–1.1)
CREATININE URINE: 113.4 MG/DL (ref 28–217)
CULTURE: NORMAL
DIFFERENTIAL TYPE: ABNORMAL
DIFFERENTIAL TYPE: ABNORMAL
EKG ATRIAL RATE: 60 BPM
EKG DIAGNOSIS: NORMAL
EKG P AXIS: 90 DEGREES
EKG P-R INTERVAL: 188 MS
EKG Q-T INTERVAL: 458 MS
EKG QRS DURATION: 132 MS
EKG QTC CALCULATION (BAZETT): 458 MS
EKG R AXIS: -8 DEGREES
EKG T AXIS: 133 DEGREES
EKG VENTRICULAR RATE: 60 BPM
EOSINOPHILS ABSOLUTE: 0 K/CU MM
EOSINOPHILS ABSOLUTE: 0 K/CU MM
EOSINOPHILS RELATIVE PERCENT: 0 % (ref 0–3)
EOSINOPHILS RELATIVE PERCENT: 0 % (ref 0–3)
GFR SERPL CREATININE-BSD FRML MDRD: 28 ML/MIN/1.73M2
GFR SERPL CREATININE-BSD FRML MDRD: 30 ML/MIN/1.73M2
GFR SERPL CREATININE-BSD FRML MDRD: 30 ML/MIN/1.73M2
GLUCOSE SERPL-MCNC: 113 MG/DL (ref 70–99)
GLUCOSE SERPL-MCNC: 147 MG/DL (ref 70–99)
GLUCOSE SERPL-MCNC: 159 MG/DL (ref 70–99)
GLUCOSE, URINE: NEGATIVE MG/DL
HCO3 ARTERIAL: 22.8 MMOL/L (ref 18–23)
HCO3 ARTERIAL: 23.8 MMOL/L (ref 18–23)
HCT VFR BLD CALC: 21.8 % (ref 37–47)
HCT VFR BLD CALC: 25.8 % (ref 37–47)
HEMOGLOBIN: 7.2 GM/DL (ref 12.5–16)
HEMOGLOBIN: 7.6 GM/DL (ref 12.5–16)
IMMATURE NEUTROPHIL %: 0.2 % (ref 0–0.43)
IMMATURE NEUTROPHIL %: 0.8 % (ref 0–0.43)
INR BLD: 1.23 INDEX
KETONES, URINE: NEGATIVE MG/DL
LACTATE: 1 MMOL/L (ref 0.5–1.9)
LACTATE: 1.3 MMOL/L (ref 0.5–1.9)
LEUKOCYTE ESTERASE, URINE: ABNORMAL
LYMPHOCYTES ABSOLUTE: 0.3 K/CU MM
LYMPHOCYTES ABSOLUTE: 0.5 K/CU MM
LYMPHOCYTES RELATIVE PERCENT: 2.9 % (ref 24–44)
LYMPHOCYTES RELATIVE PERCENT: 6.1 % (ref 24–44)
Lab: NORMAL
MAGNESIUM: 2.1 MG/DL (ref 1.8–2.4)
MAGNESIUM: 2.1 MG/DL (ref 1.8–2.4)
MCH RBC QN AUTO: 26.6 PG (ref 27–31)
MCH RBC QN AUTO: 26.8 PG (ref 27–31)
MCHC RBC AUTO-ENTMCNC: 29.5 % (ref 32–36)
MCHC RBC AUTO-ENTMCNC: 33 % (ref 32–36)
MCV RBC AUTO: 81 FL (ref 78–100)
MCV RBC AUTO: 90.2 FL (ref 78–100)
METHEMOGLOBIN ARTERIAL: 1.4 %
METHEMOGLOBIN ARTERIAL: 1.5 %
MONOCYTES ABSOLUTE: 0.4 K/CU MM
MONOCYTES ABSOLUTE: 0.5 K/CU MM
MONOCYTES RELATIVE PERCENT: 4.1 % (ref 0–4)
MONOCYTES RELATIVE PERCENT: 6.5 % (ref 0–4)
MUCUS: ABNORMAL HPF
NITRITE URINE, QUANTITATIVE: NEGATIVE
NUCLEATED RBC %: 0 %
NUCLEATED RBC %: 0 %
O2 SATURATION: 87.1 % (ref 96–97)
O2 SATURATION: 94.8 % (ref 96–97)
PCO2 ARTERIAL: 35 MMHG (ref 32–45)
PCO2 ARTERIAL: 36 MMHG (ref 32–45)
PDW BLD-RTO: 13.4 % (ref 11.7–14.9)
PDW BLD-RTO: 13.4 % (ref 11.7–14.9)
PH BLOOD: 7.41 (ref 7.34–7.45)
PH BLOOD: 7.44 (ref 7.34–7.45)
PH, URINE: 5.5 (ref 5–8)
PHOSPHORUS: 4.1 MG/DL (ref 2.5–4.9)
PHOSPHORUS: 4.2 MG/DL (ref 2.5–4.9)
PLATELET # BLD: 166 K/CU MM (ref 140–440)
PLATELET # BLD: 186 K/CU MM (ref 140–440)
PMV BLD AUTO: 9.1 FL (ref 7.5–11.1)
PMV BLD AUTO: 9.3 FL (ref 7.5–11.1)
PO2 ARTERIAL: 53 MMHG (ref 75–100)
PO2 ARTERIAL: 76 MMHG (ref 75–100)
POTASSIUM SERPL-SCNC: 4.3 MMOL/L (ref 3.5–5.1)
POTASSIUM SERPL-SCNC: 4.8 MMOL/L (ref 3.5–5.1)
POTASSIUM SERPL-SCNC: 4.8 MMOL/L (ref 3.5–5.1)
PRO-BNP: ABNORMAL PG/ML
PROCALCITONIN SERPL-MCNC: 7.36 NG/ML
PROT/CREAT RATIO, UR: 0.8
PROTEIN UA: 30 MG/DL
PROTHROMBIN TIME: 15.9 SECONDS (ref 11.7–14.5)
RBC # BLD: 2.69 M/CU MM (ref 4.2–5.4)
RBC # BLD: 2.86 M/CU MM (ref 4.2–5.4)
RBC URINE: ABNORMAL /HPF (ref 0–6)
SEGMENTED NEUTROPHILS ABSOLUTE COUNT: 7.2 K/CU MM
SEGMENTED NEUTROPHILS ABSOLUTE COUNT: 7.9 K/CU MM
SEGMENTED NEUTROPHILS RELATIVE PERCENT: 87.1 % (ref 36–66)
SEGMENTED NEUTROPHILS RELATIVE PERCENT: 92.1 % (ref 36–66)
SODIUM BLD-SCNC: 121 MMOL/L (ref 135–145)
SODIUM BLD-SCNC: 121 MMOL/L (ref 135–145)
SODIUM BLD-SCNC: 127 MMOL/L (ref 135–145)
SODIUM URINE: 13 MMOL/L (ref 35–167)
SPECIFIC GRAVITY UA: 1.02 (ref 1–1.03)
SPECIMEN: NORMAL
SQUAMOUS EPITHELIAL: 2 /HPF
STREP A DIRECT SCREEN: NEGATIVE
TOTAL IMMATURE NEUTOROPHIL: 0.02 K/CU MM
TOTAL IMMATURE NEUTOROPHIL: 0.07 K/CU MM
TOTAL NUCLEATED RBC: 0 K/CU MM
TOTAL NUCLEATED RBC: 0 K/CU MM
TOTAL PROTEIN: 5.4 GM/DL (ref 6.4–8.2)
TRANSITIONAL EPITHELIAL: <1 /HPF
TRICHOMONAS: ABNORMAL /HPF
URINE TOTAL PROTEIN: 95.7 MG/DL
UROBILINOGEN, URINE: 0.2 MG/DL (ref 0.2–1)
WBC # BLD: 8.3 K/CU MM (ref 4–10.5)
WBC # BLD: 8.6 K/CU MM (ref 4–10.5)
WBC UA: 20 /HPF (ref 0–5)

## 2023-02-02 PROCEDURE — 84100 ASSAY OF PHOSPHORUS: CPT

## 2023-02-02 PROCEDURE — 80048 BASIC METABOLIC PNL TOTAL CA: CPT

## 2023-02-02 PROCEDURE — 87040 BLOOD CULTURE FOR BACTERIA: CPT

## 2023-02-02 PROCEDURE — 87186 SC STD MICRODIL/AGAR DIL: CPT

## 2023-02-02 PROCEDURE — 2580000003 HC RX 258: Performed by: SURGERY

## 2023-02-02 PROCEDURE — 80053 COMPREHEN METABOLIC PANEL: CPT

## 2023-02-02 PROCEDURE — C1892 INTRO/SHEATH,FIXED,PEEL-AWAY: HCPCS

## 2023-02-02 PROCEDURE — 2580000003 HC RX 258: Performed by: STUDENT IN AN ORGANIZED HEALTH CARE EDUCATION/TRAINING PROGRAM

## 2023-02-02 PROCEDURE — 87899 AGENT NOS ASSAY W/OPTIC: CPT

## 2023-02-02 PROCEDURE — 2700000000 HC OXYGEN THERAPY PER DAY

## 2023-02-02 PROCEDURE — 76937 US GUIDE VASCULAR ACCESS: CPT

## 2023-02-02 PROCEDURE — 86738 MYCOPLASMA ANTIBODY: CPT

## 2023-02-02 PROCEDURE — 6360000002 HC RX W HCPCS: Performed by: STUDENT IN AN ORGANIZED HEALTH CARE EDUCATION/TRAINING PROGRAM

## 2023-02-02 PROCEDURE — 83605 ASSAY OF LACTIC ACID: CPT

## 2023-02-02 PROCEDURE — 36600 WITHDRAWAL OF ARTERIAL BLOOD: CPT

## 2023-02-02 PROCEDURE — 36569 INSJ PICC 5 YR+ W/O IMAGING: CPT

## 2023-02-02 PROCEDURE — C1769 GUIDE WIRE: HCPCS

## 2023-02-02 PROCEDURE — 6370000000 HC RX 637 (ALT 250 FOR IP): Performed by: SURGERY

## 2023-02-02 PROCEDURE — C9113 INJ PANTOPRAZOLE SODIUM, VIA: HCPCS | Performed by: STUDENT IN AN ORGANIZED HEALTH CARE EDUCATION/TRAINING PROGRAM

## 2023-02-02 PROCEDURE — 87449 NOS EACH ORGANISM AG IA: CPT

## 2023-02-02 PROCEDURE — 93010 ELECTROCARDIOGRAM REPORT: CPT | Performed by: INTERNAL MEDICINE

## 2023-02-02 PROCEDURE — 83880 ASSAY OF NATRIURETIC PEPTIDE: CPT

## 2023-02-02 PROCEDURE — 85730 THROMBOPLASTIN TIME PARTIAL: CPT

## 2023-02-02 PROCEDURE — 2000000000 HC ICU R&B

## 2023-02-02 PROCEDURE — 87070 CULTURE OTHR SPECIMN AEROBIC: CPT

## 2023-02-02 PROCEDURE — 82803 BLOOD GASES ANY COMBINATION: CPT

## 2023-02-02 PROCEDURE — 84156 ASSAY OF PROTEIN URINE: CPT

## 2023-02-02 PROCEDURE — 99291 CRITICAL CARE FIRST HOUR: CPT | Performed by: INTERNAL MEDICINE

## 2023-02-02 PROCEDURE — 83735 ASSAY OF MAGNESIUM: CPT

## 2023-02-02 PROCEDURE — 87086 URINE CULTURE/COLONY COUNT: CPT

## 2023-02-02 PROCEDURE — 85610 PROTHROMBIN TIME: CPT

## 2023-02-02 PROCEDURE — 82805 BLOOD GASES W/O2 SATURATION: CPT

## 2023-02-02 PROCEDURE — 87205 SMEAR GRAM STAIN: CPT

## 2023-02-02 PROCEDURE — 81001 URINALYSIS AUTO W/SCOPE: CPT

## 2023-02-02 PROCEDURE — 85025 COMPLETE CBC W/AUTO DIFF WBC: CPT

## 2023-02-02 PROCEDURE — 94761 N-INVAS EAR/PLS OXIMETRY MLT: CPT

## 2023-02-02 PROCEDURE — 51798 US URINE CAPACITY MEASURE: CPT

## 2023-02-02 PROCEDURE — 84145 PROCALCITONIN (PCT): CPT

## 2023-02-02 PROCEDURE — 6360000002 HC RX W HCPCS: Performed by: NURSE PRACTITIONER

## 2023-02-02 PROCEDURE — 36415 COLL VENOUS BLD VENIPUNCTURE: CPT

## 2023-02-02 PROCEDURE — 87081 CULTURE SCREEN ONLY: CPT

## 2023-02-02 PROCEDURE — 94150 VITAL CAPACITY TEST: CPT

## 2023-02-02 PROCEDURE — 82570 ASSAY OF URINE CREATININE: CPT

## 2023-02-02 PROCEDURE — C1751 CATH, INF, PER/CENT/MIDLINE: HCPCS

## 2023-02-02 PROCEDURE — 2580000003 HC RX 258: Performed by: NURSE PRACTITIONER

## 2023-02-02 PROCEDURE — 87088 URINE BACTERIA CULTURE: CPT

## 2023-02-02 PROCEDURE — 02HV33Z INSERTION OF INFUSION DEVICE INTO SUPERIOR VENA CAVA, PERCUTANEOUS APPROACH: ICD-10-PCS | Performed by: STUDENT IN AN ORGANIZED HEALTH CARE EDUCATION/TRAINING PROGRAM

## 2023-02-02 PROCEDURE — 81003 URINALYSIS AUTO W/O SCOPE: CPT

## 2023-02-02 PROCEDURE — 84300 ASSAY OF URINE SODIUM: CPT

## 2023-02-02 PROCEDURE — 82248 BILIRUBIN DIRECT: CPT

## 2023-02-02 PROCEDURE — 6370000000 HC RX 637 (ALT 250 FOR IP): Performed by: NURSE PRACTITIONER

## 2023-02-02 PROCEDURE — 71045 X-RAY EXAM CHEST 1 VIEW: CPT

## 2023-02-02 PROCEDURE — 94660 CPAP INITIATION&MGMT: CPT

## 2023-02-02 PROCEDURE — 2500000003 HC RX 250 WO HCPCS

## 2023-02-02 RX ORDER — FUROSEMIDE 10 MG/ML
40 INJECTION INTRAMUSCULAR; INTRAVENOUS 2 TIMES DAILY
Status: CANCELLED | OUTPATIENT
Start: 2023-02-03

## 2023-02-02 RX ORDER — NOREPINEPHRINE BIT/0.9 % NACL 16MG/250ML
INFUSION BOTTLE (ML) INTRAVENOUS
Status: COMPLETED
Start: 2023-02-02 | End: 2023-02-02

## 2023-02-02 RX ORDER — SODIUM CHLORIDE 0.9 % (FLUSH) 0.9 %
5-40 SYRINGE (ML) INJECTION EVERY 12 HOURS SCHEDULED
Status: DISCONTINUED | OUTPATIENT
Start: 2023-02-02 | End: 2023-02-20 | Stop reason: HOSPADM

## 2023-02-02 RX ORDER — FUROSEMIDE 10 MG/ML
40 INJECTION INTRAMUSCULAR; INTRAVENOUS 2 TIMES DAILY
Status: DISCONTINUED | OUTPATIENT
Start: 2023-02-02 | End: 2023-02-08

## 2023-02-02 RX ORDER — PANTOPRAZOLE SODIUM 40 MG/10ML
40 INJECTION, POWDER, LYOPHILIZED, FOR SOLUTION INTRAVENOUS DAILY
Status: DISCONTINUED | OUTPATIENT
Start: 2023-02-02 | End: 2023-02-20 | Stop reason: HOSPADM

## 2023-02-02 RX ORDER — FUROSEMIDE 10 MG/ML
40 INJECTION INTRAMUSCULAR; INTRAVENOUS ONCE
Status: COMPLETED | OUTPATIENT
Start: 2023-02-02 | End: 2023-02-02

## 2023-02-02 RX ORDER — NOREPINEPHRINE BIT/0.9 % NACL 16MG/250ML
1-40 INFUSION BOTTLE (ML) INTRAVENOUS CONTINUOUS
Status: DISCONTINUED | OUTPATIENT
Start: 2023-02-02 | End: 2023-02-03

## 2023-02-02 RX ORDER — DOBUTAMINE HYDROCHLORIDE 200 MG/100ML
2.5-1 INJECTION INTRAVENOUS CONTINUOUS
Status: DISCONTINUED | OUTPATIENT
Start: 2023-02-02 | End: 2023-02-03

## 2023-02-02 RX ORDER — LIDOCAINE HYDROCHLORIDE 10 MG/ML
5 INJECTION, SOLUTION EPIDURAL; INFILTRATION; INTRACAUDAL; PERINEURAL ONCE
Status: DISCONTINUED | OUTPATIENT
Start: 2023-02-02 | End: 2023-02-20 | Stop reason: HOSPADM

## 2023-02-02 RX ORDER — SODIUM CHLORIDE 9 MG/ML
500 INJECTION, SOLUTION INTRAVENOUS PRN
Status: DISCONTINUED | OUTPATIENT
Start: 2023-02-02 | End: 2023-02-20 | Stop reason: HOSPADM

## 2023-02-02 RX ORDER — SODIUM CHLORIDE 0.9 % (FLUSH) 0.9 %
5-40 SYRINGE (ML) INJECTION PRN
Status: DISCONTINUED | OUTPATIENT
Start: 2023-02-02 | End: 2023-02-20 | Stop reason: HOSPADM

## 2023-02-02 RX ORDER — NOREPINEPHRINE BIT/0.9 % NACL 16MG/250ML
1-100 INFUSION BOTTLE (ML) INTRAVENOUS CONTINUOUS
Status: DISCONTINUED | OUTPATIENT
Start: 2023-02-02 | End: 2023-02-02

## 2023-02-02 RX ADMIN — PANTOPRAZOLE SODIUM 40 MG: 40 INJECTION, POWDER, LYOPHILIZED, FOR SOLUTION INTRAVENOUS at 22:36

## 2023-02-02 RX ADMIN — PIPERACILLIN AND TAZOBACTAM 3375 MG: 3; .375 INJECTION, POWDER, LYOPHILIZED, FOR SOLUTION INTRAVENOUS at 10:23

## 2023-02-02 RX ADMIN — HYDROCORTISONE SODIUM SUCCINATE 50 MG: 100 INJECTION, POWDER, FOR SOLUTION INTRAMUSCULAR; INTRAVENOUS at 21:59

## 2023-02-02 RX ADMIN — Medication 5 MCG/MIN: at 15:10

## 2023-02-02 RX ADMIN — SODIUM CHLORIDE 500 ML: 9 INJECTION, SOLUTION INTRAVENOUS at 23:09

## 2023-02-02 RX ADMIN — POLYETHYLENE GLYCOL (3350) 17 G: 17 POWDER, FOR SOLUTION ORAL at 10:13

## 2023-02-02 RX ADMIN — SODIUM CHLORIDE, PRESERVATIVE FREE 10 ML: 5 INJECTION INTRAVENOUS at 10:23

## 2023-02-02 RX ADMIN — MEMANTINE 10 MG: 10 TABLET ORAL at 10:13

## 2023-02-02 RX ADMIN — OXYBUTYNIN CHLORIDE 5 MG: 5 TABLET ORAL at 10:12

## 2023-02-02 RX ADMIN — GABAPENTIN 600 MG: 300 CAPSULE ORAL at 10:12

## 2023-02-02 RX ADMIN — PANTOPRAZOLE SODIUM 40 MG: 40 TABLET, DELAYED RELEASE ORAL at 06:47

## 2023-02-02 RX ADMIN — GUAIFENESIN 600 MG: 600 TABLET, EXTENDED RELEASE ORAL at 10:12

## 2023-02-02 RX ADMIN — FUROSEMIDE 40 MG: 10 INJECTION, SOLUTION INTRAMUSCULAR; INTRAVENOUS at 17:46

## 2023-02-02 RX ADMIN — DOCUSATE SODIUM 100 MG: 100 CAPSULE, LIQUID FILLED ORAL at 10:12

## 2023-02-02 RX ADMIN — SODIUM CHLORIDE, PRESERVATIVE FREE 10 ML: 5 INJECTION INTRAVENOUS at 23:17

## 2023-02-02 RX ADMIN — FUROSEMIDE 40 MG: 10 INJECTION, SOLUTION INTRAMUSCULAR; INTRAVENOUS at 13:23

## 2023-02-02 RX ADMIN — VANCOMYCIN HYDROCHLORIDE 1750 MG: 10 INJECTION, POWDER, LYOPHILIZED, FOR SOLUTION INTRAVENOUS at 17:57

## 2023-02-02 RX ADMIN — PIPERACILLIN AND TAZOBACTAM 3375 MG: 3; .375 INJECTION, POWDER, LYOPHILIZED, FOR SOLUTION INTRAVENOUS at 23:11

## 2023-02-02 RX ADMIN — SODIUM CHLORIDE, PRESERVATIVE FREE 10 ML: 5 INJECTION INTRAVENOUS at 23:16

## 2023-02-02 RX ADMIN — DOBUTAMINE IN DEXTROSE 2.5 MCG/KG/MIN: 200 INJECTION, SOLUTION INTRAVENOUS at 23:05

## 2023-02-02 ASSESSMENT — PAIN SCALES - WONG BAKER
WONGBAKER_NUMERICALRESPONSE: 0

## 2023-02-02 NOTE — CARE COORDINATION
Spoke with Benoit Hills at [facility] pt is approved to admit whenever medically stable/ no precert needed.    Electronic PAS completed

## 2023-02-02 NOTE — PROGRESS NOTES
Brought to rm 2110 from [phone] with ICU charge nurse, primary nurse and respiratory, pt was on bipap at this time.  Pt would barely squeeze hands to verbal commands, would not follow any other verbal commands, labs drawn via u/s while waiting for line placement.   -Skin check performed by this nurse and Bennie Landrum RN, while performing jairon care from large amount stool

## 2023-02-02 NOTE — PROGRESS NOTES
Pt removed from bipap to 4L of 02 per nasal cannula. Mouthcare given and pt said, \"That feels good\" eyes did open briefly, protective skin aids applied to decrease redness of face from bipap. IV team in room at this time.    -Family updated and informed of time frame for PICC line placement.

## 2023-02-02 NOTE — CONSULTS
Patient:   Leif Sol    Date:  23  :  1945, 76 y.o. Nephrologist: Shala Martinez MD  Provider: Monroe Benson MD    Reason for Consult: Stage II acute kidney injury    Consult requested by : Dr Keesha Amado MD    Chief Complaint:   Right upper quadrant abdominal pain    HISTORY OF PRESENT ILLNESS/Brief hospital course  AND  brief background history    Ms. Pawel Hutton, is a 78-year-old female, was brought to the emergency department via squad on 2023 with not feeling well along with right upper quadrant pain. Most of the history is taken from her sister who lives same condominium with her sister, Monday morning during the day of her ER visit, when sister went to check her, she told sister \"I do not feel good\" additionally she had right upper quadrant pain. As her symptoms continue to worsen , EMT was called and brought her to the emergency department. On arrival in our emergency department, she was afebrile, blood pressure recorded slightly higher 150s over 60s, likely from pain, her O2 saturation is 95% while breathing ambient air. She underwent several diagnostic tests, which include imaging and biochemical.  Imaging study which include chest x-ray as well as CT of the chest, abdomen and pelvis with administration of intravenous contrast and abdominal ultrasound. To summarize she had nonspecific gallbladder distention without shadowing calculus, cardiomegaly with vascular congestion, distended gallbladder, and fibroid uterus. Imaging study suggested possible cholecystitis. Additionally she had nuclear scan, there was some suggestion of cholecystitis by that test also. Biochemical testing showed creatinine of 0.7, with acceptable electrolyte. Pertinent abnormal lab include hemoglobin 11.7, without leukocytosis, high proBNP level more than 1800 pg/mL. She was given 500 cc of crystalloid solution, namely normal saline.   Appropriate bodily fluid culture were drawn and empirical broad-spectrum antibiotic mainly fibrocellular initiated. Although she received normal saline as well as lactated Ringer but both of them had been discontinued recently. Meanwhile she was evaluated by surgical team and underwent laparoscopic cholecystectomy as the clinical suspicion for cholecystitis were high, that was done on January 31, 2023. He was done under general anesthesia, apparently with minimal blood loss of 57 l. Unfortunately throughout the hospitalization her creatinine increased to 2.0 on February 1, 2023, although it went down to 1.7. Hence the kidney consult was requested. I was able to review her medication list, she had 1 dose of Lasix, 1 dose of Ancef, Zosyn were initiated recently, she was on gabapentin fairly high dose 600 mg twice a day, along with oxybutynin 5 mg twice a day. Meanwhile after consultation with electrophysiologist-direct oral anticoagulation was discontinued as she deemed to be not in atrial fibrillation. I do not see any episode of hypotension during her hospital stay-unless there was not recorded    Her baseline creatinine about 0.7 to 0.8 mg/dL, no episode of acute kidney injury at least in our epic system by creatinine criteria. PMH :   Diabetes mellitus type 2 diagnosed several years ago was on metformin therapy  Coronary artery disease status post CABG-in July 9, 2018-   see the details of procedure:   Mitral valve repair with #28 CG ing  - MAZE epi and endocardial radiofrequency  - CABG X 3  LIMA to LAD  SVG to Cx M1  SVG to M2     Left atrial appendage ligation  Closure of PFO  3. Dyslipidemia  4. Dysrhythmia-requiring pacemaker placement on December 11, 2018--   see details of procedure- insertion of right ventricular pacing lead under fluoroscopy. Insertion of right atrial lead under fluoroscopy  Insertion of a Dual chamber Pacemaker. Electronic analysis of lead and device. IV sedation.   Selective venography of left upper extremity. 5.  Mood disorder with depression and probably memory loss due to depression-she lost one of her sister in 2022  6. Recurrent cystitis-presumed overactive bladder she was on oxybutynin  7. Presumed neuropathy she was on gabapentin        PSH :  S/p coronary artery bypass surgery x3 vessels-done in 2018  Mitral valve repair-maze procedure-all done at the same time 2018  Dual-chamber pacemaker placed on 2018  Gallbladder surgery of course 2023  Tonsillectomy in the distant past      OB GYN Hx:  She never been pregnant    Habits :   She never smoked, no history of alcohol illicit drug abuse    Soc Hx:  Patient is from here, she never been , she used to work in a 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 Her sister  last  it affected her mentally. She has been dealing with depression and bereavement since then-her overall activity is downhill, she was also briefly in ECF back in 2022    [facility]   Father  of massive heart attack relatively young age, mother had TIA but lived up to age 80, one of her uncle underwent heart transplantation. No history of chronic kidney disease    Heart data         Mitral valve repair with #28 CG ing  - MAZE epi and endocardial radiofrequency  - CABG X 3  LIMA to LAD  SVG to Cx M1  SVG to M2-2018     Left atrial appendage ligation  Closure of PFO     2018--   - insertion of right ventricular pacing lead under fluoroscopy. Insertion of right atrial lead under fluoroscopy  Insertion of a Dual chamber Pacemaker. Electronic analysis of lead and device.     Heart cath on 2018    LMCA: Normal.     LAD: 99% MID LAD, CONTINUING BACK PROX INTO OSTIUM     LCx: LARGE VESSEL   99% AT BIFURCATION INTO OM1 AND 2     RCA: 99% DISTAL RCA GOING INTO LARGE PDA         Echocardiogram in January 30, 2023    Summary   Technically difficult examination. Left ventricular systolic function is abnormal.   Ejection fraction is visually estimated at 40-45%. Inferolateral wall and basal anteroseptal wall segments are hypokinetic. PPM wiring visualized within the right heart. Moderately dilated right ventricle. Sclerotic, but non-stenotic aortic valve. Moderate aortic regurgitation; PHT: 306 msec. Mitral annular calcification is present. Mild mitral regurgitation. Moderate tricuspid regurgitation; RVSP: 38 mmHg. No evidence of any pericardial effusion. REVIEW OF SYSTEMS:     All pertinent ROS neg except   Right upper quadrant abdominal pain, she is very drowsy now I was not able to wake her up  Also decreased urine output-    Medication :     Reviewed, see in epic    BP (!) 122/50   Pulse 85   Temp 98.2 °F (36.8 °C)   Resp 20   Ht 5' 5\" (1.651 m)   Wt 189 lb 9.5 oz (86 kg)   SpO2 96%   BMI 31.55 kg/m²     PHYSICAL EXAM:  General appearance: Drowsy, did not open her eyes I could not wake her up-we tried with her sister to gather-head of the bed elevated almost 80 to 85 degrees  HEENT: At least 2+ conjunctival pallor, no scleral icterus  Neck: Seems supple with supplemental oxygen per nasal cannula  Heart: Seemed irregular-left chest wall implanted cardiac device, well-healed incision from previous sternotomy  LUNGS: Diffuse crackles bilaterally on posterior exam  Abdomen: Tender as she grimaces, there is a AVRIL drain-evidence of recent abdominal surgery-seem distended bladder  Extremities:  At least 1+ edema-she does have bilateral SCDs  I did not see any Rodriguez catheter    LABS:  Reviewed, see in epic            IMPRESSION:  Stage II acute kidney injury-likely is a combination of infection/general surgery/general anesthesia-all can affect renal function and perfusion-additionally she was on oxybutynin-which can cause bladder obstruction as it is an anticholinergic drug-even though initial CT in the ER did show distended bladder-I suspect she has acute bladder obstruction .   Physical exam suggest pulmonary edema -which in turn can cause renal vein congestion and decreased renal  perfusion -finally-medication induced tubular injury or inflammation is always a possibility-  Suspect acute decompensated heart failure unless she has pneumonitis or atelectasis-especially with underlying cardiomyopathy  Recent gallbladder surgery from cholecystitis-supported by biopsy result  Diabetes mellitus and other chronic condition    PLAN:    Bladder scan and likely need Rodriguez insertion-urine urinary indicis-hold oxybutynin and gabapentin-she likely has gabapentin toxicity-particularly at the face of acute kidney injury-chest x-ray-I suspect she has pulmonary edema-I also suspect her proBNP level would be higher-if all makes sense then diuretics-at least 40 mg IV twice daily-given the severity of acute kidney injury-avoid any nonessential medication-including antimicrobial unless it is necessary-watch for iatrogenic nosocomial complication-follow clinically and biochemically

## 2023-02-02 NOTE — PROGRESS NOTES
I did not see the patient. The patient was discussed with the CHONG. Labs and pertinent imaging were reviewed. I was available for questions and consultation as needed. Unable to addend yesterday's note by CHONG. Cr 1.8 later this AM from 2.0 earlier this morning from normal yesterday; per nurse minimal urine output and some confusion likely secondary to anesthesia and pain medication, not taking much PO and per nursing has had dark concentrated urine. Did note BNP; given improvement in TAYLOR with gentle fluids will continue gentle timed hydration and monitor; patient is currently on room air and no complaints of shortness of breath but will monitor closely. Plan to restart home diuretics tomorrow 2/1. If no improvement in kidney function by tomorrow will consult Nephrology. Will continue to monitor.

## 2023-02-02 NOTE — PROGRESS NOTES
02/02/23 1108   Encounter Summary   Encounter Overview/Reason  Initial Encounter   Service Provided For: Family; Patient not available   Referral/Consult From: [address] Family members   Last Encounter  02/02/23  (patient was deeply sleeping but her sister was sitting on the couch.  Expressed no needs at this time.)   Complexity of Encounter Low   Begin Time 1109   End Time  1125   Total Time Calculated 16 min   Encounter    Type Family Care   Spiritual/Emotional needs   Type Spiritual Support   Grief, Loss, and Adjustments   Type Adjustment to illness   Assessment/Intervention/Outcome   Assessment Unable to assess  (not available to participate)   Intervention Other (Comment)  (family care)   Plan and Referrals   Plan/Referrals Continue to visit, (comment)  (as needed)

## 2023-02-02 NOTE — PROGRESS NOTES
V2.0  [facility] [place]      Name:  Juliana Mistry /Age/Sex: 1945  (76 y.o. female)   MRN & CSN:  [id number] & [id number] Encounter Date/Time: 2023 3:29 PM EST    Location:  -A PCP: Triston Valverde MD       Hospital Day: 4    Assessment and Plan:   Juliana Mistry is a 76 y.o. female with pmh of Juliana Mistry is a 76 y.o. female with pmh of CAD s/p CABG, Valvular Heart Disease, s/p Mitral Valve Repair, PFO Closure, Paroxysmal A-Fib, HTN, HLD, DM, TIA, Early Dementia who presents with Chest pain Rt Sided and RUQ Abdominal Pain      Plan:    Acute hypoxic respiratory failure  Patient with acute respiratory failure was on nasal cannula. Became acutely more short of breath. CT did show what appears to be either fluid overload or viral pneumonia. Patient placed on BiPAP  Transfer to the ICU. Patient. The patient. Supplemental oxygen    Sepsis, source to be determined  Patient with potential abnormality as well she did have acute solid cholecystitis status post lap pedro 2023. High risk for postoperative infection requiring pneumonia  Initiated patient on IV Zosyn  Will repeat chest xray  Follow-up blood cultures  Follow-up sputum cultures noted resting    Cholecystitis  Patient underwent lap pedro on 2023 with Dr. Natasha Rocha. AVRIL drain in place  IV antibiotics for possible postoperative infection    TAYLOR  Patient with a creatinine bump. To get some IV fluids yesterday. Stop today  Nephrology consulted  IV diuresis    Hyponatremia  Patient with acute worsening hyponatremia to 121. Nephrology consulted  F/U UA    Acute on chronic systolic CHF  Patient with an EF in the 45 (become acutely short of breath with progressive pulmonary edema. IV diuresis  Cardiology consulted  Nephrology on board    CAD s/p CABG  Patient is also known valvular heart disease.   Cardiology) following    Goals of care discussion  Pressure patient be transferred to the ICU on 2023 goals of care discussion was had with the patient's sisters who are next of kin and POA. It was determined that if necessary the patient would like to be intubated as this is a acute and worsening process involving the lungs. If she continue to worsen however to stop at this point she would not want CPR, or defibrillation. Patient Code status changed to DNR-CCA. Diet ADULT DIET; Regular   DVT Prophylaxis [] Lovenox, []  Heparin, [x] SCDs, [] Ambulation,    [] Eliquis, [] Xarelto  [] Coumadin [] other   Code Status DNR-CCA   Disposition From: Home  Expected Disposition: Home  Estimated Date of Discharge: To be determined  Patient requires continued admission due to respiratory failure postop fluid overload   Surrogate Decision Maker/ POA      Subjective:     Chief Complaint: Chest Pain (X 3 days )     This morning, patient was able to converse and answer questions. She was getting a little bit more short of breath and did have some rhonchi on exam.  Did get a chest x-ray that did show potentially an evolving process or fluid overload. Was not the best study. A couple hours later I was called back to the room and on repeat visit the patient was becoming  more lethargic and had with increasing shortness of breath. She also was gurgling and into respiratory distress. At this point the patient was placed on BiPAP given some IV Lasix and critical care was consulted and saw the patient at bedside. She will be transferred to the ICU at this time. Review of Systems:    Review of Systems   Unable to perform ROS: Mental status change       Objective: Intake/Output Summary (Last 24 hours) at 2/2/2023 1529  Last data filed at 2/2/2023 1110  Gross per 24 hour   Intake --   Output 305 ml   Net -305 ml        Vitals:   Vitals:    02/02/23 1452   BP:    Pulse: 80   Resp: 22   Temp:    SpO2:        Physical Exam:     Physical Exam  Vitals reviewed. Constitutional:       General: She is in acute distress. Appearance: Normal appearance. She is normal weight. She is ill-appearing. HENT:      Head: Normocephalic and atraumatic. Mouth/Throat:      Mouth: Mucous membranes are moist.      Pharynx: Oropharynx is clear. Eyes:      Extraocular Movements: Extraocular movements intact. Conjunctiva/sclera: Conjunctivae normal.      Pupils: Pupils are equal, round, and reactive to light. Cardiovascular:      Rate and Rhythm: Normal rate and regular rhythm. Pulses: Normal pulses. Heart sounds: Normal heart sounds. Pulmonary:      Effort: Pulmonary effort is normal.      Breath sounds: Wheezing, rhonchi and rales present. Abdominal:      General: Abdomen is flat. Bowel sounds are normal.      Palpations: Abdomen is soft. Musculoskeletal:         General: No swelling. Normal range of motion. Cervical back: Normal range of motion and neck supple. Skin:     General: Skin is warm and dry. Neurological:      General: No focal deficit present. Mental Status: She is alert and oriented to person, place, and time. Mental status is at baseline. Psychiatric:         Mood and Affect: Mood normal.         Behavior: Behavior normal.         Thought Content:  Thought content normal.         Judgment: Judgment normal.       Medications:   Medications:    piperacillin-tazobactam  3,375 mg IntraVENous Q8H    furosemide  40 mg IntraVENous BID    vancomycin  1,750 mg IntraVENous Once    vancomycin (VANCOCIN) intermittent dosing (placeholder)   Other RX Placeholder    guaiFENesin  600 mg Oral BID    polyethylene glycol  17 g Oral Daily    sodium chloride flush  5-40 mL IntraVENous 2 times per day    docusate sodium  100 mg Oral BID    donepezil  10 mg Oral Nightly    [Held by provider] gabapentin  600 mg Oral BID    memantine  10 mg Oral BID    metoprolol tartrate  25 mg Oral BID    pantoprazole  40 mg Oral QAM AC    rosuvastatin  40 mg Oral QPM      Infusions:    norepinephrine 5 mcg/min (02/02/23 1510) sodium chloride       PRN Meds: morphine, 1 mg, Q4H PRN  droperidol, 0.625 mg, Q10 Min PRN  HYDROcodone-acetaminophen, 1 tablet, Q6H PRN  sodium chloride flush, 5-40 mL, PRN  sodium chloride, , PRN  ondansetron, 4 mg, Q8H PRN   Or  ondansetron, 4 mg, Q6H PRN  acetaminophen, 650 mg, Q6H PRN   Or  acetaminophen, 650 mg, Q6H PRN  ipratropium, 1 spray, PRN        Labs      Recent Results (from the past 24 hour(s))   CBC with Auto Differential    Collection Time: 02/02/23  6:22 AM   Result Value Ref Range    WBC 8.3 4.0 - 10.5 K/CU MM    RBC 2.86 (L) 4.2 - 5.4 M/CU MM    Hemoglobin 7.6 (L) 12.5 - 16.0 GM/DL    Hematocrit 25.8 (L) 37 - 47 %    MCV 90.2 78 - 100 FL    MCH 26.6 (L) 27 - 31 PG    MCHC 29.5 (L) 32.0 - 36.0 %    RDW 13.4 11.7 - 14.9 %    Platelets 183 276 - 410 K/CU MM    MPV 9.3 7.5 - 11.1 FL    Differential Type AUTOMATED DIFFERENTIAL     Segs Relative 87.1 (H) 36 - 66 %    Lymphocytes % 6.1 (L) 24 - 44 %    Monocytes % 6.5 (H) 0 - 4 %    Eosinophils % 0.0 0 - 3 %    Basophils % 0.1 0 - 1 %    Segs Absolute 7.2 K/CU MM    Lymphocytes Absolute 0.5 K/CU MM    Monocytes Absolute 0.5 K/CU MM    Eosinophils Absolute 0.0 K/CU MM    Basophils Absolute 0.0 K/CU MM    Nucleated RBC % 0.0 %    Total Nucleated RBC 0.0 K/CU MM    Total Immature Neutrophil 0.02 K/CU MM    Immature Neutrophil % 0.2 0 - 0.43 %   Basic Metabolic Panel    Collection Time: 02/02/23  6:22 AM   Result Value Ref Range    Sodium 121 (L) 135 - 145 MMOL/L    Potassium 4.8 3.5 - 5.1 MMOL/L    Chloride 88 (L) 99 - 110 mMol/L    CO2 20 (L) 21 - 32 MMOL/L    Anion Gap 13 4 - 16    BUN 38 (H) 6 - 23 MG/DL    Creatinine 1.7 (H) 0.6 - 1.1 MG/DL    Est, Glom Filt Rate 30 (L) >60 mL/min/1.73m2    Glucose 147 (H) 70 - 99 MG/DL    Calcium 7.3 (L) 8.3 - 10.6 MG/DL   Brain Natriuretic Peptide    Collection Time: 02/02/23  6:22 AM   Result Value Ref Range    Pro-BNP 15,652 (H) <300 PG/ML   Lactic Acid    Collection Time: 02/02/23  6:22 AM   Result Value Ref Range    Lactate 1.3 0.5 - 1.9 mMOL/L   Sodium, urine, random    Collection Time: 02/02/23 12:00 PM   Result Value Ref Range    Sodium, Ur 13 (L) 35 - 167 MMOL/L   Urinalysis with Reflex to Culture    Collection Time: 02/02/23 12:00 PM    Specimen: Urine   Result Value Ref Range    Color, UA YELLOW YELLOW    Clarity, UA CLOUDY (A) CLEAR    Glucose, Urine NEGATIVE NEGATIVE MG/DL    Bilirubin Urine NEGATIVE NEGATIVE MG/DL    Ketones, Urine NEGATIVE NEGATIVE MG/DL    Specific Gravity, UA 1.025 1.001 - 1.035    Blood, Urine MODERATE NUMBER OR AMOUNT OBSERVED (A) NEGATIVE    pH, Urine 5.5 5.0 - 8.0    Protein, UA 30 (A) NEGATIVE MG/DL    Urobilinogen, Urine 0.2 0.2 - 1.0 MG/DL    Nitrite Urine, Quantitative NEGATIVE NEGATIVE    Leukocyte Esterase, Urine SMALL NUMBER OR AMOUNT OBSERVED (A) NEGATIVE    RBC, UA NONE SEEN 0 - 6 /HPF    WBC, UA 20 (H) 0 - 5 /HPF    Bacteria, UA NEGATIVE NEGATIVE /HPF    Squam Epithel, UA 2 /HPF    Trans Epithel, UA <1 /HPF    Mucus, UA RARE (A) NEGATIVE HPF    Trichomonas, UA NONE SEEN NONE SEEN /HPF   Protein / creatinine ratio, urine    Collection Time: 02/02/23 12:00 PM   Result Value Ref Range    Urine Total Protein 95.7 (H) <12 MG/DL    Creatinine, Ur 113.4 28 - 217 MG/DL    Prot/Creat Ratio, Ur 0.8 (H) <0.2        Imaging/Diagnostics Last 24 Hours   XR CHEST PORTABLE    Result Date: 2/2/2023  EXAMINATION: ONE XRAY VIEW OF THE CHEST 2/2/2023 1:52 pm COMPARISON: 02/02/2023, 01/30/2023 HISTORY: ORDERING SYSTEM PROVIDED HISTORY: shortness of breath TECHNOLOGIST PROVIDED HISTORY: Reason for exam:->shortness of breath Reason for Exam: shortness of breath FINDINGS: Sternotomy wires. Prosthetic cardiac valve. Pacemaker wires. Lung volumes. Bibasilar opacities. No pneumothorax. Heart size is stable. Low lung volumes with bibasilar opacities which may represent atelectasis or pneumonia.      XR CHEST PORTABLE    Result Date: 2/2/2023  EXAMINATION: ONE X-RAY VIEW OF THE CHEST 2/2/2023 10:49 am COMPARISON: CT chest 01/30/2023, chest radiograph 01/30/2023 HISTORY: ORDERING SYSTEM PROVIDED HISTORY: Shortness of breath TECHNOLOGIST PROVIDED HISTORY: Reason for exam:->Shortness of breath Reason for Exam: shortness of breath FINDINGS: The lungs are underinflated, resulting in vascular crowding and subsegmental atelectasis. The cardiomediastinal silhouette is obscured, but likely unchanged. Bibasilar consolidations favor atelectasis. The left upper lobe is obscured. No new focal consolidation, pneumothorax, or right-sided pleural effusion. There is blunting of the left costophrenic angle, which may be due to low lung volumes and/or patient positioning. Osseous structures are diffusely demineralized and grossly unchanged. Left chest cardiac conduction device is again noted. Low lung volumes with likely bibasilar atelectasis versus developing infection. Comment: Please note this report has been produced using speech recognition software and may contain errors related to that system including errors in grammar, punctuation, and spelling, as well as words and phrases that may be inappropriate. If there are any questions or concerns please feel free to contact the dictating provider for clarification.      Electronically signed by Evelina Lopez MD on 2/2/2023 at 3:29 PM

## 2023-02-02 NOTE — PROGRESS NOTES
Patient with new AMS, and congestion. Dr. Trudy Garcia and Dr. Walls Rota at beside with patient and family. RTS called to bring bipap to bedside. Patient vitals stable at this time.

## 2023-02-02 NOTE — PROGRESS NOTES
Inpatient Progress Note 2/2/2023        Joan Alas  1945  [id number]      Assessment/Plan:  72yoF with PMH of Afib, CAD s/p CABG, DM, HTN. HLD, valvular heart disease s/p mitral valve repari. PFO closure, early dementia, tachy/ceasar syndrome s/p Pacer who presented with abdominal pain found to have acute cholecystitis s/p laparoscopic cholecystecomy who was upgraded to ICU for hypoxic resp failure with mixed septo-cardiogenic features. Problem list  Metabolic encephalopathy  Acute hypoxic respiratory failure  Septic shock  Cardiogenic shock? Cardiorenal syndrome? Acute kidney injury  Hyponatremia  Hypochloremia    Neuro: Metabolic encephalopathy etiology likely due to septic shock from pneumonia? .  Pain controlled on current regimen, adjust as needed. Cardio: Septic shock requiring vasoactive agents, cardiogenic component cannot be ruled out especially in the setting of her proBNP being 15,000. These findings could be mixed cardio septic, central venous that may help delineate 1 versus another. Otherwise continue vasoactive agents for blood pressure support titrate to MAP greater than 65, add inotropic agents for potential cardiogenic shock component and improvement of cardiac output. Previous echocardiogram performed on 1/30/  Resp: Hypoxic respiratory failure requiring BiPAP, likely due to worsening pneumonia potential aspiration? .  Till eating appropriately on BiPAP based on ABGs. If no improvement will require mechanical ventilation. Continue close monitoring with low threshold for intubation. GI: N.p.o. at this time, G-tube if able. GI prophylaxis. Status postcholecystectomy. : Worsening acute kidney injury, electrolytes appropriately monitored and replenished. Etiology of her TAYLOR could be due to sepsis versus cardiorenal syndrome.   Patient did receive a large volume bolus with noted worsening BNP which could be contributing to her overall status versus the worsening of her pneumonia which would be contributing also to her sepsis and acute kidney injury. Nephrology following  Heme: Hemoglobin stable, monitor and transfuse as needed  ID: Pneumonia/septic shock, on broad-spectrum intravenous antibiotics, cultures pending, narrow when able. Endo: Stress dose steroids for septic shock. Hyperglycemic, blood glucose goal 140 to 180 mg/dL, insulin sliding scale as needed. MSK: No acute issues at this time. Unable to tolerate PT or OT or out of bed to chair. Tubes/Lines/Drains: PICC, PIV, Rodriguez    Code Status: DNR CCA      Subjective:    Patient was seen and evaluated at bedside, on BiPAP in mid distress, sleepy but wakes up and responds to verbal stimuli. As per bedside nursing patient was more awake, alert and conversant throughout the day. Physical Exam:            /61   Pulse (!) 110   Temp 98.6 °F (37 °C) (Axillary)   Resp 17   Ht 5' 5\" (1.651 m)   Wt 189 lb 9.5 oz (86 kg)   SpO2 100%   BMI 31.55 kg/m²     General: NAD  Eyes: EOMI  ENT: neck supple  Cardiovascular: Regular rate. Respiratory: crackles  Gastrointestinal: Soft, mild tenderness in RUQ.    Genitourinary: no suprapubic tenderness  Musculoskeletal: 1+ pitting edema  Skin: warm, dry  Neuro: Alert drowzy      Current Medications:   piperacillin-tazobactam  3,375 mg IntraVENous Q8H    furosemide  40 mg IntraVENous BID    vancomycin  1,750 mg IntraVENous Once    vancomycin (VANCOCIN) intermittent dosing (placeholder)   Other RX Placeholder    lidocaine PF  5 mL IntraDERmal Once    sodium chloride flush  5-40 mL IntraVENous 2 times per day    pantoprazole  40 mg IntraVENous Daily    guaiFENesin  600 mg Oral BID    polyethylene glycol  17 g Oral Daily    sodium chloride flush  5-40 mL IntraVENous 2 times per day    [Held by provider] docusate sodium  100 mg Oral BID    [Held by provider] donepezil  10 mg Oral Nightly    [Held by provider] gabapentin  600 mg Oral BID    [Held by provider] memantine  10 mg Oral BID    [Held by provider] metoprolol tartrate  25 mg Oral BID    [Held by provider] rosuvastatin  40 mg Oral QPM       Labs, Imaging and Studies reviewed:    XR CHEST PORTABLE    Result Date: 2/2/2023  EXAMINATION: ONE XRAY VIEW OF THE CHEST 2/2/2023 1:52 pm COMPARISON: 02/02/2023, 01/30/2023 HISTORY: ORDERING SYSTEM PROVIDED HISTORY: shortness of breath TECHNOLOGIST PROVIDED HISTORY: Reason for exam:->shortness of breath Reason for Exam: shortness of breath FINDINGS: Sternotomy wires. Prosthetic cardiac valve. Pacemaker wires. Lung volumes. Bibasilar opacities. No pneumothorax. Heart size is stable. Low lung volumes with bibasilar opacities which may represent atelectasis or pneumonia. XR CHEST PORTABLE    Result Date: 2/2/2023  EXAMINATION: ONE X-RAY VIEW OF THE CHEST 2/2/2023 10:49 am COMPARISON: CT chest 01/30/2023, chest radiograph 01/30/2023 HISTORY: ORDERING SYSTEM PROVIDED HISTORY: Shortness of breath TECHNOLOGIST PROVIDED HISTORY: Reason for exam:->Shortness of breath Reason for Exam: shortness of breath FINDINGS: The lungs are underinflated, resulting in vascular crowding and subsegmental atelectasis. The cardiomediastinal silhouette is obscured, but likely unchanged. Bibasilar consolidations favor atelectasis. The left upper lobe is obscured. No new focal consolidation, pneumothorax, or right-sided pleural effusion. There is blunting of the left costophrenic angle, which may be due to low lung volumes and/or patient positioning. Osseous structures are diffusely demineralized and grossly unchanged. Left chest cardiac conduction device is again noted. Low lung volumes with likely bibasilar atelectasis versus developing infection.        Recent Results (from the past 24 hour(s))   CBC with Auto Differential    Collection Time: 02/02/23  6:22 AM   Result Value Ref Range    WBC 8.3 4.0 - 10.5 K/CU MM    RBC 2.86 (L) 4.2 - 5.4 M/CU MM    Hemoglobin 7.6 (L) 12.5 - 16.0 GM/DL    Hematocrit 25.8 (L) 37 - 47 %    MCV 90.2 78 - 100 FL    MCH 26.6 (L) 27 - 31 PG    MCHC 29.5 (L) 32.0 - 36.0 %    RDW 13.4 11.7 - 14.9 %    Platelets 789 220 - 380 K/CU MM    MPV 9.3 7.5 - 11.1 FL    Differential Type AUTOMATED DIFFERENTIAL     Segs Relative 87.1 (H) 36 - 66 %    Lymphocytes % 6.1 (L) 24 - 44 %    Monocytes % 6.5 (H) 0 - 4 %    Eosinophils % 0.0 0 - 3 %    Basophils % 0.1 0 - 1 %    Segs Absolute 7.2 K/CU MM    Lymphocytes Absolute 0.5 K/CU MM    Monocytes Absolute 0.5 K/CU MM    Eosinophils Absolute 0.0 K/CU MM    Basophils Absolute 0.0 K/CU MM    Nucleated RBC % 0.0 %    Total Nucleated RBC 0.0 K/CU MM    Total Immature Neutrophil 0.02 K/CU MM    Immature Neutrophil % 0.2 0 - 0.43 %   Basic Metabolic Panel    Collection Time: 02/02/23  6:22 AM   Result Value Ref Range    Sodium 121 (L) 135 - 145 MMOL/L    Potassium 4.8 3.5 - 5.1 MMOL/L    Chloride 88 (L) 99 - 110 mMol/L    CO2 20 (L) 21 - 32 MMOL/L    Anion Gap 13 4 - 16    BUN 38 (H) 6 - 23 MG/DL    Creatinine 1.7 (H) 0.6 - 1.1 MG/DL    Est, Glom Filt Rate 30 (L) >60 mL/min/1.73m2    Glucose 147 (H) 70 - 99 MG/DL    Calcium 7.3 (L) 8.3 - 10.6 MG/DL   Brain Natriuretic Peptide    Collection Time: 02/02/23  6:22 AM   Result Value Ref Range    Pro-BNP 15,652 (H) <300 PG/ML   Lactic Acid    Collection Time: 02/02/23  6:22 AM   Result Value Ref Range    Lactate 1.3 0.5 - 1.9 mMOL/L   Sodium, urine, random    Collection Time: 02/02/23 12:00 PM   Result Value Ref Range    Sodium, Ur 13 (L) 35 - 167 MMOL/L   Urinalysis with Reflex to Culture    Collection Time: 02/02/23 12:00 PM    Specimen: Urine   Result Value Ref Range    Color, UA YELLOW YELLOW    Clarity, UA CLOUDY (A) CLEAR    Glucose, Urine NEGATIVE NEGATIVE MG/DL    Bilirubin Urine NEGATIVE NEGATIVE MG/DL    Ketones, Urine NEGATIVE NEGATIVE MG/DL    Specific Gravity, UA 1.025 1.001 - 1.035    Blood, Urine MODERATE NUMBER OR AMOUNT OBSERVED (A) NEGATIVE    pH, Urine 5.5 5.0 - 8.0    Protein, UA 30 (A) NEGATIVE MG/DL    Urobilinogen, Urine 0.2 0.2 - 1.0 MG/DL    Nitrite Urine, Quantitative NEGATIVE NEGATIVE    Leukocyte Esterase, Urine SMALL NUMBER OR AMOUNT OBSERVED (A) NEGATIVE    RBC, UA NONE SEEN 0 - 6 /HPF    WBC, UA 20 (H) 0 - 5 /HPF    Bacteria, UA NEGATIVE NEGATIVE /HPF    Squam Epithel, UA 2 /HPF    Trans Epithel, UA <1 /HPF    Mucus, UA RARE (A) NEGATIVE HPF    Trichomonas, UA NONE SEEN NONE SEEN /HPF   Protein / creatinine ratio, urine    Collection Time: 02/02/23 12:00 PM   Result Value Ref Range    Urine Total Protein 95.7 (H) <12 MG/DL    Creatinine, Ur 113.4 28 - 217 MG/DL    Prot/Creat Ratio, Ur 0.8 (H) <0.2   Blood gas, arterial    Collection Time: 02/02/23  2:00 PM   Result Value Ref Range    pH, Bld 7.44 7.34 - 7.45    pCO2, Arterial 35.0 32 - 45 MMHG    pO2, Arterial 76 75 - 100 MMHG    Base Exc, Mixed . 1 0 - 2.3    HCO3, Arterial 23.8 (H) 18 - 23 MMOL/L    CO2 Content 24.9 (H) 19 - 24 MMOL/L    O2 Sat 94.8 (L) 96 - 97 %    Carbon Monoxide, Blood 1.9 0 - 5 %    Methemoglobin, Arterial 1.4 <1.5 %    Comment 18/5 40%    Critical Care Panel    Collection Time: 02/02/23  3:15 PM   Result Value Ref Range    Sodium 121 (L) 135 - 145 MMOL/L    Potassium 4.8 3.5 - 5.1 MMOL/L    Chloride 87 (L) 99 - 110 mMol/L    CO2 23 21 - 32 MMOL/L    Anion Gap 11 4 - 16    BUN 39 (H) 6 - 23 MG/DL    Creatinine 1.8 (H) 0.6 - 1.1 MG/DL    Est, Glom Filt Rate 28 (L) >60 mL/min/1.73m2    Glucose 159 (H) 70 - 99 MG/DL    Calcium 7.3 (L) 8.3 - 10.6 MG/DL    Phosphorus 4.1 2.5 - 4.9 MG/DL    Magnesium 2.1 1.8 - 2.4 mg/dl   Procalcitonin    Collection Time: 02/02/23  3:15 PM   Result Value Ref Range    Procalcitonin 7.36      Critical care attestation:    I spent 55  cumulative minutes (excluding procedures), in full attention to this critically ill patient. I have personally reviewed the patient's history and interval events in the EMR, along with vitals, labs and radiology imaging.  Critical care time was spent personally providing care for this patient, excluding billable procedures, and no overlapping with any other provider. This includes documenting my assessment and plan of care and developing the care plan to treat the problems below. The high probability of deterioration required my full and direct attention, intervention, and personal management due to do to underlying diagnosis and clinical problems including the treatment of active or impending:  [] Neurological monitoring and treatment  [x] Respiratory failure -assessment of ventilator support, adjustment, ventilator weaning  [x] Hemodynamic and volume assessment with volume resuscitation  [x] Mechanical and/or chemical support of the circulation,  [x] Frequent vasoactive agent adjustment,  [] Renal replacement therapy,  [x] For rapid decompensation,  [x] Electrolyte instability  [] Suctioning every 2 hours  [] Every hour neuro checks  [] Every hour neurovascular checks  [x] Frequent evaluation of patient's response to treatment and titration of therapies,  [x] Interpretation of laboratory and radiological data,  [x] Application and interpretation of advanced monitoring technologies,  [x] Extensive interpretation of multiple databases,  [x] Development of treatment plan with patient, surrogate, or consultants.   [] Others:   Electronically signed by Pawan Gross MD on 2/2/2023 at 6:39 PM

## 2023-02-02 NOTE — CONSULTS
[id number] [facility]  consulted by Dr. Hans Awad for monitoring and adjustment. Indication for treatment: Vancomycin indication: HAP  Goal trough: Trough Goal: 15-20 mcg/mL  AUC/THERON: 400-600    Risk Factors for MRSA Identified:   Hospitalization within the past 90 days, Received IV antibiotics within the past 90 days    Pertinent Laboratory Values:   Temp Readings from Last 3 Encounters:   02/02/23 97.9 °F (36.6 °C) (Axillary)   07/12/22 98.1 °F (36.7 °C) (Oral)   09/23/21 98.9 °F (37.2 °C) (Axillary)     Recent Labs     01/31/23  0239 02/01/23  0450 02/01/23  1005 02/02/23  0622   WBC 8.6 12.0*  --  8.3   LACTATE  --   --  2.0* 1.3     Recent Labs     02/01/23  0450 02/01/23  1005 02/02/23  0622   BUN 29* 30* 38*   CREATININE 2.0* 1.8* 1.7*     Estimated Creatinine Clearance: 29 mL/min (A) (based on SCr of 1.7 mg/dL (H)). Intake/Output Summary (Last 24 hours) at 2/2/2023 1510  Last data filed at 2/2/2023 1110  Gross per 24 hour   Intake --   Output 305 ml   Net -305 ml       Pertinent Cultures:   Date    Source    Results  2/2   Blood    ordered  2/2   Sputum/Respiratory  ordered  2/2   MRSA Nasal   ordered    Ideal body weight: 57 kg (125 lb 10.6 oz)  Adjusted ideal body weight: 68.6 kg (151 lb 3.8 oz)  Actual weight: 86kg    Vancomycin level:   TROUGH:  No results for input(s): VANCOTROUGH in the last 72 hours. RANDOM:  No results for input(s): VANCORANDOM in the last 72 hours. Assessment:  HPI: Pt is 76 yof admitted 1/30 for CHF exacerbation. Patient now with new onset AMS and requiring BiPAP and vasopressors. Chest x-ray showing bibasilar opacities. SCr, BUN, and urine output: TAYLOR with incomplete I/Os  Day(s) of therapy: 1  Vancomycin concentration: TBD    Plan: Will give vancomycin 1750mg IV x 1 (25mg/kg) and plan for vanco level in AM to assess vanco clearance.   Pharmacy will continue to monitor patient and adjust therapy as indicated    [facility] [address]8 FOR 2/3 @0600    Thank you for the consult.   Hayley Bashir [facility] [place], PharmD  2/2/2023 3:10 PM

## 2023-02-02 NOTE — PROGRESS NOTES
Today's plan: patient is congested, CHF reviewed, appears increased congestion, may have left sided pneumonia also,  Na 121, will recommend to transfer to ICU,nehrology consult recommended, she may need lasix drip    Admit Date:  1/30/2023    Subjective: Okay      Chief complaints on admission  Chief Complaint   Patient presents with    Chest Pain     X 3 days          History of present illness:Jaimie is a 76 y. o.year old who  presents with had concerns including Chest Pain (X 3 days ). Past medical history:    has a past medical history of Anxiety, Arthritis, Atrial fibrillation ([facility] [address] CAD (coronary artery disease), COVID-19, Diabetes mellitus ([facility] [address] H/O cardiac catheterization, H/O cardiovascular stress test, H/O echocardiogram, H/O echocardiogram, H/O echocardiogram, H/O three vessel coronary artery bypass, History of Holter monitoring, History of nuclear stress test, Hyperlipidemia, Hypertension, Memory loss, Missing teeth, acquired, Pneumonia, Risk for falls, TIA (transient ischemic attack), Urinary leakage, UTI (urinary tract infection), and Wears glasses. Past surgical history:   has a past surgical history that includes Cardiac catheterization (06/25/2018); Nelson tooth extraction; eye surgery (Bilateral, 2018); Tonsillectomy (1940's); thoracentesis (Bilateral, 07/13/2018); Pacemaker insertion (Left, 12/11/2018); CABG with Mitral Valve Repair (07/09/2018); Mitral valve maze procedure (07/19/2018); and Cholecystectomy, laparoscopic (N/A, 1/31/2023). Social History:   reports that she has never smoked. She has never used smokeless tobacco. She reports that she does not drink alcohol and does not use drugs. Family history:  family history includes Breast Cancer in her sister; Diabetes in her sister; Early Death in her brother and father; Heart Disease in her father and sister; Other in her sister; Parkinsonism in her brother; Stroke in her mother.     No Known Allergies      Objective:   BP (!) 122/50   Pulse 85   Temp 98.2 °F (36.8 °C)   Resp 20   Ht 5' 5\" (1.651 m)   Wt 189 lb 9.5 oz (86 kg)   SpO2 96%   BMI 31.55 kg/m²     Intake/Output Summary (Last 24 hours) at 2/2/2023 1255  Last data filed at 2/2/2023 1110  Gross per 24 hour   Intake --   Output 305 ml   Net -305 ml         TELEMETRY:      Physical Exam:  Constitutional:  Well developed, Well nourished, No acute distress, Non-toxic appearance.   HENT:  Normocephalic, Atraumatic, Bilateral external ears normal, Oropharynx moist, No oral exudates, Nose normal. Neck- Normal range of motion, No tenderness, Supple, No stridor.   Eyes:  PERRL, EOMI, Conjunctiva normal, No discharge.   Respiratory:  Normal breath sounds, No respiratory distress, No wheezing, No chest tenderness.,no use of accessory muscles, diaphragm movement is normal  Cardiovascular: (PMI) apex non displaced,no lifts no thrills, no s3,no s4, Normal heart rate, Normal rhythm, No murmurs, No rubs, No gallops. Carotid arteries pulse and amplitude are normal no bruit, no abdominal bruit noted ( normal abdominal aorta ausculation), femoral arteries pulse and amplitude are normal no bruit, pedal pulses are normal  GI:  Bowel sounds normal, Soft, No tenderness, No masses, No pulsatile masses.   : External genitalia appear normal, No masses or lesions. No discharge. No CVA tenderness.   Musculoskeletal:  Intact distal pulses, No edema, No tenderness, No cyanosis, No clubbing. Good range of motion in all major joints. No tenderness to palpation or major deformities noted. Back- No tenderness.   Integument:  Warm, Dry, No erythema, No rash.   Lymphatic:  No lymphadenopathy noted.   Neurologic:  Alert & oriented x 3, Normal motor function, Normal sensory function, No focal deficits noted.   Psychiatric:  Affect normal, Judgment normal, Mood normal.     Medications:    piperacillin-tazobactam  3,375 mg IntraVENous Q8H    guaiFENesin  600 mg Oral BID    polyethylene glycol  17 g Oral Daily     sodium chloride flush  5-40 mL IntraVENous 2 times per day    docusate sodium  100 mg Oral BID    donepezil  10 mg Oral Nightly    [Held by provider] furosemide  20 mg Oral Daily    [Held by provider] gabapentin  600 mg Oral BID    memantine  10 mg Oral BID    metoprolol tartrate  25 mg Oral BID    pantoprazole  40 mg Oral QAM AC    rosuvastatin  40 mg Oral QPM      sodium chloride       morphine, droperidol, HYDROcodone-acetaminophen, sodium chloride flush, sodium chloride, ondansetron **OR** ondansetron, acetaminophen **OR** acetaminophen, ipratropium    Lab Data:  CBC:   Recent Labs     01/31/23  0239 02/01/23  0450 02/02/23  0622   WBC 8.6 12.0* 8.3   HGB 11.0* 8.8* 7.6*   HCT 33.5* 27.9* 25.8*   MCV 81.9 84.8 90.2    185 166       BMP:   Recent Labs     02/01/23  0450 02/01/23  1005 02/02/23  0622   * 130* 121*   K 4.8 4.7 4.8   CL 94* 95* 88*   CO2 21 21 20*   BUN 29* 30* 38*   CREATININE 2.0* 1.8* 1.7*       LIVER PROFILE:   Recent Labs     02/01/23  1005   AST 57*   ALT 22   BILITOT 0.8   ALKPHOS 102       PT/INR: No results for input(s): PROTIME, INR in the last 72 hours. APTT: No results for input(s): APTT in the last 72 hours. BNP:  No results for input(s): BNP in the last 72 hours. TROPONIN: @TROPONINI:3@      Assessment:  76 y. o.year old who is admitted for          Plan:  Chest pain: Patient appears to have right-sided chest pain with coughing, CAT scan of the chest is suspicious for cholecystitis based on distended gallbladder will recommend to get GI or surgical consult, she may need HIDA  CAD, history of bypass in 2018, has LIMA to LAD SVG to circumflex OM1 and OM 2 stable for now, had history of intra.   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 fib history of maze, will get EP evaluation if we can discontinue Eliquis because she history of fall into the parenchymal bleed in the brain in past and if the pacemaker does not show any underlying A. fib may need may be able to stop Eliquis  Critical time is performed by myself in  35 minutes exclusive of separately billable procedures. This time includes bedside physical exams and repeat evaluation, close medical management, titration of IV  drip, discussion with consultants, review of diagnostic testing results and monitoring for potential decompensation. Health maintenance: exerise and   All labs, medications and tests reviewed, continue all other medications of all above medical condition listed as is.       Veronica Cuevas MD, MD 2/2/2023 12:55 PM

## 2023-02-03 LAB
ALBUMIN SERPL-MCNC: 3.1 GM/DL (ref 3.4–5)
ALBUMIN SERPL-MCNC: 3.2 GM/DL (ref 3.4–5)
ALBUMIN SERPL-MCNC: 3.3 GM/DL (ref 3.4–5)
ALP BLD-CCNC: 106 IU/L (ref 40–129)
ALP BLD-CCNC: 108 IU/L (ref 40–129)
ALP BLD-CCNC: 112 IU/L (ref 40–129)
ALT SERPL-CCNC: 13 U/L (ref 10–40)
ANION GAP SERPL CALCULATED.3IONS-SCNC: 13 MMOL/L (ref 4–16)
ANION GAP SERPL CALCULATED.3IONS-SCNC: 15 MMOL/L (ref 4–16)
ANION GAP SERPL CALCULATED.3IONS-SCNC: 15 MMOL/L (ref 4–16)
APTT: 43.5 SECONDS (ref 25.1–37.1)
APTT: 43.5 SECONDS (ref 25.1–37.1)
APTT: 49 SECONDS (ref 25.1–37.1)
AST SERPL-CCNC: 56 IU/L (ref 15–37)
AST SERPL-CCNC: 56 IU/L (ref 15–37)
AST SERPL-CCNC: 59 IU/L (ref 15–37)
BASE EXCESS MIXED: 17 (ref 0–2.3)
BASE EXCESS: 0 (ref 0–2.4)
BASE EXCESS: 2 (ref 0–2.4)
BASE EXCESS: 2 (ref 0–2.4)
BASOPHILS ABSOLUTE: 0 K/CU MM
BASOPHILS RELATIVE PERCENT: 0.1 % (ref 0–1)
BASOPHILS RELATIVE PERCENT: 0.1 % (ref 0–1)
BASOPHILS RELATIVE PERCENT: 0.4 % (ref 0–1)
BILIRUB SERPL-MCNC: 0.7 MG/DL (ref 0–1)
BILIRUB SERPL-MCNC: 0.8 MG/DL (ref 0–1)
BILIRUB SERPL-MCNC: 0.9 MG/DL (ref 0–1)
BILIRUBIN DIRECT: 0.5 MG/DL (ref 0–0.3)
BILIRUBIN, INDIRECT: 0.2 MG/DL (ref 0–0.7)
BILIRUBIN, INDIRECT: 0.3 MG/DL (ref 0–0.7)
BILIRUBIN, INDIRECT: 0.4 MG/DL (ref 0–0.7)
BUN SERPL-MCNC: 39 MG/DL (ref 6–23)
BUN SERPL-MCNC: 40 MG/DL (ref 6–23)
BUN SERPL-MCNC: 40 MG/DL (ref 6–23)
CALCIUM SERPL-MCNC: 7.1 MG/DL (ref 8.3–10.6)
CALCIUM SERPL-MCNC: 7.2 MG/DL (ref 8.3–10.6)
CALCIUM SERPL-MCNC: 7.3 MG/DL (ref 8.3–10.6)
CARBON MONOXIDE, BLOOD: 2.3 % (ref 0–5)
CHLORIDE BLD-SCNC: 92 MMOL/L (ref 99–110)
CHLORIDE BLD-SCNC: 95 MMOL/L (ref 99–110)
CHLORIDE BLD-SCNC: 95 MMOL/L (ref 99–110)
CO2 CONTENT: 45.1 MMOL/L (ref 19–24)
CO2: 21 MMOL/L (ref 21–32)
CO2: 22 MMOL/L (ref 21–32)
CO2: 23 MMOL/L (ref 21–32)
COMMENT: ABNORMAL
CREAT SERPL-MCNC: 1.5 MG/DL (ref 0.6–1.1)
CREAT SERPL-MCNC: 1.6 MG/DL (ref 0.6–1.1)
CREAT SERPL-MCNC: 1.9 MG/DL (ref 0.6–1.1)
D DIMER: 4700 NG/ML(DDU)
DIFFERENTIAL TYPE: ABNORMAL
DOSE AMOUNT: NORMAL
DOSE TIME: NORMAL
EOSINOPHILS ABSOLUTE: 0 K/CU MM
EOSINOPHILS RELATIVE PERCENT: 0 % (ref 0–3)
EOSINOPHILS RELATIVE PERCENT: 0 % (ref 0–3)
EOSINOPHILS RELATIVE PERCENT: 0.4 % (ref 0–3)
FIBRINOGEN LEVEL: 691 MG/DL (ref 196.9–442.1)
GFR SERPL CREATININE-BSD FRML MDRD: 27 ML/MIN/1.73M2
GFR SERPL CREATININE-BSD FRML MDRD: 33 ML/MIN/1.73M2
GFR SERPL CREATININE-BSD FRML MDRD: 35 ML/MIN/1.73M2
GLUCOSE SERPL-MCNC: 188 MG/DL (ref 70–99)
GLUCOSE SERPL-MCNC: 197 MG/DL (ref 70–99)
GLUCOSE SERPL-MCNC: 208 MG/DL (ref 70–99)
HCO3 ARTERIAL: 43.4 MMOL/L (ref 18–23)
HCO3 VENOUS: 23 MMOL/L (ref 19–25)
HCO3 VENOUS: 23.2 MMOL/L (ref 19–25)
HCO3 VENOUS: 24.1 MMOL/L (ref 19–25)
HCT VFR BLD CALC: 20.6 % (ref 37–47)
HCT VFR BLD CALC: 23.8 % (ref 37–47)
HCT VFR BLD CALC: 24.3 % (ref 37–47)
HEMOGLOBIN: 6.9 GM/DL (ref 12.5–16)
HEMOGLOBIN: 8.2 GM/DL (ref 12.5–16)
HEMOGLOBIN: 8.2 GM/DL (ref 12.5–16)
IMMATURE NEUTROPHIL %: 0.4 % (ref 0–0.43)
IMMATURE NEUTROPHIL %: 0.6 % (ref 0–0.43)
IMMATURE NEUTROPHIL %: 1.6 % (ref 0–0.43)
INR BLD: 1.12 INDEX
INR BLD: 1.15 INDEX
INR BLD: 1.25 INDEX
L PNEUMO AG UR QL IA: NEGATIVE
LACTATE: 0.9 MMOL/L (ref 0.5–1.9)
LACTATE: 1 MMOL/L (ref 0.5–1.9)
LACTATE: 1.1 MMOL/L (ref 0.5–1.9)
LYMPHOCYTES ABSOLUTE: 0.2 K/CU MM
LYMPHOCYTES ABSOLUTE: 0.2 K/CU MM
LYMPHOCYTES ABSOLUTE: 0.4 K/CU MM
LYMPHOCYTES RELATIVE PERCENT: 1.8 % (ref 24–44)
LYMPHOCYTES RELATIVE PERCENT: 2.8 % (ref 24–44)
LYMPHOCYTES RELATIVE PERCENT: 4.4 % (ref 24–44)
MAGNESIUM: 2.2 MG/DL (ref 1.8–2.4)
MAGNESIUM: 2.2 MG/DL (ref 1.8–2.4)
MAGNESIUM: 2.3 MG/DL (ref 1.8–2.4)
MCH RBC QN AUTO: 27.1 PG (ref 27–31)
MCH RBC QN AUTO: 27.3 PG (ref 27–31)
MCH RBC QN AUTO: 27.5 PG (ref 27–31)
MCHC RBC AUTO-ENTMCNC: 33.5 % (ref 32–36)
MCHC RBC AUTO-ENTMCNC: 33.7 % (ref 32–36)
MCHC RBC AUTO-ENTMCNC: 34.5 % (ref 32–36)
MCV RBC AUTO: 79.3 FL (ref 78–100)
MCV RBC AUTO: 80.8 FL (ref 78–100)
MCV RBC AUTO: 81.5 FL (ref 78–100)
METHEMOGLOBIN ARTERIAL: 1.2 %
MONOCYTES ABSOLUTE: 0.3 K/CU MM
MONOCYTES ABSOLUTE: 0.4 K/CU MM
MONOCYTES ABSOLUTE: 0.5 K/CU MM
MONOCYTES RELATIVE PERCENT: 3.8 % (ref 0–4)
MONOCYTES RELATIVE PERCENT: 4 % (ref 0–4)
MONOCYTES RELATIVE PERCENT: 5.1 % (ref 0–4)
NUCLEATED RBC %: 0 %
O2 SAT, VEN: 83.5 % (ref 50–70)
O2 SAT, VEN: 89.8 % (ref 50–70)
O2 SAT, VEN: 89.9 % (ref 50–70)
O2 SATURATION: 93.8 % (ref 96–97)
PCO2 ARTERIAL: 57 MMHG (ref 32–45)
PCO2, VEN: 38 MMHG (ref 38–52)
PCO2, VEN: 38 MMHG (ref 38–52)
PCO2, VEN: 41 MMHG (ref 38–52)
PDW BLD-RTO: 13.3 % (ref 11.7–14.9)
PDW BLD-RTO: 13.7 % (ref 11.7–14.9)
PDW BLD-RTO: 13.8 % (ref 11.7–14.9)
PH BLOOD: 7.49 (ref 7.34–7.45)
PH VENOUS: 7.36 (ref 7.32–7.42)
PH VENOUS: 7.39 (ref 7.32–7.42)
PH VENOUS: 7.41 (ref 7.32–7.42)
PHOSPHORUS: 3.5 MG/DL (ref 2.5–4.9)
PHOSPHORUS: 4.3 MG/DL (ref 2.5–4.9)
PHOSPHORUS: 4.6 MG/DL (ref 2.5–4.9)
PLATELET # BLD: 166 K/CU MM (ref 140–440)
PLATELET # BLD: 173 K/CU MM (ref 140–440)
PLATELET # BLD: 176 K/CU MM (ref 140–440)
PMV BLD AUTO: 9.2 FL (ref 7.5–11.1)
PMV BLD AUTO: 9.4 FL (ref 7.5–11.1)
PMV BLD AUTO: 9.5 FL (ref 7.5–11.1)
PO2 ARTERIAL: 72 MMHG (ref 75–100)
PO2, VEN: 48 MMHG (ref 28–48)
PO2, VEN: 59 MMHG (ref 28–48)
PO2, VEN: 61 MMHG (ref 28–48)
POTASSIUM SERPL-SCNC: 3.7 MMOL/L (ref 3.5–5.1)
POTASSIUM SERPL-SCNC: 3.9 MMOL/L (ref 3.5–5.1)
POTASSIUM SERPL-SCNC: 4 MMOL/L (ref 3.5–5.1)
PROCALCITONIN SERPL-MCNC: 5.09 NG/ML
PROTHROMBIN TIME: 14.4 SECONDS (ref 11.7–14.5)
PROTHROMBIN TIME: 14.8 SECONDS (ref 11.7–14.5)
PROTHROMBIN TIME: 16.1 SECONDS (ref 11.7–14.5)
RBC # BLD: 2.55 M/CU MM (ref 4.2–5.4)
RBC # BLD: 2.98 M/CU MM (ref 4.2–5.4)
RBC # BLD: 3 M/CU MM (ref 4.2–5.4)
S PNEUM AG CSF QL: NORMAL
SEGMENTED NEUTROPHILS ABSOLUTE COUNT: 6.4 K/CU MM
SEGMENTED NEUTROPHILS ABSOLUTE COUNT: 8 K/CU MM
SEGMENTED NEUTROPHILS ABSOLUTE COUNT: 8.3 K/CU MM
SEGMENTED NEUTROPHILS RELATIVE PERCENT: 88.1 % (ref 36–66)
SEGMENTED NEUTROPHILS RELATIVE PERCENT: 92.7 % (ref 36–66)
SEGMENTED NEUTROPHILS RELATIVE PERCENT: 93.7 % (ref 36–66)
SODIUM BLD-SCNC: 128 MMOL/L (ref 135–145)
SODIUM BLD-SCNC: 131 MMOL/L (ref 135–145)
SODIUM BLD-SCNC: 132 MMOL/L (ref 135–145)
TOTAL IMMATURE NEUTOROPHIL: 0.04 K/CU MM
TOTAL IMMATURE NEUTOROPHIL: 0.04 K/CU MM
TOTAL IMMATURE NEUTOROPHIL: 0.15 K/CU MM
TOTAL NUCLEATED RBC: 0 K/CU MM
TOTAL PROTEIN: 5.1 GM/DL (ref 6.4–8.2)
TOTAL PROTEIN: 5.2 GM/DL (ref 6.4–8.2)
TOTAL PROTEIN: 5.4 GM/DL (ref 6.4–8.2)
VANCOMYCIN RANDOM: <4 UG/ML
WBC # BLD: 6.9 K/CU MM (ref 4–10.5)
WBC # BLD: 8.9 K/CU MM (ref 4–10.5)
WBC # BLD: 9.1 K/CU MM (ref 4–10.5)

## 2023-02-03 PROCEDURE — 2000000000 HC ICU R&B

## 2023-02-03 PROCEDURE — 83735 ASSAY OF MAGNESIUM: CPT

## 2023-02-03 PROCEDURE — 92610 EVALUATE SWALLOWING FUNCTION: CPT

## 2023-02-03 PROCEDURE — 2580000003 HC RX 258: Performed by: NURSE PRACTITIONER

## 2023-02-03 PROCEDURE — 36430 TRANSFUSION BLD/BLD COMPNT: CPT

## 2023-02-03 PROCEDURE — 82248 BILIRUBIN DIRECT: CPT

## 2023-02-03 PROCEDURE — 94660 CPAP INITIATION&MGMT: CPT

## 2023-02-03 PROCEDURE — 84145 PROCALCITONIN (PCT): CPT

## 2023-02-03 PROCEDURE — 86900 BLOOD TYPING SEROLOGIC ABO: CPT

## 2023-02-03 PROCEDURE — 85379 FIBRIN DEGRADATION QUANT: CPT

## 2023-02-03 PROCEDURE — 2580000003 HC RX 258: Performed by: STUDENT IN AN ORGANIZED HEALTH CARE EDUCATION/TRAINING PROGRAM

## 2023-02-03 PROCEDURE — 2700000000 HC OXYGEN THERAPY PER DAY

## 2023-02-03 PROCEDURE — 82805 BLOOD GASES W/O2 SATURATION: CPT

## 2023-02-03 PROCEDURE — 85610 PROTHROMBIN TIME: CPT

## 2023-02-03 PROCEDURE — 6360000002 HC RX W HCPCS: Performed by: STUDENT IN AN ORGANIZED HEALTH CARE EDUCATION/TRAINING PROGRAM

## 2023-02-03 PROCEDURE — 85014 HEMATOCRIT: CPT

## 2023-02-03 PROCEDURE — 94761 N-INVAS EAR/PLS OXIMETRY MLT: CPT

## 2023-02-03 PROCEDURE — 80053 COMPREHEN METABOLIC PANEL: CPT

## 2023-02-03 PROCEDURE — 86901 BLOOD TYPING SEROLOGIC RH(D): CPT

## 2023-02-03 PROCEDURE — 85018 HEMOGLOBIN: CPT

## 2023-02-03 PROCEDURE — 83605 ASSAY OF LACTIC ACID: CPT

## 2023-02-03 PROCEDURE — 2580000003 HC RX 258: Performed by: SURGERY

## 2023-02-03 PROCEDURE — 85730 THROMBOPLASTIN TIME PARTIAL: CPT

## 2023-02-03 PROCEDURE — 99233 SBSQ HOSP IP/OBS HIGH 50: CPT | Performed by: INTERNAL MEDICINE

## 2023-02-03 PROCEDURE — 99024 POSTOP FOLLOW-UP VISIT: CPT | Performed by: SURGERY

## 2023-02-03 PROCEDURE — 86850 RBC ANTIBODY SCREEN: CPT

## 2023-02-03 PROCEDURE — 80202 ASSAY OF VANCOMYCIN: CPT

## 2023-02-03 PROCEDURE — 85025 COMPLETE CBC W/AUTO DIFF WBC: CPT

## 2023-02-03 PROCEDURE — 85384 FIBRINOGEN ACTIVITY: CPT

## 2023-02-03 PROCEDURE — 82803 BLOOD GASES ANY COMBINATION: CPT

## 2023-02-03 PROCEDURE — 84100 ASSAY OF PHOSPHORUS: CPT

## 2023-02-03 PROCEDURE — P9016 RBC LEUKOCYTES REDUCED: HCPCS

## 2023-02-03 PROCEDURE — 86922 COMPATIBILITY TEST ANTIGLOB: CPT

## 2023-02-03 PROCEDURE — C9113 INJ PANTOPRAZOLE SODIUM, VIA: HCPCS | Performed by: STUDENT IN AN ORGANIZED HEALTH CARE EDUCATION/TRAINING PROGRAM

## 2023-02-03 RX ORDER — SODIUM CHLORIDE 9 MG/ML
INJECTION, SOLUTION INTRAVENOUS PRN
Status: DISCONTINUED | OUTPATIENT
Start: 2023-02-03 | End: 2023-02-04

## 2023-02-03 RX ADMIN — PANTOPRAZOLE SODIUM 40 MG: 40 INJECTION, POWDER, LYOPHILIZED, FOR SOLUTION INTRAVENOUS at 08:37

## 2023-02-03 RX ADMIN — VANCOMYCIN HYDROCHLORIDE 1000 MG: 1 INJECTION, POWDER, LYOPHILIZED, FOR SOLUTION INTRAVENOUS at 08:43

## 2023-02-03 RX ADMIN — PIPERACILLIN AND TAZOBACTAM 3375 MG: 3; .375 INJECTION, POWDER, LYOPHILIZED, FOR SOLUTION INTRAVENOUS at 18:25

## 2023-02-03 RX ADMIN — SODIUM CHLORIDE, PRESERVATIVE FREE 10 ML: 5 INJECTION INTRAVENOUS at 08:38

## 2023-02-03 RX ADMIN — HYDROCORTISONE SODIUM SUCCINATE 50 MG: 100 INJECTION, POWDER, FOR SOLUTION INTRAMUSCULAR; INTRAVENOUS at 03:54

## 2023-02-03 RX ADMIN — HYDROCORTISONE SODIUM SUCCINATE 50 MG: 100 INJECTION, POWDER, FOR SOLUTION INTRAMUSCULAR; INTRAVENOUS at 15:54

## 2023-02-03 RX ADMIN — HYDROCORTISONE SODIUM SUCCINATE 50 MG: 100 INJECTION, POWDER, FOR SOLUTION INTRAMUSCULAR; INTRAVENOUS at 21:04

## 2023-02-03 RX ADMIN — PIPERACILLIN AND TAZOBACTAM 3375 MG: 3; .375 INJECTION, POWDER, LYOPHILIZED, FOR SOLUTION INTRAVENOUS at 12:39

## 2023-02-03 RX ADMIN — SODIUM CHLORIDE, PRESERVATIVE FREE 10 ML: 5 INJECTION INTRAVENOUS at 21:05

## 2023-02-03 RX ADMIN — HYDROCORTISONE SODIUM SUCCINATE 50 MG: 100 INJECTION, POWDER, FOR SOLUTION INTRAMUSCULAR; INTRAVENOUS at 08:37

## 2023-02-03 RX ADMIN — PIPERACILLIN AND TAZOBACTAM 3375 MG: 3; .375 INJECTION, POWDER, LYOPHILIZED, FOR SOLUTION INTRAVENOUS at 03:52

## 2023-02-03 ASSESSMENT — PAIN SCALES - GENERAL
PAINLEVEL_OUTOF10: 0

## 2023-02-03 NOTE — PROGRESS NOTES
Pt is now on Vapo Therm at 40% FiO2 and 30 L/min. Her respirations are easy & unlabored. She is alert & oriented x 4 and appears to be calm and in no acute distress. Her skin is warm and dry. A bedside nursing swallow eval was completed on Pt and she started to cough when she took a couple sips of water. Informed Critical Care PA, Venus, of this and an order for speech therapy for a swallow eval was obtained for Pt. Pt's family members are at the bedside with her. Pt and her family members currently deny any needs and/or concerns. Will continue to monitor her closely.

## 2023-02-03 NOTE — PROGRESS NOTES
Patient persistently dropping Hgb since surgery. Will benefit from CTA for evaluation of bleeding. Discussed risk benefit of contrast administration in setting of TAYLOR which include but not limited to worsening of kidney function and Dialysis. Patient and family verbalized understanding and agree with procedure.

## 2023-02-03 NOTE — CONSULTS
PICC consult complete; indication for line: vesicant and hemodynamically unstable. Procedure, rationale, risks/benefits discussed with family. Verbal consent obtained from sister. TO completed at 1810. PICC inserted in LINCOLN basilic vessel w/using sterile, US guided technique. PICC inserted without difficulty- unable to use VPSG4 d/t patient in A-fib. STAT Portable obtained to confirm tip location; both lumens with brisk blood return and no resistance. PICC OK to use per Critical care physician, Dr. Campos William.

## 2023-02-03 NOTE — PROGRESS NOTES
V2.0  [facility] [place] Hospital[place] Progress Note      Name:  Sofia De La Cruz /Age/Sex: 1945  (76 y.o. female)   MRN & CSN:  [id number] & [id number] Encounter Date/Time: 2/3/2023 3:29 PM EST    Location:  -A PCP: Cheryl Meade MD       Hospital Day: 5    Assessment and Plan:   Sofia De La Cruz is a 76 y.o. female with pmh of Sofia De La Cruz is a 76 y.o. female with pmh of CAD s/p CABG, Valvular Heart Disease, s/p Mitral Valve Repair, PFO Closure, Paroxysmal A-Fib, HTN, HLD, DM, TIA, Early Dementia who presents with Chest pain Rt Sided and RUQ Abdominal Pain      Plan:    Acute hypoxic respiratory failure  Patient with acute respiratory failure was on nasal cannula. Became acutely more short of breath. CT did show what appears to be either fluid overload or viral pneumonia. Patient placed on BiPAP and still on it when seen. Difficulty opening her eyes. F/U ICU recs    Sepsis - Not present on admission, source to be determined  Patient with potential abnormality as well she did have acute solid cholecystitis status post lap pedro 2023. High risk for postoperative infection requiring pneumonia. WBC 12, SBP 92, RR 22. Procal trending down to 5. Patient on IV Zosyn and IV vanc   F/U ICU recs   F/U pan culture   Off pressors   On solucortef 50mg Q6H    Cholecystitis  Patient underwent lap pedro on 2023 with Dr. Kaela Benavides. ARVIL drain in place  IV antibiotics for possible postoperative infection  GS on board     TAYLOR  Pt with Cr 2 on admission, trended downt o 1.7 but back up to 1.9. baseline Cr 0.7. Nephrology on board    Hypochloremic Hyponatremia  Patient with acute worsening hyponatremia to 121 and now back up to 128  Nephrology consulted    Acute on chronic systolic CHF  Patient with an EF 50% with hypokinetic inferio-lateral wall and basal anterio-septal carson the 45 (become acutely short of breath with progressive pulmonary edema.   Cardiology on board  Nephrology on board    CAD s/p CABG  Patient is also known valvular heart disease. Cardiology) following    Acute Normocytic Anemia 2/2 possibly Post-operative loss and hemodilution 2/2 fluid overload   Hb 6.9 today. Was 11 on 1/31/2023  1U PRBC per ICU     Diet Diet NPO Exceptions are: Sips of Water with Meds   DVT Prophylaxis [] Lovenox, []  Heparin, [x] SCDs, [] Ambulation,    [] Eliquis, [] Xarelto  [] Coumadin [] other   Code Status DNR-CCA   Disposition From: Home  Expected Disposition: Home  Estimated Date of Discharge: To be determined  Patient requires continued admission due to respiratory failure. Pt. In the ICU. Surrogate Decision Maker/ POA      Subjective:     Chief Complaint: Chest Pain (X 3 days )     Patient seen and examined in the AM. Patient's sisters at bedside. Pt. On BiPAP and having difficulty opening her eyes. Review of Systems:    Review of Systems   Unable to perform ROS: Mental status change       Objective: Intake/Output Summary (Last 24 hours) at 2/3/2023 1254  Last data filed at 2/3/2023 5757  Gross per 24 hour   Intake 693.17 ml   Output 3125 ml   Net -2431.83 ml          Vitals:   Vitals:    02/03/23 1230   BP: (!) 95/53   Pulse: 99   Resp: 21   Temp:    SpO2: 97%       Physical Exam:     Physical Exam  Vitals reviewed. Constitutional:       General: She is not in acute distress. Appearance: Normal appearance. She is normal weight. She is ill-appearing. Interventions: Face mask in place. Comments: BiPAP mask   HENT:      Head: Normocephalic and atraumatic. Mouth/Throat:      Mouth: Mucous membranes are moist.      Pharynx: Oropharynx is clear. Eyes:      Extraocular Movements: Extraocular movements intact. Conjunctiva/sclera: Conjunctivae normal.      Pupils: Pupils are equal, round, and reactive to light. Cardiovascular:      Rate and Rhythm: Normal rate. Rhythm irregularly irregular. Pulses: Normal pulses. Heart sounds: Normal heart sounds.    Pulmonary: Effort: Pulmonary effort is normal.      Breath sounds: Decreased air movement and transmitted upper airway sounds present. Rhonchi present. No wheezing or rales. Abdominal:      General: Abdomen is flat. Bowel sounds are normal.      Palpations: Abdomen is soft. Musculoskeletal:         General: No swelling. Normal range of motion. Cervical back: Normal range of motion and neck supple. Skin:     General: Skin is warm and dry. Neurological:      Mental Status: She is lethargic.        Medications:   Medications:    piperacillin-tazobactam  3,375 mg IntraVENous Q8H    [Held by provider] furosemide  40 mg IntraVENous BID    vancomycin (VANCOCIN) intermittent dosing (placeholder)   Other RX Placeholder    lidocaine PF  5 mL IntraDERmal Once    sodium chloride flush  5-40 mL IntraVENous 2 times per day    pantoprazole  40 mg IntraVENous Daily    hydrocortisone sodium succinate PF  50 mg IntraVENous Q6H    [Held by provider] guaiFENesin  600 mg Oral BID    polyethylene glycol  17 g Oral Daily    sodium chloride flush  5-40 mL IntraVENous 2 times per day    [Held by provider] docusate sodium  100 mg Oral BID    [Held by provider] donepezil  10 mg Oral Nightly    [Held by provider] gabapentin  600 mg Oral BID    [Held by provider] memantine  10 mg Oral BID    [Held by provider] metoprolol tartrate  25 mg Oral BID    [Held by provider] rosuvastatin  40 mg Oral QPM      Infusions:    sodium chloride      sodium chloride 500 mL (02/02/23 2309)    sodium chloride       PRN Meds: sodium chloride, , PRN  sodium chloride flush, 5-40 mL, PRN  sodium chloride, 500 mL, PRN  morphine, 1 mg, Q4H PRN  droperidol, 0.625 mg, Q10 Min PRN  HYDROcodone-acetaminophen, 1 tablet, Q6H PRN  sodium chloride flush, 5-40 mL, PRN  sodium chloride, , PRN  ondansetron, 4 mg, Q8H PRN   Or  ondansetron, 4 mg, Q6H PRN  acetaminophen, 650 mg, Q6H PRN   Or  acetaminophen, 650 mg, Q6H PRN  ipratropium, 1 spray, PRN      Labs      Recent Results (from the past 24 hour(s))   Blood gas, arterial    Collection Time: 02/02/23  2:00 PM   Result Value Ref Range    pH, Bld 7.44 7.34 - 7.45    pCO2, Arterial 35.0 32 - 45 MMHG    pO2, Arterial 76 75 - 100 MMHG    Base Exc, Mixed . 1 0 - 2.3    HCO3, Arterial 23.8 (H) 18 - 23 MMOL/L    CO2 Content 24.9 (H) 19 - 24 MMOL/L    O2 Sat 94.8 (L) 96 - 97 %    Carbon Monoxide, Blood 1.9 0 - 5 %    Methemoglobin, Arterial 1.4 <1.5 %    Comment 18/5 40%    Legionella antigen, urine    Collection Time: 02/02/23  2:00 PM    Specimen: Urine   Result Value Ref Range    Legionella Urinary Ag NEGATIVE NEGATIVE   Strep Pneumoniae Antigen    Collection Time: 02/02/23  2:00 PM    Specimen: CSF   Result Value Ref Range    Strep pneumo Ag URINE NEGATIVE    Critical Care Panel    Collection Time: 02/02/23  3:15 PM   Result Value Ref Range    Sodium 121 (L) 135 - 145 MMOL/L    Potassium 4.8 3.5 - 5.1 MMOL/L    Chloride 87 (L) 99 - 110 mMol/L    CO2 23 21 - 32 MMOL/L    Anion Gap 11 4 - 16    BUN 39 (H) 6 - 23 MG/DL    Creatinine 1.8 (H) 0.6 - 1.1 MG/DL    Est, Glom Filt Rate 28 (L) >60 mL/min/1.73m2    Glucose 159 (H) 70 - 99 MG/DL    Calcium 7.3 (L) 8.3 - 10.6 MG/DL    Phosphorus 4.1 2.5 - 4.9 MG/DL    Magnesium 2.1 1.8 - 2.4 mg/dl   Procalcitonin    Collection Time: 02/02/23  3:15 PM   Result Value Ref Range    Procalcitonin 7.36    Blood gas, arterial    Collection Time: 02/02/23  9:00 PM   Result Value Ref Range    pH, Bld 7.41 7.34 - 7.45    pCO2, Arterial 36.0 32 - 45 MMHG    pO2, Arterial 53 (L) 75 - 100 MMHG    Base Excess 1 0 - 2.4    HCO3, Arterial 22.8 18 - 23 MMOL/L    CO2 Content 23.9 19 - 24 MMOL/L    O2 Sat 87.1 (L) 96 - 97 %    Carbon Monoxide, Blood 2.2 0 - 5 %    Methemoglobin, Arterial 1.5 (H) <1.5 %    Comment BIPAP 18    CBC with Auto Differential    Collection Time: 02/02/23 10:50 PM   Result Value Ref Range    WBC 8.6 4.0 - 10.5 K/CU MM    RBC 2.69 (L) 4.2 - 5.4 M/CU MM    Hemoglobin 7.2 (L) 12.5 - 16.0 GM/DL Hematocrit 21.8 (L) 37 - 47 %    MCV 81.0 78 - 100 FL    MCH 26.8 (L) 27 - 31 PG    MCHC 33.0 32.0 - 36.0 %    RDW 13.4 11.7 - 14.9 %    Platelets 654 414 - 181 K/CU MM    MPV 9.1 7.5 - 11.1 FL    Differential Type AUTOMATED DIFFERENTIAL     Segs Relative 92.1 (H) 36 - 66 %    Lymphocytes % 2.9 (L) 24 - 44 %    Monocytes % 4.1 (H) 0 - 4 %    Eosinophils % 0.0 0 - 3 %    Basophils % 0.1 0 - 1 %    Segs Absolute 7.9 K/CU MM    Lymphocytes Absolute 0.3 K/CU MM    Monocytes Absolute 0.4 K/CU MM    Eosinophils Absolute 0.0 K/CU MM    Basophils Absolute 0.0 K/CU MM    Nucleated RBC % 0.0 %    Total Nucleated RBC 0.0 K/CU MM    Total Immature Neutrophil 0.07 K/CU MM    Immature Neutrophil % 0.8 (H) 0 - 0.43 %   Critical Care Panel    Collection Time: 02/02/23 10:50 PM   Result Value Ref Range    Sodium 127 (L) 135 - 145 MMOL/L    Potassium 4.3 3.5 - 5.1 MMOL/L    Chloride 92 (L) 99 - 110 mMol/L    CO2 22 21 - 32 MMOL/L    Anion Gap 13 4 - 16    BUN 38 (H) 6 - 23 MG/DL    Creatinine 1.7 (H) 0.6 - 1.1 MG/DL    Est, Glom Filt Rate 30 (L) >60 mL/min/1.73m2    Glucose 113 (H) 70 - 99 MG/DL    Calcium 7.3 (L) 8.3 - 10.6 MG/DL    Phosphorus 4.2 2.5 - 4.9 MG/DL    Magnesium 2.1 1.8 - 2.4 mg/dl   Hepatic Function Panel    Collection Time: 02/02/23 10:50 PM   Result Value Ref Range    Albumin 3.4 3.4 - 5.0 GM/DL    Total Bilirubin 0.8 0.0 - 1.0 MG/DL    Bilirubin, Direct 0.5 (H) 0.0 - 0.3 MG/DL    Bilirubin, Indirect 0.3 0 - 0.7 MG/DL    Alkaline Phosphatase 111 40 - 129 IU/L    AST 65 (H) 15 - 37 IU/L    ALT 15 10 - 40 U/L    Total Protein 5.4 (L) 6.4 - 8.2 GM/DL   Lactic Acid    Collection Time: 02/02/23 10:50 PM   Result Value Ref Range    Lactate 1.0 0.5 - 1.9 mMOL/L   Protime/INR & PTT    Collection Time: 02/02/23 10:50 PM   Result Value Ref Range    Protime 15.9 (H) 11.7 - 14.5 SECONDS    INR 1.23 INDEX    aPTT 54.1 (H) 25.1 - 37.1 SECONDS   Blood Gas, Venous    Collection Time: 02/03/23  5:00 AM   Result Value Ref Range pH, Johnnie 7.36 7.32 - 7.42    pCO2, Johnnie 41 38 - 52 mmHG    pO2, Johnnie 61 (H) 28 - 48 mmHG    Base Excess 2 0 - 2.4    HCO3, Venous 23.2 19 - 25 MMOL/L    O2 Sat, Johnnie 89.9 (H) 50 - 70 %    Comment VBG    Procalcitonin    Collection Time: 02/03/23  5:40 AM   Result Value Ref Range    Procalcitonin 5.09    Vancomycin Level, Random    Collection Time: 02/03/23  5:40 AM   Result Value Ref Range    Vancomycin Rm <4.0 UG/ML    DOSE AMOUNT DOSE AMT.  GIVEN - UNKNOWN     DOSE TIME DOSE TIME GIVEN - UNKNOWN    CBC with Auto Differential    Collection Time: 02/03/23  5:40 AM   Result Value Ref Range    WBC 6.9 4.0 - 10.5 K/CU MM    RBC 2.55 (L) 4.2 - 5.4 M/CU MM    Hemoglobin 6.9 (LL) 12.5 - 16.0 GM/DL    Hematocrit 20.6 (L) 37 - 47 %    MCV 80.8 78 - 100 FL    MCH 27.1 27 - 31 PG    MCHC 33.5 32.0 - 36.0 %    RDW 13.3 11.7 - 14.9 %    Platelets 647 450 - 371 K/CU MM    MPV 9.4 7.5 - 11.1 FL    Differential Type AUTOMATED DIFFERENTIAL     Segs Relative 92.7 (H) 36 - 66 %    Lymphocytes % 2.8 (L) 24 - 44 %    Monocytes % 3.8 0 - 4 %    Eosinophils % 0.0 0 - 3 %    Basophils % 0.1 0 - 1 %    Segs Absolute 6.4 K/CU MM    Lymphocytes Absolute 0.2 K/CU MM    Monocytes Absolute 0.3 K/CU MM    Eosinophils Absolute 0.0 K/CU MM    Basophils Absolute 0.0 K/CU MM    Nucleated RBC % 0.0 %    Total Nucleated RBC 0.0 K/CU MM    Total Immature Neutrophil 0.04 K/CU MM    Immature Neutrophil % 0.6 (H) 0 - 0.43 %   Critical Care Panel    Collection Time: 02/03/23  5:40 AM   Result Value Ref Range    Sodium 128 (L) 135 - 145 MMOL/L    Potassium 4.0 3.5 - 5.1 MMOL/L    Chloride 92 (L) 99 - 110 mMol/L    CO2 21 21 - 32 MMOL/L    Anion Gap 15 4 - 16    BUN 40 (H) 6 - 23 MG/DL    Creatinine 1.9 (H) 0.6 - 1.1 MG/DL    Est, Glom Filt Rate 27 (L) >60 mL/min/1.73m2    Glucose 208 (H) 70 - 99 MG/DL    Calcium 7.2 (L) 8.3 - 10.6 MG/DL    Phosphorus 4.6 2.5 - 4.9 MG/DL    Magnesium 2.2 1.8 - 2.4 mg/dl   Hepatic Function Panel    Collection Time: 02/03/23  5:40 AM   Result Value Ref Range    Albumin 3.1 (L) 3.4 - 5.0 GM/DL    Total Bilirubin 0.7 0.0 - 1.0 MG/DL    Bilirubin, Direct 0.5 (H) 0.0 - 0.3 MG/DL    Bilirubin, Indirect 0.2 0 - 0.7 MG/DL    Alkaline Phosphatase 108 40 - 129 IU/L    AST 59 (H) 15 - 37 IU/L    ALT 13 10 - 40 U/L    Total Protein 5.1 (L) 6.4 - 8.2 GM/DL   Lactic Acid    Collection Time: 02/03/23  5:40 AM   Result Value Ref Range    Lactate 0.9 0.5 - 1.9 mMOL/L   Protime/INR & PTT    Collection Time: 02/03/23  5:40 AM   Result Value Ref Range    Protime 16.1 (H) 11.7 - 14.5 SECONDS    INR 1.25 INDEX    aPTT 49.0 (H) 25.1 - 37.1 SECONDS   TYPE AND SCREEN    Collection Time: 02/03/23  8:16 AM   Result Value Ref Range    ABO/Rh O POSITIVE     Antibody Screen NEGATIVE     Unit Number [id number]     Component LEUKO-POOR RED CELLS     Unit Divison 00     Status ISSUED     Transfusion Status OK TO TRANSFUSE     Crossmatch Result COMPATIBLE    Blood gas, arterial    Collection Time: 02/03/23 10:00 AM   Result Value Ref Range    pH, Bld 7.49 (H) 7.34 - 7.45    pCO2, Arterial 57.0 (H) 32 - 45 MMHG    pO2, Arterial 72 (L) 75 - 100 MMHG    Base Exc, Mixed 17 (H) 0 - 2.3    HCO3, Arterial 43.4 (H) 18 - 23 MMOL/L    CO2 Content 45.1 (H) 19 - 24 MMOL/L    O2 Sat 93.8 (L) 96 - 97 %    Carbon Monoxide, Blood 2.3 0 - 5 %    Methemoglobin, Arterial 1.2 <1.5 %    Comment 18/5 40%    Blood Gas, Venous    Collection Time: 02/03/23  1:00 PM   Result Value Ref Range    pH, Johnnie 7.39 7.32 - 7.42    pCO2, Johnnie 38 38 - 52 mmHG    pO2, Johnnie 48 28 - 48 mmHG    Base Excess 2 0 - 2.4    HCO3, Venous 23.0 19 - 25 MMOL/L    O2 Sat, Johnnie 83.5 (H) 50 - 70 %    Comment VBG         Imaging/Diagnostics Last 24 Hours   XR CHEST PORTABLE    Result Date: 2/2/2023  EXAMINATION: ONE XRAY VIEW OF THE CHEST 2/2/2023 1:52 pm COMPARISON: 02/02/2023, 01/30/2023 HISTORY: ORDERING SYSTEM PROVIDED HISTORY: shortness of breath TECHNOLOGIST PROVIDED HISTORY: Reason for exam:->shortness of breath Reason for Exam: shortness of breath FINDINGS: Sternotomy wires.  Prosthetic cardiac valve.  Pacemaker wires.  Lung volumes. Bibasilar opacities.  No pneumothorax.  Heart size is stable.     Low lung volumes with bibasilar opacities which may represent atelectasis or pneumonia.     XR CHEST PORTABLE    Result Date: 2/2/2023  EXAMINATION: ONE X-RAY VIEW OF THE CHEST 2/2/2023 10:49 am COMPARISON: CT chest 01/30/2023, chest radiograph 01/30/2023 HISTORY: ORDERING SYSTEM PROVIDED HISTORY: Shortness of breath TECHNOLOGIST PROVIDED HISTORY: Reason for exam:->Shortness of breath Reason for Exam: shortness of breath FINDINGS: The lungs are underinflated, resulting in vascular crowding and subsegmental atelectasis.  The cardiomediastinal silhouette is obscured, but likely unchanged.  Bibasilar consolidations favor atelectasis.  The left upper lobe is obscured.  No new focal consolidation, pneumothorax, or right-sided pleural effusion.  There is blunting of the left costophrenic angle, which may be due to low lung volumes and/or patient positioning.  Osseous structures are diffusely demineralized and grossly unchanged.  Left chest cardiac conduction device is again noted.     Low lung volumes with likely bibasilar atelectasis versus developing infection.       Comment: Please note this report has been produced using speech recognition software and may contain errors related to that system including errors in grammar, punctuation, and spelling, as well as words and phrases that may be inappropriate. If there are any questions or concerns please feel free to contact the dictating provider for clarification.     Electronically signed by Devin Keane MD on 2/3/2023 at 12:54 PM

## 2023-02-03 NOTE — PROGRESS NOTES
Dr. Gabe Love updated on results of labs drawn at [phone], and how levo had helped pressure, new orders noted, plan for PICC line.      Once meds arrived IV team at bedside

## 2023-02-03 NOTE — PROGRESS NOTES
[id number] [facility]  consulted by Dr. Aurora Leal for monitoring and adjustment. Indication for treatment: Vancomycin indication: HAP  Goal trough: Trough Goal: 15-20 mcg/mL  AUC/THERON: 400-600    Risk Factors for MRSA Identified:   Hospitalization within the past 90 days, Received IV antibiotics within the past 90 days    Pertinent Laboratory Values:   Temp Readings from Last 3 Encounters:   02/03/23 99.4 °F (37.4 °C) (Axillary)   07/12/22 98.1 °F (36.7 °C) (Oral)   09/23/21 98.9 °F (37.2 °C) (Axillary)     Recent Labs     02/02/23  0622 02/02/23  2250 02/03/23  0540   WBC 8.3 8.6 6.9   LACTATE 1.3 1.0 0.9       Recent Labs     02/02/23  1515 02/02/23  2250 02/03/23  0540   BUN 39* 38* 40*   CREATININE 1.8* 1.7* 1.9*       Estimated Creatinine Clearance: 26 mL/min (A) (based on SCr of 1.9 mg/dL (H)). Intake/Output Summary (Last 24 hours) at 2/3/2023 1004  Last data filed at 2/3/2023 4706  Gross per 24 hour   Intake 693.17 ml   Output 3170 ml   Net -2476.83 ml         Pertinent Cultures:   Date    Source    Results  2/2   Blood    ordered  2/2   Sputum/Respiratory  ordered  2/2   MRSA Nasal   Ordered  2/2   Urine AG   neg    Ideal body weight: 57 kg (125 lb 10.6 oz)  Adjusted ideal body weight: 68.6 kg (151 lb 3.8 oz)  Actual weight: 86kg    Vancomycin level:   TROUGH:  No results for input(s): VANCOTROUGH in the last 72 hours. RANDOM:    Recent Labs     02/03/23  0540   VANCORANDOM <4.0       Assessment:  HPI: Pt is 76 yof admitted 1/30 for CHF exacerbation. Patient now with new onset AMS and requiring BiPAP and vasopressors. Chest x-ray showing bibasilar opacities. SCr, BUN, and urine output: TAYLOR with good UOP. Baseline Scr 0.7 last month  Day(s) of therapy: 2  Vancomycin concentration:   2/3 @ 0540 <4 ~ 12 hours post dose    Plan:  Patient received vancomycin 1750mg IV x 1 (25mg/kg).  Vanco level this AM <4. Will give vanco 1000mg IV x 1 and repeat level in AM. Difficult to anticipate clearance at this point. Pharmacy will continue to monitor patient and adjust therapy as indicated    Yamile 3 2/4 @0600    Thank you for the consult.   Hayley Bashir [facility] [place], PharmD  2/3/2023 10:04 AM

## 2023-02-03 NOTE — PROGRESS NOTES
Inpatient Progress Note 2/3/2023        Georgina Nett  1945  [id number]      Assessment/Plan:  72yoF with PMH of Afib, CAD s/p CABG, DM, HTN. HLD, valvular heart disease s/p mitral valve repari. PFO closure, early dementia, tachy/ceasar syndrome s/p Pacer who presented with abdominal pain found to have acute cholecystitis s/p laparoscopic cholecystecomy who was upgraded to ICU for hypoxic resp failure with mixed septo-cardiogenic features. Problem list  Metabolic encephalopathy  Acute hypoxic respiratory failure  Septic shock  Cardiogenic shock? Cardiorenal syndrome? Acute kidney injury  Hyponatremia  Hypochloremia     Neuro: Metabolic encephalopathy much improved compared to yesterday. Pain control on current multimodal regiment, adjust as needed. Cardio: Septic versus cardiogenic versus mixed shock with noted hypotension required utilization of vasoactive agents, titrated off. Central venous sat 89%, after administration of some Lasix. Possibly likely more septic than cardiogenic in etiology. Despite noted proBNP. Cardiology following noted to have be slightly congested will benefit from gentle diuresis. Resp: Hypoxic respiratory failure requiring BiPAP, transition to high flow nasal cannula. Titrate to sat goal greater than 88%. Encourage pulmonary toileting, incentive spirometry. GI: Mentation improved, advance diet as tolerated GI prophylaxis. Status postcholecystectomy. : Creatinine 1.6 today from 1.9 yesterday improved after diuresis, will continue gentle diuresis at this time. Monitor electrolytes and replenish as needed.,  Nephrology following. Heme: Concern for drop in hemoglobin postsurgery could be dilutional versus equilibration. There is a significant drop compared to presentation, 11.7-6.9 status post 1 unit of PRBC.   No overt evidence of bleeding though concern could be that there may be some internal bleeding, discussed risk benefit of potentially performing CT angio of the abdomen pelvis for evaluation of overt bleeding, after discussion with general surgery will continue to monitor resuscitate as needed and if bleeding does persist will follow with CT imaging. Chemical DVT prophylaxis on hold, continue mechanical DVT prophylaxis with SCDs. ID: Pneumonia/septic shock, on broad-spectrum intravenous antibiotics, cultures pending, narrow when able. Endo: Stress dose steroids for septic shock. Hyperglycemic, blood glucose goal 140 to 180 mg/dL, insulin sliding scale as needed. MSK: No acute issues at this time. Unable to tolerate PT or OT or out of bed to chair. Tubes/Lines/Drains: PICC, PIV, Rodriguez     Code Status: DNR CCA         Subjective:    Patient seen and evaluated bedside, resting comfortably in bed, NAD VSS. Improved on BiPAP, much more alert and oriented compared to yesterday's evaluation. Otherwise continue current therapy with intravenous antibiotics. ,  Continue stress dose steroids. Patient otherwise currently denies any fevers, chills, nausea, vomiting, headache, dizziness, lightheadedness, focal neurological deficits, chest pain, abdominal pain, urinary or bowel symptoms. Physical Exam:            BP (!) 124/46   Pulse 86   Temp 98.3 °F (36.8 °C) (Oral)   Resp (!) 37   Ht 5' 5\" (1.651 m)   Wt 189 lb 9.5 oz (86 kg)   SpO2 98%   BMI 31.55 kg/m²     General: NAD  Eyes: EOMI  ENT: neck supple  Cardiovascular: Regular rate. Respiratory: Crackles   Gastrointestinal: Soft, non tender  Genitourinary: no suprapubic tenderness  Musculoskeletal: 1+ edema. Skin: warm, dry  Neuro: Alert. Psych: Mood appropriate.     Current Medications:   piperacillin-tazobactam  3,375 mg IntraVENous Q8H    [Held by provider] furosemide  40 mg IntraVENous BID    vancomycin (VANCOCIN) intermittent dosing (placeholder)   Other RX Placeholder    lidocaine PF  5 mL IntraDERmal Once    sodium chloride flush  5-40 mL IntraVENous 2 times per day    pantoprazole  40 mg IntraVENous Daily    hydrocortisone sodium succinate PF  50 mg IntraVENous Q6H    [Held by provider] guaiFENesin  600 mg Oral BID    polyethylene glycol  17 g Oral Daily    sodium chloride flush  5-40 mL IntraVENous 2 times per day    [Held by provider] docusate sodium  100 mg Oral BID    [Held by provider] donepezil  10 mg Oral Nightly    [Held by provider] gabapentin  600 mg Oral BID    [Held by provider] memantine  10 mg Oral BID    [Held by provider] metoprolol tartrate  25 mg Oral BID    [Held by provider] rosuvastatin  40 mg Oral QPM       Labs, Imaging and Studies reviewed:    XR CHEST PORTABLE    Result Date: 2/2/2023  EXAMINATION: ONE XRAY VIEW OF THE CHEST 2/2/2023 8:19 pm COMPARISON: Chest 02/02/2023 at 1:54 p.m. HISTORY: ORDERING SYSTEM PROVIDED HISTORY: PICC Tip Confirmation Additional signs and symptoms: na Relevant Medical/Surgical History: diabetes, hypertension FINDINGS: *Right upper extremity PICC tip overlies the superior cavoatrial junction level. *Stable cardiomegaly. *Low lung volumes with bibasilar atelectasis or infiltrate, greater left than right. Pneumonia is a consideration. *Probable small volume left pleural effusion. *Stable sequela from open-heart surgery and left-sided pacemaker. 1. Right upper extremity PICC tip overlies the superior cavoatrial junction level. 2. Stable cardiomegaly. 3. Low lung volumes with bibasilar atelectasis or infiltrate, greater left than right. Pneumonia is a consideration. 4. Probable small volume left pleural effusion. 5. Stable sequela from open-heart surgery and left-sided pacemaker. XR CHEST PORTABLE    Result Date: 2/2/2023  EXAMINATION: ONE XRAY VIEW OF THE CHEST 2/2/2023 1:52 pm COMPARISON: 02/02/2023, 01/30/2023 HISTORY: ORDERING SYSTEM PROVIDED HISTORY: shortness of breath TECHNOLOGIST PROVIDED HISTORY: Reason for exam:->shortness of breath Reason for Exam: shortness of breath FINDINGS: Sternotomy wires. Prosthetic cardiac valve. Pacemaker wires. Lung volumes. Bibasilar opacities. No pneumothorax. Heart size is stable. Low lung volumes with bibasilar opacities which may represent atelectasis or pneumonia. XR CHEST PORTABLE    Result Date: 2/2/2023  EXAMINATION: ONE X-RAY VIEW OF THE CHEST 2/2/2023 10:49 am COMPARISON: CT chest 01/30/2023, chest radiograph 01/30/2023 HISTORY: ORDERING SYSTEM PROVIDED HISTORY: Shortness of breath TECHNOLOGIST PROVIDED HISTORY: Reason for exam:->Shortness of breath Reason for Exam: shortness of breath FINDINGS: The lungs are underinflated, resulting in vascular crowding and subsegmental atelectasis. The cardiomediastinal silhouette is obscured, but likely unchanged. Bibasilar consolidations favor atelectasis. The left upper lobe is obscured. No new focal consolidation, pneumothorax, or right-sided pleural effusion. There is blunting of the left costophrenic angle, which may be due to low lung volumes and/or patient positioning. Osseous structures are diffusely demineralized and grossly unchanged. Left chest cardiac conduction device is again noted. Low lung volumes with likely bibasilar atelectasis versus developing infection.        Recent Results (from the past 24 hour(s))   Blood gas, arterial    Collection Time: 02/02/23  9:00 PM   Result Value Ref Range    pH, Bld 7.41 7.34 - 7.45    pCO2, Arterial 36.0 32 - 45 MMHG    pO2, Arterial 53 (L) 75 - 100 MMHG    Base Excess 1 0 - 2.4    HCO3, Arterial 22.8 18 - 23 MMOL/L    CO2 Content 23.9 19 - 24 MMOL/L    O2 Sat 87.1 (L) 96 - 97 %    Carbon Monoxide, Blood 2.2 0 - 5 %    Methemoglobin, Arterial 1.5 (H) <1.5 %    Comment BIPAP 18    CBC with Auto Differential    Collection Time: 02/02/23 10:50 PM   Result Value Ref Range    WBC 8.6 4.0 - 10.5 K/CU MM    RBC 2.69 (L) 4.2 - 5.4 M/CU MM    Hemoglobin 7.2 (L) 12.5 - 16.0 GM/DL    Hematocrit 21.8 (L) 37 - 47 %    MCV 81.0 78 - 100 FL    MCH 26.8 (L) 27 - 31 PG    MCHC 33.0 32.0 - 36.0 %    RDW 13.4 11.7 - 14.9 %    Platelets 532 192 - 717 K/CU MM    MPV 9.1 7.5 - 11.1 FL    Differential Type AUTOMATED DIFFERENTIAL     Segs Relative 92.1 (H) 36 - 66 %    Lymphocytes % 2.9 (L) 24 - 44 %    Monocytes % 4.1 (H) 0 - 4 %    Eosinophils % 0.0 0 - 3 %    Basophils % 0.1 0 - 1 %    Segs Absolute 7.9 K/CU MM    Lymphocytes Absolute 0.3 K/CU MM    Monocytes Absolute 0.4 K/CU MM    Eosinophils Absolute 0.0 K/CU MM    Basophils Absolute 0.0 K/CU MM    Nucleated RBC % 0.0 %    Total Nucleated RBC 0.0 K/CU MM    Total Immature Neutrophil 0.07 K/CU MM    Immature Neutrophil % 0.8 (H) 0 - 0.43 %   Critical Care Panel    Collection Time: 02/02/23 10:50 PM   Result Value Ref Range    Sodium 127 (L) 135 - 145 MMOL/L    Potassium 4.3 3.5 - 5.1 MMOL/L    Chloride 92 (L) 99 - 110 mMol/L    CO2 22 21 - 32 MMOL/L    Anion Gap 13 4 - 16    BUN 38 (H) 6 - 23 MG/DL    Creatinine 1.7 (H) 0.6 - 1.1 MG/DL    Est, Glom Filt Rate 30 (L) >60 mL/min/1.73m2    Glucose 113 (H) 70 - 99 MG/DL    Calcium 7.3 (L) 8.3 - 10.6 MG/DL    Phosphorus 4.2 2.5 - 4.9 MG/DL    Magnesium 2.1 1.8 - 2.4 mg/dl   Hepatic Function Panel    Collection Time: 02/02/23 10:50 PM   Result Value Ref Range    Albumin 3.4 3.4 - 5.0 GM/DL    Total Bilirubin 0.8 0.0 - 1.0 MG/DL    Bilirubin, Direct 0.5 (H) 0.0 - 0.3 MG/DL    Bilirubin, Indirect 0.3 0 - 0.7 MG/DL    Alkaline Phosphatase 111 40 - 129 IU/L    AST 65 (H) 15 - 37 IU/L    ALT 15 10 - 40 U/L    Total Protein 5.4 (L) 6.4 - 8.2 GM/DL   Lactic Acid    Collection Time: 02/02/23 10:50 PM   Result Value Ref Range    Lactate 1.0 0.5 - 1.9 mMOL/L   Protime/INR & PTT    Collection Time: 02/02/23 10:50 PM   Result Value Ref Range    Protime 15.9 (H) 11.7 - 14.5 SECONDS    INR 1.23 INDEX    aPTT 54.1 (H) 25.1 - 37.1 SECONDS   Blood Gas, Venous    Collection Time: 02/03/23  5:00 AM   Result Value Ref Range    pH, Johnnie 7.36 7.32 - 7.42    pCO2, Johnnie 41 38 - 52 mmHG    pO2, Johnnie 61 (H) 28 - 48 mmHG    Base Excess 2 0 - 2.4 HCO3, Venous 23.2 19 - 25 MMOL/L    O2 Sat, Johnnie 89.9 (H) 50 - 70 %    Comment VBG    Procalcitonin    Collection Time: 02/03/23  5:40 AM   Result Value Ref Range    Procalcitonin 5.09    Vancomycin Level, Random    Collection Time: 02/03/23  5:40 AM   Result Value Ref Range    Vancomycin Rm <4.0 UG/ML    DOSE AMOUNT DOSE AMT.  GIVEN - UNKNOWN     DOSE TIME DOSE TIME GIVEN - UNKNOWN    CBC with Auto Differential    Collection Time: 02/03/23  5:40 AM   Result Value Ref Range    WBC 6.9 4.0 - 10.5 K/CU MM    RBC 2.55 (L) 4.2 - 5.4 M/CU MM    Hemoglobin 6.9 (LL) 12.5 - 16.0 GM/DL    Hematocrit 20.6 (L) 37 - 47 %    MCV 80.8 78 - 100 FL    MCH 27.1 27 - 31 PG    MCHC 33.5 32.0 - 36.0 %    RDW 13.3 11.7 - 14.9 %    Platelets 576 064 - 541 K/CU MM    MPV 9.4 7.5 - 11.1 FL    Differential Type AUTOMATED DIFFERENTIAL     Segs Relative 92.7 (H) 36 - 66 %    Lymphocytes % 2.8 (L) 24 - 44 %    Monocytes % 3.8 0 - 4 %    Eosinophils % 0.0 0 - 3 %    Basophils % 0.1 0 - 1 %    Segs Absolute 6.4 K/CU MM    Lymphocytes Absolute 0.2 K/CU MM    Monocytes Absolute 0.3 K/CU MM    Eosinophils Absolute 0.0 K/CU MM    Basophils Absolute 0.0 K/CU MM    Nucleated RBC % 0.0 %    Total Nucleated RBC 0.0 K/CU MM    Total Immature Neutrophil 0.04 K/CU MM    Immature Neutrophil % 0.6 (H) 0 - 0.43 %   Critical Care Panel    Collection Time: 02/03/23  5:40 AM   Result Value Ref Range    Sodium 128 (L) 135 - 145 MMOL/L    Potassium 4.0 3.5 - 5.1 MMOL/L    Chloride 92 (L) 99 - 110 mMol/L    CO2 21 21 - 32 MMOL/L    Anion Gap 15 4 - 16    BUN 40 (H) 6 - 23 MG/DL    Creatinine 1.9 (H) 0.6 - 1.1 MG/DL    Est, Glom Filt Rate 27 (L) >60 mL/min/1.73m2    Glucose 208 (H) 70 - 99 MG/DL    Calcium 7.2 (L) 8.3 - 10.6 MG/DL    Phosphorus 4.6 2.5 - 4.9 MG/DL    Magnesium 2.2 1.8 - 2.4 mg/dl   Hepatic Function Panel    Collection Time: 02/03/23  5:40 AM   Result Value Ref Range    Albumin 3.1 (L) 3.4 - 5.0 GM/DL    Total Bilirubin 0.7 0.0 - 1.0 MG/DL Bilirubin, Direct 0.5 (H) 0.0 - 0.3 MG/DL    Bilirubin, Indirect 0.2 0 - 0.7 MG/DL    Alkaline Phosphatase 108 40 - 129 IU/L    AST 59 (H) 15 - 37 IU/L    ALT 13 10 - 40 U/L    Total Protein 5.1 (L) 6.4 - 8.2 GM/DL   Lactic Acid    Collection Time: 02/03/23  5:40 AM   Result Value Ref Range    Lactate 0.9 0.5 - 1.9 mMOL/L   Protime/INR & PTT    Collection Time: 02/03/23  5:40 AM   Result Value Ref Range    Protime 16.1 (H) 11.7 - 14.5 SECONDS    INR 1.25 INDEX    aPTT 49.0 (H) 25.1 - 37.1 SECONDS   TYPE AND SCREEN    Collection Time: 02/03/23  8:16 AM   Result Value Ref Range    ABO/Rh O POSITIVE     Antibody Screen NEGATIVE     Unit Number [id number]     Component LEUKO-POOR RED CELLS     Unit Divison 00     Status ISSUED     Transfusion Status OK TO TRANSFUSE     Crossmatch Result COMPATIBLE    Blood gas, arterial    Collection Time: 02/03/23 10:00 AM   Result Value Ref Range    pH, Bld 7.49 (H) 7.34 - 7.45    pCO2, Arterial 57.0 (H) 32 - 45 MMHG    pO2, Arterial 72 (L) 75 - 100 MMHG    Base Exc, Mixed 17 (H) 0 - 2.3    HCO3, Arterial 43.4 (H) 18 - 23 MMOL/L    CO2 Content 45.1 (H) 19 - 24 MMOL/L    O2 Sat 93.8 (L) 96 - 97 %    Carbon Monoxide, Blood 2.3 0 - 5 %    Methemoglobin, Arterial 1.2 <1.5 %    Comment 18/5 40%    Blood Gas, Venous    Collection Time: 02/03/23  1:00 PM   Result Value Ref Range    pH, Johnnie 7.39 7.32 - 7.42    pCO2, Johnnie 38 38 - 52 mmHG    pO2, Johnnie 48 28 - 48 mmHG    Base Excess 2 0 - 2.4    HCO3, Venous 23.0 19 - 25 MMOL/L    O2 Sat, Johnnie 83.5 (H) 50 - 70 %    Comment VBG    CBC with Auto Differential    Collection Time: 02/03/23  1:53 PM   Result Value Ref Range    WBC 8.9 4.0 - 10.5 K/CU MM    RBC 2.98 (L) 4.2 - 5.4 M/CU MM    Hemoglobin 8.2 (L) 12.5 - 16.0 GM/DL    Hematocrit 24.3 (L) 37 - 47 %    MCV 81.5 78 - 100 FL    MCH 27.5 27 - 31 PG    MCHC 33.7 32.0 - 36.0 %    RDW 13.7 11.7 - 14.9 %    Platelets 148 873 - 467 K/CU MM    MPV 9.5 7.5 - 11.1 FL    Differential Type AUTOMATED DIFFERENTIAL     Segs Relative 93.7 (H) 36 - 66 %    Lymphocytes % 1.8 (L) 24 - 44 %    Monocytes % 4.0 0 - 4 %    Eosinophils % 0.0 0 - 3 %    Basophils % 0.1 0 - 1 %    Segs Absolute 8.3 K/CU MM    Lymphocytes Absolute 0.2 K/CU MM    Monocytes Absolute 0.4 K/CU MM    Eosinophils Absolute 0.0 K/CU MM    Basophils Absolute 0.0 K/CU MM    Nucleated RBC % 0.0 %    Total Nucleated RBC 0.0 K/CU MM    Total Immature Neutrophil 0.04 K/CU MM    Immature Neutrophil % 0.4 0 - 0.43 %   Critical Care Panel    Collection Time: 02/03/23  1:53 PM   Result Value Ref Range    Sodium 131 (L) 135 - 145 MMOL/L    Potassium 3.9 3.5 - 5.1 MMOL/L    Chloride 95 (L) 99 - 110 mMol/L    CO2 23 21 - 32 MMOL/L    Anion Gap 13 4 - 16    BUN 39 (H) 6 - 23 MG/DL    Creatinine 1.6 (H) 0.6 - 1.1 MG/DL    Est, Glom Filt Rate 33 (L) >60 mL/min/1.73m2    Glucose 188 (H) 70 - 99 MG/DL    Calcium 7.3 (L) 8.3 - 10.6 MG/DL    Phosphorus 4.3 2.5 - 4.9 MG/DL    Magnesium 2.2 1.8 - 2.4 mg/dl   Hepatic Function Panel    Collection Time: 02/03/23  1:53 PM   Result Value Ref Range    Albumin 3.2 (L) 3.4 - 5.0 GM/DL    Total Bilirubin 0.8 0.0 - 1.0 MG/DL    Bilirubin, Direct 0.5 (H) 0.0 - 0.3 MG/DL    Bilirubin, Indirect 0.3 0 - 0.7 MG/DL    Alkaline Phosphatase 106 40 - 129 IU/L    AST 56 (H) 15 - 37 IU/L    ALT 13 10 - 40 U/L    Total Protein 5.2 (L) 6.4 - 8.2 GM/DL   Fibrinogen    Collection Time: 02/03/23  1:53 PM   Result Value Ref Range    Fibrinogen 691 (H) 196.9 - 442.1 MG/DL   D-Dimer, Quantitative    Collection Time: 02/03/23  1:53 PM   Result Value Ref Range    D-Dimer, Quant 4700 (H) <230 NG/mL(DDU)   Lactic Acid    Collection Time: 02/03/23  2:00 PM   Result Value Ref Range    Lactate 1.1 0.5 - 1.9 mMOL/L       Electronically signed by Bard David MD on 2/3/2023 at 5:29 PM

## 2023-02-03 NOTE — PROGRESS NOTES
Speech Language Pathology  Facility/Department: [facility] ICU   CLINICAL BEDSIDE SWALLOW EVALUATION    NAME: Elsi Osorio  : 1945  MRN: [id number]    ADMISSION DATE: 2023  ADMITTING DIAGNOSIS: has Paroxysmal atrial fibrillation ([address] Essential hypertension; Mixed hyperlipidemia; VHD (valvular heart disease); Abnormal EKG; Acute blood loss anemia; Uncontrolled pain; Gait disturbance; Pneumonia due to infectious organism; SSS (sick sinus syndrome) ([address] S/P placement of cardiac pacemaker; Tachycardia-bradycardia ([address] Fall at home, subsequent encounter; Acute intracranial hemorrhage ([address] Hyponatremia; Compression fracture of L1 lumbar vertebra ([address] Intraparenchymal hematoma of brain; COVID-19; CAD (coronary artery disease); AMS (altered mental status); Altered mental status; Concussion with no loss of consciousness; Chest pain; Right upper quadrant pain; and Acute cholecystitis on their problem list.      Impressions: Elsi Osorio was seen for a bedside swallowing evaluation after being admitted to [facility] with chest pain and a cough. Pt was alert and cooperative throughout assessment. She is currently on Vapotherm. Relevant medical hx includes anxiety, CAD, COVID-19, hypertension, TIA and pneumonia. Pt denies swallowing difficulties PTA. Pt was positioned upright in bed and presented with a hoarse vocal quality and weak volitional cough. Oral mechanism examination was [facility] with no focal orofacial weakness. Per nursing pt is cleared for a clear liquid diet at this time. PO trials of ice chips, nectar thick liquids and thin liquids by spoon/cup/straw sips were given. Pt demonstrated adequate oral manipulation with liquid trials. Suspect moderate pharyngeal dysphagia characterized by delayed swallow initiation and reduced laryngeal elevation. She presented with a weak cough with trials of thin liquids by cup/straw sips.   Clear vocal quality and 0 overt s/s of aspiration were observed with trials of nectar thick liquids by spoon/cup/straw sips and ice chips. Recommend clear liquid/nectar thick liquid diet with strict aspiration precautions. Allowance of limited ice chips with nursing. SLP will continue to follow Alyce Cerda for diet tolerance monitoring and possible diet level advancement when cleared by surgeon. ONSET DATE: this admission     Date of Eval: 2/3/2023  Evaluating Therapist: CRISSY Bhatt    Current Diet level:  Current Diet : NPO      Primary Complaint  Patient Complaint: weakness    Pain:  Pain Assessment  Pain Assessment: None - Denies Pain  Pain Level: 0  Kaufman-Baker Pain Rating: No hurt  Patient's Stated Pain Goal: 0 - No pain  Pain Location: Abdomen, Back  Pain Orientation: Mid  Pain Descriptors: Discomfort  Functional Pain Assessment: Prevents or interferes some active activities and ADLs  Pain Type: Surgical pain  Pain Frequency: Continuous  Pain Onset: Awakened from sleep  Non-Pharmaceutical Pain Intervention(s): Family support  Response to Pain Intervention: Pain improved but above pain goal  Side Effects: No reported side effects    Reason for Referral  Alyce Cerda was referred for a bedside swallow evaluation to assess the efficiency of her swallow function, identify signs and symptoms of aspiration and make recommendations regarding safe dietary consistencies, effective compensatory strategies, and safe eating environment. Impression  Dysphagia Diagnosis: Mild oral stage dysphagia; Moderate pharyngeal stage dysphagia  Dysphagia Outcome Severity Scale: Level 4: Mild moderate dysphagia- Intermittent supervision/cueing. One - two diet consistencies restricted     Treatment Plan  Requires SLP Intervention: Yes  Duration of Treatment: 3-4xs weekly          Recommended Diet and Intervention        Recommended Form of Meds: Meds in puree     Therapeutic Interventions: Diet tolerance monitoring; Therapeutic PO trials with SLP    Compensatory Swallowing Strategies  Compensatory Swallowing Strategies : Upright as possible for all oral intake;Eat/Feed slowly; Small bites/sips    Treatment/Goals  Short-term Goals  Timeframe for Short-term Goals: LOS or until goals are met  Goal 1: Pt will tolerate nectar thick liquids/clear liquid diet (per surgeon) without clinical evidence of aspiration 100%  Goal 2: Pt will participate in advanced trials for possible safe diet level advancement 10/10 trials  Goal 3: Pt/caregivers will demonstrate comprehension of recommendations/POC    General  Chart Reviewed: Yes  Behavior/Cognition: Alert; Cooperative;Pleasant mood  Respiratory Status: O2 via nasual cannula  O2 Device: High flow nasal cannula  Communication Observation: Functional  Follows Directions: Simple  Dentition: Adequate  Patient Positioning: Upright in bed  Baseline Vocal Quality: Hoarse  Volitional Cough: Weak  Volitional Swallow: Delayed  Prior Dysphagia History: Pt denies hx of dysphagia  Consistencies Administered: Mildly Thick - straw;Mildly Thick- teaspoon;Mildly Thick - cup; Ice Chips; Thin - straw; Thin - teaspoon; Thin - cup           Vision/Hearing  Vision  Vision: Within Functional Limits  Hearing  Hearing: Within functional limits    Oral Motor Deficits       Oral Phase Dysfunction  Oral Phase  Oral Phase: Exceptions     Indicators of Pharyngeal Phase Dysfunction        Prognosis  Individuals consulted  Consulted and agree with results and recommendations: Patient;RN  RN Name: Sandra Lama  Patient Education: recommendations/POC  Patient Education Response: Verbalizes understanding  Safety Devices in place: Yes  Type of devices:  All fall risk precautions in place       Therapy Time   [id number]\          Jadyn Crowley MS, CCC-SLP, 2/3/2023

## 2023-02-03 NOTE — PROGRESS NOTES
Nephrology Progress Note  2/3/2023 8:35 AM        Subjective:   Admit Date: 1/30/2023  PCP: Shola Anglin MD    Interval History: Eventful last 12 hours  She was transferred to ICU and BiPAP were initiated-transfer was done mainly for mentation change and hypoxia    This morning she remains with BiPAP with a setting of 18/5 and FiO2 50%    She was awake and did follow commands      Diet: Probably none    ROS: Hypoxia, decreased alertness,  Excellent urine output 3.17 L with IV loop  No fever and acceptable blood pressure  Had brief period of atrial fibrillation    Data:     Current meds:    vancomycin  1,000 mg IntraVENous Once    piperacillin-tazobactam  3,375 mg IntraVENous Q8H    [Held by provider] furosemide  40 mg IntraVENous BID    vancomycin (VANCOCIN) intermittent dosing (placeholder)   Other RX Placeholder    lidocaine PF  5 mL IntraDERmal Once    sodium chloride flush  5-40 mL IntraVENous 2 times per day    pantoprazole  40 mg IntraVENous Daily    hydrocortisone sodium succinate PF  50 mg IntraVENous Q6H    [Held by provider] guaiFENesin  600 mg Oral BID    polyethylene glycol  17 g Oral Daily    sodium chloride flush  5-40 mL IntraVENous 2 times per day    [Held by provider] docusate sodium  100 mg Oral BID    [Held by provider] donepezil  10 mg Oral Nightly    [Held by provider] gabapentin  600 mg Oral BID    [Held by provider] memantine  10 mg Oral BID    [Held by provider] metoprolol tartrate  25 mg Oral BID    [Held by provider] rosuvastatin  40 mg Oral QPM      sodium chloride      sodium chloride 500 mL (02/02/23 2309)    DOBUTamine Stopped (02/02/23 2328)    norepinephrine Stopped (02/02/23 1819)    sodium chloride           I/O last 3 completed shifts:   In: 663.2 [I.V.:42.6; IV Piggyback:620.5]  Out: 1463 [UQTEI:7546; Drains:70]    CBC:   Recent Labs     02/02/23  0622 02/02/23  2250 02/03/23  0540   WBC 8.3 8.6 6.9   HGB 7.6* 7.2* 6.9*    186 166          Recent Labs 02/02/23  1515 02/02/23  2250 02/03/23  0540   * 127* 128*   K 4.8 4.3 4.0   CL 87* 92* 92*   CO2 23 22 21   BUN 39* 38* 40*   CREATININE 1.8* 1.7* 1.9*   GLUCOSE 159* 113* 208*       Lab Results   Component Value Date    CALCIUM 7.2 (L) 02/03/2023    PHOS 4.6 02/03/2023       Objective:     Vitals: BP (!) 94/53   Pulse 86   Temp 99.4 °F (37.4 °C) (Axillary)   Resp 30   Ht 5' 5\" (1.651 m)   Wt 189 lb 9.5 oz (86 kg)   SpO2 98%   BMI 31.55 kg/m² ,    General appearance: She was arousable, with BiPAP therapy  HEENT: Striking conjunctival pallor at least 3+  Neck: Seems supple unable to check for jugular veins  Lungs: Crackles but much less pronounced this morning compared to yesterday  Heart: Seemed regular rate and rhythm  Abdomen: Soft  Extremities: Positive leg edema she has bilateral SCDs  Has Rodriguez catheter now      Problem List :         Impression :     Stage II acute kidney injury-nonoliguric and good urine output-likely from combination of toxic ischemic tubular injury compounded by renal vein congestion  Acute decompensated heart failure-with underlying cardiomyopathy and dysrhythmia-now complicated by respiratory failure needing BiPAP therapy  Recent gallbladder surgery, with underlying diabetes and other chronic condition  Low sodium mainly from hypervolemia    Recommendation/Plan  :     I would recommend avoiding combination of Zosyn and vancomycin if possible-as it carries the highest risk of acute kidney injury-including acute interstitial nephritis and acute tubular injury-also I recommend diuretics-she still overloaded-gabapentin is on hold-oxybutynin has been discontinued-I would also avoid any nonessential medication-watch for iatrogenic and nosocomial complication-follow clinically and biochemically      Michael Morales MD MD

## 2023-02-03 NOTE — PROGRESS NOTES
GENERAL SURGERY PROGRESS NOTE    Keyla Marcial is a 76 y.o. female s/p laparoscopic cholecystectomy by Dr. Ismael James on 1/31. Subjective:  Transferred to the ICU yesterday for respirator failure and lower blood pressures. Reports good pain control. AVRIL with mild mostly serous drainage. Making good urine.     Objective:    Vitals: VITALS:  /70   Pulse (!) 118   Temp 97.9 °F (36.6 °C) (Axillary)   Resp 23   Ht 5' 5\" (1.651 m)   Wt 189 lb 9.5 oz (86 kg)   SpO2 97%   BMI 31.55 kg/m²     I/O: 02/02 0701 - 02/03 0700  In: 663.2 [I.V.:42.6]  Out: 7268 [Urine:3100; Drains:70]    Labs/Imaging Results:   Lab Results   Component Value Date/Time     02/03/2023 05:40 AM    K 4.0 02/03/2023 05:40 AM    CL 92 02/03/2023 05:40 AM    CO2 21 02/03/2023 05:40 AM    BUN 40 02/03/2023 05:40 AM    CREATININE 1.9 02/03/2023 05:40 AM    GLUCOSE 208 02/03/2023 05:40 AM    CALCIUM 7.2 02/03/2023 05:40 AM      Lab Results   Component Value Date    WBC 6.9 02/03/2023    HGB 6.9 (LL) 02/03/2023    HCT 20.6 (L) 02/03/2023    MCV 80.8 02/03/2023     02/03/2023         IV Fluids:   sodium chloride    sodium chloride Last Rate: 500 mL (02/02/23 2309)    sodium chloride    Scheduled Meds:   piperacillin-tazobactam, 3,375 mg, IntraVENous, Q8H    [Held by provider] furosemide, 40 mg, IntraVENous, BID    vancomycin (VANCOCIN) intermittent dosing (placeholder), , Other, RX Placeholder    lidocaine PF, 5 mL, IntraDERmal, Once    sodium chloride flush, 5-40 mL, IntraVENous, 2 times per day    pantoprazole, 40 mg, IntraVENous, Daily    hydrocortisone sodium succinate PF, 50 mg, IntraVENous, Q6H    [Held by provider] guaiFENesin, 600 mg, Oral, BID    polyethylene glycol, 17 g, Oral, Daily    sodium chloride flush, 5-40 mL, IntraVENous, 2 times per day    [Held by provider] docusate sodium, 100 mg, Oral, BID    [Held by provider] donepezil, 10 mg, Oral, Nightly    [Held by provider] gabapentin, 600 mg, Oral, BID [Held by provider] memantine, 10 mg, Oral, BID    [Held by provider] metoprolol tartrate, 25 mg, Oral, BID    [Held by provider] rosuvastatin, 40 mg, Oral, QPM    Physical Exam:  General: A&O x 3, no distress. HEENT: Anicteric sclerae, MMM. Extremities: No edema bilat LE. Abdomen: Soft, appropriately tender, nondistended. Moderate ecchymosis at port sites    AVRIL- 70cc serosanguinous output      Assessment and Plan:  76 y.o. female s/p laparoscopic cholecystectomy for acute cholecystitis. Per Dr. Kassy Vargas there was some oozing due to her recent Eliquis use, drain was left. Hgb has slowly downtrended since OR--likely some equilibration of hgb level and hemodilution from periop fluids. Agree with transfusion.     Patient Active Problem List:     Paroxysmal atrial fibrillation (HCC)     Essential hypertension     Mixed hyperlipidemia     VHD (valvular heart disease)     Abnormal EKG     Acute blood loss anemia     Uncontrolled pain     Gait disturbance     Pneumonia due to infectious organism     SSS (sick sinus syndrome) (HCC)     S/P placement of cardiac pacemaker     Tachycardia-bradycardia ([address]     Fall at home, subsequent encounter     Acute intracranial hemorrhage ([address]     Hyponatremia     Compression fracture of L1 lumbar vertebra (HCC)     Intraparenchymal hematoma of brain     COVID-19     CAD (coronary artery disease)     AMS (altered mental status)     Altered mental status     Concussion with no loss of consciousness     Chest pain     Right upper quadrant pain     Acute cholecystitis      - would transfuse as indicated and trend hgb, I do not suspect there is ongoing bleeding  - continue supportive cares including respiratory support per Intensivist guidance, appreciate their assistance with cares  - can consider CTA if hgb continues to drop but I do not suspect imaging would  at this time  - will follow    Jaskaran oCnner MD

## 2023-02-03 NOTE — PROGRESS NOTES
Today's plan: patient is congested, CHF reviewed, appears increased congestion, may have left sided pneumonia also,  Sodium is better we will sign off please call us again as needed    Admit Date:  1/30/2023    Subjective: Okay      Chief complaints on admission  Chief Complaint   Patient presents with    Chest Pain     X 3 days          History of present illness:Jaimie is a 76 y. o.year old who  presents with had concerns including Chest Pain (X 3 days ). Past medical history:    has a past medical history of Anxiety, Arthritis, Atrial fibrillation ([facility] CAD (coronary artery disease), COVID-19, Diabetes mellitus ([facility] [address] H/O cardiac catheterization, H/O cardiovascular stress test, H/O echocardiogram, H/O echocardiogram, H/O echocardiogram, H/O three vessel coronary artery bypass, History of Holter monitoring, History of nuclear stress test, Hyperlipidemia, Hypertension, Memory loss, Missing teeth, acquired, Pneumonia, Risk for falls, TIA (transient ischemic attack), Urinary leakage, UTI (urinary tract infection), and Wears glasses. Past surgical history:   has a past surgical history that includes Cardiac catheterization (06/25/2018); [place] tooth extraction; eye surgery (Bilateral, 2018); Tonsillectomy (1940's); thoracentesis (Bilateral, 07/13/2018); Pacemaker insertion (Left, 12/11/2018); CABG with Mitral Valve Repair (07/09/2018); Mitral valve maze procedure (07/19/2018); and Cholecystectomy, laparoscopic (N/A, 1/31/2023). Social History:   reports that she has never smoked. She has never used smokeless tobacco. She reports that she does not drink alcohol and does not use drugs. Family history:  family history includes Breast Cancer in her sister; Diabetes in her sister; Early Death in her brother and father; Heart Disease in her father and sister; Other in her sister; Parkinsonism in her brother; Stroke in her mother.     No Known Allergies      Objective:   BP (!) 124/46   Pulse 86   Temp 98.3 °F (36.8 °C) (Oral)   Resp (!) 37   Ht 5' 5\" (1.651 m)   Wt 189 lb 9.5 oz (86 kg)   SpO2 98%   BMI 31.55 kg/m²     Intake/Output Summary (Last 24 hours) at 2/3/2023 1724  Last data filed at 2/3/2023 1637  Gross per 24 hour   Intake 993.17 ml   Output 3925 ml   Net -2931.83 ml         TELEMETRY:      Physical Exam:  Constitutional:  Well developed, Well nourished, No acute distress, Non-toxic appearance. HENT:  Normocephalic, Atraumatic, Bilateral external ears normal, Oropharynx moist, No oral exudates, Nose normal. Neck- Normal range of motion, No tenderness, Supple, No stridor. Eyes:  PERRL, EOMI, Conjunctiva normal, No discharge. Respiratory:  Normal breath sounds, No respiratory distress, No wheezing, No chest tenderness. ,no use of accessory muscles, diaphragm movement is normal  Cardiovascular: (PMI) apex non displaced,no lifts no thrills, no s3,no s4, Normal heart rate, Normal rhythm, No murmurs, No rubs, No gallops. Carotid arteries pulse and amplitude are normal no bruit, no abdominal bruit noted ( normal abdominal aorta ausculation), femoral arteries pulse and amplitude are normal no bruit, pedal pulses are normal  GI:  Bowel sounds normal, Soft, No tenderness, No masses, No pulsatile masses. : External genitalia appear normal, No masses or lesions. No discharge. No CVA tenderness. Musculoskeletal:  Intact distal pulses, No edema, No tenderness, No cyanosis, No clubbing. Good range of motion in all major joints. No tenderness to palpation or major deformities noted. Back- No tenderness. Integument:  Warm, Dry, No erythema, No rash. Lymphatic:  No lymphadenopathy noted. Neurologic:  Alert & oriented x 3, Normal motor function, Normal sensory function, No focal deficits noted.    Psychiatric:  Affect normal, Judgment normal, Mood normal.     Medications:    piperacillin-tazobactam  3,375 mg IntraVENous Q8H    [Held by provider] furosemide  40 mg IntraVENous BID    vancomycin (VANCOCIN) intermittent dosing (placeholder)   Other RX Placeholder    lidocaine PF  5 mL IntraDERmal Once    sodium chloride flush  5-40 mL IntraVENous 2 times per day    pantoprazole  40 mg IntraVENous Daily    hydrocortisone sodium succinate PF  50 mg IntraVENous Q6H    [Held by provider] guaiFENesin  600 mg Oral BID    polyethylene glycol  17 g Oral Daily    sodium chloride flush  5-40 mL IntraVENous 2 times per day    [Held by provider] docusate sodium  100 mg Oral BID    [Held by provider] donepezil  10 mg Oral Nightly    [Held by provider] gabapentin  600 mg Oral BID    [Held by provider] memantine  10 mg Oral BID    [Held by provider] metoprolol tartrate  25 mg Oral BID    [Held by provider] rosuvastatin  40 mg Oral QPM      sodium chloride      sodium chloride 500 mL (02/02/23 2309)    sodium chloride       sodium chloride, sodium chloride flush, sodium chloride, morphine, droperidol, HYDROcodone-acetaminophen, sodium chloride flush, sodium chloride, ondansetron **OR** ondansetron, acetaminophen **OR** acetaminophen, ipratropium    Lab Data:  CBC:   Recent Labs     02/02/23 2250 02/03/23  0540 02/03/23  1353   WBC 8.6 6.9 8.9   HGB 7.2* 6.9* 8.2*   HCT 21.8* 20.6* 24.3*   MCV 81.0 80.8 81.5    166 173       BMP:   Recent Labs     02/02/23 2250 02/03/23  0540 02/03/23  1353   * 128* 131*   K 4.3 4.0 3.9   CL 92* 92* 95*   CO2 22 21 23   PHOS 4.2 4.6 4.3   BUN 38* 40* 39*   CREATININE 1.7* 1.9* 1.6*       LIVER PROFILE:   Recent Labs     02/02/23 2250 02/03/23  0540 02/03/23  1353   AST 65* 59* 56*   ALT 15 13 13   BILIDIR 0.5* 0.5* 0.5*   BILITOT 0.8 0.7 0.8   ALKPHOS 111 108 106       PT/INR:   Recent Labs     02/02/23 2250 02/03/23  0540   PROTIME 15.9* 16.1*   INR 1.23 1.25     APTT:   Recent Labs     02/02/23 2250 02/03/23  0540   APTT 54.1* 49.0*     BNP:  No results for input(s): BNP in the last 72 hours. TROPONIN: @TROPONINI:3@      Assessment:  76 y. o.year old who is admitted for          Plan:  Chest pain: Patient appears to have right-sided chest pain with coughing, CAT scan of the chest is suspicious for cholecystitis based on distended gallbladder will recommend to get GI or surgical consult, she may need HIDA  CAD, history of bypass in 2018, has LIMA to LAD SVG to circumflex OM1 and OM 2 stable for now, had history of intra. 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 fib history of maze, will get EP evaluation if we can discontinue Eliquis because she history of fall into the parenchymal bleed in the brain in past and if the pacemaker does not show any underlying A. fib may need may be able to stop Eliquis  Critical time is performed by myself in  35 minutes exclusive of separately billable procedures. This time includes bedside physical exams and repeat evaluation, close medical management, titration of IV  drip, discussion with consultants, review of diagnostic testing results and monitoring for potential decompensation. Health maintenance: exerise and   All labs, medications and tests reviewed, continue all other medications of all above medical condition listed as is.       Hortencia Soulier, MD, MD 2/3/2023 5:24 PM

## 2023-02-04 LAB
ABO/RH: NORMAL
ALBUMIN SERPL-MCNC: 3.1 GM/DL (ref 3.4–5)
ALP BLD-CCNC: 113 IU/L (ref 40–129)
ALT SERPL-CCNC: 13 U/L (ref 10–40)
ANION GAP SERPL CALCULATED.3IONS-SCNC: 14 MMOL/L (ref 4–16)
ANTIBODY SCREEN: NEGATIVE
APTT: 46.6 SECONDS (ref 25.1–37.1)
AST SERPL-CCNC: 58 IU/L (ref 15–37)
BASE EXCESS: 1 (ref 0–2.4)
BASOPHILS ABSOLUTE: 0 K/CU MM
BASOPHILS RELATIVE PERCENT: 0.1 % (ref 0–1)
BILIRUB SERPL-MCNC: 0.7 MG/DL (ref 0–1)
BILIRUBIN DIRECT: 0.4 MG/DL (ref 0–0.3)
BILIRUBIN, INDIRECT: 0.3 MG/DL (ref 0–0.7)
BUN SERPL-MCNC: 39 MG/DL (ref 6–23)
CALCIUM SERPL-MCNC: 7.6 MG/DL (ref 8.3–10.6)
CHLORIDE BLD-SCNC: 96 MMOL/L (ref 99–110)
CO2: 23 MMOL/L (ref 21–32)
COMMENT: ABNORMAL
COMPONENT: NORMAL
CREAT SERPL-MCNC: 1.7 MG/DL (ref 0.6–1.1)
CROSSMATCH RESULT: NORMAL
CULTURE: ABNORMAL
DIFFERENTIAL TYPE: ABNORMAL
DOSE AMOUNT: NORMAL
DOSE TIME: NORMAL
EOSINOPHILS ABSOLUTE: 0 K/CU MM
EOSINOPHILS RELATIVE PERCENT: 0 % (ref 0–3)
GFR SERPL CREATININE-BSD FRML MDRD: 30 ML/MIN/1.73M2
GLUCOSE SERPL-MCNC: 182 MG/DL (ref 70–99)
HCO3 VENOUS: 23.5 MMOL/L (ref 19–25)
HCT VFR BLD CALC: 23.8 % (ref 37–47)
HEMOGLOBIN: 7.8 GM/DL (ref 12.5–16)
IMMATURE NEUTROPHIL %: 0.6 % (ref 0–0.43)
INR BLD: 1.02 INDEX
LACTATE: 0.9 MMOL/L (ref 0.5–1.9)
LYMPHOCYTES ABSOLUTE: 0.2 K/CU MM
LYMPHOCYTES RELATIVE PERCENT: 2.7 % (ref 24–44)
Lab: ABNORMAL
Lab: ABNORMAL
MAGNESIUM: 2.4 MG/DL (ref 1.8–2.4)
MCH RBC QN AUTO: 26.4 PG (ref 27–31)
MCHC RBC AUTO-ENTMCNC: 32.8 % (ref 32–36)
MCV RBC AUTO: 80.4 FL (ref 78–100)
MONOCYTES ABSOLUTE: 0.5 K/CU MM
MONOCYTES RELATIVE PERCENT: 5.7 % (ref 0–4)
NUCLEATED RBC %: 0 %
O2 SAT, VEN: 88.5 % (ref 50–70)
PCO2, VEN: 38 MMHG (ref 38–52)
PDW BLD-RTO: 13.8 % (ref 11.7–14.9)
PH VENOUS: 7.4 (ref 7.32–7.42)
PHOSPHORUS: 3.3 MG/DL (ref 2.5–4.9)
PLATELET # BLD: 167 K/CU MM (ref 140–440)
PMV BLD AUTO: 9.4 FL (ref 7.5–11.1)
PO2, VEN: 56 MMHG (ref 28–48)
POTASSIUM SERPL-SCNC: 3.5 MMOL/L (ref 3.5–5.1)
PROTHROMBIN TIME: 13.2 SECONDS (ref 11.7–14.5)
RBC # BLD: 2.96 M/CU MM (ref 4.2–5.4)
SEGMENTED NEUTROPHILS ABSOLUTE COUNT: 7.4 K/CU MM
SEGMENTED NEUTROPHILS RELATIVE PERCENT: 90.9 % (ref 36–66)
SODIUM BLD-SCNC: 133 MMOL/L (ref 135–145)
SPECIMEN: ABNORMAL
SPECIMEN: ABNORMAL
STATUS: NORMAL
TOTAL IMMATURE NEUTOROPHIL: 0.05 K/CU MM
TOTAL NUCLEATED RBC: 0 K/CU MM
TOTAL PROTEIN: 5.4 GM/DL (ref 6.4–8.2)
TRANSFUSION STATUS: NORMAL
UNIT DIVISION: 0
UNIT NUMBER: NORMAL
VANCOMYCIN RANDOM: 17.4 UG/ML
WBC # BLD: 8.2 K/CU MM (ref 4–10.5)

## 2023-02-04 PROCEDURE — 6360000002 HC RX W HCPCS: Performed by: STUDENT IN AN ORGANIZED HEALTH CARE EDUCATION/TRAINING PROGRAM

## 2023-02-04 PROCEDURE — 2700000000 HC OXYGEN THERAPY PER DAY

## 2023-02-04 PROCEDURE — 92526 ORAL FUNCTION THERAPY: CPT

## 2023-02-04 PROCEDURE — 83735 ASSAY OF MAGNESIUM: CPT

## 2023-02-04 PROCEDURE — 2580000003 HC RX 258: Performed by: STUDENT IN AN ORGANIZED HEALTH CARE EDUCATION/TRAINING PROGRAM

## 2023-02-04 PROCEDURE — 83605 ASSAY OF LACTIC ACID: CPT

## 2023-02-04 PROCEDURE — 6370000000 HC RX 637 (ALT 250 FOR IP): Performed by: NURSE PRACTITIONER

## 2023-02-04 PROCEDURE — 80202 ASSAY OF VANCOMYCIN: CPT

## 2023-02-04 PROCEDURE — 2580000003 HC RX 258: Performed by: NURSE PRACTITIONER

## 2023-02-04 PROCEDURE — APPNB15 APP NON BILLABLE TIME 0-15 MINS: Performed by: NURSE PRACTITIONER

## 2023-02-04 PROCEDURE — 6370000000 HC RX 637 (ALT 250 FOR IP): Performed by: SURGERY

## 2023-02-04 PROCEDURE — 6370000000 HC RX 637 (ALT 250 FOR IP): Performed by: PHYSICIAN ASSISTANT

## 2023-02-04 PROCEDURE — 6360000002 HC RX W HCPCS: Performed by: PHYSICIAN ASSISTANT

## 2023-02-04 PROCEDURE — 94761 N-INVAS EAR/PLS OXIMETRY MLT: CPT

## 2023-02-04 PROCEDURE — 99024 POSTOP FOLLOW-UP VISIT: CPT | Performed by: NURSE PRACTITIONER

## 2023-02-04 PROCEDURE — 2140000000 HC CCU INTERMEDIATE R&B

## 2023-02-04 PROCEDURE — 85025 COMPLETE CBC W/AUTO DIFF WBC: CPT

## 2023-02-04 PROCEDURE — 84100 ASSAY OF PHOSPHORUS: CPT

## 2023-02-04 PROCEDURE — 80053 COMPREHEN METABOLIC PANEL: CPT

## 2023-02-04 PROCEDURE — 85610 PROTHROMBIN TIME: CPT

## 2023-02-04 PROCEDURE — C9113 INJ PANTOPRAZOLE SODIUM, VIA: HCPCS | Performed by: STUDENT IN AN ORGANIZED HEALTH CARE EDUCATION/TRAINING PROGRAM

## 2023-02-04 PROCEDURE — 85730 THROMBOPLASTIN TIME PARTIAL: CPT

## 2023-02-04 PROCEDURE — 2580000003 HC RX 258: Performed by: SURGERY

## 2023-02-04 PROCEDURE — 2580000003 HC RX 258: Performed by: PHYSICIAN ASSISTANT

## 2023-02-04 PROCEDURE — 36592 COLLECT BLOOD FROM PICC: CPT

## 2023-02-04 PROCEDURE — 82805 BLOOD GASES W/O2 SATURATION: CPT

## 2023-02-04 PROCEDURE — 82248 BILIRUBIN DIRECT: CPT

## 2023-02-04 RX ORDER — MEMANTINE HYDROCHLORIDE 5 MG/1
5 TABLET ORAL 2 TIMES DAILY
Status: DISCONTINUED | OUTPATIENT
Start: 2023-02-04 | End: 2023-02-20 | Stop reason: HOSPADM

## 2023-02-04 RX ORDER — ROSUVASTATIN CALCIUM 5 MG/1
10 TABLET, COATED ORAL EVERY EVENING
Status: DISCONTINUED | OUTPATIENT
Start: 2023-02-04 | End: 2023-02-20 | Stop reason: HOSPADM

## 2023-02-04 RX ORDER — GABAPENTIN 300 MG/1
300 CAPSULE ORAL 2 TIMES DAILY
Status: DISCONTINUED | OUTPATIENT
Start: 2023-02-04 | End: 2023-02-15

## 2023-02-04 RX ADMIN — SODIUM CHLORIDE, PRESERVATIVE FREE 10 ML: 5 INJECTION INTRAVENOUS at 21:36

## 2023-02-04 RX ADMIN — ROSUVASTATIN CALCIUM 10 MG: 5 TABLET, COATED ORAL at 18:23

## 2023-02-04 RX ADMIN — ACETAMINOPHEN 650 MG: 325 TABLET ORAL at 20:14

## 2023-02-04 RX ADMIN — SODIUM CHLORIDE, PRESERVATIVE FREE 10 ML: 5 INJECTION INTRAVENOUS at 09:32

## 2023-02-04 RX ADMIN — HYDROCORTISONE SODIUM SUCCINATE 50 MG: 100 INJECTION, POWDER, FOR SOLUTION INTRAMUSCULAR; INTRAVENOUS at 09:32

## 2023-02-04 RX ADMIN — METOPROLOL TARTRATE 25 MG: 50 TABLET, FILM COATED ORAL at 14:24

## 2023-02-04 RX ADMIN — PIPERACILLIN AND TAZOBACTAM 3375 MG: 3; .375 INJECTION, POWDER, LYOPHILIZED, FOR SOLUTION INTRAVENOUS at 03:23

## 2023-02-04 RX ADMIN — MEMANTINE HYDROCHLORIDE 5 MG: 5 TABLET ORAL at 20:15

## 2023-02-04 RX ADMIN — FUROSEMIDE 40 MG: 10 INJECTION, SOLUTION INTRAMUSCULAR; INTRAVENOUS at 13:58

## 2023-02-04 RX ADMIN — PANTOPRAZOLE SODIUM 40 MG: 40 INJECTION, POWDER, LYOPHILIZED, FOR SOLUTION INTRAVENOUS at 09:32

## 2023-02-04 RX ADMIN — GUAIFENESIN 600 MG: 600 TABLET, EXTENDED RELEASE ORAL at 20:15

## 2023-02-04 RX ADMIN — METOPROLOL TARTRATE 25 MG: 50 TABLET, FILM COATED ORAL at 20:15

## 2023-02-04 RX ADMIN — VANCOMYCIN HYDROCHLORIDE 1000 MG: 1 INJECTION, POWDER, LYOPHILIZED, FOR SOLUTION INTRAVENOUS at 18:43

## 2023-02-04 RX ADMIN — MEMANTINE 10 MG: 10 TABLET ORAL at 13:43

## 2023-02-04 RX ADMIN — GUAIFENESIN 600 MG: 600 TABLET, EXTENDED RELEASE ORAL at 13:39

## 2023-02-04 RX ADMIN — PIPERACILLIN AND TAZOBACTAM 3375 MG: 3; .375 INJECTION, POWDER, LYOPHILIZED, FOR SOLUTION INTRAVENOUS at 09:51

## 2023-02-04 RX ADMIN — MEROPENEM 1000 MG: 1 INJECTION, POWDER, FOR SOLUTION INTRAVENOUS at 13:20

## 2023-02-04 RX ADMIN — DONEPEZIL HYDROCHLORIDE 10 MG: 10 TABLET ORAL at 20:15

## 2023-02-04 RX ADMIN — GABAPENTIN 600 MG: 300 CAPSULE ORAL at 13:45

## 2023-02-04 RX ADMIN — GABAPENTIN 300 MG: 300 CAPSULE ORAL at 20:14

## 2023-02-04 RX ADMIN — HYDROCORTISONE SODIUM SUCCINATE 50 MG: 100 INJECTION, POWDER, FOR SOLUTION INTRAMUSCULAR; INTRAVENOUS at 14:06

## 2023-02-04 RX ADMIN — FUROSEMIDE 40 MG: 10 INJECTION, SOLUTION INTRAMUSCULAR; INTRAVENOUS at 18:23

## 2023-02-04 RX ADMIN — HYDROCORTISONE SODIUM SUCCINATE 50 MG: 100 INJECTION, POWDER, FOR SOLUTION INTRAMUSCULAR; INTRAVENOUS at 03:23

## 2023-02-04 ASSESSMENT — ENCOUNTER SYMPTOMS
SHORTNESS OF BREATH: 0
BLOOD IN STOOL: 0
EYE PAIN: 0
CHEST TIGHTNESS: 0
COUGH: 0
STRIDOR: 0
ABDOMINAL DISTENTION: 0
CONSTIPATION: 0
BACK PAIN: 0
VOMITING: 0
CHOKING: 0
NAUSEA: 0
WHEEZING: 0
DIARRHEA: 0
ABDOMINAL PAIN: 0

## 2023-02-04 ASSESSMENT — PAIN SCALES - GENERAL
PAINLEVEL_OUTOF10: 0
PAINLEVEL_OUTOF10: 0

## 2023-02-04 ASSESSMENT — PAIN SCALES - WONG BAKER
WONGBAKER_NUMERICALRESPONSE: 0
WONGBAKER_NUMERICALRESPONSE: 0

## 2023-02-04 NOTE — PROGRESS NOTES
Renal Dose Adjustment Consult     PREVIOUS ORDER:  Gabapentin 600mg BID  Namenda 10mg BID  Crestor 40mg daily    Estimated Creatinine Clearance: 28 mL/min (A) (based on SCr of 1.7 mg/dL (H)). Recent Labs     02/03/23  1353 02/03/23  2100 02/04/23  0500   BUN 39* 40* 39*   CREATININE 1.6* 1.5* 1.7*    176 167   INR 1.15 1.12 1.02     NEW RENALLY ADJUSTED ORDER:  Gabapentin 300mg BID  Namenda 5mg BID  Crestor 10mg daily    Pharmacy will continue to monitor and adjust as necessary.     JOSEPH Stewart ANNETTE [facility]  2/4/2023 1:56 PM

## 2023-02-04 NOTE — PROGRESS NOTES
[address] [facility]  consulted by Dr. Aysha Hooker for monitoring and adjustment. Indication for treatment: Vancomycin indication: HAP  Goal trough: Trough Goal: 15-20 mcg/mL  AUC/THERON: 400-600    Risk Factors for MRSA Identified:   Hospitalization within the past 90 days, Received IV antibiotics within the past 90 days    Pertinent Laboratory Values:   Temp Readings from Last 3 Encounters:   02/04/23 97.6 °F (36.4 °C) (Axillary)   07/12/22 98.1 °F (36.7 °C) (Oral)   09/23/21 98.9 °F (37.2 °C) (Axillary)     Recent Labs     02/03/23  1353 02/03/23  1400 02/03/23  2100 02/04/23  0500   WBC 8.9  --  9.1 8.2   LACTATE  --  1.1 1.0 0.9     Recent Labs     02/03/23  1353 02/03/23  2100 02/04/23  0500   BUN 39* 40* 39*   CREATININE 1.6* 1.5* 1.7*     Estimated Creatinine Clearance: 28 mL/min (A) (based on SCr of 1.7 mg/dL (H)). Intake/Output Summary (Last 24 hours) at 2/4/2023 1716  Last data filed at 2/4/2023 1401  Gross per 24 hour   Intake 470.14 ml   Output 1375 ml   Net -904.86 ml       Pertinent Cultures:   Date    Source    Results  2/2   Blood    NGTD  2/2   Sputum/Respiratory  Pending  2/2   MRSA Nasal   Pending  2/2   Urine AG   Negative  2/2   Urine    ESBL Ecoli low col#    Ideal body weight: 57 kg (125 lb 10.6 oz)  Adjusted ideal body weight: 66.8 kg (147 lb 5.7 oz)  Actual weight: 86kg    Vancomycin level:   TROUGH:  No results for input(s): VANCOTROUGH in the last 72 hours. RANDOM:    Recent Labs     02/03/23  0540 02/04/23  0500   VANCORANDOM <4.0 17.4       Assessment:  HPI: Pt is 76 yof admitted 1/30 for CHF exacerbation. Patient now with new onset AMS and requiring BiPAP and vasopressors. Chest x-ray showing bibasilar opacities.    SCr, BUN, and urine output:   Remains in [facility] (baseline Scr 0.7 last month)  Day(s) of therapy: 3  Vancomycin concentration:   2/3 @ 0540 <4 ~ 12 hours post dose  2/4: not resulted    Plan:  Intermittent vancomycin dosing based on levels while in [place]  Based on level, re-dose with 1000 mg x1  Repeat next random level tomorrow AM  Pharmacy will continue to monitor patient and adjust therapy as indicated    VANCOMYCIN CONCENTRATION SCHEDULED FOR 2/5 @0600    Thank you for the consult.   Traci Gregg [facility], PharmD  2/4/2023 5:16 PM

## 2023-02-04 NOTE — PROGRESS NOTES
Inpatient Progress Note 2/4/2023        [place] [facility] [[email or web address]]  1945  [id number]      Assessment/Plan:  72yoF with PMH of Afib, CAD s/p CABG, DM, HTN. HLD, valvular heart disease s/p mitral valve repari. PFO closure, early dementia, tachy/ceasar syndrome s/p Pacer who presented with abdominal pain found to have acute cholecystitis s/p laparoscopic cholecystecomy who was upgraded to ICU for hypoxic resp failure with mixed septo-cardiogenic features. Problem list  Metabolic encephalopathy  Acute hypoxic respiratory failure  Septic shock  Cardiogenic shock? Cardiorenal syndrome? Acute kidney injury  Hyponatremia  Hypochloremia     Neuro: Metabolic encephalopathy much improved compared to yesterday. Pain control on current multimodal regiment, adjust as needed. Cardio: Bp much improved compared to prior. Likely septic shock? Off vasoactive agents for last 24 hours. Responding to diuretics at thsi time. BP elevated, restarted home meds. Cardiology singed off. Resp: Hypoxic respiratory failure requiring BiPAP, transition to high flow nasal cannula. Titrate to sat goal greater than 88%. Encourage pulmonary toileting, incentive spirometry. GI: Mentation improved, advance diet as tolerated GI prophylaxis. Status postcholecystectomy. : Creatinine 1.7 today  improved after diuresis, will continue gentle diuresis at this time. Monitor electrolytes and replenish as needed.,  Nephrology following. Heme: Concern for drop in hemoglobin postsurgery could be dilutional versus equilibration. Remains stable at this time with slow downtrend. No overt evidence of bleeding though if concern remains and patient requiring additional transfusions. Then she may benefit from CT angiogram. Chemical DVT prophylaxis on hold, continue mechanical DVT prophylaxis with SCDs. ID: Pneumonia/septic shock, on broad-spectrum intravenous antibiotics, cultures pending, narrow when able.   Endo: Stress dose steroids for septic shock, de-escalte. Hyperglycemic, blood glucose goal 140 to 180 mg/dL, insulin sliding scale as needed. MSK: No acute issues at this time. Unable to tolerate PT or OT or out of bed to chair. Tubes/Lines/Drains: PICC, PIV, Rodriguez     Code Status: DNR CCA     Patient transferred to step down. Subjective:    Patient seen and evaluated at bedside resting in bed, NAD, VSS. no acute events overnight. Mentation improving, responsive to diuretics. Restarted on home meds. Continue close monitoring. Physical Exam:            BP (!) 167/65   Pulse 83   Temp (!) 96 °F (35.6 °C) (Oral)   Resp 25   Ht 5' 5\" (1.651 m)   Wt 179 lb 14.3 oz (81.6 kg)   SpO2 97%   BMI 29.94 kg/m²     General: NAD  Eyes: EOMI  ENT: neck supple  Cardiovascular: Irregular rate. Respiratory: Clear to auscultation  Gastrointestinal: Soft, non tender  Genitourinary: no suprapubic tenderness  Musculoskeletal: 1-2 + edema. Skin: warm, dry  Neuro: Alert. Psych: Mood appropriate. Current Medications:   meropenem  1,000 mg IntraVENous Once    Followed by    Neetu Sanchez ON 2/5/2023] meropenem  1,000 mg IntraVENous Q12H    furosemide  40 mg IntraVENous BID    vancomycin (VANCOCIN) intermittent dosing (placeholder)   Other RX Placeholder    lidocaine PF  5 mL IntraDERmal Once    sodium chloride flush  5-40 mL IntraVENous 2 times per day    pantoprazole  40 mg IntraVENous Daily    hydrocortisone sodium succinate PF  50 mg IntraVENous Q6H    guaiFENesin  600 mg Oral BID    polyethylene glycol  17 g Oral Daily    sodium chloride flush  5-40 mL IntraVENous 2 times per day    docusate sodium  100 mg Oral BID    donepezil  10 mg Oral Nightly    gabapentin  600 mg Oral BID    memantine  10 mg Oral BID    metoprolol tartrate  25 mg Oral BID    rosuvastatin  40 mg Oral QPM       Labs, Imaging and Studies reviewed:    XR CHEST PORTABLE    Result Date: 2/2/2023  EXAMINATION: ONE XRAY VIEW OF THE CHEST 2/2/2023 8:19 pm COMPARISON: Chest 02/02/2023 at 1:54 p.m. HISTORY: ORDERING SYSTEM PROVIDED HISTORY: PICC Tip Confirmation Additional signs and symptoms: na Relevant Medical/Surgical History: diabetes, hypertension FINDINGS: *Right upper extremity PICC tip overlies the superior cavoatrial junction level. *Stable cardiomegaly. *Low lung volumes with bibasilar atelectasis or infiltrate, greater left than right. Pneumonia is a consideration. *Probable small volume left pleural effusion. *Stable sequela from open-heart surgery and left-sided pacemaker. 1. Right upper extremity PICC tip overlies the superior cavoatrial junction level. 2. Stable cardiomegaly. 3. Low lung volumes with bibasilar atelectasis or infiltrate, greater left than right. Pneumonia is a consideration. 4. Probable small volume left pleural effusion. 5. Stable sequela from open-heart surgery and left-sided pacemaker. XR CHEST PORTABLE    Result Date: 2/2/2023  EXAMINATION: ONE XRAY VIEW OF THE CHEST 2/2/2023 1:52 pm COMPARISON: 02/02/2023, 01/30/2023 HISTORY: ORDERING SYSTEM PROVIDED HISTORY: shortness of breath TECHNOLOGIST PROVIDED HISTORY: Reason for exam:->shortness of breath Reason for Exam: shortness of breath FINDINGS: Sternotomy wires. Prosthetic cardiac valve. Pacemaker wires. Lung volumes. Bibasilar opacities. No pneumothorax. Heart size is stable. Low lung volumes with bibasilar opacities which may represent atelectasis or pneumonia.        Recent Results (from the past 24 hour(s))   CBC with Auto Differential    Collection Time: 02/03/23  1:53 PM   Result Value Ref Range    WBC 8.9 4.0 - 10.5 K/CU MM    RBC 2.98 (L) 4.2 - 5.4 M/CU MM    Hemoglobin 8.2 (L) 12.5 - 16.0 GM/DL    Hematocrit 24.3 (L) 37 - 47 %    MCV 81.5 78 - 100 FL    MCH 27.5 27 - 31 PG    MCHC 33.7 32.0 - 36.0 %    RDW 13.7 11.7 - 14.9 %    Platelets 033 422 - 116 K/CU MM    MPV 9.5 7.5 - 11.1 FL    Differential Type AUTOMATED DIFFERENTIAL     Segs Relative 93.7 (H) 36 - 66 %    Lymphocytes % 1.8 (L) 24 - 44 % Monocytes % 4.0 0 - 4 %    Eosinophils % 0.0 0 - 3 %    Basophils % 0.1 0 - 1 %    Segs Absolute 8.3 K/CU MM    Lymphocytes Absolute 0.2 K/CU MM    Monocytes Absolute 0.4 K/CU MM    Eosinophils Absolute 0.0 K/CU MM    Basophils Absolute 0.0 K/CU MM    Nucleated RBC % 0.0 %    Total Nucleated RBC 0.0 K/CU MM    Total Immature Neutrophil 0.04 K/CU MM    Immature Neutrophil % 0.4 0 - 0.43 %   Critical Care Panel    Collection Time: 02/03/23  1:53 PM   Result Value Ref Range    Sodium 131 (L) 135 - 145 MMOL/L    Potassium 3.9 3.5 - 5.1 MMOL/L    Chloride 95 (L) 99 - 110 mMol/L    CO2 23 21 - 32 MMOL/L    Anion Gap 13 4 - 16    BUN 39 (H) 6 - 23 MG/DL    Creatinine 1.6 (H) 0.6 - 1.1 MG/DL    Est, Glom Filt Rate 33 (L) >60 mL/min/1.73m2    Glucose 188 (H) 70 - 99 MG/DL    Calcium 7.3 (L) 8.3 - 10.6 MG/DL    Phosphorus 4.3 2.5 - 4.9 MG/DL    Magnesium 2.2 1.8 - 2.4 mg/dl   Hepatic Function Panel    Collection Time: 02/03/23  1:53 PM   Result Value Ref Range    Albumin 3.2 (L) 3.4 - 5.0 GM/DL    Total Bilirubin 0.8 0.0 - 1.0 MG/DL    Bilirubin, Direct 0.5 (H) 0.0 - 0.3 MG/DL    Bilirubin, Indirect 0.3 0 - 0.7 MG/DL    Alkaline Phosphatase 106 40 - 129 IU/L    AST 56 (H) 15 - 37 IU/L    ALT 13 10 - 40 U/L    Total Protein 5.2 (L) 6.4 - 8.2 GM/DL   Protime/INR & PTT    Collection Time: 02/03/23  1:53 PM   Result Value Ref Range    Protime 14.8 (H) 11.7 - 14.5 SECONDS    INR 1.15 INDEX    aPTT 43.5 (H) 25.1 - 37.1 SECONDS   Fibrinogen    Collection Time: 02/03/23  1:53 PM   Result Value Ref Range    Fibrinogen 691 (H) 196.9 - 442.1 MG/DL   D-Dimer, Quantitative    Collection Time: 02/03/23  1:53 PM   Result Value Ref Range    D-Dimer, Quant 4700 (H) <230 NG/mL(DDU)   Lactic Acid    Collection Time: 02/03/23  2:00 PM   Result Value Ref Range    Lactate 1.1 0.5 - 1.9 mMOL/L   Blood Gas, Venous    Collection Time: 02/03/23  9:00 PM   Result Value Ref Range    pH, Johnnie 7.41 7.32 - 7.42    pCO2, Johnnei 38 38 - 52 mmHG    pO2, Johnnie 59 (H) 28 - 48 mmHG    Base Excess 0 0 - 2.4    HCO3, Venous 24.1 19 - 25 MMOL/L    O2 Sat, Johnnie 89.8 (H) 50 - 70 %    Comment VBG    CBC with Auto Differential    Collection Time: 02/03/23  9:00 PM   Result Value Ref Range    WBC 9.1 4.0 - 10.5 K/CU MM    RBC 3.00 (L) 4.2 - 5.4 M/CU MM    Hemoglobin 8.2 (L) 12.5 - 16.0 GM/DL    Hematocrit 23.8 (L) 37 - 47 %    MCV 79.3 78 - 100 FL    MCH 27.3 27 - 31 PG    MCHC 34.5 32.0 - 36.0 %    RDW 13.8 11.7 - 14.9 %    Platelets 810 582 - 222 K/CU MM    MPV 9.2 7.5 - 11.1 FL    Differential Type AUTOMATED DIFFERENTIAL     Segs Relative 88.1 (H) 36 - 66 %    Lymphocytes % 4.4 (L) 24 - 44 %    Monocytes % 5.1 (H) 0 - 4 %    Eosinophils % 0.4 0 - 3 %    Basophils % 0.4 0 - 1 %    Segs Absolute 8.0 K/CU MM    Lymphocytes Absolute 0.4 K/CU MM    Monocytes Absolute 0.5 K/CU MM    Eosinophils Absolute 0.0 K/CU MM    Basophils Absolute 0.0 K/CU MM    Nucleated RBC % 0.0 %    Total Nucleated RBC 0.0 K/CU MM    Total Immature Neutrophil 0.15 K/CU MM    Immature Neutrophil % 1.6 (H) 0 - 0.43 %   Critical Care Panel    Collection Time: 02/03/23  9:00 PM   Result Value Ref Range    Sodium 132 (L) 135 - 145 MMOL/L    Potassium 3.7 3.5 - 5.1 MMOL/L    Chloride 95 (L) 99 - 110 mMol/L    CO2 22 21 - 32 MMOL/L    Anion Gap 15 4 - 16    BUN 40 (H) 6 - 23 MG/DL    Creatinine 1.5 (H) 0.6 - 1.1 MG/DL    Est, Glom Filt Rate 35 (L) >60 mL/min/1.73m2    Glucose 197 (H) 70 - 99 MG/DL    Calcium 7.1 (L) 8.3 - 10.6 MG/DL    Phosphorus 3.5 2.5 - 4.9 MG/DL    Magnesium 2.3 1.8 - 2.4 mg/dl   Hepatic Function Panel    Collection Time: 02/03/23  9:00 PM   Result Value Ref Range    Albumin 3.3 (L) 3.4 - 5.0 GM/DL    Total Bilirubin 0.9 0.0 - 1.0 MG/DL    Bilirubin, Direct 0.5 (H) 0.0 - 0.3 MG/DL    Bilirubin, Indirect 0.4 0 - 0.7 MG/DL    Alkaline Phosphatase 112 40 - 129 IU/L    AST 56 (H) 15 - 37 IU/L    ALT 13 10 - 40 U/L    Total Protein 5.4 (L) 6.4 - 8.2 GM/DL   Lactic Acid    Collection Time: 02/03/23  9:00 PM   Result Value Ref Range    Lactate 1.0 0.5 - 1.9 mMOL/L   Protime/INR & PTT    Collection Time: 02/03/23  9:00 PM   Result Value Ref Range    Protime 14.4 11.7 - 14.5 SECONDS    INR 1.12 INDEX    aPTT 43.5 (H) 25.1 - 37.1 SECONDS   Blood Gas, Venous    Collection Time: 02/04/23  5:00 AM   Result Value Ref Range    pH, Johnnie 7.40 7.32 - 7.42    pCO2, Johnnie 38 38 - 52 mmHG    pO2, Johnnie 56 (H) 28 - 48 mmHG    Base Excess 1 0 - 2.4    HCO3, Venous 23.5 19 - 25 MMOL/L    O2 Sat, Johnnie 88.5 (H) 50 - 70 %    Comment VBG    CBC with Auto Differential    Collection Time: 02/04/23  5:00 AM   Result Value Ref Range    WBC 8.2 4.0 - 10.5 K/CU MM    RBC 2.96 (L) 4.2 - 5.4 M/CU MM    Hemoglobin 7.8 (L) 12.5 - 16.0 GM/DL    Hematocrit 23.8 (L) 37 - 47 %    MCV 80.4 78 - 100 FL    MCH 26.4 (L) 27 - 31 PG    MCHC 32.8 32.0 - 36.0 %    RDW 13.8 11.7 - 14.9 %    Platelets 348 809 - 503 K/CU MM    MPV 9.4 7.5 - 11.1 FL    Differential Type AUTOMATED DIFFERENTIAL     Segs Relative 90.9 (H) 36 - 66 %    Lymphocytes % 2.7 (L) 24 - 44 %    Monocytes % 5.7 (H) 0 - 4 %    Eosinophils % 0.0 0 - 3 %    Basophils % 0.1 0 - 1 %    Segs Absolute 7.4 K/CU MM    Lymphocytes Absolute 0.2 K/CU MM    Monocytes Absolute 0.5 K/CU MM    Eosinophils Absolute 0.0 K/CU MM    Basophils Absolute 0.0 K/CU MM    Nucleated RBC % 0.0 %    Total Nucleated RBC 0.0 K/CU MM    Total Immature Neutrophil 0.05 K/CU MM    Immature Neutrophil % 0.6 (H) 0 - 0.43 %   Critical Care Panel    Collection Time: 02/04/23  5:00 AM   Result Value Ref Range    Sodium 133 (L) 135 - 145 MMOL/L    Potassium 3.5 3.5 - 5.1 MMOL/L    Chloride 96 (L) 99 - 110 mMol/L    CO2 23 21 - 32 MMOL/L    Anion Gap 14 4 - 16    BUN 39 (H) 6 - 23 MG/DL    Creatinine 1.7 (H) 0.6 - 1.1 MG/DL    Est, Glom Filt Rate 30 (L) >60 mL/min/1.73m2    Glucose 182 (H) 70 - 99 MG/DL    Calcium 7.6 (L) 8.3 - 10.6 MG/DL    Phosphorus 3.3 2.5 - 4.9 MG/DL    Magnesium 2.4 1.8 - 2.4 mg/dl   Hepatic Function Panel Collection Time: 02/04/23  5:00 AM   Result Value Ref Range    Albumin 3.1 (L) 3.4 - 5.0 GM/DL    Total Bilirubin 0.7 0.0 - 1.0 MG/DL    Bilirubin, Direct 0.4 (H) 0.0 - 0.3 MG/DL    Bilirubin, Indirect 0.3 0 - 0.7 MG/DL    Alkaline Phosphatase 113 40 - 129 IU/L    AST 58 (H) 15 - 37 IU/L    ALT 13 10 - 40 U/L    Total Protein 5.4 (L) 6.4 - 8.2 GM/DL   Lactic Acid    Collection Time: 02/04/23  5:00 AM   Result Value Ref Range    Lactate 0.9 0.5 - 1.9 mMOL/L   Protime/INR & PTT    Collection Time: 02/04/23  5:00 AM   Result Value Ref Range    Protime 13.2 11.7 - 14.5 SECONDS    INR 1.02 INDEX    aPTT 46.6 (H) 25.1 - 37.1 SECONDS       Electronically signed by Chuy Mendoza MD on 2/4/2023 at 1:42 PM

## 2023-02-04 NOTE — PROGRESS NOTES
GENERAL SURGERY PROGRESS NOTE    Gerald Almodovar is a 76 y.o. female s/p laparoscopic cholecystectomy by Dr. Yarelis Torres on 1/31. Subjective:  Transferred to the ICU for respirator failure and lower blood pressures. Reports good pain control. AVRIL with minimal serous drainage.  Denies N/V    Objective:    Vitals: VITALS:  BP (!) 177/85   Pulse 76   Temp (!) 96 °F (35.6 °C) (Oral)   Resp 24   Ht 5' 5\" (1.651 m)   Wt 179 lb 14.3 oz (81.6 kg)   SpO2 97%   BMI 29.94 kg/m²     I/O: 02/03 0701 - 02/04 0700  In: 800.1 [I.V.:30]  Out: 1750 [Urine:1750]    Labs/Imaging Results:   Lab Results   Component Value Date/Time     02/04/2023 05:00 AM    K 3.5 02/04/2023 05:00 AM    CL 96 02/04/2023 05:00 AM    CO2 23 02/04/2023 05:00 AM    BUN 39 02/04/2023 05:00 AM    CREATININE 1.7 02/04/2023 05:00 AM    GLUCOSE 182 02/04/2023 05:00 AM    CALCIUM 7.6 02/04/2023 05:00 AM      Lab Results   Component Value Date    WBC 8.2 02/04/2023    HGB 7.8 (L) 02/04/2023    HCT 23.8 (L) 02/04/2023    MCV 80.4 02/04/2023     02/04/2023         IV Fluids:   sodium chloride    sodium chloride Last Rate: 12.5 mL/hr at 02/04/23 0324    sodium chloride    Scheduled Meds:   piperacillin-tazobactam, 3,375 mg, IntraVENous, Q8H    [Held by provider] furosemide, 40 mg, IntraVENous, BID    vancomycin (VANCOCIN) intermittent dosing (placeholder), , Other, RX Placeholder    lidocaine PF, 5 mL, IntraDERmal, Once    sodium chloride flush, 5-40 mL, IntraVENous, 2 times per day    pantoprazole, 40 mg, IntraVENous, Daily    hydrocortisone sodium succinate PF, 50 mg, IntraVENous, Q6H    [Held by provider] guaiFENesin, 600 mg, Oral, BID    polyethylene glycol, 17 g, Oral, Daily    sodium chloride flush, 5-40 mL, IntraVENous, 2 times per day    [Held by provider] docusate sodium, 100 mg, Oral, BID    [Held by provider] donepezil, 10 mg, Oral, Nightly    [Held by provider] gabapentin, 600 mg, Oral, BID    [Held by provider] memantine, 10 mg, Oral, BID    [Held by provider] metoprolol tartrate, 25 mg, Oral, BID    [Held by provider] rosuvastatin, 40 mg, Oral, QPM    Physical Exam:  General: A&O x 3, no distress. HEENT: Anicteric sclerae, MMM. Extremities: No edema bilat LE. Abdomen: Soft, appropriately tender, nondistended. Moderate ecchymosis at port sites    AVRIL- 0cc serosanguinous output recorded yesterday      Assessment and Plan:  76 y.o. female s/p laparoscopic cholecystectomy for acute cholecystitis. Per Dr. Dedrick Pope there was some oozing due to her recent Eliquis use, drain was left. Hgb has slowly downtrended since OR--likely some equilibration of hgb level and hemodilution from periop fluids. Agree with transfusion.     Patient Active Problem List:     Paroxysmal atrial fibrillation (HCC)     Essential hypertension     Mixed hyperlipidemia     VHD (valvular heart disease)     Abnormal EKG     Acute blood loss anemia     Uncontrolled pain     Gait disturbance     Pneumonia due to infectious organism     SSS (sick sinus syndrome) ([facility])     S/P placement of cardiac pacemaker     Tachycardia-bradycardia ([address]     Fall at home, subsequent encounter     Acute intracranial hemorrhage ([address]     Hyponatremia     Compression fracture of L1 lumbar vertebra (HCC)     Intraparenchymal hematoma of brain     COVID-19     CAD (coronary artery disease)     AMS (altered mental status)     Altered mental status     Concussion with no loss of consciousness     Chest pain     Right upper quadrant pain     Acute cholecystitis      - would transfuse as indicated and trend hgb, hgb has been stable  - continue supportive cares including respiratory support per Intensivist guidance, appreciate their assistance with cares  - can consider CTA if hgb continues to drop but I do not suspect imaging would  at this time  - will follow    BIRD Caba - CNP

## 2023-02-04 NOTE — PROGRESS NOTES
[id number] [facility] SPEECH/LANGUAGE PATHOLOGY  DAILY PROGRESS NOTE  [place] Cannon  2/4/2023  [id number]  Chest pain [R07.9]  Right sided abdominal pain [R10.9]  Chest pain, unspecified type [R07.9]  Acute cholecystitis [K81.0]  No Known Allergies      Pt was seen this date for dysphagia treatment. IMPRESSION AND RECOMMENDATIONS: Pt was seen for ongoing bedside swallow evaluation and diet tolerance monitoring/advancement following admission to [facility] with chest pain and cough. Pt was seen seated upright in bed, alert and cooperative. Multiple family members at bedside throughout. Oral mechanism examination was [facility] for symmetry/ROM/strength/coordination. Pt remains on clear liquid diet, no solid PO trials presented. Pt accepted PO trials of thin liquids via straw sips and nectar thick liquids via straw sips. Oral stage continues [facility] with adequate oral manipulation. Pharyngeal stage appears WFL with no s/s of aspiration. Recommend clear thin liquid diet with strict aspiration precautions and cues for rate and amount of intake. Results/recommendations d/w RN, Dillon Flores, pt and pt's family, who demonstrated understanding and agreement. SLP will continue to follow for diet tolerance monitoring and advancement of solid consistencies as allowed by surgeon.      GOALS (current status in bold):  Short-term Goals  Timeframe for Short-term Goals: LOS or until goals are met  Goal 1: Pt will tolerate nectar thick liquids/clear liquid diet (per surgeon) without clinical evidence of aspiration 100% Goal Met, pt tolerated 5/5 PO trials of nectar thick liquids with no s/s of aspiration  New Goal: Pt will tolerate thin liquids/clear liquid diet (per surgeon) without clinical evidence of aspiration 100%  Goal 2: Pt will participate in advanced trials for possible safe diet level advancement 10/10 trials Meeting, continue for solid consistencies  Goal 3: Pt/caregivers will demonstrate comprehension of recommendations/POC Meeting, continue      EDUCATION: results/recommendations d/w pt, pt's family and RN, [facility], who demonstrated understanding and agreement.      PAIN RATING (0-10 Scale): 0  Time in/Time out: SLP Individual Minutes  Time In: 1400  Time Out: 1430  Minutes: 30    Visit number: 2      William Mcdonnell MS, CF-SLP  2/4/2023  2:35 PM

## 2023-02-04 NOTE — PROGRESS NOTES
V2.0  [facility] Hospitalist Progress Note      Name:  Sofia De La Cruz /Age/Sex: 1945  (76 y.o. female)   MRN & CSN:  [id number] & [id number] Encounter Date/Time: 2023 3:29 PM EST    Location:  -A PCP: Cheryl Meade MD       Hospital Day: 6    Assessment and Plan:   Sofia De La Cruz is a 76 y.o. female with pmh of Sofia De La Cruz is a 76 y.o. female with pmh of CAD s/p CABG, Valvular Heart Disease, s/p Mitral Valve Repair, PFO Closure, Paroxysmal A-Fib, HTN, HLD, DM, TIA, Early Dementia who presents with Chest pain Rt Sided and RUQ Abdominal Pain      Plan:    Acute hypoxic respiratory failure  Patient with acute respiratory failure was on nasal cannula. Became acutely more short of breath. CT did show what appears to be either fluid overload or viral pneumonia. Now off BiPAP and on 35% Heated high flow   Plan for transfer out of ICU    Sepsis 2/2 likely ESBL UTI without hematuria, Pneumonia    Patient with potential abnormality as well she did have acute solid cholecystitis status post lap pedro 2023. High risk for postoperative infection requiring pneumonia. WBC 12, SBP 92, RR 22 initially. Procal trending down to 5. Urine growing ESBL UTI  Patient on IV Meropenem and IV vanc   Keep on vanc till MRSA nares result   F/U pan culture   Off pressors and plan to transfer out of ICU today   On solucortef 50mg Q6H - will likely discontinue once patient out of ICU. Acute Encephalopathy 2/2 likely Metabolic and Septic 2/2 hypoxia and ESBL UTI  Was difficult to wake on 2/3/2023  AO X 3 today. Cholecystitis  Patient underwent lap pedro on 2023 with Dr. Kaela Benavides. AVRIL drain in place  GS on board     TAYLOR  Pt with Cr 2 on admission, trended down to 1.7 again. baseline Cr 0.7.    Nephrology on board    Hypochloremic Hyponatremia  Patient with acute worsening hyponatremia to 121 and now back up to 133  Nephrology on board    Acute on chronic systolic CHF  Patient with an EF 50% with hypokinetic inferio-lateral wall and basal anterio-septal carson the 45 (become acutely short of breath with progressive pulmonary edema. Cardiology on board  Nephrology on board    CAD s/p CABG  Patient is also known valvular heart disease. Cardiology) following    Acute Normocytic Anemia 2/2 possibly Post-operative loss and hemodilution 2/2 fluid overload   Hb 6.9 yesterday. It was 11 on 1/31/2023. 1U PRBC this admission. Hb 7.8 today. No signs of overt bleeding     Diet ADULT DIET; Clear Liquid; Mildly Thick (Nectar)   DVT Prophylaxis [] Lovenox, []  Heparin, [x] SCDs, [] Ambulation,    [] Eliquis, [] Xarelto  [] Coumadin [] other   Code Status DNR-CCA   Disposition From: Home  Expected Disposition: TBD  Estimated Date of Discharge: To be determined  Patient requires continued admission due to respiratory failure. Pt. On heated high flow NC at 35%. Plan to transfer out of ICU   Surrogate Decision Maker/ POA      Subjective:     Chief Complaint: Chest Pain (X 3 days )     Patient seen and examined in the AM. Patient's sisters at bedside. Pt. On heated high flow NC. States she feels ok. No complaints today. AO X 3    Review of Systems:    Review of Systems   Constitutional:  Positive for fatigue. Negative for chills, fever and unexpected weight change. Eyes:  Negative for pain and visual disturbance. Respiratory:  Negative for cough, choking, chest tightness, shortness of breath, wheezing and stridor. Cardiovascular:  Negative for chest pain, palpitations and leg swelling. Gastrointestinal:  Negative for abdominal distention, abdominal pain, blood in stool, constipation, diarrhea, nausea and vomiting. Genitourinary:  Negative for decreased urine volume, dysuria, frequency, hematuria and urgency. Musculoskeletal:  Negative for arthralgias, back pain and myalgias. Skin:  Negative for pallor and rash.    Neurological:  Negative for dizziness, tremors, syncope, speech difficulty, weakness, light-headedness, numbness and headaches. Psychiatric/Behavioral:  Negative for agitation. Objective: Intake/Output Summary (Last 24 hours) at 2/4/2023 1423  Last data filed at 2/4/2023 1401  Gross per 24 hour   Intake 470.14 ml   Output 2175 ml   Net -1704.86 ml          Vitals:   Vitals:    02/04/23 1300   BP: (!) 167/65   Pulse: 83   Resp: 25   Temp:    SpO2: 97%       Physical Exam:     Physical Exam  Vitals reviewed. Constitutional:       General: She is awake. She is not in acute distress. Appearance: Normal appearance. She is overweight. She is not ill-appearing. Interventions: Nasal cannula in place. Comments: Heated high flow    HENT:      Head: Normocephalic and atraumatic. Mouth/Throat:      Mouth: Mucous membranes are moist.      Pharynx: Oropharynx is clear. Eyes:      Extraocular Movements: Extraocular movements intact. Conjunctiva/sclera: Conjunctivae normal.      Pupils: Pupils are equal, round, and reactive to light. Cardiovascular:      Rate and Rhythm: Normal rate. Rhythm irregularly irregular. Pulses: Normal pulses. Heart sounds: Normal heart sounds. Pulmonary:      Effort: Pulmonary effort is normal.      Breath sounds: Decreased air movement and transmitted upper airway sounds present. Rhonchi present. No wheezing or rales. Abdominal:      General: Abdomen is flat. Bowel sounds are normal.      Palpations: Abdomen is soft. Musculoskeletal:         General: No swelling. Normal range of motion. Cervical back: Normal range of motion and neck supple. Skin:     General: Skin is warm and dry. Neurological:      Mental Status: She is alert. Psychiatric:         Behavior: Behavior is cooperative.        Medications:   Medications:    [START ON 2/5/2023] meropenem  1,000 mg IntraVENous Q12H    gabapentin  300 mg Oral BID    memantine  5 mg Oral BID    rosuvastatin  10 mg Oral QPM    furosemide  40 mg IntraVENous BID    vancomycin (VANCOCIN) intermittent dosing (placeholder)   Other RX Placeholder    lidocaine PF  5 mL IntraDERmal Once    sodium chloride flush  5-40 mL IntraVENous 2 times per day    pantoprazole  40 mg IntraVENous Daily    hydrocortisone sodium succinate PF  50 mg IntraVENous Q6H    guaiFENesin  600 mg Oral BID    polyethylene glycol  17 g Oral Daily    sodium chloride flush  5-40 mL IntraVENous 2 times per day    docusate sodium  100 mg Oral BID    donepezil  10 mg Oral Nightly    metoprolol tartrate  25 mg Oral BID      Infusions:    sodium chloride 12.5 mL/hr at 02/04/23 0324    sodium chloride       PRN Meds: sodium chloride flush, 5-40 mL, PRN  sodium chloride, 500 mL, PRN  morphine, 1 mg, Q4H PRN  HYDROcodone-acetaminophen, 1 tablet, Q6H PRN  sodium chloride flush, 5-40 mL, PRN  sodium chloride, , PRN  ondansetron, 4 mg, Q8H PRN   Or  ondansetron, 4 mg, Q6H PRN  acetaminophen, 650 mg, Q6H PRN   Or  acetaminophen, 650 mg, Q6H PRN  ipratropium, 1 spray, PRN      Labs      Recent Results (from the past 24 hour(s))   Blood Gas, Venous    Collection Time: 02/03/23  9:00 PM   Result Value Ref Range    pH, Johnnie 7.41 7.32 - 7.42    pCO2, Johnnie 38 38 - 52 mmHG    pO2, Johnnie 59 (H) 28 - 48 mmHG    Base Excess 0 0 - 2.4    HCO3, Venous 24.1 19 - 25 MMOL/L    O2 Sat, Johnnie 89.8 (H) 50 - 70 %    Comment VBG    CBC with Auto Differential    Collection Time: 02/03/23  9:00 PM   Result Value Ref Range    WBC 9.1 4.0 - 10.5 K/CU MM    RBC 3.00 (L) 4.2 - 5.4 M/CU MM    Hemoglobin 8.2 (L) 12.5 - 16.0 GM/DL    Hematocrit 23.8 (L) 37 - 47 %    MCV 79.3 78 - 100 FL    MCH 27.3 27 - 31 PG    MCHC 34.5 32.0 - 36.0 %    RDW 13.8 11.7 - 14.9 %    Platelets 476 973 - 959 K/CU MM    MPV 9.2 7.5 - 11.1 FL    Differential Type AUTOMATED DIFFERENTIAL     Segs Relative 88.1 (H) 36 - 66 %    Lymphocytes % 4.4 (L) 24 - 44 %    Monocytes % 5.1 (H) 0 - 4 %    Eosinophils % 0.4 0 - 3 %    Basophils % 0.4 0 - 1 %    Segs Absolute 8.0 K/CU MM    Lymphocytes Absolute 0.4 K/CU MM    Monocytes Absolute 0.5 K/CU MM    Eosinophils Absolute 0.0 K/CU MM    Basophils Absolute 0.0 K/CU MM    Nucleated RBC % 0.0 %    Total Nucleated RBC 0.0 K/CU MM    Total Immature Neutrophil 0.15 K/CU MM    Immature Neutrophil % 1.6 (H) 0 - 0.43 %   Critical Care Panel    Collection Time: 02/03/23  9:00 PM   Result Value Ref Range    Sodium 132 (L) 135 - 145 MMOL/L    Potassium 3.7 3.5 - 5.1 MMOL/L    Chloride 95 (L) 99 - 110 mMol/L    CO2 22 21 - 32 MMOL/L    Anion Gap 15 4 - 16    BUN 40 (H) 6 - 23 MG/DL    Creatinine 1.5 (H) 0.6 - 1.1 MG/DL    Est, Glom Filt Rate 35 (L) >60 mL/min/1.73m2    Glucose 197 (H) 70 - 99 MG/DL    Calcium 7.1 (L) 8.3 - 10.6 MG/DL    Phosphorus 3.5 2.5 - 4.9 MG/DL    Magnesium 2.3 1.8 - 2.4 mg/dl   Hepatic Function Panel    Collection Time: 02/03/23  9:00 PM   Result Value Ref Range    Albumin 3.3 (L) 3.4 - 5.0 GM/DL    Total Bilirubin 0.9 0.0 - 1.0 MG/DL    Bilirubin, Direct 0.5 (H) 0.0 - 0.3 MG/DL    Bilirubin, Indirect 0.4 0 - 0.7 MG/DL    Alkaline Phosphatase 112 40 - 129 IU/L    AST 56 (H) 15 - 37 IU/L    ALT 13 10 - 40 U/L    Total Protein 5.4 (L) 6.4 - 8.2 GM/DL   Lactic Acid    Collection Time: 02/03/23  9:00 PM   Result Value Ref Range    Lactate 1.0 0.5 - 1.9 mMOL/L   Protime/INR & PTT    Collection Time: 02/03/23  9:00 PM   Result Value Ref Range    Protime 14.4 11.7 - 14.5 SECONDS    INR 1.12 INDEX    aPTT 43.5 (H) 25.1 - 37.1 SECONDS   Blood Gas, Venous    Collection Time: 02/04/23  5:00 AM   Result Value Ref Range    pH, Johnnie 7.40 7.32 - 7.42    pCO2, Johnnie 38 38 - 52 mmHG    pO2, Johnnie 56 (H) 28 - 48 mmHG    Base Excess 1 0 - 2.4    HCO3, Venous 23.5 19 - 25 MMOL/L    O2 Sat, Johnnie 88.5 (H) 50 - 70 %    Comment VBG    CBC with Auto Differential    Collection Time: 02/04/23  5:00 AM   Result Value Ref Range    WBC 8.2 4.0 - 10.5 K/CU MM    RBC 2.96 (L) 4.2 - 5.4 M/CU MM    Hemoglobin 7.8 (L) 12.5 - 16.0 GM/DL    Hematocrit 23.8 (L) 37 - 47 %    MCV 80.4 78 - 100 FL    MCH 26.4 (L) 27 - 31 PG    MCHC 32.8 32.0 - 36.0 %    RDW 13.8 11.7 - 14.9 %    Platelets 538 960 - 925 K/CU MM    MPV 9.4 7.5 - 11.1 FL    Differential Type AUTOMATED DIFFERENTIAL     Segs Relative 90.9 (H) 36 - 66 %    Lymphocytes % 2.7 (L) 24 - 44 %    Monocytes % 5.7 (H) 0 - 4 %    Eosinophils % 0.0 0 - 3 %    Basophils % 0.1 0 - 1 %    Segs Absolute 7.4 K/CU MM    Lymphocytes Absolute 0.2 K/CU MM    Monocytes Absolute 0.5 K/CU MM    Eosinophils Absolute 0.0 K/CU MM    Basophils Absolute 0.0 K/CU MM    Nucleated RBC % 0.0 %    Total Nucleated RBC 0.0 K/CU MM    Total Immature Neutrophil 0.05 K/CU MM    Immature Neutrophil % 0.6 (H) 0 - 0.43 %   Critical Care Panel    Collection Time: 02/04/23  5:00 AM   Result Value Ref Range    Sodium 133 (L) 135 - 145 MMOL/L    Potassium 3.5 3.5 - 5.1 MMOL/L    Chloride 96 (L) 99 - 110 mMol/L    CO2 23 21 - 32 MMOL/L    Anion Gap 14 4 - 16    BUN 39 (H) 6 - 23 MG/DL    Creatinine 1.7 (H) 0.6 - 1.1 MG/DL    Est, Glom Filt Rate 30 (L) >60 mL/min/1.73m2    Glucose 182 (H) 70 - 99 MG/DL    Calcium 7.6 (L) 8.3 - 10.6 MG/DL    Phosphorus 3.3 2.5 - 4.9 MG/DL    Magnesium 2.4 1.8 - 2.4 mg/dl   Hepatic Function Panel    Collection Time: 02/04/23  5:00 AM   Result Value Ref Range    Albumin 3.1 (L) 3.4 - 5.0 GM/DL    Total Bilirubin 0.7 0.0 - 1.0 MG/DL    Bilirubin, Direct 0.4 (H) 0.0 - 0.3 MG/DL    Bilirubin, Indirect 0.3 0 - 0.7 MG/DL    Alkaline Phosphatase 113 40 - 129 IU/L    AST 58 (H) 15 - 37 IU/L    ALT 13 10 - 40 U/L    Total Protein 5.4 (L) 6.4 - 8.2 GM/DL   Lactic Acid    Collection Time: 02/04/23  5:00 AM   Result Value Ref Range    Lactate 0.9 0.5 - 1.9 mMOL/L   Protime/INR & PTT    Collection Time: 02/04/23  5:00 AM   Result Value Ref Range    Protime 13.2 11.7 - 14.5 SECONDS    INR 1.02 INDEX    aPTT 46.6 (H) 25.1 - 37.1 SECONDS        Imaging/Diagnostics Last 24 Hours   XR CHEST PORTABLE    Result Date: 2/2/2023  EXAMINATION: ONE XRAY VIEW OF THE CHEST 2/2/2023 1:52 pm COMPARISON: 02/02/2023, 01/30/2023 HISTORY: ORDERING SYSTEM PROVIDED HISTORY: shortness of breath TECHNOLOGIST PROVIDED HISTORY: Reason for exam:->shortness of breath Reason for Exam: shortness of breath FINDINGS: Sternotomy wires. Prosthetic cardiac valve. Pacemaker wires. Lung volumes. Bibasilar opacities. No pneumothorax. Heart size is stable. Low lung volumes with bibasilar opacities which may represent atelectasis or pneumonia. XR CHEST PORTABLE    Result Date: 2/2/2023  EXAMINATION: ONE X-RAY VIEW OF THE CHEST 2/2/2023 10:49 am COMPARISON: CT chest 01/30/2023, chest radiograph 01/30/2023 HISTORY: ORDERING SYSTEM PROVIDED HISTORY: Shortness of breath TECHNOLOGIST PROVIDED HISTORY: Reason for exam:->Shortness of breath Reason for Exam: shortness of breath FINDINGS: The lungs are underinflated, resulting in vascular crowding and subsegmental atelectasis. The cardiomediastinal silhouette is obscured, but likely unchanged. Bibasilar consolidations favor atelectasis. The left upper lobe is obscured. No new focal consolidation, pneumothorax, or right-sided pleural effusion. There is blunting of the left costophrenic angle, which may be due to low lung volumes and/or patient positioning. Osseous structures are diffusely demineralized and grossly unchanged. Left chest cardiac conduction device is again noted. Low lung volumes with likely bibasilar atelectasis versus developing infection. Comment: Please note this report has been produced using speech recognition software and may contain errors related to that system including errors in grammar, punctuation, and spelling, as well as words and phrases that may be inappropriate. If there are any questions or concerns please feel free to contact the dictating provider for clarification.      Electronically signed by Huma Coelho MD on 2/4/2023 at 2:23 PM

## 2023-02-04 NOTE — PROGRESS NOTES
RENAL DOSE ADJUSTMENT MADE PER P/T PROTOCOL    PREVIOUS ORDER:  Meropenem 1gm Q8h EI    Estimated Creatinine Clearance: 28 mL/min (A) (based on SCr of 1.7 mg/dL (H)). Recent Labs     02/03/23  1353 02/03/23  2100 02/04/23  0500   BUN 39* 40* 39*   CREATININE 1.6* 1.5* 1.7*    176 167   INR 1.15 1.12 1.02     NEW RENALLY ADJUSTED ORDER:  Meropenem 1gm Q12h EI per [facility] P&T protocol for estimated CrCl 28 ml/min. Pharmacy will continue to monitor.     JOSEPH Noguera [facility]  2/4/2023 11:48 AM

## 2023-02-04 NOTE — PROGRESS NOTES
4 Eyes Skin Assessment     NAME:  Eloise Valladares  YOB: 1945  MEDICAL RECORD NUMBER:  [id number]    The patient is being assessed for  Transfer to New Unit    I agree that One RN have performed a thorough Head to Toe Skin Assessment on the patient. ALL assessment sites listed below have been assessed. Areas assessed by both nurses:    Head, Face, Ears, Shoulders, Back, Chest, Arms, Elbows, Hands, Sacrum. Buttock, Coccyx, Ischium, Legs. Feet and Heels, and Other    bruising on RUQ and LLQ and straight down belly button area from surgery. AVRIL drain coming off Right side. Glued surgical sites. Does the Patient have a Wound? Yes wound(s) were present on assessment.  LDA wound assessment was Initiated and completed by RN       Brandon Prevention initiated by RN: Yes   Wound Care Orders initiated by RN: Yes    Pressure Injury (Stage 3,4, Unstageable, DTI, NWPT, and Complex wounds) if present place referral order by RN under : No    New and Established Ostomies, if present place, referral order under : No      Nurse 1 eSignature: Electronically signed by Claudetta Gails, RN on 2/4/23 at 3:25 PM EST    **SHARE this note so that the co-signing nurse is able to place an eSignature**    Nurse 2 eSignature: Electronically signed by Marylou Deluca RN on 2/4/23 at 3:43 PM EST

## 2023-02-04 NOTE — PROGRESS NOTES
Nephrology Progress Note  2/4/2023 12:58 PM        Subjective:   Admit Date: 1/30/2023  PCP: Rose Marie Campo MD    Interval History: Patient seen in early morning, this is a late entry  She was much more alert awake and oriented this morning    Diet: Eating some    ROS: She still with high flow per nasal cannula oxygen, 30 L/min, FiO2 50%  Urine output 1.75 L for the last 24 hours  No fever but variable blood pressure-probably not accurate need manual confirmation    Data:     Current meds:    meropenem  1,000 mg IntraVENous Once    Followed by    Lamont Escobar ON 2/5/2023] meropenem  1,000 mg IntraVENous Q12H    furosemide  40 mg IntraVENous BID    vancomycin (VANCOCIN) intermittent dosing (placeholder)   Other RX Placeholder    lidocaine PF  5 mL IntraDERmal Once    sodium chloride flush  5-40 mL IntraVENous 2 times per day    pantoprazole  40 mg IntraVENous Daily    hydrocortisone sodium succinate PF  50 mg IntraVENous Q6H    guaiFENesin  600 mg Oral BID    polyethylene glycol  17 g Oral Daily    sodium chloride flush  5-40 mL IntraVENous 2 times per day    docusate sodium  100 mg Oral BID    donepezil  10 mg Oral Nightly    gabapentin  600 mg Oral BID    memantine  10 mg Oral BID    metoprolol tartrate  25 mg Oral BID    rosuvastatin  40 mg Oral QPM      sodium chloride 12.5 mL/hr at 02/04/23 0324    sodium chloride           I/O last 3 completed shifts:   In: 1405.6 [I.V.:72.6; Blood:300; IV [id number]:8178]  Out: 4956 [Urine:3400; Drains:25]    CBC:   Recent Labs     02/03/23  1353 02/03/23  2100 02/04/23  0500   WBC 8.9 9.1 8.2   HGB 8.2* 8.2* 7.8*    176 167          Recent Labs     02/03/23  1353 02/03/23  2100 02/04/23  0500   * 132* 133*   K 3.9 3.7 3.5   CL 95* 95* 96*   CO2 23 22 23   BUN 39* 40* 39*   CREATININE 1.6* 1.5* 1.7*   GLUCOSE 188* 197* 182*       Lab Results   Component Value Date    CALCIUM 7.6 (L) 02/04/2023    PHOS 3.3 02/04/2023       Objective:     Vitals: BP (!) 177/85   Pulse 76   Temp (!) 96 °F (35.6 °C) (Oral)   Resp 24   Ht 5' 5\" (1.651 m)   Wt 179 lb 14.3 oz (81.6 kg)   SpO2 97%   BMI 29.94 kg/m² ,    General appearance: She was thin, alert awake follows command  HEENT: Striking 3+ conjunctival pallor  Neck: Supple with supplemental oxygen actual high flow per nasal cannula  Lungs: Very poor airflow-I did not hear any crackles but I did get some airflow  Heart: Irregular-lungs clear S1 and S2  Abdomen: Soft, AVRIL drain with minimal discharge-tender on palpation  Extremities: Leg edema seems improved, she has a Rodriguez catheter      Problem List :         Impression :     Stage III acute kidney injury-acceptable urine output although dropped-thought to be from renal vein congestion and recent general surgery, anesthesia and perhaps infection if she has any-BUN/creatinine still has not plateau  Acute decompensated heart failure suspected underlying cardiomyopathy and dysrhythmia-she is only able to get intermittent diuretics perhaps from low blood pressure-  Anemia and acute bleed was suspected-  Sepsis especially lung infection are suspected-although she remains at risk for abdominal infection given recent cholecystitis and surgery    Recommendation/Plan  :     proBNP level tomorrow morning-diuretics if she can tolerate-she is getting work-up for anemia especially for bleeding-looks like she will get a CT angiogram-discussion was done  with the family-about potential worsening of kidney function-but okay with me if it is clinically appropriate-we will watch for iatrogenic and nosocomial complication-keep eyes open for any other source of infection-watch the volume status follow clinically and biochemically      Maricruz Gibbons MD MD

## 2023-02-05 ENCOUNTER — APPOINTMENT (OUTPATIENT)
Dept: GENERAL RADIOLOGY | Age: 78
DRG: 853 | End: 2023-02-05
Payer: MEDICARE

## 2023-02-05 LAB
ALBUMIN SERPL-MCNC: 3.2 GM/DL (ref 3.4–5)
ALP BLD-CCNC: 104 IU/L (ref 40–129)
ALT SERPL-CCNC: 12 U/L (ref 10–40)
ANION GAP SERPL CALCULATED.3IONS-SCNC: 10 MMOL/L (ref 4–16)
AST SERPL-CCNC: 47 IU/L (ref 15–37)
BASOPHILS ABSOLUTE: 0 K/CU MM
BASOPHILS RELATIVE PERCENT: 0.1 % (ref 0–1)
BILIRUB SERPL-MCNC: 0.7 MG/DL (ref 0–1)
BILIRUBIN DIRECT: 0.3 MG/DL (ref 0–0.3)
BILIRUBIN, INDIRECT: 0.4 MG/DL (ref 0–0.7)
BUN SERPL-MCNC: 36 MG/DL (ref 6–23)
CALCIUM SERPL-MCNC: 8 MG/DL (ref 8.3–10.6)
CHLORIDE BLD-SCNC: 97 MMOL/L (ref 99–110)
CO2: 28 MMOL/L (ref 21–32)
CREAT SERPL-MCNC: 1.6 MG/DL (ref 0.6–1.1)
DIFFERENTIAL TYPE: ABNORMAL
DOSE AMOUNT: NORMAL
DOSE TIME: NORMAL
EOSINOPHILS ABSOLUTE: 0 K/CU MM
EOSINOPHILS RELATIVE PERCENT: 0.3 % (ref 0–3)
GFR SERPL CREATININE-BSD FRML MDRD: 33 ML/MIN/1.73M2
GLUCOSE SERPL-MCNC: 128 MG/DL (ref 70–99)
HCT VFR BLD CALC: 26.7 % (ref 37–47)
HEMOGLOBIN: 8.7 GM/DL (ref 12.5–16)
IMMATURE NEUTROPHIL %: 0.8 % (ref 0–0.43)
LYMPHOCYTES ABSOLUTE: 0.8 K/CU MM
LYMPHOCYTES RELATIVE PERCENT: 8.3 % (ref 24–44)
MAGNESIUM: 2.2 MG/DL (ref 1.8–2.4)
MCH RBC QN AUTO: 26.9 PG (ref 27–31)
MCHC RBC AUTO-ENTMCNC: 32.6 % (ref 32–36)
MCV RBC AUTO: 82.4 FL (ref 78–100)
MONOCYTES ABSOLUTE: 0.7 K/CU MM
MONOCYTES RELATIVE PERCENT: 7.2 % (ref 0–4)
NUCLEATED RBC %: 0 %
PDW BLD-RTO: 14.1 % (ref 11.7–14.9)
PHOSPHORUS: 2.5 MG/DL (ref 2.5–4.9)
PLATELET # BLD: 213 K/CU MM (ref 140–440)
PMV BLD AUTO: 9.4 FL (ref 7.5–11.1)
POTASSIUM SERPL-SCNC: 3.1 MMOL/L (ref 3.5–5.1)
PRO-BNP: ABNORMAL PG/ML
RBC # BLD: 3.24 M/CU MM (ref 4.2–5.4)
SEGMENTED NEUTROPHILS ABSOLUTE COUNT: 8.1 K/CU MM
SEGMENTED NEUTROPHILS RELATIVE PERCENT: 83.3 % (ref 36–66)
SODIUM BLD-SCNC: 135 MMOL/L (ref 135–145)
TOTAL IMMATURE NEUTOROPHIL: 0.08 K/CU MM
TOTAL NUCLEATED RBC: 0 K/CU MM
TOTAL PROTEIN: 5.2 GM/DL (ref 6.4–8.2)
VANCOMYCIN RANDOM: 24.4 UG/ML
WBC # BLD: 9.7 K/CU MM (ref 4–10.5)

## 2023-02-05 PROCEDURE — 6360000002 HC RX W HCPCS: Performed by: PHYSICIAN ASSISTANT

## 2023-02-05 PROCEDURE — 2700000000 HC OXYGEN THERAPY PER DAY

## 2023-02-05 PROCEDURE — 6370000000 HC RX 637 (ALT 250 FOR IP): Performed by: INTERNAL MEDICINE

## 2023-02-05 PROCEDURE — C9113 INJ PANTOPRAZOLE SODIUM, VIA: HCPCS | Performed by: STUDENT IN AN ORGANIZED HEALTH CARE EDUCATION/TRAINING PROGRAM

## 2023-02-05 PROCEDURE — 2580000003 HC RX 258: Performed by: NURSE PRACTITIONER

## 2023-02-05 PROCEDURE — 85025 COMPLETE CBC W/AUTO DIFF WBC: CPT

## 2023-02-05 PROCEDURE — 6370000000 HC RX 637 (ALT 250 FOR IP): Performed by: PHYSICIAN ASSISTANT

## 2023-02-05 PROCEDURE — 82248 BILIRUBIN DIRECT: CPT

## 2023-02-05 PROCEDURE — 6360000002 HC RX W HCPCS: Performed by: STUDENT IN AN ORGANIZED HEALTH CARE EDUCATION/TRAINING PROGRAM

## 2023-02-05 PROCEDURE — 51702 INSERT TEMP BLADDER CATH: CPT

## 2023-02-05 PROCEDURE — 6360000002 HC RX W HCPCS: Performed by: INTERNAL MEDICINE

## 2023-02-05 PROCEDURE — 2580000003 HC RX 258: Performed by: PHYSICIAN ASSISTANT

## 2023-02-05 PROCEDURE — 2140000000 HC CCU INTERMEDIATE R&B

## 2023-02-05 PROCEDURE — 84100 ASSAY OF PHOSPHORUS: CPT

## 2023-02-05 PROCEDURE — 80202 ASSAY OF VANCOMYCIN: CPT

## 2023-02-05 PROCEDURE — 6370000000 HC RX 637 (ALT 250 FOR IP): Performed by: NURSE PRACTITIONER

## 2023-02-05 PROCEDURE — 83880 ASSAY OF NATRIURETIC PEPTIDE: CPT

## 2023-02-05 PROCEDURE — 6370000000 HC RX 637 (ALT 250 FOR IP): Performed by: SURGERY

## 2023-02-05 PROCEDURE — 71045 X-RAY EXAM CHEST 1 VIEW: CPT

## 2023-02-05 PROCEDURE — 2580000003 HC RX 258: Performed by: SURGERY

## 2023-02-05 PROCEDURE — 36592 COLLECT BLOOD FROM PICC: CPT

## 2023-02-05 PROCEDURE — 94761 N-INVAS EAR/PLS OXIMETRY MLT: CPT

## 2023-02-05 PROCEDURE — 83735 ASSAY OF MAGNESIUM: CPT

## 2023-02-05 PROCEDURE — 80053 COMPREHEN METABOLIC PANEL: CPT

## 2023-02-05 RX ORDER — SPIRONOLACTONE 50 MG/1
25 TABLET, FILM COATED ORAL DAILY
Status: DISCONTINUED | OUTPATIENT
Start: 2023-02-05 | End: 2023-02-09

## 2023-02-05 RX ORDER — POTASSIUM CHLORIDE 20 MEQ/1
40 TABLET, EXTENDED RELEASE ORAL 2 TIMES DAILY WITH MEALS
Status: COMPLETED | OUTPATIENT
Start: 2023-02-05 | End: 2023-02-05

## 2023-02-05 RX ORDER — HEPARIN SODIUM 5000 [USP'U]/ML
5000 INJECTION, SOLUTION INTRAVENOUS; SUBCUTANEOUS EVERY 8 HOURS SCHEDULED
Status: DISCONTINUED | OUTPATIENT
Start: 2023-02-05 | End: 2023-02-20 | Stop reason: HOSPADM

## 2023-02-05 RX ORDER — CHLORHEXIDINE GLUCONATE 0.12 MG/ML
15 RINSE ORAL 2 TIMES DAILY
Status: DISPENSED | OUTPATIENT
Start: 2023-02-05 | End: 2023-02-10

## 2023-02-05 RX ORDER — POTASSIUM CHLORIDE 7.45 MG/ML
10 INJECTION INTRAVENOUS
Status: DISCONTINUED | OUTPATIENT
Start: 2023-02-05 | End: 2023-02-05

## 2023-02-05 RX ORDER — AMILORIDE HYDROCHLORIDE 5 MG/1
5 TABLET ORAL DAILY
Status: DISCONTINUED | OUTPATIENT
Start: 2023-02-05 | End: 2023-02-07

## 2023-02-05 RX ADMIN — SODIUM CHLORIDE, PRESERVATIVE FREE 10 ML: 5 INJECTION INTRAVENOUS at 20:25

## 2023-02-05 RX ADMIN — SODIUM CHLORIDE, PRESERVATIVE FREE 10 ML: 5 INJECTION INTRAVENOUS at 10:27

## 2023-02-05 RX ADMIN — SODIUM CHLORIDE, PRESERVATIVE FREE 10 ML: 5 INJECTION INTRAVENOUS at 09:56

## 2023-02-05 RX ADMIN — HEPARIN SODIUM 5000 UNITS: 5000 INJECTION INTRAVENOUS; SUBCUTANEOUS at 16:39

## 2023-02-05 RX ADMIN — CHLORHEXIDINE GLUCONATE 0.12% ORAL RINSE 15 ML: 1.2 LIQUID ORAL at 16:38

## 2023-02-05 RX ADMIN — MEMANTINE HYDROCHLORIDE 5 MG: 5 TABLET ORAL at 09:56

## 2023-02-05 RX ADMIN — POTASSIUM CHLORIDE 10 MEQ: 7.46 INJECTION, SOLUTION INTRAVENOUS at 10:23

## 2023-02-05 RX ADMIN — HEPARIN SODIUM 5000 UNITS: 5000 INJECTION INTRAVENOUS; SUBCUTANEOUS at 20:24

## 2023-02-05 RX ADMIN — POTASSIUM CHLORIDE 40 MEQ: 1500 TABLET, EXTENDED RELEASE ORAL at 13:36

## 2023-02-05 RX ADMIN — PANTOPRAZOLE SODIUM 40 MG: 40 INJECTION, POWDER, LYOPHILIZED, FOR SOLUTION INTRAVENOUS at 09:56

## 2023-02-05 RX ADMIN — POTASSIUM CHLORIDE 40 MEQ: 1500 TABLET, EXTENDED RELEASE ORAL at 16:39

## 2023-02-05 RX ADMIN — SPIRONOLACTONE 25 MG: 50 TABLET ORAL at 13:37

## 2023-02-05 RX ADMIN — Medication: at 10:25

## 2023-02-05 RX ADMIN — GABAPENTIN 300 MG: 300 CAPSULE ORAL at 20:25

## 2023-02-05 RX ADMIN — FUROSEMIDE 40 MG: 10 INJECTION, SOLUTION INTRAMUSCULAR; INTRAVENOUS at 16:42

## 2023-02-05 RX ADMIN — DOCUSATE SODIUM 100 MG: 100 CAPSULE, LIQUID FILLED ORAL at 20:24

## 2023-02-05 RX ADMIN — Medication: at 20:24

## 2023-02-05 RX ADMIN — METOPROLOL TARTRATE 25 MG: 50 TABLET, FILM COATED ORAL at 20:23

## 2023-02-05 RX ADMIN — AMILORIDE HYDROCLORIDE 5 MG: 5 TABLET ORAL at 13:36

## 2023-02-05 RX ADMIN — MEROPENEM 1000 MG: 1 INJECTION, POWDER, FOR SOLUTION INTRAVENOUS at 01:27

## 2023-02-05 RX ADMIN — GABAPENTIN 300 MG: 300 CAPSULE ORAL at 09:57

## 2023-02-05 RX ADMIN — DONEPEZIL HYDROCHLORIDE 10 MG: 10 TABLET ORAL at 20:24

## 2023-02-05 RX ADMIN — MEMANTINE HYDROCHLORIDE 5 MG: 5 TABLET ORAL at 20:24

## 2023-02-05 RX ADMIN — GUAIFENESIN 600 MG: 600 TABLET, EXTENDED RELEASE ORAL at 09:57

## 2023-02-05 RX ADMIN — CHLORHEXIDINE GLUCONATE 118 ML: 4 LIQUID TOPICAL at 16:39

## 2023-02-05 RX ADMIN — METOPROLOL TARTRATE 25 MG: 50 TABLET, FILM COATED ORAL at 09:56

## 2023-02-05 RX ADMIN — HEPARIN SODIUM 5000 UNITS: 5000 INJECTION INTRAVENOUS; SUBCUTANEOUS at 10:08

## 2023-02-05 RX ADMIN — ROSUVASTATIN CALCIUM 10 MG: 5 TABLET, COATED ORAL at 16:42

## 2023-02-05 RX ADMIN — FUROSEMIDE 40 MG: 10 INJECTION, SOLUTION INTRAMUSCULAR; INTRAVENOUS at 09:57

## 2023-02-05 RX ADMIN — CHLORHEXIDINE GLUCONATE 0.12% ORAL RINSE 15 ML: 1.2 LIQUID ORAL at 20:24

## 2023-02-05 RX ADMIN — DOCUSATE SODIUM 100 MG: 100 CAPSULE, LIQUID FILLED ORAL at 09:56

## 2023-02-05 RX ADMIN — GUAIFENESIN 600 MG: 600 TABLET, EXTENDED RELEASE ORAL at 20:24

## 2023-02-05 RX ADMIN — MEROPENEM 1000 MG: 1 INJECTION, POWDER, FOR SOLUTION INTRAVENOUS at 13:39

## 2023-02-05 ASSESSMENT — ENCOUNTER SYMPTOMS
NAUSEA: 0
CONSTIPATION: 0
BLOOD IN STOOL: 0
SHORTNESS OF BREATH: 0
CHEST TIGHTNESS: 0
EYE PAIN: 0
COUGH: 0
ABDOMINAL PAIN: 0
DIARRHEA: 0
ABDOMINAL DISTENTION: 0
WHEEZING: 0
STRIDOR: 0
VOMITING: 0
BACK PAIN: 0
CHOKING: 0

## 2023-02-05 ASSESSMENT — PAIN SCALES - WONG BAKER
WONGBAKER_NUMERICALRESPONSE: 0
WONGBAKER_NUMERICALRESPONSE: 0

## 2023-02-05 ASSESSMENT — PAIN SCALES - GENERAL
PAINLEVEL_OUTOF10: 0
PAINLEVEL_OUTOF10: 0

## 2023-02-05 NOTE — PROGRESS NOTES
Nephrology Progress Note  2/5/2023 11:10 AM        Subjective:   Admit Date: 1/30/2023  PCP: Lisbet Mtichell MD    Interval History: Patient seen earlier today, this is a late entry  She was much more alert awake and oriented transferred from ICU to medical floor    Diet: Reported eating some    ROS: Still with high flow oxygen per nasal cannula 25 L/min with 29% of FiO2  Urine output recorded 2.325 L for the last 24 hours  No fever and acceptable blood pressure    Data:     Current meds:    mupirocin   Topical BID    chlorhexidine gluconate   Topical Daily    chlorhexidine  15 mL Mouth/Throat BID    potassium chloride  10 mEq IntraVENous Q2H    heparin (porcine)  5,000 Units SubCUTAneous 3 times per day    meropenem  1,000 mg IntraVENous Q12H    gabapentin  300 mg Oral BID    memantine  5 mg Oral BID    rosuvastatin  10 mg Oral QPM    furosemide  40 mg IntraVENous BID    vancomycin (VANCOCIN) intermittent dosing (placeholder)   Other RX Placeholder    lidocaine PF  5 mL IntraDERmal Once    sodium chloride flush  5-40 mL IntraVENous 2 times per day    pantoprazole  40 mg IntraVENous Daily    guaiFENesin  600 mg Oral BID    polyethylene glycol  17 g Oral Daily    sodium chloride flush  5-40 mL IntraVENous 2 times per day    docusate sodium  100 mg Oral BID    donepezil  10 mg Oral Nightly    metoprolol tartrate  25 mg Oral BID      sodium chloride 12.5 mL/hr at 02/04/23 0324    sodium chloride           I/O last 3 completed shifts:   In: 710.1 [P.O.:240; IV Piggyback:470.1]  Out: 5495 [Urine:3275]    CBC:   Recent Labs     02/03/23  2100 02/04/23  0500 02/05/23  0700   WBC 9.1 8.2 9.7   HGB 8.2* 7.8* 8.7*    167 213          Recent Labs     02/03/23  2100 02/04/23  0500 02/05/23  0700   * 133* 135   K 3.7 3.5 3.1*   CL 95* 96* 97*   CO2 22 23 28   BUN 40* 39* 36*   CREATININE 1.5* 1.7* 1.6*   GLUCOSE 197* 182* 128*       Lab Results   Component Value Date    CALCIUM 8.0 (L) 02/05/2023    PHOS 2.5 02/05/2023       Objective:     Vitals: BP (!) 123/55   Pulse 69   Temp 98 °F (36.7 °C) (Oral)   Resp 22   Ht 5' 5\" (1.651 m)   Wt 177 lb 9.6 oz (80.6 kg)   SpO2 97%   BMI 29.55 kg/m² ,    General appearance: Thin but alert awake and oriented  HEENT: At least 3+ conjunctival pallor  Neck: Supple with oxygen per nasal cannula high flow her head of the bed elevated about 60 degrees  Lungs: Crackles bilaterally on auscultation  Heart: Seems regular rate and rhythm  Abdomen: Soft, nontender  Extremities:  At least 1+ edema bilaterally  Has a Rodriguez catheter      Problem List :         Impression :     Stage III acute kidney injury-nonoliguric-recovering mainly from renal vein congestion  Acute decompensated heart failure-she does have cardiomyopathy and dysrhythmia  Multifactorial anemia and sepsis are suspected  Hypokalemia mainly from diuretics    Recommendation/Plan  :     I would keep the diuretics but replete potassium and add amiloride and Aldactone for now-adjust diuretics as she does, surveillance chest x-ray-watch for iatrogenic nosocomial complication follow clinically and biochemically        Shayy Johnson MD MD

## 2023-02-05 NOTE — PLAN OF CARE
Problem: Discharge Planning  Goal: Discharge to home or other facility with appropriate resources  2/5/2023 0039 by Isaias Bates RN  Outcome: Progressing  Flowsheets (Taken 2/4/2023 2000)  Discharge to home or other facility with appropriate resources:   Identify barriers to discharge with patient and caregiver   Arrange for needed discharge resources and transportation as appropriate   Identify discharge learning needs (meds, wound care, etc)   Arrange for interpreters to assist at discharge as needed   Refer to discharge planning if patient needs post-hospital services based on physician order or complex needs related to functional status, cognitive ability or social support system  2/4/2023 1606 by Melo Scott RN  Outcome: Progressing     Problem: Pain  Goal: Verbalizes/displays adequate comfort level or baseline comfort level  2/5/2023 0039 by Isaias Bates RN  Outcome: Progressing  Flowsheets  Taken 2/5/2023 0000  Verbalizes/displays adequate comfort level or baseline comfort level:   Encourage patient to monitor pain and request assistance   Assess pain using appropriate pain scale   Administer analgesics based on type and severity of pain and evaluate response   Implement non-pharmacological measures as appropriate and evaluate response   Consider cultural and social influences on pain and pain management   Notify Licensed Independent Practitioner if interventions unsuccessful or patient reports new pain  Taken 2/4/2023 2000  Verbalizes/displays adequate comfort level or baseline comfort level:   Encourage patient to monitor pain and request assistance   Assess pain using appropriate pain scale   Implement non-pharmacological measures as appropriate and evaluate response   Consider cultural and social influences on pain and pain management   Notify Licensed Independent Practitioner if interventions unsuccessful or patient reports new pain  Taken 2/4/2023 1900  Verbalizes/displays adequate comfort level or baseline comfort level:   Encourage patient to monitor pain and request assistance   Assess pain using appropriate pain scale   Administer analgesics based on type and severity of pain and evaluate response   Implement non-pharmacological measures as appropriate and evaluate response   Consider cultural and social influences on pain and pain management   Notify Licensed Independent Practitioner if interventions unsuccessful or patient reports new pain  2/4/2023 1606 by Mendez Verduzco RN  Outcome: Progressing     Problem: Chronic Conditions and Co-morbidities  Goal: Patient's chronic conditions and co-morbidity symptoms are monitored and maintained or improved  2/5/2023 0039 by Henry Olivier RN  Outcome: Progressing  Flowsheets (Taken 2/4/2023 2000)  Care Plan - Patient's Chronic Conditions and Co-Morbidity Symptoms are Monitored and Maintained or Improved:   Monitor and assess patient's chronic conditions and comorbid symptoms for stability, deterioration, or improvement   Collaborate with multidisciplinary team to address chronic and comorbid conditions and prevent exacerbation or deterioration   Update acute care plan with appropriate goals if chronic or comorbid symptoms are exacerbated and prevent overall improvement and discharge  2/4/2023 1606 by Mendez Verduzco RN  Outcome: Progressing     Problem: Skin/Tissue Integrity  Goal: Absence of new skin breakdown  Description: 1. Monitor for areas of redness and/or skin breakdown  2. Assess vascular access sites hourly  3. Every 4-6 hours minimum:  Change oxygen saturation probe site  4. Every 4-6 hours:  If on nasal continuous positive airway pressure, respiratory therapy assess nares and determine need for appliance change or resting period.   2/5/2023 0039 by Henry Olivier RN  Outcome: Progressing  2/4/2023 1606 by Mendez Verduzco RN  Outcome: Progressing     Problem: Safety - Adult  Goal: Free from fall injury  2/5/2023 0039 by Henry Olivier RN  Outcome: Progressing  2/4/2023 1606 by Jarod Curtis RN  Outcome: Progressing

## 2023-02-05 NOTE — PROGRESS NOTES
[facility]  consulted by Dr. Navneet Velasco for monitoring and adjustment. Indication for treatment: Vancomycin indication: HAP  Goal trough: Trough Goal: 15-20 mcg/mL  AUC/THERON: 400-600    Risk Factors for MRSA Identified:   Hospitalization within the past 90 days, Received IV antibiotics within the past 90 days    Pertinent Laboratory Values:   Temp Readings from Last 3 Encounters:   02/05/23 98 °F (36.7 °C) (Oral)   07/12/22 98.1 °F (36.7 °C) (Oral)   09/23/21 98.9 °F (37.2 °C) (Axillary)     Recent Labs     02/03/23  1400 02/03/23  2100 02/04/23  0500 02/05/23  0700   WBC  --  9.1 8.2 9.7   LACTATE 1.1 1.0 0.9  --        Recent Labs     02/03/23  2100 02/04/23  0500 02/05/23  0700   BUN 40* 39* 36*   CREATININE 1.5* 1.7* 1.6*       Estimated Creatinine Clearance: 30 mL/min (A) (based on SCr of 1.6 mg/dL (H)). Intake/Output Summary (Last 24 hours) at 2/5/2023 1101  Last data filed at 2/5/2023 2960  Gross per 24 hour   Intake 240 ml   Output 2150 ml   Net -1910 ml         Pertinent Cultures:   Date    Source    Results  2/2   Blood    NGTD  2/2   Sputum/Respiratory  Pending  2/2   MRSA Nasal   Positive  2/2   Urine AG   Negative  2/2   Urine    ESBL Ecoli 60587 CFU/mL    Ideal body weight: 57 kg (125 lb 10.6 oz)  Adjusted ideal body weight: 66.4 kg (146 lb 7 oz)  Actual weight: 86kg    Vancomycin level:   TROUGH:  No results for input(s): VANCOTROUGH in the last 72 hours. RANDOM:    Recent Labs     02/03/23  0540 02/04/23  0500 02/05/23  0700   VANCORANDOM <4.0 17.4 24.4         Assessment:  HPI: Pt is 76 yof admitted 1/30 for CHF exacerbation. Patient now with new onset AMS and requiring BiPAP and vasopressors. Chest x-ray showing bibasilar opacities. MRSA nares positive, sputum culture pending.   SCr, BUN, and urine output:   Remains in TAYLOR (baseline Scr 0.7 last month)  Day(s) of therapy: 4  Vancomycin concentration:   2/3 @ 0540 <4 ~ 12 hours post dose, appropriate to re-dose  2/4: 17.4, appropriate to re-dose  2/5: 24.4, above goal for re-dosing    Plan:  Intermittent vancomycin dosing based on levels while in TAYLOR  Based on level, no supplemental vancomycin needed  Repeat next random level tomorrow AM  Pharmacy will continue to monitor patient and adjust therapy as indicated    Yamile 3 2/6 @0600    Thank you for the consult.   Fernando Sanders, [address] [place] [address], PharmD  2/5/2023 11:01 AM

## 2023-02-05 NOTE — PROGRESS NOTES
V2.0  [facility]ist Progress Note      Name:  Maryann Palomo /Age/Sex: 1945  (76 y.o. female)   MRN & CSN:  [id number] & [id number] Encounter Date/Time: 2023 3:29 PM EST    Location:  36/200-A PCP: Melisa Morales MD       Hospital Day: 7    Assessment and Plan:   Maryann Palomo is a 76 y.o. female with pmh of Maryann Dewey is a 76 y.o. female with pmh of CAD s/p CABG, Valvular Heart Disease, s/p Mitral Valve Repair, PFO Closure, Paroxysmal A-Fib, HTN, HLD, DM, TIA, Early Dementia who presents with Chest pain Rt Sided and RUQ Abdominal Pain      Plan:    Acute hypoxic respiratory failure  Patient with acute respiratory failure was on nasal cannula. Became acutely more short of breath. CT did show what appears to be either fluid overload or viral pneumonia. Now off BiPAP and on 29% at 25L/min Heated high flow     Sepsis 2/2 likely ESBL UTI without hematuria, Pneumonia    Patient with potential abnormality as well she did have acute solid cholecystitis status post lap pedro 2023. High risk for postoperative infection requiring pneumonia. WBC 12, SBP 92, RR 22 initially. Procal trending down to 5. Urine growing ESBL UTI. MRSA Nares +ve  Patient on IV Meropenem and IV vanc - plan for at least a total of 5 days. F/U final culture   Off pressors and plan to transfer out of ICU today   Now off solucortef 50mg Q6H   De colonize MRSA    Acute Encephalopathy 2/2 likely Metabolic and Septic 2/2 hypoxia and ESBL UTI  Was difficult to wake on 2/3/2023  AO X 3 today but lethargic today compared to yesterday     Cholecystitis  Patient underwent lap pedro on 2023 with Dr. Chris Carrillo. AVRIL drain in place  GS on board     TAYLOR  Pt with Cr 2 on admission, trended down to 1.6 today. baseline Cr 0.7.    Nephrology on board    Hypochloremic Hyponatremia  Patient with acute worsening hyponatremia to 121 and now back up to 135  Nephrology on board    Acute on chronic systolic CHF  Patient with an EF 50% with hypokinetic inferio-lateral wall and basal anterio-septal carson the 45 (become acutely short of breath with progressive pulmonary edema. Pro-BNP trending up to 24,733  Cardiology on board  Nephrology on board  Started on aldactone and amiloride    Hypokalemia   K 3.1  Replacement per Nephro    CAD s/p CABG  Patient is also known valvular heart disease. Cardiology) following    Acute Normocytic Anemia 2/2 possibly Post-operative loss and hemodilution 2/2 fluid overload   It was 11 on 1/31/2023. 1U PRBC this admission. Hb 8.7 today. No signs of overt bleeding     Diet ADULT DIET; Clear Liquid; Mildly Thick (Nectar)   DVT Prophylaxis [] Lovenox, [x]  Heparin, [] SCDs, [] Ambulation,    [] Eliquis, [] Xarelto  [] Coumadin [] other   Code Status DNR-CCA   Disposition From: Home  Expected Disposition: TBD  Estimated Date of Discharge: To be determined  Patient requires continued admission due to respiratory failure. Pt. On heated high flow NC at 35%. Plan to transfer out of ICU   Surrogate Decision Maker/ POA      Subjective:     Chief Complaint: Chest Pain (X 3 days )     Patient seen and examined in the AM. Patient's sisters at bedside. Pt. Is lethargic and oriented to year but not to month - she was oriented to month yesterday. Pt. On heated high flow NC. Review of Systems:    Review of Systems   Constitutional:  Positive for fatigue. Negative for chills, fever and unexpected weight change. Eyes:  Negative for pain and visual disturbance. Respiratory:  Negative for cough, choking, chest tightness, shortness of breath, wheezing and stridor. Cardiovascular:  Negative for chest pain, palpitations and leg swelling. Gastrointestinal:  Negative for abdominal distention, abdominal pain, blood in stool, constipation, diarrhea, nausea and vomiting. Genitourinary:  Negative for decreased urine volume, dysuria, frequency, hematuria and urgency.    Musculoskeletal:  Negative for arthralgias, back pain and myalgias. Skin:  Negative for pallor and rash. Neurological:  Negative for dizziness, tremors, syncope, speech difficulty, weakness, light-headedness, numbness and headaches. Psychiatric/Behavioral:  Negative for agitation. Objective: Intake/Output Summary (Last 24 hours) at 2/5/2023 1236  Last data filed at 2/5/2023 0432  Gross per 24 hour   Intake 240 ml   Output 2150 ml   Net -1910 ml          Vitals:   Vitals:    02/05/23 0956   BP:    Pulse: 69   Resp:    Temp:    SpO2:        Physical Exam:     Physical Exam  Vitals reviewed. Constitutional:       General: She is awake. She is not in acute distress. Appearance: Normal appearance. She is overweight. She is not ill-appearing. Interventions: Nasal cannula in place. Comments: Heated high flow    HENT:      Head: Normocephalic and atraumatic. Mouth/Throat:      Mouth: Mucous membranes are moist.      Pharynx: Oropharynx is clear. Eyes:      Extraocular Movements: Extraocular movements intact. Conjunctiva/sclera: Conjunctivae normal.      Pupils: Pupils are equal, round, and reactive to light. Cardiovascular:      Rate and Rhythm: Normal rate. Rhythm irregularly irregular. Pulses: Normal pulses. Heart sounds: Normal heart sounds. Pulmonary:      Effort: Pulmonary effort is normal.      Breath sounds: Decreased air movement and transmitted upper airway sounds present. Rhonchi present. No wheezing or rales. Abdominal:      General: Abdomen is flat. Bowel sounds are normal.      Palpations: Abdomen is soft. Musculoskeletal:         General: No swelling. Normal range of motion. Cervical back: Normal range of motion and neck supple. Skin:     General: Skin is warm and dry. Neurological:      Mental Status: She is lethargic. Comments: Oriented to year, not month   Psychiatric:         Behavior: Behavior is cooperative.        Medications:   Medications:    mupirocin   Topical BID    chlorhexidine gluconate   Topical Daily    chlorhexidine  15 mL Mouth/Throat BID    heparin (porcine)  5,000 Units SubCUTAneous 3 times per day    aMILoride  5 mg Oral Daily    spironolactone  25 mg Oral Daily    potassium chloride  40 mEq Oral BID WC    meropenem  1,000 mg IntraVENous Q12H    gabapentin  300 mg Oral BID    memantine  5 mg Oral BID    rosuvastatin  10 mg Oral QPM    furosemide  40 mg IntraVENous BID    vancomycin (VANCOCIN) intermittent dosing (placeholder)   Other RX Placeholder    lidocaine PF  5 mL IntraDERmal Once    sodium chloride flush  5-40 mL IntraVENous 2 times per day    pantoprazole  40 mg IntraVENous Daily    guaiFENesin  600 mg Oral BID    polyethylene glycol  17 g Oral Daily    sodium chloride flush  5-40 mL IntraVENous 2 times per day    docusate sodium  100 mg Oral BID    donepezil  10 mg Oral Nightly    metoprolol tartrate  25 mg Oral BID      Infusions:    sodium chloride 12.5 mL/hr at 02/04/23 0324    sodium chloride       PRN Meds: sodium chloride flush, 5-40 mL, PRN  sodium chloride, 500 mL, PRN  morphine, 1 mg, Q4H PRN  HYDROcodone-acetaminophen, 1 tablet, Q6H PRN  sodium chloride flush, 5-40 mL, PRN  sodium chloride, , PRN  ondansetron, 4 mg, Q8H PRN   Or  ondansetron, 4 mg, Q6H PRN  acetaminophen, 650 mg, Q6H PRN   Or  acetaminophen, 650 mg, Q6H PRN  ipratropium, 1 spray, PRN      Labs      Recent Results (from the past 24 hour(s))   Hepatic Function Panel    Collection Time: 02/05/23  7:00 AM   Result Value Ref Range    Albumin 3.2 (L) 3.4 - 5.0 GM/DL    Total Bilirubin 0.7 0.0 - 1.0 MG/DL    Bilirubin, Direct 0.3 0.0 - 0.3 MG/DL    Bilirubin, Indirect 0.4 0 - 0.7 MG/DL    Alkaline Phosphatase 104 40 - 129 IU/L    AST 47 (H) 15 - 37 IU/L    ALT 12 10 - 40 U/L    Total Protein 5.2 (L) 6.4 - 8.2 GM/DL   Critical Care Panel    Collection Time: 02/05/23  7:00 AM   Result Value Ref Range    Sodium 135 135 - 145 MMOL/L    Potassium 3.1 (L) 3.5 - 5.1 MMOL/L    Chloride 97 (L) 99 - 110 mMol/L CO2 28 21 - 32 MMOL/L    Anion Gap 10 4 - 16    BUN 36 (H) 6 - 23 MG/DL    Creatinine 1.6 (H) 0.6 - 1.1 MG/DL    Est, Glom Filt Rate 33 (L) >60 mL/min/1.73m2    Glucose 128 (H) 70 - 99 MG/DL    Calcium 8.0 (L) 8.3 - 10.6 MG/DL    Phosphorus 2.5 2.5 - 4.9 MG/DL    Magnesium 2.2 1.8 - 2.4 mg/dl   CBC with Auto Differential    Collection Time: 02/05/23  7:00 AM   Result Value Ref Range    WBC 9.7 4.0 - 10.5 K/CU MM    RBC 3.24 (L) 4.2 - 5.4 M/CU MM    Hemoglobin 8.7 (L) 12.5 - 16.0 GM/DL    Hematocrit 26.7 (L) 37 - 47 %    MCV 82.4 78 - 100 FL    MCH 26.9 (L) 27 - 31 PG    MCHC 32.6 32.0 - 36.0 %    RDW 14.1 11.7 - 14.9 %    Platelets 252 934 - 908 K/CU MM    MPV 9.4 7.5 - 11.1 FL    Differential Type AUTOMATED DIFFERENTIAL     Segs Relative 83.3 (H) 36 - 66 %    Lymphocytes % 8.3 (L) 24 - 44 %    Monocytes % 7.2 (H) 0 - 4 %    Eosinophils % 0.3 0 - 3 %    Basophils % 0.1 0 - 1 %    Segs Absolute 8.1 K/CU MM    Lymphocytes Absolute 0.8 K/CU MM    Monocytes Absolute 0.7 K/CU MM    Eosinophils Absolute 0.0 K/CU MM    Basophils Absolute 0.0 K/CU MM    Nucleated RBC % 0.0 %    Total Nucleated RBC 0.0 K/CU MM    Total Immature Neutrophil 0.08 K/CU MM    Immature Neutrophil % 0.8 (H) 0 - 0.43 %   Brain Natriuretic Peptide    Collection Time: 02/05/23  7:00 AM   Result Value Ref Range    Pro-BNP 24,733 (H) <300 PG/ML   Vancomycin Level, Random    Collection Time: 02/05/23  7:00 AM   Result Value Ref Range    Vancomycin Rm 24.4 UG/ML    DOSE AMOUNT DOSE AMT. GIVEN - `     DOSE TIME DOSE TIME GIVEN - `         Imaging/Diagnostics Last 24 Hours   XR CHEST PORTABLE    Result Date: 2/2/2023  EXAMINATION: ONE XRAY VIEW OF THE CHEST 2/2/2023 1:52 pm COMPARISON: 02/02/2023, 01/30/2023 HISTORY: ORDERING SYSTEM PROVIDED HISTORY: shortness of breath TECHNOLOGIST PROVIDED HISTORY: Reason for exam:->shortness of breath Reason for Exam: shortness of breath FINDINGS: Sternotomy wires. Prosthetic cardiac valve. Pacemaker wires.   Lung volumes. Bibasilar opacities. No pneumothorax. Heart size is stable. Low lung volumes with bibasilar opacities which may represent atelectasis or pneumonia. XR CHEST PORTABLE    Result Date: 2/2/2023  EXAMINATION: ONE X-RAY VIEW OF THE CHEST 2/2/2023 10:49 am COMPARISON: CT chest 01/30/2023, chest radiograph 01/30/2023 HISTORY: ORDERING SYSTEM PROVIDED HISTORY: Shortness of breath TECHNOLOGIST PROVIDED HISTORY: Reason for exam:->Shortness of breath Reason for Exam: shortness of breath FINDINGS: The lungs are underinflated, resulting in vascular crowding and subsegmental atelectasis. The cardiomediastinal silhouette is obscured, but likely unchanged. Bibasilar consolidations favor atelectasis. The left upper lobe is obscured. No new focal consolidation, pneumothorax, or right-sided pleural effusion. There is blunting of the left costophrenic angle, which may be due to low lung volumes and/or patient positioning. Osseous structures are diffusely demineralized and grossly unchanged. Left chest cardiac conduction device is again noted. Low lung volumes with likely bibasilar atelectasis versus developing infection. Comment: Please note this report has been produced using speech recognition software and may contain errors related to that system including errors in grammar, punctuation, and spelling, as well as words and phrases that may be inappropriate. If there are any questions or concerns please feel free to contact the dictating provider for clarification.      Electronically signed by Huma Coelho MD on 2/5/2023 at 12:36 PM

## 2023-02-06 LAB
ALBUMIN SERPL-MCNC: 3 GM/DL (ref 3.4–5)
ALBUMIN SERPL-MCNC: 3 GM/DL (ref 3.4–5)
ALP BLD-CCNC: 101 IU/L (ref 40–128)
ALP BLD-CCNC: 101 IU/L (ref 40–129)
ALT SERPL-CCNC: 10 U/L (ref 10–40)
ALT SERPL-CCNC: 10 U/L (ref 10–40)
ANION GAP SERPL CALCULATED.3IONS-SCNC: 10 MMOL/L (ref 4–16)
AST SERPL-CCNC: 30 IU/L (ref 15–37)
AST SERPL-CCNC: 30 IU/L (ref 15–37)
BASOPHILS ABSOLUTE: 0 K/CU MM
BASOPHILS RELATIVE PERCENT: 0.1 % (ref 0–1)
BILIRUB SERPL-MCNC: 0.7 MG/DL (ref 0–1)
BILIRUB SERPL-MCNC: 0.7 MG/DL (ref 0–1)
BILIRUBIN DIRECT: 0.3 MG/DL (ref 0–0.3)
BILIRUBIN, INDIRECT: 0.4 MG/DL (ref 0–0.7)
BUN SERPL-MCNC: 33 MG/DL (ref 6–23)
CALCIUM SERPL-MCNC: 8.5 MG/DL (ref 8.3–10.6)
CHLORIDE BLD-SCNC: 98 MMOL/L (ref 99–110)
CO2: 30 MMOL/L (ref 21–32)
CREAT SERPL-MCNC: 1.5 MG/DL (ref 0.6–1.1)
DIFFERENTIAL TYPE: ABNORMAL
DOSE AMOUNT: NORMAL
DOSE TIME: NORMAL
EOSINOPHILS ABSOLUTE: 0.1 K/CU MM
EOSINOPHILS RELATIVE PERCENT: 1.4 % (ref 0–3)
GFR SERPL CREATININE-BSD FRML MDRD: 35 ML/MIN/1.73M2
GLUCOSE SERPL-MCNC: 158 MG/DL (ref 70–99)
HCT VFR BLD CALC: 28 % (ref 37–47)
HEMOGLOBIN: 9.1 GM/DL (ref 12.5–16)
IMMATURE NEUTROPHIL %: 3.6 % (ref 0–0.43)
LYMPHOCYTES ABSOLUTE: 1 K/CU MM
LYMPHOCYTES RELATIVE PERCENT: 11.7 % (ref 24–44)
MAGNESIUM: 1.9 MG/DL (ref 1.8–2.4)
MCH RBC QN AUTO: 27.1 PG (ref 27–31)
MCHC RBC AUTO-ENTMCNC: 32.5 % (ref 32–36)
MCV RBC AUTO: 83.3 FL (ref 78–100)
MONOCYTES ABSOLUTE: 0.6 K/CU MM
MONOCYTES RELATIVE PERCENT: 7.5 % (ref 0–4)
PDW BLD-RTO: 14.2 % (ref 11.7–14.9)
PHOSPHORUS: 1.8 MG/DL (ref 2.5–4.9)
PLATELET # BLD: 208 K/CU MM (ref 140–440)
PMV BLD AUTO: 9.3 FL (ref 7.5–11.1)
POTASSIUM SERPL-SCNC: 3.9 MMOL/L (ref 3.5–5.1)
RBC # BLD: 3.36 M/CU MM (ref 4.2–5.4)
SEGMENTED NEUTROPHILS ABSOLUTE COUNT: 6.4 K/CU MM
SEGMENTED NEUTROPHILS RELATIVE PERCENT: 75.7 % (ref 36–66)
SODIUM BLD-SCNC: 138 MMOL/L (ref 135–145)
TOTAL IMMATURE NEUTOROPHIL: 0.31 K/CU MM
TOTAL PROTEIN: 4.9 GM/DL (ref 6.4–8.2)
TOTAL PROTEIN: 4.9 GM/DL (ref 6.4–8.2)
VANCOMYCIN RANDOM: 17.6 UG/ML
WBC # BLD: 8.5 K/CU MM (ref 4–10.5)

## 2023-02-06 PROCEDURE — 80202 ASSAY OF VANCOMYCIN: CPT

## 2023-02-06 PROCEDURE — 6360000002 HC RX W HCPCS: Performed by: STUDENT IN AN ORGANIZED HEALTH CARE EDUCATION/TRAINING PROGRAM

## 2023-02-06 PROCEDURE — 6370000000 HC RX 637 (ALT 250 FOR IP): Performed by: NURSE PRACTITIONER

## 2023-02-06 PROCEDURE — 2580000003 HC RX 258: Performed by: NURSE PRACTITIONER

## 2023-02-06 PROCEDURE — 85025 COMPLETE CBC W/AUTO DIFF WBC: CPT

## 2023-02-06 PROCEDURE — 2580000003 HC RX 258: Performed by: SURGERY

## 2023-02-06 PROCEDURE — 6370000000 HC RX 637 (ALT 250 FOR IP): Performed by: PHYSICIAN ASSISTANT

## 2023-02-06 PROCEDURE — 6360000002 HC RX W HCPCS: Performed by: INTERNAL MEDICINE

## 2023-02-06 PROCEDURE — 92526 ORAL FUNCTION THERAPY: CPT | Performed by: SPEECH-LANGUAGE PATHOLOGIST

## 2023-02-06 PROCEDURE — 2700000000 HC OXYGEN THERAPY PER DAY

## 2023-02-06 PROCEDURE — 2580000003 HC RX 258: Performed by: PHYSICIAN ASSISTANT

## 2023-02-06 PROCEDURE — 99024 POSTOP FOLLOW-UP VISIT: CPT | Performed by: NURSE PRACTITIONER

## 2023-02-06 PROCEDURE — 80076 HEPATIC FUNCTION PANEL: CPT

## 2023-02-06 PROCEDURE — C9113 INJ PANTOPRAZOLE SODIUM, VIA: HCPCS | Performed by: STUDENT IN AN ORGANIZED HEALTH CARE EDUCATION/TRAINING PROGRAM

## 2023-02-06 PROCEDURE — 6370000000 HC RX 637 (ALT 250 FOR IP): Performed by: INTERNAL MEDICINE

## 2023-02-06 PROCEDURE — APPNB15 APP NON BILLABLE TIME 0-15 MINS: Performed by: NURSE PRACTITIONER

## 2023-02-06 PROCEDURE — 94761 N-INVAS EAR/PLS OXIMETRY MLT: CPT

## 2023-02-06 PROCEDURE — 93296 REM INTERROG EVL PM/IDS: CPT | Performed by: INTERNAL MEDICINE

## 2023-02-06 PROCEDURE — 36592 COLLECT BLOOD FROM PICC: CPT

## 2023-02-06 PROCEDURE — 2140000000 HC CCU INTERMEDIATE R&B

## 2023-02-06 PROCEDURE — 84100 ASSAY OF PHOSPHORUS: CPT

## 2023-02-06 PROCEDURE — 83735 ASSAY OF MAGNESIUM: CPT

## 2023-02-06 PROCEDURE — 6360000002 HC RX W HCPCS: Performed by: PHYSICIAN ASSISTANT

## 2023-02-06 PROCEDURE — 2580000003 HC RX 258: Performed by: STUDENT IN AN ORGANIZED HEALTH CARE EDUCATION/TRAINING PROGRAM

## 2023-02-06 PROCEDURE — 97530 THERAPEUTIC ACTIVITIES: CPT

## 2023-02-06 PROCEDURE — 93294 REM INTERROG EVL PM/LDLS PM: CPT | Performed by: INTERNAL MEDICINE

## 2023-02-06 PROCEDURE — 80053 COMPREHEN METABOLIC PANEL: CPT

## 2023-02-06 PROCEDURE — 6370000000 HC RX 637 (ALT 250 FOR IP): Performed by: SURGERY

## 2023-02-06 RX ADMIN — CHLORHEXIDINE GLUCONATE 118 ML: 4 LIQUID TOPICAL at 09:24

## 2023-02-06 RX ADMIN — DOCUSATE SODIUM 100 MG: 100 CAPSULE, LIQUID FILLED ORAL at 21:11

## 2023-02-06 RX ADMIN — FUROSEMIDE 40 MG: 10 INJECTION, SOLUTION INTRAMUSCULAR; INTRAVENOUS at 17:47

## 2023-02-06 RX ADMIN — MEROPENEM 1000 MG: 1 INJECTION, POWDER, FOR SOLUTION INTRAVENOUS at 14:30

## 2023-02-06 RX ADMIN — MEMANTINE HYDROCHLORIDE 5 MG: 5 TABLET ORAL at 21:11

## 2023-02-06 RX ADMIN — SODIUM CHLORIDE, PRESERVATIVE FREE 10 ML: 5 INJECTION INTRAVENOUS at 08:55

## 2023-02-06 RX ADMIN — FUROSEMIDE 40 MG: 10 INJECTION, SOLUTION INTRAMUSCULAR; INTRAVENOUS at 08:50

## 2023-02-06 RX ADMIN — METOPROLOL TARTRATE 25 MG: 50 TABLET, FILM COATED ORAL at 08:50

## 2023-02-06 RX ADMIN — DOCUSATE SODIUM 100 MG: 100 CAPSULE, LIQUID FILLED ORAL at 08:49

## 2023-02-06 RX ADMIN — SPIRONOLACTONE 25 MG: 50 TABLET ORAL at 08:49

## 2023-02-06 RX ADMIN — AMILORIDE HYDROCLORIDE 5 MG: 5 TABLET ORAL at 08:59

## 2023-02-06 RX ADMIN — HEPARIN SODIUM 5000 UNITS: 5000 INJECTION INTRAVENOUS; SUBCUTANEOUS at 06:38

## 2023-02-06 RX ADMIN — GUAIFENESIN 600 MG: 600 TABLET, EXTENDED RELEASE ORAL at 21:11

## 2023-02-06 RX ADMIN — PANTOPRAZOLE SODIUM 40 MG: 40 INJECTION, POWDER, LYOPHILIZED, FOR SOLUTION INTRAVENOUS at 08:50

## 2023-02-06 RX ADMIN — MEMANTINE HYDROCHLORIDE 5 MG: 5 TABLET ORAL at 08:49

## 2023-02-06 RX ADMIN — Medication: at 08:50

## 2023-02-06 RX ADMIN — HEPARIN SODIUM 5000 UNITS: 5000 INJECTION INTRAVENOUS; SUBCUTANEOUS at 14:31

## 2023-02-06 RX ADMIN — SODIUM CHLORIDE, PRESERVATIVE FREE 10 ML: 5 INJECTION INTRAVENOUS at 21:12

## 2023-02-06 RX ADMIN — GABAPENTIN 300 MG: 300 CAPSULE ORAL at 08:50

## 2023-02-06 RX ADMIN — ROSUVASTATIN CALCIUM 10 MG: 5 TABLET, COATED ORAL at 17:43

## 2023-02-06 RX ADMIN — SODIUM CHLORIDE 25 ML/HR: 9 INJECTION, SOLUTION INTRAVENOUS at 14:29

## 2023-02-06 RX ADMIN — GUAIFENESIN 600 MG: 600 TABLET, EXTENDED RELEASE ORAL at 08:49

## 2023-02-06 RX ADMIN — HEPARIN SODIUM 5000 UNITS: 5000 INJECTION INTRAVENOUS; SUBCUTANEOUS at 21:11

## 2023-02-06 RX ADMIN — SODIUM CHLORIDE, PRESERVATIVE FREE 10 ML: 5 INJECTION INTRAVENOUS at 08:59

## 2023-02-06 RX ADMIN — DONEPEZIL HYDROCHLORIDE 10 MG: 10 TABLET ORAL at 21:11

## 2023-02-06 RX ADMIN — CHLORHEXIDINE GLUCONATE 0.12% ORAL RINSE 15 ML: 1.2 LIQUID ORAL at 08:50

## 2023-02-06 RX ADMIN — MEROPENEM 1000 MG: 1 INJECTION, POWDER, FOR SOLUTION INTRAVENOUS at 01:41

## 2023-02-06 RX ADMIN — METOPROLOL TARTRATE 25 MG: 50 TABLET, FILM COATED ORAL at 21:11

## 2023-02-06 RX ADMIN — VANCOMYCIN HYDROCHLORIDE 1000 MG: 1 INJECTION, POWDER, LYOPHILIZED, FOR SOLUTION INTRAVENOUS at 09:18

## 2023-02-06 RX ADMIN — Medication: at 21:11

## 2023-02-06 ASSESSMENT — PAIN SCALES - GENERAL: PAINLEVEL_OUTOF10: 0

## 2023-02-06 ASSESSMENT — ENCOUNTER SYMPTOMS
BLOOD IN STOOL: 0
CONSTIPATION: 0
NAUSEA: 0
CHOKING: 0
BACK PAIN: 0
CHEST TIGHTNESS: 0
EYE PAIN: 0
VOMITING: 0
STRIDOR: 0
ABDOMINAL PAIN: 0
SHORTNESS OF BREATH: 0
ABDOMINAL DISTENTION: 0
WHEEZING: 0
COUGH: 0
DIARRHEA: 0

## 2023-02-06 ASSESSMENT — PAIN SCALES - WONG BAKER: WONGBAKER_NUMERICALRESPONSE: 0

## 2023-02-06 NOTE — PROGRESS NOTES
Physical Therapy    Physical Therapy Treatment Note  Name: Gina Vyas MRN: [id number] :   1945   Date:  2023   Admission Date: 2023 Room:  [address] [place] [address]   Restrictions/Precautions:          general precautions, fall risk, AVRIL drain  admitted to the hospital for chest pain. Pt underwent a laparoscopic cholecystectomy on 2023. Communication with other providers:  per nurse ok to tx but pt has been very sleepy and difficult to keep awake. Co-tx with Esmer CISNEROS  Subjective:  Patient states:  pt's sister present and very supportive  Pain:   Location, Type, Intensity (0/10 to 10/10):  only reports abdomin a little sore. Objective:    Observation:  pt was alert during tx session and oriented. Pt on vapotherm. O2 staying in 90's for most of tx but when dropping to 88% pt returned to sup. Treatment, including education/measures:  Bed placed in chair position by sister with cues and education and encouraged to do this to prevent skin breakdown and pressure relief. Pt was able to tolerate bed position x 10 minutes and was able to do 10 reps aps with tactile and verbal cues. Foot of bed lowered and pt was max/dependent of 2 using bed pad for sup to sit and to scoot to EOB. Pt tolerated sitting EOB x 7 minutes. Pt was able to work on wt shifts forward with cues and max assist of 2 progressing to min/mod of 2.pt was able to to do a few leg kicks in sitting with cues and assist. Sit to sup and scooting to [facility] dependent of 2. Rolling to right side dependent of 2. Safety  Patient left safely in the bed, with call light/phone in reach with alarm applied. Gait belt was used for transfers and gait. Assessment / Impression:      Patient's tolerance of treatment:  fair   Adverse Reaction: na  Significant change in status and impact:  na  Barriers to improvement:  strength, safety, and on vapotherm  Plan for Next Session:    Cont.  POC  Time in:  1315  Time out:  1345  Timed treatment minutes:  30  Total treatment time:  30    Previously filed items:  Social/Functional History  Lives With: Family  Type of Home: Condo  Home Layout: One level  Active : No        Short Term Goals  Time Frame for Short Term Goals: 1 week  Short Term Goal 1: pt to complete all bed mobility mod A x1  Short Term Goal 2: pt to sit EOB 10 minutes CGA with no LOB throughout  Short Term Goal 3: pt to complete stand pivot transfer with LRAD mod A x1  Short Term Goal 4: pt to propel manual WC 25' with min A    Electronically signed by:    Lacy Schneider  2/6/2023, 1:13 PM

## 2023-02-06 NOTE — PROGRESS NOTES
[id number] [facility] OF SPEECH/LANGUAGE PATHOLOGY  DAILY PROGRESS NOTE  Eloy Spring  2/6/2023  [id number]  Chest pain [R07.9]  Right sided abdominal pain [R10.9]  Chest pain, unspecified type [R07.9]  Acute cholecystitis [K81.0]  No Known Allergies      Pt was seen this date for dysphagia treatment. IMPRESSION AND RECOMMENDATIONS:   Pt was seen for ongoing bedside swallow evaluation and diet tolerance monitoring/advancement following admission to [facility] with chest pain and cough. Pt currently on Vapotherm for acute respiratory failure due to fluid overload vs viral pneumonia. Pt's sister was present and reports that pt was tolerating a regular diet and thin liquids PTA. Pt was asleep and difficult to arouse given max verbal/tactile stim. Once awake, she was able to remain adequately alert for safe participation given intermittent cues. Pt was seen seated upright in bed and following oral care presented with PO trials of ice-chips, nectar thick and thin liquids by spoon, cup and straw, pureed and soft solids. Mild oral dysphagia due to reduced attention to soft solid bolus requiring cues for completion of mastication/a-p transit and clearance. The pharyngeal swallow appeared [place] for all textures presented. Recommend advance diet (cleared by surgery per/RN) to Puree/Thins. ST will follow for safe diet tolerance monitoring and trials for advancement of solids as mental status improves. Pt's sister in agreement.       GOALS (current status in bold):  Short-term Goals  Timeframe for Short-term Goals: LOS or until goals are met  Goal 1: Pt will tolerate Pureed diet and Thin liquids without clinical evidence for aspiration 100%  Goal 2: Pt will participate in advanced trials for possible safe diet level advancement 10/10 trials Not Met, Continue  Goal 3: Pt/caregivers will demonstrate comprehension of recommendations/POC Meeting, continue    EDUCATION: results/recommendations d/w pt, pt's sister     PAIN RATING (0-10 Scale): 0  Time in/Time out: SLP Individual Minutes  Time In: 1500  Time Out: 9313  Minutes: 30    Visit number: [id number] Bird Quach MA [facility] SLP  Speech Language Pathologist    2/6/2023  3:20 PM

## 2023-02-06 NOTE — PROGRESS NOTES
Occupational Therapy    Occupational Therapy Treatment Note    Name: Elsi Osorio MRN: [id number] :   1945   Date:  2023   Admission Date: 2023 Room:  [facility]     Primary Problem:    The primary encounter diagnosis was Chest pain, unspecified type. Diagnoses of Right sided abdominal pain and Cholecystitis were also pertinent to this visit. Restrictions/Precautions:          general precautions, fall risk, contact precautions     Communication with other providers: RN approved session, ELIANA De La Garza for safety/assist    Subjective:  Patient states: \"Did I do ok? \"  Pain:   Location, Type, Intensity (0/10 to 10/10):  denies     Objective:    Observation: Pt presented in semi-dunn's upon arrival, supportive sister at bedside. Objective Measures:  Tele, VapoTherm, o2 90-96%, dropped to 88% with sit to supine and recovered to 95%, AVRIL drain. Treatment, including education:  Therapeutic Activity Training:   Therapeutic activity training was instructed today. Cues were given for safety, sequence, UE/LE placement, awareness, and balance. Activities performed today included bed mobility training, sup-sit. Pt educated on benefits and placed into chair position for ~10 min while instructed through gentle ROM including shoulder flexion for X10 reps and ankle pumps to arouse pt for session. Sit to Supine with HOB partially raised with Max A X2 and max cues for initiation/UE placement. Pt sat EOB for ~7 min with Min to Mod A for sitting balance d/t posterior leaning and pt requiring cues to correct L lateral leaning throughout. Pt's sister present and reports pt chronically leans to L side, even at home. Pt demo fatigue and requested to return supine. Sit to Supine with Max A X2 with cues for positioning. Max A X2 to scoot up/reposition in bed in trendelenburg. Patient educated on role of OT , benefits of OT and rationale for therapeutic intervention.  Educated on need to be patient advocate, benefit of EOB activity. Patient left safely in bed at end of session, with call light in reach, alarm on and nursing aware. Pt's sister at bedside at end of session. Assessment / Impression:    Patient's tolerance of treatment: Fair   Adverse Reaction: None  Significant change in status and impact:none   Barriers to improvement: Strength, Activity tolerance, Safety, balance       Plan for Next Session:    Cont OOB/EOB activity     Time in:  1315  Time out:  1345  Timed treatment minutes:  30  Total treatment time:  30      Electronically signed by:     RYLAND Pierre,   2/6/2023, 2:06 PM

## 2023-02-06 NOTE — PROGRESS NOTES
GENERAL SURGERY PROGRESS NOTE    Latrell Osman is a 76 y.o. female s/p laparoscopic cholecystectomy by Dr. Yordy Gomez on 1/31. Subjective:  Was transferred to the ICU for respirator failure and lower blood pressures, currently on 3N. Reports good pain control. AVRIL with minimal serous drainage.  Denies N/V    Objective:    Vitals: VITALS:  BP (!) 129/56   Pulse 78   Temp 99 °F (37.2 °C) (Oral)   Resp 22   Ht 5' 5\" (1.651 m)   Wt 178 lb 4.8 oz (80.9 kg)   SpO2 93%   BMI 29.67 kg/m²     I/O: 02/05 0701 - 02/06 0700  In: 370 [P.O.:360; I.V.:10]  Out: 2810 [Urine:2800; Drains:10]    Labs/Imaging Results:   Lab Results   Component Value Date/Time     02/06/2023 06:10 AM    K 3.9 02/06/2023 06:10 AM    CL 98 02/06/2023 06:10 AM    CO2 30 02/06/2023 06:10 AM    BUN 33 02/06/2023 06:10 AM    CREATININE 1.5 02/06/2023 06:10 AM    GLUCOSE 158 02/06/2023 06:10 AM    CALCIUM 8.5 02/06/2023 06:10 AM      Lab Results   Component Value Date    WBC 8.5 02/06/2023    HGB 9.1 (L) 02/06/2023    HCT 28.0 (L) 02/06/2023    MCV 83.3 02/06/2023     02/06/2023         IV Fluids:   sodium chloride Last Rate: 12.5 mL/hr at 02/04/23 0324    sodium chloride    Scheduled Meds:   vancomycin, 1,000 mg, IntraVENous, Once    mupirocin, , Topical, BID    chlorhexidine gluconate, , Topical, Daily    chlorhexidine, 15 mL, Mouth/Throat, BID    heparin (porcine), 5,000 Units, SubCUTAneous, 3 times per day    aMILoride, 5 mg, Oral, Daily    spironolactone, 25 mg, Oral, Daily    [COMPLETED] meropenem, 1,000 mg, IntraVENous, Once **FOLLOWED BY** meropenem, 1,000 mg, IntraVENous, Q12H    gabapentin, 300 mg, Oral, BID    memantine, 5 mg, Oral, BID    rosuvastatin, 10 mg, Oral, QPM    furosemide, 40 mg, IntraVENous, BID    vancomycin (VANCOCIN) intermittent dosing (placeholder), , Other, RX Placeholder    lidocaine PF, 5 mL, IntraDERmal, Once    sodium chloride flush, 5-40 mL, IntraVENous, 2 times per day    pantoprazole, 40 mg, IntraVENous, Daily    guaiFENesin, 600 mg, Oral, BID    polyethylene glycol, 17 g, Oral, Daily    sodium chloride flush, 5-40 mL, IntraVENous, 2 times per day    docusate sodium, 100 mg, Oral, BID    donepezil, 10 mg, Oral, Nightly    metoprolol tartrate, 25 mg, Oral, BID    Physical Exam:  General: A&O x 3, no distress. HEENT: Anicteric sclerae, MMM. Extremities: No edema bilat LE. Abdomen: Soft, appropriately tender, nondistended. Moderate ecchymosis at port sites    AVRIL- 10cc serosanguinous output recorded yesterday      Assessment and Plan:  76 y.o. female s/p laparoscopic cholecystectomy for acute cholecystitis. Per Dr. Frandy Quigley there was some oozing due to her recent Eliquis use, drain was left. Hgb has slowly downtrended since OR--likely some equilibration of hgb level and hemodilution from periop fluids. Agree with transfusion.     Patient Active Problem List:     Paroxysmal atrial fibrillation (HCC)     Essential hypertension     Mixed hyperlipidemia     VHD (valvular heart disease)     Abnormal EKG     Acute blood loss anemia     Uncontrolled pain     Gait disturbance     Pneumonia due to infectious organism     SSS (sick sinus syndrome) (HCC)     S/P placement of cardiac pacemaker     Tachycardia-bradycardia ([address]     Fall at home, subsequent encounter     Acute intracranial hemorrhage ([address]     Hyponatremia     Compression fracture of L1 lumbar vertebra (HCC)     Intraparenchymal hematoma of brain     COVID-19     CAD (coronary artery disease)     AMS (altered mental status)     Altered mental status     Concussion with no loss of consciousness     Chest pain     Right upper quadrant pain     Acute cholecystitis      - hgb improved  - continue supportive cares including respiratory support per Intensivist guidance, appreciate their assistance with cares  - AVRIL with minimal output  - will follow    Chau Hemphill, BIRD - CNP

## 2023-02-06 NOTE — PROGRESS NOTES
[place]2.[place]  [facility] [place]      Name:  Keyla Marcial /Age/Sex: 1945  (76 y.o. female)   MRN & CSN:  [id number] & [id number] Encounter Date/Time: 2023 3:29 PM EST    Location:  36/200-A PCP: Mic Ambrosio MD       Hospital Day: 8    Assessment and Plan:   Keyla Marcial is a 76 y.o. female with pmh of Keyla Marcial is a 76 y.o. female with pmh of CAD s/p CABG, Valvular Heart Disease, s/p Mitral Valve Repair, PFO Closure, Paroxysmal A-Fib, HTN, HLD, DM, TIA, Early Dementia who presents with Chest pain Rt Sided and RUQ Abdominal Pain      Plan:    Acute hypoxic respiratory failure  Patient with acute respiratory failure was on nasal cannula. Became acutely more short of breath. CT did show what appears to be either fluid overload or viral pneumonia. Now off BiPAP and on 25% at 25L/min Heated high flow   Obtain ABG    Sepsis 2/2 likely ESBL UTI without hematuria, Pneumonia    Patient with potential abnormality as well she did have acute solid cholecystitis status post lap pedro 2023. High risk for postoperative infection requiring pneumonia. WBC 12, SBP 92, RR 22 initially. Procal trending down to 5. Urine growing ESBL UTI. MRSA Nares +ve  Patient on IV Meropenem and IV vanc - plan for at least total of 5 days. De colonize MRSA    Acute Encephalopathy 2/2 likely Metabolic and Septic 2/2 hypoxia and ESBL UTI, Iatrogenic 2/2 medication   Was difficult to wake on 2/3/2023  AO X 3 today but lethargic today compared to yesterday again - sister at bedside. Gabapentin on hold per nephro - agree with it  Obtain ABG if able      Cholecystitis  Patient underwent lap pedro on 2023 with Dr. Kandy Ruth. AVRIL drain in place  GS on board     TAYLOR  Pt with Cr 2 on admission, trended down to 1.5 today. baseline Cr 0.7.    Nephrology on board    Hypochloremic Hyponatremia  Patient with acute worsening hyponatremia to 121 and now back up to 135  Nephrology on board    Acute on chronic HFpEF  Patient with an EF 50% with hypokinetic inferio-lateral wall and basal anterio-septal carson the 45 (become acutely short of breath with progressive pulmonary edema. Pro-BNP trending up to 24,733  Cardiology on board  Nephrology on board  On aldactone and amiloride    Hypokalemia   K 3.9    CAD s/p CABG  Pacer in-situ  VHD  Patient is also known valvular heart disease. Cardiology following  Discussed with cardiology about interrogating pacer. Acute Normocytic Anemia 2/2 possibly Post-operative loss and hemodilution 2/2 fluid overload   Hb was 11 on 1/31/2023. 1U PRBC this admission. Hb 9.1 today. No signs of overt bleeding     Diet ADULT DIET; Clear Liquid; Mildly Thick (Nectar)   DVT Prophylaxis [] Lovenox, [x]  Heparin, [] SCDs, [] Ambulation,    [] Eliquis, [] Xarelto  [] Coumadin [] other   Code Status DNR-CCA   Disposition From: Home  Expected Disposition: will need SNF  Estimated Date of Discharge: To be determined  Patient requires continued admission due to respiratory failure. Pt. On heated high flow NC at 25%. Surrogate Decision Maker/ POA      Subjective:     Chief Complaint: Chest Pain (X 3 days )     Patient seen and examined in the AM. Patient's sister at bedside. Pt. Is lethargic but oriented X 3 today. Still on heated high flow nasal canula. States she feels tired. Discussed with cardiology - plan for pacer interrogation. Review of Systems:    Review of Systems   Constitutional:  Positive for fatigue. Negative for chills, fever and unexpected weight change. Eyes:  Negative for pain and visual disturbance. Respiratory:  Negative for cough, choking, chest tightness, shortness of breath, wheezing and stridor. Cardiovascular:  Negative for chest pain, palpitations and leg swelling. Gastrointestinal:  Negative for abdominal distention, abdominal pain, blood in stool, constipation, diarrhea, nausea and vomiting.    Genitourinary:  Negative for decreased urine volume, dysuria, frequency, hematuria and urgency. Musculoskeletal:  Negative for arthralgias, back pain and myalgias. Skin:  Negative for pallor and rash. Neurological:  Negative for dizziness, tremors, syncope, speech difficulty, weakness, light-headedness, numbness and headaches. Psychiatric/Behavioral:  Negative for agitation. Objective: Intake/Output Summary (Last 24 hours) at 2/6/2023 1405  Last data filed at 2/6/2023 1240  Gross per 24 hour   Intake 370 ml   Output 3735 ml   Net -3365 ml          Vitals:   Vitals:    02/06/23 1130   BP:    Pulse: 69   Resp: (!) 32   Temp:    SpO2: 95%       Physical Exam:     Physical Exam  Vitals reviewed. Constitutional:       General: She is awake. She is not in acute distress. Appearance: Normal appearance. She is overweight. She is not ill-appearing. Interventions: Nasal cannula in place. Comments: Heated high flow    HENT:      Head: Normocephalic and atraumatic. Mouth/Throat:      Mouth: Mucous membranes are moist.      Pharynx: Oropharynx is clear. Eyes:      Extraocular Movements: Extraocular movements intact. Conjunctiva/sclera: Conjunctivae normal.      Pupils: Pupils are equal, round, and reactive to light. Cardiovascular:      Rate and Rhythm: Normal rate. Rhythm irregularly irregular. Pulses: Normal pulses. Heart sounds: Normal heart sounds. Comments: Pacer is not capturing all beats - pacer spikes are all over the place  Pulmonary:      Effort: Pulmonary effort is normal. Tachypnea present. Breath sounds: Decreased air movement and transmitted upper airway sounds present. No wheezing, rhonchi or rales. Abdominal:      General: Abdomen is protuberant. Bowel sounds are normal.      Palpations: Abdomen is soft. Musculoskeletal:         General: No swelling. Normal range of motion. Cervical back: Normal range of motion and neck supple. Skin:     General: Skin is warm and dry.    Neurological: Mental Status: She is oriented to person, place, and time. She is lethargic. Comments: Oriented to year, not month   Psychiatric:         Behavior: Behavior is cooperative.        Medications:   Medications:    mupirocin   Topical BID    chlorhexidine gluconate   Topical Daily    chlorhexidine  15 mL Mouth/Throat BID    heparin (porcine)  5,000 Units SubCUTAneous 3 times per day    aMILoride  5 mg Oral Daily    spironolactone  25 mg Oral Daily    meropenem  1,000 mg IntraVENous Q12H    [Held by provider] gabapentin  300 mg Oral BID    memantine  5 mg Oral BID    rosuvastatin  10 mg Oral QPM    furosemide  40 mg IntraVENous BID    vancomycin (VANCOCIN) intermittent dosing (placeholder)   Other RX Placeholder    lidocaine PF  5 mL IntraDERmal Once    sodium chloride flush  5-40 mL IntraVENous 2 times per day    pantoprazole  40 mg IntraVENous Daily    guaiFENesin  600 mg Oral BID    polyethylene glycol  17 g Oral Daily    sodium chloride flush  5-40 mL IntraVENous 2 times per day    docusate sodium  100 mg Oral BID    donepezil  10 mg Oral Nightly    metoprolol tartrate  25 mg Oral BID      Infusions:    sodium chloride 12.5 mL/hr at 02/04/23 0324    sodium chloride       PRN Meds: sodium chloride flush, 5-40 mL, PRN  sodium chloride, 500 mL, PRN  morphine, 1 mg, Q4H PRN  HYDROcodone-acetaminophen, 1 tablet, Q6H PRN  sodium chloride flush, 5-40 mL, PRN  sodium chloride, , PRN  ondansetron, 4 mg, Q8H PRN   Or  ondansetron, 4 mg, Q6H PRN  acetaminophen, 650 mg, Q6H PRN   Or  acetaminophen, 650 mg, Q6H PRN  ipratropium, 1 spray, PRN      Labs      Recent Results (from the past 24 hour(s))   Hepatic Function Panel    Collection Time: 02/06/23  6:10 AM   Result Value Ref Range    Albumin 3.0 (L) 3.4 - 5.0 GM/DL    Total Bilirubin 0.7 0.0 - 1.0 MG/DL    Bilirubin, Direct 0.3 0.0 - 0.3 MG/DL    Bilirubin, Indirect 0.4 0 - 0.7 MG/DL    Alkaline Phosphatase 101 40 - 129 IU/L    AST 30 15 - 37 IU/L    ALT 10 10 - 40 U/L Total Protein 4.9 (L) 6.4 - 8.2 GM/DL   CBC with Auto Differential    Collection Time: 02/06/23  6:10 AM   Result Value Ref Range    WBC 8.5 4.0 - 10.5 K/CU MM    RBC 3.36 (L) 4.2 - 5.4 M/CU MM    Hemoglobin 9.1 (L) 12.5 - 16.0 GM/DL    Hematocrit 28.0 (L) 37 - 47 %    MCV 83.3 78 - 100 FL    MCH 27.1 27 - 31 PG    MCHC 32.5 32.0 - 36.0 %    RDW 14.2 11.7 - 14.9 %    Platelets 622 430 - 492 K/CU MM    MPV 9.3 7.5 - 11.1 FL    Immature Neutrophil % 3.6 (H) 0 - 0.43 %    Segs Relative 75.7 (H) 36 - 66 %    Eosinophils % 1.4 0 - 3 %    Basophils % 0.1 0 - 1 %    Lymphocytes % 11.7 (L) 24 - 44 %    Monocytes % 7.5 (H) 0 - 4 %    Total Immature Neutrophil 0.31 K/CU MM    Segs Absolute 6.4 K/CU MM    Eosinophils Absolute 0.1 K/CU MM    Basophils Absolute 0.0 K/CU MM    Lymphocytes Absolute 1.0 K/CU MM    Monocytes Absolute 0.6 K/CU MM    Differential Type AUTOMATED DIFFERENTIAL    Comprehensive Metabolic Panel    Collection Time: 02/06/23  6:10 AM   Result Value Ref Range    Sodium 138 135 - 145 MMOL/L    Potassium 3.9 3.5 - 5.1 MMOL/L    Chloride 98 (L) 99 - 110 mMol/L    CO2 30 21 - 32 MMOL/L    BUN 33 (H) 6 - 23 MG/DL    Creatinine 1.5 (H) 0.6 - 1.1 MG/DL    Est, Glom Filt Rate 35 (L) >60 mL/min/1.73m2    Glucose 158 (H) 70 - 99 MG/DL    Calcium 8.5 8.3 - 10.6 MG/DL    Albumin 3.0 (L) 3.4 - 5.0 GM/DL    Total Protein 4.9 (L) 6.4 - 8.2 GM/DL    Total Bilirubin 0.7 0.0 - 1.0 MG/DL    ALT 10 10 - 40 U/L    AST 30 15 - 37 IU/L    Alkaline Phosphatase 101 40 - 128 IU/L    Anion Gap 10 4 - 16   Magnesium    Collection Time: 02/06/23  6:10 AM   Result Value Ref Range    Magnesium 1.9 1.8 - 2.4 mg/dl   Phosphorus    Collection Time: 02/06/23  6:10 AM   Result Value Ref Range    Phosphorus 1.8 (L) 2.5 - 4.9 MG/DL   Vancomycin Level, Random    Collection Time: 02/06/23  6:10 AM   Result Value Ref Range    Vancomycin Rm 17.6 UG/ML    DOSE AMOUNT DOSE AMT.  GIVEN - `     DOSE TIME DOSE TIME GIVEN - `         Imaging/Diagnostics Last 24 Hours   XR CHEST PORTABLE    Result Date: 2/2/2023  EXAMINATION: ONE XRAY VIEW OF THE CHEST 2/2/2023 1:52 pm COMPARISON: 02/02/2023, 01/30/2023 HISTORY: ORDERING SYSTEM PROVIDED HISTORY: shortness of breath TECHNOLOGIST PROVIDED HISTORY: Reason for exam:->shortness of breath Reason for Exam: shortness of breath FINDINGS: Sternotomy wires. Prosthetic cardiac valve. Pacemaker wires. Lung volumes. Bibasilar opacities. No pneumothorax. Heart size is stable. Low lung volumes with bibasilar opacities which may represent atelectasis or pneumonia. XR CHEST PORTABLE    Result Date: 2/2/2023  EXAMINATION: ONE X-RAY VIEW OF THE CHEST 2/2/2023 10:49 am COMPARISON: CT chest 01/30/2023, chest radiograph 01/30/2023 HISTORY: ORDERING SYSTEM PROVIDED HISTORY: Shortness of breath TECHNOLOGIST PROVIDED HISTORY: Reason for exam:->Shortness of breath Reason for Exam: shortness of breath FINDINGS: The lungs are underinflated, resulting in vascular crowding and subsegmental atelectasis. The cardiomediastinal silhouette is obscured, but likely unchanged. Bibasilar consolidations favor atelectasis. The left upper lobe is obscured. No new focal consolidation, pneumothorax, or right-sided pleural effusion. There is blunting of the left costophrenic angle, which may be due to low lung volumes and/or patient positioning. Osseous structures are diffusely demineralized and grossly unchanged. Left chest cardiac conduction device is again noted. Low lung volumes with likely bibasilar atelectasis versus developing infection. Comment: Please note this report has been produced using speech recognition software and may contain errors related to that system including errors in grammar, punctuation, and spelling, as well as words and phrases that may be inappropriate. If there are any questions or concerns please feel free to contact the dictating provider for clarification.      Electronically signed by Derrick Barcenas Joe Coates MD on 2/6/2023 at 2:05 PM

## 2023-02-06 NOTE — PLAN OF CARE
Problem: Discharge Planning  Goal: Discharge to home or other facility with appropriate resources  Outcome: Progressing     Problem: Pain  Goal: Verbalizes/displays adequate comfort level or baseline comfort level  Outcome: Progressing     Problem: Chronic Conditions and Co-morbidities  Goal: Patient's chronic conditions and co-morbidity symptoms are monitored and maintained or improved  Outcome: Progressing     Problem: Skin/Tissue Integrity  Goal: Absence of new skin breakdown  Description: 1. Monitor for areas of redness and/or skin breakdown  2. Assess vascular access sites hourly  3. Every 4-6 hours minimum:  Change oxygen saturation probe site  4. Every 4-6 hours:  If on nasal continuous positive airway pressure, respiratory therapy assess nares and determine need for appliance change or resting period.   Outcome: Progressing     Problem: Safety - Adult  Goal: Free from fall injury  Outcome: Progressing     Problem: Nutrition Deficit:  Goal: Optimize nutritional status  Outcome: Progressing

## 2023-02-06 NOTE — PROGRESS NOTES
Received call from [employer] that pt was having failure to sense with pacemaker- Dr. Joe Coates notified. Also made aware of pt being more lethargic this morning and through night per night nurse.

## 2023-02-06 NOTE — PLAN OF CARE
Problem: Discharge Planning  Goal: Discharge to home or other facility with appropriate resources  2/5/2023 2323 by Emelina Choe RN  Outcome: Progressing  2/5/2023 1608 by Sigmund Cockayne, RN  Outcome: Progressing     Problem: Pain  Goal: Verbalizes/displays adequate comfort level or baseline comfort level  2/5/2023 2323 by Emelina Choe RN  Outcome: Progressing  2/5/2023 1608 by Sigmund Cockayne, RN  Outcome: Progressing     Problem: Chronic Conditions and Co-morbidities  Goal: Patient's chronic conditions and co-morbidity symptoms are monitored and maintained or improved  2/5/2023 2323 by Emelina Choe RN  Outcome: Progressing  2/5/2023 1608 by Sigmund Cockayne, RN  Outcome: Progressing     Problem: Skin/Tissue Integrity  Goal: Absence of new skin breakdown  Description: 1. Monitor for areas of redness and/or skin breakdown  2. Assess vascular access sites hourly  3. Every 4-6 hours minimum:  Change oxygen saturation probe site  4. Every 4-6 hours:  If on nasal continuous positive airway pressure, respiratory therapy assess nares and determine need for appliance change or resting period.   2/5/2023 2323 by Emelina Choe RN  Outcome: Progressing  2/5/2023 1608 by Sigmund Cockayne, RN  Outcome: Progressing

## 2023-02-06 NOTE — PROGRESS NOTES
Comprehensive Nutrition Assessment    Type and Reason for Visit:  Initial (LOS)    Nutrition Recommendations/Plan:   Monitor for diet advancement  Assess need for ONS upon diet advancement  Monitor weights, po intakes  If unable to advance oral diet, please consult RD for enteral nutrition recommendations     Malnutrition Assessment:  Malnutrition Status:  No malnutrition (02/06/23 1138)      Nutrition Assessment:    Pt admitted w/ chest pain, h/o AMS, a-fib, CAD. Pt sleeping @ time of visit. Pt diet intermittently NPO since admission d/t surgery, dysphagia. [employer] message sent to general surgery and SLP regarding possibility of diet advancement. surgery OK w/ diet advancement, awaiting SLP. Nutrition Related Findings:    Hgb 9.1, hct 28, albumin 3.0, RBC 3.36, glucose 158; HgbA1C 5.6% 7/9/22 Wound Type: Surgical Incision       Current Nutrition Intake & Therapies:    Average Meal Intake: 51-75% (of clear, nectar thick liquids)  Average Supplements Intake: None Ordered  ADULT DIET; Clear Liquid; Mildly Thick (Nectar)    Anthropometric Measures:  Height: 5' 5\" (165.1 cm)  Ideal Body Weight (IBW): 125 lbs (57 kg)    Admission Body Weight: 173 lb 15.1 oz (78.9 kg)  Current Body Weight: 178 lb 5.6 oz (80.9 kg), 142.7 % IBW. Weight Source: Bed Scale  Current BMI (kg/m2): 29.7  Usual Body Weight: 154 lb (69.9 kg) (11/2/22)  % Weight Change (Calculated): 15.8  Weight Adjustment For: No Adjustment                 BMI Categories: Overweight (BMI 25.0-29. 9)    Estimated Daily Nutrient Needs:  Energy Requirements Based On: Formula  Weight Used for Energy Requirements: Current  Energy (kcal/day): 2324-0790  Weight Used for Protein Requirements: Ideal  Protein (g/day): 65-75  Method Used for Fluid Requirements: 1 ml/kcal  Fluid (ml/day): 6950    Nutrition Diagnosis:   Inadequate oral intake related to swallowing difficulty as evidenced by NPO or clear liquid status due to medical condition    Nutrition Interventions: Food and/or Nutrient Delivery: Continue Current Diet  Nutrition Education/Counseling: Education not appropriate  Coordination of Nutrition Care: Continue to monitor while inpatient, Speech Therapy, Coordination of Care  Plan of Care discussed with: General surgery, SLP    Goals:     Goals: PO intake 75% or greater (monitor for diet advancement as medically feasible)       Nutrition Monitoring and Evaluation:      Food/Nutrient Intake Outcomes: Diet Advancement/Tolerance, Food and Nutrient Intake  Physical Signs/Symptoms Outcomes: Biochemical Data, Chewing or Swallowing, Weight    Discharge Planning:     Too soon to determine     Kelby Moseley, [address]: [id number]

## 2023-02-06 NOTE — PROGRESS NOTES
Nephrology Progress Note  2/6/2023 9:37 AM        Subjective:   Admit Date: 1/30/2023  PCP: Georges Larios MD    Interval History:  patient seen earlier today, delayed entry   he was less responsive and interactive this morning    Diet:  unsure how much she is eating    ROS:   still with high flow oxygen per nasal cannula 25 L/min with 30% of FiO2   nurse texted me that her urine output dropped     although total urine output is 2.81 L for the last 24 hours   we will keep an eye on that   no fever, acceptable blood pressure      Data:     Current meds:    vancomycin  1,000 mg IntraVENous Once    mupirocin   Topical BID    chlorhexidine gluconate   Topical Daily    chlorhexidine  15 mL Mouth/Throat BID    heparin (porcine)  5,000 Units SubCUTAneous 3 times per day    aMILoride  5 mg Oral Daily    spironolactone  25 mg Oral Daily    meropenem  1,000 mg IntraVENous Q12H    gabapentin  300 mg Oral BID    memantine  5 mg Oral BID    rosuvastatin  10 mg Oral QPM    furosemide  40 mg IntraVENous BID    vancomycin (VANCOCIN) intermittent dosing (placeholder)   Other RX Placeholder    lidocaine PF  5 mL IntraDERmal Once    sodium chloride flush  5-40 mL IntraVENous 2 times per day    pantoprazole  40 mg IntraVENous Daily    guaiFENesin  600 mg Oral BID    polyethylene glycol  17 g Oral Daily    sodium chloride flush  5-40 mL IntraVENous 2 times per day    docusate sodium  100 mg Oral BID    donepezil  10 mg Oral Nightly    metoprolol tartrate  25 mg Oral BID      sodium chloride 12.5 mL/hr at 02/04/23 0324    sodium chloride           I/O last 3 completed shifts: In: 56 [P.O.:600;  I.V.:10]  Out: 4260 [Urine:4250; Drains:10]    CBC:   Recent Labs     02/04/23  0500 02/05/23  0700 02/06/23  0610   WBC 8.2 9.7 8.5   HGB 7.8* 8.7* 9.1*    213 208          Recent Labs     02/04/23  0500 02/05/23  0700 02/06/23  0610   * 135 138   K 3.5 3.1* 3.9   CL 96* 97* 98*   CO2 23 28 30   BUN 39* 36* 33*   CREATININE 1.7* 1. 6* 1.5*   GLUCOSE 182* 128* 158*       Lab Results   Component Value Date    CALCIUM 8.5 02/06/2023    PHOS 1.8 (L) 02/06/2023       Objective:     Vitals: BP (!) 129/56   Pulse 78   Temp 99 °F (37.2 °C) (Oral)   Resp 22   Ht 5' 5\" (1.651 m)   Wt 178 lb 4.8 oz (80.9 kg)   SpO2 93%   BMI 29.67 kg/m² ,    General appearance:   little lethargic not as interactive as yesterday although there was early morning  HEENT:   2+ conjunctival pallor at least  Neck:   head of the bed elevated about 60 degrees with nasal cannula oxygen  Lungs:   anterior exam has adventitious breath sound  Heart:   seemed irregular- well-healed incision from previous tenotomy, left chest wall cardiac device  Abdomen:  soft  Extremities:   positive edema      Problem List :         Impression :      stage III acute kidney injury- good urine output but according to nurse recently urine output dropped- thought to be mainly from renal vein congestion- but of course she can have additional etiology also   acute decompensated heart failure and underlying disc mediated cardiomyopathy- good urine and BUN/creatinine acceptable   hypokalemia was corrected    Recommendation/Plan  :      I would continue IV loop for now- watch closely for urine output and adequate \"plasma capillary refill\"- keep electrolytes within normal range specially potassium and magnesium- given her underlying dysrhythmia- I would shoot for potassium at least 4 and magnesium at least more than 1.8- watch for iatrogenic nosocomial complication, follow clinically and biochemically- she looks little drowsy to me, with her acute kidney injury and age of 76- I will probably hold gabapentin for now as it is exclusively cleared by the kidney      Harshal Juarez MD MD

## 2023-02-06 NOTE — PROGRESS NOTES
[address] [facility]  consulted by Dr. Juan Bosch for monitoring and adjustment. Indication for treatment: Vancomycin indication: HAP  Goal trough: Trough Goal: 15-20 mcg/mL  AUC/THERON: 400-600    Risk Factors for MRSA Identified:   Hospitalization within the past 90 days, Received IV antibiotics within the past 90 days    Pertinent Laboratory Values:   Temp Readings from Last 3 Encounters:   02/06/23 98.3 °F (36.8 °C) (Oral)   07/12/22 98.1 °F (36.7 °C) (Oral)   09/23/21 98.9 °F (37.2 °C) (Axillary)     Recent Labs     02/03/23  1400 02/03/23  2100 02/04/23  0500 02/05/23  0700 02/06/23  0610   WBC  --  9.1 8.2 9.7 8.5   LACTATE 1.1 1.0 0.9  --   --        Recent Labs     02/04/23  0500 02/05/23  0700 02/06/23  0610   BUN 39* 36* 33*   CREATININE 1.7* 1.6* 1.5*       Estimated Creatinine Clearance: 32 mL/min (A) (based on SCr of 1.5 mg/dL (H)). Intake/Output Summary (Last 24 hours) at 2/6/2023 5564  Last data filed at 2/6/2023 7954  Gross per 24 hour   Intake 370 ml   Output 2810 ml   Net -2440 ml         Pertinent Cultures:   Date    Source    Results  2/2   Blood    NGTD  2/2   Sputum/Respiratory  Pending  2/2   MRSA Nasal   Positive  2/2   Urine AG   Negative  2/2   Urine    ESBL Ecoli 49005 CFU/mL    Ideal body weight: 57 kg (125 lb 10.6 oz)  Adjusted ideal body weight: 66.6 kg (146 lb 11.5 oz)  Actual weight: 86kg    Vancomycin level:   TROUGH:  No results for input(s): VANCOTROUGH in the last 72 hours. RANDOM:    Recent Labs     02/04/23  0500 02/05/23  0700 02/06/23  0610   VANCORANDOM 17.4 24.4 17.6         Assessment:  HPI: Pt is 76 yof admitted 1/30 for CHF exacerbation. Patient now with new onset AMS and requiring BiPAP and vasopressors. Chest x-ray showing bibasilar opacities. MRSA nares positive, sputum culture pending.   SCr, BUN, and urine output:   TAYLOR, SCR now trending down to 1.5 today (baseline Scr 0.7 last month)  Day(s) of therapy: 5  Vancomycin concentration:   2/3 @ 0540 <4 ~ 12 hours post dose, appropriate to re-dose  2/4: 17.4, appropriate to re-dose  2/5: 24.4, above goal for re-dosing  2/6 - 17.6, 36 hour level post 1000mg dose, ok to re-dose    Plan:  TAYLOR, SCR now trending down to 1.5 today  Continue intermittent vancomycin dosing based on levels while in TAYLOR  Vanco level @17.6 this am, ok to re-dose. Give vancomycin 1000mg ivpb x1 dose today and recheck the vanco level tomorrow am due to improving renal trends. Pharmacy will continue to monitor patient and adjust therapy as indicated    Yamile 3 2/7 @06:00    Thank you for the consult. JOSEPH Strickland [facility]  2/6/2023 8:22 AM

## 2023-02-07 ENCOUNTER — PROCEDURE VISIT (OUTPATIENT)
Dept: CARDIOLOGY CLINIC | Age: 78
End: 2023-02-07
Payer: MEDICARE

## 2023-02-07 ENCOUNTER — APPOINTMENT (OUTPATIENT)
Dept: GENERAL RADIOLOGY | Age: 78
DRG: 853 | End: 2023-02-07
Payer: MEDICARE

## 2023-02-07 DIAGNOSIS — I49.5 SINUS NODE DYSFUNCTION (HCC): ICD-10-CM

## 2023-02-07 DIAGNOSIS — I44.0 AV BLOCK, 1ST DEGREE: ICD-10-CM

## 2023-02-07 DIAGNOSIS — Z95.0 CARDIAC PACEMAKER IN SITU: ICD-10-CM

## 2023-02-07 LAB
ALBUMIN SERPL-MCNC: 2.8 GM/DL (ref 3.4–5)
ALBUMIN SERPL-MCNC: 2.8 GM/DL (ref 3.4–5)
ALP BLD-CCNC: 99 IU/L (ref 40–128)
ALP BLD-CCNC: 99 IU/L (ref 40–129)
ALT SERPL-CCNC: 8 U/L (ref 10–40)
ALT SERPL-CCNC: 8 U/L (ref 10–40)
ANION GAP SERPL CALCULATED.3IONS-SCNC: 9 MMOL/L (ref 4–16)
AST SERPL-CCNC: 21 IU/L (ref 15–37)
AST SERPL-CCNC: 21 IU/L (ref 15–37)
BASOPHILS ABSOLUTE: 0 K/CU MM
BASOPHILS RELATIVE PERCENT: 0.1 % (ref 0–1)
BILIRUB SERPL-MCNC: 0.9 MG/DL (ref 0–1)
BILIRUB SERPL-MCNC: 0.9 MG/DL (ref 0–1)
BILIRUBIN DIRECT: 0.3 MG/DL (ref 0–0.3)
BILIRUBIN, INDIRECT: 0.6 MG/DL (ref 0–0.7)
BUN SERPL-MCNC: 31 MG/DL (ref 6–23)
CALCIUM SERPL-MCNC: 8.4 MG/DL (ref 8.3–10.6)
CHLORIDE BLD-SCNC: 95 MMOL/L (ref 99–110)
CO2: 32 MMOL/L (ref 21–32)
CREAT SERPL-MCNC: 1.3 MG/DL (ref 0.6–1.1)
CULTURE: NORMAL
CULTURE: NORMAL
DIFFERENTIAL TYPE: ABNORMAL
DOSE AMOUNT: NORMAL
DOSE TIME: NORMAL
EOSINOPHILS ABSOLUTE: 0.2 K/CU MM
EOSINOPHILS RELATIVE PERCENT: 2.1 % (ref 0–3)
GFR SERPL CREATININE-BSD FRML MDRD: 42 ML/MIN/1.73M2
GLUCOSE SERPL-MCNC: 133 MG/DL (ref 70–99)
HCT VFR BLD CALC: 21.8 % (ref 37–47)
HCT VFR BLD CALC: 28.5 % (ref 37–47)
HEMOGLOBIN: 7 GM/DL (ref 12.5–16)
HEMOGLOBIN: 9.1 GM/DL (ref 12.5–16)
IMMATURE NEUTROPHIL %: 6.1 % (ref 0–0.43)
LYMPHOCYTES ABSOLUTE: 1.3 K/CU MM
LYMPHOCYTES RELATIVE PERCENT: 14.2 % (ref 24–44)
Lab: NORMAL
Lab: NORMAL
MAGNESIUM: 1.7 MG/DL (ref 1.8–2.4)
MCH RBC QN AUTO: 27.5 PG (ref 27–31)
MCHC RBC AUTO-ENTMCNC: 32.1 % (ref 32–36)
MCV RBC AUTO: 85.5 FL (ref 78–100)
MONOCYTES ABSOLUTE: 0.7 K/CU MM
MONOCYTES RELATIVE PERCENT: 7.1 % (ref 0–4)
NUCLEATED RBC %: 0 %
PDW BLD-RTO: 14 % (ref 11.7–14.9)
PHOSPHORUS: 2.1 MG/DL (ref 2.5–4.9)
PLATELET # BLD: 169 K/CU MM (ref 140–440)
PMV BLD AUTO: 9.5 FL (ref 7.5–11.1)
POTASSIUM SERPL-SCNC: 3.9 MMOL/L (ref 3.5–5.1)
RBC # BLD: 2.55 M/CU MM (ref 4.2–5.4)
SEGMENTED NEUTROPHILS ABSOLUTE COUNT: 6.5 K/CU MM
SEGMENTED NEUTROPHILS RELATIVE PERCENT: 70.4 % (ref 36–66)
SODIUM BLD-SCNC: 136 MMOL/L (ref 135–145)
SPECIMEN: NORMAL
SPECIMEN: NORMAL
TOTAL IMMATURE NEUTOROPHIL: 0.56 K/CU MM
TOTAL NUCLEATED RBC: 0 K/CU MM
TOTAL PROTEIN: 4.7 GM/DL (ref 6.4–8.2)
TOTAL PROTEIN: 4.7 GM/DL (ref 6.4–8.2)
VANCOMYCIN RANDOM: 19.1 UG/ML
WBC # BLD: 9.2 K/CU MM (ref 4–10.5)

## 2023-02-07 PROCEDURE — 6370000000 HC RX 637 (ALT 250 FOR IP): Performed by: INTERNAL MEDICINE

## 2023-02-07 PROCEDURE — 85014 HEMATOCRIT: CPT

## 2023-02-07 PROCEDURE — 71045 X-RAY EXAM CHEST 1 VIEW: CPT

## 2023-02-07 PROCEDURE — 80076 HEPATIC FUNCTION PANEL: CPT

## 2023-02-07 PROCEDURE — 80053 COMPREHEN METABOLIC PANEL: CPT

## 2023-02-07 PROCEDURE — 2580000003 HC RX 258: Performed by: NURSE PRACTITIONER

## 2023-02-07 PROCEDURE — 85018 HEMOGLOBIN: CPT

## 2023-02-07 PROCEDURE — 2580000003 HC RX 258: Performed by: SURGERY

## 2023-02-07 PROCEDURE — 2580000003 HC RX 258: Performed by: PHYSICIAN ASSISTANT

## 2023-02-07 PROCEDURE — 36592 COLLECT BLOOD FROM PICC: CPT

## 2023-02-07 PROCEDURE — 36415 COLL VENOUS BLD VENIPUNCTURE: CPT

## 2023-02-07 PROCEDURE — 6360000002 HC RX W HCPCS: Performed by: PHYSICIAN ASSISTANT

## 2023-02-07 PROCEDURE — 83735 ASSAY OF MAGNESIUM: CPT

## 2023-02-07 PROCEDURE — 2140000000 HC CCU INTERMEDIATE R&B

## 2023-02-07 PROCEDURE — 99223 1ST HOSP IP/OBS HIGH 75: CPT | Performed by: INTERNAL MEDICINE

## 2023-02-07 PROCEDURE — 6370000000 HC RX 637 (ALT 250 FOR IP): Performed by: NURSE PRACTITIONER

## 2023-02-07 PROCEDURE — 6370000000 HC RX 637 (ALT 250 FOR IP): Performed by: SURGERY

## 2023-02-07 PROCEDURE — 6370000000 HC RX 637 (ALT 250 FOR IP): Performed by: PHYSICIAN ASSISTANT

## 2023-02-07 PROCEDURE — 2700000000 HC OXYGEN THERAPY PER DAY

## 2023-02-07 PROCEDURE — 6360000002 HC RX W HCPCS: Performed by: STUDENT IN AN ORGANIZED HEALTH CARE EDUCATION/TRAINING PROGRAM

## 2023-02-07 PROCEDURE — 93280 PM DEVICE PROGR EVAL DUAL: CPT | Performed by: INTERNAL MEDICINE

## 2023-02-07 PROCEDURE — 80202 ASSAY OF VANCOMYCIN: CPT

## 2023-02-07 PROCEDURE — 2580000003 HC RX 258: Performed by: STUDENT IN AN ORGANIZED HEALTH CARE EDUCATION/TRAINING PROGRAM

## 2023-02-07 PROCEDURE — 6360000002 HC RX W HCPCS: Performed by: INTERNAL MEDICINE

## 2023-02-07 PROCEDURE — 85025 COMPLETE CBC W/AUTO DIFF WBC: CPT

## 2023-02-07 PROCEDURE — 84100 ASSAY OF PHOSPHORUS: CPT

## 2023-02-07 PROCEDURE — 94761 N-INVAS EAR/PLS OXIMETRY MLT: CPT

## 2023-02-07 PROCEDURE — C9113 INJ PANTOPRAZOLE SODIUM, VIA: HCPCS | Performed by: STUDENT IN AN ORGANIZED HEALTH CARE EDUCATION/TRAINING PROGRAM

## 2023-02-07 PROCEDURE — 92526 ORAL FUNCTION THERAPY: CPT | Performed by: SPEECH-LANGUAGE PATHOLOGIST

## 2023-02-07 PROCEDURE — 82565 ASSAY OF CREATININE: CPT

## 2023-02-07 RX ADMIN — CHLORHEXIDINE GLUCONATE 0.12% ORAL RINSE 15 ML: 1.2 LIQUID ORAL at 10:20

## 2023-02-07 RX ADMIN — HEPARIN SODIUM 5000 UNITS: 5000 INJECTION INTRAVENOUS; SUBCUTANEOUS at 06:04

## 2023-02-07 RX ADMIN — SODIUM CHLORIDE, PRESERVATIVE FREE 10 ML: 5 INJECTION INTRAVENOUS at 16:13

## 2023-02-07 RX ADMIN — HYDROCODONE BITARTRATE AND ACETAMINOPHEN 1 TABLET: 5; 325 TABLET ORAL at 20:17

## 2023-02-07 RX ADMIN — DOCUSATE SODIUM 100 MG: 100 CAPSULE, LIQUID FILLED ORAL at 20:17

## 2023-02-07 RX ADMIN — Medication: at 20:18

## 2023-02-07 RX ADMIN — SPIRONOLACTONE 25 MG: 50 TABLET ORAL at 10:16

## 2023-02-07 RX ADMIN — Medication: at 10:17

## 2023-02-07 RX ADMIN — SODIUM CHLORIDE, PRESERVATIVE FREE 10 ML: 5 INJECTION INTRAVENOUS at 20:19

## 2023-02-07 RX ADMIN — MEMANTINE HYDROCHLORIDE 5 MG: 5 TABLET ORAL at 20:18

## 2023-02-07 RX ADMIN — GUAIFENESIN 600 MG: 600 TABLET, EXTENDED RELEASE ORAL at 20:17

## 2023-02-07 RX ADMIN — CHLORHEXIDINE GLUCONATE 0.12% ORAL RINSE 15 ML: 1.2 LIQUID ORAL at 20:19

## 2023-02-07 RX ADMIN — ROSUVASTATIN CALCIUM 10 MG: 5 TABLET, COATED ORAL at 17:36

## 2023-02-07 RX ADMIN — PANTOPRAZOLE SODIUM 40 MG: 40 INJECTION, POWDER, LYOPHILIZED, FOR SOLUTION INTRAVENOUS at 10:18

## 2023-02-07 RX ADMIN — VANCOMYCIN HYDROCHLORIDE 750 MG: 750 INJECTION, POWDER, LYOPHILIZED, FOR SOLUTION INTRAVENOUS at 10:24

## 2023-02-07 RX ADMIN — MEROPENEM 1000 MG: 1 INJECTION, POWDER, FOR SOLUTION INTRAVENOUS at 13:52

## 2023-02-07 RX ADMIN — DOCUSATE SODIUM 100 MG: 100 CAPSULE, LIQUID FILLED ORAL at 10:17

## 2023-02-07 RX ADMIN — HYDROCODONE BITARTRATE AND ACETAMINOPHEN 1 TABLET: 5; 325 TABLET ORAL at 10:16

## 2023-02-07 RX ADMIN — FUROSEMIDE 40 MG: 10 INJECTION, SOLUTION INTRAMUSCULAR; INTRAVENOUS at 17:36

## 2023-02-07 RX ADMIN — GUAIFENESIN 600 MG: 600 TABLET, EXTENDED RELEASE ORAL at 10:16

## 2023-02-07 RX ADMIN — MEROPENEM 1000 MG: 1 INJECTION, POWDER, FOR SOLUTION INTRAVENOUS at 01:11

## 2023-02-07 RX ADMIN — DONEPEZIL HYDROCHLORIDE 10 MG: 10 TABLET ORAL at 20:17

## 2023-02-07 RX ADMIN — HEPARIN SODIUM 5000 UNITS: 5000 INJECTION INTRAVENOUS; SUBCUTANEOUS at 20:18

## 2023-02-07 RX ADMIN — HEPARIN SODIUM 5000 UNITS: 5000 INJECTION INTRAVENOUS; SUBCUTANEOUS at 14:16

## 2023-02-07 RX ADMIN — FUROSEMIDE 40 MG: 10 INJECTION, SOLUTION INTRAMUSCULAR; INTRAVENOUS at 10:18

## 2023-02-07 RX ADMIN — MEMANTINE HYDROCHLORIDE 5 MG: 5 TABLET ORAL at 10:17

## 2023-02-07 ASSESSMENT — ENCOUNTER SYMPTOMS
EYE PAIN: 0
VOMITING: 0
CHEST TIGHTNESS: 0
BACK PAIN: 0
ABDOMINAL PAIN: 1
WHEEZING: 0
PHOTOPHOBIA: 0
STRIDOR: 0
ABDOMINAL PAIN: 0
COUGH: 0
ABDOMINAL DISTENTION: 0
DIARRHEA: 0
SHORTNESS OF BREATH: 0
BLOOD IN STOOL: 0
CONSTIPATION: 0
CHOKING: 0
NAUSEA: 0
COLOR CHANGE: 0

## 2023-02-07 ASSESSMENT — PAIN DESCRIPTION - DESCRIPTORS: DESCRIPTORS: ACHING

## 2023-02-07 ASSESSMENT — PAIN DESCRIPTION - LOCATION: LOCATION: ABDOMEN

## 2023-02-07 ASSESSMENT — PAIN - FUNCTIONAL ASSESSMENT: PAIN_FUNCTIONAL_ASSESSMENT: PREVENTS OR INTERFERES SOME ACTIVE ACTIVITIES AND ADLS

## 2023-02-07 ASSESSMENT — PAIN DESCRIPTION - ORIENTATION: ORIENTATION: RIGHT

## 2023-02-07 ASSESSMENT — PAIN SCALES - GENERAL
PAINLEVEL_OUTOF10: 0
PAINLEVEL_OUTOF10: 8
PAINLEVEL_OUTOF10: 5

## 2023-02-07 NOTE — PROGRESS NOTES
[id number] [facility] SPEECH/LANGUAGE PATHOLOGY  DAILY PROGRESS NOTE  Melody Aponte  2/7/2023  [id number]  Chest pain [R07.9]  Right sided abdominal pain [R10.9]  Chest pain, unspecified type [R07.9]  Acute cholecystitis [K81.0]  No Known Allergies      Pt was seen this date for dysphagia treatment. IMPRESSION AND RECOMMENDATIONS:   Pt was seen for diet tolerance monitoring/advancement following admission to [facility] with acute respiratory failure, cholecystitis s/p lap pedro. Pt currently on Vapotherm for acute respiratory failure due to fluid overload vs viral pneumonia. Pt's sister was present and reports that pt was tolerating a regular diet and thin liquids PTA. Initiated Puree/Thins diet 2/6. Pt remains on 25LO2/min 25% via high flow nc. Pt was asleep but aroused easily upon arrival of her family. Her sister has been at pt's bedside throughout the day and reports minimal PO intake. She expressed concern regarding same and overall lack of progress medically. She reports that her surgeon has no concerns given pt's age and s/p surgery. Pt was amenable to 4oz of pudding and 8oz of thin liquids by straw given extra time to complete oral phase. Oral phase was otherwise WNL for textures presented. Pt's pharyngeal swallow appears WFL with no overt s/s aspiration. Noted momentary intermittent oral holding r/t reduced attention; pt swallowed given min cues. Pt declined trials of advanced solids at this time. Recommend continue Puree/Thins. ST will follow for safe diet tolerance monitoring and trials for advancement of solids as mental status improves. RD consult for ONS due to reduced intake.       GOALS (current status in bold):  Short-term Goals  Timeframe for Short-term Goals: LOS or until goals are met  Goal 1: Pt will tolerate Pureed diet and Thin liquids without clinical evidence for aspiration 100% Meeting, Continue  Goal 2: Pt will participate in advanced trials for possible safe diet level advancement 10/10 trials DNT, Continue  Goal 3: Pt/caregivers will demonstrate comprehension of recommendations/POC Meeting, continue    EDUCATION: results/recommendations d/w pt, pt's younger sister     PAIN RATING (0-10 Scale): 0  Time in/Time out: SLP Individual Minutes  Time In: 1335  Time Out: 1400  Minutes: 25    Visit number: Rúa Jacobs 42 MA [facility] SLP  Speech Language Pathologist    2/7/2023  1:56 PM

## 2023-02-07 NOTE — PROGRESS NOTES
Nephrology Progress Note  2/7/2023 8:53 AM        Subjective:   Admit Date: 1/30/2023  PCP: Brayan Taylor MD    Interval History: Patient seen earlier today, remains with high flow oxygen but looks like lower FiO2    Diet: Unsure how much she is eating    ROS: She is more alert awake but looks still lethargic  With high flow oxygen per nasal cannula with 25% FiO2 and 25 L/min  Continue to make good urine 2.3 L for the last 24 hours of course with IV loop  Variable blood pressure recorded, no fever    Data:     Current meds:    mupirocin   Topical BID    chlorhexidine gluconate   Topical Daily    chlorhexidine  15 mL Mouth/Throat BID    heparin (porcine)  5,000 Units SubCUTAneous 3 times per day    aMILoride  5 mg Oral Daily    spironolactone  25 mg Oral Daily    meropenem  1,000 mg IntraVENous Q12H    [Held by provider] gabapentin  300 mg Oral BID    memantine  5 mg Oral BID    rosuvastatin  10 mg Oral QPM    furosemide  40 mg IntraVENous BID    vancomycin (VANCOCIN) intermittent dosing (placeholder)   Other RX Placeholder    lidocaine PF  5 mL IntraDERmal Once    sodium chloride flush  5-40 mL IntraVENous 2 times per day    pantoprazole  40 mg IntraVENous Daily    guaiFENesin  600 mg Oral BID    polyethylene glycol  17 g Oral Daily    sodium chloride flush  5-40 mL IntraVENous 2 times per day    docusate sodium  100 mg Oral BID    donepezil  10 mg Oral Nightly    metoprolol tartrate  25 mg Oral BID      sodium chloride 12.5 mL/hr at 02/04/23 0324    sodium chloride 25 mL/hr (02/06/23 1429)         I/O last 3 completed shifts: In: 65 [P.O.:840;  I.V.:10]  Out: 4025 [Urine:4025]    CBC:   Recent Labs     02/05/23  0700 02/06/23  0610 02/07/23  0522   WBC 9.7 8.5 9.2   HGB 8.7* 9.1* 7.0*    208 169          Recent Labs     02/05/23  0700 02/06/23  0610 02/07/23  0522    138 136   K 3.1* 3.9 3.9   CL 97* 98* 95*   CO2 28 30 32   BUN 36* 33* 31*   CREATININE 1.6* 1.5* 1.3*   GLUCOSE 128* 158* 133* Lab Results   Component Value Date    CALCIUM 8.4 02/07/2023    PHOS 2.1 (L) 02/07/2023       Objective:     Vitals: BP (!) 98/47   Pulse 72   Temp 97.8 °F (36.6 °C) (Oral)   Resp 27   Ht 5' 5\" (1.651 m)   Wt 178 lb 5.6 oz (80.9 kg)   SpO2 99%   BMI 29.68 kg/m² ,    General appearance: Cachectic, lethargic  HEENT: At least 2+ conjunctival pallor she is awake and respond and answer question  Neck: Supple, head of the bed elevated about 60 degrees with nasal cannula  Lungs: Limited exam anteriorly, adventitious breath sound  Heart: Irregular, well-healed incision from previous sternotomy, left chest 12 implanted cardiac device which is nontender  Abdomen: Soft-minimally tender, AVRIL drain  Extremities: Positive leg edema  She does have a pure wick      Problem List :         Impression :     Stage III acute kidney disease-good urine output so far improving by BUN and creatinine criteria thought to be from renal vein congestion  Acute decompensated failure suspected she does have cardiomyopathy-so far so good diuresis  Her electrolytes are acceptable at this time  Recent cholecystitis status postcholecystectomy AVRIL drain  Underlying atherosclerotic cardiovascular disease, valvular disease and diabetes mellitus as well as dysrhythmia    Recommendation/Plan  :     I would keep the IV loop for now-I will repeat a chest x-ray today just to compare-perhaps proBNP level tomorrow morning-keep all electrolytes within acceptable range given her cardiac disease and dysrhythmia-May need to improve her nutrition-she has been on antimicrobial agent for a while, I would revisit it-watch for iatrogenic nosocomial complication follow clinically and biochemically      Kira Hurtado MD MD

## 2023-02-07 NOTE — CARE COORDINATION
CM in to see Pt to follow up on discharge planning. Pt sleeping soundly. Pt sister Manish Souza present. Plan remains to return to [facility] when medically ready.       CM call to [place] to verify any needs to return, left  for return call

## 2023-02-07 NOTE — PROGRESS NOTES
V2.0  [facility] Hospitalist Progress Note      Name:  Doug Miles /Age/Sex: 1945  (76 y.o. female)   MRN & CSN:  [id number] & [id number] Encounter Date/Time: 2023 3:29 PM EST    Location:  36/200-A PCP: Dayday Cantor MD       Hospital Day: 9    Assessment and Plan:   Doug Miles is a 76 y.o. female with pmh of Doug Miles is a 76 y.o. female with pmh of CAD s/p CABG, Valvular Heart Disease, s/p Mitral Valve Repair, PFO Closure, Paroxysmal A-Fib, HTN, HLD, DM, TIA, Early Dementia who presents with Chest pain Rt Sided and RUQ Abdominal Pain      Plan:    Acute hypoxic respiratory failure  Patient with acute respiratory failure was on nasal cannula. Became acutely more short of breath. CT did show what appears to be either fluid overload or viral pneumonia. 25% at 25L/min Heated high flow +/- NIV    Sepsis 2/2 likely ESBL UTI without hematuria, Pneumonia    Patient with potential abnormality as well she did have acute solid cholecystitis status post lap pedro 2023. High risk for postoperative infection requiring MRSA pneumonia coverage. WBC 12, SBP 92, RR 22 initially. Procal trending down to 5. Urine growing ESBL UTI. MRSA Nares +ve  Patient on IV Meropenem and IV vanc - plan for at least total of 5 days from drain removal  De-colonize MRSA    Acute Encephalopathy 2/2 likely Metabolic and Septic 2/2 hypoxia and ESBL UTI, Iatrogenic 2/2 medication   Was difficult to wake on 2/3/2023  AO X 3 today but lethargic today compared to yesterday again - sister at bedside. Gabapentin on hold per nephro - agree with it    Cholecystitis s/p lap cholecystectomy   Patient underwent lap pedro on 2023 with Dr. Mar Montanez. AVRIL drain removed 2023  GS on board   Abx for 5 days post drain removal    TAYLOR  Pt with Cr 2 on admission, trended down to 1.5 today. baseline Cr 0.7.    Nephrology on board    Hypochloremic Hyponatremia  Patient with acute worsening hyponatremia to 121 and now back up to 135  Nephrology on board    Acute on chronic HFpEF  Patient with an EF 50% with hypokinetic inferio-lateral wall and basal anterio-septal carson the 45 (become acutely short of breath with progressive pulmonary edema. Pro-BNP trending up to 24,733  Cardiology on board  Nephrology on board  On aldactone and amiloride    Hypokalemia   K 3.9    CAD s/p CABG  Pacer in-situ  VHD  Patient is also known valvular heart disease. Cardiology following  Discussed with cardiology about interrogating pacer. Acute Normocytic Anemia 2/2 possibly Post-operative loss and hemodilution 2/2 fluid overload   Hb was 11 on 1/31/2023. 1U PRBC this admission. Hb 9.1 today. No signs of overt bleeding     Diet ADULT DIET; Dysphagia - Pureed  ADULT ORAL NUTRITION SUPPLEMENT; Breakfast, Dinner, Lunch; Frozen Oral Supplement   DVT Prophylaxis [] Lovenox, [x]  Heparin, [] SCDs, [] Ambulation,    [] Eliquis, [] Xarelto  [] Coumadin [] other   Code Status DNR-CCA   Disposition From: Home  Expected Disposition: will need SNF  Estimated Date of Discharge: To be determined  Patient requires continued admission due to respiratory failure. Pt. On heated high flow NC at 25%. Surrogate Decision Maker/ POA      Subjective:     Chief Complaint: Chest Pain (X 3 days )     Patient seen and examined in the AM. Patient's sister at bedside. Pt. Is lethargic but oriented X 3 today. Still on heated high flow nasal canula. States she feels tired. Discussed with cardiology - plan for pacer interrogation. Review of Systems:    Review of Systems   Constitutional:  Positive for fatigue. Negative for chills, fever and unexpected weight change. Eyes:  Negative for pain and visual disturbance. Respiratory:  Negative for cough, choking, chest tightness, shortness of breath, wheezing and stridor. Cardiovascular:  Negative for chest pain, palpitations and leg swelling.    Gastrointestinal:  Negative for abdominal distention, abdominal pain, blood in stool, constipation, diarrhea, nausea and vomiting. Genitourinary:  Negative for decreased urine volume, dysuria, frequency, hematuria and urgency. Musculoskeletal:  Negative for arthralgias, back pain and myalgias. Skin:  Negative for pallor and rash. Neurological:  Negative for dizziness, tremors, syncope, speech difficulty, weakness, light-headedness, numbness and headaches. Psychiatric/Behavioral:  Negative for agitation. Objective: Intake/Output Summary (Last 24 hours) at 2/7/2023 1744  Last data filed at 2/7/2023 1116  Gross per 24 hour   Intake 480 ml   Output 1450 ml   Net -970 ml          Vitals:   Vitals:    02/07/23 1730   BP: 112/69   Pulse: 75   Resp: 30   Temp:    SpO2:        Physical Exam:     Physical Exam  Vitals reviewed. Constitutional:       General: She is awake. She is not in acute distress. Appearance: Normal appearance. She is overweight. She is not ill-appearing. Interventions: Nasal cannula in place. Comments: Heated high flow    HENT:      Head: Normocephalic and atraumatic. Mouth/Throat:      Mouth: Mucous membranes are moist.      Pharynx: Oropharynx is clear. Eyes:      Extraocular Movements: Extraocular movements intact. Conjunctiva/sclera: Conjunctivae normal.      Pupils: Pupils are equal, round, and reactive to light. Cardiovascular:      Rate and Rhythm: Normal rate. Rhythm irregularly irregular. Pulses: Normal pulses. Heart sounds: Normal heart sounds. Comments: Pacer is not capturing all beats - pacer spikes are all over the place  Pulmonary:      Effort: Pulmonary effort is normal. Tachypnea present. Breath sounds: Decreased air movement and transmitted upper airway sounds present. No wheezing, rhonchi or rales. Abdominal:      General: Abdomen is protuberant. Bowel sounds are normal.      Palpations: Abdomen is soft. Musculoskeletal:         General: No swelling. Normal range of motion.       Cervical back: Normal range of motion and neck supple. Skin:     General: Skin is warm and dry. Neurological:      Mental Status: She is oriented to person, place, and time. She is lethargic. Comments: Oriented to year, not month   Psychiatric:         Behavior: Behavior is cooperative.        Medications:   Medications:    vancomycin  750 mg IntraVENous Q24H    mupirocin   Topical BID    chlorhexidine gluconate   Topical Daily    chlorhexidine  15 mL Mouth/Throat BID    heparin (porcine)  5,000 Units SubCUTAneous 3 times per day    spironolactone  25 mg Oral Daily    meropenem  1,000 mg IntraVENous Q12H    [Held by provider] gabapentin  300 mg Oral BID    memantine  5 mg Oral BID    rosuvastatin  10 mg Oral QPM    furosemide  40 mg IntraVENous BID    lidocaine PF  5 mL IntraDERmal Once    sodium chloride flush  5-40 mL IntraVENous 2 times per day    pantoprazole  40 mg IntraVENous Daily    guaiFENesin  600 mg Oral BID    polyethylene glycol  17 g Oral Daily    sodium chloride flush  5-40 mL IntraVENous 2 times per day    docusate sodium  100 mg Oral BID    donepezil  10 mg Oral Nightly    metoprolol tartrate  25 mg Oral BID      Infusions:    sodium chloride 12.5 mL/hr at 02/04/23 0324    sodium chloride 25 mL/hr (02/06/23 1429)     PRN Meds: sodium chloride flush, 5-40 mL, PRN  sodium chloride, 500 mL, PRN  morphine, 1 mg, Q4H PRN  HYDROcodone-acetaminophen, 1 tablet, Q6H PRN  sodium chloride flush, 5-40 mL, PRN  sodium chloride, , PRN  ondansetron, 4 mg, Q8H PRN   Or  ondansetron, 4 mg, Q6H PRN  acetaminophen, 650 mg, Q6H PRN   Or  acetaminophen, 650 mg, Q6H PRN  ipratropium, 1 spray, PRN      Labs      Recent Results (from the past 24 hour(s))   Hepatic Function Panel    Collection Time: 02/07/23  5:22 AM   Result Value Ref Range    Albumin 2.8 (L) 3.4 - 5.0 GM/DL    Total Bilirubin 0.9 0.0 - 1.0 MG/DL    Bilirubin, Direct 0.3 0.0 - 0.3 MG/DL    Bilirubin, Indirect 0.6 0 - 0.7 MG/DL    Alkaline Phosphatase 99 40 - 129 IU/L    AST 21 15 - 37 IU/L    ALT 8 (L) 10 - 40 U/L    Total Protein 4.7 (L) 6.4 - 8.2 GM/DL   CBC with Auto Differential    Collection Time: 02/07/23  5:22 AM   Result Value Ref Range    WBC 9.2 4.0 - 10.5 K/CU MM    RBC 2.55 (L) 4.2 - 5.4 M/CU MM    Hemoglobin 7.0 (L) 12.5 - 16.0 GM/DL    Hematocrit 21.8 (L) 37 - 47 %    MCV 85.5 78 - 100 FL    MCH 27.5 27 - 31 PG    MCHC 32.1 32.0 - 36.0 %    RDW 14.0 11.7 - 14.9 %    Platelets 184 678 - 759 K/CU MM    MPV 9.5 7.5 - 11.1 FL    Differential Type AUTOMATED DIFFERENTIAL     Segs Relative 70.4 (H) 36 - 66 %    Lymphocytes % 14.2 (L) 24 - 44 %    Monocytes % 7.1 (H) 0 - 4 %    Eosinophils % 2.1 0 - 3 %    Basophils % 0.1 0 - 1 %    Segs Absolute 6.5 K/CU MM    Lymphocytes Absolute 1.3 K/CU MM    Monocytes Absolute 0.7 K/CU MM    Eosinophils Absolute 0.2 K/CU MM    Basophils Absolute 0.0 K/CU MM    Nucleated RBC % 0.0 %    Total Nucleated RBC 0.0 K/CU MM    Total Immature Neutrophil 0.56 K/CU MM    Immature Neutrophil % 6.1 (H) 0 - 0.43 %   Vancomycin Level, Random    Collection Time: 02/07/23  5:22 AM   Result Value Ref Range    Vancomycin Rm 19.1 UG/ML    DOSE AMOUNT DOSE AMT.  GIVEN - 1000MG     DOSE TIME DOSE TIME GIVEN - 2/6 @09:00    Comprehensive Metabolic Panel    Collection Time: 02/07/23  5:22 AM   Result Value Ref Range    Sodium 136 135 - 145 MMOL/L    Potassium 3.9 3.5 - 5.1 MMOL/L    Chloride 95 (L) 99 - 110 mMol/L    CO2 32 21 - 32 MMOL/L    BUN 31 (H) 6 - 23 MG/DL    Creatinine 1.3 (H) 0.6 - 1.1 MG/DL    Est, Glom Filt Rate 42 (L) >60 mL/min/1.73m2    Glucose 133 (H) 70 - 99 MG/DL    Calcium 8.4 8.3 - 10.6 MG/DL    Albumin 2.8 (L) 3.4 - 5.0 GM/DL    Total Protein 4.7 (L) 6.4 - 8.2 GM/DL    Total Bilirubin 0.9 0.0 - 1.0 MG/DL    ALT 8 (L) 10 - 40 U/L    AST 21 15 - 37 IU/L    Alkaline Phosphatase 99 40 - 128 IU/L    Anion Gap 9 4 - 16   Magnesium    Collection Time: 02/07/23  5:22 AM   Result Value Ref Range    Magnesium 1.7 (L) 1.8 - 2.4 mg/dl Phosphorus    Collection Time: 02/07/23  5:22 AM   Result Value Ref Range    Phosphorus 2.1 (L) 2.5 - 4.9 MG/DL        Imaging/Diagnostics Last 24 Hours   XR CHEST PORTABLE    Result Date: 2/2/2023  EXAMINATION: ONE XRAY VIEW OF THE CHEST 2/2/2023 1:52 pm COMPARISON: 02/02/2023, 01/30/2023 HISTORY: ORDERING SYSTEM PROVIDED HISTORY: shortness of breath TECHNOLOGIST PROVIDED HISTORY: Reason for exam:->shortness of breath Reason for Exam: shortness of breath FINDINGS: Sternotomy wires. Prosthetic cardiac valve. Pacemaker wires. Lung volumes. Bibasilar opacities. No pneumothorax. Heart size is stable. Low lung volumes with bibasilar opacities which may represent atelectasis or pneumonia. XR CHEST PORTABLE    Result Date: 2/2/2023  EXAMINATION: ONE X-RAY VIEW OF THE CHEST 2/2/2023 10:49 am COMPARISON: CT chest 01/30/2023, chest radiograph 01/30/2023 HISTORY: ORDERING SYSTEM PROVIDED HISTORY: Shortness of breath TECHNOLOGIST PROVIDED HISTORY: Reason for exam:->Shortness of breath Reason for Exam: shortness of breath FINDINGS: The lungs are underinflated, resulting in vascular crowding and subsegmental atelectasis. The cardiomediastinal silhouette is obscured, but likely unchanged. Bibasilar consolidations favor atelectasis. The left upper lobe is obscured. No new focal consolidation, pneumothorax, or right-sided pleural effusion. There is blunting of the left costophrenic angle, which may be due to low lung volumes and/or patient positioning. Osseous structures are diffusely demineralized and grossly unchanged. Left chest cardiac conduction device is again noted. Low lung volumes with likely bibasilar atelectasis versus developing infection. Comment: Please note this report has been produced using speech recognition software and may contain errors related to that system including errors in grammar, punctuation, and spelling, as well as words and phrases that may be inappropriate.  If there are any questions or concerns please feel free to contact the dictating provider for clarification.      Electronically signed by Jose Eduardo Vela MD on 2/7/2023 at 5:44 PM

## 2023-02-07 NOTE — PROGRESS NOTES
[facility]  consulted by Dr. Ramez Martinez for monitoring and adjustment. Indication for treatment: Vancomycin indication: HAP  Goal trough: Trough Goal: 15-20 mcg/mL  AUC/THERON: 400-600    Risk Factors for MRSA Identified:   Hospitalization within the past 90 days, Received IV antibiotics within the past 90 days    Pertinent Laboratory Values:   Temp Readings from Last 3 Encounters:   02/07/23 97.8 °F (36.6 °C) (Oral)   07/12/22 98.1 °F (36.7 °C) (Oral)   09/23/21 98.9 °F (37.2 °C) (Axillary)     Recent Labs     02/05/23  0700 02/06/23  0610 02/07/23 0522   WBC 9.7 8.5 9.2       Recent Labs     02/05/23  0700 02/06/23  0610 02/07/23 0522   BUN 36* 33* 31*   CREATININE 1.6* 1.5* 1.3*       Estimated Creatinine Clearance: 37 mL/min (A) (based on SCr of 1.3 mg/dL (H)). Intake/Output Summary (Last 24 hours) at 2/7/2023 4772  Last data filed at 2/7/2023 0606  Gross per 24 hour   Intake 480 ml   Output 2325 ml   Net -1845 ml         Pertinent Cultures:   Date    Source    Results  2/2   Blood    NGTD  2/2   Sputum/Respiratory  Pending  2/2   MRSA Nasal   Positive  2/2   Urine AG   Negative  2/2   Urine    ESBL Ecoli 98837 CFU/mL    Ideal body weight: 57 kg (125 lb 10.6 oz)  Adjusted ideal body weight: 66.6 kg (146 lb 11.8 oz)  Actual weight: 86kg    Vancomycin level:   TROUGH:  No results for input(s): VANCOTROUGH in the last 72 hours. RANDOM:    Recent Labs     02/05/23  0700 02/06/23  0610 02/07/23 0522   VANCORANDOM 24.4 17.6 19.1         Assessment:  HPI: Pt is 76 yof admitted 1/30 for CHF exacerbation. Patient now with new onset AMS and requiring BiPAP and vasopressors. Chest x-ray showing bibasilar opacities. MRSA nares positive, sputum culture pending.   SCr, BUN, and urine output:   TAYLOR, SCR continues to trend down to 1.3 today (baseline Scr 0.7 last month), UOP good  Day(s) of therapy: 6  Vancomycin concentration:   2/3 @ 0540 <4 ~ 12 hours post dose, appropriate to re-dose  2/4: 17.4, appropriate to re-dose  2/5: 24.4, above goal for re-dosing  2/6 - 17.6, 36 hour level post 1000mg dose, ok to re-dose  2/7 - 19.1, 20 hour level post 1000mg ivpb dose    Plan:  TAYLOR, SCR continues to trend down to 1.3 today  Due to improved renal trends, will resume scheduled dosing  Start vancomycin 750mg uivpb q24h for a predicted AUC of 458 at steady state. Recheck the vanco level in 2 days to monitor for accumulation 2/2 advanced age  Pharmacy will continue to monitor patient and adjust therapy as indicated    VANCOMYCIN CONCENTRATION SCHEDULED FOR 2/9 @06:00    Thank you for the consult. Selma Ramirez, [address]  2/7/2023 9:06 AM

## 2023-02-07 NOTE — PROGRESS NOTES
Spoke with Dr. Tawanna Montesinos, okay to pull AVRIL drain today. This nurse and Machelle Venegas RN pulled AVRIL drain, pressure held for roughly one minute. Pt resting now and stating she is satisfied and denies pain. Will continue to monitor pt.

## 2023-02-07 NOTE — PROGRESS NOTES
Nutrition Note    Diet now advanced as per speech therapy with request to start oral nutrition supplement. Currently on pureed diet, will plan to offer frozen oral nutrition supplement and continue to follow up during stay.         Electronically signed by Tyson Washington RD, SP[place] on 2/7/23 at 2:14 PM EST    Contact: [phone]

## 2023-02-08 LAB
ALBUMIN SERPL-MCNC: 2.9 GM/DL (ref 3.4–5)
ALBUMIN SERPL-MCNC: 2.9 GM/DL (ref 3.4–5)
ALP BLD-CCNC: 100 IU/L (ref 40–128)
ALP BLD-CCNC: 100 IU/L (ref 40–129)
ALT SERPL-CCNC: 8 U/L (ref 10–40)
ALT SERPL-CCNC: 8 U/L (ref 10–40)
AMMONIA: 27 UMOL/L (ref 11–51)
ANION GAP SERPL CALCULATED.3IONS-SCNC: 12 MMOL/L (ref 4–16)
AST SERPL-CCNC: 20 IU/L (ref 15–37)
AST SERPL-CCNC: 20 IU/L (ref 15–37)
BASOPHILS ABSOLUTE: 0 K/CU MM
BASOPHILS RELATIVE PERCENT: 0.2 % (ref 0–1)
BILIRUB SERPL-MCNC: 0.8 MG/DL (ref 0–1)
BILIRUB SERPL-MCNC: 0.8 MG/DL (ref 0–1)
BILIRUBIN DIRECT: 0.3 MG/DL (ref 0–0.3)
BILIRUBIN, INDIRECT: 0.5 MG/DL (ref 0–0.7)
BUN SERPL-MCNC: 33 MG/DL (ref 6–23)
CALCIUM SERPL-MCNC: 8.6 MG/DL (ref 8.3–10.6)
CHLORIDE BLD-SCNC: 89 MMOL/L (ref 99–110)
CO2: 30 MMOL/L (ref 21–32)
CORTISOL, PLASMA: 18.58
CREAT SERPL-MCNC: 1.3 MG/DL (ref 0.6–1.1)
CRP SERPL HS-MCNC: 116.3 MG/L
DIFFERENTIAL TYPE: ABNORMAL
EOSINOPHILS ABSOLUTE: 0.4 K/CU MM
EOSINOPHILS RELATIVE PERCENT: 2.8 % (ref 0–3)
GFR SERPL CREATININE-BSD FRML MDRD: 42 ML/MIN/1.73M2
GLUCOSE SERPL-MCNC: 126 MG/DL (ref 70–99)
HCT VFR BLD CALC: 27.9 % (ref 37–47)
HEMOGLOBIN: 9.2 GM/DL (ref 12.5–16)
IMMATURE NEUTROPHIL %: 9.7 % (ref 0–0.43)
LYMPHOCYTES ABSOLUTE: 1.4 K/CU MM
LYMPHOCYTES RELATIVE PERCENT: 11.2 % (ref 24–44)
MCH RBC QN AUTO: 26.7 PG (ref 27–31)
MCHC RBC AUTO-ENTMCNC: 33 % (ref 32–36)
MCV RBC AUTO: 80.9 FL (ref 78–100)
MONOCYTES ABSOLUTE: 0.8 K/CU MM
MONOCYTES RELATIVE PERCENT: 6.3 % (ref 0–4)
NUCLEATED RBC %: 0 %
PDW BLD-RTO: 13.7 % (ref 11.7–14.9)
PLATELET # BLD: 232 K/CU MM (ref 140–440)
PMV BLD AUTO: 9.6 FL (ref 7.5–11.1)
POTASSIUM SERPL-SCNC: 4 MMOL/L (ref 3.5–5.1)
PROCALCITONIN SERPL-MCNC: 0.46 NG/ML
RBC # BLD: 3.45 M/CU MM (ref 4.2–5.4)
SEGMENTED NEUTROPHILS ABSOLUTE COUNT: 8.8 K/CU MM
SEGMENTED NEUTROPHILS RELATIVE PERCENT: 69.8 % (ref 36–66)
SODIUM BLD-SCNC: 131 MMOL/L (ref 135–145)
TOTAL IMMATURE NEUTOROPHIL: 1.23 K/CU MM
TOTAL NUCLEATED RBC: 0 K/CU MM
TOTAL PROTEIN: 4.9 GM/DL (ref 6.4–8.2)
TOTAL PROTEIN: 4.9 GM/DL (ref 6.4–8.2)
TSH SERPL DL<=0.005 MIU/L-ACNC: 4.3 UIU/ML (ref 0.27–4.2)
WBC # BLD: 12.6 K/CU MM (ref 4–10.5)

## 2023-02-08 PROCEDURE — 6360000002 HC RX W HCPCS: Performed by: PHYSICIAN ASSISTANT

## 2023-02-08 PROCEDURE — 6360000002 HC RX W HCPCS: Performed by: INTERNAL MEDICINE

## 2023-02-08 PROCEDURE — 2140000000 HC CCU INTERMEDIATE R&B

## 2023-02-08 PROCEDURE — 6370000000 HC RX 637 (ALT 250 FOR IP): Performed by: INTERNAL MEDICINE

## 2023-02-08 PROCEDURE — 2580000003 HC RX 258: Performed by: STUDENT IN AN ORGANIZED HEALTH CARE EDUCATION/TRAINING PROGRAM

## 2023-02-08 PROCEDURE — 82746 ASSAY OF FOLIC ACID SERUM: CPT

## 2023-02-08 PROCEDURE — 97530 THERAPEUTIC ACTIVITIES: CPT

## 2023-02-08 PROCEDURE — 6370000000 HC RX 637 (ALT 250 FOR IP): Performed by: NURSE PRACTITIONER

## 2023-02-08 PROCEDURE — 82533 TOTAL CORTISOL: CPT

## 2023-02-08 PROCEDURE — 80076 HEPATIC FUNCTION PANEL: CPT

## 2023-02-08 PROCEDURE — 36415 COLL VENOUS BLD VENIPUNCTURE: CPT

## 2023-02-08 PROCEDURE — 2580000003 HC RX 258: Performed by: NURSE PRACTITIONER

## 2023-02-08 PROCEDURE — 86140 C-REACTIVE PROTEIN: CPT

## 2023-02-08 PROCEDURE — 85025 COMPLETE CBC W/AUTO DIFF WBC: CPT

## 2023-02-08 PROCEDURE — 2580000003 HC RX 258: Performed by: PHYSICIAN ASSISTANT

## 2023-02-08 PROCEDURE — 84443 ASSAY THYROID STIM HORMONE: CPT

## 2023-02-08 PROCEDURE — 92526 ORAL FUNCTION THERAPY: CPT | Performed by: SPEECH-LANGUAGE PATHOLOGIST

## 2023-02-08 PROCEDURE — 2580000003 HC RX 258: Performed by: SURGERY

## 2023-02-08 PROCEDURE — 80053 COMPREHEN METABOLIC PANEL: CPT

## 2023-02-08 PROCEDURE — 36592 COLLECT BLOOD FROM PICC: CPT

## 2023-02-08 PROCEDURE — 6360000002 HC RX W HCPCS: Performed by: STUDENT IN AN ORGANIZED HEALTH CARE EDUCATION/TRAINING PROGRAM

## 2023-02-08 PROCEDURE — 82140 ASSAY OF AMMONIA: CPT

## 2023-02-08 PROCEDURE — 97112 NEUROMUSCULAR REEDUCATION: CPT

## 2023-02-08 PROCEDURE — 84145 PROCALCITONIN (PCT): CPT

## 2023-02-08 PROCEDURE — 82607 VITAMIN B-12: CPT

## 2023-02-08 PROCEDURE — 6370000000 HC RX 637 (ALT 250 FOR IP): Performed by: SURGERY

## 2023-02-08 PROCEDURE — 6370000000 HC RX 637 (ALT 250 FOR IP): Performed by: PHYSICIAN ASSISTANT

## 2023-02-08 PROCEDURE — C9113 INJ PANTOPRAZOLE SODIUM, VIA: HCPCS | Performed by: STUDENT IN AN ORGANIZED HEALTH CARE EDUCATION/TRAINING PROGRAM

## 2023-02-08 RX ORDER — LANOLIN ALCOHOL/MO/W.PET/CERES
3 CREAM (GRAM) TOPICAL NIGHTLY
Status: DISCONTINUED | OUTPATIENT
Start: 2023-02-08 | End: 2023-02-20 | Stop reason: HOSPADM

## 2023-02-08 RX ORDER — FUROSEMIDE 10 MG/ML
20 INJECTION INTRAMUSCULAR; INTRAVENOUS 2 TIMES DAILY
Status: DISCONTINUED | OUTPATIENT
Start: 2023-02-08 | End: 2023-02-09

## 2023-02-08 RX ORDER — THIAMINE HYDROCHLORIDE 100 MG/ML
100 INJECTION, SOLUTION INTRAMUSCULAR; INTRAVENOUS DAILY
Status: DISCONTINUED | OUTPATIENT
Start: 2023-02-08 | End: 2023-02-20 | Stop reason: HOSPADM

## 2023-02-08 RX ADMIN — METOPROLOL TARTRATE 25 MG: 50 TABLET, FILM COATED ORAL at 10:24

## 2023-02-08 RX ADMIN — SODIUM CHLORIDE, PRESERVATIVE FREE 10 ML: 5 INJECTION INTRAVENOUS at 20:50

## 2023-02-08 RX ADMIN — FUROSEMIDE 20 MG: 10 INJECTION, SOLUTION INTRAMUSCULAR; INTRAVENOUS at 10:26

## 2023-02-08 RX ADMIN — HEPARIN SODIUM 5000 UNITS: 5000 INJECTION INTRAVENOUS; SUBCUTANEOUS at 05:42

## 2023-02-08 RX ADMIN — SODIUM CHLORIDE, PRESERVATIVE FREE 10 ML: 5 INJECTION INTRAVENOUS at 10:27

## 2023-02-08 RX ADMIN — GUAIFENESIN 600 MG: 600 TABLET, EXTENDED RELEASE ORAL at 20:50

## 2023-02-08 RX ADMIN — CHLORHEXIDINE GLUCONATE 0.12% ORAL RINSE 15 ML: 1.2 LIQUID ORAL at 10:26

## 2023-02-08 RX ADMIN — VANCOMYCIN HYDROCHLORIDE 750 MG: 750 INJECTION, POWDER, LYOPHILIZED, FOR SOLUTION INTRAVENOUS at 10:28

## 2023-02-08 RX ADMIN — SODIUM CHLORIDE, PRESERVATIVE FREE 10 ML: 5 INJECTION INTRAVENOUS at 20:51

## 2023-02-08 RX ADMIN — DONEPEZIL HYDROCHLORIDE 10 MG: 10 TABLET ORAL at 20:50

## 2023-02-08 RX ADMIN — MEROPENEM 1000 MG: 1 INJECTION, POWDER, FOR SOLUTION INTRAVENOUS at 14:11

## 2023-02-08 RX ADMIN — MEMANTINE HYDROCHLORIDE 5 MG: 5 TABLET ORAL at 20:50

## 2023-02-08 RX ADMIN — ROSUVASTATIN CALCIUM 10 MG: 5 TABLET, COATED ORAL at 17:30

## 2023-02-08 RX ADMIN — FUROSEMIDE 20 MG: 10 INJECTION, SOLUTION INTRAMUSCULAR; INTRAVENOUS at 17:30

## 2023-02-08 RX ADMIN — GUAIFENESIN 600 MG: 600 TABLET, EXTENDED RELEASE ORAL at 10:25

## 2023-02-08 RX ADMIN — DOCUSATE SODIUM 100 MG: 100 CAPSULE, LIQUID FILLED ORAL at 20:49

## 2023-02-08 RX ADMIN — CHLORHEXIDINE GLUCONATE 0.12% ORAL RINSE 15 ML: 1.2 LIQUID ORAL at 20:50

## 2023-02-08 RX ADMIN — HEPARIN SODIUM 5000 UNITS: 5000 INJECTION INTRAVENOUS; SUBCUTANEOUS at 20:48

## 2023-02-08 RX ADMIN — HEPARIN SODIUM 5000 UNITS: 5000 INJECTION INTRAVENOUS; SUBCUTANEOUS at 14:24

## 2023-02-08 RX ADMIN — Medication: at 10:24

## 2023-02-08 RX ADMIN — PANTOPRAZOLE SODIUM 40 MG: 40 INJECTION, POWDER, LYOPHILIZED, FOR SOLUTION INTRAVENOUS at 10:26

## 2023-02-08 RX ADMIN — SPIRONOLACTONE 25 MG: 50 TABLET ORAL at 10:25

## 2023-02-08 RX ADMIN — Medication: at 20:49

## 2023-02-08 RX ADMIN — MEMANTINE HYDROCHLORIDE 5 MG: 5 TABLET ORAL at 10:25

## 2023-02-08 RX ADMIN — Medication 3 MG: at 20:49

## 2023-02-08 RX ADMIN — POLYETHYLENE GLYCOL (3350) 17 G: 17 POWDER, FOR SOLUTION ORAL at 10:26

## 2023-02-08 RX ADMIN — METOPROLOL TARTRATE 25 MG: 50 TABLET, FILM COATED ORAL at 20:49

## 2023-02-08 RX ADMIN — CHLORHEXIDINE GLUCONATE: 4 LIQUID TOPICAL at 10:28

## 2023-02-08 RX ADMIN — DOCUSATE SODIUM 100 MG: 100 CAPSULE, LIQUID FILLED ORAL at 10:28

## 2023-02-08 RX ADMIN — THIAMINE HYDROCHLORIDE 100 MG: 100 INJECTION, SOLUTION INTRAMUSCULAR; INTRAVENOUS at 17:30

## 2023-02-08 RX ADMIN — MEROPENEM 1000 MG: 1 INJECTION, POWDER, FOR SOLUTION INTRAVENOUS at 01:42

## 2023-02-08 ASSESSMENT — PAIN DESCRIPTION - PAIN TYPE: TYPE: ACUTE PAIN

## 2023-02-08 ASSESSMENT — PAIN SCALES - GENERAL
PAINLEVEL_OUTOF10: 3
PAINLEVEL_OUTOF10: 0

## 2023-02-08 ASSESSMENT — ENCOUNTER SYMPTOMS
CHEST TIGHTNESS: 0
COUGH: 0
DIARRHEA: 0
STRIDOR: 0
CONSTIPATION: 0
BACK PAIN: 0
CHOKING: 0
EYE PAIN: 0
SHORTNESS OF BREATH: 0
NAUSEA: 0
WHEEZING: 0
BLOOD IN STOOL: 0
ABDOMINAL DISTENTION: 0
ABDOMINAL PAIN: 0
VOMITING: 0

## 2023-02-08 ASSESSMENT — PAIN - FUNCTIONAL ASSESSMENT: PAIN_FUNCTIONAL_ASSESSMENT: ACTIVITIES ARE NOT PREVENTED

## 2023-02-08 ASSESSMENT — PAIN DESCRIPTION - LOCATION: LOCATION: BACK

## 2023-02-08 ASSESSMENT — PAIN DESCRIPTION - FREQUENCY: FREQUENCY: CONTINUOUS

## 2023-02-08 ASSESSMENT — PAIN SCALES - WONG BAKER
WONGBAKER_NUMERICALRESPONSE: 0
WONGBAKER_NUMERICALRESPONSE: 0

## 2023-02-08 ASSESSMENT — PAIN DESCRIPTION - ONSET: ONSET: PROGRESSIVE

## 2023-02-08 ASSESSMENT — PAIN DESCRIPTION - DESCRIPTORS: DESCRIPTORS: PRESSURE

## 2023-02-08 NOTE — PROGRESS NOTES
[address] [place] OF SPEECH/LANGUAGE PATHOLOGY  DAILY PROGRESS NOTE  Anjana Victor  2/8/2023  [id number]  Chest pain [R07.9]  Right sided abdominal pain [R10.9]  Chest pain, unspecified type [R07.9]  Acute cholecystitis [K81.0]  No Known Allergies      Pt was seen this date for dysphagia treatment. IMPRESSION AND RECOMMENDATIONS:   Pt was seen for diet tolerance monitoring/advancement following admission to [facility] with acute respiratory failure, cholecystitis s/p lap pedro. Pt currently on room air. Per/family pt was tolerating a regular diet and thin liquids PTA. Sister reports that pt choked on ice-cream today. Pt was alert and agreeable to PO trials of thins by straw, pureed, and soft solids. She completed trials of soft solids with mildly prolonged and disorganized mastication and mild lingual residue. The oral phase was otherwise WNL for all other textures trialed. The pharyngeal swallow appears [place] for all trials including continuous sips of thins by straw. Recommend advance diet to Minced and moist/Thins. Plan is for pt to be discharged today to [facility].       GOALS (current status in bold):  Short-term Goals  Timeframe for Short-term Goals: LOS or until goals are met  Goal 1: Pt will tolerate Pureed diet and Thin liquids without clinical evidence for aspiration 100% Met Discontinue  Goal 2: Pt will participate in advanced trials for possible safe diet level advancement 10/10 trials Partially Met, advanced to Minced and moist.  Goal 3: Pt/caregivers will demonstrate comprehension of recommendations/POC Meeting, continue    EDUCATION: results/recommendations d/w pt, pt's younger sister     PAIN RATING (0-10 Scale): 0  Time in/Time out: SLP Individual Minutes  Time In: 1400  Time Out: 1430  Minutes: 30    Visit number: 5      Tamsen Hose MA [facility] SLP  Speech Language Pathologist    2/8/2023  2:24 PM

## 2023-02-08 NOTE — PROGRESS NOTES
Nephrology Progress Note  2/8/2023 10:13 AM        Subjective:   Admit Date: 1/30/2023  PCP: JATIN KAUR MD    Interval History: Patient seen in early morning, this is a late entry  She is much more alert awake and oriented and without any oxygen    Diet: Unsure whether she is eating and how much    ROS: Off oxygen in the morning  Urine output still 2.15 L for the last 24 hours  No fever and acceptable blood pressure    Data:     Current meds:    furosemide  20 mg IntraVENous BID    vancomycin  750 mg IntraVENous Q24H    mupirocin   Topical BID    chlorhexidine gluconate   Topical Daily    chlorhexidine  15 mL Mouth/Throat BID    heparin (porcine)  5,000 Units SubCUTAneous 3 times per day    spironolactone  25 mg Oral Daily    meropenem  1,000 mg IntraVENous Q12H    [Held by provider] gabapentin  300 mg Oral BID    memantine  5 mg Oral BID    rosuvastatin  10 mg Oral QPM    lidocaine PF  5 mL IntraDERmal Once    sodium chloride flush  5-40 mL IntraVENous 2 times per day    pantoprazole  40 mg IntraVENous Daily    guaiFENesin  600 mg Oral BID    polyethylene glycol  17 g Oral Daily    sodium chloride flush  5-40 mL IntraVENous 2 times per day    docusate sodium  100 mg Oral BID    donepezil  10 mg Oral Nightly    metoprolol tartrate  25 mg Oral BID      sodium chloride 12.5 mL/hr at 02/04/23 0324    sodium chloride 25 mL/hr (02/06/23 1429)         I/O last 3 completed shifts:  In: -   Out: 3550 [Urine:3500; Drains:50]    CBC:   Recent Labs     02/06/23  0610 02/07/23  0522 02/07/23  2100 02/08/23  0520   WBC 8.5 9.2  --  12.6*   HGB 9.1* 7.0* 9.1* 9.2*    169  --  232          Recent Labs     02/06/23  0610 02/07/23  0522 02/08/23  0520    136 131*   K 3.9 3.9 4.0   CL 98* 95* 89*   CO2 30 32 30   BUN 33* 31* 33*   CREATININE 1.5* 1.3* 1.3*   GLUCOSE 158* 133* 126*       Lab Results   Component Value Date    CALCIUM 8.6 02/08/2023    PHOS 2.1 (L) 02/07/2023       Objective:     Vitals: BP (!)  115/49   Pulse 70   Temp 98.2 °F (36.8 °C) (Oral)   Resp 22   Ht 5' 5\" (1.651 m)   Wt 175 lb 4.3 oz (79.5 kg)   SpO2 95%   BMI 29.17 kg/m² ,    General appearance: Thin, alert, awake at least partially oriented  HEENT: Positive conjunctival pallor perhaps 2+, no scleral icterus  Neck: Supple, head of the bed elevated about 30 degrees, without oxygen per nasal cannula now  Lungs: Still has adventitious breath sound but better airflow  Heart: Seems regular rate and rhythm with loud S1 and S2, well-healed incision from previous sternotomy, left chest wall implanted device  Abdomen: Soft, nontender looks like AVRIL drain is out  Extremities: Less edema  She does have a pure wick      Problem List :         Impression :     Stage III acute kidney injury-recovering from toxic and ischemic tubular injury and renal vein congestion  Acute decompensated heart failure likely precipitated by infection and general surgeon anesthesia-much better now off oxygen therapy  Underlying atherosclerotic cardiovascular disease, dysrhythmia-recent cholecystitis and gallbladder surgery  She also has valvular disease and diabetes and dysrhythmia    Recommendation/Plan  :     Lower Lasix 20 mg IV twice daily-if she is ready to go to nursing home, okay from kidney standpoint to discharge her-I will try torsemide 20 mg twice a day for 5 days followed by daily-CMP, magnesium and phosphorus weekly along with CBC [id number] with me in couple of weeks either virtual visit or face-to-face visit-we might have to adjust diuretics or completely take her off depending on her clinical course      Maricruz Gibbons MD MD

## 2023-02-08 NOTE — PROGRESS NOTES
Physical Therapy Treatment Note  Name: Doug Miles MRN: [id number] :   1945   Date:  2023   Admission Date: 2023 Room:  [address] [place] [address]     Restrictions/Precautions:          general precautions, fall risk, contact isolation    Communication with other providers:  RN, co-tx with OT due to pt high level of assist and poor activity tolerance    Subjective:  Patient states: \"I can try\"  Pain:   Location, Type, Intensity (0/10 to 10/10):  5/10 abdominal pain    Objective:    Observation:  pt supine in bed upon entry and agreeable to session    Treatment, including education/measures:    Bed mobility: pt completed supine <-> sitting EOB mod A x2 with cues for hand placement and sequencing    Balance: Pt sat EOB ~10 minutes min A progressing to max A as pt fatigues. Pt with retropulsion and increased L lateral lean as time progressed. Cues provided for upright posture but pt unable to maintain upright without assist    Education: pt's sister with many questions about pt/therapy. All questions answered to best ability    Tested LE strength against gravity but unable to maintain extension against gravity. Pt still unsafe to attempt standing this date. Assessment / Impression:    Pt supine in bed at end of session with needs in reach, alarm on, and visitor in room.      Patient's tolerance of treatment:  fair   Adverse Reaction: n/a  Significant change in status and impact:  n/a  Barriers to improvement:  decreased endurance, impaired transfers, impaired balance    Plan for Next Session:    Continue to address bed mobility, sitting balance, and transfer training in future sessions    Time in:  1445  Time out:  1516  Timed treatment minutes:  25  Total treatment time:  31    Previously filed items:  Social/Functional History  Lives With: Family  Type of Home: Condo  Home Layout: One level  Active : No        Short Term Goals  Time Frame for Short Term Goals: 1 week  Short Term Goal 1: pt to complete all bed mobility mod A x1  Short Term Goal 2: pt to sit EOB 10 minutes CGA with no LOB throughout  Short Term Goal 3: pt to complete stand pivot transfer with LRAD mod A x1  Short Term Goal 4: pt to propel manual WC 25' with min A    Electronically signed by:    Yamile Monae, PT  2/8/2023, 4:17 PM

## 2023-02-08 NOTE — PROGRESS NOTES
Occupational Therapy Treatment Note  Name: Elsi Osorio MRN: [id number] :   1945   Date:  2023   Admission Date: 2023 Room:  [facility]     Primary Problem:  The primary encounter diagnosis was Chest pain, unspecified type. Diagnoses of Right sided abdominal pain and Cholecystitis were also pertinent to this visit. Past Medical History:   Diagnosis Date    Anxiety     Arthritis     knees    Atrial fibrillation (HCC)     CAD (coronary artery disease)     Sees Dr. Lesli Long    COVID-19 2021    Diabetes mellitus ([facility] [address]     H/O cardiac catheterization 2018    severe 3 vessel disease, refer to CV surgery for CABG and MAZE    H/O cardiovascular stress test 2018    EF47%  fixed perfusion defect ant wall, pt in afib    H/O echocardiogram 2018    EF55% mod MR    H/O echocardiogram 2018    EF 50-55%, Mild : AR, MR & TR.    H/O echocardiogram 2020    EF 55%, Breast artifact noted. H/O three vessel coronary artery bypass 2018    Dr. Kiara Hinds    History of Holter monitoring 10/23/2018    Multiple PAF episodes noted. Tachy-Dinesh episodes noted. History of nuclear stress test 2020    EF 55%, Breast artifact. Hyperlipidemia     Hypertension     Memory loss     on Aricept    Missing teeth, acquired     Pneumonia     pneumococcal pneumonia twice at end of     Risk for falls     uses walker    TIA (transient ischemic attack)     family doctors said she has had mini-strokes    Urinary leakage     UTI (urinary tract infection)     recent --just stopped antibiotic last week as of 18    Wears glasses          Communication with other providers:  RN ok'd Tx, CoTx with PT for pt safety and tolerance, RN handoff     Subjective:  Patient states: \"Oh I don't know, I can try\"  Pain: 5/10 abdominal pain upon entry. At EOB, pt declined.    Restrictions: general, fall, tele, contact ISO   Sister at bedside    Objective:    Observation:  pt was in bed upon arrival, agreeable to session    Treatment, including education:    Therapeutic Activity Training:   Therapeutic activity training was instructed today. Cues were given for safety, sequence, UE/LE placement, visual cues, and balance. Activities performed today included:    Bed mobility:  Pt completed sup to sit with MaxAx2 with cues for sequencing. Pt demo poor praxis to complete. Pt returned to supine with MaxAx2 and cues for sequencing. Pt boosted to [address] with DEPx2. Seated balance:  Pt tolerated EOB ~10 mins with Dennis and [place] assist to reach bed rails to assist with balance. Pt digressed to MaxA with fatigue. Pt demo retro lean + L lateral lean with fatigue. 1 therapist managed balance posteriorly and other therapist directed activity anterior to pt. OT attempted to instruct pt for BUE movement and pt intermittently with poor follow through for commands d/t poor praxis and poor initiation and poor sequencing. Pt did demo ability for elbows and digits to be [place] for ROM bilaterally. Shoulders unable to be formally assessed. Demo ~90 degrees scaption on LUE (dominant hand) for Duck[place] hair brushing. Self Care Training:   Self care training was performed today. Cues were given for safety, sequence, UE/LE placement, visual cues, and balance. Activities performed today included:    Hair grooming:  Pt required ModA seated EOB d/t poor initiation and sequencing. With max verbal + tactile cues, pt able to engage in brushing front of hair. Pt required frequent redirection to task and pt demo poor heeding of task. Pt provided [place] assist to brush the rest of hair to encourage initiation and appropriate sequencing. Facial hygiene:  Pt demo ability to complete with SetupA seated EOB for cleaning nose drip.  D/t poor initiation, poor praxis, and poor command following, pt demo intermittent difficulties with this and required DEP assist.       Education: Role of OT, OT POC, safety, benefits of EOB/OOB activity, rationale for treatment    Safety Measures: Gait belt used for safety of pt and therapist, Left in bed, Alarm in place, call light and phone within reach, lines managed, needs met     Assessment / Impression:    Pt demo poor cognition this date with frequent moans. Upon prompting, pt unable to qualify. Pt with confusion regarding time and place. Pt with difficulty following commands, improved success with simple 1-step commands. Pt required frequent cues for redirection to task along with tactile cues to inc pt success with commands. Continue to address EOB ADLs to address poor seated balance in prep for OOB ADLs.     Patient's tolerance of treatment: fair   Adverse Reaction: none   Significant change in status and impact:  none   Barriers to improvement: strength, endurance, seated balance, cognition, sequencing, initiation    Plan for Next Session:    Continue OT POC    Time in:  1416  Time out:  1437  Timed treatment minutes:  25  Total treatment time:  29    Electronically signed by:    DONNY Zaidi  License: [id number]  0/3/2593, 4:58 PM

## 2023-02-08 NOTE — PROGRESS NOTES
[facility]  consulted by Dr. Shabbir Barcenas for monitoring and adjustment. Indication for treatment: Vancomycin indication: HAP  Goal trough: Trough Goal: 15-20 mcg/mL  AUC/THERON: 400-600    Risk Factors for MRSA Identified:   Hospitalization within the past 90 days, Received IV antibiotics within the past 90 days    Pertinent Laboratory Values:   Temp Readings from Last 3 Encounters:   02/08/23 99.6 °F (37.6 °C) (Axillary)   07/12/22 98.1 °F (36.7 °C) (Oral)   09/23/21 98.9 °F (37.2 °C) (Axillary)     Recent Labs     02/06/23  0610 02/07/23  0522 02/08/23  0520   WBC 8.5 9.2 12.6*     Recent Labs     02/06/23  0610 02/07/23  0522 02/08/23  0520   BUN 33* 31* 33*   CREATININE 1.5* 1.3* 1.3*     Estimated Creatinine Clearance: 37 mL/min (A) (based on SCr of 1.3 mg/dL (H)). Intake/Output Summary (Last 24 hours) at 2/8/2023 1414  Last data filed at 2/8/2023 2277  Gross per 24 hour   Intake 260 ml   Output 3000 ml   Net -2740 ml       Pertinent Cultures:   Date    Source    Results  2/2   Blood    NGTD  2/2   Sputum/Respiratory  Pending  2/2   MRSA Nasal   Positive  2/2   Urine AG   Negative  2/2   Urine    ESBL Ecoli 98287 CFU/mL    Ideal body weight: 57 kg (125 lb 10.6 oz)  Adjusted ideal body weight: 66 kg (145 lb 8.1 oz)  Actual weight: 86kg    Vancomycin level:   TROUGH:  No results for input(s): VANCOTROUGH in the last 72 hours. RANDOM:    Recent Labs     02/06/23  0610 02/07/23  0522   VANCORANDOM 17.6 19.1       Assessment:  HPI: Pt is 76 yof admitted 1/30 for CHF exacerbation. Patient now with new onset AMS and requiring BiPAP and vasopressors. Chest x-ray showing bibasilar opacities. MRSA nares positive, sputum culture pending.   SCr, BUN, and urine output:   TALYOR, SCR continues to trend down to 1.3 today (baseline Scr 0.7 last month), UOP good  Day(s) of therapy: 7  Vancomycin concentration:   2/3 @ 0540 <4 ~ 12 hours post dose, appropriate to re-dose  2/4: 17.4, appropriate to re-dose  2/5: 24.4, above goal for re-dosing  2/6 - 17.6, 36 hour level post 1000mg dose, ok to re-dose  2/7 - 19.1, 20 hour level post 1000mg ivpb dose    Plan:  TAYLOR, SCR continues to trend down to 1.3 today  Continue vancomycin 750mg uivpb q24h for a predicted AUC of 458 at steady state. Recheck the vanco level in AM to monitor for accumulation 2/2 advanced age  Pharmacy will continue to monitor patient and adjust therapy as indicated    VANCOMYCIN CONCENTRATION SCHEDULED FOR 2/9 @06:00    Thank you for the consult.   Jian Banks, [facility]  2/8/2023 2:14 PM

## 2023-02-08 NOTE — PROGRESS NOTES
V2.0  [facility] Hospitalist Progress Note      Name:  Gina Vyas /Age/Sex: 1945  (76 y.o. female)   MRN & CSN:  [id number] & [id number] Encounter Date/Time: 2023 3:29 PM EST    Location:  36/200-A PCP: Ino Kelley MD       Hospital Day: 10    Assessment and Plan:   Gina Vyas is a 76 y.o. female with pmh of Gina Vyas is a 76 y.o. female with pmh of CAD s/p CABG, Valvular Heart Disease, s/p Mitral Valve Repair, PFO Closure, Paroxysmal A-Fib, HTN, HLD, DM, TIA, Early Dementia who presents with Chest pain Rt Sided and RUQ Abdominal Pain      Plan:  Acute hypoxic respiratory failure, resolved  Patient with acute respiratory failure was on nasal cannula. Became acutely more short of breath. CT did show what appears to be either fluid overload or viral pneumonia. 25% at 25L/min Heated high flow --> weaned off O2    Sepsis 2/2 likely ESBL UTI without hematuria, Pneumonia    Patient with potential abnormality as well she did have acute solid cholecystitis status post lap pedro 2023. High risk for postoperative infection requiring MRSA pneumonia coverage. WBC 12, SBP 92, RR 22 initially. Procal trending down to 5. Urine growing ESBL UTI. MRSA Nares +ve  Patient on IV Meropenem and IV vanc - plan for antibiotics 5 days post drain removal end date  - discharge on oral  De-colonize MRSA  Check procal     Acute Encephalopathy 2/2 likely Metabolic and Septic 2/2 hypoxia and ESBL UTI, Iatrogenic 2/2 medication   Was difficult to wake on 2/3/2023  AO X 3 today but lethargic today compared to yesterday again - sister at bedside. Gabapentin on hold per nephro - agree with it    Cholecystitis s/p lap cholecystectomy   Patient underwent lap pedro on 2023 with Dr. Jluis Youssef. AVRIL drain removed 2023  GS on board   Abx for 5 days post drain removal until   Change to oral at discharge    TAYLOR  Pt with Cr 2 on admission, trended down to 1.5 today. baseline Cr 0.7.    Nephrology on board    Hypochloremic Hyponatremia  Patient with acute worsening hyponatremia to 121 and now back up to 135  Nephrology on board    Acute on chronic HFpEF  Patient with an EF 50% with hypokinetic inferio-lateral wall and basal anterio-septal carson the 45 (become acutely short of breath with progressive pulmonary edema. Pro-BNP trending up to 24,733  Cardiology on board  Nephrology on board  On aldactone and amiloride    Hypokalemia   K 3.9    CAD s/p CABG  Pacer in-situ  VHD  Patient is also known valvular heart disease. Cardiology following  Discussed with cardiology about interrogating pacer. Acute Normocytic Anemia 2/2 possibly Post-operative loss and hemodilution 2/2 fluid overload   Hb was 11 on 1/31/2023. 1U PRBC this admission. Hb 9.1 today. No signs of overt bleeding     Diet ADULT DIET; Dysphagia - Pureed  ADULT ORAL NUTRITION SUPPLEMENT; Breakfast, Dinner, Lunch; Frozen Oral Supplement   DVT Prophylaxis [] Lovenox, [x]  Heparin, [] SCDs, [] Ambulation,    [] Eliquis, [] Xarelto  [] Coumadin [] other   Code Status DNR-CCA   Disposition From: Home  Expected Disposition: [place]  Estimated Date of Discharge: 2/9/2023     Surrogate Decision Maker/ POA      Subjective:     Chief Complaint: Chest Pain (X 3 days )     Patient seen and examined in the AM. Patient's sister at bedside. Pt. Is lethargic but oriented X 3 today. Still on heated high flow nasal canula. States she feels tired. Discussed with cardiology - plan for pacer interrogation. Review of Systems:    Review of Systems   Constitutional:  Positive for fatigue. Negative for chills, fever and unexpected weight change. Eyes:  Negative for pain and visual disturbance. Respiratory:  Negative for cough, choking, chest tightness, shortness of breath, wheezing and stridor. Cardiovascular:  Negative for chest pain, palpitations and leg swelling.    Gastrointestinal:  Negative for abdominal distention, abdominal pain, blood in stool, constipation, diarrhea, nausea and vomiting. Genitourinary:  Negative for decreased urine volume, dysuria, frequency, hematuria and urgency. Musculoskeletal:  Negative for arthralgias, back pain and myalgias. Skin:  Negative for pallor and rash. Neurological:  Negative for dizziness, tremors, syncope, speech difficulty, weakness, light-headedness, numbness and headaches. Psychiatric/Behavioral:  Negative for agitation. Objective: Intake/Output Summary (Last 24 hours) at 2/8/2023 0921  Last data filed at 2/8/2023 0917  Gross per 24 hour   Intake 260 ml   Output 3050 ml   Net -2790 ml          Vitals:   Vitals:    02/08/23 0400   BP: (!) 115/49   Pulse: 70   Resp: 22   Temp: 98.2 °F (36.8 °C)   SpO2: 95%       Physical Exam:     Physical Exam  Vitals reviewed. Constitutional:       General: She is awake. She is not in acute distress. Appearance: Normal appearance. She is overweight. She is not ill-appearing. Interventions: Nasal cannula in place. Comments: Heated high flow    HENT:      Head: Normocephalic and atraumatic. Mouth/Throat:      Mouth: Mucous membranes are moist.      Pharynx: Oropharynx is clear. Eyes:      Extraocular Movements: Extraocular movements intact. Conjunctiva/sclera: Conjunctivae normal.      Pupils: Pupils are equal, round, and reactive to light. Cardiovascular:      Rate and Rhythm: Normal rate. Rhythm irregularly irregular. Pulses: Normal pulses. Heart sounds: Normal heart sounds. Comments: Pacer is not capturing all beats - pacer spikes are all over the place  Pulmonary:      Effort: Pulmonary effort is normal. Tachypnea present. Breath sounds: Decreased air movement and transmitted upper airway sounds present. No wheezing, rhonchi or rales. Abdominal:      General: Abdomen is protuberant. Bowel sounds are normal.      Palpations: Abdomen is soft. Musculoskeletal:         General: No swelling.  Normal range of motion. Cervical back: Normal range of motion and neck supple. Skin:     General: Skin is warm and dry. Neurological:      Mental Status: She is oriented to person, place, and time. She is lethargic. Comments: Oriented to year, not month   Psychiatric:         Behavior: Behavior is cooperative.        Medications:   Medications:    furosemide  20 mg IntraVENous BID    vancomycin  750 mg IntraVENous Q24H    mupirocin   Topical BID    chlorhexidine gluconate   Topical Daily    chlorhexidine  15 mL Mouth/Throat BID    heparin (porcine)  5,000 Units SubCUTAneous 3 times per day    spironolactone  25 mg Oral Daily    meropenem  1,000 mg IntraVENous Q12H    [Held by provider] gabapentin  300 mg Oral BID    memantine  5 mg Oral BID    rosuvastatin  10 mg Oral QPM    lidocaine PF  5 mL IntraDERmal Once    sodium chloride flush  5-40 mL IntraVENous 2 times per day    pantoprazole  40 mg IntraVENous Daily    guaiFENesin  600 mg Oral BID    polyethylene glycol  17 g Oral Daily    sodium chloride flush  5-40 mL IntraVENous 2 times per day    docusate sodium  100 mg Oral BID    donepezil  10 mg Oral Nightly    metoprolol tartrate  25 mg Oral BID      Infusions:    sodium chloride 12.5 mL/hr at 02/04/23 0324    sodium chloride 25 mL/hr (02/06/23 1429)     PRN Meds: sodium chloride flush, 5-40 mL, PRN  sodium chloride, 500 mL, PRN  morphine, 1 mg, Q4H PRN  HYDROcodone-acetaminophen, 1 tablet, Q6H PRN  sodium chloride flush, 5-40 mL, PRN  sodium chloride, , PRN  ondansetron, 4 mg, Q8H PRN   Or  ondansetron, 4 mg, Q6H PRN  acetaminophen, 650 mg, Q6H PRN   Or  acetaminophen, 650 mg, Q6H PRN  ipratropium, 1 spray, PRN      Labs      Recent Results (from the past 24 hour(s))   Hemoglobin and Hematocrit    Collection Time: 02/07/23  9:00 PM   Result Value Ref Range    Hemoglobin 9.1 (L) 12.5 - 16.0 GM/DL    Hematocrit 28.5 (L) 37 - 47 %   Hepatic Function Panel    Collection Time: 02/08/23  5:20 AM   Result Value Ref Range Albumin 2.9 (L) 3.4 - 5.0 GM/DL    Total Bilirubin 0.8 0.0 - 1.0 MG/DL    Bilirubin, Direct 0.3 0.0 - 0.3 MG/DL    Bilirubin, Indirect 0.5 0 - 0.7 MG/DL    Alkaline Phosphatase 100 40 - 129 IU/L    AST 20 15 - 37 IU/L    ALT 8 (L) 10 - 40 U/L    Total Protein 4.9 (L) 6.4 - 8.2 GM/DL   CBC with Auto Differential    Collection Time: 02/08/23  5:20 AM   Result Value Ref Range    WBC 12.6 (H) 4.0 - 10.5 K/CU MM    RBC 3.45 (L) 4.2 - 5.4 M/CU MM    Hemoglobin 9.2 (L) 12.5 - 16.0 GM/DL    Hematocrit 27.9 (L) 37 - 47 %    MCV 80.9 78 - 100 FL    MCH 26.7 (L) 27 - 31 PG    MCHC 33.0 32.0 - 36.0 %    RDW 13.7 11.7 - 14.9 %    Platelets 018 253 - 334 K/CU MM    MPV 9.6 7.5 - 11.1 FL    Differential Type AUTOMATED DIFFERENTIAL     Segs Relative 69.8 (H) 36 - 66 %    Lymphocytes % 11.2 (L) 24 - 44 %    Monocytes % 6.3 (H) 0 - 4 %    Eosinophils % 2.8 0 - 3 %    Basophils % 0.2 0 - 1 %    Segs Absolute 8.8 K/CU MM    Lymphocytes Absolute 1.4 K/CU MM    Monocytes Absolute 0.8 K/CU MM    Eosinophils Absolute 0.4 K/CU MM    Basophils Absolute 0.0 K/CU MM    Nucleated RBC % 0.0 %    Total Nucleated RBC 0.0 K/CU MM    Total Immature Neutrophil 1.23 K/CU MM    Immature Neutrophil % 9.7 (H) 0 - 0.43 %   Comprehensive Metabolic Panel    Collection Time: 02/08/23  5:20 AM   Result Value Ref Range    Sodium 131 (L) 135 - 145 MMOL/L    Potassium 4.0 3.5 - 5.1 MMOL/L    Chloride 89 (L) 99 - 110 mMol/L    CO2 30 21 - 32 MMOL/L    BUN 33 (H) 6 - 23 MG/DL    Creatinine 1.3 (H) 0.6 - 1.1 MG/DL    Est, Glom Filt Rate 42 (L) >60 mL/min/1.73m2    Glucose 126 (H) 70 - 99 MG/DL    Calcium 8.6 8.3 - 10.6 MG/DL    Albumin 2.9 (L) 3.4 - 5.0 GM/DL    Total Protein 4.9 (L) 6.4 - 8.2 GM/DL    Total Bilirubin 0.8 0.0 - 1.0 MG/DL    ALT 8 (L) 10 - 40 U/L    AST 20 15 - 37 IU/L    Alkaline Phosphatase 100 40 - 128 IU/L    Anion Gap 12 4 - 16        Imaging/Diagnostics Last 24 Hours   XR CHEST PORTABLE    Result Date: 2/2/2023  EXAMINATION: ONE XRAY VIEW OF THE CHEST 2/2/2023 1:52 pm COMPARISON: 02/02/2023, 01/30/2023 HISTORY: ORDERING SYSTEM PROVIDED HISTORY: shortness of breath TECHNOLOGIST PROVIDED HISTORY: Reason for exam:->shortness of breath Reason for Exam: shortness of breath FINDINGS: Sternotomy wires. Prosthetic cardiac valve. Pacemaker wires. Lung volumes. Bibasilar opacities. No pneumothorax. Heart size is stable. Low lung volumes with bibasilar opacities which may represent atelectasis or pneumonia. XR CHEST PORTABLE    Result Date: 2/2/2023  EXAMINATION: ONE X-RAY VIEW OF THE CHEST 2/2/2023 10:49 am COMPARISON: CT chest 01/30/2023, chest radiograph 01/30/2023 HISTORY: ORDERING SYSTEM PROVIDED HISTORY: Shortness of breath TECHNOLOGIST PROVIDED HISTORY: Reason for exam:->Shortness of breath Reason for Exam: shortness of breath FINDINGS: The lungs are underinflated, resulting in vascular crowding and subsegmental atelectasis. The cardiomediastinal silhouette is obscured, but likely unchanged. Bibasilar consolidations favor atelectasis. The left upper lobe is obscured. No new focal consolidation, pneumothorax, or right-sided pleural effusion. There is blunting of the left costophrenic angle, which may be due to low lung volumes and/or patient positioning. Osseous structures are diffusely demineralized and grossly unchanged. Left chest cardiac conduction device is again noted. Low lung volumes with likely bibasilar atelectasis versus developing infection. Comment: Please note this report has been produced using speech recognition software and may contain errors related to that system including errors in grammar, punctuation, and spelling, as well as words and phrases that may be inappropriate. If there are any questions or concerns please feel free to contact the dictating provider for clarification.      Electronically signed by Demetrius Paz MD on 2/8/2023 at 9:21 AM

## 2023-02-09 ENCOUNTER — APPOINTMENT (OUTPATIENT)
Dept: CT IMAGING | Age: 78
DRG: 853 | End: 2023-02-09
Payer: MEDICARE

## 2023-02-09 LAB
ALBUMIN SERPL-MCNC: 2.9 GM/DL (ref 3.4–5)
ALP BLD-CCNC: 102 IU/L (ref 40–128)
ALT SERPL-CCNC: 8 U/L (ref 10–40)
ANION GAP SERPL CALCULATED.3IONS-SCNC: 13 MMOL/L (ref 4–16)
AST SERPL-CCNC: 22 IU/L (ref 15–37)
BASE EXCESS MIXED: 10.1 (ref 0–2.3)
BILIRUB SERPL-MCNC: 0.8 MG/DL (ref 0–1)
BUN SERPL-MCNC: 33 MG/DL (ref 6–23)
CALCIUM SERPL-MCNC: 8.5 MG/DL (ref 8.3–10.6)
CARBON MONOXIDE, BLOOD: 3.1 % (ref 0–5)
CHLORIDE BLD-SCNC: 86 MMOL/L (ref 99–110)
CO2 CONTENT: 34.4 MMOL/L (ref 19–24)
CO2: 28 MMOL/L (ref 21–32)
COMMENT: ABNORMAL
CREAT SERPL-MCNC: 1.3 MG/DL (ref 0.6–1.1)
DIFFERENTIAL TYPE: ABNORMAL
DOSE AMOUNT: NORMAL
DOSE TIME: NORMAL
EOSINOPHILS ABSOLUTE: 0.6 K/CU MM
EOSINOPHILS RELATIVE PERCENT: 4 % (ref 0–3)
FOLATE SERPL-MCNC: 16.7 NG/ML (ref 3.1–17.5)
GFR SERPL CREATININE-BSD FRML MDRD: 42 ML/MIN/1.73M2
GLUCOSE SERPL-MCNC: 120 MG/DL (ref 70–99)
HCO3 ARTERIAL: 33.2 MMOL/L (ref 18–23)
HCT VFR BLD CALC: 26.7 % (ref 37–47)
HEMOGLOBIN: 8.8 GM/DL (ref 12.5–16)
LYMPHOCYTES ABSOLUTE: 2.4 K/CU MM
LYMPHOCYTES RELATIVE PERCENT: 15 % (ref 24–44)
MAGNESIUM: 1.8 MG/DL (ref 1.8–2.4)
MCH RBC QN AUTO: 27 PG (ref 27–31)
MCHC RBC AUTO-ENTMCNC: 33 % (ref 32–36)
MCV RBC AUTO: 81.9 FL (ref 78–100)
METAMYELOCYTES ABSOLUTE COUNT: 0.16 K/CU MM
METAMYELOCYTES PERCENT: 1 %
METHEMOGLOBIN ARTERIAL: 1.5 %
MONOCYTES ABSOLUTE: 1.9 K/CU MM
MONOCYTES RELATIVE PERCENT: 12 % (ref 0–4)
O2 SATURATION: 93 % (ref 96–97)
PCO2 ARTERIAL: 38 MMHG (ref 32–45)
PDW BLD-RTO: 13.8 % (ref 11.7–14.9)
PH BLOOD: 7.55 (ref 7.34–7.45)
PHOSPHORUS: 4.8 MG/DL (ref 2.5–4.9)
PLATELET # BLD: 270 K/CU MM (ref 140–440)
PMV BLD AUTO: 9.6 FL (ref 7.5–11.1)
PO2 ARTERIAL: 69 MMHG (ref 75–100)
POLYCHROMASIA: ABNORMAL
POTASSIUM SERPL-SCNC: 4.2 MMOL/L (ref 3.5–5.1)
PRO-BNP: 5227 PG/ML
PROCALCITONIN SERPL-MCNC: 0.49 NG/ML
RBC # BLD: 3.26 M/CU MM (ref 4.2–5.4)
SEGMENTED NEUTROPHILS ABSOLUTE COUNT: 10.8 K/CU MM
SEGMENTED NEUTROPHILS RELATIVE PERCENT: 68 % (ref 36–66)
SODIUM BLD-SCNC: 127 MMOL/L (ref 135–145)
TOTAL PROTEIN: 5 GM/DL (ref 6.4–8.2)
TOXIC GRANULATION: PRESENT
VANCOMYCIN RANDOM: 22 UG/ML
VITAMIN B-12: 411.4 PG/ML (ref 211–911)
WBC # BLD: 15.9 K/CU MM (ref 4–10.5)

## 2023-02-09 PROCEDURE — 83735 ASSAY OF MAGNESIUM: CPT

## 2023-02-09 PROCEDURE — 2580000003 HC RX 258: Performed by: SURGERY

## 2023-02-09 PROCEDURE — 85025 COMPLETE CBC W/AUTO DIFF WBC: CPT

## 2023-02-09 PROCEDURE — 82565 ASSAY OF CREATININE: CPT

## 2023-02-09 PROCEDURE — 99024 POSTOP FOLLOW-UP VISIT: CPT | Performed by: SURGERY

## 2023-02-09 PROCEDURE — 6370000000 HC RX 637 (ALT 250 FOR IP): Performed by: INTERNAL MEDICINE

## 2023-02-09 PROCEDURE — 6360000002 HC RX W HCPCS: Performed by: INTERNAL MEDICINE

## 2023-02-09 PROCEDURE — 6360000002 HC RX W HCPCS: Performed by: STUDENT IN AN ORGANIZED HEALTH CARE EDUCATION/TRAINING PROGRAM

## 2023-02-09 PROCEDURE — 6360000002 HC RX W HCPCS

## 2023-02-09 PROCEDURE — 82803 BLOOD GASES ANY COMBINATION: CPT

## 2023-02-09 PROCEDURE — 2709999900 CT DRAINAGE HEMATOMA/SEROMA/FLUID COLLECTION

## 2023-02-09 PROCEDURE — 70450 CT HEAD/BRAIN W/O DYE: CPT

## 2023-02-09 PROCEDURE — 84100 ASSAY OF PHOSPHORUS: CPT

## 2023-02-09 PROCEDURE — 2580000003 HC RX 258: Performed by: PHYSICIAN ASSISTANT

## 2023-02-09 PROCEDURE — 83880 ASSAY OF NATRIURETIC PEPTIDE: CPT

## 2023-02-09 PROCEDURE — 84145 PROCALCITONIN (PCT): CPT

## 2023-02-09 PROCEDURE — 6370000000 HC RX 637 (ALT 250 FOR IP): Performed by: PHYSICIAN ASSISTANT

## 2023-02-09 PROCEDURE — 87205 SMEAR GRAM STAIN: CPT

## 2023-02-09 PROCEDURE — 36415 COLL VENOUS BLD VENIPUNCTURE: CPT

## 2023-02-09 PROCEDURE — 6360000004 HC RX CONTRAST MEDICATION: Performed by: INTERNAL MEDICINE

## 2023-02-09 PROCEDURE — C9113 INJ PANTOPRAZOLE SODIUM, VIA: HCPCS | Performed by: STUDENT IN AN ORGANIZED HEALTH CARE EDUCATION/TRAINING PROGRAM

## 2023-02-09 PROCEDURE — 94761 N-INVAS EAR/PLS OXIMETRY MLT: CPT

## 2023-02-09 PROCEDURE — 87070 CULTURE OTHR SPECIMN AEROBIC: CPT

## 2023-02-09 PROCEDURE — 6360000002 HC RX W HCPCS: Performed by: PHYSICIAN ASSISTANT

## 2023-02-09 PROCEDURE — 2580000003 HC RX 258: Performed by: INTERNAL MEDICINE

## 2023-02-09 PROCEDURE — 2580000003 HC RX 258: Performed by: NURSE PRACTITIONER

## 2023-02-09 PROCEDURE — 74177 CT ABD & PELVIS W/CONTRAST: CPT

## 2023-02-09 PROCEDURE — 80053 COMPREHEN METABOLIC PANEL: CPT

## 2023-02-09 PROCEDURE — 6370000000 HC RX 637 (ALT 250 FOR IP): Performed by: SURGERY

## 2023-02-09 PROCEDURE — 80202 ASSAY OF VANCOMYCIN: CPT

## 2023-02-09 PROCEDURE — 92526 ORAL FUNCTION THERAPY: CPT

## 2023-02-09 PROCEDURE — 87186 SC STD MICRODIL/AGAR DIL: CPT

## 2023-02-09 PROCEDURE — 87077 CULTURE AEROBIC IDENTIFY: CPT

## 2023-02-09 PROCEDURE — 6370000000 HC RX 637 (ALT 250 FOR IP): Performed by: NURSE PRACTITIONER

## 2023-02-09 PROCEDURE — 36600 WITHDRAWAL OF ARTERIAL BLOOD: CPT

## 2023-02-09 PROCEDURE — 6360000002 HC RX W HCPCS: Performed by: RADIOLOGY

## 2023-02-09 PROCEDURE — 2140000000 HC CCU INTERMEDIATE R&B

## 2023-02-09 RX ORDER — FENTANYL CITRATE 50 UG/ML
INJECTION, SOLUTION INTRAMUSCULAR; INTRAVENOUS
Status: COMPLETED | OUTPATIENT
Start: 2023-02-09 | End: 2023-02-09

## 2023-02-09 RX ORDER — MAGNESIUM SULFATE IN WATER 40 MG/ML
2000 INJECTION, SOLUTION INTRAVENOUS ONCE
Status: COMPLETED | OUTPATIENT
Start: 2023-02-09 | End: 2023-02-09

## 2023-02-09 RX ORDER — MIDAZOLAM HYDROCHLORIDE 2 MG/2ML
INJECTION, SOLUTION INTRAMUSCULAR; INTRAVENOUS
Status: COMPLETED | OUTPATIENT
Start: 2023-02-09 | End: 2023-02-09

## 2023-02-09 RX ORDER — SODIUM CHLORIDE 0.9 % (FLUSH) 0.9 %
5-40 SYRINGE (ML) INJECTION 2 TIMES DAILY
Status: DISCONTINUED | OUTPATIENT
Start: 2023-02-09 | End: 2023-02-20 | Stop reason: HOSPADM

## 2023-02-09 RX ADMIN — HEPARIN SODIUM 5000 UNITS: 5000 INJECTION INTRAVENOUS; SUBCUTANEOUS at 05:40

## 2023-02-09 RX ADMIN — GUAIFENESIN 600 MG: 600 TABLET, EXTENDED RELEASE ORAL at 20:56

## 2023-02-09 RX ADMIN — IOPAMIDOL 80 ML: 755 INJECTION, SOLUTION INTRAVENOUS at 14:21

## 2023-02-09 RX ADMIN — SODIUM CHLORIDE, PRESERVATIVE FREE 10 ML: 5 INJECTION INTRAVENOUS at 20:58

## 2023-02-09 RX ADMIN — Medication: at 20:57

## 2023-02-09 RX ADMIN — MAGNESIUM SULFATE HEPTAHYDRATE 2000 MG: 2 INJECTION, SOLUTION INTRAVENOUS at 13:17

## 2023-02-09 RX ADMIN — MEMANTINE HYDROCHLORIDE 5 MG: 5 TABLET ORAL at 20:56

## 2023-02-09 RX ADMIN — FENTANYL CITRATE 25 MCG: 50 INJECTION, SOLUTION INTRAMUSCULAR; INTRAVENOUS at 17:31

## 2023-02-09 RX ADMIN — DOCUSATE SODIUM 100 MG: 100 CAPSULE, LIQUID FILLED ORAL at 20:57

## 2023-02-09 RX ADMIN — THIAMINE HYDROCHLORIDE 100 MG: 100 INJECTION, SOLUTION INTRAMUSCULAR; INTRAVENOUS at 13:11

## 2023-02-09 RX ADMIN — HYDROCODONE BITARTRATE AND ACETAMINOPHEN 1 TABLET: 5; 325 TABLET ORAL at 20:56

## 2023-02-09 RX ADMIN — MEROPENEM 1000 MG: 1 INJECTION, POWDER, FOR SOLUTION INTRAVENOUS at 00:41

## 2023-02-09 RX ADMIN — PANTOPRAZOLE SODIUM 40 MG: 40 INJECTION, POWDER, LYOPHILIZED, FOR SOLUTION INTRAVENOUS at 13:12

## 2023-02-09 RX ADMIN — HEPARIN SODIUM 5000 UNITS: 5000 INJECTION INTRAVENOUS; SUBCUTANEOUS at 20:58

## 2023-02-09 RX ADMIN — SODIUM CHLORIDE, PRESERVATIVE FREE 10 ML: 5 INJECTION INTRAVENOUS at 20:59

## 2023-02-09 RX ADMIN — MIDAZOLAM HYDROCHLORIDE 0.5 MG: 1 INJECTION, SOLUTION INTRAMUSCULAR; INTRAVENOUS at 18:01

## 2023-02-09 RX ADMIN — METOPROLOL TARTRATE 25 MG: 50 TABLET, FILM COATED ORAL at 20:57

## 2023-02-09 RX ADMIN — FENTANYL CITRATE 25 MCG: 50 INJECTION, SOLUTION INTRAMUSCULAR; INTRAVENOUS at 18:01

## 2023-02-09 RX ADMIN — MEROPENEM 1000 MG: 1 INJECTION, POWDER, FOR SOLUTION INTRAVENOUS at 16:15

## 2023-02-09 RX ADMIN — MIDAZOLAM HYDROCHLORIDE 0.5 MG: 1 INJECTION, SOLUTION INTRAMUSCULAR; INTRAVENOUS at 17:31

## 2023-02-09 RX ADMIN — Medication 3 MG: at 20:57

## 2023-02-09 RX ADMIN — CHLORHEXIDINE GLUCONATE 0.12% ORAL RINSE 15 ML: 1.2 LIQUID ORAL at 20:56

## 2023-02-09 RX ADMIN — HYDROCODONE BITARTRATE AND ACETAMINOPHEN 1 TABLET: 5; 325 TABLET ORAL at 00:42

## 2023-02-09 RX ADMIN — DONEPEZIL HYDROCHLORIDE 10 MG: 10 TABLET ORAL at 20:57

## 2023-02-09 RX ADMIN — SODIUM CHLORIDE, PRESERVATIVE FREE 10 ML: 5 INJECTION INTRAVENOUS at 13:12

## 2023-02-09 ASSESSMENT — ENCOUNTER SYMPTOMS
ABDOMINAL PAIN: 0
VOMITING: 0
SHORTNESS OF BREATH: 0
ABDOMINAL DISTENTION: 0
EYE PAIN: 0
DIARRHEA: 0
CHEST TIGHTNESS: 0
WHEEZING: 0
STRIDOR: 0
NAUSEA: 0
BACK PAIN: 0
CHOKING: 0
CONSTIPATION: 0
COUGH: 0
BLOOD IN STOOL: 0

## 2023-02-09 ASSESSMENT — PAIN SCALES - GENERAL
PAINLEVEL_OUTOF10: 8
PAINLEVEL_OUTOF10: 0
PAINLEVEL_OUTOF10: 6

## 2023-02-09 ASSESSMENT — PAIN SCALES - WONG BAKER: WONGBAKER_NUMERICALRESPONSE: 0

## 2023-02-09 NOTE — PROGRESS NOTES
V2.0  [facility] – [place], [place] Hospitalist Progress Note      Name:  Marla Lange /Age/Sex: 1945  (76 y.o. female)   MRN & CSN:  [id number] & [id number] Encounter Date/Time: 2023 3:29 PM EST    Location:  6369/9854-[id number] PCP: Kalyn Monk MD       Hospital Day: 11    Assessment and Plan:   Marla Lange is a 76 y.o. female with pmh of Marla Lange is a 76 y.o. female with pmh of CAD s/p CABG, Valvular Heart Disease, s/p Mitral Valve Repair, PFO Closure, Paroxysmal A-Fib, HTN, HLD, DM, TIA, Early Dementia who presents with Chest pain Rt Sided and RUQ Abdominal Pain      Plan:    Sepsis 2/2 likely ESBL UTI without hematuria, Pneumonia, acute cholecystitis  Cholecystitis s/p lap cholecystectomy  Large post-cholecystectomy abscess  Patient with potential abnormality as well she did have acute solid cholecystitis status post lap pedro 2023. High risk for postoperative infection requiring MRSA pneumonia coverage. WBC 12, SBP 92, RR 22 initially. Procal trending down 10.51 -> 5.09 -> 0.4 5 -> 0.49  Urine growing ESBL UTI. MRSA Nares +ve  Patient was doing relatively well   Vancomycin stopped - completed coverage for pneumonia   Continue on Meropenem - plan was for 5 days post drain removal  De-colonize MRSA  Trend procal and CRP  Obtained CT abd pelvis with IV contrast - shows large post cholecsytectomy abscesses. The largest axial dimension is 9 x 11.3 cm and the largest coronal dimension is 8.4 x 10 cm. A 2nd collection can be seen measuring 5.9 x 5.7 cm posteriorly which may communicate with the larger collection. Reached out to general surgery team.  STAT consult placed for IR team.  Consulted ID    Acute Encephalopathy 2/2 likely Metabolic and Septic 2/2 hypoxia and ESBL UTI, Iatrogenic 2/2 medication   Was difficult to wake on 2/3/2023  AO X 3 today but lethargic today compared to yesterday again - sister at bedside.    Gabapentin on hold per nephro - agree with it    Cholecystitis s/p lap cholecystectomy Patient underwent lap pedro on 1/31/2023 with Dr. Mandeep Gallegos. AVRIL drain removed 2/7/2023  GS on board   Abx for 5 days post drain removal until Feb 12  Change to oral at discharge    Acute hypoxic respiratory failure, resolved  Patient with acute respiratory failure was on nasal cannula. Became acutely more short of breath. CT did show what appears to be either fluid overload or viral pneumonia. 25% at 25L/min Heated high flow --> weaned off O2    TAYLOR  Pt with Cr 2 on admission, trended down to 1.5 today. baseline Cr 0.7. Nephrology on board    Hypochloremic Hyponatremia  Patient with acute worsening hyponatremia to 121 and now back up to 135  Nephrology on board    Acute on chronic HFpEF  Patient with an EF 50% with hypokinetic inferio-lateral wall and basal anterio-septal carson the 45 (become acutely short of breath with progressive pulmonary edema. Pro-BNP trending up to 24,733  Cardiology on board  Nephrology on board  On aldactone and amiloride    Hypokalemia   K 3.9    CAD s/p CABG  Pacer in-situ  VHD  Patient is also known valvular heart disease. Cardiology following  Discussed with cardiology about interrogating pacer. Acute Normocytic Anemia 2/2 possibly Post-operative loss and hemodilution 2/2 fluid overload   Hb was 11 on 1/31/2023. 1U PRBC this admission. Hb 9.1 today. No signs of overt bleeding     Diet ADULT ORAL NUTRITION SUPPLEMENT; Breakfast, Lunch; Clear Liquid Oral Supplement  ADULT ORAL NUTRITION SUPPLEMENT; Lunch, Dinner; Standard High Calorie/High Protein Oral Supplement  ADULT DIET;  Dysphagia - Pureed   DVT Prophylaxis [] Lovenox, [x]  Heparin, [] SCDs, [] Ambulation,    [] Eliquis, [] Xarelto  [] Coumadin [] other   Code Status DNR-CCA   Disposition From: Home  Expected Disposition: [place]  Estimated Date of Discharge: 2/9/2023     Surrogate Decision Maker/ POA      Subjective:     Chief Complaint: Chest Pain (X 3 days )     Patient seen and examined in the AM. Patient's sister at bedside. Pt. Is lethargic but oriented X 3 today. Still on heated high flow nasal canula. States she feels tired. Discussed with cardiology - plan for pacer interrogation. Review of Systems:    Review of Systems   Constitutional:  Positive for fatigue. Negative for chills, fever and unexpected weight change. Eyes:  Negative for pain and visual disturbance. Respiratory:  Negative for cough, choking, chest tightness, shortness of breath, wheezing and stridor. Cardiovascular:  Negative for chest pain, palpitations and leg swelling. Gastrointestinal:  Negative for abdominal distention, abdominal pain, blood in stool, constipation, diarrhea, nausea and vomiting. Genitourinary:  Negative for decreased urine volume, dysuria, frequency, hematuria and urgency. Musculoskeletal:  Negative for arthralgias, back pain and myalgias. Skin:  Negative for pallor and rash. Neurological:  Negative for dizziness, tremors, syncope, speech difficulty, weakness, light-headedness, numbness and headaches. Psychiatric/Behavioral:  Negative for agitation. Objective: Intake/Output Summary (Last 24 hours) at 2/9/2023 1515  Last data filed at 2/9/2023 1047  Gross per 24 hour   Intake 120 ml   Output 3600 ml   Net -3480 ml          Vitals:   Vitals:    02/09/23 1131   BP: (!) 109/51   Pulse:    Resp:    Temp: 98.7 °F (37.1 °C)   SpO2: 96%       Physical Exam:     Physical Exam  Vitals reviewed. Constitutional:       General: She is awake. She is not in acute distress. Appearance: Normal appearance. She is overweight. She is not ill-appearing. Interventions: Nasal cannula in place. Comments: Heated high flow    HENT:      Head: Normocephalic and atraumatic. Mouth/Throat:      Mouth: Mucous membranes are moist.      Pharynx: Oropharynx is clear. Eyes:      Extraocular Movements: Extraocular movements intact.       Conjunctiva/sclera: Conjunctivae normal.      Pupils: Pupils are equal, round, and reactive to light. Cardiovascular:      Rate and Rhythm: Normal rate. Rhythm irregularly irregular. Pulses: Normal pulses. Heart sounds: Normal heart sounds. Comments: Pacer is not capturing all beats - pacer spikes are all over the place  Pulmonary:      Effort: Pulmonary effort is normal. Tachypnea present. Breath sounds: Decreased air movement and transmitted upper airway sounds present. No wheezing, rhonchi or rales. Abdominal:      General: Abdomen is protuberant. Bowel sounds are normal.      Palpations: Abdomen is soft. Musculoskeletal:         General: No swelling. Normal range of motion. Cervical back: Normal range of motion and neck supple. Skin:     General: Skin is warm and dry. Neurological:      Mental Status: She is oriented to person, place, and time. She is lethargic. Comments: Oriented to year, not month   Psychiatric:         Behavior: Behavior is cooperative.        Medications:   Medications:    magnesium sulfate  2,000 mg IntraVENous Once    sodium chloride flush  5-40 mL IntraVENous BID    thiamine  100 mg IntraVENous Daily    melatonin  3 mg Oral Nightly    mupirocin   Topical BID    chlorhexidine gluconate   Topical Daily    chlorhexidine  15 mL Mouth/Throat BID    heparin (porcine)  5,000 Units SubCUTAneous 3 times per day    meropenem  1,000 mg IntraVENous Q12H    [Held by provider] gabapentin  300 mg Oral BID    memantine  5 mg Oral BID    rosuvastatin  10 mg Oral QPM    lidocaine PF  5 mL IntraDERmal Once    sodium chloride flush  5-40 mL IntraVENous 2 times per day    pantoprazole  40 mg IntraVENous Daily    guaiFENesin  600 mg Oral BID    polyethylene glycol  17 g Oral Daily    sodium chloride flush  5-40 mL IntraVENous 2 times per day    docusate sodium  100 mg Oral BID    donepezil  10 mg Oral Nightly    metoprolol tartrate  25 mg Oral BID      Infusions:    sodium chloride 12.5 mL/hr at 02/04/23 0324    sodium chloride 25 mL/hr (02/06/23 9803)     PRN Meds: sodium chloride flush, 5-40 mL, PRN  sodium chloride, 500 mL, PRN  morphine, 1 mg, Q4H PRN  HYDROcodone-acetaminophen, 1 tablet, Q6H PRN  sodium chloride flush, 5-40 mL, PRN  sodium chloride, , PRN  ondansetron, 4 mg, Q8H PRN   Or  ondansetron, 4 mg, Q6H PRN  acetaminophen, 650 mg, Q6H PRN   Or  acetaminophen, 650 mg, Q6H PRN  ipratropium, 1 spray, PRN      Labs      Recent Results (from the past 24 hour(s))   Vitamin B12 & Folate    Collection Time: 02/08/23  4:40 PM   Result Value Ref Range    Vitamin B-12 411.4 211 - 911 pg/ml    Folate 16.7 3.1 - 17.5 NG/ML   TSH    Collection Time: 02/08/23  4:40 PM   Result Value Ref Range    TSH, High Sensitivity 4.300 (H) 0.270 - 4.20 uIu/ml   Ammonia    Collection Time: 02/08/23  4:40 PM   Result Value Ref Range    Ammonia 27 11 - 51 UMOL/L   Cortisol Total    Collection Time: 02/08/23  4:40 PM   Result Value Ref Range    Cortisol, Plasma 18.58    Procalcitonin    Collection Time: 02/08/23  4:40 PM   Result Value Ref Range    Procalcitonin 0.456    C-Reactive Protein    Collection Time: 02/08/23  4:40 PM   Result Value Ref Range    CRP High Sensitivity 116.3 (H) <5.0 mg/L   CBC with Auto Differential    Collection Time: 02/09/23 12:40 AM   Result Value Ref Range    WBC 15.9 (H) 4.0 - 10.5 K/CU MM    RBC 3.26 (L) 4.2 - 5.4 M/CU MM    Hemoglobin 8.8 (L) 12.5 - 16.0 GM/DL    Hematocrit 26.7 (L) 37 - 47 %    MCV 81.9 78 - 100 FL    MCH 27.0 27 - 31 PG    MCHC 33.0 32.0 - 36.0 %    RDW 13.8 11.7 - 14.9 %    Platelets 893 671 - 846 K/CU MM    MPV 9.6 7.5 - 11.1 FL    Metamyelocytes Relative 1 (H) 0.0 %    Segs Relative 68.0 (H) 36 - 66 %    Eosinophils % 4.0 (H) 0 - 3 %    Lymphocytes % 15.0 (L) 24 - 44 %    Monocytes % 12.0 (H) 0 - 4 %    Metamyelocytes Absolute 0.16 K/CU MM    Segs Absolute 10.8 K/CU MM    Eosinophils Absolute 0.6 K/CU MM    Lymphocytes Absolute 2.4 K/CU MM    Monocytes Absolute 1.9 K/CU MM    Differential Type AUTOMATED DIFFERENTIAL     Polychromasia 1+     Toxic Granulation PRESENT    Comprehensive Metabolic Panel    Collection Time: 02/09/23 12:40 AM   Result Value Ref Range    Sodium 127 (L) 135 - 145 MMOL/L    Potassium 4.2 3.5 - 5.1 MMOL/L    Chloride 86 (L) 99 - 110 mMol/L    CO2 28 21 - 32 MMOL/L    BUN 33 (H) 6 - 23 MG/DL    Creatinine 1.3 (H) 0.6 - 1.1 MG/DL    Est, Glom Filt Rate 42 (L) >60 mL/min/1.73m2    Glucose 120 (H) 70 - 99 MG/DL    Calcium 8.5 8.3 - 10.6 MG/DL    Albumin 2.9 (L) 3.4 - 5.0 GM/DL    Total Protein 5.0 (L) 6.4 - 8.2 GM/DL    Total Bilirubin 0.8 0.0 - 1.0 MG/DL    ALT 8 (L) 10 - 40 U/L    AST 22 15 - 37 IU/L    Alkaline Phosphatase 102 40 - 128 IU/L    Anion Gap 13 4 - 16   Vancomycin Level, Random    Collection Time: 02/09/23 12:40 AM   Result Value Ref Range    Vancomycin Rm 22.0 UG/ML    DOSE AMOUNT DOSE AMT. GIVEN - 750mg     DOSE TIME DOSE TIME GIVEN - 1000    Brain Natriuretic Peptide    Collection Time: 02/09/23 12:40 AM   Result Value Ref Range    Pro-BNP 5,227 (H) <300 PG/ML   Magnesium    Collection Time: 02/09/23 12:40 AM   Result Value Ref Range    Magnesium 1.8 1.8 - 2.4 mg/dl   Phosphorus    Collection Time: 02/09/23 12:40 AM   Result Value Ref Range    Phosphorus 4.8 2.5 - 4.9 MG/DL   Procalcitonin    Collection Time: 02/09/23 12:40 AM   Result Value Ref Range    Procalcitonin 0.494         Imaging/Diagnostics Last 24 Hours   XR CHEST PORTABLE    Result Date: 2/2/2023  EXAMINATION: ONE XRAY VIEW OF THE CHEST 2/2/2023 1:52 pm COMPARISON: 02/02/2023, 01/30/2023 HISTORY: ORDERING SYSTEM PROVIDED HISTORY: shortness of breath TECHNOLOGIST PROVIDED HISTORY: Reason for exam:->shortness of breath Reason for Exam: shortness of breath FINDINGS: Sternotomy wires. Prosthetic cardiac valve. Pacemaker wires. Lung volumes. Bibasilar opacities. No pneumothorax. Heart size is stable. Low lung volumes with bibasilar opacities which may represent atelectasis or pneumonia.      XR CHEST PORTABLE    Result Date: 2/2/2023  EXAMINATION: ONE X-RAY VIEW OF THE CHEST 2/2/2023 10:49 am COMPARISON: CT chest 01/30/2023, chest radiograph 01/30/2023 HISTORY: ORDERING SYSTEM PROVIDED HISTORY: Shortness of breath TECHNOLOGIST PROVIDED HISTORY: Reason for exam:->Shortness of breath Reason for Exam: shortness of breath FINDINGS: The lungs are underinflated, resulting in vascular crowding and subsegmental atelectasis. The cardiomediastinal silhouette is obscured, but likely unchanged. Bibasilar consolidations favor atelectasis. The left upper lobe is obscured. No new focal consolidation, pneumothorax, or right-sided pleural effusion. There is blunting of the left costophrenic angle, which may be due to low lung volumes and/or patient positioning. Osseous structures are diffusely demineralized and grossly unchanged. Left chest cardiac conduction device is again noted. Low lung volumes with likely bibasilar atelectasis versus developing infection. Comment: Please note this report has been produced using speech recognition software and may contain errors related to that system including errors in grammar, punctuation, and spelling, as well as words and phrases that may be inappropriate. If there are any questions or concerns please feel free to contact the dictating provider for clarification.      Electronically signed by Ethel Helms MD on 2/9/2023 at 3:15 PM

## 2023-02-09 NOTE — PROGRESS NOTES
[address] [place] OF SPEECH/LANGUAGE PATHOLOGY  DAILY PROGRESS NOTE  Gerald Almodovar  2/9/2023  [id number]  Chest pain [R07.9]  Right sided abdominal pain [R10.9]  Chest pain, unspecified type [R07.9]  Acute cholecystitis [K81.0]  No Known Allergies      Pt was seen this date for dysphagia treatment. IMPRESSION AND RECOMMENDATIONS:   Gerald Almodovar was seen for a bedside swallowing treatment and diet tolerance monitoring. Nursing and family at bedside report that pt had a choking episode with Jello on meal tray this afternoon. For today's assessment pt was positioned upright 09 degrees and presented with a high pitch and breathy vocal quality and weak volitional cough. PO trials of puree, soft solids and thin liquids by spoon/cup/straw sips were given. Prolonged mastication and slow oral A-P transit was observed with trials of soft solids. Pharyngeal swallow appeared intact characterized by timely swallow initiation and adequate laryngeal elevation. Clear vocal quality and 0 overt s/s of aspiration were observed with all PO trials given. Recommend downgrade to pureed solids/thin liquids with aspiration precautions. SLP will continue to follow for possible diet level advancement. GOALS (current status in bold):  Short-term Goals  Timeframe for Short-term Goals: LOS or until goals are met  Goal 1: Pt will tolerate Pureed diet and Thin liquids without clinical evidence for aspiration 100% New goal- downgraded to puree   Goal 2: Pt will participate in advanced trials for possible safe diet level advancement 10/10 trials Not being met- fluctuating mental status   Goal 3: Pt/caregivers will demonstrate comprehension of recommendations/POC Meeting, continue    EDUCATION: results/recommendations d/w pt, pt's younger sister     PAIN RATING (0-10 Scale): 0  Time in/Time out: SLP Individual Minutes  Time In: 1315  Time Out: 1345  Minutes: 30    Visit number: Fong [place]. Darryl Siu Ga [address], [id number] [facility], 2/9/2023

## 2023-02-09 NOTE — PROGRESS NOTES
This nurse and student nurse, as well as pt sister noticed pt acting slightly different from yesterday and this morning, this afternoon. Reached out to hospitalist, hospitalist at bedside assessed pt.

## 2023-02-09 NOTE — PROGRESS NOTES
Comprehensive Nutrition Assessment    Type and Reason for Visit:  Reassess    Nutrition Recommendations/Plan:   Continue current diet  Modify supplements to high kristen/high pro BID and clear oral supplement BID  Encourage and document po intakes  Assist with meals as needed     Malnutrition Assessment:  Malnutrition Status: At risk for malnutrition (Comment) (age, poor po intake) (02/09/23 1009)      Nutrition Assessment:    Diet upgraded to dysphagia minced and moist with thin liquids 2/8 by SLP. Spoke w/ pt's sisters, they report poor po intakes here but good PTA, but pt doing better w/ liquids at this time. Food preferences updated, will change supplement to high kristen/high pro supplement BID, clear oral supplement BID. Will continue to follow at high nutrition risk. Nutrition Related Findings:    Hgb 9.1, hct 28, albumin 3.0, RBC 3.36, glucose 120; HgbA1C 5.6% 7/9/22 Wound Type: Surgical Incision       Current Nutrition Intake & Therapies:    Average Meal Intake: 1-25%, 51-75% (x 2 meals)  Average Supplements Intake: 0%  ADULT ORAL NUTRITION SUPPLEMENT; Breakfast, Dinner, Lunch; Frozen Oral Supplement  ADULT DIET; Dysphagia - Minced and Moist    Anthropometric Measures:  Height: 5' 5\" (165.1 cm)  Ideal Body Weight (IBW): 125 lbs (57 kg)    Admission Body Weight: 173 lb 15.1 oz (78.9 kg)  Current Body Weight: 175 lb 4.3 oz (79.5 kg), 142.7 % IBW. Weight Source: Bed Scale  Current BMI (kg/m2): 29.2  Usual Body Weight: 154 lb (69.9 kg) (11/2/22)  % Weight Change (Calculated): 15.8  Weight Adjustment For: No Adjustment                 BMI Categories: Overweight (BMI 25.0-29. 9)    Estimated Daily Nutrient Needs:  Energy Requirements Based On: Formula  Weight Used for Energy Requirements: Current  Energy (kcal/day): 6195-6306 (MSJ)  Weight Used for Protein Requirements: Ideal  Protein (g/day): 65-75 (1.2g/day)  Method Used for Fluid Requirements: 1 ml/kcal  Fluid (ml/day): 1650    Nutrition Diagnosis:   Inadequate oral intake related to swallowing difficulty, psychological cause or life stress as evidenced by intake 26-50% (intermittent NPO/clears since admission)    Nutrition Interventions:   Food and/or Nutrient Delivery: Continue Current Diet, Modify Oral Nutrition Supplement  Nutrition Education/Counseling: Education not appropriate  Coordination of Nutrition Care: Continue to monitor while inpatient, Speech Therapy, Swallow Evaluation  Plan of Care discussed with: General surgery, SLP    Goals:  Previous Goal Met: Progressing toward Goal(s)  Goals: Meet at least 75% of estimated needs       Nutrition Monitoring and Evaluation:      Food/Nutrient Intake Outcomes: Diet Advancement/Tolerance, Food and Nutrient Intake  Physical Signs/Symptoms Outcomes: Biochemical Data, Chewing or Swallowing, Weight    Discharge Planning:     Too soon to determine     Michael Meredith, [address]: [id number]

## 2023-02-09 NOTE — PROGRESS NOTES
Nephrology Progress Note  2/9/2023 8:50 AM        Subjective:   Admit Date: 1/30/2023  PCP: Deondre Frazier MD    Interval History: Patient seen in early morning, this is a late entry  She was sleeping I did not wake her up, she remains off oxygen    Diet: Unsure about her oral intake    ROS: Seems comfortable, urine output still 3.9 L for the last 24 hours  No fever and acceptable blood pressure    Data:     Current meds:    furosemide  20 mg IntraVENous BID    thiamine  100 mg IntraVENous Daily    melatonin  3 mg Oral Nightly    vancomycin  750 mg IntraVENous Q24H    mupirocin   Topical BID    chlorhexidine gluconate   Topical Daily    chlorhexidine  15 mL Mouth/Throat BID    heparin (porcine)  5,000 Units SubCUTAneous 3 times per day    spironolactone  25 mg Oral Daily    meropenem  1,000 mg IntraVENous Q12H    [Held by provider] gabapentin  300 mg Oral BID    memantine  5 mg Oral BID    rosuvastatin  10 mg Oral QPM    lidocaine PF  5 mL IntraDERmal Once    sodium chloride flush  5-40 mL IntraVENous 2 times per day    pantoprazole  40 mg IntraVENous Daily    guaiFENesin  600 mg Oral BID    polyethylene glycol  17 g Oral Daily    sodium chloride flush  5-40 mL IntraVENous 2 times per day    docusate sodium  100 mg Oral BID    donepezil  10 mg Oral Nightly    metoprolol tartrate  25 mg Oral BID      sodium chloride 12.5 mL/hr at 02/04/23 0324    sodium chloride 25 mL/hr (02/06/23 1429)         I/O last 3 completed shifts:   In: 380 [P.O.:380]  Out: 6000 [Urine:6000]    CBC:   Recent Labs     02/07/23  0522 02/07/23  2100 02/08/23  0520 02/09/23  0040   WBC 9.2  --  12.6* 15.9*   HGB 7.0* 9.1* 9.2* 8.8*     --  232 270          Recent Labs     02/07/23  0522 02/08/23  0520 02/09/23  0040    131* 127*   K 3.9 4.0 4.2   CL 95* 89* 86*   CO2 32 30 28   BUN 31* 33* 33*   CREATININE 1.3* 1.3* 1.3*   GLUCOSE 133* 126* 120*       Lab Results   Component Value Date    CALCIUM 8.5 02/09/2023    PHOS 4.8 02/09/2023       Objective:     Vitals: BP (!) 105/42   Pulse 62   Temp 99 °F (37.2 °C) (Axillary)   Resp 23   Ht 5' 5\" (1.651 m)   Wt 175 lb 4.3 oz (79.5 kg)   SpO2 95%   BMI 29.17 kg/m² ,    General appearance: Thin, cachectic, sleeping in bed comfortably  HEENT: Positive conjunctival pallor  Neck: Supple without supplemental oxygen  Lungs: Limited anterior exam some adventitious breath sound-I did not want to wake her up to listen to her lung at the back but I might be able to do it sometime later today  Heart: Seems regular rate and rhythm, she does have left chest wall implanted cardiac device as well as well-healed incision from previous sternotomy  Abdomen: Soft, AVRIL drain is out  Extremities: No gross edema now  She does have pure wick      Problem List :         Impression :     Stage III acute kidney injury-polyuric and recovering  Acute decompensated heart failure with underlying atherosclerotic cardiovascular disease and dysrhythmia-probably precipitated by cholecystitis  Hyponatremia now-difficult to tell whether it is from excessive sodium loss compared to water with heavy diuretics  She does have diabetes and valvular disease    Recommendation/Plan  :     Hold the diuretics for today-repeat chest x-ray-also hold spironolactone along with loop-hopefully she is well compensated now-as it Could be transient pulmonary edema-we will see how she does-please let me know if she is ready to be discharged and I will make a good outpatient plan and follow-up      Ebony Stinson MD MD

## 2023-02-09 NOTE — OR NURSING
PROCEDURE PERFORMED: abdominal abscess drain placement    PRIMARY INDICATION FOR PROCEDURE: abscess/sepsis    INFORMED CONSENT:  Obtained prior to procedure. Consent placed in chart. ALYSSA SCORE PRE PROCEDURE:   10     PT TRANSPORTED FROM:  8807                                  TO THE IR ROOM:   CT2                     ASSESSMENT: Pt with baseline dementia oriented to self. Pt on RA & able to follow commands. BARRIER PRECAUTIONS & STERILE TECHNIQUE:               Pt transferred to the table and positioned supine for comfort. Warm blankets given. Pt placed on Cardiac Monitor. Pt prepped and draped in a sterile fashion with chlorhexadine. PAIN/LOCAL ANESTHESIA/SEDATION MANAGEMENT:           Local: Lidocaine 1% given by Dr. Pierce Ludwig          Sedation: administered by Anabell Madrid RN             Fentanyl: 25mcg @ 5770; 25mcg @ 1801             Versed: 0.5mg @ 8846; 0.5mg @ 1801    INTRAOPERATIVE:           ACCESS TIME: Drain #1 @ 2554; Drain #2 @ 8043          US/FLUORO: CT guided          ACCESS USED: OneStep (#1 -medial) & 18g Trocar needle (#2 -lateral)          WIRE USED: Amplatz          CATHETER USED: 10Fr locking pigtail drain sutured in place & attached to AVRIL bulb x2        STERILE DRESSINGS: biopatch x2, guaze & tegaderm    SPECIMENS: 6.5cc of thick bloody abscess fluid sent to lab from medial drain; 3cc of thick bloody abscess fluid sent to lab from lateral drain    EBL:  <1cc      FOLLOW- UP X-RAY: None    COMPLICATIONS/ OUTCOME: VSS, pt tolerated well.      ALYSSA SCORE POST PROCEDURE:  10        REPORT CALLED TO: Pop Knight

## 2023-02-10 ENCOUNTER — APPOINTMENT (OUTPATIENT)
Dept: ULTRASOUND IMAGING | Age: 78
DRG: 853 | End: 2023-02-10
Payer: MEDICARE

## 2023-02-10 LAB
ALBUMIN SERPL-MCNC: 3 GM/DL (ref 3.4–5)
ALP BLD-CCNC: 106 IU/L (ref 40–129)
ALT SERPL-CCNC: 11 U/L (ref 10–40)
ANION GAP SERPL CALCULATED.3IONS-SCNC: 8 MMOL/L (ref 4–16)
AST SERPL-CCNC: 26 IU/L (ref 15–37)
BILIRUB SERPL-MCNC: 0.5 MG/DL (ref 0–1)
BUN SERPL-MCNC: 32 MG/DL (ref 6–23)
CALCIUM SERPL-MCNC: 8.6 MG/DL (ref 8.3–10.6)
CHLORIDE BLD-SCNC: 90 MMOL/L (ref 99–110)
CO2: 31 MMOL/L (ref 21–32)
CREAT SERPL-MCNC: 1.3 MG/DL (ref 0.6–1.1)
DIFFERENTIAL TYPE: ABNORMAL
EOSINOPHILS ABSOLUTE: 0.2 K/CU MM
EOSINOPHILS RELATIVE PERCENT: 2 % (ref 0–3)
GFR SERPL CREATININE-BSD FRML MDRD: 42 ML/MIN/1.73M2
GLUCOSE SERPL-MCNC: 158 MG/DL (ref 70–99)
HCT VFR BLD CALC: 24.5 % (ref 37–47)
HEMOGLOBIN: 7.8 GM/DL (ref 12.5–16)
LYMPHOCYTES ABSOLUTE: 0.8 K/CU MM
LYMPHOCYTES RELATIVE PERCENT: 8 % (ref 24–44)
MCH RBC QN AUTO: 26.9 PG (ref 27–31)
MCHC RBC AUTO-ENTMCNC: 31.8 % (ref 32–36)
MCV RBC AUTO: 84.5 FL (ref 78–100)
METAMYELOCYTES ABSOLUTE COUNT: 0.11 K/CU MM
METAMYELOCYTES PERCENT: 1 %
MONOCYTES ABSOLUTE: 0.2 K/CU MM
MONOCYTES RELATIVE PERCENT: 2 % (ref 0–4)
PDW BLD-RTO: 14 % (ref 11.7–14.9)
PLATELET # BLD: 230 K/CU MM (ref 140–440)
PMV BLD AUTO: 9.2 FL (ref 7.5–11.1)
POTASSIUM SERPL-SCNC: 4.3 MMOL/L (ref 3.5–5.1)
RBC # BLD: 2.9 M/CU MM (ref 4.2–5.4)
SEGMENTED NEUTROPHILS ABSOLUTE COUNT: 9.2 K/CU MM
SEGMENTED NEUTROPHILS RELATIVE PERCENT: 87 % (ref 36–66)
SODIUM BLD-SCNC: 129 MMOL/L (ref 135–145)
TOTAL PROTEIN: 5.2 GM/DL (ref 6.4–8.2)
WBC # BLD: 10.5 K/CU MM (ref 4–10.5)

## 2023-02-10 PROCEDURE — 2580000003 HC RX 258: Performed by: NURSE PRACTITIONER

## 2023-02-10 PROCEDURE — 97530 THERAPEUTIC ACTIVITIES: CPT

## 2023-02-10 PROCEDURE — 6370000000 HC RX 637 (ALT 250 FOR IP): Performed by: NURSE PRACTITIONER

## 2023-02-10 PROCEDURE — 6360000002 HC RX W HCPCS: Performed by: INTERNAL MEDICINE

## 2023-02-10 PROCEDURE — 2500000003 HC RX 250 WO HCPCS: Performed by: NURSE PRACTITIONER

## 2023-02-10 PROCEDURE — C9113 INJ PANTOPRAZOLE SODIUM, VIA: HCPCS | Performed by: STUDENT IN AN ORGANIZED HEALTH CARE EDUCATION/TRAINING PROGRAM

## 2023-02-10 PROCEDURE — 6370000000 HC RX 637 (ALT 250 FOR IP): Performed by: INTERNAL MEDICINE

## 2023-02-10 PROCEDURE — 6370000000 HC RX 637 (ALT 250 FOR IP): Performed by: SURGERY

## 2023-02-10 PROCEDURE — 2580000003 HC RX 258: Performed by: SURGERY

## 2023-02-10 PROCEDURE — 84145 PROCALCITONIN (PCT): CPT

## 2023-02-10 PROCEDURE — 6370000000 HC RX 637 (ALT 250 FOR IP): Performed by: PHYSICIAN ASSISTANT

## 2023-02-10 PROCEDURE — 6360000002 HC RX W HCPCS: Performed by: NURSE PRACTITIONER

## 2023-02-10 PROCEDURE — 2580000003 HC RX 258: Performed by: INTERNAL MEDICINE

## 2023-02-10 PROCEDURE — 85007 BL SMEAR W/DIFF WBC COUNT: CPT

## 2023-02-10 PROCEDURE — 85027 COMPLETE CBC AUTOMATED: CPT

## 2023-02-10 PROCEDURE — 6360000002 HC RX W HCPCS: Performed by: STUDENT IN AN ORGANIZED HEALTH CARE EDUCATION/TRAINING PROGRAM

## 2023-02-10 PROCEDURE — 2140000000 HC CCU INTERMEDIATE R&B

## 2023-02-10 PROCEDURE — 86140 C-REACTIVE PROTEIN: CPT

## 2023-02-10 PROCEDURE — 99024 POSTOP FOLLOW-UP VISIT: CPT | Performed by: SURGERY

## 2023-02-10 PROCEDURE — 94761 N-INVAS EAR/PLS OXIMETRY MLT: CPT

## 2023-02-10 PROCEDURE — 2580000003 HC RX 258: Performed by: PHYSICIAN ASSISTANT

## 2023-02-10 PROCEDURE — 80053 COMPREHEN METABOLIC PANEL: CPT

## 2023-02-10 PROCEDURE — 93971 EXTREMITY STUDY: CPT

## 2023-02-10 PROCEDURE — 97112 NEUROMUSCULAR REEDUCATION: CPT

## 2023-02-10 PROCEDURE — 97535 SELF CARE MNGMENT TRAINING: CPT

## 2023-02-10 PROCEDURE — 99223 1ST HOSP IP/OBS HIGH 75: CPT | Performed by: INTERNAL MEDICINE

## 2023-02-10 PROCEDURE — APPSS60 APP SPLIT SHARED TIME 46-60 MINUTES: Performed by: NURSE PRACTITIONER

## 2023-02-10 PROCEDURE — 6360000002 HC RX W HCPCS: Performed by: PHYSICIAN ASSISTANT

## 2023-02-10 RX ORDER — HYDROCODONE BITARTRATE AND ACETAMINOPHEN 5; 325 MG/1; MG/1
1 TABLET ORAL EVERY 6 HOURS PRN
Status: DISCONTINUED | OUTPATIENT
Start: 2023-02-10 | End: 2023-02-20 | Stop reason: HOSPADM

## 2023-02-10 RX ADMIN — MORPHINE SULFATE 1 MG: 2 INJECTION, SOLUTION INTRAMUSCULAR; INTRAVENOUS at 06:11

## 2023-02-10 RX ADMIN — MEROPENEM 1000 MG: 1 INJECTION, POWDER, FOR SOLUTION INTRAVENOUS at 13:48

## 2023-02-10 RX ADMIN — SODIUM CHLORIDE, PRESERVATIVE FREE 10 ML: 5 INJECTION INTRAVENOUS at 10:31

## 2023-02-10 RX ADMIN — Medication: at 21:39

## 2023-02-10 RX ADMIN — Medication 3 MG: at 21:30

## 2023-02-10 RX ADMIN — HYDROCODONE BITARTRATE AND ACETAMINOPHEN 1 TABLET: 5; 325 TABLET ORAL at 17:50

## 2023-02-10 RX ADMIN — HEPARIN SODIUM 5000 UNITS: 5000 INJECTION INTRAVENOUS; SUBCUTANEOUS at 21:31

## 2023-02-10 RX ADMIN — SULFAMETHOXAZOLE AND TRIMETHOPRIM 400 MG: 80; 16 INJECTION, SOLUTION, CONCENTRATE INTRAVENOUS at 17:43

## 2023-02-10 RX ADMIN — MORPHINE SULFATE 1 MG: 2 INJECTION, SOLUTION INTRAMUSCULAR; INTRAVENOUS at 01:06

## 2023-02-10 RX ADMIN — METOPROLOL TARTRATE 25 MG: 50 TABLET, FILM COATED ORAL at 21:31

## 2023-02-10 RX ADMIN — DOCUSATE SODIUM 100 MG: 100 CAPSULE, LIQUID FILLED ORAL at 10:32

## 2023-02-10 RX ADMIN — SODIUM CHLORIDE, PRESERVATIVE FREE 10 ML: 5 INJECTION INTRAVENOUS at 23:30

## 2023-02-10 RX ADMIN — HEPARIN SODIUM 5000 UNITS: 5000 INJECTION INTRAVENOUS; SUBCUTANEOUS at 13:47

## 2023-02-10 RX ADMIN — MEMANTINE HYDROCHLORIDE 5 MG: 5 TABLET ORAL at 21:30

## 2023-02-10 RX ADMIN — HYDROCODONE BITARTRATE AND ACETAMINOPHEN 1 TABLET: 5; 325 TABLET ORAL at 10:33

## 2023-02-10 RX ADMIN — DONEPEZIL HYDROCHLORIDE 10 MG: 10 TABLET ORAL at 21:31

## 2023-02-10 RX ADMIN — ROSUVASTATIN CALCIUM 10 MG: 5 TABLET, COATED ORAL at 17:50

## 2023-02-10 RX ADMIN — GUAIFENESIN 600 MG: 600 TABLET, EXTENDED RELEASE ORAL at 10:32

## 2023-02-10 RX ADMIN — MEROPENEM 1000 MG: 1 INJECTION, POWDER, FOR SOLUTION INTRAVENOUS at 01:19

## 2023-02-10 RX ADMIN — THIAMINE HYDROCHLORIDE 100 MG: 100 INJECTION, SOLUTION INTRAMUSCULAR; INTRAVENOUS at 10:32

## 2023-02-10 RX ADMIN — HEPARIN SODIUM 5000 UNITS: 5000 INJECTION INTRAVENOUS; SUBCUTANEOUS at 06:11

## 2023-02-10 RX ADMIN — Medication: at 10:32

## 2023-02-10 RX ADMIN — DOCUSATE SODIUM 100 MG: 100 CAPSULE, LIQUID FILLED ORAL at 21:30

## 2023-02-10 RX ADMIN — MEMANTINE HYDROCHLORIDE 5 MG: 5 TABLET ORAL at 10:32

## 2023-02-10 RX ADMIN — PIPERACILLIN AND TAZOBACTAM 3375 MG: 3; .375 INJECTION, POWDER, LYOPHILIZED, FOR SOLUTION INTRAVENOUS at 17:49

## 2023-02-10 RX ADMIN — PANTOPRAZOLE SODIUM 40 MG: 40 INJECTION, POWDER, LYOPHILIZED, FOR SOLUTION INTRAVENOUS at 10:31

## 2023-02-10 RX ADMIN — GUAIFENESIN 600 MG: 600 TABLET, EXTENDED RELEASE ORAL at 21:30

## 2023-02-10 RX ADMIN — POLYETHYLENE GLYCOL (3350) 17 G: 17 POWDER, FOR SOLUTION ORAL at 10:32

## 2023-02-10 ASSESSMENT — ENCOUNTER SYMPTOMS
CHOKING: 0
EYE PAIN: 0
BLOOD IN STOOL: 0
WHEEZING: 0
STRIDOR: 0
VOMITING: 0
COUGH: 0
DIARRHEA: 0
BACK PAIN: 0
SHORTNESS OF BREATH: 0
ABDOMINAL PAIN: 0
ABDOMINAL DISTENTION: 0
CONSTIPATION: 0
NAUSEA: 0
CHEST TIGHTNESS: 0

## 2023-02-10 ASSESSMENT — PAIN SCALES - GENERAL
PAINLEVEL_OUTOF10: 8
PAINLEVEL_OUTOF10: 3
PAINLEVEL_OUTOF10: 9

## 2023-02-10 ASSESSMENT — PAIN DESCRIPTION - LOCATION: LOCATION: ABDOMEN

## 2023-02-10 ASSESSMENT — PAIN SCALES - WONG BAKER: WONGBAKER_NUMERICALRESPONSE: 6

## 2023-02-10 NOTE — PROGRESS NOTES
Speech Language Pathology  Sofia De La Cruz   1945  [id number]        Attempted to see Sofia De La Cruz for a bedside swallowing treatment and diet tolerance monitoring x2 02/10/23. Deferred assessment- pt was resting and refused all offers of PO intake at this time.     Darryl Goode 85, CCC-SLP, 2/10/2023

## 2023-02-10 NOTE — PRE SEDATION
CTSedation Pre-Procedure Note    Patient Name: Omer Perez   YOB: 1945  Room/Bed: 5043/8810-N  Medical Record Number: [id number]  Date: 2/10/2023   Time: 9:37 AM       Indication:  CT guided RUQ drainage    Consent: I have discussed with the patient and/or the patient representative the indication, alternatives, and the possible risks and/or complications of the planned procedure and the anesthesia methods. The patient and/or patient representative appear to understand and agree to proceed. Vital Signs:   Vitals:    02/10/23 0600   BP: (!) 120/45   Pulse: 64   Resp: 23   Temp: 98.4 °F (36.9 °C)   SpO2: 97%       Past Medical History:   has a past medical history of Anxiety, Arthritis, Atrial fibrillation ([facility] [address] CAD (coronary artery disease), COVID-19, Diabetes mellitus ([facility] [address] H/O cardiac catheterization, H/O cardiovascular stress test, H/O echocardiogram, H/O echocardiogram, H/O echocardiogram, H/O three vessel coronary artery bypass, History of Holter monitoring, History of nuclear stress test, Hyperlipidemia, Hypertension, Memory loss, Missing teeth, acquired, Pneumonia, Risk for falls, TIA (transient ischemic attack), Urinary leakage, UTI (urinary tract infection), and Wears glasses. Past Surgical History:   has a past surgical history that includes Cardiac catheterization (06/25/2018); Bernard tooth extraction; eye surgery (Bilateral, 2018); Tonsillectomy (1940's); thoracentesis (Bilateral, 07/13/2018); Pacemaker insertion (Left, 12/11/2018); CABG with Mitral Valve Repair (07/09/2018); Mitral valve maze procedure (07/19/2018); and Cholecystectomy, laparoscopic (N/A, 1/31/2023).     Medications:   Scheduled Meds:    sodium chloride flush  5-40 mL IntraVENous BID    thiamine  100 mg IntraVENous Daily    melatonin  3 mg Oral Nightly    mupirocin   Topical BID    heparin (porcine)  5,000 Units SubCUTAneous 3 times per day    meropenem  1,000 mg IntraVENous Q12H    [Held by provider] gabapentin 300 mg Oral BID    memantine  5 mg Oral BID    rosuvastatin  10 mg Oral QPM    lidocaine PF  5 mL IntraDERmal Once    sodium chloride flush  5-40 mL IntraVENous 2 times per day    pantoprazole  40 mg IntraVENous Daily    guaiFENesin  600 mg Oral BID    polyethylene glycol  17 g Oral Daily    sodium chloride flush  5-40 mL IntraVENous 2 times per day    docusate sodium  100 mg Oral BID    donepezil  10 mg Oral Nightly    metoprolol tartrate  25 mg Oral BID     Continuous Infusions:    sodium chloride 12.5 mL/hr at 02/04/23 0324    sodium chloride 25 mL/hr (02/06/23 1429)     PRN Meds: sodium chloride flush, sodium chloride, morphine, HYDROcodone-acetaminophen, sodium chloride flush, sodium chloride, ondansetron **OR** ondansetron, acetaminophen **OR** acetaminophen, ipratropium  Home Meds:   Prior to Admission medications    Medication Sig Start Date End Date Taking? Authorizing Provider   gabapentin (NEURONTIN) 600 MG tablet Take 600 mg by mouth 2 times daily.    Yes Historical Provider, MD   memantine (NAMENDA) 10 MG tablet Take 10 mg by mouth 2 times daily   Yes Historical Provider, MD   apixaban (ELIQUIS) 5 MG TABS tablet Take 1 tablet by mouth 2 times daily 1/18/23   Shoaib Vazquez MD   diphenhydrAMINE-APAP, sleep, (ACETAMINOPHEN PM)  MG tablet Take 1 tablet by mouth nightly as needed for Sleep    Historical Provider, MD   metoprolol tartrate (LOPRESSOR) 50 MG tablet Take 0.5 tablets by mouth 2 times daily 4/19/22   Austyn Salasine APRN - CNP   rosuvastatin (CRESTOR) 40 MG tablet Take 1 tablet by mouth every evening 4/19/22   Austyn Saurabh, APRN - CNP   bisacodyl (DULCOLAX) 5 MG EC tablet Take 5 mg by mouth daily as needed for Constipation 6/9/21   Historical Provider, MD   oxybutynin (DITROPAN) 5 MG tablet Take 5 mg by mouth 2 times daily 6/7/21   Historical Provider, MD   potassium chloride (KLOR-CON) 20 MEQ packet Take 20 mEq by mouth daily    Historical Provider, MD   senna (SENOKOT) 8.6 MG tablet Take 1 tablet by mouth daily  Patient not taking: Reported on 11/2/2022    Historical Provider, MD   acetaminophen (APAP EXTRA STRENGTH) 500 MG tablet Take 1 tablet by mouth every 6 hours as needed for Pain  Patient not taking: Reported on 11/2/2022 12/1/20   Payton Givens PA-C   ipratropium (ATROVENT) 0.03 % nasal spray 1 spray by Nasal route as needed 9/27/19   Historical Provider, MD   omeprazole (PRILOSEC) 40 MG delayed release capsule Take 40 mg by mouth daily    Historical Provider, MD   melatonin 3 MG TABS tablet Take 3 mg by mouth daily  Patient not taking: Reported on 11/2/2022    Historical Provider, MD   aspirin 81 MG EC tablet Take 1 tablet by mouth daily 7/21/18   C Leesa Lucero MD   furosemide (LASIX) 20 MG tablet Take 1 tablet by mouth daily 7/21/18   C Leesa Lucero MD   docusate sodium (COLACE, DULCOLAX) 100 MG CAPS Take 100 mg by mouth 2 times daily 7/20/18   C Leesa Lucero MD   donepezil (ARICEPT) 10 MG tablet Take 10 mg by mouth nightly 5/28/18   Historical Provider, MD   citalopram (CELEXA) 40 MG tablet Take 40 mg by mouth daily 5/28/18   Historical Provider, MD   vitamin D3 (CHOLECALCIFEROL) 25 MCG (1000 UT) TABS tablet Take 1 tablet by mouth daily     Historical Provider, MD   CALCIUM CARBONATE PO Take 1,000 mg by mouth daily     Historical Provider, MD     Coumadin Use Last 7 Days:  no  Antiplatelet drug therapy use last 7 days: no  Other anticoagulant use last 7 days: no  Additional Medication Information:  none      Pre-Sedation Documentation and Exam:   Vital signs have been reviewed (see flow sheet for vitals).     Mallampati Airway Assessment:  normal neck range of motion    Prior History of Anesthesia Complications:   none    ASA Classification:  Class 3 - A patient with severe systemic disease that limits activity but is not incapacitating    Sedation/ Anesthesia Plan:   intravenous sedation    Medications Planned:   midazolam (Versed) intravenously and fentanyl intravenously    Patient is an appropriate candidate for plan of sedation: yes    Electronically signed by Annette Mccall MD on 2/10/2023 at 9:37 AM

## 2023-02-10 NOTE — PROGRESS NOTES
General Surgery-Dr. Cynthia Regalado Day: 12    Chief Complaint on Admission: acute cholecystitis  Late entry    Subjective: Went for CT drain placement today  Denies abdominal complaints  Family at bedside            ROS:  Review of Systems  Negative except as above    Allergies  Patient has no known allergies. Diagnosis Date    Anxiety     Arthritis     knees    Atrial fibrillation (HCC)     CAD (coronary artery disease)     Sees Dr. Mervat Meade    COVID-19 2021    Diabetes mellitus ([address]     H/O cardiac catheterization 2018    severe 3 vessel disease, refer to CV surgery for CABG and MAZE    H/O cardiovascular stress test 2018    EF47%  fixed perfusion defect ant wall, pt in afib    H/O echocardiogram 2018    EF55% mod MR    H/O echocardiogram 2018    EF 50-55%, Mild : AR, MR & TR.    H/O echocardiogram 2020    EF 55%, Breast artifact noted. H/O three vessel coronary artery bypass 2018    Dr. Juliocesar Marie    History of Holter monitoring 10/23/2018    Multiple PAF episodes noted. Tachy-Dinesh episodes noted. History of nuclear stress test 2020    EF 55%, Breast artifact. Hyperlipidemia     Hypertension     Memory loss     on Aricept    Missing teeth, acquired     Pneumonia     pneumococcal pneumonia twice at end of     Risk for falls     uses walker    TIA (transient ischemic attack)     family doctors said she has had mini-strokes    Urinary leakage     UTI (urinary tract infection)     recent --just stopped antibiotic last week as of 18    Wears glasses        Objective:     Vitals:    02/10/23 0800   BP: (!) 117/48   Pulse: 62   Resp: 20   Temp: 99.3 °F (37.4 °C)   SpO2: 95%       TEMPERATURE:  Current -Temp: 99.3 °F (37.4 °C); Max - Temp  Av.9 °F (37.2 °C)  Min: 98.4 °F (36.9 °C)  Max: 99.3 °F (37.4 °C)    No intake/output data recorded. I/O last 3 completed shifts:   In: 340 [P.O.:340]  Out: 4505 [Urine:4300; Drains:205]      Physical Exam:  Physical Exam  Vitals reviewed. Constitutional:       Comments: Answering questions appropriately     HENT:      Head: Normocephalic and atraumatic. Eyes:      General:         Right eye: No discharge. Left eye: No discharge. Cardiovascular:      Rate and Rhythm: Normal rate. Abdominal:      Palpations: Abdomen is soft. Tenderness: There is no abdominal tenderness. Musculoskeletal:         General: No swelling. Skin:     General: Skin is warm. Coloration: Skin is not jaundiced. Neurological:      Mental Status: She is alert. IR drain x 2: one is dark sanguineous and other is sanguineous with slight bilious tinge.      Scheduled Meds:   sodium chloride flush  5-40 mL IntraVENous BID    thiamine  100 mg IntraVENous Daily    melatonin  3 mg Oral Nightly    mupirocin   Topical BID    heparin (porcine)  5,000 Units SubCUTAneous 3 times per day    meropenem  1,000 mg IntraVENous Q12H    [Held by provider] gabapentin  300 mg Oral BID    memantine  5 mg Oral BID    rosuvastatin  10 mg Oral QPM    lidocaine PF  5 mL IntraDERmal Once    sodium chloride flush  5-40 mL IntraVENous 2 times per day    pantoprazole  40 mg IntraVENous Daily    guaiFENesin  600 mg Oral BID    polyethylene glycol  17 g Oral Daily    sodium chloride flush  5-40 mL IntraVENous 2 times per day    docusate sodium  100 mg Oral BID    donepezil  10 mg Oral Nightly    metoprolol tartrate  25 mg Oral BID     ContinuousInfusions:   sodium chloride 12.5 mL/hr at 02/04/23 0324    sodium chloride 25 mL/hr (02/06/23 1429)     PRN Meds:HYDROcodone-acetaminophen, HYDROmorphone **OR** HYDROmorphone, sodium chloride flush, sodium chloride, sodium chloride flush, sodium chloride, ondansetron **OR** ondansetron, acetaminophen **OR** acetaminophen, ipratropium      Labs/Imaging Results:   Lab Results   Component Value Date    WBC 15.9 (H) 02/09/2023    HGB 8.8 (L) 02/09/2023    HCT 26.7 (L) 02/09/2023    MCV 81.9 02/09/2023  02/09/2023     Lab Results   Component Value Date     (L) 02/09/2023    K 4.2 02/09/2023    CL 86 (L) 02/09/2023    CO2 28 02/09/2023    BUN 33 (H) 02/09/2023    CREATININE 1.3 (H) 02/09/2023    GLUCOSE 120 (H) 02/09/2023    CALCIUM 8.5 02/09/2023    PROT 5.0 (L) 02/09/2023    LABALBU 2.9 (L) 02/09/2023    BILITOT 0.8 02/09/2023    ALKPHOS 102 02/09/2023    AST 22 02/09/2023    ALT 8 (L) 02/09/2023    LABGLOM 42 (L) 02/09/2023    GFRAA >60 07/12/2022     CT head:  STUDY MILDLY DEGRADED BY MOTION ARTIFACT   No noncontrast CT evidence of acute intracranial pathology. Advanced cortical atrophy and white matter microvascular degenerative   changes, heavy atherosclerotic calcification distal internal carotid and   vertebral arteries of uncertain hemodynamic significance. CT A/P:    1. Large post cholecystectomy abscess which extends from the gallbladder   fossa to the subhepatic region and medial and posterior to the liver. The   largest axial dimension is 9 x 11.3 cm and the largest coronal dimension is   8.4 x 10 cm. A 2nd collection can be seen measuring 5.9 x 5.7 cm posteriorly   which may communicate with the larger collection. 2. Constipation and stool impaction in the rectum. 3. Degenerated calcified fibroids. No other acute abnormality. Findings discussed with Dr. Kasey Galan on 02/09/2023 at 3:20 p.m.       CT drain:  Successful CT guided intra-abdominal fluid drainage as described above. Approximately a total of 9.5 cc of old bloody fluid was removed. The catheter was left to AVRIL suction bulb drainage         Assessment:       75 y/o F s/p lap pedro on 1/31/23    Plan:       -Based on procedure note, drainage characteristics suspect this fluid is hematoma and not abscess. F/u cultures.   -Watch drain output closely. One of the drains has slight bile tinge. Here LFTs are not consistent with bile leak. May ultimately require HIDA.   -Diet as tolerated.   -Above d/w pt, pt's nurse, and pt's family at bedside.        Electronically signed by Karina Severino II, MD on 2/10/2023 at 10:31 AM

## 2023-02-10 NOTE — PROGRESS NOTES
Physical Therapy Treatment Note  Name: Lesli Braga MRN: [id number] :   1945   Date:  2/10/2023   Admission Date: 2023 Room:  [address] [place] [address]     Restrictions/Precautions:          general precautions, fall risk, contact isolation, AVRIL drains    Communication with other providers:  RN, OT    Subjective:  Patient states:  \"I could probably help\"  Pain:   Location, Type, Intensity (0/10 to 10/10):  denies pain    Objective:    Observation:  pt supine in bed upon entry with family present. Family requesting pt be cleaned    Treatment, including education/measures:    Bed mobility: pt completed supine to/from EOB max A x2 with assist for all sequencing. Pt dependent x2 to scoot higher in bed    Balance: pt sat EOB ~30 minutes with varying assist from max A to short bouts of min A with cues for hand placement and sitting posture. Pt with retropulsion and L lateral lean.     Assessment / Impression:    Pt supine in bed with OT in room at end of session and alarm on    Patient's tolerance of treatment:  fair   Adverse Reaction: n/a  Significant change in status and impact:  n/a  Barriers to improvement:  decreased endurance, impaired transfers, impaired gait    Plan for Next Session:    Continue to address bed mobility and sitting balance in future sessions    Time in:  1413  Time out:  1456  Timed treatment minutes:  43  Total treatment time:  37    Previously filed items:  Social/Functional History  Lives With: Family  Type of Home: Condo  Home Layout: One level  Active : No        Short Term Goals  Time Frame for Short Term Goals: 1 week  Short Term Goal 1: pt to complete all bed mobility mod A x1  Short Term Goal 2: pt to sit EOB 10 minutes CGA with no LOB throughout  Short Term Goal 3: pt to complete stand pivot transfer with LRAD mod A x1  Short Term Goal 4: pt to propel manual WC 25' with min A    Electronically signed by:    Major Berry PT  2/10/2023, 3:21 PM

## 2023-02-10 NOTE — CONSULTS
Infectious Disease Consult Note  2/10/2023   Patient Name: Marla Lange : 1945   Impression  Sepsis:  Acute on Chronic Cholecystitis s/p Lap Choley 23:  Postop Cholecystectomy Abscess:  MRSA Screen Positive:  ESBL E.coli Complicated UTI:  Acute Metabolic Encephalopathy:  Afebrile  Leukocytosis upward trend on  from 9.2 to 15.9  Pct 10.51, 5.09, 0.456, 0.494  .3  -BC 0/2 NGTD  -MRSA Screen Positive (de-colonized)  -Urine culture: ESBL E.coli  -S/p per Dr. Donnelly Chance: Adam Morrison. DX:  acute on chronic cholecystitis. Hydrops. -CT A&P W IV Contrast: 1. Large post cholecystectomy abscess which extends from the gallbladder   fossa to the subhepatic region and medial and posterior to the liver. The   largest axial dimension is 9 x 11.3 cm and the largest coronal dimension is   8.4 x 10 cm. A 2nd collection can be seen measuring 5.9 x 5.7 cm posteriorly   which may communicate with the larger collection. 2. Constipation and stool impaction in the rectum. 3. Degenerated calcified fibroids. No other acute abnormality. Findings discussed with Dr. Alana Cardoza on 2023 at 3:20 p.m.    2/10-S/p per IR: CT Drainage of abscess:  removed 9.5 cc old bloody fluid. AVRIL intact.  Cultures: Pending  TAYLOR:  Dr. Otoniel Funes onboard  DMII:  CAD and VHD s/p CABG with MVR 2018/ HTN/ HLD/ PFO Closure/ PAF with PPM 2018:  Dr. Jayesh Torres onboard for EP  Dr. Oniel Doyle onboard for cardiology     S/p TIA, Wheel Chair Bound:  Multi-morbidity: per PMHx:  anxiety    Plan:  DC IV meropenem as patient has AMS, unsure if ABX induced  Start IV Zosn 3,375 mg q8h  Start IV Bactrim 5 mg/kg q8h (covers ESBL E.coli)  Trend CRP and Pct, ordered  Await IR cultures  ID recommends neurology consult due to AMS    Thank you for allowing me to consult in the care of this patient.  ------------------------  REASON FOR CONSULT: Infective syndrome \"sepsis, patient s/p lap cholecystectomy drain removed on , more altered, worsening WBC, procal down to 0.4 from 10, but CRP elevated. \"  Requested by: Dr. Mccain Neither is a 76 y.o.  female with a history of CAD s/p CABG 2018, VHD s/p MVR 2018, PFO closure, PAF, HTN, HLD, anxiety, DMII, TIA, wheel chair bound and early dementia who was admitted 1/30/2023 for further evaluation and management of right-sided chest wall pain radiating underneath her right breast with onset the day of admission. She reports a persistent, non-productive cough for about 2 weeks prior to admission also. She was found to have acute cholecystitis and underwent per Dr. Reyes Kwok on 1/31/23 a lap choley. A repeat CT A&P W IV Contrast obtained 2/9 revealed a large collection postop choley abscess. She went 2/9 to IR for a CT guided drainage. She remains with AVRIL x 2 intact. She had a positive MRSA screen, and was de-colonized. Her WBC and CRP have trended up despite Pct trending down. She was found to have ESBL E.coli in her urine culture on 2/2. She was initially given IV Zosyn and vancomycin, then transitioned to meropenem. The vancomycin was DCd on 2/7/23. The HPI is obtained from the EMR and the patient's sister, Andreina Holder as the patient is quite weak and a poor historian. ? Infectious diseases service was consulted to evaluate the pt, and recommend further investigative and therapeutic measures. ROS: Other systems reviewed Including eyes, ENT, respiratory, cardiovascular, GI, , dermatologic, neurologic, psych, hem/lymphatic, musculoskeletal and endocrine were negative other than what is mentioned above.      Patient Active Problem List    Diagnosis Date Noted    Acute cholecystitis 01/31/2023    Chest pain 01/30/2023    Right upper quadrant pain 01/30/2023    Concussion with no loss of consciousness     AMS (altered mental status) 07/08/2022    Altered mental status 07/08/2022    CAD (coronary artery disease)     COVID-19 09/19/2021    Intraparenchymal hematoma of brain 06/07/2021    Acute intracranial hemorrhage ([facility] 06/06/2021    Hyponatremia 06/06/2021    Compression fracture of L1 lumbar vertebra ([facility] [facility] 06/06/2021    Fall at home, subsequent encounter 12/04/2019    S/P placement of cardiac pacemaker     Tachycardia-bradycardia ([facility]     SSS (sick sinus syndrome) ([facility] [facility] 12/11/2018    Pneumonia due to infectious organism 07/18/2018    Acute blood loss anemia 07/10/2018    Uncontrolled pain 07/10/2018    Gait disturbance 07/10/2018    PAF (paroxysmal atrial fibrillation) ([facility] 06/07/2018    Essential hypertension 06/07/2018    Mixed hyperlipidemia 06/07/2018    VHD (valvular heart disease) 06/07/2018    Abnormal EKG 06/07/2018     Past Medical History:   Diagnosis Date    Anxiety     Arthritis     knees    Atrial fibrillation (HCC)     CAD (coronary artery disease)     Sees Dr. Julian Keller    COVID-19 09/19/2021    Diabetes mellitus ([facility] [facility]     H/O cardiac catheterization 06/25/2018    severe 3 vessel disease, refer to CV surgery for CABG and MAZE    H/O cardiovascular stress test 06/06/2018    EF47%  fixed perfusion defect ant wall, pt in afib    H/O echocardiogram 06/06/2018    EF55% mod MR    H/O echocardiogram 08/28/2018    EF 50-55%, Mild : AR, MR & TR.    H/O echocardiogram 03/13/2020    EF 55%, Breast artifact noted. H/O three vessel coronary artery bypass 07/09/2018    Dr. Abdoulaye Choudhury    History of Holter monitoring 10/23/2018    Multiple PAF episodes noted. Tachy-Dinesh episodes noted. History of nuclear stress test 03/13/2020    EF 55%, Breast artifact.     Hyperlipidemia     Hypertension     Memory loss     on Aricept    Missing teeth, acquired     Pneumonia     pneumococcal pneumonia twice at end of 2017    Risk for falls     uses walker    TIA (transient ischemic attack)     family doctors said she has had mini-strokes    Urinary leakage     UTI (urinary tract infection)     recent --just stopped antibiotic last week as of 6-29-18    Wears glasses       Past Surgical History:   Procedure Laterality Date    CABG WITH MITRAL VALVE REPAIR  2018    CABG X 3 [place]>LAD, SVG>CX M1, SVG>M2, MVR repair w/#28 CG ring, PFO closure, MAZE    CARDIAC CATHETERIZATION  2018    CHOLECYSTECTOMY, LAPAROSCOPIC N/A 2023    CHOLECYSTECTOMY LAPAROSCOPIC performed by Alessandro Love MD at [address] Bilateral 2018    Cataracts    MITRAL VALVE MAZE PROCEDURE  2018    Dr. Elaine Fix Left 2018    [employer] [facility] XT DR LANA Bartlett     THORACENTESIS Bilateral 2018    IR Dr. Mazin Orozco, right 56, left 900 all s/s    TONSILLECTOMY  1940's    WISDOM TOOTH EXTRACTION        Family History   Problem Relation Age of Onset    Stroke Mother     Heart Disease Father     Early Death Father     Breast Cancer Sister     Parkinsonism Brother     Heart Disease Sister     Other Sister         fibromyalgia    Diabetes Sister     Early Death Brother          at birth      Infectious disease related family history - not contibutory. 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 No recent unusual exposures. NO pets    ? ALLERGIES  No Known Allergies   MEDICATIONS  Reviewed and are per the chart/EMR. ?   Antibiotics:   Present:  Zosyn -, 10-  Bactrim 2/10-  Past:  Vancomycin -6  Meropenem 2/4-10  -------------------------------------------------------------------------------------------------------------------    Vital Signs:  Vitals:    02/10/23 0600   BP: (!) 120/45   Pulse: 64   Resp: 23   Temp: 98.4 °F (36.9 °C)   SpO2: 97%         Exam:    VS: noted; wt 175 lb (79.5 kg) Height 5'5\"  Gen: lethargic, oriented x2, no distress  Skin: no stigmata of endocarditis  Wounds: C/D/I abdominal incisions well approximated. AVRIL x 2 from right-side of abdomen draining dark red/brown fluid  HEMT: AT/NC Oropharynx pink, moist, and without lesions or exudates; dentition in good state of repair  Eyes: PERRLA, EOMI, conjunctiva pink, sclera anicteric. Neck: Supple. Trachea midline. No LAD. Chest: no distress and CTA. Good air movement. Room air. Heart: PPM Capture rate 60 and no MRG. Abd: soft, non-distended, RUQ tenderness to light palpation, no hepatomegaly. Normoactive bowel sounds. Ext: no clubbing, cyanosis, or edema  External Catheter Site: without erythema or tenderness draining clear yellow urine  Neuro: Mental status intact. CN 2-12 intact and no focal sensory or motor deficits    ? Diagnostic Studies: reviewed  1/30/2023 XR Chest Portable:  Impression   1. Low lung volumes with left basilar atelectasis. 1/30/2023 CT Chest W Contrast:  Impression   1. Bibasilar atelectasis. 2. Cardiomegaly with enlarged central pulmonary arteries likely due to   pulmonary arterial hypertension. 3. Distended gallbladder. This could be just due to a nonfasting state. However, I do raise the possibility of acute cholecystitis. 4. Fibroid uterus. 1/30/2023 CT Abdomen Pelvis W IV Contrast:  Impression   1. Bibasilar atelectasis. 2. Cardiomegaly with enlarged central pulmonary arteries likely due to   pulmonary arterial hypertension. 3. Distended gallbladder. This could be just due to a nonfasting state. However, I do raise the possibility of acute cholecystitis. 4. Fibroid uterus. 1/30/2023 US Abdomen Limited:  Impression   Nonspecific gallbladder distension without shadowing calculus. However, when   correlated to the CT scan findings are suspicious for acute cholecystitis. Correlate with nuclear medicine hepatic biliary scan. 1/30/2023 NM Hepatobiliary:  Impression   The gallbladder is not visualized by a 3 hours post injection.   The patient   refused further imaging (4 hour post-injection is a standard endpoint). This   can be seen with acute or chronic cholecystitis. 2/6/2023 XR Chest Portable:  Impression   Low lung volumes with likely bibasilar atelectasis versus developing   infection. 2/2/2023 XR Chest Portable:  Impression   Low lung volumes with bibasilar opacities which may represent atelectasis or   pneumonia. 2/2/2023 XR Chest Portable:  Impression   1. Right upper extremity PICC tip overlies the superior cavoatrial junction   level. 2. Stable cardiomegaly. 3. Low lung volumes with bibasilar atelectasis or infiltrate, greater left   than right. Pneumonia is a consideration. 4. Probable small volume left pleural effusion. 5. Stable sequela from open-heart surgery and left-sided pacemaker. 2/5/2023 XR Chest Portable:  Impression   1. Congestive heart failure is most likely given the radiographic findings;   pneumonia is also a consideration in areas of consolidation with pleural   effusion. 2. Calcific atherosclerosis aorta. 3. Cardiomegaly. 2/7/2023 XR Chest Portable;  Impression   Slight interval improvement in pulmonary edema. 2/9/2023 CT Head WO Contrast:  Impression   STUDY MILDLY DEGRADED BY MOTION ARTIFACT   No noncontrast CT evidence of acute intracranial pathology. Advanced cortical atrophy and white matter microvascular degenerative   changes, heavy atherosclerotic calcification distal internal carotid and   vertebral arteries of uncertain hemodynamic significance. ?  2/9/2023 CT Abdomen Pelvis W IV Contrast:  Impression   1. Large post cholecystectomy abscess which extends from the gallbladder   fossa to the subhepatic region and medial and posterior to the liver. The   largest axial dimension is 9 x 11.3 cm and the largest coronal dimension is   8.4 x 10 cm. A 2nd collection can be seen measuring 5.9 x 5.7 cm posteriorly   which may communicate with the larger collection.    2. Constipation and stool impaction in the rectum. 3. Degenerated calcified fibroids. No other acute abnormality. Findings discussed with Dr. Stan Scott on 02/09/2023 at 3:20 p.m. RECOMMENDATIONS:   Percutaneous abscess drainage. 2/9/2023 CT Drainage Hematoma/Seroma/Fluid Collection:  ?  I have examined this patient and available medical records on this date and have made the above observations, conclusions and recommendations. Electronically signed by: Electronically signed by Pattie Holland.  BIRD Mcconnell CNP on 2/10/2023 at 9:18 AM

## 2023-02-10 NOTE — CARE COORDINATION
CM in to see Pt to follow up on discharge planning. Pt sister present. Discharge plan remains [facility] when medically ready. If Pt improves, Pt sisters wish to take Pt home with home care.       CM following

## 2023-02-10 NOTE — PROGRESS NOTES
General Surgery- [facility] Day: 12    Chief Complaint on Admission: acute cholecystitis  Late entry    Subjective:     Some confusion  Went for CT head and A/P   Tolerated PO  Denies complaints          ROS:  Review of Systems  Negative except as above    Allergies  Patient has no known allergies. Diagnosis Date    Anxiety     Arthritis     knees    Atrial fibrillation (HCC)     CAD (coronary artery disease)     Sees Dr. Redd Mccullough    COVID-19 2021    Diabetes mellitus ([address]     H/O cardiac catheterization 2018    severe 3 vessel disease, refer to CV surgery for CABG and MAZE    H/O cardiovascular stress test 2018    EF47%  fixed perfusion defect ant wall, pt in afib    H/O echocardiogram 2018    EF55% mod MR    H/O echocardiogram 2018    EF 50-55%, Mild : AR, MR & TR.    H/O echocardiogram 2020    EF 55%, Breast artifact noted. H/O three vessel coronary artery bypass 2018    Dr. Germania Boogie    History of Holter monitoring 10/23/2018    Multiple PAF episodes noted. Tachy-Dinesh episodes noted. History of nuclear stress test 2020    EF 55%, Breast artifact. Hyperlipidemia     Hypertension     Memory loss     on Aricept    Missing teeth, acquired     Pneumonia     pneumococcal pneumonia twice at end of     Risk for falls     uses walker    TIA (transient ischemic attack)     family doctors said she has had mini-strokes    Urinary leakage     UTI (urinary tract infection)     recent --just stopped antibiotic last week as of 18    Wears glasses        Objective:     Vitals:    02/10/23 0600   BP: (!) 120/45   Pulse: 64   Resp: 23   Temp: 98.4 °F (36.9 °C)   SpO2: 97%       TEMPERATURE:  Current -Temp: 98.4 °F (36.9 °C); Max - Temp  Av.8 °F (37.1 °C)  Min: 98.4 °F (36.9 °C)  Max: 99.1 °F (37.3 °C)    No intake/output data recorded. I/O last 3 completed shifts:   In: 340 [P.O.:340]  Out: 4505 [Urine:4300; Drains:205]      Physical Exam:  Physical Exam  Vitals reviewed.   Constitutional:       Comments: Answering questions appropriately     HENT:      Head: Normocephalic and atraumatic.   Eyes:      General:         Right eye: No discharge.         Left eye: No discharge.   Cardiovascular:      Rate and Rhythm: Normal rate.   Abdominal:      Palpations: Abdomen is soft.      Tenderness: There is no abdominal tenderness.   Musculoskeletal:         General: No swelling.   Skin:     General: Skin is warm.      Coloration: Skin is not jaundiced.   Neurological:      Mental Status: She is alert.         Scheduled Meds:   sodium chloride flush  5-40 mL IntraVENous BID    thiamine  100 mg IntraVENous Daily    melatonin  3 mg Oral Nightly    mupirocin   Topical BID    heparin (porcine)  5,000 Units SubCUTAneous 3 times per day    meropenem  1,000 mg IntraVENous Q12H    [Held by provider] gabapentin  300 mg Oral BID    memantine  5 mg Oral BID    rosuvastatin  10 mg Oral QPM    lidocaine PF  5 mL IntraDERmal Once    sodium chloride flush  5-40 mL IntraVENous 2 times per day    pantoprazole  40 mg IntraVENous Daily    guaiFENesin  600 mg Oral BID    polyethylene glycol  17 g Oral Daily    sodium chloride flush  5-40 mL IntraVENous 2 times per day    docusate sodium  100 mg Oral BID    donepezil  10 mg Oral Nightly    metoprolol tartrate  25 mg Oral BID     ContinuousInfusions:   sodium chloride 12.5 mL/hr at 02/04/23 0324    sodium chloride 25 mL/hr (02/06/23 1429)     PRN Meds:HYDROcodone-acetaminophen, HYDROmorphone **OR** HYDROmorphone, sodium chloride flush, sodium chloride, sodium chloride flush, sodium chloride, ondansetron **OR** ondansetron, acetaminophen **OR** acetaminophen, ipratropium      Labs/Imaging Results:   Lab Results   Component Value Date    WBC 15.9 (H) 02/09/2023    HGB 8.8 (L) 02/09/2023    HCT 26.7 (L) 02/09/2023    MCV 81.9 02/09/2023     02/09/2023     Lab Results   Component Value Date     (L) 02/09/2023    K 4.2  02/09/2023    CL 86 (L) 02/09/2023    CO2 28 02/09/2023    BUN 33 (H) 02/09/2023    CREATININE 1.3 (H) 02/09/2023    GLUCOSE 120 (H) 02/09/2023    CALCIUM 8.5 02/09/2023    PROT 5.0 (L) 02/09/2023    LABALBU 2.9 (L) 02/09/2023    BILITOT 0.8 02/09/2023    ALKPHOS 102 02/09/2023    AST 22 02/09/2023    ALT 8 (L) 02/09/2023    LABGLOM 42 (L) 02/09/2023    GFRAA >60 07/12/2022     CT head:  STUDY MILDLY DEGRADED BY MOTION ARTIFACT   No noncontrast CT evidence of acute intracranial pathology. Advanced cortical atrophy and white matter microvascular degenerative   changes, heavy atherosclerotic calcification distal internal carotid and   vertebral arteries of uncertain hemodynamic significance. CT A/P:    1. Large post cholecystectomy abscess which extends from the gallbladder   fossa to the subhepatic region and medial and posterior to the liver. The   largest axial dimension is 9 x 11.3 cm and the largest coronal dimension is   8.4 x 10 cm. A 2nd collection can be seen measuring 5.9 x 5.7 cm posteriorly   which may communicate with the larger collection. 2. Constipation and stool impaction in the rectum. 3. Degenerated calcified fibroids. No other acute abnormality. Findings discussed with Dr. Nicole Handy on 02/09/2023 at 3:20 p.m. Assessment:       77 y/o F s/p lap pedro on 1/31/23    Plan:       -Reviewed labs, images  -Agree with CT guided drain  -Case d/w Hospitalist via perfect serve  -Pt previously had drain which was removed several days ago without any purulent output, so favor hematoma vs biloma over abscess. Await CT drain results.        Electronically signed by Chelly Linares II, MD on 2/10/2023 at 10:26 AM

## 2023-02-10 NOTE — PROGRESS NOTES
Nephrology Progress Note  2/10/2023 9:48 AM        Subjective:   Admit Date: 1/30/2023  PCP: Mic Ambrosio MD    Interval History:  patient seen early morning, this is a late entry   she was alert awake and oriented without oxygen therapy    Diet:  unsure how much she is eating    ROS:   she has abdominal pain on superficial palpation- looks like AVRIL drain is back, she had CT-guided intra-abdominal free drainage, had about 9.5 cc of bloody fluid according to Dr. Tisha Santamaria note (  interventional radiologist) that was after her imaging studies were done   her urine output dropped to 1.5 L/day as I hold diuretics   but she is maintaining oxygenation without any supplemental oxygen   she has no fever and acceptable blood pressure    Data:     Current meds:    sodium chloride flush  5-40 mL IntraVENous BID    thiamine  100 mg IntraVENous Daily    melatonin  3 mg Oral Nightly    mupirocin   Topical BID    heparin (porcine)  5,000 Units SubCUTAneous 3 times per day    meropenem  1,000 mg IntraVENous Q12H    [Held by provider] gabapentin  300 mg Oral BID    memantine  5 mg Oral BID    rosuvastatin  10 mg Oral QPM    lidocaine PF  5 mL IntraDERmal Once    sodium chloride flush  5-40 mL IntraVENous 2 times per day    pantoprazole  40 mg IntraVENous Daily    guaiFENesin  600 mg Oral BID    polyethylene glycol  17 g Oral Daily    sodium chloride flush  5-40 mL IntraVENous 2 times per day    docusate sodium  100 mg Oral BID    donepezil  10 mg Oral Nightly    metoprolol tartrate  25 mg Oral BID      sodium chloride 12.5 mL/hr at 02/04/23 0324    sodium chloride 25 mL/hr (02/06/23 1429)         I/O last 3 completed shifts:   In: 340 [P.O.:340]  Out: 4505 [Urine:4300; Drains:205]    CBC:   Recent Labs     02/07/23  2100 02/08/23  0520 02/09/23  0040   WBC  --  12.6* 15.9*   HGB 9.1* 9.2* 8.8*   PLT  --  232 270          Recent Labs     02/08/23  0520 02/09/23  0040   * 127*   K 4.0 4.2   CL 89* 86*   CO2 30 28   BUN 33* 33* CREATININE 1.3* 1.3*   GLUCOSE 126* 120*       Lab Results   Component Value Date    CALCIUM 8.5 02/09/2023    PHOS 4.8 02/09/2023       Objective:     Vitals: BP (!) 120/45   Pulse 64   Temp 98.4 °F (36.9 °C) (Oral)   Resp 23   Ht 5' 5\" (1.651 m)   Wt 175 lb 4.3 oz (79.5 kg)   SpO2 97%   BMI 29.17 kg/m² ,    General appearance:   thin, alert, awake and oriented  HEENT:   please 2+ conjunctival pallor no scleral icterus  Neck:   supple  Lungs:   Limited anterior exam, no gross crackles  Heart:   seemed regular rate and rhythm, chest wall implanted cardiac device, well-healed incision from previous sternotomy  Abdomen:  tender on palpation, AVRIL drain  Extremities:   no gross leg edema   she has a pure wick      Problem List :         Impression :      stage II acute kidney disease- nonoliguric- stable by BUN and creatinine criteria as of yesterday   acute decompensated heart failure with underlying atherosclerotic cardiovascular disease and dysrhythmia- CT did not show overt pulmonary edema, does have right pleural effusion   acute cholecystitis s/p surgery complicated by possible intra-abdominal abscess or bleeding- with AVRIL drain now   had hyponatremia, and as well as history of diabetes and valvular disease    Recommendation/Plan  :      with potential abdominal abscess I will keep holding diuretics- we will watch fluid status closely- if there is good evidence of pulmonary edema then only IV loop- we will review the fluid analysis- watch for iatrogenic and nosocomial complication- follow clinically and biochemically      Donetta Sicard, MD MD

## 2023-02-10 NOTE — PROGRESS NOTES
V2.0  [facility] Hospitalist Progress Note      Name:  Coni Frankel /Age/Sex: 1945  (76 y.o. female)   MRN & CSN:  [id number] & [id number] Encounter Date/Time: 2/10/2023 3:29 PM EST    Location:  [facility] PCP: Celine Wolf MD       Hospital Day: 12    Assessment and Plan:   Coni Frankel is a 76 y.o. female with pmh of Coni Frankel is a 76 y.o. female with pmh of CAD s/p CABG, Valvular Heart Disease, s/p Mitral Valve Repair, PFO Closure, Paroxysmal A-Fib, HTN, HLD, DM, TIA, Early Dementia who presents with Chest pain Rt Sided and RUQ Abdominal Pain      Plan:    Sepsis 2/2 likely ESBL UTI without hematuria, Pneumonia, acute cholecystitis  Cholecystitis s/p lap cholecystectomy  Large post-cholecystectomy abscess  Patient with potential abnormality as well she did have acute solid cholecystitis status post lap pedro 2023. High risk for postoperative infection requiring MRSA pneumonia coverage. WBC 12, SBP 92, RR 22 initially. Procal trending down 10.51 -> 5.09 -> 0.4 5 -> 0.49  Urine growing ESBL UTI. MRSA Nares +ve  Patient was doing relatively well, but a day after drain removal patient started to get more altered and intermittently complained of the right quadrant pain  Obtained CT abd pelvis with IV contrast - shows large post cholecsytectomy abscesses. The largest axial dimension is 9 x 11.3 cm and the largest coronal dimension is 8.4 x 10 cm. A 2nd collection can be seen measuring 5.9 x 5.7 cm posteriorly which may communicate with the larger collection.   Reached out to general surgery team - following closely again  STAT consult placed for IR team.- s/p drain placement x 2 - sent for culture - follow  Consulted ID - changed abx to zosyn and bactrim  Continue to monitor    Acute Encephalopathy 2/2 likely Metabolic and Septic 2/2 hypoxia and ESBL UTI, Iatrogenic 2/2 medication   Was difficult to wake on 2/3/2023  AO X 3 today but lethargic today compared to yesterday again - sister at bedside. Gabapentin on hold per nephro - agree with it    Cholecystitis s/p lap cholecystectomy   Patient underwent lap pedro on 1/31/2023 with Dr. Madhavi Giordano. AVRIL drain removed 2/7/2023  GS on board   Abx for 5 days post drain removal until Feb 12  Change to oral at discharge    Acute hypoxic respiratory failure, resolved  Patient with acute respiratory failure was on nasal cannula. Became acutely more short of breath. CT did show what appears to be either fluid overload or viral pneumonia. 25% at 25L/min Heated high flow --> weaned off O2      TAYLOR  Pt with Cr 2 on admission, trended down to 1.5 today. baseline Cr 0.7. Nephrology on board    Hypochloremic Hyponatremia  Patient with acute worsening hyponatremia to 121 and now back up to 135  Nephrology on board    Acute on chronic HFpEF  Patient with an EF 50% with hypokinetic inferio-lateral wall and basal anterio-septal carson the 45 (become acutely short of breath with progressive pulmonary edema. Pro-BNP trending up to 24,733  Cardiology on board  Nephrology on board  On aldactone and amiloride    Hypokalemia   K 3.9    CAD s/p CABG  Pacer in-situ  VHD  Patient is also known valvular heart disease. Cardiology following  Discussed with cardiology about interrogating pacer. Acute Normocytic Anemia 2/2 possibly Post-operative loss and hemodilution 2/2 fluid overload   Hb was 11 on 1/31/2023. 1U PRBC this admission. Hb 9.1 today. No signs of overt bleeding     Diet ADULT ORAL NUTRITION SUPPLEMENT; Breakfast, Lunch; Clear Liquid Oral Supplement  ADULT ORAL NUTRITION SUPPLEMENT; Lunch, Dinner; Standard High Calorie/High Protein Oral Supplement  ADULT DIET;  Dysphagia - Pureed   DVT Prophylaxis [] Lovenox, [x]  Heparin, [] SCDs, [] Ambulation,    [] Eliquis, [] Xarelto  [] Coumadin [] other   Code Status DNR-CCA   Disposition From: Home  Expected Disposition: [place]  Estimated Date of Discharge: 2/9/2023     Surrogate Decision Maker/ POA      Subjective: Chief Complaint: Chest Pain (X 3 days )     Patient seen and examined in the AM. Patient's sister at bedside. Pt. Is lethargic but oriented X 3 today. Still on heated high flow nasal canula. States she feels tired. Discussed with cardiology - plan for pacer interrogation - no abnormlaity noted    Review of Systems:    Review of Systems   Constitutional:  Positive for fatigue. Negative for chills, fever and unexpected weight change. Eyes:  Negative for pain and visual disturbance. Respiratory:  Negative for cough, choking, chest tightness, shortness of breath, wheezing and stridor. Cardiovascular:  Negative for chest pain, palpitations and leg swelling. Gastrointestinal:  Negative for abdominal distention, abdominal pain, blood in stool, constipation, diarrhea, nausea and vomiting. Genitourinary:  Negative for decreased urine volume, dysuria, frequency, hematuria and urgency. Musculoskeletal:  Negative for arthralgias, back pain and myalgias. Skin:  Negative for pallor and rash. Neurological:  Negative for dizziness, tremors, syncope, speech difficulty, weakness, light-headedness, numbness and headaches. Psychiatric/Behavioral:  Negative for agitation. Objective: Intake/Output Summary (Last 24 hours) at 2/10/2023 1534  Last data filed at 2/10/2023 0645  Gross per 24 hour   Intake --   Output 905 ml   Net -905 ml          Vitals:   Vitals:    02/10/23 1232   BP: (!) 115/46   Pulse: 62   Resp: 24   Temp: 97.7 °F (36.5 °C)   SpO2: 94%       Physical Exam:     Physical Exam  Vitals reviewed. Constitutional:       General: She is awake. She is not in acute distress. Appearance: Normal appearance. She is overweight. She is not ill-appearing. Interventions: Nasal cannula in place. Comments: Heated high flow    HENT:      Head: Normocephalic and atraumatic. Mouth/Throat:      Mouth: Mucous membranes are moist.      Pharynx: Oropharynx is clear.    Eyes: Extraocular Movements: Extraocular movements intact. Conjunctiva/sclera: Conjunctivae normal.      Pupils: Pupils are equal, round, and reactive to light. Cardiovascular:      Rate and Rhythm: Normal rate. Rhythm irregularly irregular. Pulses: Normal pulses. Heart sounds: Normal heart sounds. Comments: Pacer is not capturing all beats - pacer spikes are all over the place  Pulmonary:      Effort: Pulmonary effort is normal. Tachypnea present. Breath sounds: Decreased air movement and transmitted upper airway sounds present. No wheezing, rhonchi or rales. Abdominal:      General: Abdomen is protuberant. Bowel sounds are normal.      Palpations: Abdomen is soft. Musculoskeletal:         General: No swelling. Normal range of motion. Cervical back: Normal range of motion and neck supple. Skin:     General: Skin is warm and dry. Neurological:      Mental Status: She is oriented to person, place, and time. She is lethargic. Comments: Oriented to year, not month   Psychiatric:         Behavior: Behavior is cooperative.        Medications:   Medications:    piperacillin-tazobactam  3,375 mg IntraVENous Q8H    sulfamethoxazole-trimethoprim (BACTRIM) IVPB  400 mg IntraVENous Q8H    sodium chloride flush  5-40 mL IntraVENous BID    thiamine  100 mg IntraVENous Daily    melatonin  3 mg Oral Nightly    mupirocin   Topical BID    heparin (porcine)  5,000 Units SubCUTAneous 3 times per day    [Held by provider] gabapentin  300 mg Oral BID    memantine  5 mg Oral BID    rosuvastatin  10 mg Oral QPM    lidocaine PF  5 mL IntraDERmal Once    sodium chloride flush  5-40 mL IntraVENous 2 times per day    pantoprazole  40 mg IntraVENous Daily    guaiFENesin  600 mg Oral BID    polyethylene glycol  17 g Oral Daily    sodium chloride flush  5-40 mL IntraVENous 2 times per day    docusate sodium  100 mg Oral BID    donepezil  10 mg Oral Nightly    metoprolol tartrate  25 mg Oral BID Infusions:    sodium chloride 12.5 mL/hr at 02/04/23 0324    sodium chloride 25 mL/hr (02/06/23 1429)     PRN Meds: HYDROcodone-acetaminophen, 1 tablet, Q6H PRN  HYDROmorphone, 0.25 mg, Q3H PRN   Or  HYDROmorphone, 0.5 mg, Q3H PRN  sodium chloride flush, 5-40 mL, PRN  sodium chloride, 500 mL, PRN  sodium chloride flush, 5-40 mL, PRN  sodium chloride, , PRN  ondansetron, 4 mg, Q8H PRN   Or  ondansetron, 4 mg, Q6H PRN  acetaminophen, 650 mg, Q6H PRN   Or  acetaminophen, 650 mg, Q6H PRN  ipratropium, 1 spray, PRN      Labs      Recent Results (from the past 24 hour(s))   Culture, Body Fluid    Collection Time: 02/09/23  5:45 PM    Specimen: Abscess; Body Fluid   Result Value Ref Range    Specimen ABSCESS MEDIAL ABD     Special Requests Medial abdominal abscess drain     Culture       Prelim Report Growth too young. further report to follow   Culture, Body Fluid    Collection Time: 02/09/23  6:00 PM    Specimen: Abdomen; Body Fluid   Result Value Ref Range    Specimen ABDOMEN LATERAL     Special Requests Lateral abdominal abscess drain     Culture       Prelim Report Growth too young.  further report to follow   CBC with Auto Differential    Collection Time: 02/10/23 11:42 AM   Result Value Ref Range    WBC 10.5 4.0 - 10.5 K/CU MM    RBC 2.90 (L) 4.2 - 5.4 M/CU MM    Hemoglobin 7.8 (L) 12.5 - 16.0 GM/DL    Hematocrit 24.5 (L) 37 - 47 %    MCV 84.5 78 - 100 FL    MCH 26.9 (L) 27 - 31 PG    MCHC 31.8 (L) 32.0 - 36.0 %    RDW 14.0 11.7 - 14.9 %    Platelets 503 591 - 504 K/CU MM    MPV 9.2 7.5 - 11.1 FL    Metamyelocytes Relative 1 (H) 0.0 %    Segs Relative 87.0 (H) 36 - 66 %    Eosinophils % 2.0 0 - 3 %    Lymphocytes % 8.0 (L) 24 - 44 %    Monocytes % 2.0 0 - 4 %    Metamyelocytes Absolute 0.11 K/CU MM    Segs Absolute 9.2 K/CU MM    Eosinophils Absolute 0.2 K/CU MM    Lymphocytes Absolute 0.8 K/CU MM    Monocytes Absolute 0.2 K/CU MM    Differential Type MANUAL DIFFERENTIAL    Comprehensive Metabolic Panel Collection Time: 02/10/23 11:42 AM   Result Value Ref Range    Sodium 129 (L) 135 - 145 MMOL/L    Potassium 4.3 3.5 - 5.1 MMOL/L    Chloride 90 (L) 99 - 110 mMol/L    CO2 31 21 - 32 MMOL/L    BUN 32 (H) 6 - 23 MG/DL    Creatinine 1.3 (H) 0.6 - 1.1 MG/DL    Est, Glom Filt Rate 42 (L) >60 mL/min/1.73m2    Glucose 158 (H) 70 - 99 MG/DL    Calcium 8.6 8.3 - 10.6 MG/DL    Albumin 3.0 (L) 3.4 - 5.0 GM/DL    Total Protein 5.2 (L) 6.4 - 8.2 GM/DL    Total Bilirubin 0.5 0.0 - 1.0 MG/DL    ALT 11 10 - 40 U/L    AST 26 15 - 37 IU/L    Alkaline Phosphatase 106 40 - 129 IU/L    Anion Gap 8 4 - 16        Imaging/Diagnostics Last 24 Hours   XR CHEST PORTABLE    Result Date: 2/2/2023  EXAMINATION: ONE XRAY VIEW OF THE CHEST 2/2/2023 1:52 pm COMPARISON: 02/02/2023, 01/30/2023 HISTORY: ORDERING SYSTEM PROVIDED HISTORY: shortness of breath TECHNOLOGIST PROVIDED HISTORY: Reason for exam:->shortness of breath Reason for Exam: shortness of breath FINDINGS: Sternotomy wires. Prosthetic cardiac valve. Pacemaker wires. Lung volumes. Bibasilar opacities. No pneumothorax. Heart size is stable. Low lung volumes with bibasilar opacities which may represent atelectasis or pneumonia. XR CHEST PORTABLE    Result Date: 2/2/2023  EXAMINATION: ONE X-RAY VIEW OF THE CHEST 2/2/2023 10:49 am COMPARISON: CT chest 01/30/2023, chest radiograph 01/30/2023 HISTORY: ORDERING SYSTEM PROVIDED HISTORY: Shortness of breath TECHNOLOGIST PROVIDED HISTORY: Reason for exam:->Shortness of breath Reason for Exam: shortness of breath FINDINGS: The lungs are underinflated, resulting in vascular crowding and subsegmental atelectasis. The cardiomediastinal silhouette is obscured, but likely unchanged. Bibasilar consolidations favor atelectasis. The left upper lobe is obscured. No new focal consolidation, pneumothorax, or right-sided pleural effusion.   There is blunting of the left costophrenic angle, which may be due to low lung volumes and/or patient positioning. Osseous structures are diffusely demineralized and grossly unchanged. Left chest cardiac conduction device is again noted. Low lung volumes with likely bibasilar atelectasis versus developing infection. Comment: Please note this report has been produced using speech recognition software and may contain errors related to that system including errors in grammar, punctuation, and spelling, as well as words and phrases that may be inappropriate. If there are any questions or concerns please feel free to contact the dictating provider for clarification.      Electronically signed by Lela Li MD on 2/10/2023 at 3:34 PM

## 2023-02-10 NOTE — PROGRESS NOTES
Occupational Therapy Treatment Note  Name: Alyce Cerda MRN: [id number] :   1945   Date:  2/10/2023   Admission Date: 2023 Room:  [address] [place] [address]-A     Primary Problem:  The primary encounter diagnosis was Chest pain, unspecified type. Diagnoses of Right sided abdominal pain and Cholecystitis were also pertinent to this visit. Past Medical History:   Diagnosis Date    Anxiety     Arthritis     knees    Atrial fibrillation (HCC)     CAD (coronary artery disease)     Sees Dr. Marino Garcia    COVID-19 2021    Diabetes mellitus ([facility] [address]     H/O cardiac catheterization 2018    severe 3 vessel disease, refer to CV surgery for CABG and MAZE    H/O cardiovascular stress test 2018    EF47%  fixed perfusion defect ant wall, pt in afib    H/O echocardiogram 2018    EF55% mod MR    H/O echocardiogram 2018    EF 50-55%, Mild : AR, MR & TR.    H/O echocardiogram 2020    EF 55%, Breast artifact noted. H/O three vessel coronary artery bypass 2018    Dr. Santa Steven    History of Holter monitoring 10/23/2018    Multiple PAF episodes noted. Tachy-Dinesh episodes noted. History of nuclear stress test 2020    EF 55%, Breast artifact.     Hyperlipidemia     Hypertension     Memory loss     on Aricept    Missing teeth, acquired     Pneumonia     pneumococcal pneumonia twice at end of     Risk for falls     uses walker    TIA (transient ischemic attack)     family doctors said she has had mini-strokes    Urinary leakage     UTI (urinary tract infection)     recent --just stopped antibiotic last week as of 18    Wears glasses          Communication with other providers:  RN ok'd Tx, CoTx with PT for pt safety and tolerance, RN handoff     Subjective:  Patient states:  \"I think I can\"  Pain: declined   Restrictions: general, fall, tele, contact ISO, AVRIL drains   Sisters at bedside    Objective:    Observation:  pt was in bed upon arrival, agreeable to session to get cleaned up at EOB.     Treatment, including education:    Therapeutic Activity Training:   Therapeutic activity training was instructed today.  Cues were given for safety, sequence, UE/LE placement, visual cues, and balance.    Activities performed today included:    Bed mobility:  Pt completed sup to sit with MaxAx2 and max cues throughout for sequencing.   Pt returned to supine with MaxAx2 and cues provided throughout.   Pt repositioned to HOB with DEPx2.    Scooting:  Pt required MaxA via chuckpad to scoot hips anterior to EOB.    Seated balance:  Pt tolerated EOB ~30mins with MaxA and short bouts of Dennis. Pt provided cues for posture and pt unable to maintain. Pt demo retro + L lateral lean. Pt benefited from [facility] assist to reach hand to bed rail to assist with balance.     Self Care Training:   Self care training was performed today.  Cues were given for safety, sequence, UE/LE placement, visual cues, and balance.    Activities performed today included:    Facial hygiene:  Pt washed face at EOB with SetupA, verbal cues, and tactile cues for initiation and sequncing.     LB bathing:  Pt washed thighs and OT managed distal BLE and feet. Pt required ModA overall to complete for LB bathing. Occurred at EOB with verbal and tactile cues for initiation and sequencing.     UB bathing:  Pt attempted to initiate UB bathing on LUE. Pt demo poor initiation and sequencing and d/t concern for soaking dressings, pt required MaxA for UB bathing. Occurred at EOB with  verbal and tactile cues for initiation and sequencing.     UB/LB lotion:  Pt donned lotion seated EOB with DEP assist for skin integrity.     UB dressing:  Pt doffed/ donned new gown with MaxA seated EOB.     LB dressing:  Pt doffed/donned new bilat nonslip socks seated EOB with DEP assist.     D/t progressive fatigue, pt returned to supine and other ADLs completed in semifowlers.     Oral hygiene:  Pt completed in semifowlers with Dennis. Pt required assist for package  management, Torres Martinez assist to reach mouth for initiation, and wiping mouth with towel. Once pt reached mouth with [facility] assist, pt able to maintain without Torres Martinez. Pt required inc time to complete with cues to spit in basin. Pt required total assist to bring to pt in bed and total assist for cleanup. Hair grooming:  Pt engaged in [employer] in semifowlers with MaxA to wash hair. Pt required MaxA and able to demo reaching hand to head to assist some. Therapist dried hair with towel. Pt brushed hair with MaxA d/t fatigue and difficulty with reaching above head. Pt did demo ability to brush a small part of hair with L dominant hand with max verbal + tactile cues. This required inc time for cueing and inc time for initiation/sequencing. Education: Role of OT, OT POC, safety, benefits of EOB/OOB activity, rationale for treatment, importance of frequent mobility, importance to complete things as independent as able, plans to progress     Safety Measures: Gait belt used for safety of pt and therapist, Left in bed, Alarm in place, call light and phone within reach, lines managed, needs met     Assessment / Impression:    Pt demo improved command following this date and followed 1-step commands well with verbal + tactile cues. Pt demo improved praxis this date with BUE, remains a barrier.      Patient's tolerance of treatment: fair   Adverse Reaction: none   Significant change in status and impact:  improved command following this date   Barriers to improvement: strength, endurance, praxis, cognition, pain mgmt     Plan for Next Session:    Continue OT POC    Time in:  1413  Time out:  1506  Timed treatment minutes:  53  Total treatment time:  48    Electronically signed by:    DONNY Bar  License: [id number]  7/07/6287, 3:55 PM

## 2023-02-11 PROBLEM — T81.49XA POSTOPERATIVE ABSCESS: Status: ACTIVE | Noted: 2023-02-11

## 2023-02-11 PROBLEM — G92.8 DRUG-INDUCED ENCEPHALOPATHY: Status: ACTIVE | Noted: 2023-02-11

## 2023-02-11 LAB
ANION GAP SERPL CALCULATED.3IONS-SCNC: 14 MMOL/L (ref 4–16)
BACTERIA: NEGATIVE /HPF
BILIRUBIN URINE: NEGATIVE MG/DL
BLOOD, URINE: ABNORMAL
BUN SERPL-MCNC: 26 MG/DL (ref 6–23)
CALCIUM SERPL-MCNC: 8 MG/DL (ref 8.3–10.6)
CHLORIDE BLD-SCNC: 88 MMOL/L (ref 99–110)
CLARITY: CLEAR
CO2: 27 MMOL/L (ref 21–32)
COLOR: YELLOW
CREAT SERPL-MCNC: 1.3 MG/DL (ref 0.6–1.1)
CRP SERPL HS-MCNC: 92.4 MG/L
DIFFERENTIAL TYPE: ABNORMAL
EOSINOPHILS ABSOLUTE: 0.1 K/CU MM
EOSINOPHILS RELATIVE PERCENT: 1 % (ref 0–3)
GFR SERPL CREATININE-BSD FRML MDRD: 42 ML/MIN/1.73M2
GLUCOSE SERPL-MCNC: 165 MG/DL (ref 70–99)
GLUCOSE, URINE: NEGATIVE MG/DL
HCT VFR BLD CALC: 24.6 % (ref 37–47)
HEMOGLOBIN: 7.8 GM/DL (ref 12.5–16)
KETONES, URINE: NEGATIVE MG/DL
LEUKOCYTE ESTERASE, URINE: ABNORMAL
LYMPHOCYTES ABSOLUTE: 1.2 K/CU MM
LYMPHOCYTES RELATIVE PERCENT: 11 % (ref 24–44)
MAGNESIUM: 2.3 MG/DL (ref 1.8–2.4)
MCH RBC QN AUTO: 26.8 PG (ref 27–31)
MCHC RBC AUTO-ENTMCNC: 31.7 % (ref 32–36)
MCV RBC AUTO: 84.5 FL (ref 78–100)
MONOCYTES ABSOLUTE: 0.9 K/CU MM
MONOCYTES RELATIVE PERCENT: 8 % (ref 0–4)
NITRITE URINE, QUANTITATIVE: NEGATIVE
PDW BLD-RTO: 14 % (ref 11.7–14.9)
PH, URINE: 7 (ref 5–8)
PHOSPHORUS: 4 MG/DL (ref 2.5–4.9)
PLATELET # BLD: 243 K/CU MM (ref 140–440)
PMV BLD AUTO: 9.2 FL (ref 7.5–11.1)
POTASSIUM SERPL-SCNC: 4.3 MMOL/L (ref 3.5–5.1)
PROCALCITONIN SERPL-MCNC: 0.33 NG/ML
PROTEIN UA: NEGATIVE MG/DL
RBC # BLD: 2.91 M/CU MM (ref 4.2–5.4)
RBC URINE: 1 /HPF (ref 0–6)
SEGMENTED NEUTROPHILS ABSOLUTE COUNT: 9.1 K/CU MM
SEGMENTED NEUTROPHILS RELATIVE PERCENT: 80 % (ref 36–66)
SODIUM BLD-SCNC: 129 MMOL/L (ref 135–145)
SPECIFIC GRAVITY UA: <1.005 (ref 1–1.03)
SQUAMOUS EPITHELIAL: 4 /HPF
TRICHOMONAS: NORMAL /HPF
UROBILINOGEN, URINE: 0.2 MG/DL (ref 0.2–1)
WBC # BLD: 11.3 K/CU MM (ref 4–10.5)
WBC UA: 1 /HPF (ref 0–5)
YEAST: NORMAL /HPF

## 2023-02-11 PROCEDURE — 6370000000 HC RX 637 (ALT 250 FOR IP): Performed by: INTERNAL MEDICINE

## 2023-02-11 PROCEDURE — 6360000002 HC RX W HCPCS: Performed by: INTERNAL MEDICINE

## 2023-02-11 PROCEDURE — 6370000000 HC RX 637 (ALT 250 FOR IP): Performed by: PHYSICIAN ASSISTANT

## 2023-02-11 PROCEDURE — APPNB15 APP NON BILLABLE TIME 0-15 MINS: Performed by: NURSE PRACTITIONER

## 2023-02-11 PROCEDURE — 2500000003 HC RX 250 WO HCPCS: Performed by: INTERNAL MEDICINE

## 2023-02-11 PROCEDURE — 2500000003 HC RX 250 WO HCPCS: Performed by: NURSE PRACTITIONER

## 2023-02-11 PROCEDURE — 6370000000 HC RX 637 (ALT 250 FOR IP): Performed by: NURSE PRACTITIONER

## 2023-02-11 PROCEDURE — 2580000003 HC RX 258: Performed by: NURSE PRACTITIONER

## 2023-02-11 PROCEDURE — 6360000002 HC RX W HCPCS: Performed by: NURSE PRACTITIONER

## 2023-02-11 PROCEDURE — 36592 COLLECT BLOOD FROM PICC: CPT

## 2023-02-11 PROCEDURE — 84145 PROCALCITONIN (PCT): CPT

## 2023-02-11 PROCEDURE — 2580000003 HC RX 258: Performed by: SURGERY

## 2023-02-11 PROCEDURE — 86140 C-REACTIVE PROTEIN: CPT

## 2023-02-11 PROCEDURE — 81001 URINALYSIS AUTO W/SCOPE: CPT

## 2023-02-11 PROCEDURE — 84100 ASSAY OF PHOSPHORUS: CPT

## 2023-02-11 PROCEDURE — 2580000003 HC RX 258: Performed by: INTERNAL MEDICINE

## 2023-02-11 PROCEDURE — 94761 N-INVAS EAR/PLS OXIMETRY MLT: CPT

## 2023-02-11 PROCEDURE — 85007 BL SMEAR W/DIFF WBC COUNT: CPT

## 2023-02-11 PROCEDURE — 99024 POSTOP FOLLOW-UP VISIT: CPT | Performed by: NURSE PRACTITIONER

## 2023-02-11 PROCEDURE — 6360000002 HC RX W HCPCS: Performed by: STUDENT IN AN ORGANIZED HEALTH CARE EDUCATION/TRAINING PROGRAM

## 2023-02-11 PROCEDURE — 83735 ASSAY OF MAGNESIUM: CPT

## 2023-02-11 PROCEDURE — 2140000000 HC CCU INTERMEDIATE R&B

## 2023-02-11 PROCEDURE — 85027 COMPLETE CBC AUTOMATED: CPT

## 2023-02-11 PROCEDURE — 80048 BASIC METABOLIC PNL TOTAL CA: CPT

## 2023-02-11 PROCEDURE — C9113 INJ PANTOPRAZOLE SODIUM, VIA: HCPCS | Performed by: STUDENT IN AN ORGANIZED HEALTH CARE EDUCATION/TRAINING PROGRAM

## 2023-02-11 RX ADMIN — SULFAMETHOXAZOLE AND TRIMETHOPRIM 400 MG: 80; 16 INJECTION, SOLUTION, CONCENTRATE INTRAVENOUS at 09:52

## 2023-02-11 RX ADMIN — HYDROMORPHONE HYDROCHLORIDE 0.5 MG: 1 INJECTION, SOLUTION INTRAMUSCULAR; INTRAVENOUS; SUBCUTANEOUS at 01:20

## 2023-02-11 RX ADMIN — PIPERACILLIN AND TAZOBACTAM 3375 MG: 3; .375 INJECTION, POWDER, LYOPHILIZED, FOR SOLUTION INTRAVENOUS at 15:20

## 2023-02-11 RX ADMIN — HEPARIN SODIUM 5000 UNITS: 5000 INJECTION INTRAVENOUS; SUBCUTANEOUS at 06:14

## 2023-02-11 RX ADMIN — Medication: at 09:34

## 2023-02-11 RX ADMIN — SODIUM CHLORIDE, PRESERVATIVE FREE 10 ML: 5 INJECTION INTRAVENOUS at 09:30

## 2023-02-11 RX ADMIN — PIPERACILLIN AND TAZOBACTAM 3375 MG: 3; .375 INJECTION, POWDER, LYOPHILIZED, FOR SOLUTION INTRAVENOUS at 01:25

## 2023-02-11 RX ADMIN — HYDROCODONE BITARTRATE AND ACETAMINOPHEN 1 TABLET: 5; 325 TABLET ORAL at 00:19

## 2023-02-11 RX ADMIN — HYDROCODONE BITARTRATE AND ACETAMINOPHEN 1 TABLET: 5; 325 TABLET ORAL at 09:21

## 2023-02-11 RX ADMIN — SULFAMETHOXAZOLE AND TRIMETHOPRIM 400 MG: 80; 16 INJECTION, SOLUTION, CONCENTRATE INTRAVENOUS at 01:14

## 2023-02-11 RX ADMIN — SULFAMETHOXAZOLE AND TRIMETHOPRIM 132.8 MG: 80; 16 INJECTION, SOLUTION, CONCENTRATE INTRAVENOUS at 18:04

## 2023-02-11 RX ADMIN — Medication: at 09:19

## 2023-02-11 RX ADMIN — PIPERACILLIN AND TAZOBACTAM 3375 MG: 3; .375 INJECTION, POWDER, LYOPHILIZED, FOR SOLUTION INTRAVENOUS at 09:39

## 2023-02-11 RX ADMIN — SODIUM CHLORIDE, PRESERVATIVE FREE 10 ML: 5 INJECTION INTRAVENOUS at 09:21

## 2023-02-11 RX ADMIN — HYDROMORPHONE HYDROCHLORIDE 0.25 MG: 1 INJECTION, SOLUTION INTRAMUSCULAR; INTRAVENOUS; SUBCUTANEOUS at 13:25

## 2023-02-11 RX ADMIN — MEMANTINE HYDROCHLORIDE 5 MG: 5 TABLET ORAL at 09:21

## 2023-02-11 RX ADMIN — GUAIFENESIN 600 MG: 600 TABLET, EXTENDED RELEASE ORAL at 09:33

## 2023-02-11 RX ADMIN — ROSUVASTATIN CALCIUM 10 MG: 5 TABLET, COATED ORAL at 17:59

## 2023-02-11 RX ADMIN — PANTOPRAZOLE SODIUM 40 MG: 40 INJECTION, POWDER, LYOPHILIZED, FOR SOLUTION INTRAVENOUS at 09:18

## 2023-02-11 RX ADMIN — HEPARIN SODIUM 5000 UNITS: 5000 INJECTION INTRAVENOUS; SUBCUTANEOUS at 13:27

## 2023-02-11 RX ADMIN — SODIUM CHLORIDE, PRESERVATIVE FREE 10 ML: 5 INJECTION INTRAVENOUS at 09:31

## 2023-02-11 RX ADMIN — THIAMINE HYDROCHLORIDE 100 MG: 100 INJECTION, SOLUTION INTRAMUSCULAR; INTRAVENOUS at 09:19

## 2023-02-11 ASSESSMENT — PAIN SCALES - GENERAL
PAINLEVEL_OUTOF10: 1
PAINLEVEL_OUTOF10: 8
PAINLEVEL_OUTOF10: 0
PAINLEVEL_OUTOF10: 10

## 2023-02-11 ASSESSMENT — ENCOUNTER SYMPTOMS
ABDOMINAL DISTENTION: 0
STRIDOR: 0
COUGH: 0
DIARRHEA: 0
WHEEZING: 0
CHEST TIGHTNESS: 0
VOMITING: 0
CONSTIPATION: 0
EYE PAIN: 0
SHORTNESS OF BREATH: 0
NAUSEA: 0
ABDOMINAL PAIN: 0
CHOKING: 0
BLOOD IN STOOL: 0
BACK PAIN: 0

## 2023-02-11 ASSESSMENT — PAIN DESCRIPTION - DESCRIPTORS
DESCRIPTORS: OTHER (COMMENT)
DESCRIPTORS: ACHING
DESCRIPTORS: ACHING

## 2023-02-11 ASSESSMENT — PAIN DESCRIPTION - PAIN TYPE
TYPE: ACUTE PAIN
TYPE: ACUTE PAIN

## 2023-02-11 ASSESSMENT — PAIN SCALES - WONG BAKER
WONGBAKER_NUMERICALRESPONSE: 6

## 2023-02-11 ASSESSMENT — PAIN DESCRIPTION - ORIENTATION
ORIENTATION: RIGHT
ORIENTATION: ANTERIOR
ORIENTATION: RIGHT
ORIENTATION: RIGHT
ORIENTATION: LEFT;RIGHT
ORIENTATION: RIGHT

## 2023-02-11 ASSESSMENT — PAIN DESCRIPTION - LOCATION
LOCATION: ABDOMEN

## 2023-02-11 ASSESSMENT — PAIN - FUNCTIONAL ASSESSMENT
PAIN_FUNCTIONAL_ASSESSMENT: ACTIVITIES ARE NOT PREVENTED
PAIN_FUNCTIONAL_ASSESSMENT: ACTIVITIES ARE NOT PREVENTED

## 2023-02-11 ASSESSMENT — PAIN DESCRIPTION - FREQUENCY
FREQUENCY: CONTINUOUS
FREQUENCY: CONTINUOUS

## 2023-02-11 ASSESSMENT — PAIN DESCRIPTION - ONSET
ONSET: PROGRESSIVE
ONSET: PROGRESSIVE

## 2023-02-11 NOTE — PROGRESS NOTES
V2.0  [facility] Hospitalist Progress Note      Name:  Eloise Valladares /Age/Sex: 1945  (76 y.o. female)   MRN & CSN:  [id number] & [id number] Encounter Date/Time: 2023 3:29 PM EST    Location:  3323/7757-[id number] PCP: Zaria Reese MD       Hospital Day: 13    Assessment and Plan:   Eloise Valladares is a 76 y.o. female with pmh of Eloise Valladares is a 76 y.o. female with pmh of CAD s/p CABG, Valvular Heart Disease, s/p Mitral Valve Repair, PFO Closure, Paroxysmal A-Fib, HTN, HLD, DM, TIA, Early Dementia who presents with Chest pain Rt Sided and RUQ Abdominal Pain      Plan:    Sepsis 2/2 likely ESBL UTI without hematuria, Pneumonia, acute cholecystitis  Cholecystitis s/p lap cholecystectomy  Large post-cholecystectomy fluid collection, concern for infection  Patient with potential abnormality as well she did have acute solid cholecystitis status post lap pedro 2023. High risk for postoperative infection requiring MRSA pneumonia coverage. WBC 12, SBP 92, RR 22 initially. Procal trending down 10.51 -> 5.09 -> 0.4 5 -> 0.49 -> 0.32  Urine growing ESBL UTI. MRSA Nares +ve  Patient was doing relatively well, but a day after drain removal patient started to get more altered and intermittently complained of the right quadrant pain. Obtained CT abd pelvis with IV contrast - shows large post cholecsytectomy abscesses. The largest axial dimension is 9 x 11.3 cm and the largest coronal dimension is 8.4 x 10 cm. A 2nd collection can be seen measuring 5.9 x 5.7 cm posteriorly which may communicate with the larger collection. STAT consult placed for IR team.- s/p drain placement x 2   Drain output 1525 ml over 24 hours from 2/10; continue to monitor  General surgery team - following closely again.   Consulted ID - changed abx to zosyn and bactrim   Fluid culture also growing E.coli - follow sensitivity; given the history of ESBL this has risk of being ESBL too - page ID regarding antibiotics if ESBL or if patient's status worsens  Continue to monitor    Acute Encephalopathy 2/2 likely Metabolic and Septic 2/2 hypoxia and ESBL UTI, Iatrogenic 2/2 medication   Was difficult to wake on 2/3/2023  AO X 3 today but lethargic today compared to yesterday again - sister at bedside.   Gabapentin on hold per nephro - agree with it    Cholecystitis s/p lap cholecystectomy   Patient underwent lap pedro on 1/31/2023 with Dr. Valadez.  AVRIL drain removed 2/7/2023  GS on board   Abx for 5 days post drain removal until Feb 12  Change to oral at discharge    Acute hypoxic respiratory failure, resolved  Patient with acute respiratory failure was on nasal cannula.  Became acutely more short of breath.  CT did show what appears to be either fluid overload or viral pneumonia.  25% at 25L/min Heated high flow --> weaned off O2      TAYLOR  Pt with Cr 2 on admission, trended down to 1.5 today. baseline Cr 0.7.   Nephrology on board    Hypochloremic Hyponatremia  Patient with acute worsening hyponatremia to 121 and now back up to 135  Nephrology on board    Acute on chronic HFpEF  Patient with an EF 50% with hypokinetic inferio-lateral wall and basal anterio-septal carson the 45 (become acutely short of breath with progressive pulmonary edema.  Pro-BNP trending up to 24,733  Cardiology on board  Nephrology on board  On aldactone and amiloride    Hypokalemia   K 3.9    CAD s/p CABG  Pacer in-situ  VHD  Patient is also known valvular heart disease.  Cardiology following  Discussed with cardiology about interrogating pacer.     Acute Normocytic Anemia 2/2 possibly Post-operative loss and hemodilution 2/2 fluid overload   Hb was 11 on 1/31/2023. 1U PRBC this admission. Hb 9.1 today. No signs of overt bleeding     Diet ADULT ORAL NUTRITION SUPPLEMENT; Breakfast, Lunch; Clear Liquid Oral Supplement  ADULT ORAL NUTRITION SUPPLEMENT; Lunch, Dinner; Standard High Calorie/High Protein Oral Supplement  ADULT DIET; Dysphagia - Pureed   DVT Prophylaxis [] Lovenox, [x]   Heparin, [] SCDs, [] Ambulation,    [] Eliquis, [] Xarelto  [] Coumadin [] other   Code Status DNR-CCA   Disposition From: Home  Expected Disposition: [place]  Estimated Date of Discharge: 2/9/2023     Surrogate Decision Maker/ POA      Subjective:     Chief Complaint: Chest Pain (X 3 days )     Patient seen and examined in the AM. Patient's sister at bedside. Pt. Is lethargic but oriented X 3 today. Still on heated high flow nasal canula. States she feels tired. Discussed with cardiology - plan for pacer interrogation - no abnormlaity noted    Review of Systems:    Review of Systems   Constitutional:  Positive for fatigue. Negative for chills, fever and unexpected weight change. Eyes:  Negative for pain and visual disturbance. Respiratory:  Negative for cough, choking, chest tightness, shortness of breath, wheezing and stridor. Cardiovascular:  Negative for chest pain, palpitations and leg swelling. Gastrointestinal:  Negative for abdominal distention, abdominal pain, blood in stool, constipation, diarrhea, nausea and vomiting. Genitourinary:  Negative for decreased urine volume, dysuria, frequency, hematuria and urgency. Musculoskeletal:  Negative for arthralgias, back pain and myalgias. Skin:  Negative for pallor and rash. Neurological:  Negative for dizziness, tremors, syncope, speech difficulty, weakness, light-headedness, numbness and headaches. Psychiatric/Behavioral:  Negative for agitation. Objective: Intake/Output Summary (Last 24 hours) at 2/11/2023 1559  Last data filed at 2/11/2023 1538  Gross per 24 hour   Intake 2095.64 ml   Output 6490 ml   Net -4394.36 ml          Vitals:   Vitals:    02/11/23 1355   BP:    Pulse:    Resp: 20   Temp:    SpO2:        Physical Exam:     Physical Exam  Vitals reviewed. Constitutional:       General: She is awake. She is not in acute distress. Appearance: Normal appearance. She is overweight. She is not ill-appearing. Interventions: Nasal cannula in place. Comments: Heated high flow    HENT:      Head: Normocephalic and atraumatic. Mouth/Throat:      Mouth: Mucous membranes are moist.      Pharynx: Oropharynx is clear. Eyes:      Extraocular Movements: Extraocular movements intact. Conjunctiva/sclera: Conjunctivae normal.      Pupils: Pupils are equal, round, and reactive to light. Cardiovascular:      Rate and Rhythm: Normal rate. Rhythm irregularly irregular. Pulses: Normal pulses. Heart sounds: Normal heart sounds. Comments: Pacer is not capturing all beats - pacer spikes are all over the place  Pulmonary:      Effort: Pulmonary effort is normal. Tachypnea present. Breath sounds: Decreased air movement and transmitted upper airway sounds present. No wheezing, rhonchi or rales. Abdominal:      General: Abdomen is protuberant. Bowel sounds are normal.      Palpations: Abdomen is soft. Musculoskeletal:         General: No swelling. Normal range of motion. Cervical back: Normal range of motion and neck supple. Skin:     General: Skin is warm and dry. Neurological:      Mental Status: She is oriented to person, place, and time. She is lethargic. Comments: Oriented to year, not month   Psychiatric:         Behavior: Behavior is cooperative.        Medications:   Medications:    sulfamethoxazole-trimethoprim (BACTRIM) IVPB  2 mg/kg (Adjusted) IntraVENous Q8H    piperacillin-tazobactam  3,375 mg IntraVENous Q8H    sodium chloride flush  5-40 mL IntraVENous BID    thiamine  100 mg IntraVENous Daily    melatonin  3 mg Oral Nightly    mupirocin   Topical BID    heparin (porcine)  5,000 Units SubCUTAneous 3 times per day    [Held by provider] gabapentin  300 mg Oral BID    memantine  5 mg Oral BID    rosuvastatin  10 mg Oral QPM    lidocaine PF  5 mL IntraDERmal Once    sodium chloride flush  5-40 mL IntraVENous 2 times per day    pantoprazole  40 mg IntraVENous Daily    guaiFENesin 600 mg Oral BID    polyethylene glycol  17 g Oral Daily    sodium chloride flush  5-40 mL IntraVENous 2 times per day    docusate sodium  100 mg Oral BID    donepezil  10 mg Oral Nightly    metoprolol tartrate  25 mg Oral BID      Infusions:    sodium chloride 12.5 mL/hr at 02/04/23 0324    sodium chloride Stopped (02/08/23 1146)     PRN Meds: HYDROcodone-acetaminophen, 1 tablet, Q6H PRN  HYDROmorphone, 0.25 mg, Q3H PRN   Or  HYDROmorphone, 0.5 mg, Q3H PRN  sodium chloride flush, 5-40 mL, PRN  sodium chloride, 500 mL, PRN  sodium chloride flush, 5-40 mL, PRN  sodium chloride, , PRN  ondansetron, 4 mg, Q8H PRN   Or  ondansetron, 4 mg, Q6H PRN  acetaminophen, 650 mg, Q6H PRN   Or  acetaminophen, 650 mg, Q6H PRN  ipratropium, 1 spray, PRN      Labs      Recent Results (from the past 24 hour(s))   CBC with Auto Differential    Collection Time: 02/11/23  3:40 AM   Result Value Ref Range    WBC 11.3 (H) 4.0 - 10.5 K/CU MM    RBC 2.91 (L) 4.2 - 5.4 M/CU MM    Hemoglobin 7.8 (L) 12.5 - 16.0 GM/DL    Hematocrit 24.6 (L) 37 - 47 %    MCV 84.5 78 - 100 FL    MCH 26.8 (L) 27 - 31 PG    MCHC 31.7 (L) 32.0 - 36.0 %    RDW 14.0 11.7 - 14.9 %    Platelets 393 478 - 180 K/CU MM    MPV 9.2 7.5 - 11.1 FL    Segs Relative 80.0 (H) 36 - 66 %    Eosinophils % 1.0 0 - 3 %    Lymphocytes % 11.0 (L) 24 - 44 %    Monocytes % 8.0 (H) 0 - 4 %    Segs Absolute 9.1 K/CU MM    Eosinophils Absolute 0.1 K/CU MM    Lymphocytes Absolute 1.2 K/CU MM    Monocytes Absolute 0.9 K/CU MM    Differential Type MANUAL DIFFERENTIAL    Procalcitonin    Collection Time: 02/11/23  3:40 AM   Result Value Ref Range    Procalcitonin 0.325    C-Reactive Protein    Collection Time: 02/11/23  3:40 AM   Result Value Ref Range    CRP High Sensitivity 92.4 (H) <5.0 mg/L   Magnesium    Collection Time: 02/11/23  3:40 AM   Result Value Ref Range    Magnesium 2.3 1.8 - 2.4 mg/dl   Phosphorus    Collection Time: 02/11/23  3:40 AM   Result Value Ref Range    Phosphorus 4.0 2.5 - 4.9 MG/DL   Urinalysis    Collection Time: 02/11/23 11:10 AM   Result Value Ref Range    Color, UA YELLOW YELLOW    Clarity, UA CLEAR CLEAR    Glucose, Urine NEGATIVE NEGATIVE MG/DL    Bilirubin Urine NEGATIVE NEGATIVE MG/DL    Ketones, Urine NEGATIVE NEGATIVE MG/DL    Specific Gravity, UA <1.005 1.001 - 1.035    Blood, Urine SMALL NUMBER OR AMOUNT OBSERVED (A) NEGATIVE    pH, Urine 7.0 5.0 - 8.0    Protein, UA NEGATIVE NEGATIVE MG/DL    Urobilinogen, Urine 0.2 0.2 - 1.0 MG/DL    Nitrite Urine, Quantitative NEGATIVE NEGATIVE    Leukocyte Esterase, Urine TRACE (A) NEGATIVE   Microscopic Urinalysis    Collection Time: 02/11/23 11:10 AM   Result Value Ref Range    RBC, UA 1 0 - 6 /HPF    WBC, UA 1 0 - 5 /HPF    Bacteria, UA NEGATIVE NEGATIVE /HPF    Yeast, UA RARE /HPF    Squam Epithel, UA 4 /HPF    Trichomonas, UA NONE SEEN NONE SEEN /HPF        Imaging/Diagnostics Last 24 Hours   XR CHEST PORTABLE    Result Date: 2/2/2023  EXAMINATION: ONE XRAY VIEW OF THE CHEST 2/2/2023 1:52 pm COMPARISON: 02/02/2023, 01/30/2023 HISTORY: ORDERING SYSTEM PROVIDED HISTORY: shortness of breath TECHNOLOGIST PROVIDED HISTORY: Reason for exam:->shortness of breath Reason for Exam: shortness of breath FINDINGS: Sternotomy wires. Prosthetic cardiac valve. Pacemaker wires. Lung volumes. Bibasilar opacities. No pneumothorax. Heart size is stable. Low lung volumes with bibasilar opacities which may represent atelectasis or pneumonia. XR CHEST PORTABLE    Result Date: 2/2/2023  EXAMINATION: ONE X-RAY VIEW OF THE CHEST 2/2/2023 10:49 am COMPARISON: CT chest 01/30/2023, chest radiograph 01/30/2023 HISTORY: ORDERING SYSTEM PROVIDED HISTORY: Shortness of breath TECHNOLOGIST PROVIDED HISTORY: Reason for exam:->Shortness of breath Reason for Exam: shortness of breath FINDINGS: The lungs are underinflated, resulting in vascular crowding and subsegmental atelectasis. The cardiomediastinal silhouette is obscured, but likely unchanged. Bibasilar consolidations favor atelectasis. The left upper lobe is obscured. No new focal consolidation, pneumothorax, or right-sided pleural effusion. There is blunting of the left costophrenic angle, which may be due to low lung volumes and/or patient positioning. Osseous structures are diffusely demineralized and grossly unchanged. Left chest cardiac conduction device is again noted. Low lung volumes with likely bibasilar atelectasis versus developing infection. Comment: Please note this report has been produced using speech recognition software and may contain errors related to that system including errors in grammar, punctuation, and spelling, as well as words and phrases that may be inappropriate. If there are any questions or concerns please feel free to contact the dictating provider for clarification.      Electronically signed by Oswaldo Sims MD on 2/11/2023 at 3:59 PM

## 2023-02-11 NOTE — PROGRESS NOTES
General Surgery-Dr. Humera Mckeon Day: 15    Chief Complaint on Admission: acute cholecystitis  Late entry    Subjective:     Some confusion, sister at bedside  Tolerated PO, denies N/V  Denies complaints    ROS:  Review of Systems  Negative except as above    Allergies  Patient has no known allergies. Diagnosis Date    Anxiety     Arthritis     knees    Atrial fibrillation (HCC)     CAD (coronary artery disease)     Sees Dr. Elo Gustafson    COVID-19 2021    Diabetes mellitus ([address]     H/O cardiac catheterization 2018    severe 3 vessel disease, refer to CV surgery for CABG and MAZE    H/O cardiovascular stress test 2018    EF47%  fixed perfusion defect ant wall, pt in afib    H/O echocardiogram 2018    EF55% mod MR    H/O echocardiogram 2018    EF 50-55%, Mild : AR, MR & TR.    H/O echocardiogram 2020    EF 55%, Breast artifact noted. H/O three vessel coronary artery bypass 2018    Dr. Son Lacy    History of Holter monitoring 10/23/2018    Multiple PAF episodes noted. Tachy-Dinesh episodes noted. History of nuclear stress test 2020    EF 55%, Breast artifact. Hyperlipidemia     Hypertension     Memory loss     on Aricept    Missing teeth, acquired     Pneumonia     pneumococcal pneumonia twice at end of     Risk for falls     uses walker    TIA (transient ischemic attack)     family doctors said she has had mini-strokes    Urinary leakage     UTI (urinary tract infection)     recent --just stopped antibiotic last week as of 18    Wears glasses        Objective:     Vitals:    23 0730   BP: (!) 108/56   Pulse:    Resp:    Temp: 98 °F (36.7 °C)   SpO2: 96%       TEMPERATURE:  Current -Temp: 98 °F (36.7 °C); Max - Temp  Av °F (36.7 °C)  Min: 97.7 °F (36.5 °C)  Max: 98.1 °F (36.7 °C)    No intake/output data recorded. I/O last 3 completed shifts:   In: 1447.9 [I.V.:301.9; IV [id number]]  Out: 9793 [Urine:3875; Drains:1730]      Physical Exam:  Physical Exam  Vitals reviewed. Constitutional:       Comments: Answering questions appropriately     HENT:      Head: Normocephalic and atraumatic. Eyes:      General:         Right eye: No discharge. Left eye: No discharge. Cardiovascular:      Rate and Rhythm: Normal rate. Abdominal:      Palpations: Abdomen is soft. Tenderness: There is no abdominal tenderness. Musculoskeletal:         General: No swelling. Skin:     General: Skin is warm. Coloration: Skin is not jaundiced. Neurological:      Mental Status: She is alert.          Scheduled Meds:   piperacillin-tazobactam  3,375 mg IntraVENous Q8H    sulfamethoxazole-trimethoprim (BACTRIM) IVPB  400 mg IntraVENous Q8H    sodium chloride flush  5-40 mL IntraVENous BID    thiamine  100 mg IntraVENous Daily    melatonin  3 mg Oral Nightly    mupirocin   Topical BID    heparin (porcine)  5,000 Units SubCUTAneous 3 times per day    [Held by provider] gabapentin  300 mg Oral BID    memantine  5 mg Oral BID    rosuvastatin  10 mg Oral QPM    lidocaine PF  5 mL IntraDERmal Once    sodium chloride flush  5-40 mL IntraVENous 2 times per day    pantoprazole  40 mg IntraVENous Daily    guaiFENesin  600 mg Oral BID    polyethylene glycol  17 g Oral Daily    sodium chloride flush  5-40 mL IntraVENous 2 times per day    docusate sodium  100 mg Oral BID    donepezil  10 mg Oral Nightly    metoprolol tartrate  25 mg Oral BID     ContinuousInfusions:   sodium chloride 12.5 mL/hr at 02/04/23 0324    sodium chloride Stopped (02/08/23 1146)     PRN Meds:HYDROcodone-acetaminophen, HYDROmorphone **OR** HYDROmorphone, sodium chloride flush, sodium chloride, sodium chloride flush, sodium chloride, ondansetron **OR** ondansetron, acetaminophen **OR** acetaminophen, ipratropium      Labs/Imaging Results:   Lab Results   Component Value Date    WBC 11.3 (H) 02/11/2023    HGB 7.8 (L) 02/11/2023    HCT 24.6 (L) 02/11/2023    MCV 84.5 02/11/2023     02/11/2023     Lab Results   Component Value Date     (L) 02/10/2023    K 4.3 02/10/2023    CL 90 (L) 02/10/2023    CO2 31 02/10/2023    BUN 32 (H) 02/10/2023    CREATININE 1.3 (H) 02/10/2023    GLUCOSE 158 (H) 02/10/2023    CALCIUM 8.6 02/10/2023    PROT 5.2 (L) 02/10/2023    LABALBU 3.0 (L) 02/10/2023    BILITOT 0.5 02/10/2023    ALKPHOS 106 02/10/2023    AST 26 02/10/2023    ALT 11 02/10/2023    LABGLOM 42 (L) 02/10/2023    GFRAA >60 07/12/2022     CT head:  STUDY MILDLY DEGRADED BY MOTION ARTIFACT   No noncontrast CT evidence of acute intracranial pathology. Advanced cortical atrophy and white matter microvascular degenerative   changes, heavy atherosclerotic calcification distal internal carotid and   vertebral arteries of uncertain hemodynamic significance. CT A/P:    1. Large post cholecystectomy abscess which extends from the gallbladder   fossa to the subhepatic region and medial and posterior to the liver. The   largest axial dimension is 9 x 11.3 cm and the largest coronal dimension is   8.4 x 10 cm. A 2nd collection can be seen measuring 5.9 x 5.7 cm posteriorly   which may communicate with the larger collection. 2. Constipation and stool impaction in the rectum. 3. Degenerated calcified fibroids. No other acute abnormality. Findings discussed with Dr. Jose Cole on 02/09/2023 at 3:20 p.m. Assessment:       77 y/o F s/p lap pedro on 1/31/23    Plan:       -2 drains placed by IR with thick bloody fluid from both sent to lab, favoring hematoma. -Drains with ss drainage that is tinged bile color in bulbs, per nurse unchanged from yesterday.   -May need HIDA but will watch today  -Labs trending down  -Discussed with nurse to call if any changes to appearance of drain output      Electronically signed by BIRD Rueda - CNP on 2/11/2023 at 9:07 AM

## 2023-02-11 NOTE — PROGRESS NOTES
Late entry    PRN med dilaudid 0.25mg given, patient vocalized pain but unable to describe or rate pain level. x2 AVRIL drains flushed with no resistance.      Bedbath completed and linen changed with new purewick placed on continous suction 40mhg.

## 2023-02-11 NOTE — PROGRESS NOTES
Nephrology Progress Note  2/11/2023 11:45 AM  Subjective: Interval History: Micah Templeton is a 76 y.o. female with doing okay tired weak resting in bed        Data:   Scheduled Meds:   piperacillin-tazobactam  3,375 mg IntraVENous Q8H    sulfamethoxazole-trimethoprim (BACTRIM) IVPB  400 mg IntraVENous Q8H    sodium chloride flush  5-40 mL IntraVENous BID    thiamine  100 mg IntraVENous Daily    melatonin  3 mg Oral Nightly    mupirocin   Topical BID    heparin (porcine)  5,000 Units SubCUTAneous 3 times per day    [Held by provider] gabapentin  300 mg Oral BID    memantine  5 mg Oral BID    rosuvastatin  10 mg Oral QPM    lidocaine PF  5 mL IntraDERmal Once    sodium chloride flush  5-40 mL IntraVENous 2 times per day    pantoprazole  40 mg IntraVENous Daily    guaiFENesin  600 mg Oral BID    polyethylene glycol  17 g Oral Daily    sodium chloride flush  5-40 mL IntraVENous 2 times per day    docusate sodium  100 mg Oral BID    donepezil  10 mg Oral Nightly    metoprolol tartrate  25 mg Oral BID     Continuous Infusions:   sodium chloride 12.5 mL/hr at 02/04/23 0324    sodium chloride Stopped (02/08/23 1146)         CBC   Recent Labs     02/09/23  0040 02/10/23  1142 02/11/23  0340   WBC 15.9* 10.5 11.3*   HGB 8.8* 7.8* 7.8*   HCT 26.7* 24.5* 24.6*    230 243      BMP   Recent Labs     02/09/23  0040 02/10/23  1142 02/11/23  0340   * 129*  --    K 4.2 4.3  --    CL 86* 90*  --    CO2 28 31  --    PHOS 4.8  --  4.0   BUN 33* 32*  --    CREATININE 1.3* 1.3*  --      Hepatic:   Recent Labs     02/09/23  0040 02/10/23  1142   AST 22 26   ALT 8* 11   BILITOT 0.8 0.5   ALKPHOS 102 106     Troponin: No results for input(s): TROPONINI in the last 72 hours. BNP: No results for input(s): BNP in the last 72 hours. Lipids: No results for input(s): CHOL, HDL in the last 72 hours.     Invalid input(s): LDLCALCU  ABGs:   Lab Results   Component Value Date/Time    PO2ART 69 02/09/2023 03:00 PM    FCC2OXU 38.0 02/09/2023 03:00 PM     INR: No results for input(s): INR in the last 72 hours. Renal Labs  Albumin:    Lab Results   Component Value Date/Time    LABALBU 3.0 02/10/2023 11:42 AM     Calcium:    Lab Results   Component Value Date/Time    CALCIUM 8.6 02/10/2023 11:42 AM     Phosphorus:    Lab Results   Component Value Date/Time    PHOS 4.0 02/11/2023 03:40 AM     U/A:    Lab Results   Component Value Date/Time    NITRU NEGATIVE 02/02/2023 12:00 PM    COLORU YELLOW 02/02/2023 12:00 PM    WBCUA 20 02/02/2023 12:00 PM    RBCUA NONE SEEN 02/02/2023 12:00 PM    MUCUS RARE 02/02/2023 12:00 PM    TRICHOMONAS NONE SEEN 02/02/2023 12:00 PM    BACTERIA NEGATIVE 02/02/2023 12:00 PM    CLARITYU CLOUDY 02/02/2023 12:00 PM    SPECGRAV 1.025 02/02/2023 12:00 PM    UROBILINOGEN 0.2 02/02/2023 12:00 PM    BILIRUBINUR NEGATIVE 02/02/2023 12:00 PM    BLOODU MODERATE NUMBER OR AMOUNT OBSERVED 02/02/2023 12:00 PM    KETUA NEGATIVE 02/02/2023 12:00 PM     ABG:    Lab Results   Component Value Date/Time    YQS9MIX 38.0 02/09/2023 03:00 PM    PO2ART 69 02/09/2023 03:00 PM    EFO9IUE 33.2 02/09/2023 03:00 PM     HgBA1c:    Lab Results   Component Value Date/Time    LABA1C 5.6 07/09/2022 04:03 AM     Microalbumen/Creatinine ratio:  No components found for: RUCREAT  TSH:  No results found for: TSH  IRON:    Lab Results   Component Value Date/Time    IRON 28 11/18/2021 05:47 AM     Iron Saturation:  No components found for: PERCENTFE  TIBC:    Lab Results   Component Value Date/Time    TIBC 181 11/18/2021 05:47 AM     FERRITIN:    Lab Results   Component Value Date/Time    FERRITIN 463 11/18/2021 05:47 AM     RPR:  No results found for: RPR  LAURIE:  No results found for: ANATITER, LAURIE  24 Hour Urine for Creatinine Clearance:  No components found for: CREAT4, UHRS10, UTV10      Objective:   I/O: 02/10 0701 - 02/11 0700  In: 1447.9 [I.V.:301.9]  Out: 4700 [Urine:3175; Drains:1525]  I/O last 3 completed shifts: In: 1447.9 [I.V.:301.9;  IV [id number]]  Out: 5605 [Urine:3875; Drains:1730]  No intake/output data recorded. Vitals: BP (!) 108/56   Pulse 60   Temp 98 °F (36.7 °C) (Oral)   Resp 19   Ht 5' 5\" (1.651 m)   Wt 175 lb 4.3 oz (79.5 kg)   SpO2 96%   BMI 29.17 kg/m²  {  General appearance: awake weak  HEENT: Head: Normal, normocephalic, atraumatic.   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

## 2023-02-12 LAB
CRP SERPL HS-MCNC: 63.7 MG/L
CULTURE: ABNORMAL
CULTURE: ABNORMAL
GRAM SMEAR: ABNORMAL
Lab: ABNORMAL
PROCALCITONIN SERPL-MCNC: 0.3 NG/ML
SPECIMEN: ABNORMAL

## 2023-02-12 PROCEDURE — 6360000002 HC RX W HCPCS: Performed by: NURSE PRACTITIONER

## 2023-02-12 PROCEDURE — C9113 INJ PANTOPRAZOLE SODIUM, VIA: HCPCS | Performed by: STUDENT IN AN ORGANIZED HEALTH CARE EDUCATION/TRAINING PROGRAM

## 2023-02-12 PROCEDURE — 84145 PROCALCITONIN (PCT): CPT

## 2023-02-12 PROCEDURE — 2580000003 HC RX 258: Performed by: NURSE PRACTITIONER

## 2023-02-12 PROCEDURE — 36415 COLL VENOUS BLD VENIPUNCTURE: CPT

## 2023-02-12 PROCEDURE — 6360000002 HC RX W HCPCS: Performed by: STUDENT IN AN ORGANIZED HEALTH CARE EDUCATION/TRAINING PROGRAM

## 2023-02-12 PROCEDURE — 6360000002 HC RX W HCPCS: Performed by: INTERNAL MEDICINE

## 2023-02-12 PROCEDURE — 2500000003 HC RX 250 WO HCPCS: Performed by: NURSE PRACTITIONER

## 2023-02-12 PROCEDURE — 2580000003 HC RX 258: Performed by: INTERNAL MEDICINE

## 2023-02-12 PROCEDURE — APPNB15 APP NON BILLABLE TIME 0-15 MINS: Performed by: NURSE PRACTITIONER

## 2023-02-12 PROCEDURE — 2500000003 HC RX 250 WO HCPCS: Performed by: INTERNAL MEDICINE

## 2023-02-12 PROCEDURE — 85025 COMPLETE CBC W/AUTO DIFF WBC: CPT

## 2023-02-12 PROCEDURE — 99024 POSTOP FOLLOW-UP VISIT: CPT | Performed by: NURSE PRACTITIONER

## 2023-02-12 PROCEDURE — 2140000000 HC CCU INTERMEDIATE R&B

## 2023-02-12 PROCEDURE — 6370000000 HC RX 637 (ALT 250 FOR IP): Performed by: SURGERY

## 2023-02-12 PROCEDURE — 6370000000 HC RX 637 (ALT 250 FOR IP): Performed by: PHYSICIAN ASSISTANT

## 2023-02-12 PROCEDURE — 87040 BLOOD CULTURE FOR BACTERIA: CPT

## 2023-02-12 PROCEDURE — 36592 COLLECT BLOOD FROM PICC: CPT

## 2023-02-12 PROCEDURE — 6360000002 HC RX W HCPCS: Performed by: SURGERY

## 2023-02-12 PROCEDURE — 2580000003 HC RX 258: Performed by: SURGERY

## 2023-02-12 PROCEDURE — 86140 C-REACTIVE PROTEIN: CPT

## 2023-02-12 PROCEDURE — 6370000000 HC RX 637 (ALT 250 FOR IP): Performed by: NURSE PRACTITIONER

## 2023-02-12 PROCEDURE — 94761 N-INVAS EAR/PLS OXIMETRY MLT: CPT

## 2023-02-12 PROCEDURE — 6370000000 HC RX 637 (ALT 250 FOR IP): Performed by: INTERNAL MEDICINE

## 2023-02-12 RX ORDER — GUAIFENESIN 200 MG/10ML
300 LIQUID ORAL EVERY 6 HOURS SCHEDULED
Status: DISCONTINUED | OUTPATIENT
Start: 2023-02-12 | End: 2023-02-20 | Stop reason: HOSPADM

## 2023-02-12 RX ADMIN — Medication 3 MG: at 00:31

## 2023-02-12 RX ADMIN — DOCUSATE SODIUM 100 MG: 100 CAPSULE, LIQUID FILLED ORAL at 20:43

## 2023-02-12 RX ADMIN — ONDANSETRON 4 MG: 2 INJECTION INTRAMUSCULAR; INTRAVENOUS at 00:31

## 2023-02-12 RX ADMIN — HEPARIN SODIUM 5000 UNITS: 5000 INJECTION INTRAVENOUS; SUBCUTANEOUS at 12:55

## 2023-02-12 RX ADMIN — MEMANTINE HYDROCHLORIDE 5 MG: 5 TABLET ORAL at 08:57

## 2023-02-12 RX ADMIN — SODIUM CHLORIDE, PRESERVATIVE FREE 10 ML: 5 INJECTION INTRAVENOUS at 08:59

## 2023-02-12 RX ADMIN — GUAIFENESIN 600 MG: 600 TABLET, EXTENDED RELEASE ORAL at 00:31

## 2023-02-12 RX ADMIN — Medication 3 MG: at 20:43

## 2023-02-12 RX ADMIN — PIPERACILLIN AND TAZOBACTAM 3375 MG: 3; .375 INJECTION, POWDER, LYOPHILIZED, FOR SOLUTION INTRAVENOUS at 15:51

## 2023-02-12 RX ADMIN — PIPERACILLIN AND TAZOBACTAM 3375 MG: 3; .375 INJECTION, POWDER, LYOPHILIZED, FOR SOLUTION INTRAVENOUS at 23:57

## 2023-02-12 RX ADMIN — MEMANTINE HYDROCHLORIDE 5 MG: 5 TABLET ORAL at 00:31

## 2023-02-12 RX ADMIN — METOPROLOL TARTRATE 25 MG: 50 TABLET, FILM COATED ORAL at 20:43

## 2023-02-12 RX ADMIN — HEPARIN SODIUM 5000 UNITS: 5000 INJECTION INTRAVENOUS; SUBCUTANEOUS at 00:30

## 2023-02-12 RX ADMIN — PIPERACILLIN AND TAZOBACTAM 3375 MG: 3; .375 INJECTION, POWDER, LYOPHILIZED, FOR SOLUTION INTRAVENOUS at 06:47

## 2023-02-12 RX ADMIN — MEMANTINE HYDROCHLORIDE 5 MG: 5 TABLET ORAL at 20:43

## 2023-02-12 RX ADMIN — HYDROCODONE BITARTRATE AND ACETAMINOPHEN 1 TABLET: 5; 325 TABLET ORAL at 17:36

## 2023-02-12 RX ADMIN — HYDROCODONE BITARTRATE AND ACETAMINOPHEN 1 TABLET: 5; 325 TABLET ORAL at 08:57

## 2023-02-12 RX ADMIN — DOCUSATE SODIUM 100 MG: 100 CAPSULE, LIQUID FILLED ORAL at 08:57

## 2023-02-12 RX ADMIN — DONEPEZIL HYDROCHLORIDE 10 MG: 10 TABLET ORAL at 00:31

## 2023-02-12 RX ADMIN — ROSUVASTATIN CALCIUM 10 MG: 5 TABLET, COATED ORAL at 17:36

## 2023-02-12 RX ADMIN — GUAIFENESIN 300 MG: 100 SOLUTION ORAL at 21:07

## 2023-02-12 RX ADMIN — PANTOPRAZOLE SODIUM 40 MG: 40 INJECTION, POWDER, LYOPHILIZED, FOR SOLUTION INTRAVENOUS at 08:57

## 2023-02-12 RX ADMIN — SULFAMETHOXAZOLE AND TRIMETHOPRIM 132.8 MG: 80; 16 INJECTION, SOLUTION, CONCENTRATE INTRAVENOUS at 13:01

## 2023-02-12 RX ADMIN — Medication: at 00:30

## 2023-02-12 RX ADMIN — HEPARIN SODIUM 5000 UNITS: 5000 INJECTION INTRAVENOUS; SUBCUTANEOUS at 20:43

## 2023-02-12 RX ADMIN — GUAIFENESIN 600 MG: 600 TABLET, EXTENDED RELEASE ORAL at 08:57

## 2023-02-12 RX ADMIN — HEPARIN SODIUM 5000 UNITS: 5000 INJECTION INTRAVENOUS; SUBCUTANEOUS at 06:45

## 2023-02-12 RX ADMIN — DONEPEZIL HYDROCHLORIDE 10 MG: 10 TABLET ORAL at 20:43

## 2023-02-12 RX ADMIN — THIAMINE HYDROCHLORIDE 100 MG: 100 INJECTION, SOLUTION INTRAMUSCULAR; INTRAVENOUS at 08:57

## 2023-02-12 RX ADMIN — ACETAMINOPHEN 650 MG: 325 TABLET ORAL at 00:31

## 2023-02-12 RX ADMIN — SULFAMETHOXAZOLE AND TRIMETHOPRIM 132.8 MG: 80; 16 INJECTION, SOLUTION, CONCENTRATE INTRAVENOUS at 17:42

## 2023-02-12 RX ADMIN — PIPERACILLIN AND TAZOBACTAM 3375 MG: 3; .375 INJECTION, POWDER, LYOPHILIZED, FOR SOLUTION INTRAVENOUS at 00:32

## 2023-02-12 RX ADMIN — SODIUM CHLORIDE, PRESERVATIVE FREE 10 ML: 5 INJECTION INTRAVENOUS at 08:57

## 2023-02-12 RX ADMIN — METOPROLOL TARTRATE 25 MG: 50 TABLET, FILM COATED ORAL at 00:31

## 2023-02-12 ASSESSMENT — ENCOUNTER SYMPTOMS
COUGH: 0
BACK PAIN: 0
BLOOD IN STOOL: 0
ABDOMINAL DISTENTION: 0
CONSTIPATION: 0
EYE PAIN: 0
DIARRHEA: 0
STRIDOR: 0
CHOKING: 0
NAUSEA: 0
WHEEZING: 0
VOMITING: 0
ABDOMINAL PAIN: 0
SHORTNESS OF BREATH: 0
CHEST TIGHTNESS: 0

## 2023-02-12 ASSESSMENT — PAIN SCALES - WONG BAKER
WONGBAKER_NUMERICALRESPONSE: 6

## 2023-02-12 ASSESSMENT — PAIN DESCRIPTION - ONSET: ONSET: ON-GOING

## 2023-02-12 ASSESSMENT — PAIN DESCRIPTION - PAIN TYPE: TYPE: SURGICAL PAIN

## 2023-02-12 ASSESSMENT — PAIN SCALES - GENERAL: PAINLEVEL_OUTOF10: 0

## 2023-02-12 ASSESSMENT — PAIN - FUNCTIONAL ASSESSMENT: PAIN_FUNCTIONAL_ASSESSMENT: ACTIVITIES ARE NOT PREVENTED

## 2023-02-12 ASSESSMENT — PAIN DESCRIPTION - ORIENTATION: ORIENTATION: RIGHT;ANTERIOR

## 2023-02-12 ASSESSMENT — PAIN DESCRIPTION - LOCATION: LOCATION: ABDOMEN

## 2023-02-12 ASSESSMENT — PAIN DESCRIPTION - DESCRIPTORS: DESCRIPTORS: DISCOMFORT

## 2023-02-12 ASSESSMENT — PAIN DESCRIPTION - FREQUENCY: FREQUENCY: CONTINUOUS

## 2023-02-12 NOTE — PROGRESS NOTES
Pt family states bottom of her right foot itches and has been there since 3 pm today. . they want some cream for it messaged MD. MD replied \"Ill look at it tomorrow right now without seeing it I cant order anything\"

## 2023-02-12 NOTE — PROGRESS NOTES
V2.0  [facility] [place]      Name:  Juliana Mistry /Age/Sex: 1945  (76 y.o. female)   MRN & CSN:  [id number] & [id number] Encounter Date/Time: 2023 3:29 PM EST    Location:  [address] [place] [address] PCP: Triston Valverde MD       Hospital Day: 15    Assessment and Plan:   Juliana Mistry is a 76 y.o. female with pmh of Juliana Mistry is a 76 y.o. female with pmh of CAD s/p CABG, Valvular Heart Disease, s/p Mitral Valve Repair, PFO Closure, Paroxysmal A-Fib, HTN, HLD, DM, TIA, Early Dementia who presents with Chest pain Rt Sided and RUQ Abdominal Pain      Plan:    Sepsis 2/2 likely ESBL UTI without hematuria, Pneumonia, acute cholecystitis  Cholecystitis s/p lap cholecystectomy  Large post-cholecystectomy fluid collection, concern for infection  Patient with potential abnormality as well she did have acute solid cholecystitis status post lap pedro 2023. High risk for postoperative infection requiring MRSA pneumonia coverage. WBC 12, SBP 92, RR 22 initially. Procal trending down 10.51 -> 5.09 -> 0.4 5 -> 0.49 -> 0.32  Urine growing ESBL UTI. MRSA Nares +ve  Patient was doing relatively well, but a day after drain removal patient started to get more altered and intermittently complained of the right quadrant pain. Obtained CT abd pelvis with IV contrast - shows large post cholecsytectomy abscesses. The largest axial dimension is 9 x 11.3 cm and the largest coronal dimension is 8.4 x 10 cm. A 2nd collection can be seen measuring 5.9 x 5.7 cm posteriorly which may communicate with the larger collection. STAT consult placed for IR team.- s/p drain placement x 2 -cultures sent today  Drain output 1525 ml over 24 hours from 2/10; continue to monitor  General surgery team - following closely again.   Consulted ID - changed abx to zosyn and bactrim   Fluid culture also growing E.coli - follow sensitivity; given the history of ESBL this has risk of being ESBL too - page ID regarding antibiotics if ESBL or if patient's status worsens  Continue to monitor    Acute Encephalopathy 2/2 likely Metabolic and Septic 2/2 hypoxia and ESBL UTI, Iatrogenic 2/2 medication   Was difficult to wake on 2/3/2023  AO X 3 today but lethargic today compared to yesterday again - sister at bedside. Gabapentin on hold per nephro - agree with it    Cholecystitis s/p lap cholecystectomy   Patient underwent lap pedro on 1/31/2023 with Dr. Aries Bazzi. AVRIL drain removed 2/7/2023  GS on board   Abx for 5 days post drain removal until Feb 12  Change to oral at discharge    Acute hypoxic respiratory failure, resolved  Patient with acute respiratory failure was on nasal cannula. Became acutely more short of breath. CT did show what appears to be either fluid overload or viral pneumonia. 25% at 25L/min Heated high flow --> weaned off O2      TAYLOR  Pt with Cr 2 on admission, trended down to 1.5 today. baseline Cr 0.7. Nephrology on board    Hypochloremic Hyponatremia  Patient with acute worsening hyponatremia to 121 and now back up to 135  Nephrology on board    Acute on chronic HFpEF  Patient with an EF 50% with hypokinetic inferio-lateral wall and basal anterio-septal carson the 45 (become acutely short of breath with progressive pulmonary edema. Pro-BNP trending up to 24,733  Cardiology on board  Nephrology on board  On aldactone and amiloride    Hypokalemia   K 3.9    CAD s/p CABG  Pacer in-situ  VHD  Patient is also known valvular heart disease. Cardiology following  Discussed with cardiology about interrogating pacer. Acute Normocytic Anemia 2/2 possibly Post-operative loss and hemodilution 2/2 fluid overload   Hb was 11 on 1/31/2023. 1U PRBC this admission. Hb 9.1 today. No signs of overt bleeding     Diet ADULT ORAL NUTRITION SUPPLEMENT; Breakfast, Lunch; Clear Liquid Oral Supplement  ADULT ORAL NUTRITION SUPPLEMENT; Lunch, Dinner; Standard High Calorie/High Protein Oral Supplement  ADULT DIET;  Dysphagia - Pureed   DVT Prophylaxis [] Lovenox, [x]  Heparin, [] SCDs, [] Ambulation,    [] Eliquis, [] Xarelto  [] Coumadin [] other   Code Status DNR-CCA   Disposition From: Home  Expected Disposition: [place]  Estimated Date of Discharge: 2/9/2023     Surrogate Decision Maker/ POA      Subjective:     Chief Complaint: Chest Pain (X 3 days )     Patient seen at bedside, sister at bedside, clinically improving, drain was dark reddish colored fluid, drained    Review of Systems:    Review of Systems   Constitutional:  Positive for fatigue. Negative for chills, fever and unexpected weight change. Eyes:  Negative for pain and visual disturbance. Respiratory:  Negative for cough, choking, chest tightness, shortness of breath, wheezing and stridor. Cardiovascular:  Negative for chest pain, palpitations and leg swelling. Gastrointestinal:  Negative for abdominal distention, abdominal pain, blood in stool, constipation, diarrhea, nausea and vomiting. Genitourinary:  Negative for decreased urine volume, dysuria, frequency, hematuria and urgency. Musculoskeletal:  Negative for arthralgias, back pain and myalgias. Skin:  Negative for pallor and rash. Neurological:  Negative for dizziness, tremors, syncope, speech difficulty, weakness, light-headedness, numbness and headaches. Psychiatric/Behavioral:  Negative for agitation. Objective: Intake/Output Summary (Last 24 hours) at 2/12/2023 1443  Last data filed at 2/12/2023 1314  Gross per 24 hour   Intake 1705.64 ml   Output 1740 ml   Net -34.36 ml          Vitals:   Vitals:    02/12/23 1200   BP: (!) 106/53   Pulse: 60   Resp: 19   Temp: 98.5 °F (36.9 °C)   SpO2:        Physical Exam:     Physical Exam  Vitals reviewed. Constitutional:       General: She is awake. She is not in acute distress. Appearance: Normal appearance. She is overweight. She is not ill-appearing. Interventions: Nasal cannula in place.       Comments: Heated high flow    HENT:      Head: Normocephalic and atraumatic. Mouth/Throat:      Mouth: Mucous membranes are moist.      Pharynx: Oropharynx is clear. Eyes:      Extraocular Movements: Extraocular movements intact. Conjunctiva/sclera: Conjunctivae normal.      Pupils: Pupils are equal, round, and reactive to light. Cardiovascular:      Rate and Rhythm: Normal rate. Rhythm irregularly irregular. Pulses: Normal pulses. Heart sounds: Normal heart sounds. Comments: Pacer is not capturing all beats - pacer spikes are all over the place  Pulmonary:      Effort: Pulmonary effort is normal. Tachypnea present. Breath sounds: Decreased air movement and transmitted upper airway sounds present. No wheezing, rhonchi or rales. Abdominal:      General: Abdomen is protuberant. Bowel sounds are normal.      Palpations: Abdomen is soft. Musculoskeletal:         General: No swelling. Normal range of motion. Cervical back: Normal range of motion and neck supple. Skin:     General: Skin is warm and dry. Neurological:      Mental Status: She is oriented to person, place, and time. She is lethargic. Comments: Oriented to year, not month   Psychiatric:         Behavior: Behavior is cooperative.        Medications:   Medications:    sulfamethoxazole-trimethoprim (BACTRIM) IVPB  2 mg/kg (Adjusted) IntraVENous Q8H    piperacillin-tazobactam  3,375 mg IntraVENous Q8H    sodium chloride flush  5-40 mL IntraVENous BID    thiamine  100 mg IntraVENous Daily    melatonin  3 mg Oral Nightly    heparin (porcine)  5,000 Units SubCUTAneous 3 times per day    [Held by provider] gabapentin  300 mg Oral BID    memantine  5 mg Oral BID    rosuvastatin  10 mg Oral QPM    lidocaine PF  5 mL IntraDERmal Once    sodium chloride flush  5-40 mL IntraVENous 2 times per day    pantoprazole  40 mg IntraVENous Daily    guaiFENesin  600 mg Oral BID    polyethylene glycol  17 g Oral Daily    sodium chloride flush  5-40 mL IntraVENous 2 times per day    docusate sodium  100 mg Oral BID    donepezil  10 mg Oral Nightly    metoprolol tartrate  25 mg Oral BID      Infusions:    sodium chloride 12.5 mL/hr at 02/04/23 0324    sodium chloride Stopped (02/08/23 1146)     PRN Meds: HYDROcodone-acetaminophen, 1 tablet, Q6H PRN  HYDROmorphone, 0.25 mg, Q3H PRN   Or  HYDROmorphone, 0.5 mg, Q3H PRN  sodium chloride flush, 5-40 mL, PRN  sodium chloride, 500 mL, PRN  sodium chloride flush, 5-40 mL, PRN  sodium chloride, , PRN  ondansetron, 4 mg, Q8H PRN   Or  ondansetron, 4 mg, Q6H PRN  acetaminophen, 650 mg, Q6H PRN   Or  acetaminophen, 650 mg, Q6H PRN  ipratropium, 1 spray, PRN      Labs      Recent Results (from the past 24 hour(s))   Procalcitonin    Collection Time: 02/12/23  9:15 AM   Result Value Ref Range    Procalcitonin 0.304    C-Reactive Protein    Collection Time: 02/12/23  9:15 AM   Result Value Ref Range    CRP High Sensitivity 63.7 (H) <5.0 mg/L        Imaging/Diagnostics Last 24 Hours   XR CHEST PORTABLE    Result Date: 2/2/2023  EXAMINATION: ONE XRAY VIEW OF THE CHEST 2/2/2023 1:52 pm COMPARISON: 02/02/2023, 01/30/2023 HISTORY: ORDERING SYSTEM PROVIDED HISTORY: shortness of breath TECHNOLOGIST PROVIDED HISTORY: Reason for exam:->shortness of breath Reason for Exam: shortness of breath FINDINGS: Sternotomy wires. Prosthetic cardiac valve. Pacemaker wires. Lung volumes. Bibasilar opacities. No pneumothorax. Heart size is stable. Low lung volumes with bibasilar opacities which may represent atelectasis or pneumonia. XR CHEST PORTABLE    Result Date: 2/2/2023  EXAMINATION: ONE X-RAY VIEW OF THE CHEST 2/2/2023 10:49 am COMPARISON: CT chest 01/30/2023, chest radiograph 01/30/2023 HISTORY: ORDERING SYSTEM PROVIDED HISTORY: Shortness of breath TECHNOLOGIST PROVIDED HISTORY: Reason for exam:->Shortness of breath Reason for Exam: shortness of breath FINDINGS: The lungs are underinflated, resulting in vascular crowding and subsegmental atelectasis.   The cardiomediastinal silhouette is obscured, but likely unchanged. Bibasilar consolidations favor atelectasis. The left upper lobe is obscured. No new focal consolidation, pneumothorax, or right-sided pleural effusion. There is blunting of the left costophrenic angle, which may be due to low lung volumes and/or patient positioning. Osseous structures are diffusely demineralized and grossly unchanged. Left chest cardiac conduction device is again noted. Low lung volumes with likely bibasilar atelectasis versus developing infection. Comment: Please note this report has been produced using speech recognition software and may contain errors related to that system including errors in grammar, punctuation, and spelling, as well as words and phrases that may be inappropriate. If there are any questions or concerns please feel free to contact the dictating provider for clarification.      Electronically signed by Ledy Singh MD on 2/12/2023 at 2:43 PM

## 2023-02-12 NOTE — PROGRESS NOTES
General Surgery-Dr. Liane Pierre Day: 12    Chief Complaint on Admission: acute cholecystitis  Late entry    Subjective:     Some confusion, sister at bedside  Tolerated PO, denies N/V  Denies complaints    ROS:  Review of Systems  Negative except as above    Allergies  Patient has no known allergies. Diagnosis Date    Anxiety     Arthritis     knees    Atrial fibrillation (HCC)     CAD (coronary artery disease)     Sees Dr. Alek Taylor    COVID-19 2021    Diabetes mellitus ([address]     H/O cardiac catheterization 2018    severe 3 vessel disease, refer to CV surgery for CABG and MAZE    H/O cardiovascular stress test 2018    EF47%  fixed perfusion defect ant wall, pt in afib    H/O echocardiogram 2018    EF55% mod MR    H/O echocardiogram 2018    EF 50-55%, Mild : AR, MR & TR.    H/O echocardiogram 2020    EF 55%, Breast artifact noted. H/O three vessel coronary artery bypass 2018    Dr. Stuart Butts    History of Holter monitoring 10/23/2018    Multiple PAF episodes noted. Tachy-Dinesh episodes noted. History of nuclear stress test 2020    EF 55%, Breast artifact. Hyperlipidemia     Hypertension     Memory loss     on Aricept    Missing teeth, acquired     Pneumonia     pneumococcal pneumonia twice at end of     Risk for falls     uses walker    TIA (transient ischemic attack)     family doctors said she has had mini-strokes    Urinary leakage     UTI (urinary tract infection)     recent --just stopped antibiotic last week as of 18    Wears glasses        Objective:     Vitals:    23 0850   BP: (!) 128/46   Pulse: 66   Resp: 24   Temp: 98.1 °F (36.7 °C)   SpO2: 93%       TEMPERATURE:  Current -Temp: 98.1 °F (36.7 °C); Max - Temp  Av.1 °F (36.7 °C)  Min: 98 °F (36.7 °C)  Max: 98.2 °F (36.8 °C)    No intake/output data recorded. I/O last 3 completed shifts:   In: 2215.6 [P.O.:480; I.V.:150; IV Piggyback:1585.6]  Out: 4728 [WFTWI:6966; Drains:215]      Physical Exam:  Physical Exam  Vitals reviewed. HENT:      Head: Normocephalic and atraumatic. Eyes:      General:         Right eye: No discharge. Left eye: No discharge. Cardiovascular:      Rate and Rhythm: Normal rate. Abdominal:      Palpations: Abdomen is soft. Tenderness: There is no abdominal tenderness. Comments: AVRIL drains x 2 RUQ   Musculoskeletal:         General: No swelling. Skin:     General: Skin is warm. Coloration: Skin is not jaundiced. Neurological:      Mental Status: She is alert.          Scheduled Meds:   sulfamethoxazole-trimethoprim (BACTRIM) IVPB  2 mg/kg (Adjusted) IntraVENous Q8H    piperacillin-tazobactam  3,375 mg IntraVENous Q8H    sodium chloride flush  5-40 mL IntraVENous BID    thiamine  100 mg IntraVENous Daily    melatonin  3 mg Oral Nightly    heparin (porcine)  5,000 Units SubCUTAneous 3 times per day    [Held by provider] gabapentin  300 mg Oral BID    memantine  5 mg Oral BID    rosuvastatin  10 mg Oral QPM    lidocaine PF  5 mL IntraDERmal Once    sodium chloride flush  5-40 mL IntraVENous 2 times per day    pantoprazole  40 mg IntraVENous Daily    guaiFENesin  600 mg Oral BID    polyethylene glycol  17 g Oral Daily    sodium chloride flush  5-40 mL IntraVENous 2 times per day    docusate sodium  100 mg Oral BID    donepezil  10 mg Oral Nightly    metoprolol tartrate  25 mg Oral BID     ContinuousInfusions:   sodium chloride 12.5 mL/hr at 02/04/23 0324    sodium chloride Stopped (02/08/23 1146)     PRN Meds:HYDROcodone-acetaminophen, HYDROmorphone **OR** HYDROmorphone, sodium chloride flush, sodium chloride, sodium chloride flush, sodium chloride, ondansetron **OR** ondansetron, acetaminophen **OR** acetaminophen, ipratropium      Labs/Imaging Results:   Lab Results   Component Value Date    WBC 11.3 (H) 02/11/2023    HGB 7.8 (L) 02/11/2023    HCT 24.6 (L) 02/11/2023    MCV 84.5 02/11/2023     02/11/2023     Lab Results   Component Value Date     (L) 02/11/2023    K 4.3 02/11/2023    CL 88 (L) 02/11/2023    CO2 27 02/11/2023    BUN 26 (H) 02/11/2023    CREATININE 1.3 (H) 02/11/2023    GLUCOSE 165 (H) 02/11/2023    CALCIUM 8.0 (L) 02/11/2023    PROT 5.2 (L) 02/10/2023    LABALBU 3.0 (L) 02/10/2023    BILITOT 0.5 02/10/2023    ALKPHOS 106 02/10/2023    AST 26 02/10/2023    ALT 11 02/10/2023    LABGLOM 42 (L) 02/11/2023    GFRAA >60 07/12/2022     CT head:  STUDY MILDLY DEGRADED BY MOTION ARTIFACT   No noncontrast CT evidence of acute intracranial pathology. Advanced cortical atrophy and white matter microvascular degenerative   changes, heavy atherosclerotic calcification distal internal carotid and   vertebral arteries of uncertain hemodynamic significance. CT A/P:    1. Large post cholecystectomy abscess which extends from the gallbladder   fossa to the subhepatic region and medial and posterior to the liver. The   largest axial dimension is 9 x 11.3 cm and the largest coronal dimension is   8.4 x 10 cm. A 2nd collection can be seen measuring 5.9 x 5.7 cm posteriorly   which may communicate with the larger collection. 2. Constipation and stool impaction in the rectum. 3. Degenerated calcified fibroids. No other acute abnormality. Findings discussed with Dr. Luis Melvin on 02/09/2023 at 3:20 p.m. Assessment:       75 y/o F s/p lap pedro on 1/31/23    Plan:       -2 drains placed by IR with thick bloody fluid from both sent to lab, favoring hematoma.    -Drains with dark red blood in bulb, flushed drain, ss output  -May need HIDA  -Labs ordered  -Discussed with nurse to call if any changes to appearance of drain output    Electronically signed by BIRD Meadows - CNP on 2/12/2023 at 9:10 AM

## 2023-02-13 ENCOUNTER — APPOINTMENT (OUTPATIENT)
Dept: GENERAL RADIOLOGY | Age: 78
DRG: 853 | End: 2023-02-13
Payer: MEDICARE

## 2023-02-13 LAB
ALBUMIN SERPL-MCNC: 3.2 GM/DL (ref 3.4–5)
ALP BLD-CCNC: 114 IU/L (ref 40–128)
ALT SERPL-CCNC: 9 U/L (ref 10–40)
ANION GAP SERPL CALCULATED.3IONS-SCNC: 9 MMOL/L (ref 4–16)
AST SERPL-CCNC: 25 IU/L (ref 15–37)
BACTERIA: NEGATIVE /HPF
BASOPHILS ABSOLUTE: 0 K/CU MM
BASOPHILS RELATIVE PERCENT: 0.4 % (ref 0–1)
BILIRUB SERPL-MCNC: 0.3 MG/DL (ref 0–1)
BILIRUBIN URINE: NEGATIVE
BLOOD, URINE: NORMAL
BUN SERPL-MCNC: 27 MG/DL (ref 6–23)
CALCIUM SERPL-MCNC: 8.9 MG/DL (ref 8.3–10.6)
CHLORIDE BLD-SCNC: 93 MMOL/L (ref 99–110)
CLARITY: CLEAR
CO2: 27 MMOL/L (ref 21–32)
COLOR: YELLOW
CREAT SERPL-MCNC: 1.5 MG/DL (ref 0.6–1.1)
CRP SERPL HS-MCNC: 53.3 MG/L
DIFFERENTIAL TYPE: ABNORMAL
EOSINOPHILS ABSOLUTE: 0.3 K/CU MM
EOSINOPHILS RELATIVE PERCENT: 2.7 % (ref 0–3)
GFR SERPL CREATININE-BSD FRML MDRD: 35 ML/MIN/1.73M2
GLUCOSE SERPL-MCNC: 85 MG/DL (ref 70–99)
GLUCOSE, URINE: NEGATIVE MG/DL
HCT VFR BLD CALC: 23.9 % (ref 37–47)
HEMOGLOBIN: 7.8 GM/DL (ref 12.5–16)
IMMATURE NEUTROPHIL %: 2.2 % (ref 0–0.43)
KETONES, URINE: NEGATIVE MG/DL
LEUKOCYTE ESTERASE, URINE: NORMAL
LYMPHOCYTES ABSOLUTE: 1.2 K/CU MM
LYMPHOCYTES RELATIVE PERCENT: 12.5 % (ref 24–44)
MCH RBC QN AUTO: 27.2 PG (ref 27–31)
MCHC RBC AUTO-ENTMCNC: 32.6 % (ref 32–36)
MCV RBC AUTO: 83.3 FL (ref 78–100)
MONOCYTES ABSOLUTE: 0.7 K/CU MM
MONOCYTES RELATIVE PERCENT: 7.3 % (ref 0–4)
NITRITE URINE, QUANTITATIVE: NEGATIVE
NUCLEATED RBC %: 0 %
PDW BLD-RTO: 14.3 % (ref 11.7–14.9)
PH, URINE: 7
PLATELET # BLD: 247 K/CU MM (ref 140–440)
PMV BLD AUTO: 9.4 FL (ref 7.5–11.1)
POTASSIUM SERPL-SCNC: 5.7 MMOL/L (ref 3.5–5.1)
PROTEIN UA: NORMAL MG/DL
RBC # BLD: 2.87 M/CU MM (ref 4.2–5.4)
RBC URINE: 1 /HPF
SEGMENTED NEUTROPHILS ABSOLUTE COUNT: 6.9 K/CU MM
SEGMENTED NEUTROPHILS RELATIVE PERCENT: 74.9 % (ref 36–66)
SODIUM BLD-SCNC: 129 MMOL/L (ref 135–145)
SPECIFIC GRAVITY UA: <1.005
SQUAMOUS EPITHELIAL: 1 /HPF
TOTAL IMMATURE NEUTOROPHIL: 0.2 K/CU MM
TOTAL NUCLEATED RBC: 0 K/CU MM
TOTAL PROTEIN: 5.1 GM/DL (ref 6.4–8.2)
TRICHOMONAS: NORMAL /HPF
UROBILINOGEN, URINE: 0.2 MG/DL
WBC # BLD: 9.2 K/CU MM (ref 4–10.5)
WBC UA: 6 /HPF

## 2023-02-13 PROCEDURE — C9113 INJ PANTOPRAZOLE SODIUM, VIA: HCPCS | Performed by: STUDENT IN AN ORGANIZED HEALTH CARE EDUCATION/TRAINING PROGRAM

## 2023-02-13 PROCEDURE — 84156 ASSAY OF PROTEIN URINE: CPT

## 2023-02-13 PROCEDURE — 97535 SELF CARE MNGMENT TRAINING: CPT

## 2023-02-13 PROCEDURE — 2580000003 HC RX 258: Performed by: NURSE PRACTITIONER

## 2023-02-13 PROCEDURE — 82570 ASSAY OF URINE CREATININE: CPT

## 2023-02-13 PROCEDURE — 81001 URINALYSIS AUTO W/SCOPE: CPT

## 2023-02-13 PROCEDURE — 97112 NEUROMUSCULAR REEDUCATION: CPT

## 2023-02-13 PROCEDURE — 97530 THERAPEUTIC ACTIVITIES: CPT

## 2023-02-13 PROCEDURE — 2580000003 HC RX 258: Performed by: INTERNAL MEDICINE

## 2023-02-13 PROCEDURE — 86140 C-REACTIVE PROTEIN: CPT

## 2023-02-13 PROCEDURE — 94761 N-INVAS EAR/PLS OXIMETRY MLT: CPT

## 2023-02-13 PROCEDURE — 6370000000 HC RX 637 (ALT 250 FOR IP): Performed by: STUDENT IN AN ORGANIZED HEALTH CARE EDUCATION/TRAINING PROGRAM

## 2023-02-13 PROCEDURE — 84300 ASSAY OF URINE SODIUM: CPT

## 2023-02-13 PROCEDURE — 99232 SBSQ HOSP IP/OBS MODERATE 35: CPT | Performed by: NURSE PRACTITIONER

## 2023-02-13 PROCEDURE — 92526 ORAL FUNCTION THERAPY: CPT | Performed by: SPEECH-LANGUAGE PATHOLOGIST

## 2023-02-13 PROCEDURE — 36592 COLLECT BLOOD FROM PICC: CPT

## 2023-02-13 PROCEDURE — 6370000000 HC RX 637 (ALT 250 FOR IP): Performed by: NURSE PRACTITIONER

## 2023-02-13 PROCEDURE — 6360000002 HC RX W HCPCS: Performed by: INTERNAL MEDICINE

## 2023-02-13 PROCEDURE — 80053 COMPREHEN METABOLIC PANEL: CPT

## 2023-02-13 PROCEDURE — 71045 X-RAY EXAM CHEST 1 VIEW: CPT

## 2023-02-13 PROCEDURE — 6370000000 HC RX 637 (ALT 250 FOR IP): Performed by: INTERNAL MEDICINE

## 2023-02-13 PROCEDURE — 6370000000 HC RX 637 (ALT 250 FOR IP): Performed by: PHYSICIAN ASSISTANT

## 2023-02-13 PROCEDURE — 6370000000 HC RX 637 (ALT 250 FOR IP): Performed by: SURGERY

## 2023-02-13 PROCEDURE — 2580000003 HC RX 258: Performed by: SURGERY

## 2023-02-13 PROCEDURE — 2500000003 HC RX 250 WO HCPCS: Performed by: NURSE PRACTITIONER

## 2023-02-13 PROCEDURE — 6360000002 HC RX W HCPCS: Performed by: NURSE PRACTITIONER

## 2023-02-13 PROCEDURE — 2500000003 HC RX 250 WO HCPCS: Performed by: INTERNAL MEDICINE

## 2023-02-13 PROCEDURE — 6360000002 HC RX W HCPCS: Performed by: STUDENT IN AN ORGANIZED HEALTH CARE EDUCATION/TRAINING PROGRAM

## 2023-02-13 PROCEDURE — 85025 COMPLETE CBC W/AUTO DIFF WBC: CPT

## 2023-02-13 PROCEDURE — 2140000000 HC CCU INTERMEDIATE R&B

## 2023-02-13 RX ORDER — SODIUM POLYSTYRENE SULFONATE 15 G/60ML
15 SUSPENSION ORAL; RECTAL ONCE
Status: COMPLETED | OUTPATIENT
Start: 2023-02-13 | End: 2023-02-13

## 2023-02-13 RX ADMIN — DONEPEZIL HYDROCHLORIDE 10 MG: 10 TABLET ORAL at 21:20

## 2023-02-13 RX ADMIN — HEPARIN SODIUM 5000 UNITS: 5000 INJECTION INTRAVENOUS; SUBCUTANEOUS at 12:57

## 2023-02-13 RX ADMIN — HYDROCODONE BITARTRATE AND ACETAMINOPHEN 1 TABLET: 5; 325 TABLET ORAL at 21:20

## 2023-02-13 RX ADMIN — GUAIFENESIN 300 MG: 100 SOLUTION ORAL at 17:27

## 2023-02-13 RX ADMIN — SODIUM CHLORIDE, PRESERVATIVE FREE 10 ML: 5 INJECTION INTRAVENOUS at 21:23

## 2023-02-13 RX ADMIN — PIPERACILLIN AND TAZOBACTAM 3375 MG: 3; .375 INJECTION, POWDER, LYOPHILIZED, FOR SOLUTION INTRAVENOUS at 21:29

## 2023-02-13 RX ADMIN — METOPROLOL TARTRATE 25 MG: 50 TABLET, FILM COATED ORAL at 09:16

## 2023-02-13 RX ADMIN — METOPROLOL TARTRATE 25 MG: 50 TABLET, FILM COATED ORAL at 21:20

## 2023-02-13 RX ADMIN — SODIUM ZIRCONIUM CYCLOSILICATE 10 G: 10 POWDER, FOR SUSPENSION ORAL at 21:20

## 2023-02-13 RX ADMIN — Medication 3 MG: at 21:20

## 2023-02-13 RX ADMIN — GUAIFENESIN 300 MG: 100 SOLUTION ORAL at 12:57

## 2023-02-13 RX ADMIN — HEPARIN SODIUM 5000 UNITS: 5000 INJECTION INTRAVENOUS; SUBCUTANEOUS at 06:31

## 2023-02-13 RX ADMIN — MEMANTINE HYDROCHLORIDE 5 MG: 5 TABLET ORAL at 09:16

## 2023-02-13 RX ADMIN — DOCUSATE SODIUM 100 MG: 100 CAPSULE, LIQUID FILLED ORAL at 09:16

## 2023-02-13 RX ADMIN — HEPARIN SODIUM 5000 UNITS: 5000 INJECTION INTRAVENOUS; SUBCUTANEOUS at 21:20

## 2023-02-13 RX ADMIN — MEMANTINE HYDROCHLORIDE 5 MG: 5 TABLET ORAL at 21:20

## 2023-02-13 RX ADMIN — ROSUVASTATIN CALCIUM 10 MG: 5 TABLET, COATED ORAL at 17:26

## 2023-02-13 RX ADMIN — SODIUM POLYSTYRENE SULFONATE 15 G: 15 SUSPENSION ORAL; RECTAL at 09:31

## 2023-02-13 RX ADMIN — SULFAMETHOXAZOLE AND TRIMETHOPRIM 132.8 MG: 80; 16 INJECTION, SOLUTION, CONCENTRATE INTRAVENOUS at 17:26

## 2023-02-13 RX ADMIN — SODIUM CHLORIDE, PRESERVATIVE FREE 10 ML: 5 INJECTION INTRAVENOUS at 09:17

## 2023-02-13 RX ADMIN — PIPERACILLIN AND TAZOBACTAM 3375 MG: 3; .375 INJECTION, POWDER, LYOPHILIZED, FOR SOLUTION INTRAVENOUS at 09:15

## 2023-02-13 RX ADMIN — SULFAMETHOXAZOLE AND TRIMETHOPRIM 132.8 MG: 80; 16 INJECTION, SOLUTION, CONCENTRATE INTRAVENOUS at 09:34

## 2023-02-13 RX ADMIN — SODIUM CHLORIDE, PRESERVATIVE FREE 10 ML: 5 INJECTION INTRAVENOUS at 21:24

## 2023-02-13 RX ADMIN — THIAMINE HYDROCHLORIDE 100 MG: 100 INJECTION, SOLUTION INTRAMUSCULAR; INTRAVENOUS at 09:16

## 2023-02-13 RX ADMIN — GUAIFENESIN 300 MG: 100 SOLUTION ORAL at 06:31

## 2023-02-13 RX ADMIN — SULFAMETHOXAZOLE AND TRIMETHOPRIM 132.8 MG: 80; 16 INJECTION, SOLUTION, CONCENTRATE INTRAVENOUS at 01:34

## 2023-02-13 RX ADMIN — PANTOPRAZOLE SODIUM 40 MG: 40 INJECTION, POWDER, LYOPHILIZED, FOR SOLUTION INTRAVENOUS at 09:16

## 2023-02-13 RX ADMIN — DOCUSATE SODIUM 100 MG: 100 CAPSULE, LIQUID FILLED ORAL at 21:20

## 2023-02-13 ASSESSMENT — ENCOUNTER SYMPTOMS
ABDOMINAL DISTENTION: 0
WHEEZING: 0
EYE PAIN: 0
NAUSEA: 0
DIARRHEA: 0
VOMITING: 0
STRIDOR: 0
BLOOD IN STOOL: 0
CONSTIPATION: 0
COUGH: 0
BACK PAIN: 0
ABDOMINAL PAIN: 0
SHORTNESS OF BREATH: 0
CHOKING: 0
CHEST TIGHTNESS: 0

## 2023-02-13 ASSESSMENT — PAIN SCALES - GENERAL
PAINLEVEL_OUTOF10: 0
PAINLEVEL_OUTOF10: 7

## 2023-02-13 NOTE — CARE COORDINATION
D/c plan is to return to [facility] when medically ready. NO pre-cert required. Rapid covid needed on day of d/c. D/C INSTRUCTIONS ARE ON FRONT OF PACKET LOCATED WITH SOFT CHART.   TE

## 2023-02-13 NOTE — PROGRESS NOTES
Nephrology Progress Note  2/13/2023 1:32 PM        Subjective:   Admit Date: 1/30/2023  PCP: Monroe Benson MD    Interval History: Patient seen in early morning, this is a late entry  Also weekend events briefly reviewed    Diet: Unsure    ROS: She was alert awake and oriented without any acute distress  She denied any shortness of breath, PND or orthopnea this morning  Urine output recorded little more than 2 L for the last 24 hours  No fever blood pressure acceptable    Data:     Current meds:    guaiFENesin  300 mg Oral 4 times per day    sulfamethoxazole-trimethoprim (BACTRIM) IVPB  2 mg/kg (Adjusted) IntraVENous Q8H    piperacillin-tazobactam  3,375 mg IntraVENous Q8H    sodium chloride flush  5-40 mL IntraVENous BID    thiamine  100 mg IntraVENous Daily    melatonin  3 mg Oral Nightly    heparin (porcine)  5,000 Units SubCUTAneous 3 times per day    [Held by provider] gabapentin  300 mg Oral BID    memantine  5 mg Oral BID    rosuvastatin  10 mg Oral QPM    lidocaine PF  5 mL IntraDERmal Once    sodium chloride flush  5-40 mL IntraVENous 2 times per day    pantoprazole  40 mg IntraVENous Daily    polyethylene glycol  17 g Oral Daily    sodium chloride flush  5-40 mL IntraVENous 2 times per day    docusate sodium  100 mg Oral BID    donepezil  10 mg Oral Nightly    metoprolol tartrate  25 mg Oral BID      sodium chloride 12.5 mL/hr at 02/04/23 0324    sodium chloride Stopped (02/08/23 1146)         I/O last 3 completed shifts:  In: -   Out: 8804 [Urine:3250; Drains:95]    CBC:   Recent Labs     02/11/23  0340 02/13/23  0610   WBC 11.3* 9.2   HGB 7.8* 7.8*    247          Recent Labs     02/11/23  0340 02/13/23  0610   * 129*   K 4.3 5.7*   CL 88* 93*   CO2 27 27   BUN 26* 27*   CREATININE 1.3* 1.5*   GLUCOSE 165* 85       Lab Results   Component Value Date    CALCIUM 8.9 02/13/2023    PHOS 4.0 02/11/2023       Objective:     Vitals: BP (!) 108/51   Pulse 60   Temp 97.2 °F (36.2 °C) (Oral) Resp 24   Ht 5' 5\" (1.651 m)   Wt 175 lb (79.4 kg)   SpO2 96%   BMI 29.12 kg/m² ,    General appearance: Thin, she was alert awake and oriented this morning  HEENT: Very minimal conjunctival pallor this morning  Neck: Supple, head of the bed elevated about 30 degrees  Lungs: I had only minimal anterior exam has coarse breath sound  Heart: Seemed irregular, well-healed incision from previous sternotomy, left chest wall implanted cardiac device  Abdomen: Soft, tender, AVRIL drain  Extremities: Trace leg edema      Problem List :         Impression :     Stage III acute kidney disease-nonoliguric-potassium, and creatinine is up this morning along with BUN-we will do stepwise work-up again-starting with bladder scan-also she is on Bactrim-which can cause high potassium and high creatinine by blocking-tubular potassium and creatinine secretion respectively-but she can have another acute kidney injury also-we will also get a urine urinary indicis-unlikely she has pulmonary edema but we will keep that in mind-other etiology could be acute bleeding  Recent acute decompensated heart failure complicated by cholecystitis requiring surgery-which is also complicated by intra-abdominal bleeding abscess etc.  She does have diabetes and valvular disease  Low sodium could be either hypo or hypervolemia but I think this could be from hypo-    Recommendation/Plan  :     Start with UA, urinary indicis and bladder scan if there is a feasible alternative agent than Bactrim-May try that-oral Vergie Clint for the time being-chest x-ray and proBNP level tomorrow morning-follow clinically and biochemically      Gustavo Wong MD MD

## 2023-02-13 NOTE — PROGRESS NOTES
Infectious Disease Progress Note  2023   Patient Name: Micah Check : 1945   Impression  Sepsis Secondary to ESBL E.coli Postop Intra-Abdominal Abscess and:  ESBL E.coli Complicated UTI:  MRSA Screen Positive:  Acute Metabolic Encephalopathy: Resolved  Afebrile with no leukocytosis, CRP on DWT, Pct consistently at a plateau but on DWT  2/0-ZU 0/2 NGTD  -BC 0/2-NGTD  -MRSA Screen Positive (de-colonized)  -Urine culture: ESBL E.coli  -S/p per Dr. Juan Chatterjee: [facility] Bile. DX:  acute on chronic cholecystitis. Hydrops. -CT A&P W IV Contrast: 1. Large post cholecystectomy abscess which extends from the gallbladder   fossa to the subhepatic region and medial and posterior to the liver. The   largest axial dimension is 9 x 11.3 cm and the largest coronal dimension is   8.4 x 10 cm. A 2nd collection can be seen measuring 5.9 x 5.7 cm posteriorly   which may communicate with the larger collection. 2. Constipation and stool impaction in the rectum. 3. Degenerated calcified fibroids. No other acute abnormality. Findings discussed with Dr. Kristin Serrano on 2023 at 3:20 p.m.    -S/p per IR: CT Drainage of abscess:  removed 9.5 cc old bloody fluid. AVRIL intact. Cultures: ESBL E.coli  [facility]:  Dr. Alma Coulter onboard  DMII:  CAD and VHD s/p CABG with MVR 2018/ HTN/ HLD/ PFO Closure/ PAF with PPM 2018:  Dr. Mau Hubbard onboard for EP  Dr. Martha Staples onboard for cardiology      S/p TIA, Wheel Chair Bound:  Multi-morbidity: per PMHx:  anxiety     Plan:  Continue IV Zosn 3,375 mg q8h  Continue IV Bactrim 5 mg/kg q8h (covers ESBL E.coli)  Plan to treat with current therapy x 7 days past removal of AVRIL drains  Trend CRP and Pct, ordered  DW patient's sister, at bedside, the plan of care    Ongoing Antimicrobial Therapy  Zosyn 2/2-4, 10-  Bactrim 2/10-? Completed Antimicrobial Therapy  Vancomycin 2/2-6  Meropenem -10  History:? Interval history noted.   Chief complaint: sepsis secondary to postop choley intra-abdominal abscess. Denies n/v/d/f or untoward effects of antibiotics  Physical Exam:  Vital Signs: BP (!) 108/51   Pulse 60   Temp 97.2 °F (36.2 °C) (Oral)   Resp 24   Ht 5' 5\" (1.651 m)   Wt 175 lb (79.4 kg)   SpO2 96%   BMI 29.12 kg/m²     Gen: more alert, oriented x 2  Wounds: C/D/I abdominal incisions well approximated. VARIL x 2 from right-side of abdomen draining dark red/brown fluid. Chest: no distress and CTA. Good air movement. Room air. Heart: PPM Capture rate 60 and no MRG. Abd: soft, non-distended, RUQ tenderness to light palpation, no hepatomegaly. Normoactive bowel sounds. Ext: no clubbing, cyanosis, or edema  External Catheter Site: without erythema or tenderness draining clear yellow urine  Neuro: Mental status intact. CN 2-12 intact and no focal sensory or motor deficits     Radiologic / Imaging / TESTING  1/30/2023 XR Chest Portable:  Impression   1. Low lung volumes with left basilar atelectasis. 1/30/2023 CT Chest W Contrast:  Impression   1. Bibasilar atelectasis. 2. Cardiomegaly with enlarged central pulmonary arteries likely due to   pulmonary arterial hypertension. 3. Distended gallbladder. This could be just due to a nonfasting state. However, I do raise the possibility of acute cholecystitis. 4. Fibroid uterus. 1/30/2023 CT Abdomen Pelvis W IV Contrast:  Impression   1. Bibasilar atelectasis. 2. Cardiomegaly with enlarged central pulmonary arteries likely due to   pulmonary arterial hypertension. 3. Distended gallbladder. This could be just due to a nonfasting state. However, I do raise the possibility of acute cholecystitis. 4. Fibroid uterus. 1/30/2023 US Abdomen Limited:  Impression   Nonspecific gallbladder distension without shadowing calculus. However, when   correlated to the CT scan findings are suspicious for acute cholecystitis. Correlate with nuclear medicine hepatic biliary scan.       1/30/2023 NM Hepatobiliary:  Impression The gallbladder is not visualized by a 3 hours post injection. The patient   refused further imaging (4 hour post-injection is a standard endpoint). This   can be seen with acute or chronic cholecystitis. 2/6/2023 XR Chest Portable:  Impression   Low lung volumes with likely bibasilar atelectasis versus developing   infection. 2/2/2023 XR Chest Portable:  Impression   Low lung volumes with bibasilar opacities which may represent atelectasis or   pneumonia. 2/2/2023 XR Chest Portable:  Impression   1. Right upper extremity PICC tip overlies the superior cavoatrial junction   level. 2. Stable cardiomegaly. 3. Low lung volumes with bibasilar atelectasis or infiltrate, greater left   than right. Pneumonia is a consideration. 4. Probable small volume left pleural effusion. 5. Stable sequela from open-heart surgery and left-sided pacemaker. 2/5/2023 XR Chest Portable:  Impression   1. Congestive heart failure is most likely given the radiographic findings;   pneumonia is also a consideration in areas of consolidation with pleural   effusion. 2. Calcific atherosclerosis aorta. 3. Cardiomegaly. 2/7/2023 XR Chest Portable;  Impression   Slight interval improvement in pulmonary edema. 2/9/2023 CT Head WO Contrast:  Impression   STUDY MILDLY DEGRADED BY MOTION ARTIFACT   No noncontrast CT evidence of acute intracranial pathology. Advanced cortical atrophy and white matter microvascular degenerative   changes, heavy atherosclerotic calcification distal internal carotid and   vertebral arteries of uncertain hemodynamic significance. ?  2/9/2023 CT Abdomen Pelvis W IV Contrast:  Impression   1. Large post cholecystectomy abscess which extends from the gallbladder   fossa to the subhepatic region and medial and posterior to the liver. The   largest axial dimension is 9 x 11.3 cm and the largest coronal dimension is   8.4 x 10 cm.   A 2nd collection can be seen measuring 5.9 x 5.7 cm posteriorly   which may communicate with the larger collection. 2. Constipation and stool impaction in the rectum. 3. Degenerated calcified fibroids. No other acute abnormality. Findings discussed with Dr. Yogesh West on 02/09/2023 at 3:20 p.m. RECOMMENDATIONS:   Percutaneous abscess drainage. 2/9/2023 CT Drainage Hematoma/Seroma/Fluid Collection:  Impression   Successful CT guided intra-abdominal fluid drainage as described above. Approximately a total of 9.5 cc of old bloody fluid was removed. The catheter was left to AVRIL suction bulb drainage. 2/10/2023 VL Dup Lower Extremity Venous Left:  Impression   No evidence of DVT in the left lower extremity.         Labs:    Recent Results (from the past 24 hour(s))   C-Reactive Protein    Collection Time: 02/13/23  6:10 AM   Result Value Ref Range    CRP High Sensitivity 53.3 (H) <5.0 mg/L   CBC with Auto Differential    Collection Time: 02/13/23  6:10 AM   Result Value Ref Range    WBC 9.2 4.0 - 10.5 K/CU MM    RBC 2.87 (L) 4.2 - 5.4 M/CU MM    Hemoglobin 7.8 (L) 12.5 - 16.0 GM/DL    Hematocrit 23.9 (L) 37 - 47 %    MCV 83.3 78 - 100 FL    MCH 27.2 27 - 31 PG    MCHC 32.6 32.0 - 36.0 %    RDW 14.3 11.7 - 14.9 %    Platelets 249 631 - 176 K/CU MM    MPV 9.4 7.5 - 11.1 FL    Differential Type AUTOMATED DIFFERENTIAL     Segs Relative 74.9 (H) 36 - 66 %    Lymphocytes % 12.5 (L) 24 - 44 %    Monocytes % 7.3 (H) 0 - 4 %    Eosinophils % 2.7 0 - 3 %    Basophils % 0.4 0 - 1 %    Segs Absolute 6.9 K/CU MM    Lymphocytes Absolute 1.2 K/CU MM    Monocytes Absolute 0.7 K/CU MM    Eosinophils Absolute 0.3 K/CU MM    Basophils Absolute 0.0 K/CU MM    Nucleated RBC % 0.0 %    Total Nucleated RBC 0.0 K/CU MM    Total Immature Neutrophil 0.20 K/CU MM    Immature Neutrophil % 2.2 (H) 0 - 0.43 %   Comprehensive Metabolic Panel    Collection Time: 02/13/23  6:10 AM   Result Value Ref Range    Sodium 129 (L) 135 - 145 MMOL/L    Potassium 5.7 (HH) 3.5 - 5.1 MMOL/L    Chloride 93 (L) 99 - 110 mMol/L    CO2 27 21 - 32 MMOL/L    BUN 27 (H) 6 - 23 MG/DL    Creatinine 1.5 (H) 0.6 - 1.1 MG/DL    Est, Glom Filt Rate 35 (L) >60 mL/min/1.73m2    Glucose 85 70 - 99 MG/DL    Calcium 8.9 8.3 - 10.6 MG/DL    Albumin 3.2 (L) 3.4 - 5.0 GM/DL    Total Protein 5.1 (L) 6.4 - 8.2 GM/DL    Total Bilirubin 0.3 0.0 - 1.0 MG/DL    ALT 9 (L) 10 - 40 U/L    AST 25 15 - 37 IU/L    Alkaline Phosphatase 114 40 - 128 IU/L    Anion Gap 9 4 - 16     CULTURE results: Invalid input(s): BLOOD CULTURE,  URINE CULTURE, SURGICAL CULTURE    Diagnosis:  Patient Active Problem List   Diagnosis    PAF (paroxysmal atrial fibrillation) ([facility])    Essential hypertension    Mixed hyperlipidemia    VHD (valvular heart disease)    Abnormal EKG    Acute blood loss anemia    Uncontrolled pain    Gait disturbance    Pneumonia due to infectious organism    SSS (sick sinus syndrome) ([facility])    S/P placement of cardiac pacemaker    Tachycardia-bradycardia ([address]    Fall at home, subsequent encounter    Acute intracranial hemorrhage ([facility])    Hyponatremia    Compression fracture of L1 lumbar vertebra ([facility])    Intraparenchymal hematoma of brain    COVID-19    CAD (coronary artery disease)    AMS (altered mental status)    Altered mental status    Concussion with no loss of consciousness    Chest pain    Right upper quadrant pain    Acute cholecystitis    Postoperative abscess    Drug-induced encephalopathy       Active Problems  Principal Problem:    Chest pain  Active Problems:    Right upper quadrant pain    Acute cholecystitis    Postoperative abscess    Drug-induced encephalopathy    PAF (paroxysmal atrial fibrillation) ([facility])  Resolved Problems:    * No resolved hospital problems. *    Electronically signed by: Electronically signed by BIRD Diaz CNP on 2/13/2023 at 12:48 PM

## 2023-02-13 NOTE — PROGRESS NOTES
Comprehensive Nutrition Assessment    Type and Reason for Visit:  Reassess    Nutrition Recommendations/Plan:   Consider appetite stimulant   Obtain measured weights   Switch to Clear oral nutrition supplement TID with Frozen oral nutrition supplements   Please change diet to Soft and Bite Sized or as SLP recommendations   Monitor PO intakes, GI status, weights, fluids, labs, lytes, glucose, and plan of care       Malnutrition Assessment:  Malnutrition Status: At risk for malnutrition (Comment) (02/13/23 1800)    Context:  Acute Illness     Findings of the 6 clinical characteristics of malnutrition:  Energy Intake:  75% or less of estimated energy requirements for 7 or more days  Weight Loss:  Unable to assess (Estimated weights)     Body Fat Loss:  Unable to assess     Muscle Mass Loss:  Unable to assess    Fluid Accumulation:  No significant fluid accumulation     Strength:  Not Performed    Nutrition Assessment:    Currently on pureed diet, not eating well. Remains of loss of appetite. Loved one at bedside states pt will change to Soft and Bite Sized per SLP, in hopes to encourage adequate intakes. Pt is drinking supplements and other fluids. Remains high nutrition risk. Nutrition Related Findings:    +total assist feed, pt minimally interactive at visit Wound Type: Surgical Incision       Current Nutrition Intake & Therapies:    Average Meal Intake: 1-25%, 26-50%  Average Supplements Intake: 51-75% (Clears)  ADULT ORAL NUTRITION SUPPLEMENT; Breakfast, Lunch; Clear Liquid Oral Supplement  ADULT ORAL NUTRITION SUPPLEMENT; Lunch, Dinner; Standard High Calorie/High Protein Oral Supplement  ADULT DIET; Dysphagia - Pureed    Anthropometric Measures:  Height: 5' 5\" (165.1 cm)  Ideal Body Weight (IBW): 125 lbs (57 kg)    Admission Body Weight: 173 lb 15.1 oz (78.9 kg)  Current Body Weight: 175 lb 4.3 oz (79.5 kg), 142.7 % IBW.  Weight Source: Bed Scale  Current BMI (kg/m2): 29.2  Usual Body Weight: 154 lb (69.9 kg) (11/2/22)  % Weight Change (Calculated): 15.8  Weight Adjustment For: No Adjustment                 BMI Categories: Overweight (BMI 25.0-29. 9)    Estimated Daily Nutrient Needs:  Energy Requirements Based On: Formula  Weight Used for Energy Requirements: Current  Energy (kcal/day): 2990-5690  Weight Used for Protein Requirements: Ideal  Protein (g/day): 65-75  Method Used for Fluid Requirements: 1 ml/kcal  Fluid (ml/day): 1650    Nutrition Diagnosis:   Inadequate oral intake related to swallowing difficulty, psychological cause or life stress as evidenced by intake 26-50% (intermittent NPO/clears since admission)    Nutrition Interventions:   Food and/or Nutrient Delivery: Modify Current Diet, Modify Oral Nutrition Supplement  Nutrition Education/Counseling: Education not appropriate  Coordination of Nutrition Care: Continue to monitor while inpatient, Feeding Assistance/Environment Change, Speech Therapy, Swallow Evaluation, Coordination of Care  Plan of Care discussed with: General surgery, SLP    Goals:  Previous Goal Met: No Progress toward Goal(s)  Goals: Meet at least 75% of estimated needs       Nutrition Monitoring and Evaluation:      Food/Nutrient Intake Outcomes: Diet Advancement/Tolerance, Food and Nutrient Intake  Physical Signs/Symptoms Outcomes: Biochemical Data, Chewing or Swallowing, Meal Time Behavior, Skin    Discharge Planning:     Too soon to determine     Abelino Jaramillo RD, LD  Contact: [phone]

## 2023-02-13 NOTE — CARE COORDINATION
CM contacted by [facility], they remain able to accept the Pt when medically ready. Pt will not require a precert.

## 2023-02-13 NOTE — PROGRESS NOTES
Physical Therapy    Physical Therapy Treatment Note  Name: Monroe Cannon MRN: [id number] :   1945   Date:  2023   Admission Date: 2023 Room:  [address] [place] [address]   Restrictions/Precautions:          fall risk, contact prec  Communication with other providers:  co tx /c CISNEROS  Subjective:  Patient states:  agreeable to tx  Pain:   Location, Type, Intensity (0/10 to 10/10):  states LE painful but does not numerically rate    Objective:    Observation: supine in bed upon entry  Treatment, including education/measures:  Transfers  Rolling: maxAx1/2  Supine to sit :maxAx1/2  Sit to supine :maxAx1/2  Scooting :maxAx1/2  Several sets of bed mob, rolling and repositioning during cleaning/change. Pt BLE pain and ttp limits tolerance and causs increased time for all activities. Sitting Balance EOB x~12' modAx1/2 throughout, L lat trunk lean displayed and posterior lean/retropulsion /c vc and modA for proper SAURAV, edu for techs to improve posture. Carry over limited. Safety  Patient left safely in the bed, with call light/phone in reach with alarm applied. Gait belt was used for transfers and gait.       Assessment / Impression:    Patient's tolerance of treatment:  Fair   Adverse Reaction: na  Significant change in status and impact:  na  Barriers to improvement:  weakness and endurance  Plan for Next Session:    Cont sitting balance training to improve trunk control to progress toward eventual transfer training when appropriate  Time in:  1043  Time out:  1123  Timed treatment minutes:  40  Total treatment time:  40    Previously filed items:  Social/Functional History  Lives With: Family  Type of Home: Condo  Home Layout: One level  Active : No        Short Term Goals  Time Frame for Short Term Goals: 1 week  Short Term Goal 1: pt to complete all bed mobility mod A x1  Short Term Goal 2: pt to sit EOB 10 minutes CGA with no LOB throughout  Short Term Goal 3: pt to complete stand pivot transfer with LRAD mod A x1  Short Term Goal 4: pt to propel manual WC 25' with min A    Electronically signed by:    Ivett Jamil, ELIANA  2/13/2023, 11:43 AM

## 2023-02-13 NOTE — PROGRESS NOTES
V2.0  [facility] [place] Hospitalist Progress Note      Name:  Benny Almodovar /Age/Sex: 1945  (76 y.o. female)   MRN & CSN:  [id number] & [id number] Encounter Date/Time: 2023 3:29 PM EST    Location:  82/5180-X PCP: Georges Larios MD       Hospital Day: 15    Assessment and Plan:   Benny Almodovar is a 76 y.o. female with pmh of Benny Almodovar is a 76 y.o. female with pmh of CAD s/p CABG, Valvular Heart Disease, s/p Mitral Valve Repair, PFO Closure, Paroxysmal A-Fib, HTN, HLD, DM, TIA, Early Dementia who presents with Chest pain Rt Sided and RUQ Abdominal Pain      Plan:    Sepsis 2/2 likely ESBL UTI without hematuria, Pneumonia, acute cholecystitis  Cholecystitis s/p lap cholecystectomy  Large post-cholecystectomy fluid collection, concern for infection  Patient with potential abnormality as well she did have acute solid cholecystitis status post lap pedro 2023. High risk for postoperative infection requiring MRSA pneumonia coverage. WBC 12, SBP 92, RR 22 initially. Procal trending down 10.51 -> 5.09 -> 0.4 5 -> 0.49 -> 0.32  Urine growing ESBL UTI. MRSA Nares +ve  Patient was doing relatively well, but a day after drain removal patient started to get more altered and intermittently complained of the right quadrant pain. Obtained CT abd pelvis with IV contrast - shows large post cholecsytectomy abscesses. The largest axial dimension is 9 x 11.3 cm and the largest coronal dimension is 8.4 x 10 cm. A 2nd collection can be seen measuring 5.9 x 5.7 cm posteriorly which may communicate with the larger collection. STAT consult placed for IR team.- s/p drain placement x 2 -cultures sent today  General surgery team - following closely again.   Consulted ID - changed abx to zosyn and bactrim   Fluid culture also growing E.coli - follow sensitivity; given the history of ESBL this has risk of being ESBL too - page ID regarding antibiotics if ESBL or if patient's status worsens  Continue to monitor    Acute Encephalopathy 2/2 likely Metabolic and Septic 2/2 hypoxia and ESBL UTI, Iatrogenic 2/2 medication   Was difficult to wake on 2/3/2023  Lethargic and sleepy today  Gabapentin on hold per nephro - agree with it    Cholecystitis s/p lap cholecystectomy   Patient underwent lap pedro on 1/31/2023 with Dr. Natasha Rocha. AVRIL drain removed 2/7/2023  GS on board   Abx for 5 days post drain removal until Feb 12  Change to oral at discharge    Acute hypoxic respiratory failure, resolved  Patient with acute respiratory failure was on nasal cannula. Became acutely more short of breath. CT did show what appears to be either fluid overload or viral pneumonia. 25% at 25L/min Heated high flow --> weaned off O2      TAYLOR with hyperkalemia  Pt with Cr 2 on admission, trended down to 1.5 today. baseline Cr 0.7. Kayexalate ordered  Nephrology on board    Hypochloremic Hyponatremia  Patient with acute worsening hyponatremia to 121 and now back up to 135  Nephrology on board    Acute on chronic HFpEF  Patient with an EF 50% with hypokinetic inferio-lateral wall and basal anterio-septal carson the 45 (become acutely short of breath with progressive pulmonary edema. Pro-BNP trending up to 24,733  Cardiology on board  Nephrology on board  On aldactone and amiloride    Hypokalemia   K 3.9    CAD s/p CABG  Pacer in-situ  VHD  Patient is also known valvular heart disease. Cardiology following  Discussed with cardiology about interrogating pacer. Acute Normocytic Anemia 2/2 possibly Post-operative loss and hemodilution 2/2 fluid overload   Hb was 11 on 1/31/2023. 1U PRBC this admission. Hb 9.1 today. No signs of overt bleeding     Diet ADULT ORAL NUTRITION SUPPLEMENT; Breakfast, Lunch; Clear Liquid Oral Supplement  ADULT ORAL NUTRITION SUPPLEMENT; Lunch, Dinner; Standard High Calorie/High Protein Oral Supplement  ADULT DIET;  Dysphagia - Pureed   DVT Prophylaxis [] Lovenox, [x]  Heparin, [] SCDs, [] Ambulation,    [] Eliquis, [] Xarelto  [] Coumadin [] other   Code Status DNR-CCA   Disposition From: Home  Expected Disposition: [place]  Estimated Date of Discharge: 2/9/2023     Surrogate Decision Maker/ POA      Subjective:     Chief Complaint: Chest Pain (X 3 days )     Patient seen at bedside, lethargic and sleepy today, minimally conversational    Review of Systems:    Review of Systems   Constitutional:  Positive for fatigue. Negative for chills, fever and unexpected weight change. Eyes:  Negative for pain and visual disturbance. Respiratory:  Negative for cough, choking, chest tightness, shortness of breath, wheezing and stridor. Cardiovascular:  Negative for chest pain, palpitations and leg swelling. Gastrointestinal:  Negative for abdominal distention, abdominal pain, blood in stool, constipation, diarrhea, nausea and vomiting. Genitourinary:  Negative for decreased urine volume, dysuria, frequency, hematuria and urgency. Musculoskeletal:  Negative for arthralgias, back pain and myalgias. Skin:  Negative for pallor and rash. Neurological:  Negative for dizziness, tremors, syncope, speech difficulty, weakness, light-headedness, numbness and headaches. Psychiatric/Behavioral:  Negative for agitation. Objective: Intake/Output Summary (Last 24 hours) at 2/13/2023 1506  Last data filed at 2/13/2023 0906  Gross per 24 hour   Intake 240 ml   Output 1605 ml   Net -1365 ml          Vitals:   Vitals:    02/13/23 0847   BP:    Pulse: 60   Resp: 24   Temp:    SpO2: 96%       Physical Exam:     Physical Exam  Vitals reviewed. Constitutional:       General: She is awake. She is not in acute distress. Appearance: Normal appearance. She is overweight. She is not ill-appearing. Interventions: Nasal cannula in place. Comments: Heated high flow    HENT:      Head: Normocephalic and atraumatic. Mouth/Throat:      Mouth: Mucous membranes are moist.      Pharynx: Oropharynx is clear.    Eyes:      Extraocular Movements: Extraocular movements intact. Conjunctiva/sclera: Conjunctivae normal.      Pupils: Pupils are equal, round, and reactive to light. Cardiovascular:      Rate and Rhythm: Normal rate. Rhythm irregularly irregular. Pulses: Normal pulses. Heart sounds: Normal heart sounds. Comments: Pacer is not capturing all beats - pacer spikes are all over the place  Pulmonary:      Effort: Pulmonary effort is normal. Tachypnea present. Breath sounds: Decreased air movement and transmitted upper airway sounds present. No wheezing, rhonchi or rales. Abdominal:      General: Abdomen is protuberant. Bowel sounds are normal.      Palpations: Abdomen is soft. Musculoskeletal:         General: No swelling. Normal range of motion. Cervical back: Normal range of motion and neck supple. Skin:     General: Skin is warm and dry. Neurological:      Mental Status: She is oriented to person, place, and time. She is lethargic. Comments: Oriented to year, not month   Psychiatric:         Behavior: Behavior is cooperative.        Medications:   Medications:    sodium zirconium cyclosilicate  10 g Oral BID    guaiFENesin  300 mg Oral 4 times per day    sulfamethoxazole-trimethoprim (BACTRIM) IVPB  2 mg/kg (Adjusted) IntraVENous Q8H    piperacillin-tazobactam  3,375 mg IntraVENous Q8H    sodium chloride flush  5-40 mL IntraVENous BID    thiamine  100 mg IntraVENous Daily    melatonin  3 mg Oral Nightly    heparin (porcine)  5,000 Units SubCUTAneous 3 times per day    [Held by provider] gabapentin  300 mg Oral BID    memantine  5 mg Oral BID    rosuvastatin  10 mg Oral QPM    lidocaine PF  5 mL IntraDERmal Once    sodium chloride flush  5-40 mL IntraVENous 2 times per day    pantoprazole  40 mg IntraVENous Daily    polyethylene glycol  17 g Oral Daily    sodium chloride flush  5-40 mL IntraVENous 2 times per day    docusate sodium  100 mg Oral BID    donepezil  10 mg Oral Nightly    metoprolol tartrate  25 mg Oral BID      Infusions:    sodium chloride 12.5 mL/hr at 02/04/23 0324    sodium chloride Stopped (02/08/23 1146)     PRN Meds: HYDROcodone-acetaminophen, 1 tablet, Q6H PRN  HYDROmorphone, 0.25 mg, Q3H PRN   Or  HYDROmorphone, 0.5 mg, Q3H PRN  sodium chloride flush, 5-40 mL, PRN  sodium chloride, 500 mL, PRN  sodium chloride flush, 5-40 mL, PRN  sodium chloride, , PRN  ondansetron, 4 mg, Q8H PRN   Or  ondansetron, 4 mg, Q6H PRN  acetaminophen, 650 mg, Q6H PRN   Or  acetaminophen, 650 mg, Q6H PRN  ipratropium, 1 spray, PRN      Labs      Recent Results (from the past 24 hour(s))   C-Reactive Protein    Collection Time: 02/13/23  6:10 AM   Result Value Ref Range    CRP High Sensitivity 53.3 (H) <5.0 mg/L   CBC with Auto Differential    Collection Time: 02/13/23  6:10 AM   Result Value Ref Range    WBC 9.2 4.0 - 10.5 K/CU MM    RBC 2.87 (L) 4.2 - 5.4 M/CU MM    Hemoglobin 7.8 (L) 12.5 - 16.0 GM/DL    Hematocrit 23.9 (L) 37 - 47 %    MCV 83.3 78 - 100 FL    MCH 27.2 27 - 31 PG    MCHC 32.6 32.0 - 36.0 %    RDW 14.3 11.7 - 14.9 %    Platelets 828 229 - 863 K/CU MM    MPV 9.4 7.5 - 11.1 FL    Differential Type AUTOMATED DIFFERENTIAL     Segs Relative 74.9 (H) 36 - 66 %    Lymphocytes % 12.5 (L) 24 - 44 %    Monocytes % 7.3 (H) 0 - 4 %    Eosinophils % 2.7 0 - 3 %    Basophils % 0.4 0 - 1 %    Segs Absolute 6.9 K/CU MM    Lymphocytes Absolute 1.2 K/CU MM    Monocytes Absolute 0.7 K/CU MM    Eosinophils Absolute 0.3 K/CU MM    Basophils Absolute 0.0 K/CU MM    Nucleated RBC % 0.0 %    Total Nucleated RBC 0.0 K/CU MM    Total Immature Neutrophil 0.20 K/CU MM    Immature Neutrophil % 2.2 (H) 0 - 0.43 %   Comprehensive Metabolic Panel    Collection Time: 02/13/23  6:10 AM   Result Value Ref Range    Sodium 129 (L) 135 - 145 MMOL/L    Potassium 5.7 (HH) 3.5 - 5.1 MMOL/L    Chloride 93 (L) 99 - 110 mMol/L    CO2 27 21 - 32 MMOL/L    BUN 27 (H) 6 - 23 MG/DL    Creatinine 1.5 (H) 0.6 - 1.1 MG/DL    Est, Tarun Griggst Rate 35 (L) >60 mL/min/1.73m2    Glucose 85 70 - 99 MG/DL    Calcium 8.9 8.3 - 10.6 MG/DL    Albumin 3.2 (L) 3.4 - 5.0 GM/DL    Total Protein 5.1 (L) 6.4 - 8.2 GM/DL    Total Bilirubin 0.3 0.0 - 1.0 MG/DL    ALT 9 (L) 10 - 40 U/L    AST 25 15 - 37 IU/L    Alkaline Phosphatase 114 40 - 128 IU/L    Anion Gap 9 4 - 16        Imaging/Diagnostics Last 24 Hours   XR CHEST PORTABLE    Result Date: 2/2/2023  EXAMINATION: ONE XRAY VIEW OF THE CHEST 2/2/2023 1:52 pm COMPARISON: 02/02/2023, 01/30/2023 HISTORY: ORDERING SYSTEM PROVIDED HISTORY: shortness of breath TECHNOLOGIST PROVIDED HISTORY: Reason for exam:->shortness of breath Reason for Exam: shortness of breath FINDINGS: Sternotomy wires. Prosthetic cardiac valve. Pacemaker wires. Lung volumes. Bibasilar opacities. No pneumothorax. Heart size is stable. Low lung volumes with bibasilar opacities which may represent atelectasis or pneumonia. XR CHEST PORTABLE    Result Date: 2/2/2023  EXAMINATION: ONE X-RAY VIEW OF THE CHEST 2/2/2023 10:49 am COMPARISON: CT chest 01/30/2023, chest radiograph 01/30/2023 HISTORY: ORDERING SYSTEM PROVIDED HISTORY: Shortness of breath TECHNOLOGIST PROVIDED HISTORY: Reason for exam:->Shortness of breath Reason for Exam: shortness of breath FINDINGS: The lungs are underinflated, resulting in vascular crowding and subsegmental atelectasis. The cardiomediastinal silhouette is obscured, but likely unchanged. Bibasilar consolidations favor atelectasis. The left upper lobe is obscured. No new focal consolidation, pneumothorax, or right-sided pleural effusion. There is blunting of the left costophrenic angle, which may be due to low lung volumes and/or patient positioning. Osseous structures are diffusely demineralized and grossly unchanged. Left chest cardiac conduction device is again noted. Low lung volumes with likely bibasilar atelectasis versus developing infection.        Comment: Please note this report has been produced using speech recognition software and may contain errors related to that system including errors in grammar, punctuation, and spelling, as well as words and phrases that may be inappropriate. If there are any questions or concerns please feel free to contact the dictating provider for clarification.      Electronically signed by Dillon Downs MD on 2/13/2023 at 3:06 PM

## 2023-02-13 NOTE — PROGRESS NOTES
Occupational Therapy    Occupational Therapy Treatment Note    Name: Minh Jhaveri MRN: [id number] :   1945   Date:  2023   Admission Date: 2023 Room:  [address] [place] [address]-A     Primary Problem:    The primary encounter diagnosis was Chest pain, unspecified type. Diagnoses of Right sided abdominal pain and Cholecystitis were also pertinent to this visit. Restrictions/Precautions:          general precautions, fall risk, contact precautions    Communication with other providers: JEFF Dodge     Subjective:  Patient states:  Pt pleasant and agreeable to session. Pain:   Location, Type, Intensity (0/10 to 10/10): L LE, did not rate. Objective:    Observation: Pt presented supine in bed, agreeable to session. Pt's sister present for session. Objective Measures:  Tele, AVRIL drains, IV    Treatment, including education:  Therapeutic Activity Training/Self-Care: Therapeutic activity training was instructed today. Cues were given for safety, sequence, UE/LE placement, awareness, and balance. Activities performed today included bed mobility training, sup-sit. Supine to Sit with Max A X1-2 with cues for initiation. Max A to scoot hips forward on EOB. Pt required Min-Max A for sitting EOB for ~12 min with intermittent spouts of L lateral and posterior leaning with frequent cues to correct. Pt instructed through wt shifting with trunk and  B Ue's while seated EOB to correct L lateral leaning. Sit to Supine with Max A X1-2. Max A X2 to scoot up in bed. Once pt repositioned episode of incontinence observed. Pt rolled R/L for multiple reps with Max A and Mod-Max A to sustain roll for care with pt requiring DEP assist for extensive jairon hygiene and to doff/don depend. Pt required Max A to doff gown and don clean one while supine. Pt required increased time and max cues for technique/initiation for all tasks.      Patient educated on role of OT , benefits of OT and rationale for therapeutic intervention. Educated on need to be patient advocate, benefit of EOB activity. Patient left safely in bed at end of session, with call light in reach, alarm on, and all needs met. Pt's sister at bedside. Assessment / Impression:    Patient's tolerance of treatment: Well  Adverse Reaction: None  Significant change in status and impact: Improved from initial evaluation  Barriers to improvement: None noted      Plan for Next Session:    Cont EOB activity     Time in:  1043  Time out:  1123  Timed treatment minutes:  40  Total treatment time:  40      Electronically signed by:     RYLAND Diamond,   2/13/2023, 11:47 AM

## 2023-02-14 ENCOUNTER — TELEPHONE (OUTPATIENT)
Dept: CARDIOLOGY CLINIC | Age: 78
End: 2023-02-14

## 2023-02-14 LAB
ALBUMIN SERPL-MCNC: 3.1 GM/DL (ref 3.4–5)
ALP BLD-CCNC: 113 IU/L (ref 40–128)
ALT SERPL-CCNC: 9 U/L (ref 10–40)
ANION GAP SERPL CALCULATED.3IONS-SCNC: 9 MMOL/L (ref 4–16)
AST SERPL-CCNC: 23 IU/L (ref 15–37)
BASOPHILS ABSOLUTE: 0.1 K/CU MM
BASOPHILS RELATIVE PERCENT: 0.7 % (ref 0–1)
BILIRUB SERPL-MCNC: 0.4 MG/DL (ref 0–1)
BUN SERPL-MCNC: 23 MG/DL (ref 6–23)
CALCIUM SERPL-MCNC: 9.1 MG/DL (ref 8.3–10.6)
CHLORIDE BLD-SCNC: 91 MMOL/L (ref 99–110)
CO2: 26 MMOL/L (ref 21–32)
CREAT SERPL-MCNC: 1.4 MG/DL (ref 0.6–1.1)
CREATININE URINE: 16.4 MG/DL (ref 28–217)
CRP SERPL HS-MCNC: 47.9 MG/L
DIFFERENTIAL TYPE: ABNORMAL
EOSINOPHILS ABSOLUTE: 0.1 K/CU MM
EOSINOPHILS RELATIVE PERCENT: 2 % (ref 0–3)
GFR SERPL CREATININE-BSD FRML MDRD: 38 ML/MIN/1.73M2
GLUCOSE SERPL-MCNC: 86 MG/DL (ref 70–99)
HCT VFR BLD CALC: 24.9 % (ref 37–47)
HEMOGLOBIN: 7.9 GM/DL (ref 12.5–16)
IMMATURE NEUTROPHIL %: 1.7 % (ref 0–0.43)
LYMPHOCYTES ABSOLUTE: 1.1 K/CU MM
LYMPHOCYTES RELATIVE PERCENT: 16.1 % (ref 24–44)
MAGNESIUM: 2 MG/DL (ref 1.8–2.4)
MCH RBC QN AUTO: 26.8 PG (ref 27–31)
MCHC RBC AUTO-ENTMCNC: 31.7 % (ref 32–36)
MCV RBC AUTO: 84.4 FL (ref 78–100)
MONOCYTES ABSOLUTE: 0.5 K/CU MM
MONOCYTES RELATIVE PERCENT: 7.1 % (ref 0–4)
NUCLEATED RBC %: 0 %
PDW BLD-RTO: 14.2 % (ref 11.7–14.9)
PHOSPHORUS: 4.5 MG/DL (ref 2.5–4.9)
PLATELET # BLD: 281 K/CU MM (ref 140–440)
PMV BLD AUTO: 8.5 FL (ref 7.5–11.1)
POTASSIUM SERPL-SCNC: 4.5 MMOL/L (ref 3.5–5.1)
PRO-BNP: 4172 PG/ML
PROCALCITONIN SERPL-MCNC: 0.25 NG/ML
PROT/CREAT RATIO, UR: 0.8
RBC # BLD: 2.95 M/CU MM (ref 4.2–5.4)
SEGMENTED NEUTROPHILS ABSOLUTE COUNT: 5.1 K/CU MM
SEGMENTED NEUTROPHILS RELATIVE PERCENT: 72.4 % (ref 36–66)
SODIUM BLD-SCNC: 126 MMOL/L (ref 135–145)
SODIUM URINE: 26 MMOL/L (ref 35–167)
TOTAL CK: 46 IU/L (ref 26–140)
TOTAL IMMATURE NEUTOROPHIL: 0.12 K/CU MM
TOTAL NUCLEATED RBC: 0 K/CU MM
TOTAL PROTEIN: 5.3 GM/DL (ref 6.4–8.2)
URINE TOTAL PROTEIN: 13.6 MG/DL
WBC # BLD: 7 K/CU MM (ref 4–10.5)

## 2023-02-14 PROCEDURE — 2580000003 HC RX 258: Performed by: INTERNAL MEDICINE

## 2023-02-14 PROCEDURE — 84145 PROCALCITONIN (PCT): CPT

## 2023-02-14 PROCEDURE — 99231 SBSQ HOSP IP/OBS SF/LOW 25: CPT | Performed by: NURSE PRACTITIONER

## 2023-02-14 PROCEDURE — 2580000003 HC RX 258: Performed by: NURSE PRACTITIONER

## 2023-02-14 PROCEDURE — 36415 COLL VENOUS BLD VENIPUNCTURE: CPT

## 2023-02-14 PROCEDURE — 6370000000 HC RX 637 (ALT 250 FOR IP): Performed by: PHYSICIAN ASSISTANT

## 2023-02-14 PROCEDURE — 80053 COMPREHEN METABOLIC PANEL: CPT

## 2023-02-14 PROCEDURE — 92526 ORAL FUNCTION THERAPY: CPT | Performed by: SPEECH-LANGUAGE PATHOLOGIST

## 2023-02-14 PROCEDURE — 6370000000 HC RX 637 (ALT 250 FOR IP): Performed by: INTERNAL MEDICINE

## 2023-02-14 PROCEDURE — 86140 C-REACTIVE PROTEIN: CPT

## 2023-02-14 PROCEDURE — 94761 N-INVAS EAR/PLS OXIMETRY MLT: CPT

## 2023-02-14 PROCEDURE — 6360000002 HC RX W HCPCS: Performed by: INTERNAL MEDICINE

## 2023-02-14 PROCEDURE — 83735 ASSAY OF MAGNESIUM: CPT

## 2023-02-14 PROCEDURE — 82550 ASSAY OF CK (CPK): CPT

## 2023-02-14 PROCEDURE — 6370000000 HC RX 637 (ALT 250 FOR IP): Performed by: SURGERY

## 2023-02-14 PROCEDURE — 2580000003 HC RX 258: Performed by: SURGERY

## 2023-02-14 PROCEDURE — 84100 ASSAY OF PHOSPHORUS: CPT

## 2023-02-14 PROCEDURE — C9113 INJ PANTOPRAZOLE SODIUM, VIA: HCPCS | Performed by: STUDENT IN AN ORGANIZED HEALTH CARE EDUCATION/TRAINING PROGRAM

## 2023-02-14 PROCEDURE — 2140000000 HC CCU INTERMEDIATE R&B

## 2023-02-14 PROCEDURE — 83880 ASSAY OF NATRIURETIC PEPTIDE: CPT

## 2023-02-14 PROCEDURE — 6360000002 HC RX W HCPCS: Performed by: NURSE PRACTITIONER

## 2023-02-14 PROCEDURE — 2500000003 HC RX 250 WO HCPCS: Performed by: NURSE PRACTITIONER

## 2023-02-14 PROCEDURE — 85025 COMPLETE CBC W/AUTO DIFF WBC: CPT

## 2023-02-14 PROCEDURE — 2500000003 HC RX 250 WO HCPCS: Performed by: INTERNAL MEDICINE

## 2023-02-14 PROCEDURE — 6360000002 HC RX W HCPCS: Performed by: STUDENT IN AN ORGANIZED HEALTH CARE EDUCATION/TRAINING PROGRAM

## 2023-02-14 RX ORDER — FUROSEMIDE 10 MG/ML
20 INJECTION INTRAMUSCULAR; INTRAVENOUS 2 TIMES DAILY
Status: DISCONTINUED | OUTPATIENT
Start: 2023-02-14 | End: 2023-02-20 | Stop reason: HOSPADM

## 2023-02-14 RX ADMIN — HEPARIN SODIUM 5000 UNITS: 5000 INJECTION INTRAVENOUS; SUBCUTANEOUS at 13:31

## 2023-02-14 RX ADMIN — SODIUM CHLORIDE, PRESERVATIVE FREE 10 ML: 5 INJECTION INTRAVENOUS at 20:25

## 2023-02-14 RX ADMIN — SULFAMETHOXAZOLE AND TRIMETHOPRIM 132.8 MG: 80; 16 INJECTION, SOLUTION, CONCENTRATE INTRAVENOUS at 10:58

## 2023-02-14 RX ADMIN — PANTOPRAZOLE SODIUM 40 MG: 40 INJECTION, POWDER, LYOPHILIZED, FOR SOLUTION INTRAVENOUS at 09:09

## 2023-02-14 RX ADMIN — ROSUVASTATIN CALCIUM 10 MG: 5 TABLET, COATED ORAL at 17:57

## 2023-02-14 RX ADMIN — PIPERACILLIN AND TAZOBACTAM 3375 MG: 3; .375 INJECTION, POWDER, LYOPHILIZED, FOR SOLUTION INTRAVENOUS at 13:33

## 2023-02-14 RX ADMIN — HEPARIN SODIUM 5000 UNITS: 5000 INJECTION INTRAVENOUS; SUBCUTANEOUS at 05:13

## 2023-02-14 RX ADMIN — MEMANTINE HYDROCHLORIDE 5 MG: 5 TABLET ORAL at 09:09

## 2023-02-14 RX ADMIN — SODIUM CHLORIDE, PRESERVATIVE FREE 10 ML: 5 INJECTION INTRAVENOUS at 09:09

## 2023-02-14 RX ADMIN — Medication 3 MG: at 20:24

## 2023-02-14 RX ADMIN — FUROSEMIDE 20 MG: 10 INJECTION, SOLUTION INTRAMUSCULAR; INTRAVENOUS at 09:13

## 2023-02-14 RX ADMIN — DOCUSATE SODIUM 100 MG: 100 CAPSULE, LIQUID FILLED ORAL at 20:24

## 2023-02-14 RX ADMIN — PIPERACILLIN AND TAZOBACTAM 3375 MG: 3; .375 INJECTION, POWDER, LYOPHILIZED, FOR SOLUTION INTRAVENOUS at 20:23

## 2023-02-14 RX ADMIN — GUAIFENESIN 300 MG: 100 SOLUTION ORAL at 05:13

## 2023-02-14 RX ADMIN — SULFAMETHOXAZOLE AND TRIMETHOPRIM 132.8 MG: 80; 16 INJECTION, SOLUTION, CONCENTRATE INTRAVENOUS at 02:24

## 2023-02-14 RX ADMIN — SULFAMETHOXAZOLE AND TRIMETHOPRIM 132.8 MG: 80; 16 INJECTION, SOLUTION, CONCENTRATE INTRAVENOUS at 18:07

## 2023-02-14 RX ADMIN — DOCUSATE SODIUM 100 MG: 100 CAPSULE, LIQUID FILLED ORAL at 09:09

## 2023-02-14 RX ADMIN — THIAMINE HYDROCHLORIDE 100 MG: 100 INJECTION, SOLUTION INTRAMUSCULAR; INTRAVENOUS at 09:09

## 2023-02-14 RX ADMIN — GUAIFENESIN 300 MG: 100 SOLUTION ORAL at 00:15

## 2023-02-14 RX ADMIN — METOPROLOL TARTRATE 25 MG: 50 TABLET, FILM COATED ORAL at 20:23

## 2023-02-14 RX ADMIN — MEMANTINE HYDROCHLORIDE 5 MG: 5 TABLET ORAL at 20:24

## 2023-02-14 RX ADMIN — HEPARIN SODIUM 5000 UNITS: 5000 INJECTION INTRAVENOUS; SUBCUTANEOUS at 20:24

## 2023-02-14 RX ADMIN — FUROSEMIDE 20 MG: 10 INJECTION, SOLUTION INTRAMUSCULAR; INTRAVENOUS at 17:57

## 2023-02-14 RX ADMIN — SODIUM ZIRCONIUM CYCLOSILICATE 10 G: 10 POWDER, FOR SUSPENSION ORAL at 09:08

## 2023-02-14 RX ADMIN — PIPERACILLIN AND TAZOBACTAM 3375 MG: 3; .375 INJECTION, POWDER, LYOPHILIZED, FOR SOLUTION INTRAVENOUS at 05:25

## 2023-02-14 RX ADMIN — GUAIFENESIN 300 MG: 100 SOLUTION ORAL at 17:56

## 2023-02-14 RX ADMIN — GUAIFENESIN 300 MG: 100 SOLUTION ORAL at 13:30

## 2023-02-14 RX ADMIN — ACETAMINOPHEN 650 MG: 325 TABLET ORAL at 02:38

## 2023-02-14 RX ADMIN — DONEPEZIL HYDROCHLORIDE 10 MG: 10 TABLET ORAL at 20:24

## 2023-02-14 RX ADMIN — SODIUM CHLORIDE, PRESERVATIVE FREE 10 ML: 5 INJECTION INTRAVENOUS at 20:26

## 2023-02-14 ASSESSMENT — ENCOUNTER SYMPTOMS
SHORTNESS OF BREATH: 0
VOMITING: 0
NAUSEA: 0
BACK PAIN: 0
COUGH: 0
BLOOD IN STOOL: 0
ABDOMINAL DISTENTION: 0
DIARRHEA: 0
CONSTIPATION: 0
STRIDOR: 0
EYE PAIN: 0
CHEST TIGHTNESS: 0
WHEEZING: 0
CHOKING: 0
ABDOMINAL PAIN: 0

## 2023-02-14 NOTE — PROGRESS NOTES
Nephrology Progress Note  2/14/2023 3:11 PM        Subjective:   Admit Date: 1/30/2023  PCP: Georges Larios MD    Interval History: Patient seen in early morning, this is a late entry        Diet: Unsure about oral intake    ROS: No overt shortness of breath, she did talk to me but looks fatigued and tired  Urine output was not recorded  No fever, acceptable blood pressure    Data:     Current meds:    furosemide  20 mg IntraVENous BID    sodium zirconium cyclosilicate  10 g Oral BID    guaiFENesin  300 mg Oral 4 times per day    sulfamethoxazole-trimethoprim (BACTRIM) IVPB  2 mg/kg (Adjusted) IntraVENous Q8H    piperacillin-tazobactam  3,375 mg IntraVENous Q8H    sodium chloride flush  5-40 mL IntraVENous BID    thiamine  100 mg IntraVENous Daily    melatonin  3 mg Oral Nightly    heparin (porcine)  5,000 Units SubCUTAneous 3 times per day    [Held by provider] gabapentin  300 mg Oral BID    memantine  5 mg Oral BID    rosuvastatin  10 mg Oral QPM    lidocaine PF  5 mL IntraDERmal Once    sodium chloride flush  5-40 mL IntraVENous 2 times per day    pantoprazole  40 mg IntraVENous Daily    polyethylene glycol  17 g Oral Daily    sodium chloride flush  5-40 mL IntraVENous 2 times per day    docusate sodium  100 mg Oral BID    donepezil  10 mg Oral Nightly    metoprolol tartrate  25 mg Oral BID      sodium chloride 12.5 mL/hr at 02/04/23 0324    sodium chloride Stopped (02/08/23 1146)         I/O last 3 completed shifts:   In: 240 [P.O.:240]  Out: 845 [Urine:750; Drains:95]    CBC:   Recent Labs     02/13/23  0610 02/14/23  0615   WBC 9.2 7.0   HGB 7.8* 7.9*    281          Recent Labs     02/13/23  0610 02/14/23  0615   * 126*   K 5.7* 4.5   CL 93* 91*   CO2 27 26   BUN 27* 23   CREATININE 1.5* 1.4*   GLUCOSE 85 86       Lab Results   Component Value Date    CALCIUM 9.1 02/14/2023    PHOS 4.5 02/14/2023       Objective:     Vitals: BP (!) 118/44   Pulse 70   Temp 97.8 °F (36.6 °C) (Oral)   Resp 20   Ht 5' 5\" (1.651 m)   Wt 175 lb (79.4 kg)   SpO2 100%   BMI 29.12 kg/m² ,    General appearance: Thin, alert, awake, without any distress but looks fatigued and tired  HEENT: At least 1+ conjunctival pallor no scleral icterus  Neck: Supple with head of the bed elevated about 30 degrees  Lungs: Limited anterior exam, positive crackles  Heart: Seems regular rate and rhythm, left chest wall implanted cardiac device, well-healed incision from previous sternotomy  Abdomen: Tender, 2 AVRIL drain  Extremities: Trace leg and pedal edema      Problem List :         Impression :     Stage III acute kidney injury-unknown urine output-BUN/creatinine down again-combination of urinary indicis, high proBNP and chest x-ray-all suggest pulmonary edema/renal vein congestion  High potassium also normalized I will base with binders  Acute decompensated heart failure-likely causing hyponatremia from excess total body water  Recent cholecystitis, surgery and complication-has extended spectrum beta-lactamase E. coli now-    Recommendation/Plan  :     Restart Lasix 20 mg IV twice daily-discontinue potassium binders-unfortunately she has several abdominal complication from infectious process-[place] high risk for iatrogenic and nosocomial complication-ironically from infection as well as from treatment-we will have to keep an eye-follow clinically and biochemically      Teja Liao MD MD

## 2023-02-14 NOTE — PROGRESS NOTES
Infectious Disease Progress Note  2023   Patient Name: Minh Jhaveri : 1945   Impression  Sepsis Secondary to ESBL E.coli Postop Intra-Abdominal Abscess and:  ESBL E.coli Complicated UTI:  MRSA Screen Positive:  Acute Metabolic Encephalopathy: Resolved  Afebrile with no leukocytosis, CRP and Pct on DWT. Patient appears improved. -BC 0/2 NGTD  -BC 0/-NGTD  -MRSA Screen Positive (de-colonized)  -Urine culture: ESBL E.coli  -S/p per Dr. Wise Ewing: Lars Carter. DX:  acute on chronic cholecystitis. Hydrops. -CT A&P W IV Contrast: 1. Large post cholecystectomy abscess which extends from the gallbladder   fossa to the subhepatic region and medial and posterior to the liver. The   largest axial dimension is 9 x 11.3 cm and the largest coronal dimension is   8.4 x 10 cm. A 2nd collection can be seen measuring 5.9 x 5.7 cm posteriorly   which may communicate with the larger collection. 2. Constipation and stool impaction in the rectum. 3. Degenerated calcified fibroids. No other acute abnormality. Findings discussed with Dr. Nolan Pandey on 2023 at 3:20 p.m.    -S/p per IR: CT Drainage of abscess:  removed 9.5 cc old bloody fluid. AVRIL intact. Cultures: ESBL E.coli  TAYL[facility]:  Dr. Martinez Hernandez onboard  DMII:  CAD and VHD s/p CABG with MVR 2018/ HTN/ HLD/ PFO Closure/ PAF with PPM 2018:  Dr. Angela Grace onboard for EP  Dr. Wily Bearden onboard for cardiology      S/p TIA, Wheel Chair Bound:  Multi-morbidity: per PMHx:  anxiety     Plan:  Continue IV Zosyn 3,375 mg q8h  Continue IV Bactrim 5 mg/kg q8h (covers ESBL E.coli)  Plan to treat with current therapy x 7 days past removal of AVRIL drains  Trend CRP and Pct, ordered    Ongoing Antimicrobial Therapy  Zosyn 2/2-4, 10-  Bactrim 2/10-? Completed Antimicrobial Therapy  Vancomycin /-6  Meropenem -10  History:? Interval history noted. Chief complaint: sepsis secondary to postop choley intra-abdominal abscess.    Denies n/v/d/f or untoward effects of antibiotics  Physical Exam:  Vital Signs: BP (!) 118/44   Pulse 70   Temp 97.8 °F (36.6 °C) (Oral)   Resp 20   Ht 5' 5\" (1.651 m)   Wt 175 lb (79.4 kg)   SpO2 100%   BMI 29.12 kg/m²     Gen: alert, pleasantly confused  Wounds: C/D/I abdominal incisions well approximated. AVRIL x 2 from right-side of abdomen draining dark red/brown fluid. Chest: no distress and CTA. Good air movement. Room air. Heart: PPM Capture rate 60 and no MRG. Abd: soft, non-distended, RUQ tenderness to light palpation, no hepatomegaly. Normoactive bowel sounds. Ext: no clubbing, cyanosis, or edema  External Catheter Site: without erythema or tenderness draining clear yellow urine  Neuro: Mental status intact. CN 2-12 intact and no focal sensory or motor deficits     Radiologic / Imaging / TESTING  1/30/2023 XR Chest Portable:  Impression   1. Low lung volumes with left basilar atelectasis. 1/30/2023 CT Chest W Contrast:  Impression   1. Bibasilar atelectasis. 2. Cardiomegaly with enlarged central pulmonary arteries likely due to   pulmonary arterial hypertension. 3. Distended gallbladder. This could be just due to a nonfasting state. However, I do raise the possibility of acute cholecystitis. 4. Fibroid uterus. 1/30/2023 CT Abdomen Pelvis W IV Contrast:  Impression   1. Bibasilar atelectasis. 2. Cardiomegaly with enlarged central pulmonary arteries likely due to   pulmonary arterial hypertension. 3. Distended gallbladder. This could be just due to a nonfasting state. However, I do raise the possibility of acute cholecystitis. 4. Fibroid uterus. 1/30/2023 US Abdomen Limited:  Impression   Nonspecific gallbladder distension without shadowing calculus. However, when   correlated to the CT scan findings are suspicious for acute cholecystitis. Correlate with nuclear medicine hepatic biliary scan.       1/30/2023 NM Hepatobiliary:  Impression   The gallbladder is not visualized by a 3 hours post injection. The patient   refused further imaging (4 hour post-injection is a standard endpoint). This   can be seen with acute or chronic cholecystitis. 2/6/2023 XR Chest Portable:  Impression   Low lung volumes with likely bibasilar atelectasis versus developing   infection. 2/2/2023 XR Chest Portable:  Impression   Low lung volumes with bibasilar opacities which may represent atelectasis or   pneumonia. 2/2/2023 XR Chest Portable:  Impression   1. Right upper extremity PICC tip overlies the superior cavoatrial junction   level. 2. Stable cardiomegaly. 3. Low lung volumes with bibasilar atelectasis or infiltrate, greater left   than right. Pneumonia is a consideration. 4. Probable small volume left pleural effusion. 5. Stable sequela from open-heart surgery and left-sided pacemaker. 2/5/2023 XR Chest Portable:  Impression   1. Congestive heart failure is most likely given the radiographic findings;   pneumonia is also a consideration in areas of consolidation with pleural   effusion. 2. Calcific atherosclerosis aorta. 3. Cardiomegaly. 2/7/2023 XR Chest Portable;  Impression   Slight interval improvement in pulmonary edema. 2/9/2023 CT Head WO Contrast:  Impression   STUDY MILDLY DEGRADED BY MOTION ARTIFACT   No noncontrast CT evidence of acute intracranial pathology. Advanced cortical atrophy and white matter microvascular degenerative   changes, heavy atherosclerotic calcification distal internal carotid and   vertebral arteries of uncertain hemodynamic significance. ?  2/9/2023 CT Abdomen Pelvis W IV Contrast:  Impression   1. Large post cholecystectomy abscess which extends from the gallbladder   fossa to the subhepatic region and medial and posterior to the liver. The   largest axial dimension is 9 x 11.3 cm and the largest coronal dimension is   8.4 x 10 cm.   A 2nd collection can be seen measuring 5.9 x 5.7 cm posteriorly   which may communicate with the larger collection.   2. Constipation and stool impaction in the rectum.   3. Degenerated calcified fibroids.  No other acute abnormality.   Findings discussed with Dr. Holden Morales on 02/09/2023 at 3:20 p.m.       RECOMMENDATIONS:   Percutaneous abscess drainage.      2/9/2023 CT Drainage Hematoma/Seroma/Fluid Collection:  Impression   Successful CT guided intra-abdominal fluid drainage as described above.       Approximately a total of 9.5 cc of old bloody fluid was removed.       The catheter was left to AVRIL suction bulb drainage.     2/10/2023 VL Dup Lower Extremity Venous Left:  Impression   No evidence of DVT in the left lower extremity.     2/13/20263 XR Chest Portable:  Impression   Pacemaker.  Cardiomegaly with mild vascular congestion.  Suspected   atelectasis or infiltrate in the left lung base.  Left pleural effusion.   Follow up to resolution is suggested.        Labs:    Recent Results (from the past 24 hour(s))   Urinalysis    Collection Time: 02/13/23  3:26 PM   Result Value Ref Range    Color, UA YELLOW     Clarity, UA CLEAR     Glucose, Urine NEGATIVE MG/DL    Bilirubin Urine NEGATIVE     Ketones, Urine NEGATIVE MG/DL    Specific Gravity, UA <1.005     Blood, Urine TRACE     pH, Urine 7.0     Protein, UA TRACE MG/DL    Urobilinogen, Urine 0.2 MG/DL    Nitrite Urine, Quantitative NEGATIVE     Leukocyte Esterase, Urine TRACE    Sodium, urine, random    Collection Time: 02/13/23  3:26 PM   Result Value Ref Range    Sodium, Ur 26 (L) 35 - 167 MMOL/L   Protein / creatinine ratio, urine    Collection Time: 02/13/23  3:26 PM   Result Value Ref Range    Urine Total Protein 13.6 (H) <12 MG/DL    Creatinine, Ur 16.4 (L) 28 - 217 MG/DL    Prot/Creat Ratio, Ur 0.8 (H) <0.2   Microscopic Urinalysis    Collection Time: 02/13/23  3:26 PM   Result Value Ref Range    RBC, UA 1 /HPF    WBC, UA 6 /HPF    Bacteria, UA NEGATIVE /HPF    Squam Epithel, UA 1 /HPF    Trichomonas, UA NONE SEEN /HPF   CBC with Auto  Differential    Collection Time: 02/14/23  6:15 AM   Result Value Ref Range    WBC 7.0 4.0 - 10.5 K/CU MM    RBC 2.95 (L) 4.2 - 5.4 M/CU MM    Hemoglobin 7.9 (L) 12.5 - 16.0 GM/DL    Hematocrit 24.9 (L) 37 - 47 %    MCV 84.4 78 - 100 FL    MCH 26.8 (L) 27 - 31 PG    MCHC 31.7 (L) 32.0 - 36.0 %    RDW 14.2 11.7 - 14.9 %    Platelets 520 432 - 058 K/CU MM    MPV 8.5 7.5 - 11.1 FL    Differential Type AUTOMATED DIFFERENTIAL     Segs Relative 72.4 (H) 36 - 66 %    Lymphocytes % 16.1 (L) 24 - 44 %    Monocytes % 7.1 (H) 0 - 4 %    Eosinophils % 2.0 0 - 3 %    Basophils % 0.7 0 - 1 %    Segs Absolute 5.1 K/CU MM    Lymphocytes Absolute 1.1 K/CU MM    Monocytes Absolute 0.5 K/CU MM    Eosinophils Absolute 0.1 K/CU MM    Basophils Absolute 0.1 K/CU MM    Nucleated RBC % 0.0 %    Total Nucleated RBC 0.0 K/CU MM    Total Immature Neutrophil 0.12 K/CU MM    Immature Neutrophil % 1.7 (H) 0 - 0.43 %   Comprehensive Metabolic Panel    Collection Time: 02/14/23  6:15 AM   Result Value Ref Range    Sodium 126 (L) 135 - 145 MMOL/L    Potassium 4.5 3.5 - 5.1 MMOL/L    Chloride 91 (L) 99 - 110 mMol/L    CO2 26 21 - 32 MMOL/L    BUN 23 6 - 23 MG/DL    Creatinine 1.4 (H) 0.6 - 1.1 MG/DL    Est, Glom Filt Rate 38 (L) >60 mL/min/1.73m2    Glucose 86 70 - 99 MG/DL    Calcium 9.1 8.3 - 10.6 MG/DL    Albumin 3.1 (L) 3.4 - 5.0 GM/DL    Total Protein 5.3 (L) 6.4 - 8.2 GM/DL    Total Bilirubin 0.4 0.0 - 1.0 MG/DL    ALT 9 (L) 10 - 40 U/L    AST 23 15 - 37 IU/L    Alkaline Phosphatase 113 40 - 128 IU/L    Anion Gap 9 4 - 16   C-Reactive Protein    Collection Time: 02/14/23  6:15 AM   Result Value Ref Range    CRP High Sensitivity 47.9 (H) <5.0 mg/L   Procalcitonin    Collection Time: 02/14/23  6:15 AM   Result Value Ref Range    Procalcitonin 0.254    Brain Natriuretic Peptide    Collection Time: 02/14/23  6:15 AM   Result Value Ref Range    Pro-BNP 4,172 (H) <300 PG/ML   CK    Collection Time: 02/14/23  6:15 AM   Result Value Ref Range    Total CK 46 26 - 140 IU/L   Magnesium    Collection Time: 02/14/23  6:15 AM   Result Value Ref Range    Magnesium 2.0 1.8 - 2.4 mg/dl   Phosphorus    Collection Time: 02/14/23  6:15 AM   Result Value Ref Range    Phosphorus 4.5 2.5 - 4.9 MG/DL     CULTURE results: Invalid input(s): BLOOD CULTURE,  URINE CULTURE, SURGICAL CULTURE    Diagnosis:  Patient Active Problem List   Diagnosis    PAF (paroxysmal atrial fibrillation) ([facility])    Essential hypertension    Mixed hyperlipidemia    VHD (valvular heart disease)    Abnormal EKG    Acute blood loss anemia    Uncontrolled pain    Gait disturbance    Pneumonia due to infectious organism    SSS (sick sinus syndrome) ([facility])    S/P placement of cardiac pacemaker    Tachycardia-bradycardia ([address]    Fall at home, subsequent encounter    Acute intracranial hemorrhage ([address]    Hyponatremia    Compression fracture of L1 lumbar vertebra ([facility])    Intraparenchymal hematoma of brain    COVID-19    CAD (coronary artery disease)    AMS (altered mental status)    Altered mental status    Concussion with no loss of consciousness    Chest pain    Right upper quadrant pain    Acute cholecystitis    Postoperative abscess    Drug-induced encephalopathy       Active Problems  Principal Problem:    Chest pain  Active Problems:    Right upper quadrant pain    Acute cholecystitis    Postoperative abscess    Drug-induced encephalopathy    PAF (paroxysmal atrial fibrillation) ([facility])  Resolved Problems:    * No resolved hospital problems. *    Electronically signed by: Electronically signed by Josh Sinclair.  BIRD Mcconnell CNP on 2/14/2023 at 2:38 PM

## 2023-02-14 NOTE — PROGRESS NOTES
V2.0  [facility] Hospitalist Progress Note      Name:  Marjorie Ludwig /Age/Sex: 1945  (76 y.o. female)   MRN & CSN:  [id number] & [id number] Encounter Date/Time: 2023 3:29 PM EST    Location:  [address] [place], [place] [address] PCP: Kirsten Riley MD       Hospital Day: 12    Assessment and Plan:   Marjorie Ludwig is a 76 y.o. female with pmh of Marjorie Ludwig is a 76 y.o. female with pmh of CAD s/p CABG, Valvular Heart Disease, s/p Mitral Valve Repair, PFO Closure, Paroxysmal A-Fib, HTN, HLD, DM, TIA, Early Dementia who presents with Chest pain Rt Sided and RUQ Abdominal Pain      Plan:    Sepsis 2/2 likely ESBL UTI without hematuria, Pneumonia, acute cholecystitis  Cholecystitis s/p lap cholecystectomy  Large post-cholecystectomy fluid collection, concern for infection  Patient with potential abnormality as well she did have acute solid cholecystitis status post lap pedro 2023. High risk for postoperative infection requiring MRSA pneumonia coverage. WBC 12, SBP 92, RR 22 initially. Procal trending down 10.51 -> 5.09 -> 0.4 5 -> 0.49 -> 0.32  Urine growing ESBL UTI. MRSA Nares +ve  Patient was doing relatively well, but a day after drain removal patient started to get more altered and intermittently complained of the right quadrant pain. Obtained CT abd pelvis with IV contrast - shows large post cholecsytectomy abscesses. The largest axial dimension is 9 x 11.3 cm and the largest coronal dimension is 8.4 x 10 cm. A 2nd collection can be seen measuring 5.9 x 5.7 cm posteriorly which may communicate with the larger collection. STAT consult placed for IR team.- s/p drain placement x 2 -cultures sent today  General surgery team - following closely again.   Consulted ID - changed abx to zosyn and bactrim   Fluid culture also growing E.coli - follow sensitivity; given the history of ESBL this has risk of being ESBL too - page ID regarding antibiotics if ESBL or if patient's status worsens  Continue to monitor  Plan to do IV abx until after a week of removing the drains  Trend CRP and Pct     Acute Encephalopathy 2/2 likely Metabolic and Septic 2/2 hypoxia and ESBL UTI, Iatrogenic 2/2 medication   Was difficult to wake on 2/3/2023  Lethargic and sleepy today  Gabapentin on hold per nephro - agree with it    Cholecystitis s/p lap cholecystectomy   Patient underwent lap pedro on 1/31/2023 with Dr. Charleen Shankar. AVRIL drain removed 2/7/2023  GS on board   Abx for 7 days post drain removal   Change to oral at discharge    Acute hypoxic respiratory failure, resolved  Patient with acute respiratory failure was on nasal cannula. Became acutely more short of breath. CT did show what appears to be either fluid overload or viral pneumonia. 25% at 25L/min Heated high flow --> weaned off O2      TAYLOR with hyperkalemia  Pt with Cr 2 on admission, trended down to 1.5 today. baseline Cr 0.7. Kayexalate ordered  Nephrology on board    Hypochloremic Hyponatremia  Patient with acute worsening hyponatremia to 121 and now back up to 135  Nephrology on board    Acute on chronic HFpEF  Patient with an EF 50% with hypokinetic inferio-lateral wall and basal anterio-septal carson the 45 (become acutely short of breath with progressive pulmonary edema. Pro-BNP trending up to 24,733  Cardiology on board  Nephrology on board  On aldactone and amiloride    Hypokalemia   K 3.9    CAD s/p CABG  Pacer in-situ  VHD  Patient is also known valvular heart disease. Cardiology following  Discussed with cardiology about interrogating pacer. Acute Normocytic Anemia 2/2 possibly Post-operative loss and hemodilution 2/2 fluid overload   Hb was 11 on 1/31/2023. 1U PRBC this admission. Hb 9.1 today. No signs of overt bleeding     Diet ADULT DIET;  Dysphagia - Pureed  ADULT ORAL NUTRITION SUPPLEMENT; Breakfast, Lunch, Dinner; Clear Liquid Oral Supplement  ADULT ORAL NUTRITION SUPPLEMENT; Dinner; Frozen Oral Supplement   DVT Prophylaxis [] Lovenox, [x]  Heparin, [] SCDs, [] Ambulation,    [] Eliquis, [] Xarelto  [] Coumadin [] other   Code Status DNR-CCA   Disposition From: Home  Expected Disposition: [facility]  Estimated Date of Discharge: TBD     Surrogate Decision Maker/ POA      Subjective:     Chief Complaint: Chest Pain (X 3 days )     Patient seen at bedside, sister at bedside, discussed the plan with her, patient can get IV abx at her nursing home as well however we have to wait for her     Review of Systems:    Review of Systems   Constitutional:  Positive for fatigue. Negative for chills, fever and unexpected weight change.   Eyes:  Negative for pain and visual disturbance.   Respiratory:  Negative for cough, choking, chest tightness, shortness of breath, wheezing and stridor.    Cardiovascular:  Negative for chest pain, palpitations and leg swelling.   Gastrointestinal:  Negative for abdominal distention, abdominal pain, blood in stool, constipation, diarrhea, nausea and vomiting.   Genitourinary:  Negative for decreased urine volume, dysuria, frequency, hematuria and urgency.   Musculoskeletal:  Negative for arthralgias, back pain and myalgias.   Skin:  Negative for pallor and rash.   Neurological:  Negative for dizziness, tremors, syncope, speech difficulty, weakness, light-headedness, numbness and headaches.   Psychiatric/Behavioral:  Negative for agitation.        Objective:     Intake/Output Summary (Last 24 hours) at 2/14/2023 1503  Last data filed at 2/14/2023 1333  Gross per 24 hour   Intake --   Output 2430 ml   Net -2430 ml          Vitals:   Vitals:    02/14/23 0900   BP: (!) 118/44   Pulse: 70   Resp: 20   Temp: 97.8 °F (36.6 °C)   SpO2: 100%       Physical Exam:     Physical Exam  Vitals reviewed.   Constitutional:       General: She is awake. She is not in acute distress.     Appearance: Normal appearance. She is overweight. She is not ill-appearing.      Interventions: Nasal cannula in place.      Comments: Heated high flow    HENT:      Head: Normocephalic and  atraumatic. Mouth/Throat:      Mouth: Mucous membranes are moist.      Pharynx: Oropharynx is clear. Eyes:      Extraocular Movements: Extraocular movements intact. Conjunctiva/sclera: Conjunctivae normal.      Pupils: Pupils are equal, round, and reactive to light. Cardiovascular:      Rate and Rhythm: Normal rate. Rhythm irregularly irregular. Pulses: Normal pulses. Heart sounds: Normal heart sounds. Comments: Pacer is not capturing all beats - pacer spikes are all over the place  Pulmonary:      Effort: Pulmonary effort is normal. Tachypnea present. Breath sounds: Decreased air movement and transmitted upper airway sounds present. No wheezing, rhonchi or rales. Abdominal:      General: Abdomen is protuberant. Bowel sounds are normal.      Palpations: Abdomen is soft. Musculoskeletal:         General: No swelling. Normal range of motion. Cervical back: Normal range of motion and neck supple. Skin:     General: Skin is warm and dry. Neurological:      Mental Status: She is oriented to person, place, and time. She is lethargic. Comments: Oriented to year, not month   Psychiatric:         Behavior: Behavior is cooperative.        Medications:   Medications:    furosemide  20 mg IntraVENous BID    sodium zirconium cyclosilicate  10 g Oral BID    guaiFENesin  300 mg Oral 4 times per day    sulfamethoxazole-trimethoprim (BACTRIM) IVPB  2 mg/kg (Adjusted) IntraVENous Q8H    piperacillin-tazobactam  3,375 mg IntraVENous Q8H    sodium chloride flush  5-40 mL IntraVENous BID    thiamine  100 mg IntraVENous Daily    melatonin  3 mg Oral Nightly    heparin (porcine)  5,000 Units SubCUTAneous 3 times per day    [Held by provider] gabapentin  300 mg Oral BID    memantine  5 mg Oral BID    rosuvastatin  10 mg Oral QPM    lidocaine PF  5 mL IntraDERmal Once    sodium chloride flush  5-40 mL IntraVENous 2 times per day    pantoprazole  40 mg IntraVENous Daily    polyethylene glycol  17 g Oral Daily    sodium chloride flush  5-40 mL IntraVENous 2 times per day    docusate sodium  100 mg Oral BID    donepezil  10 mg Oral Nightly    metoprolol tartrate  25 mg Oral BID      Infusions:    sodium chloride 12.5 mL/hr at 02/04/23 0324    sodium chloride Stopped (02/08/23 1146)     PRN Meds: HYDROcodone-acetaminophen, 1 tablet, Q6H PRN  HYDROmorphone, 0.25 mg, Q3H PRN   Or  HYDROmorphone, 0.5 mg, Q3H PRN  sodium chloride flush, 5-40 mL, PRN  sodium chloride, 500 mL, PRN  sodium chloride flush, 5-40 mL, PRN  sodium chloride, , PRN  ondansetron, 4 mg, Q8H PRN   Or  ondansetron, 4 mg, Q6H PRN  acetaminophen, 650 mg, Q6H PRN   Or  acetaminophen, 650 mg, Q6H PRN  ipratropium, 1 spray, PRN      Labs      Recent Results (from the past 24 hour(s))   Urinalysis    Collection Time: 02/13/23  3:26 PM   Result Value Ref Range    Color, UA YELLOW     Clarity, UA CLEAR     Glucose, Urine NEGATIVE MG/DL    Bilirubin Urine NEGATIVE     Ketones, Urine NEGATIVE MG/DL    Specific Gravity, UA <1.005     Blood, Urine TRACE     pH, Urine 7.0     Protein, UA TRACE MG/DL    Urobilinogen, Urine 0.2 MG/DL    Nitrite Urine, Quantitative NEGATIVE     Leukocyte Esterase, Urine TRACE    Sodium, urine, random    Collection Time: 02/13/23  3:26 PM   Result Value Ref Range    Sodium, Ur 26 (L) 35 - 167 MMOL/L   Protein / creatinine ratio, urine    Collection Time: 02/13/23  3:26 PM   Result Value Ref Range    Urine Total Protein 13.6 (H) <12 MG/DL    Creatinine, Ur 16.4 (L) 28 - 217 MG/DL    Prot/Creat Ratio, Ur 0.8 (H) <0.2   Microscopic Urinalysis    Collection Time: 02/13/23  3:26 PM   Result Value Ref Range    RBC, UA 1 /HPF    WBC, UA 6 /HPF    Bacteria, UA NEGATIVE /HPF    Squam Epithel, UA 1 /HPF    Trichomonas, UA NONE SEEN /HPF   CBC with Auto Differential    Collection Time: 02/14/23  6:15 AM   Result Value Ref Range    WBC 7.0 4.0 - 10.5 K/CU MM    RBC 2.95 (L) 4.2 - 5.4 M/CU MM    Hemoglobin 7.9 (L) 12.5 - 16.0 GM/DL    Hematocrit 24.9 (L) 37 - 47 %    MCV 84.4 78 - 100 FL    MCH 26.8 (L) 27 - 31 PG    MCHC 31.7 (L) 32.0 - 36.0 %    RDW 14.2 11.7 - 14.9 %    Platelets 068 497 - 202 K/CU MM    MPV 8.5 7.5 - 11.1 FL    Differential Type AUTOMATED DIFFERENTIAL     Segs Relative 72.4 (H) 36 - 66 %    Lymphocytes % 16.1 (L) 24 - 44 %    Monocytes % 7.1 (H) 0 - 4 %    Eosinophils % 2.0 0 - 3 %    Basophils % 0.7 0 - 1 %    Segs Absolute 5.1 K/CU MM    Lymphocytes Absolute 1.1 K/CU MM    Monocytes Absolute 0.5 K/CU MM    Eosinophils Absolute 0.1 K/CU MM    Basophils Absolute 0.1 K/CU MM    Nucleated RBC % 0.0 %    Total Nucleated RBC 0.0 K/CU MM    Total Immature Neutrophil 0.12 K/CU MM    Immature Neutrophil % 1.7 (H) 0 - 0.43 %   Comprehensive Metabolic Panel    Collection Time: 02/14/23  6:15 AM   Result Value Ref Range    Sodium 126 (L) 135 - 145 MMOL/L    Potassium 4.5 3.5 - 5.1 MMOL/L    Chloride 91 (L) 99 - 110 mMol/L    CO2 26 21 - 32 MMOL/L    BUN 23 6 - 23 MG/DL    Creatinine 1.4 (H) 0.6 - 1.1 MG/DL    Est, Glom Filt Rate 38 (L) >60 mL/min/1.73m2    Glucose 86 70 - 99 MG/DL    Calcium 9.1 8.3 - 10.6 MG/DL    Albumin 3.1 (L) 3.4 - 5.0 GM/DL    Total Protein 5.3 (L) 6.4 - 8.2 GM/DL    Total Bilirubin 0.4 0.0 - 1.0 MG/DL    ALT 9 (L) 10 - 40 U/L    AST 23 15 - 37 IU/L    Alkaline Phosphatase 113 40 - 128 IU/L    Anion Gap 9 4 - 16   C-Reactive Protein    Collection Time: 02/14/23  6:15 AM   Result Value Ref Range    CRP High Sensitivity 47.9 (H) <5.0 mg/L   Procalcitonin    Collection Time: 02/14/23  6:15 AM   Result Value Ref Range    Procalcitonin 0.254    Brain Natriuretic Peptide    Collection Time: 02/14/23  6:15 AM   Result Value Ref Range    Pro-BNP 4,172 (H) <300 PG/ML   CK    Collection Time: 02/14/23  6:15 AM   Result Value Ref Range    Total CK 46 26 - 140 IU/L   Magnesium    Collection Time: 02/14/23  6:15 AM   Result Value Ref Range    Magnesium 2.0 1.8 - 2.4 mg/dl   Phosphorus    Collection Time: 02/14/23 6:15 AM   Result Value Ref Range    Phosphorus 4.5 2.5 - 4.9 MG/DL        Imaging/Diagnostics Last 24 Hours   XR CHEST PORTABLE    Result Date: 2/2/2023  EXAMINATION: ONE XRAY VIEW OF THE CHEST 2/2/2023 1:52 pm COMPARISON: 02/02/2023, 01/30/2023 HISTORY: ORDERING SYSTEM PROVIDED HISTORY: shortness of breath TECHNOLOGIST PROVIDED HISTORY: Reason for exam:->shortness of breath Reason for Exam: shortness of breath FINDINGS: Sternotomy wires. Prosthetic cardiac valve. Pacemaker wires. Lung volumes. Bibasilar opacities. No pneumothorax. Heart size is stable. Low lung volumes with bibasilar opacities which may represent atelectasis or pneumonia. XR CHEST PORTABLE    Result Date: 2/2/2023  EXAMINATION: ONE X-RAY VIEW OF THE CHEST 2/2/2023 10:49 am COMPARISON: CT chest 01/30/2023, chest radiograph 01/30/2023 HISTORY: ORDERING SYSTEM PROVIDED HISTORY: Shortness of breath TECHNOLOGIST PROVIDED HISTORY: Reason for exam:->Shortness of breath Reason for Exam: shortness of breath FINDINGS: The lungs are underinflated, resulting in vascular crowding and subsegmental atelectasis. The cardiomediastinal silhouette is obscured, but likely unchanged. Bibasilar consolidations favor atelectasis. The left upper lobe is obscured. No new focal consolidation, pneumothorax, or right-sided pleural effusion. There is blunting of the left costophrenic angle, which may be due to low lung volumes and/or patient positioning. Osseous structures are diffusely demineralized and grossly unchanged. Left chest cardiac conduction device is again noted. Low lung volumes with likely bibasilar atelectasis versus developing infection. Comment: Please note this report has been produced using speech recognition software and may contain errors related to that system including errors in grammar, punctuation, and spelling, as well as words and phrases that may be inappropriate.  If there are any questions or concerns please feel free to contact the dictating provider for clarification.      Electronically signed by Latasha Braga MD on 2/14/2023 at 3:03 PM

## 2023-02-14 NOTE — PROGRESS NOTES
LATE ENTRY    [facility]  DEPARTMENT OF SPEECH/LANGUAGE PATHOLOGY  DAILY PROGRESS NOTE  Paulette Yarbrough  2/14/2023  [id number]  Chest pain [R07.9]  Right sided abdominal pain [R10.9]  Chest pain, unspecified type [R07.9]  Acute cholecystitis [K81.0]  No Known Allergies      Pt was seen this date for dysphagia treatment. IMPRESSION AND RECOMMENDATIONS:   Paulette Yarbrough was seen for a bedside swallowing treatment this date. Pt's sisters are present and report that pt does not like pureed diet and will not eat it. Discussed with them that they had requested a diet downgrade due to their concern that pt had choked on jello recently. They asked if she can have a banana. PO trials completed with pt consuming a regular solid trial with normal mastication, clearance and 0 s/s aspiration. Discussed diet advancement to Soft and that bananas are included on that diet level. Reviewed aspiration precautions. Recommend advance diet to Soft and bite-size/continue Thins.        GOALS (current status in bold):  Short-term Goals  Timeframe for Short-term Goals: LOS or until goals are met  Goal 1: Pt will tolerate Pureed diet and Thin liquids without clinical evidence for aspiration 100% Met, Discontinue  Goal 2: Pt will participate in advanced trials for possible safe diet level advancement 10/10 trials  Advanced diet to Soft  Goal 3: Pt/caregivers will demonstrate comprehension of recommendations/POC Meeting, continue    EDUCATION: results/recommendations d/w pt and her sisters    PAIN RATING (0-10 Scale): 0  Time in/Time out: SLP Individual Minutes  Time In:   Time Out:   Minutes: 20    Visit number: 7  Rafael Vernon MA [facility] SLP  Speech Language Pathologist

## 2023-02-14 NOTE — CARE COORDINATION
CM in to see Pt to follow up on discharge planning. Pt sleeping soundly. Pt sister Rodríguez Holman present. Plan remains [facility] when medically ready. If Pt improves enough plan will be home with home care. [place] denies any needs at this time. Pt accepted to [facility] when medically ready.       CM following

## 2023-02-14 NOTE — PROGRESS NOTES
[id number] [place] OF SPEECH/LANGUAGE PATHOLOGY  DAILY PROGRESS NOTE  Lesli Braga  2/14/2023  [id number]  Chest pain [R07.9]  Right sided abdominal pain [R10.9]  Chest pain, unspecified type [R07.9]  Acute cholecystitis [K81.0]  No Known Allergies      Pt was seen this date for dysphagia treatment. IMPRESSION AND RECOMMENDATIONS:   Lesli Braga was seen for diet tolerance monitoring this date. Pt's sister was present and reports pt was able to eat some items on her lunch tray on the Soft and bite-size/Thins diet level, however, her appetite is still not good. Family is pleased with her progress in general.  Pt completed PO trials of thins by straw with normal oral phase and 0 s/s aspiration. DNT solids at this time as there are no concerns and swallow function appears WNL. Completed education with sister that pt can continue on current diet level. Recommend continue Soft and bite-size/continue Thins. No further needs at this time.         GOALS (current status in bold):  Short-term Goals  Timeframe for Short-term Goals: LOS or until goals are met  Goal 1: Pt will tolerate Pureed diet and Thin liquids without clinical evidence for aspiration 100% Met, Discontinue  Goal 2: Pt will participate in advanced trials for possible safe diet level advancement 10/10 trials  Met, Discontinue  Goal 3: Pt/caregivers will demonstrate comprehension of recommendations/POC Met, Discontinue    EDUCATION: results/recommendations d/w pt and her sister    PAIN RATING (0-10 Scale): 0  Time in/Time out: SLP Individual Minutes  Time In: 1530  Time Out: 1600  Minutes: 30    Visit number: 8  Ian Mercer MA [facility] SLP  Speech Language Pathologist

## 2023-02-15 ENCOUNTER — APPOINTMENT (OUTPATIENT)
Dept: NUCLEAR MEDICINE | Age: 78
DRG: 853 | End: 2023-02-15
Payer: MEDICARE

## 2023-02-15 LAB
ALBUMIN SERPL-MCNC: 3.4 GM/DL (ref 3.4–5)
ALP BLD-CCNC: 120 IU/L (ref 40–128)
ALT SERPL-CCNC: 10 U/L (ref 10–40)
ANION GAP SERPL CALCULATED.3IONS-SCNC: 14 MMOL/L (ref 4–16)
AST SERPL-CCNC: 20 IU/L (ref 15–37)
BASOPHILS ABSOLUTE: 0.1 K/CU MM
BASOPHILS RELATIVE PERCENT: 0.7 % (ref 0–1)
BILIRUB SERPL-MCNC: 0.4 MG/DL (ref 0–1)
BUN SERPL-MCNC: 21 MG/DL (ref 6–23)
CALCIUM SERPL-MCNC: 8.8 MG/DL (ref 8.3–10.6)
CHLORIDE BLD-SCNC: 93 MMOL/L (ref 99–110)
CO2: 23 MMOL/L (ref 21–32)
CREAT SERPL-MCNC: 1.5 MG/DL (ref 0.6–1.1)
CRP SERPL HS-MCNC: 40 MG/L
CULTURE: ABNORMAL
CULTURE: ABNORMAL
DIFFERENTIAL TYPE: ABNORMAL
EOSINOPHILS ABSOLUTE: 0.1 K/CU MM
EOSINOPHILS RELATIVE PERCENT: 0.7 % (ref 0–3)
GFR SERPL CREATININE-BSD FRML MDRD: 35 ML/MIN/1.73M2
GLUCOSE SERPL-MCNC: 96 MG/DL (ref 70–99)
GRAM SMEAR: ABNORMAL
HCT VFR BLD CALC: 25.3 % (ref 37–47)
HEMOGLOBIN: 8.3 GM/DL (ref 12.5–16)
IMMATURE NEUTROPHIL %: 1.3 % (ref 0–0.43)
LYMPHOCYTES ABSOLUTE: 0.9 K/CU MM
LYMPHOCYTES RELATIVE PERCENT: 11.5 % (ref 24–44)
Lab: ABNORMAL
MAGNESIUM: 2 MG/DL (ref 1.8–2.4)
MCH RBC QN AUTO: 26.9 PG (ref 27–31)
MCHC RBC AUTO-ENTMCNC: 32.8 % (ref 32–36)
MCV RBC AUTO: 82.1 FL (ref 78–100)
MONOCYTES ABSOLUTE: 0.5 K/CU MM
MONOCYTES RELATIVE PERCENT: 5.8 % (ref 0–4)
NUCLEATED RBC %: 0 %
PDW BLD-RTO: 13.9 % (ref 11.7–14.9)
PHOSPHORUS: 4.8 MG/DL (ref 2.5–4.9)
PLATELET # BLD: 287 K/CU MM (ref 140–440)
PMV BLD AUTO: 8.7 FL (ref 7.5–11.1)
POTASSIUM SERPL-SCNC: 4.1 MMOL/L (ref 3.5–5.1)
PROCALCITONIN SERPL-MCNC: 0.24 NG/ML
RBC # BLD: 3.08 M/CU MM (ref 4.2–5.4)
SEGMENTED NEUTROPHILS ABSOLUTE COUNT: 6.5 K/CU MM
SEGMENTED NEUTROPHILS RELATIVE PERCENT: 80 % (ref 36–66)
SODIUM BLD-SCNC: 130 MMOL/L (ref 135–145)
SPECIMEN: ABNORMAL
TOTAL IMMATURE NEUTOROPHIL: 0.11 K/CU MM
TOTAL NUCLEATED RBC: 0 K/CU MM
TOTAL PROTEIN: 5.5 GM/DL (ref 6.4–8.2)
WBC # BLD: 8.2 K/CU MM (ref 4–10.5)

## 2023-02-15 PROCEDURE — 83735 ASSAY OF MAGNESIUM: CPT

## 2023-02-15 PROCEDURE — 2140000000 HC CCU INTERMEDIATE R&B

## 2023-02-15 PROCEDURE — 2580000003 HC RX 258: Performed by: INTERNAL MEDICINE

## 2023-02-15 PROCEDURE — 99232 SBSQ HOSP IP/OBS MODERATE 35: CPT | Performed by: NURSE PRACTITIONER

## 2023-02-15 PROCEDURE — 6370000000 HC RX 637 (ALT 250 FOR IP): Performed by: INTERNAL MEDICINE

## 2023-02-15 PROCEDURE — 3430000000 HC RX DIAGNOSTIC RADIOPHARMACEUTICAL: Performed by: NURSE PRACTITIONER

## 2023-02-15 PROCEDURE — C9113 INJ PANTOPRAZOLE SODIUM, VIA: HCPCS | Performed by: STUDENT IN AN ORGANIZED HEALTH CARE EDUCATION/TRAINING PROGRAM

## 2023-02-15 PROCEDURE — 6370000000 HC RX 637 (ALT 250 FOR IP): Performed by: PHYSICIAN ASSISTANT

## 2023-02-15 PROCEDURE — 6360000002 HC RX W HCPCS: Performed by: INTERNAL MEDICINE

## 2023-02-15 PROCEDURE — 2500000003 HC RX 250 WO HCPCS: Performed by: NURSE PRACTITIONER

## 2023-02-15 PROCEDURE — 6370000000 HC RX 637 (ALT 250 FOR IP): Performed by: SURGERY

## 2023-02-15 PROCEDURE — 36415 COLL VENOUS BLD VENIPUNCTURE: CPT

## 2023-02-15 PROCEDURE — A9537 TC99M MEBROFENIN: HCPCS | Performed by: NURSE PRACTITIONER

## 2023-02-15 PROCEDURE — 80053 COMPREHEN METABOLIC PANEL: CPT

## 2023-02-15 PROCEDURE — 2500000003 HC RX 250 WO HCPCS: Performed by: INTERNAL MEDICINE

## 2023-02-15 PROCEDURE — 99024 POSTOP FOLLOW-UP VISIT: CPT | Performed by: SURGERY

## 2023-02-15 PROCEDURE — 6360000002 HC RX W HCPCS: Performed by: STUDENT IN AN ORGANIZED HEALTH CARE EDUCATION/TRAINING PROGRAM

## 2023-02-15 PROCEDURE — 85025 COMPLETE CBC W/AUTO DIFF WBC: CPT

## 2023-02-15 PROCEDURE — 97530 THERAPEUTIC ACTIVITIES: CPT

## 2023-02-15 PROCEDURE — 86140 C-REACTIVE PROTEIN: CPT

## 2023-02-15 PROCEDURE — 2580000003 HC RX 258: Performed by: NURSE PRACTITIONER

## 2023-02-15 PROCEDURE — 78226 HEPATOBILIARY SYSTEM IMAGING: CPT

## 2023-02-15 PROCEDURE — 84100 ASSAY OF PHOSPHORUS: CPT

## 2023-02-15 PROCEDURE — 6360000002 HC RX W HCPCS: Performed by: NURSE PRACTITIONER

## 2023-02-15 PROCEDURE — 84145 PROCALCITONIN (PCT): CPT

## 2023-02-15 PROCEDURE — 97112 NEUROMUSCULAR REEDUCATION: CPT

## 2023-02-15 RX ADMIN — SODIUM CHLORIDE, PRESERVATIVE FREE 10 ML: 5 INJECTION INTRAVENOUS at 10:25

## 2023-02-15 RX ADMIN — FUROSEMIDE 20 MG: 10 INJECTION, SOLUTION INTRAMUSCULAR; INTRAVENOUS at 18:32

## 2023-02-15 RX ADMIN — HYDROMORPHONE HYDROCHLORIDE 0.5 MG: 1 INJECTION, SOLUTION INTRAMUSCULAR; INTRAVENOUS; SUBCUTANEOUS at 05:44

## 2023-02-15 RX ADMIN — SULFAMETHOXAZOLE AND TRIMETHOPRIM 132.8 MG: 80; 16 INJECTION, SOLUTION, CONCENTRATE INTRAVENOUS at 02:16

## 2023-02-15 RX ADMIN — ROSUVASTATIN CALCIUM 10 MG: 5 TABLET, COATED ORAL at 18:32

## 2023-02-15 RX ADMIN — GUAIFENESIN 300 MG: 100 SOLUTION ORAL at 18:32

## 2023-02-15 RX ADMIN — Medication 3 MG: at 20:49

## 2023-02-15 RX ADMIN — METOPROLOL TARTRATE 25 MG: 50 TABLET, FILM COATED ORAL at 20:48

## 2023-02-15 RX ADMIN — HEPARIN SODIUM 5000 UNITS: 5000 INJECTION INTRAVENOUS; SUBCUTANEOUS at 04:32

## 2023-02-15 RX ADMIN — DONEPEZIL HYDROCHLORIDE 10 MG: 10 TABLET ORAL at 20:48

## 2023-02-15 RX ADMIN — THIAMINE HYDROCHLORIDE 100 MG: 100 INJECTION, SOLUTION INTRAMUSCULAR; INTRAVENOUS at 10:20

## 2023-02-15 RX ADMIN — PIPERACILLIN AND TAZOBACTAM 3375 MG: 3; .375 INJECTION, POWDER, LYOPHILIZED, FOR SOLUTION INTRAVENOUS at 20:55

## 2023-02-15 RX ADMIN — SULFAMETHOXAZOLE AND TRIMETHOPRIM 132.8 MG: 80; 16 INJECTION, SOLUTION, CONCENTRATE INTRAVENOUS at 10:27

## 2023-02-15 RX ADMIN — PANTOPRAZOLE SODIUM 40 MG: 40 INJECTION, POWDER, LYOPHILIZED, FOR SOLUTION INTRAVENOUS at 10:20

## 2023-02-15 RX ADMIN — Medication 4.9 MILLICURIE: at 16:09

## 2023-02-15 RX ADMIN — PIPERACILLIN AND TAZOBACTAM 3375 MG: 3; .375 INJECTION, POWDER, LYOPHILIZED, FOR SOLUTION INTRAVENOUS at 04:36

## 2023-02-15 RX ADMIN — GUAIFENESIN 300 MG: 100 SOLUTION ORAL at 01:06

## 2023-02-15 RX ADMIN — SULFAMETHOXAZOLE AND TRIMETHOPRIM 132.8 MG: 80; 16 INJECTION, SOLUTION, CONCENTRATE INTRAVENOUS at 18:35

## 2023-02-15 RX ADMIN — FUROSEMIDE 20 MG: 10 INJECTION, SOLUTION INTRAMUSCULAR; INTRAVENOUS at 10:20

## 2023-02-15 RX ADMIN — GUAIFENESIN 300 MG: 100 SOLUTION ORAL at 05:41

## 2023-02-15 RX ADMIN — GUAIFENESIN 300 MG: 100 SOLUTION ORAL at 23:47

## 2023-02-15 RX ADMIN — MEMANTINE HYDROCHLORIDE 5 MG: 5 TABLET ORAL at 20:49

## 2023-02-15 RX ADMIN — SODIUM CHLORIDE, PRESERVATIVE FREE 10 ML: 5 INJECTION INTRAVENOUS at 20:56

## 2023-02-15 RX ADMIN — HYDROMORPHONE HYDROCHLORIDE 0.5 MG: 1 INJECTION, SOLUTION INTRAMUSCULAR; INTRAVENOUS; SUBCUTANEOUS at 20:46

## 2023-02-15 RX ADMIN — HEPARIN SODIUM 5000 UNITS: 5000 INJECTION INTRAVENOUS; SUBCUTANEOUS at 20:48

## 2023-02-15 ASSESSMENT — ENCOUNTER SYMPTOMS
SHORTNESS OF BREATH: 0
NAUSEA: 0
VOMITING: 0
COUGH: 0
CHOKING: 0
EYE PAIN: 0
ABDOMINAL DISTENTION: 0
CONSTIPATION: 0
STRIDOR: 0
DIARRHEA: 0
WHEEZING: 0
BACK PAIN: 0
CHEST TIGHTNESS: 0
BLOOD IN STOOL: 0
ABDOMINAL PAIN: 0

## 2023-02-15 ASSESSMENT — PAIN DESCRIPTION - DESCRIPTORS: DESCRIPTORS: ACHING;DISCOMFORT;DULL

## 2023-02-15 ASSESSMENT — PAIN SCALES - GENERAL
PAINLEVEL_OUTOF10: 8
PAINLEVEL_OUTOF10: 8

## 2023-02-15 ASSESSMENT — PAIN DESCRIPTION - ORIENTATION: ORIENTATION: RIGHT;ANTERIOR;POSTERIOR

## 2023-02-15 ASSESSMENT — PAIN DESCRIPTION - LOCATION: LOCATION: ABDOMEN;BACK

## 2023-02-15 NOTE — PROCEDURES
Physical Therapy Treatment Note  Name: Carly Tong MRN: [id number] :   1945   Date:  2/15/2023   Admission Date: 2023 Room:  [address] [place], [place] [address]     Restrictions/Precautions:          general precautions, fall risk    Communication with other providers:  RN OT    Subjective:  Patient states:  \"I can try\"  Pain:   Location, Type, Intensity (0/10 to 10/10):  denies pain at rest, pain in abdomen at sx site    Objective:    Observation:  Pt supine in bed with sister present upon entry. Pt agreeable to session    Treatment, including education/measures:    Bed mobility: pt completed supine <-> sitting EOB mod A x2 with assist at trunk, hips, and bilateral LEs. Cues provided for sequencing    Balance: Pt sat EOB 16 minutes with varying assist levels but mostly min A with slight retropulsion. Pt with multiple rest breaks requiring max A and three bouts ~1 minute of CGA with cues for upright posture, anterior weight shifting, and hand placement    Ther Ex: pt performed x5 bilateral LAQ with cues for technique and maintaining focus on task    Education: pt educated on benefits of therapy.  Pt's sister with a few questions regarding overall care, all questions answered    Assessment / Impression:    Pt supine in bed at end of session with needs in reach and alarm on    Patient's tolerance of treatment:  fair   Adverse Reaction: n/a  Significant change in status and impact:  improved sitting balance this session  Barriers to improvement:  decreased endurance, impaired balance, impaired transfers    Plan for Next Session:    Continue to address transfer training and gait training in future sessions    Time in:  1119  Time out:  1142  Timed treatment minutes:  23  Total treatment time:  23    Previously filed items:  Social/Functional History  Lives With: Family  Type of Home: Condo  Home Layout: One level  Active : No        Short Term Goals  Time Frame for Short Term Goals: 1 week  Short Term Goal 1: pt to complete all bed mobility mod A x1  Short Term Goal 2: pt to sit EOB 10 minutes CGA with no LOB throughout  Short Term Goal 3: pt to complete stand pivot transfer with LRAD mod A x1  Short Term Goal 4: pt to propel manual WC 25' with min A    Electronically signed by:    Mode Terrell, PT  2/15/2023, 1:19 PM

## 2023-02-15 NOTE — PROGRESS NOTES
Nephrology Progress Note  2/15/2023 10:24 AM        Subjective:   Admit Date: 1/30/2023  PCP: Yemi Escobedo MD    Interval History: Patient seen early morning, this is a late entry    Diet: Unsure how much she is taking orally    ROS: Alert awake more interactive today without any apparent distress or shortness of breath  Urine output recorded almost 3.8 L for the last 24 hours with IV loop    No fever and acceptable blood pressure although variable reading        Data:     Current meds:    furosemide  20 mg IntraVENous BID    guaiFENesin  300 mg Oral 4 times per day    sulfamethoxazole-trimethoprim (BACTRIM) IVPB  2 mg/kg (Adjusted) IntraVENous Q8H    piperacillin-tazobactam  3,375 mg IntraVENous Q8H    sodium chloride flush  5-40 mL IntraVENous BID    thiamine  100 mg IntraVENous Daily    melatonin  3 mg Oral Nightly    heparin (porcine)  5,000 Units SubCUTAneous 3 times per day    [Held by provider] gabapentin  300 mg Oral BID    memantine  5 mg Oral BID    rosuvastatin  10 mg Oral QPM    lidocaine PF  5 mL IntraDERmal Once    sodium chloride flush  5-40 mL IntraVENous 2 times per day    pantoprazole  40 mg IntraVENous Daily    polyethylene glycol  17 g Oral Daily    sodium chloride flush  5-40 mL IntraVENous 2 times per day    docusate sodium  100 mg Oral BID    donepezil  10 mg Oral Nightly    metoprolol tartrate  25 mg Oral BID      sodium chloride 12.5 mL/hr at 02/04/23 0324    sodium chloride Stopped (02/08/23 1146)         I/O last 3 completed shifts:  In: -   Out: 2387 [Urine:3500; Drains:305]    CBC:   Recent Labs     02/13/23  0610 02/14/23  0615 02/15/23  0626   WBC 9.2 7.0 8.2   HGB 7.8* 7.9* 8.3*    281 287          Recent Labs     02/13/23  0610 02/14/23  0615 02/15/23  0626   * 126* 130*   K 5.7* 4.5 4.1   CL 93* 91* 93*   CO2 27 26 23   BUN 27* 23 21   CREATININE 1.5* 1.4* 1.5*   GLUCOSE 85 86 96       Lab Results   Component Value Date    CALCIUM 8.8 02/15/2023    PHOS 4.8 02/15/2023       Objective:     Vitals: BP (!) 129/55   Pulse 67   Temp 98.5 °F (36.9 °C) (Oral)   Resp 21   Ht 5' 5\" (1.651 m)   Wt 175 lb (79.4 kg)   SpO2 96%   BMI 29.12 kg/m² ,    General appearance:, Alert, awake and oriented  HEENT: Positive conjunctival pallor  Neck: Seems supple  Lungs: No gross crackles this morning at this anteriorly  Heart: Seemed regular rate and rhythm, left chest wall implanted cardiac device, well-healed incision from previous sternotomy  Abdomen: Soft, AVRIL drain  Extremities: Positive ankle edema      Problem List :         Impression :     Stage III acute kidney injury-excellent urine output with IV loop-stable so far but remains at risk for further injury  Acute decompensated heart failure-probably accentuated by acute illness/infection-acceptable with IV loop  Low sodium thought to be from hypervolemia improving  Underlying atherosclerotic cardiovascular disease-sepsis with extended spectrum beta-lactamase-and biliary duct leak were suspected now-so things are still complicated for her    Recommendation/Plan  :     I will keep the loop for now-but with ongoing abdominal infection and potential bile leak-things can change in a minute-we will have to stay vigilant-if there is any evidence of intravascular volume depletion I would hold IV loop-although she looked much better this morning- but some time looks could be deceiving-I am hoping and praying that she will come out of this complication-follow clinically and biochemically      Elyse Dick MD MD

## 2023-02-15 NOTE — PROGRESS NOTES
General Surgery- [facility] Day: 16    Chief Complaint on Admission: acute cholecystitis  Late entry    Subjective:     Drains being flushed. Tolerating pureed diet - not full appetite yet. Denies F/C. Denies abdominal pain. ROS:  Review of Systems  Negative except as above    Allergies  Patient has no known allergies. Diagnosis Date    Anxiety     Arthritis     knees    Atrial fibrillation (HCC)     CAD (coronary artery disease)     Sees Dr. Redd Mccullough    COVID-19 2021    Diabetes mellitus ([address]     H/O cardiac catheterization 2018    severe 3 vessel disease, refer to CV surgery for CABG and MAZE    H/O cardiovascular stress test 2018    EF47%  fixed perfusion defect ant wall, pt in afib    H/O echocardiogram 2018    EF55% mod MR    H/O echocardiogram 2018    EF 50-55%, Mild : AR, MR & TR.    H/O echocardiogram 2020    EF 55%, Breast artifact noted. H/O three vessel coronary artery bypass 2018    Dr. Germania Boogie    History of Holter monitoring 10/23/2018    Multiple PAF episodes noted. Tachy-Dinesh episodes noted. History of nuclear stress test 2020    EF 55%, Breast artifact. Hyperlipidemia     Hypertension     Memory loss     on Aricept    Missing teeth, acquired     Pneumonia     pneumococcal pneumonia twice at end of     Risk for falls     uses walker    TIA (transient ischemic attack)     family doctors said she has had mini-strokes    Urinary leakage     UTI (urinary tract infection)     recent --just stopped antibiotic last week as of 18    Wears glasses        Objective:     Vitals:    02/15/23 0614   BP:    Pulse:    Resp: 21   Temp:    SpO2:        TEMPERATURE:  Current -Temp: 98.5 °F (36.9 °C);  Max - Temp  Av.3 °F (36.8 °C)  Min: 98.1 °F (36.7 °C)  Max: 98.5 °F (36.9 °C)    I/O this shift:  In: 600 [P.O.:600]  Out: 1490 [Urine:1400; Drains:90]I/O last 3 completed shifts:  In: -   Out: 5688 [Urine:3500; Drains:305]      Physical Exam:  Physical Exam  Vitals reviewed. Constitutional:       Comments: Answering questions appropriately     HENT:      Head: Normocephalic and atraumatic. Eyes:      General:         Right eye: No discharge. Left eye: No discharge. Cardiovascular:      Rate and Rhythm: Normal rate. Abdominal:      Palpations: Abdomen is soft. Tenderness: There is no abdominal tenderness. Musculoskeletal:         General: No swelling. Skin:     General: Skin is warm. Coloration: Skin is not jaundiced. Neurological:      Mental Status: She is alert. IR drain x 2 - one consistent with light s/s and other is becoming more bilious.      Scheduled Meds:   furosemide  20 mg IntraVENous BID    guaiFENesin  300 mg Oral 4 times per day    sulfamethoxazole-trimethoprim (BACTRIM) IVPB  2 mg/kg (Adjusted) IntraVENous Q8H    piperacillin-tazobactam  3,375 mg IntraVENous Q8H    sodium chloride flush  5-40 mL IntraVENous BID    thiamine  100 mg IntraVENous Daily    melatonin  3 mg Oral Nightly    heparin (porcine)  5,000 Units SubCUTAneous 3 times per day    [Held by provider] gabapentin  300 mg Oral BID    memantine  5 mg Oral BID    rosuvastatin  10 mg Oral QPM    lidocaine PF  5 mL IntraDERmal Once    sodium chloride flush  5-40 mL IntraVENous 2 times per day    pantoprazole  40 mg IntraVENous Daily    polyethylene glycol  17 g Oral Daily    sodium chloride flush  5-40 mL IntraVENous 2 times per day    docusate sodium  100 mg Oral BID    donepezil  10 mg Oral Nightly    metoprolol tartrate  25 mg Oral BID     ContinuousInfusions:   sodium chloride 12.5 mL/hr at 02/04/23 0324    sodium chloride Stopped (02/08/23 1146)     PRN Meds:HYDROcodone-acetaminophen, HYDROmorphone **OR** HYDROmorphone, sodium chloride flush, sodium chloride, sodium chloride flush, sodium chloride, ondansetron **OR** ondansetron, acetaminophen **OR** acetaminophen, ipratropium      Labs/Imaging Results:   Lab Results   Component Value Date    WBC 8.2 02/15/2023    HGB 8.3 (L) 02/15/2023    HCT 25.3 (L) 02/15/2023    MCV 82.1 02/15/2023     02/15/2023     Lab Results   Component Value Date     (L) 02/15/2023    K 4.1 02/15/2023    CL 93 (L) 02/15/2023    CO2 23 02/15/2023    BUN 21 02/15/2023    CREATININE 1.5 (H) 02/15/2023    GLUCOSE 96 02/15/2023    CALCIUM 8.8 02/15/2023    PROT 5.5 (L) 02/15/2023    LABALBU 3.4 02/15/2023    BILITOT 0.4 02/15/2023    ALKPHOS 120 02/15/2023    AST 20 02/15/2023    ALT 10 02/15/2023    LABGLOM 35 (L) 02/15/2023    GFRAA >60 07/12/2022     CT head:  STUDY MILDLY DEGRADED BY MOTION ARTIFACT   No noncontrast CT evidence of acute intracranial pathology.       Advanced cortical atrophy and white matter microvascular degenerative   changes, heavy atherosclerotic calcification distal internal carotid and   vertebral arteries of uncertain hemodynamic significance.         CT A/P:    1. Large post cholecystectomy abscess which extends from the gallbladder   fossa to the subhepatic region and medial and posterior to the liver.  The   largest axial dimension is 9 x 11.3 cm and the largest coronal dimension is   8.4 x 10 cm.  A 2nd collection can be seen measuring 5.9 x 5.7 cm posteriorly   which may communicate with the larger collection.   2. Constipation and stool impaction in the rectum.   3. Degenerated calcified fibroids.  No other acute abnormality.   Findings discussed with Dr. Holden Morales on 02/09/2023 at 3:20 p.m.         Assessment:       78 y/o F s/p lap pedro on 1/31/23    Plan:       -One of the IR drains is now declaring itself more bilious which would be concerning for bile leak. HIDA ordered. F/u results. May require ERCP. Plan d/w pt's nurse, Hospitalist, and pt's sister at bedside.           Electronically signed by BLAS LEE II, MD on 2/15/2023 at 10:18 AM

## 2023-02-15 NOTE — PROGRESS NOTES
Occupational Therapy Treatment Note  Name: Naila Galvan MRN: [id number] :   1945   Date:  2/15/2023   Admission Date: 2023 Room:  [address] [place] [address]A     Primary Problem:  The primary encounter diagnosis was Chest pain, unspecified type. Diagnoses of Right sided abdominal pain and Cholecystitis were also pertinent to this visit. Past Medical History:   Diagnosis Date    Anxiety     Arthritis     knees    Atrial fibrillation (HCC)     CAD (coronary artery disease)     Sees Dr. Alexandra December    COVID-2021    Diabetes mellitus ([facility] [address]     H/O cardiac catheterization 2018    severe 3 vessel disease, refer to CV surgery for CABG and MAZE    H/O cardiovascular stress test 2018    EF47%  fixed perfusion defect ant wall, pt in afib    H/O echocardiogram 2018    EF55% mod MR    H/O echocardiogram 2018    EF 50-55%, Mild : AR, MR & TR.    H/O echocardiogram 2020    EF 55%, Breast artifact noted. H/O three vessel coronary artery bypass 2018    Dr. Nola Culp    History of Holter monitoring 10/23/2018    Multiple PAF episodes noted. Tachy-Dinesh episodes noted. History of nuclear stress test 2020    EF 55%, Breast artifact. Hyperlipidemia     Hypertension     Memory loss     on Aricept    Missing teeth, acquired     Pneumonia     pneumococcal pneumonia twice at end of     Risk for falls     uses walker    TIA (transient ischemic attack)     family doctors said she has had mini-strokes    Urinary leakage     UTI (urinary tract infection)     recent --just stopped antibiotic last week as of 18    Wears glasses          Communication with other providers:   CoTx with PT for pt safety and tolerance, RN handoff     Subjective:  Patient states:  \"She won't mind to hold me up for a minute\"  Pain: declined at rest, c/o pain with seated EOB, no rating   Restrictions: general, fall, tele, contact ISO, AVRIL drain   Sister at bedside    Objective:    Observation:  pt was in bed upon arrival, agreeable to session    Treatment, including education:    Therapeutic Activity Training:   Therapeutic activity training was instructed today. Cues were given for safety, sequence, UE/LE placement, visual cues, and balance. Activities performed today included:    Bed mobility:  Pt completed sup to sit with ModAx2. PT managed BLE, hips, and OT managed trunk. Pt returned to sup with ModAx2. PT managed BLE, hips and OT managed hips, trunk. Pt scooted to [facility] with DEPx2. Scooting:  Pt required MaxA to advance hips anterior to EOB. Seated balance:  Pt initially tolerated EOB with Dennis d/t retro lean. D/t volitional behaviors, pt regressed to MaxA with heavy retro lean. Pt with 3 bouts of SBA for a few minutes with SBA and BUE support on bed rails. Between bouts, pt benefited from rest breaks against therapist with MaxA. PT instructs BLE activity, see PT note. Neuro:  Pt provided verbal and tactile cues for appropriate posture. WS facilitated via therapist for appropriate WS. Addressed normal movement patterns and appropriate body mechanics in prep for improved functional independence in daily routines. Self Care Training:   Self care training was performed today. Cues were given for safety, sequence, UE/LE placement, visual cues, and balance. Activities performed today included:    LB dressing:  Pt donned bilat nonslip socks in long-seated with DEP assist.       Education: Role of OT, OT POC, safety, benefits of EOB/OOB activity, rationale for treatment  Safety Measures: Gait belt used for safety of pt and therapist, Left in bed, Alarm in place, call light and phone within reach, lines managed, needs met     Assessment / Impression:    Pt demo improved seated balance with encouragement and education. Will cont to address seated balance and attempt transfers at next session.      Patient's tolerance of treatment: Good   Adverse Reaction: none   Significant change in status and impact:  improved seated balance with encouragement   Barriers to improvement: strength, endurance, seated balance, pain mgmt, transfers, cognition     Plan for Next Session:    Continue OT POC    Time in:  1119  Time out:  1142  Timed treatment minutes:  23  Total treatment time:  23    Electronically signed by:    NADEEM Loredo/ELHAM  License: [id number]  1/42/1099, 1:12 PM

## 2023-02-15 NOTE — PROGRESS NOTES
V2.0  [facility] Hospitalist Progress Note      Name:  Carly Tong /Age/Sex: 1945  (76 y.o. female)   MRN & CSN:  [id number] & [id number] Encounter Date/Time: 2/15/2023 3:29 PM EST    Location:  [id number] PCP: Shola Anglin MD       Hospital Day: 16    Assessment and Plan:   Carly Tong is a 76 y.o. female with pmh of Carly Tong is a 76 y.o. female with pmh of CAD s/p CABG, Valvular Heart Disease, s/p Mitral Valve Repair, PFO Closure, Paroxysmal A-Fib, HTN, HLD, DM, TIA, Early Dementia who presents with Chest pain Rt Sided and RUQ Abdominal Pain      Plan:    Sepsis 2/2 likely ESBL UTI without hematuria, Pneumonia, acute cholecystitis  Cholecystitis s/p lap cholecystectomy  Large post-cholecystectomy fluid collection, concern for infection  Patient with potential abnormality as well she did have acute solid cholecystitis status post lap pedro 2023. High risk for postoperative infection requiring MRSA pneumonia coverage. WBC 12, SBP 92, RR 22 initially. Procal trending down 10.51 -> 5.09 -> 0.4 5 -> 0.49 -> 0.32  Urine growing ESBL UTI. MRSA Nares +ve  Patient was doing relatively well, but a day after drain removal patient started to get more altered and intermittently complained of the right quadrant pain. Obtained CT abd pelvis with IV contrast - shows large post cholecsytectomy abscesses. The largest axial dimension is 9 x 11.3 cm and the largest coronal dimension is 8.4 x 10 cm. A 2nd collection can be seen measuring 5.9 x 5.7 cm posteriorly which may communicate with the larger collection. STAT consult placed for IR team.- s/p drain placement x 2 -cultures sent today  Patient still has bilious output in the drain, plan for HIDA   General surgery team - following closely again.   Consulted ID - changed abx to zosyn and bactrim   Fluid culture also growing E.coli - follow sensitivity; given the history of ESBL this has risk of being ESBL too - page ID regarding antibiotics if ESBL or if patient's status worsens  Continue to monitor  Plan to do IV abx until after a week of removing the drains  Trend CRP and Pct     Acute Encephalopathy 2/2 likely Metabolic and Septic 2/2 hypoxia and ESBL UTI, Iatrogenic 2/2 medication   Was difficult to wake on 2/3/2023  Lethargic and sleepy today  Gabapentin on hold per nephro - agree with it    Cholecystitis s/p lap cholecystectomy   Patient underwent lap pedro on 1/31/2023 with Dr. Oziel Garcia. AVRIL drain removed 2/7/2023  GS on board   Abx for 7 days post drain removal   Change to oral at discharge    Acute hypoxic respiratory failure, resolved  Patient with acute respiratory failure was on nasal cannula. Became acutely more short of breath. CT did show what appears to be either fluid overload or viral pneumonia. 25% at 25L/min Heated high flow --> weaned off O2      TAYLOR with hyperkalemia  Pt with Cr 2 on admission, trended down to 1.5 today. baseline Cr 0.7. Kayexalate ordered  Nephrology on board    Hypochloremic Hyponatremia  Patient with acute worsening hyponatremia to 121 and now back up to 135  Nephrology on board    Acute on chronic HFpEF  Patient with an EF 50% with hypokinetic inferio-lateral wall and basal anterio-septal carson the 45 (become acutely short of breath with progressive pulmonary edema. Pro-BNP trending up to 24,733  Cardiology on board  Nephrology on board  On aldactone and amiloride    Hypokalemia   K 3.9    CAD s/p CABG  Pacer in-situ  VHD  Patient is also known valvular heart disease. Cardiology following  Discussed with cardiology about interrogating pacer. Acute Normocytic Anemia 2/2 possibly Post-operative loss and hemodilution 2/2 fluid overload   Hb was 11 on 1/31/2023. 1U PRBC this admission. Hb 9.1 today. No signs of overt bleeding     Diet ADULT ORAL NUTRITION SUPPLEMENT; Breakfast, Lunch, Dinner; Clear Liquid Oral Supplement  ADULT ORAL NUTRITION SUPPLEMENT; Dinner; Frozen Oral Supplement  ADULT DIET;  Dysphagia - Soft and Bite Sized   DVT Prophylaxis [] Lovenox, [x]  Heparin, [] SCDs, [] Ambulation,    [] Eliquis, [] Xarelto  [] Coumadin [] other   Code Status DNR-CCA   Disposition From: Home  Expected Disposition: [place]  Estimated Date of Discharge: TBD     Surrogate Decision Maker/ POA      Subjective:     Chief Complaint: Chest Pain (X 3 days )     Patient seen at bedside, patient has bilious output with significant drainage, plan for HIDA    Review of Systems:    Review of Systems   Constitutional:  Positive for fatigue. Negative for chills, fever and unexpected weight change. Eyes:  Negative for pain and visual disturbance. Respiratory:  Negative for cough, choking, chest tightness, shortness of breath, wheezing and stridor. Cardiovascular:  Negative for chest pain, palpitations and leg swelling. Gastrointestinal:  Negative for abdominal distention, abdominal pain, blood in stool, constipation, diarrhea, nausea and vomiting. Genitourinary:  Negative for decreased urine volume, dysuria, frequency, hematuria and urgency. Musculoskeletal:  Negative for arthralgias, back pain and myalgias. Skin:  Negative for pallor and rash. Neurological:  Negative for dizziness, tremors, syncope, speech difficulty, weakness, light-headedness, numbness and headaches. Psychiatric/Behavioral:  Negative for agitation. Objective: Intake/Output Summary (Last 24 hours) at 2/15/2023 1303  Last data filed at 2/15/2023 0934  Gross per 24 hour   Intake 600 ml   Output 4270 ml   Net -3670 ml          Vitals:   Vitals:    02/15/23 0614   BP:    Pulse:    Resp: 21   Temp:    SpO2:        Physical Exam:     Physical Exam  Vitals reviewed. Constitutional:       General: She is awake. She is not in acute distress. Appearance: Normal appearance. She is overweight. She is not ill-appearing. Interventions: Nasal cannula in place. Comments: Heated high flow    HENT:      Head: Normocephalic and atraumatic. Mouth/Throat:      Mouth: Mucous membranes are moist.      Pharynx: Oropharynx is clear. Eyes:      Extraocular Movements: Extraocular movements intact. Conjunctiva/sclera: Conjunctivae normal.      Pupils: Pupils are equal, round, and reactive to light. Cardiovascular:      Rate and Rhythm: Normal rate. Rhythm irregularly irregular. Pulses: Normal pulses. Heart sounds: Normal heart sounds. Comments: Pacer is not capturing all beats - pacer spikes are all over the place  Pulmonary:      Effort: Pulmonary effort is normal. Tachypnea present. Breath sounds: Decreased air movement and transmitted upper airway sounds present. No wheezing, rhonchi or rales. Abdominal:      General: Abdomen is protuberant. Bowel sounds are normal.      Palpations: Abdomen is soft. Musculoskeletal:         General: No swelling. Normal range of motion. Cervical back: Normal range of motion and neck supple. Skin:     General: Skin is warm and dry. Neurological:      Mental Status: She is oriented to person, place, and time. She is lethargic. Comments: Oriented to year, not month   Psychiatric:         Behavior: Behavior is cooperative.        Medications:   Medications:    furosemide  20 mg IntraVENous BID    guaiFENesin  300 mg Oral 4 times per day    sulfamethoxazole-trimethoprim (BACTRIM) IVPB  2 mg/kg (Adjusted) IntraVENous Q8H    piperacillin-tazobactam  3,375 mg IntraVENous Q8H    sodium chloride flush  5-40 mL IntraVENous BID    thiamine  100 mg IntraVENous Daily    melatonin  3 mg Oral Nightly    heparin (porcine)  5,000 Units SubCUTAneous 3 times per day    memantine  5 mg Oral BID    rosuvastatin  10 mg Oral QPM    lidocaine PF  5 mL IntraDERmal Once    sodium chloride flush  5-40 mL IntraVENous 2 times per day    pantoprazole  40 mg IntraVENous Daily    polyethylene glycol  17 g Oral Daily    sodium chloride flush  5-40 mL IntraVENous 2 times per day    docusate sodium  100 mg Oral BID donepezil  10 mg Oral Nightly    metoprolol tartrate  25 mg Oral BID      Infusions:    sodium chloride 12.5 mL/hr at 02/04/23 0324    sodium chloride Stopped (02/08/23 1146)     PRN Meds: HYDROcodone-acetaminophen, 1 tablet, Q6H PRN  HYDROmorphone, 0.25 mg, Q3H PRN   Or  HYDROmorphone, 0.5 mg, Q3H PRN  sodium chloride flush, 5-40 mL, PRN  sodium chloride, 500 mL, PRN  sodium chloride flush, 5-40 mL, PRN  sodium chloride, , PRN  ondansetron, 4 mg, Q8H PRN   Or  ondansetron, 4 mg, Q6H PRN  acetaminophen, 650 mg, Q6H PRN   Or  acetaminophen, 650 mg, Q6H PRN  ipratropium, 1 spray, PRN      Labs      Recent Results (from the past 24 hour(s))   C-Reactive Protein    Collection Time: 02/15/23  6:26 AM   Result Value Ref Range    CRP High Sensitivity 40.0 (H) <5.0 mg/L   Procalcitonin    Collection Time: 02/15/23  6:26 AM   Result Value Ref Range    Procalcitonin 0.241    Comprehensive Metabolic Panel    Collection Time: 02/15/23  6:26 AM   Result Value Ref Range    Sodium 130 (L) 135 - 145 MMOL/L    Potassium 4.1 3.5 - 5.1 MMOL/L    Chloride 93 (L) 99 - 110 mMol/L    CO2 23 21 - 32 MMOL/L    BUN 21 6 - 23 MG/DL    Creatinine 1.5 (H) 0.6 - 1.1 MG/DL    Est, Glom Filt Rate 35 (L) >60 mL/min/1.73m2    Glucose 96 70 - 99 MG/DL    Calcium 8.8 8.3 - 10.6 MG/DL    Albumin 3.4 3.4 - 5.0 GM/DL    Total Protein 5.5 (L) 6.4 - 8.2 GM/DL    Total Bilirubin 0.4 0.0 - 1.0 MG/DL    ALT 10 10 - 40 U/L    AST 20 15 - 37 IU/L    Alkaline Phosphatase 120 40 - 128 IU/L    Anion Gap 14 4 - 16   Magnesium    Collection Time: 02/15/23  6:26 AM   Result Value Ref Range    Magnesium 2.0 1.8 - 2.4 mg/dl   Phosphorus    Collection Time: 02/15/23  6:26 AM   Result Value Ref Range    Phosphorus 4.8 2.5 - 4.9 MG/DL   CBC with Auto Differential    Collection Time: 02/15/23  6:26 AM   Result Value Ref Range    WBC 8.2 4.0 - 10.5 K/CU MM    RBC 3.08 (L) 4.2 - 5.4 M/CU MM    Hemoglobin 8.3 (L) 12.5 - 16.0 GM/DL    Hematocrit 25.3 (L) 37 - 47 %    MCV 82.1 78 - 100 FL    MCH 26.9 (L) 27 - 31 PG    MCHC 32.8 32.0 - 36.0 %    RDW 13.9 11.7 - 14.9 %    Platelets 173 236 - 594 K/CU MM    MPV 8.7 7.5 - 11.1 FL    Differential Type AUTOMATED DIFFERENTIAL     Segs Relative 80.0 (H) 36 - 66 %    Lymphocytes % 11.5 (L) 24 - 44 %    Monocytes % 5.8 (H) 0 - 4 %    Eosinophils % 0.7 0 - 3 %    Basophils % 0.7 0 - 1 %    Segs Absolute 6.5 K/CU MM    Lymphocytes Absolute 0.9 K/CU MM    Monocytes Absolute 0.5 K/CU MM    Eosinophils Absolute 0.1 K/CU MM    Basophils Absolute 0.1 K/CU MM    Nucleated RBC % 0.0 %    Total Nucleated RBC 0.0 K/CU MM    Total Immature Neutrophil 0.11 K/CU MM    Immature Neutrophil % 1.3 (H) 0 - 0.43 %        Imaging/Diagnostics Last 24 Hours   XR CHEST PORTABLE    Result Date: 2/2/2023  EXAMINATION: ONE XRAY VIEW OF THE CHEST 2/2/2023 1:52 pm COMPARISON: 02/02/2023, 01/30/2023 HISTORY: ORDERING SYSTEM PROVIDED HISTORY: shortness of breath TECHNOLOGIST PROVIDED HISTORY: Reason for exam:->shortness of breath Reason for Exam: shortness of breath FINDINGS: Sternotomy wires. Prosthetic cardiac valve. Pacemaker wires. Lung volumes. Bibasilar opacities. No pneumothorax. Heart size is stable. Low lung volumes with bibasilar opacities which may represent atelectasis or pneumonia. XR CHEST PORTABLE    Result Date: 2/2/2023  EXAMINATION: ONE X-RAY VIEW OF THE CHEST 2/2/2023 10:49 am COMPARISON: CT chest 01/30/2023, chest radiograph 01/30/2023 HISTORY: ORDERING SYSTEM PROVIDED HISTORY: Shortness of breath TECHNOLOGIST PROVIDED HISTORY: Reason for exam:->Shortness of breath Reason for Exam: shortness of breath FINDINGS: The lungs are underinflated, resulting in vascular crowding and subsegmental atelectasis. The cardiomediastinal silhouette is obscured, but likely unchanged. Bibasilar consolidations favor atelectasis. The left upper lobe is obscured. No new focal consolidation, pneumothorax, or right-sided pleural effusion.   There is blunting of the left costophrenic angle, which may be due to low lung volumes and/or patient positioning. Osseous structures are diffusely demineralized and grossly unchanged. Left chest cardiac conduction device is again noted. Low lung volumes with likely bibasilar atelectasis versus developing infection. Comment: Please note this report has been produced using speech recognition software and may contain errors related to that system including errors in grammar, punctuation, and spelling, as well as words and phrases that may be inappropriate. If there are any questions or concerns please feel free to contact the dictating provider for clarification.      Electronically signed by Latasha Braga MD on 2/15/2023 at 1:03 PM

## 2023-02-16 ENCOUNTER — APPOINTMENT (OUTPATIENT)
Dept: GENERAL RADIOLOGY | Age: 78
DRG: 853 | End: 2023-02-16
Payer: MEDICARE

## 2023-02-16 LAB
ALBUMIN SERPL-MCNC: 3.5 GM/DL (ref 3.4–5)
ALP BLD-CCNC: 124 IU/L (ref 40–128)
ALT SERPL-CCNC: 10 U/L (ref 10–40)
ANION GAP SERPL CALCULATED.3IONS-SCNC: 14 MMOL/L (ref 4–16)
AST SERPL-CCNC: 19 IU/L (ref 15–37)
BILIRUB SERPL-MCNC: 0.3 MG/DL (ref 0–1)
BUN SERPL-MCNC: 22 MG/DL (ref 6–23)
CALCIUM SERPL-MCNC: 8.9 MG/DL (ref 8.3–10.6)
CHLORIDE BLD-SCNC: 94 MMOL/L (ref 99–110)
CO2: 25 MMOL/L (ref 21–32)
CREAT SERPL-MCNC: 1.5 MG/DL (ref 0.6–1.1)
CRP SERPL HS-MCNC: 33.8 MG/L
GFR SERPL CREATININE-BSD FRML MDRD: 35 ML/MIN/1.73M2
GLUCOSE SERPL-MCNC: 87 MG/DL (ref 70–99)
POTASSIUM SERPL-SCNC: 3.8 MMOL/L (ref 3.5–5.1)
PROCALCITONIN SERPL-MCNC: 0.25 NG/ML
SODIUM BLD-SCNC: 133 MMOL/L (ref 135–145)
TOTAL PROTEIN: 5.8 GM/DL (ref 6.4–8.2)

## 2023-02-16 PROCEDURE — 94150 VITAL CAPACITY TEST: CPT

## 2023-02-16 PROCEDURE — 99232 SBSQ HOSP IP/OBS MODERATE 35: CPT | Performed by: NURSE PRACTITIONER

## 2023-02-16 PROCEDURE — 2580000003 HC RX 258: Performed by: INTERNAL MEDICINE

## 2023-02-16 PROCEDURE — 2140000000 HC CCU INTERMEDIATE R&B

## 2023-02-16 PROCEDURE — 94761 N-INVAS EAR/PLS OXIMETRY MLT: CPT

## 2023-02-16 PROCEDURE — 71045 X-RAY EXAM CHEST 1 VIEW: CPT

## 2023-02-16 PROCEDURE — 6370000000 HC RX 637 (ALT 250 FOR IP): Performed by: INTERNAL MEDICINE

## 2023-02-16 PROCEDURE — 6360000002 HC RX W HCPCS: Performed by: NURSE PRACTITIONER

## 2023-02-16 PROCEDURE — 6360000002 HC RX W HCPCS: Performed by: INTERNAL MEDICINE

## 2023-02-16 PROCEDURE — 6370000000 HC RX 637 (ALT 250 FOR IP): Performed by: SURGERY

## 2023-02-16 PROCEDURE — 36415 COLL VENOUS BLD VENIPUNCTURE: CPT

## 2023-02-16 PROCEDURE — 2500000003 HC RX 250 WO HCPCS: Performed by: NURSE PRACTITIONER

## 2023-02-16 PROCEDURE — 6360000002 HC RX W HCPCS: Performed by: STUDENT IN AN ORGANIZED HEALTH CARE EDUCATION/TRAINING PROGRAM

## 2023-02-16 PROCEDURE — 36592 COLLECT BLOOD FROM PICC: CPT

## 2023-02-16 PROCEDURE — 84145 PROCALCITONIN (PCT): CPT

## 2023-02-16 PROCEDURE — 6370000000 HC RX 637 (ALT 250 FOR IP): Performed by: NURSE PRACTITIONER

## 2023-02-16 PROCEDURE — C9113 INJ PANTOPRAZOLE SODIUM, VIA: HCPCS | Performed by: STUDENT IN AN ORGANIZED HEALTH CARE EDUCATION/TRAINING PROGRAM

## 2023-02-16 PROCEDURE — 6370000000 HC RX 637 (ALT 250 FOR IP): Performed by: PHYSICIAN ASSISTANT

## 2023-02-16 PROCEDURE — 99024 POSTOP FOLLOW-UP VISIT: CPT | Performed by: NURSE PRACTITIONER

## 2023-02-16 PROCEDURE — 80053 COMPREHEN METABOLIC PANEL: CPT

## 2023-02-16 PROCEDURE — 86140 C-REACTIVE PROTEIN: CPT

## 2023-02-16 PROCEDURE — 2580000003 HC RX 258: Performed by: NURSE PRACTITIONER

## 2023-02-16 PROCEDURE — APPNB30 APP NON BILLABLE TIME 0-30 MINS: Performed by: NURSE PRACTITIONER

## 2023-02-16 PROCEDURE — 2500000003 HC RX 250 WO HCPCS: Performed by: INTERNAL MEDICINE

## 2023-02-16 RX ORDER — SULFAMETHOXAZOLE AND TRIMETHOPRIM 400; 80 MG/1; MG/1
1 TABLET ORAL EVERY 12 HOURS SCHEDULED
Status: DISCONTINUED | OUTPATIENT
Start: 2023-02-16 | End: 2023-02-20 | Stop reason: HOSPADM

## 2023-02-16 RX ADMIN — PANTOPRAZOLE SODIUM 40 MG: 40 INJECTION, POWDER, LYOPHILIZED, FOR SOLUTION INTRAVENOUS at 11:49

## 2023-02-16 RX ADMIN — SULFAMETHOXAZOLE AND TRIMETHOPRIM 132.8 MG: 80; 16 INJECTION, SOLUTION, CONCENTRATE INTRAVENOUS at 03:47

## 2023-02-16 RX ADMIN — FUROSEMIDE 20 MG: 10 INJECTION, SOLUTION INTRAMUSCULAR; INTRAVENOUS at 11:48

## 2023-02-16 RX ADMIN — THIAMINE HYDROCHLORIDE 100 MG: 100 INJECTION, SOLUTION INTRAMUSCULAR; INTRAVENOUS at 11:49

## 2023-02-16 RX ADMIN — GUAIFENESIN 300 MG: 100 SOLUTION ORAL at 13:09

## 2023-02-16 RX ADMIN — MEMANTINE HYDROCHLORIDE 5 MG: 5 TABLET ORAL at 11:48

## 2023-02-16 RX ADMIN — FUROSEMIDE 20 MG: 10 INJECTION, SOLUTION INTRAMUSCULAR; INTRAVENOUS at 18:31

## 2023-02-16 RX ADMIN — MEMANTINE HYDROCHLORIDE 5 MG: 5 TABLET ORAL at 20:36

## 2023-02-16 RX ADMIN — PIPERACILLIN AND TAZOBACTAM 3375 MG: 3; .375 INJECTION, POWDER, LYOPHILIZED, FOR SOLUTION INTRAVENOUS at 04:39

## 2023-02-16 RX ADMIN — Medication 3 MG: at 20:36

## 2023-02-16 RX ADMIN — METOPROLOL TARTRATE 25 MG: 50 TABLET, FILM COATED ORAL at 11:48

## 2023-02-16 RX ADMIN — DONEPEZIL HYDROCHLORIDE 10 MG: 10 TABLET ORAL at 20:36

## 2023-02-16 RX ADMIN — SODIUM CHLORIDE, PRESERVATIVE FREE 10 ML: 5 INJECTION INTRAVENOUS at 20:35

## 2023-02-16 RX ADMIN — HEPARIN SODIUM 5000 UNITS: 5000 INJECTION INTRAVENOUS; SUBCUTANEOUS at 04:40

## 2023-02-16 RX ADMIN — ROSUVASTATIN CALCIUM 10 MG: 5 TABLET, COATED ORAL at 18:30

## 2023-02-16 RX ADMIN — METOPROLOL TARTRATE 25 MG: 50 TABLET, FILM COATED ORAL at 20:36

## 2023-02-16 RX ADMIN — SULFAMETHOXAZOLE AND TRIMETHOPRIM 1 TABLET: 400; 80 TABLET ORAL at 20:37

## 2023-02-16 RX ADMIN — GUAIFENESIN 300 MG: 100 SOLUTION ORAL at 18:30

## 2023-02-16 RX ADMIN — PIPERACILLIN AND TAZOBACTAM 3375 MG: 3; .375 INJECTION, POWDER, LYOPHILIZED, FOR SOLUTION INTRAVENOUS at 20:35

## 2023-02-16 RX ADMIN — SODIUM CHLORIDE, PRESERVATIVE FREE 10 ML: 5 INJECTION INTRAVENOUS at 13:09

## 2023-02-16 RX ADMIN — GUAIFENESIN 300 MG: 100 SOLUTION ORAL at 04:38

## 2023-02-16 RX ADMIN — SODIUM CHLORIDE, PRESERVATIVE FREE 10 ML: 5 INJECTION INTRAVENOUS at 13:08

## 2023-02-16 RX ADMIN — HEPARIN SODIUM 5000 UNITS: 5000 INJECTION INTRAVENOUS; SUBCUTANEOUS at 20:36

## 2023-02-16 RX ADMIN — GUAIFENESIN 300 MG: 100 SOLUTION ORAL at 23:27

## 2023-02-16 ASSESSMENT — ENCOUNTER SYMPTOMS
CHOKING: 0
CONSTIPATION: 0
STRIDOR: 0
BACK PAIN: 0
EYE PAIN: 0
ABDOMINAL DISTENTION: 0
DIARRHEA: 0
VOMITING: 0
NAUSEA: 0
CHEST TIGHTNESS: 0
WHEEZING: 0
BLOOD IN STOOL: 0
COUGH: 0
SHORTNESS OF BREATH: 0
ABDOMINAL PAIN: 0

## 2023-02-16 NOTE — PROGRESS NOTES
Comprehensive Nutrition Assessment    Type and Reason for Visit:  Reassess    Nutrition Recommendations/Plan:   Continue current diet  Continue supplements, modify lunch oral supp to high kristen/high pro  Please encourage and document po intakes  Consider appetite stimulant if within POC  Monitor weights     Malnutrition Assessment:  Malnutrition Status:  At risk for malnutrition (Comment) (02/13/23 1800)      Nutrition Assessment:    Diet upgraded to soft and bite sized, pt remains w/ poor appetite, minimally interactive during visit; family reports she is consuming her supplements, will add high kristen/high pro once a day to increase calorie and protein intake, states pt will drink vanilla. Consider appetite stimulant if within POC. Will continue to follow at high nutrition risk.    Nutrition Related Findings:    +total assist feed, pt minimally interactive at visit Wound Type: None       Current Nutrition Intake & Therapies:    Average Meal Intake: 0%, %  Average Supplements Intake: %  ADULT ORAL NUTRITION SUPPLEMENT; Dinner; Frozen Oral Supplement  ADULT DIET; Dysphagia - Soft and Bite Sized  ADULT ORAL NUTRITION SUPPLEMENT; Breakfast, Dinner; Clear Liquid Oral Supplement  ADULT ORAL NUTRITION SUPPLEMENT; Lunch; Standard High Calorie/High Protein Oral Supplement    Anthropometric Measures:  Height: 5' 5\" (165.1 cm)  Ideal Body Weight (IBW): 125 lbs (57 kg)    Admission Body Weight: 173 lb 15.1 oz (78.9 kg)  Current Body Weight: 165 lb 9.1 oz (75.1 kg), 142.7 % IBW. Weight Source: Bed Scale  Current BMI (kg/m2): 27.6  Usual Body Weight: 154 lb (69.9 kg) (11/2/22)  % Weight Change (Calculated): 15.8  Weight Adjustment For: No Adjustment                 BMI Categories: Overweight (BMI 25.0-29.9)    Estimated Daily Nutrient Needs:  Energy Requirements Based On: Formula  Weight Used for Energy Requirements: Current  Energy (kcal/day): 0224-9365 (MSJ)  Weight Used for Protein Requirements: Ideal  Protein  (g/day): 65-75 (1.0-1.2g/kg IBW)  Method Used for Fluid Requirements: 1 ml/kcal  Fluid (ml/day): 1650    Nutrition Diagnosis:   Inadequate oral intake related to swallowing difficulty, psychological cause or life stress as evidenced by intake 26-50%    Nutrition Interventions:   Food and/or Nutrient Delivery: Continue Current Diet, Modify Oral Nutrition Supplement  Nutrition Education/Counseling: Education not appropriate  Coordination of Nutrition Care: Continue to monitor while inpatient, Feeding Assistance/Environment Change, Speech Therapy, Swallow Evaluation, Coordination of Care  Plan of Care discussed with: General surgery, SLP    Goals:  Previous Goal Met: Progressing toward Goal(s)  Goals: Meet at least 75% of estimated needs       Nutrition Monitoring and Evaluation:   Behavioral-Environmental Outcomes: None Identified  Food/Nutrient Intake Outcomes: Diet Advancement/Tolerance, Food and Nutrient Intake  Physical Signs/Symptoms Outcomes: Biochemical Data, Chewing or Swallowing, Meal Time Behavior, Skin    Discharge Planning:     Too soon to determine     Dolphus Kris, [address]: [id number]

## 2023-02-16 NOTE — PROGRESS NOTES
V2.0  [facility] Hospitalist Progress Note      Name:  Magan Sales /Age/Sex: 1945  (76 y.o. female)   MRN & CSN:  [id number] & [id number] Encounter Date/Time: 2023 3:29 PM EST    Location:  [address] [place], [place] [address]-D PCP: Pura Maldonado MD       Hospital Day: 18    Assessment and Plan:   Magan Sales is a 76 y.o. female with pmh of Magan Sales is a 76 y.o. female with pmh of CAD s/p CABG, Valvular Heart Disease, s/p Mitral Valve Repair, PFO Closure, Paroxysmal A-Fib, HTN, HLD, DM, TIA, Early Dementia who presents with Chest pain Rt Sided and RUQ Abdominal Pain      Plan:    Sepsis 2/2 likely ESBL UTI without hematuria, Pneumonia, acute cholecystitis  Cholecystitis s/p lap cholecystectomy  Large post-cholecystectomy fluid collection, concern for infection  Patient with potential abnormality as well she did have acute solid cholecystitis status post lap pedro 2023. High risk for postoperative infection requiring MRSA pneumonia coverage. WBC 12, SBP 92, RR 22 initially. Procal trending down 10.51 -> 5.09 -> 0.4 5 -> 0.49 -> 0.32  Urine growing ESBL UTI. MRSA Nares +ve  Patient was doing relatively well, but a day after drain removal patient started to get more altered and intermittently complained of the right quadrant pain. Obtained CT abd pelvis with IV contrast - shows large post cholecsytectomy abscesses. The largest axial dimension is 9 x 11.3 cm and the largest coronal dimension is 8.4 x 10 cm. A 2nd collection can be seen measuring 5.9 x 5.7 cm posteriorly which may communicate with the larger collection. STAT consult placed for IR team.- s/p drain placement x 2 -cultures sent today  Patient still has bilious output in the drain, plan for HIDA   General surgery team - following closely again.   Consulted ID - changed abx to zosyn and bactrim   Fluid culture also growing E.coli - follow sensitivity; given the history of ESBL this has risk of being ESBL too - page ID regarding antibiotics if ESBL or if patient's status worsens  Continue to monitor  Plan to do IV abx until after a week of removing the drains  Trend CRP and Pct     Acute Encephalopathy 2/2 likely Metabolic and Septic 2/2 hypoxia and ESBL UTI, Iatrogenic 2/2 medication   Was difficult to wake on 2/3/2023  Gabapentin on hold per nephro - agree with it    Cholecystitis s/p lap cholecystectomy   Patient underwent lap pedro on 1/31/2023 with Dr. Oziel Garcia. AVRIL drain removed 2/7/2023  GS on board   Abx for 7 days post drain removal   Change to oral at discharge    Acute hypoxic respiratory failure, resolved  Patient with acute respiratory failure was on nasal cannula. Became acutely more short of breath. CT did show what appears to be either fluid overload or viral pneumonia. 25% at 25L/min Heated high flow --> weaned off O2      TAYLOR with hyperkalemia  Pt with Cr 2 on admission, trended down to 1.5 today. baseline Cr 0.7. Kayexalate ordered  Nephrology on board    Hypochloremic Hyponatremia  Patient with acute worsening hyponatremia to 121 and now back up to 135  Nephrology on board    Acute on chronic HFpEF  Patient with an EF 50% with hypokinetic inferio-lateral wall and basal anterio-septal carson the 45 (become acutely short of breath with progressive pulmonary edema. Pro-BNP trending up to 24,733  Cardiology on board  Nephrology on board  On aldactone and amiloride    Hypokalemia   K 3.9    CAD s/p CABG  Pacer in-situ  VHD  Patient is also known valvular heart disease. Cardiology following  Discussed with cardiology about interrogating pacer. Acute Normocytic Anemia 2/2 possibly Post-operative loss and hemodilution 2/2 fluid overload   Hb was 11 on 1/31/2023. 1U PRBC this admission. Hb 8.3 today. No signs of overt bleeding     Diet ADULT ORAL NUTRITION SUPPLEMENT; Breakfast, Lunch, Dinner; Clear Liquid Oral Supplement  ADULT ORAL NUTRITION SUPPLEMENT; Dinner; Frozen Oral Supplement  ADULT DIET;  Dysphagia - Soft and Bite Sized   DVT Prophylaxis [] Lovenox, [x]  Heparin, [] SCDs, [] Ambulation,    [] Eliquis, [] Xarelto  [] Coumadin [] other   Code Status DNR-CCA   Disposition From: Home  Expected Disposition: [place]  Estimated Date of Discharge: TBD     Surrogate Decision Maker/ POA      Subjective:     Chief Complaint: Chest Pain (X 3 days )     Patient seen at bedside, patient has a hacking cough today possibly secondary to aspiration, will do a chest X-ray    Review of Systems:    Review of Systems   Constitutional:  Positive for fatigue. Negative for chills, fever and unexpected weight change. Eyes:  Negative for pain and visual disturbance. Respiratory:  Negative for cough, choking, chest tightness, shortness of breath, wheezing and stridor. Cardiovascular:  Negative for chest pain, palpitations and leg swelling. Gastrointestinal:  Negative for abdominal distention, abdominal pain, blood in stool, constipation, diarrhea, nausea and vomiting. Genitourinary:  Negative for decreased urine volume, dysuria, frequency, hematuria and urgency. Musculoskeletal:  Negative for arthralgias, back pain and myalgias. Skin:  Negative for pallor and rash. Neurological:  Negative for dizziness, tremors, syncope, speech difficulty, weakness, light-headedness, numbness and headaches. Psychiatric/Behavioral:  Negative for agitation. Objective: Intake/Output Summary (Last 24 hours) at 2/16/2023 0849  Last data filed at 2/16/2023 0356  Gross per 24 hour   Intake 600 ml   Output 1095 ml   Net -495 ml          Vitals:   Vitals:    02/16/23 0350   BP: (!) 128/55   Pulse: 66   Resp: 21   Temp: 98.5 °F (36.9 °C)   SpO2:        Physical Exam:     Physical Exam  Vitals reviewed. Constitutional:       General: She is awake. She is not in acute distress. Appearance: Normal appearance. She is overweight. She is not ill-appearing. Interventions: Nasal cannula in place.       Comments: Heated high flow    HENT:      Head: Normocephalic and atraumatic. Mouth/Throat:      Mouth: Mucous membranes are moist.      Pharynx: Oropharynx is clear. Eyes:      Extraocular Movements: Extraocular movements intact. Conjunctiva/sclera: Conjunctivae normal.      Pupils: Pupils are equal, round, and reactive to light. Cardiovascular:      Rate and Rhythm: Normal rate. Rhythm irregularly irregular. Pulses: Normal pulses. Heart sounds: Normal heart sounds. Comments: Pacer is not capturing all beats - pacer spikes are all over the place  Pulmonary:      Effort: Pulmonary effort is normal. Tachypnea present. Breath sounds: Decreased air movement and transmitted upper airway sounds present. No wheezing, rhonchi or rales. Abdominal:      General: Abdomen is protuberant. Bowel sounds are normal.      Palpations: Abdomen is soft. Musculoskeletal:         General: No swelling. Normal range of motion. Cervical back: Normal range of motion and neck supple. Skin:     General: Skin is warm and dry. Neurological:      Mental Status: She is oriented to person, place, and time. She is lethargic. Comments: Oriented to year, not month   Psychiatric:         Behavior: Behavior is cooperative.        Medications:   Medications:    furosemide  20 mg IntraVENous BID    guaiFENesin  300 mg Oral 4 times per day    sulfamethoxazole-trimethoprim (BACTRIM) IVPB  2 mg/kg (Adjusted) IntraVENous Q8H    piperacillin-tazobactam  3,375 mg IntraVENous Q8H    sodium chloride flush  5-40 mL IntraVENous BID    thiamine  100 mg IntraVENous Daily    melatonin  3 mg Oral Nightly    heparin (porcine)  5,000 Units SubCUTAneous 3 times per day    memantine  5 mg Oral BID    rosuvastatin  10 mg Oral QPM    lidocaine PF  5 mL IntraDERmal Once    sodium chloride flush  5-40 mL IntraVENous 2 times per day    pantoprazole  40 mg IntraVENous Daily    polyethylene glycol  17 g Oral Daily    sodium chloride flush  5-40 mL IntraVENous 2 times per day docusate sodium  100 mg Oral BID    donepezil  10 mg Oral Nightly    metoprolol tartrate  25 mg Oral BID      Infusions:    sodium chloride 12.5 mL/hr at 02/04/23 0324    sodium chloride Stopped (02/08/23 1146)     PRN Meds: HYDROcodone-acetaminophen, 1 tablet, Q6H PRN  HYDROmorphone, 0.25 mg, Q3H PRN   Or  HYDROmorphone, 0.5 mg, Q3H PRN  sodium chloride flush, 5-40 mL, PRN  sodium chloride, 500 mL, PRN  sodium chloride flush, 5-40 mL, PRN  sodium chloride, , PRN  ondansetron, 4 mg, Q8H PRN   Or  ondansetron, 4 mg, Q6H PRN  acetaminophen, 650 mg, Q6H PRN   Or  acetaminophen, 650 mg, Q6H PRN  ipratropium, 1 spray, PRN      Labs      Recent Results (from the past 24 hour(s))   C-Reactive Protein    Collection Time: 02/16/23  7:16 AM   Result Value Ref Range    CRP High Sensitivity 33.8 (H) <5.0 mg/L   Comprehensive Metabolic Panel    Collection Time: 02/16/23  7:16 AM   Result Value Ref Range    Sodium 133 (L) 135 - 145 MMOL/L    Potassium 3.8 3.5 - 5.1 MMOL/L    Chloride 94 (L) 99 - 110 mMol/L    CO2 25 21 - 32 MMOL/L    BUN 22 6 - 23 MG/DL    Creatinine 1.5 (H) 0.6 - 1.1 MG/DL    Est, Glom Filt Rate 35 (L) >60 mL/min/1.73m2    Glucose 87 70 - 99 MG/DL    Calcium 8.9 8.3 - 10.6 MG/DL    Albumin 3.5 3.4 - 5.0 GM/DL    Total Protein 5.8 (L) 6.4 - 8.2 GM/DL    Total Bilirubin 0.3 0.0 - 1.0 MG/DL    ALT 10 10 - 40 U/L    AST 19 15 - 37 IU/L    Alkaline Phosphatase 124 40 - 128 IU/L    Anion Gap 14 4 - 16   Procalcitonin    Collection Time: 02/16/23  7:16 AM   Result Value Ref Range    Procalcitonin 0.248         Imaging/Diagnostics Last 24 Hours   XR CHEST PORTABLE    Result Date: 2/2/2023  EXAMINATION: ONE XRAY VIEW OF THE CHEST 2/2/2023 1:52 pm COMPARISON: 02/02/2023, 01/30/2023 HISTORY: ORDERING SYSTEM PROVIDED HISTORY: shortness of breath TECHNOLOGIST PROVIDED HISTORY: Reason for exam:->shortness of breath Reason for Exam: shortness of breath FINDINGS: Sternotomy wires. Prosthetic cardiac valve. Pacemaker wires. Lung volumes. Bibasilar opacities. No pneumothorax. Heart size is stable. Low lung volumes with bibasilar opacities which may represent atelectasis or pneumonia. XR CHEST PORTABLE    Result Date: 2/2/2023  EXAMINATION: ONE X-RAY VIEW OF THE CHEST 2/2/2023 10:49 am COMPARISON: CT chest 01/30/2023, chest radiograph 01/30/2023 HISTORY: ORDERING SYSTEM PROVIDED HISTORY: Shortness of breath TECHNOLOGIST PROVIDED HISTORY: Reason for exam:->Shortness of breath Reason for Exam: shortness of breath FINDINGS: The lungs are underinflated, resulting in vascular crowding and subsegmental atelectasis. The cardiomediastinal silhouette is obscured, but likely unchanged. Bibasilar consolidations favor atelectasis. The left upper lobe is obscured. No new focal consolidation, pneumothorax, or right-sided pleural effusion. There is blunting of the left costophrenic angle, which may be due to low lung volumes and/or patient positioning. Osseous structures are diffusely demineralized and grossly unchanged. Left chest cardiac conduction device is again noted. Low lung volumes with likely bibasilar atelectasis versus developing infection. Comment: Please note this report has been produced using speech recognition software and may contain errors related to that system including errors in grammar, punctuation, and spelling, as well as words and phrases that may be inappropriate. If there are any questions or concerns please feel free to contact the dictating provider for clarification.      Electronically signed by Rosana Bobo MD on 2/16/2023 at 8:49 AM

## 2023-02-16 NOTE — CONSULTS
Weekly routine line dressing change completed. Patient's LINCOLN PICC dressing changed using sterile technique per protocol. Patient tolerated well. Please consult IV/PICC Team for any questions or if patient's needs change.

## 2023-02-16 NOTE — PROGRESS NOTES
Infectious Disease Progress Note  2023   Patient Name: Keyla Marcial : 1945   Impression  Sepsis Secondary to ESBL E.coli Postop Intra-Abdominal Abscess and:  ESBL E.coli Complicated UTI:  MRSA Screen Positive:  Acute Metabolic Encephalopathy: Resolved  Afebrile with no leukocytosis, CRP and Pct on DWT. Patient appears improved. -BC 0/2 NGTD  -BC 0/-NGTD  -MRSA Screen Positive (de-colonized)  -Urine culture: ESBL E.coli  -S/p per Dr. Ismael James: [place]. DX:  acute on chronic cholecystitis. Hydrops. -CT A&P W IV Contrast: 1. Large post cholecystectomy abscess which extends from the gallbladder   fossa to the subhepatic region and medial and posterior to the liver. The   largest axial dimension is 9 x 11.3 cm and the largest coronal dimension is   8.4 x 10 cm. A 2nd collection can be seen measuring 5.9 x 5.7 cm posteriorly   which may communicate with the larger collection. 2. Constipation and stool impaction in the rectum. 3. Degenerated calcified fibroids. No other acute abnormality. Findings discussed with Dr. Amelia Hopkins on 2023 at 3:20 p.m.    -S/p per IR: CT Drainage of abscess:  removed 9.5 cc old bloody fluid. AVRIL intact.  Cultures: ESBL E.coli  TA[place]:  Dr. Danny Campuzano onboard  DMII:  CAD and VHD s/p CABG with MVR 2018/ HTN/ HLD/ PFO Closure/ PAF with PPM 2018:  Dr. Chloe Jean onboard for EP  Dr. Mario Jaimes onboard for cardiology      S/p TIA, Wheel Chair Bound:  Multi-morbidity: per PMHx:  anxiety     Plan:  Continue IV Zosyn 3,375 mg q8h (end date 23) assuming AVRIL drains will be removed in general surgery office at follow up  Continue IV, changed to po- Bactrim 1 SS bid -renal adjusted (covers ESBL E.coli) end date 2023  Plan to treat with current therapy x 7 days past removal of AVRIL drains, plan for drains intact at DC  Trend CRP and Pct, ordered  May use PICC intact for IV ABX access  Follow up with ID in 1 week please  Weekly labs drawn on Monday during the course of treatment  CBC with differential, CMP, ESR, CRP  Fax results to Attn: [facility] Infectious Diseases Staff  # [phone]   OK from ID standpoint to DC when ready    Ongoing Antimicrobial Therapy  Zosyn 2/2-4, 10-  Bactrim 2/10-? Completed Antimicrobial Therapy  Vancomycin 2/2-6  Meropenem 2/4-10  History:? Interval history noted. Chief complaint: sepsis secondary to postop choley intra-abdominal abscess. Denies n/v/d/f or untoward effects of antibiotics  Physical Exam:  Vital Signs: BP (!) 139/52   Pulse 64   Temp 98.1 °F (36.7 °C) (Oral)   Resp 22   Ht 5' 5\" (1.651 m)   Wt 165 lb 9.1 oz (75.1 kg)   SpO2 96%   BMI 27.55 kg/m²     Gen: more alert, remains confused  Wounds: C/D/I abdominal incisions well approximated. AVRIL x 2 from right-side of abdomen draining dark red/brown fluid. Chest: no distress and CTA. Good air movement. Room air. Heart: PPM Capture rate 60 and no MRG. Abd: soft, non-distended, RUQ tenderness to light palpation, no hepatomegaly. Normoactive bowel sounds. Ext: no clubbing, cyanosis, or edema  External Catheter Site: without erythema or tenderness draining clear yellow urine  PICC: intact right basilic vein  Neuro: Mental status intact. CN 2-12 intact and no focal sensory or motor deficits     Radiologic / Imaging / TESTING  1/30/2023 XR Chest Portable:  Impression   1. Low lung volumes with left basilar atelectasis. 1/30/2023 CT Chest W Contrast:  Impression   1. Bibasilar atelectasis. 2. Cardiomegaly with enlarged central pulmonary arteries likely due to   pulmonary arterial hypertension. 3. Distended gallbladder. This could be just due to a nonfasting state. However, I do raise the possibility of acute cholecystitis. 4. Fibroid uterus. 1/30/2023 CT Abdomen Pelvis W IV Contrast:  Impression   1. Bibasilar atelectasis. 2. Cardiomegaly with enlarged central pulmonary arteries likely due to   pulmonary arterial hypertension.    3. Distended gallbladder. This could be just due to a nonfasting state. However, I do raise the possibility of acute cholecystitis. 4. Fibroid uterus. 1/30/2023 US Abdomen Limited:  Impression   Nonspecific gallbladder distension without shadowing calculus. However, when   correlated to the CT scan findings are suspicious for acute cholecystitis. Correlate with nuclear medicine hepatic biliary scan. 1/30/2023 NM Hepatobiliary:  Impression   The gallbladder is not visualized by a 3 hours post injection. The patient   refused further imaging (4 hour post-injection is a standard endpoint). This   can be seen with acute or chronic cholecystitis. 2/6/2023 XR Chest Portable:  Impression   Low lung volumes with likely bibasilar atelectasis versus developing   infection. 2/2/2023 XR Chest Portable:  Impression   Low lung volumes with bibasilar opacities which may represent atelectasis or   pneumonia. 2/2/2023 XR Chest Portable:  Impression   1. Right upper extremity PICC tip overlies the superior cavoatrial junction   level. 2. Stable cardiomegaly. 3. Low lung volumes with bibasilar atelectasis or infiltrate, greater left   than right. Pneumonia is a consideration. 4. Probable small volume left pleural effusion. 5. Stable sequela from open-heart surgery and left-sided pacemaker. 2/5/2023 XR Chest Portable:  Impression   1. Congestive heart failure is most likely given the radiographic findings;   pneumonia is also a consideration in areas of consolidation with pleural   effusion. 2. Calcific atherosclerosis aorta. 3. Cardiomegaly. 2/7/2023 XR Chest Portable;  Impression   Slight interval improvement in pulmonary edema. 2/9/2023 CT Head WO Contrast:  Impression   STUDY MILDLY DEGRADED BY MOTION ARTIFACT   No noncontrast CT evidence of acute intracranial pathology.        Advanced cortical atrophy and white matter microvascular degenerative   changes, heavy atherosclerotic calcification distal internal carotid and   vertebral arteries of uncertain hemodynamic significance. ?  2/9/2023 CT Abdomen Pelvis W IV Contrast:  Impression   1. Large post cholecystectomy abscess which extends from the gallbladder   fossa to the subhepatic region and medial and posterior to the liver. The   largest axial dimension is 9 x 11.3 cm and the largest coronal dimension is   8.4 x 10 cm. A 2nd collection can be seen measuring 5.9 x 5.7 cm posteriorly   which may communicate with the larger collection. 2. Constipation and stool impaction in the rectum. 3. Degenerated calcified fibroids. No other acute abnormality. Findings discussed with Dr. Aimee Murdock on 02/09/2023 at 3:20 p.m. RECOMMENDATIONS:   Percutaneous abscess drainage. 2/9/2023 CT Drainage Hematoma/Seroma/Fluid Collection:  Impression   Successful CT guided intra-abdominal fluid drainage as described above. Approximately a total of 9.5 cc of old bloody fluid was removed. The catheter was left to AVRIL suction bulb drainage. 2/10/2023 VL Dup Lower Extremity Venous Left:  Impression   No evidence of DVT in the left lower extremity. 2/13/20263 XR Chest Portable:  Impression   Pacemaker. Cardiomegaly with mild vascular congestion. Suspected   atelectasis or infiltrate in the left lung base. Left pleural effusion. Follow up to resolution is suggested. 2/15/2023 NM Hepatobiliary:  Impression   No evidence of active biliary leak.       Labs:    Recent Results (from the past 24 hour(s))   C-Reactive Protein    Collection Time: 02/16/23  7:16 AM   Result Value Ref Range    CRP High Sensitivity 33.8 (H) <5.0 mg/L   Comprehensive Metabolic Panel    Collection Time: 02/16/23  7:16 AM   Result Value Ref Range    Sodium 133 (L) 135 - 145 MMOL/L    Potassium 3.8 3.5 - 5.1 MMOL/L    Chloride 94 (L) 99 - 110 mMol/L    CO2 25 21 - 32 MMOL/L    BUN 22 6 - 23 MG/DL    Creatinine 1.5 (H) 0.6 - 1.1 MG/DL    Est, Glom Filt Rate 35 (L) >60 mL/min/1.73m2    Glucose 87 70 - 99 MG/DL    Calcium 8.9 8.3 - 10.6 MG/DL    Albumin 3.5 3.4 - 5.0 GM/DL    Total Protein 5.8 (L) 6.4 - 8.2 GM/DL    Total Bilirubin 0.3 0.0 - 1.0 MG/DL    ALT 10 10 - 40 U/L    AST 19 15 - 37 IU/L    Alkaline Phosphatase 124 40 - 128 IU/L    Anion Gap 14 4 - 16   Procalcitonin    Collection Time: 02/16/23  7:16 AM   Result Value Ref Range    Procalcitonin 0.248      CULTURE results: Invalid input(s): BLOOD CULTURE,  URINE CULTURE, SURGICAL CULTURE    Diagnosis:  Patient Active Problem List   Diagnosis    PAF (paroxysmal atrial fibrillation) ([facility])    Essential hypertension    Mixed hyperlipidemia    VHD (valvular heart disease)    Abnormal EKG    Acute blood loss anemia    Uncontrolled pain    Gait disturbance    Pneumonia due to infectious organism    SSS (sick sinus syndrome) ([facility])    S/P placement of cardiac pacemaker    Tachycardia-bradycardia ([address]    Fall at home, subsequent encounter    Acute intracranial hemorrhage ([address]    Hyponatremia    Compression fracture of L1 lumbar vertebra ([facility])    Intraparenchymal hematoma of brain    COVID-19    CAD (coronary artery disease)    AMS (altered mental status)    Altered mental status    Concussion with no loss of consciousness    Chest pain    Right upper quadrant pain    Acute cholecystitis    Postoperative abscess    Drug-induced encephalopathy       Active Problems  Principal Problem:    Chest pain  Active Problems:    Right upper quadrant pain    Acute cholecystitis    Postoperative abscess    Drug-induced encephalopathy    PAF (paroxysmal atrial fibrillation) ([address]  Resolved Problems:    * No resolved hospital problems. *    Electronically signed by: Electronically signed by BIRD Osullivan CNP on 2/16/2023 at 4:00 PM

## 2023-02-16 NOTE — PROGRESS NOTES
1315/PICC line not flushing. IV team consulted. IV antibiotics will be given late this shift due to line not working. 1620/ This RN attempted to start a peripheral IV  to give atb. Flash obtained when 20g advanced, but IV blew. Still unable to give IV medication at this time. Dr. Marmolejo Danger aware.

## 2023-02-16 NOTE — CONSULTS
Consult completed. Patient's double lumen LINCOLN PICC line power flushed, now both lines flush with ease and has brisk blood return. Patient tolerated well. Please consult IV/PICC Team if patient's needs change.

## 2023-02-16 NOTE — PROGRESS NOTES
Nephrology Progress Note  2/16/2023 8:11 AM        Subjective:   Admit Date: 1/30/2023  PCP: Celine Wolf MD    Interval History: Patient seen morning, facilitated entry  No major event    Diet: Reported eating some    ROS: No overt shortness of breath  Urine output recorded almost 2-1/2 L for the last 24 hours  Acceptable blood pressure no fever    Data:     Current meds:    furosemide  20 mg IntraVENous BID    guaiFENesin  300 mg Oral 4 times per day    sulfamethoxazole-trimethoprim (BACTRIM) IVPB  2 mg/kg (Adjusted) IntraVENous Q8H    piperacillin-tazobactam  3,375 mg IntraVENous Q8H    sodium chloride flush  5-40 mL IntraVENous BID    thiamine  100 mg IntraVENous Daily    melatonin  3 mg Oral Nightly    heparin (porcine)  5,000 Units SubCUTAneous 3 times per day    memantine  5 mg Oral BID    rosuvastatin  10 mg Oral QPM    lidocaine PF  5 mL IntraDERmal Once    sodium chloride flush  5-40 mL IntraVENous 2 times per day    pantoprazole  40 mg IntraVENous Daily    polyethylene glycol  17 g Oral Daily    sodium chloride flush  5-40 mL IntraVENous 2 times per day    docusate sodium  100 mg Oral BID    donepezil  10 mg Oral Nightly    metoprolol tartrate  25 mg Oral BID      sodium chloride 12.5 mL/hr at 02/04/23 0324    sodium chloride Stopped (02/08/23 1146)         I/O last 3 completed shifts:   In: 600 [P.O.:600]  Out: 5618 [Urine:3500; Drains:385]    CBC:   Recent Labs     02/14/23  0615 02/15/23  0626   WBC 7.0 8.2   HGB 7.9* 8.3*    287          Recent Labs     02/14/23  0615 02/15/23  0626   * 130*   K 4.5 4.1   CL 91* 93*   CO2 26 23   BUN 23 21   CREATININE 1.4* 1.5*   GLUCOSE 86 96       Lab Results   Component Value Date    CALCIUM 8.8 02/15/2023    PHOS 4.8 02/15/2023       Objective:     Vitals: BP (!) 128/55   Pulse 66   Temp 98.5 °F (36.9 °C) (Oral)   Resp 21   Ht 5' 5\" (1.651 m)   Wt 165 lb 9.1 oz (75.1 kg)   SpO2 96%   BMI 27.55 kg/m² ,    General appearance: Thin, alert, awake and oriented  HEENT: At least 2+ conjunctival pallor  Neck: Supple  Lungs: Still had some crackles in posterior exam  Heart: Seems regular rate and rhythm, well-healed incision from previous sternotomy, left chest wall implanted cardiac device  Abdomen: Soft, minimally tender, AVRIL drain  Extremities: No gross edema      Problem List :         Impression :     Stage III acute kidney injury-good urine output-stable so far  Acute decompensated heart failure-symptom wise much better off oxygen  Low sodium thought to be from excess total body water-she does have underlying atherosclerotic cardiovascular disease as well as sepsis is suspected    Recommendation/Plan  :     Decrease Lasix to once a day-reviewed lab when available-proBNP level tomorrow morning-enhance nutrition-watch for iatrogenic nosocomial complication-follow clinically and biochemically      Tai Bay MD MD

## 2023-02-16 NOTE — PROGRESS NOTES
Infectious Disease Progress Note  2/15/2023   Patient Name: Carly Tong : 1945   Impression  Sepsis Secondary to ESBL E.coli Postop Intra-Abdominal Abscess and:  ESBL E.coli Complicated UTI:  MRSA Screen Positive:  Acute Metabolic Encephalopathy: Resolved  Afebrile with no leukocytosis, CRP and Pct on DWT. Patient appears improved. -BC 0/2 NGTD  -BC 0/-NGTD  -MRSA Screen Positive (de-colonized)  -Urine culture: ESBL E.coli  -S/p per Dr. Rody Hester: [place]. DX:  acute on chronic cholecystitis. Hydrops. -CT A&P W IV Contrast: 1. Large post cholecystectomy abscess which extends from the gallbladder   fossa to the subhepatic region and medial and posterior to the liver. The   largest axial dimension is 9 x 11.3 cm and the largest coronal dimension is   8.4 x 10 cm. A 2nd collection can be seen measuring 5.9 x 5.7 cm posteriorly   which may communicate with the larger collection. 2. Constipation and stool impaction in the rectum. 3. Degenerated calcified fibroids. No other acute abnormality. Findings discussed with Dr. Damaso Pop on 2023 at 3:20 p.m.    -S/p per IR: CT Drainage of abscess:  removed 9.5 cc old bloody fluid. AVRIL intact. Cultures: ESBL E.coli  [place]:  Dr. Silvana Ryan onboard  DMII:  CAD and VHD s/p CABG with MVR 2018/ HTN/ HLD/ PFO Closure/ PAF with PPM 2018:  Dr. Diane Mccauley onboard for EP  Dr. Kacie Yoo onboard for cardiology      S/p TIA, Wheel Chair Bound:  Multi-morbidity: per PMHx:  anxiety     Plan:  Continue IV Zosyn 3,375 mg q8h  Continue IV Bactrim 5 mg/kg q8h (covers ESBL E.coli)  Plan to treat with current therapy x 7 days past removal of AVRIL drains  Trend CRP and Pct, ordered    Ongoing Antimicrobial Therapy  Zosyn 2/2-4, 10-  Bactrim 2/10-? Completed Antimicrobial Therapy  Vancomycin 2/-6  Meropenem -10  History:? Interval history noted. Chief complaint: sepsis secondary to postop choley intra-abdominal abscess.    Denies n/v/d/f or untoward effects of antibiotics  Physical Exam:  Vital Signs: BP (!) 152/52   Pulse 73   Temp 98.5 °F (36.9 °C) (Oral)   Resp 20   Ht 5' 5\" (1.651 m)   Wt 175 lb (79.4 kg)   SpO2 96%   BMI 29.12 kg/m²     Gen: more alert, remains confused  Wounds: C/D/I abdominal incisions well approximated. AVRIL x 2 from right-side of abdomen draining dark red/brown fluid. Chest: no distress and CTA. Good air movement. Room air. Heart: PPM Capture rate 60 and no MRG. Abd: soft, non-distended, RUQ tenderness to light palpation, no hepatomegaly. Normoactive bowel sounds. Ext: no clubbing, cyanosis, or edema  External Catheter Site: without erythema or tenderness draining clear yellow urine  Neuro: Mental status intact. CN 2-12 intact and no focal sensory or motor deficits     Radiologic / Imaging / TESTING  1/30/2023 XR Chest Portable:  Impression   1. Low lung volumes with left basilar atelectasis. 1/30/2023 CT Chest W Contrast:  Impression   1. Bibasilar atelectasis. 2. Cardiomegaly with enlarged central pulmonary arteries likely due to   pulmonary arterial hypertension. 3. Distended gallbladder. This could be just due to a nonfasting state. However, I do raise the possibility of acute cholecystitis. 4. Fibroid uterus. 1/30/2023 CT Abdomen Pelvis W IV Contrast:  Impression   1. Bibasilar atelectasis. 2. Cardiomegaly with enlarged central pulmonary arteries likely due to   pulmonary arterial hypertension. 3. Distended gallbladder. This could be just due to a nonfasting state. However, I do raise the possibility of acute cholecystitis. 4. Fibroid uterus. 1/30/2023 US Abdomen Limited:  Impression   Nonspecific gallbladder distension without shadowing calculus. However, when   correlated to the CT scan findings are suspicious for acute cholecystitis. Correlate with nuclear medicine hepatic biliary scan.       1/30/2023 NM Hepatobiliary:  Impression   The gallbladder is not visualized by a 3 hours post injection. The patient   refused further imaging (4 hour post-injection is a standard endpoint). This   can be seen with acute or chronic cholecystitis. 2/6/2023 XR Chest Portable:  Impression   Low lung volumes with likely bibasilar atelectasis versus developing   infection. 2/2/2023 XR Chest Portable:  Impression   Low lung volumes with bibasilar opacities which may represent atelectasis or   pneumonia. 2/2/2023 XR Chest Portable:  Impression   1. Right upper extremity PICC tip overlies the superior cavoatrial junction   level. 2. Stable cardiomegaly. 3. Low lung volumes with bibasilar atelectasis or infiltrate, greater left   than right. Pneumonia is a consideration. 4. Probable small volume left pleural effusion. 5. Stable sequela from open-heart surgery and left-sided pacemaker. 2/5/2023 XR Chest Portable:  Impression   1. Congestive heart failure is most likely given the radiographic findings;   pneumonia is also a consideration in areas of consolidation with pleural   effusion. 2. Calcific atherosclerosis aorta. 3. Cardiomegaly. 2/7/2023 XR Chest Portable;  Impression   Slight interval improvement in pulmonary edema. 2/9/2023 CT Head WO Contrast:  Impression   STUDY MILDLY DEGRADED BY MOTION ARTIFACT   No noncontrast CT evidence of acute intracranial pathology. Advanced cortical atrophy and white matter microvascular degenerative   changes, heavy atherosclerotic calcification distal internal carotid and   vertebral arteries of uncertain hemodynamic significance. ?  2/9/2023 CT Abdomen Pelvis W IV Contrast:  Impression   1. Large post cholecystectomy abscess which extends from the gallbladder   fossa to the subhepatic region and medial and posterior to the liver. The   largest axial dimension is 9 x 11.3 cm and the largest coronal dimension is   8.4 x 10 cm.   A 2nd collection can be seen measuring 5.9 x 5.7 cm posteriorly   which may communicate with the larger collection. 2. Constipation and stool impaction in the rectum. 3. Degenerated calcified fibroids. No other acute abnormality. Findings discussed with Dr. Damaso Pop on 02/09/2023 at 3:20 p.m. RECOMMENDATIONS:   Percutaneous abscess drainage. 2/9/2023 CT Drainage Hematoma/Seroma/Fluid Collection:  Impression   Successful CT guided intra-abdominal fluid drainage as described above. Approximately a total of 9.5 cc of old bloody fluid was removed. The catheter was left to AVRIL suction bulb drainage. 2/10/2023 VL Dup Lower Extremity Venous Left:  Impression   No evidence of DVT in the left lower extremity. 2/13/20263 XR Chest Portable:  Impression   Pacemaker. Cardiomegaly with mild vascular congestion. Suspected   atelectasis or infiltrate in the left lung base. Left pleural effusion. Follow up to resolution is suggested. 2/15/2023 NM Hepatobiliary:  Impression   No evidence of active biliary leak.       Labs:    Recent Results (from the past 24 hour(s))   C-Reactive Protein    Collection Time: 02/15/23  6:26 AM   Result Value Ref Range    CRP High Sensitivity 40.0 (H) <5.0 mg/L   Procalcitonin    Collection Time: 02/15/23  6:26 AM   Result Value Ref Range    Procalcitonin 0.241    Comprehensive Metabolic Panel    Collection Time: 02/15/23  6:26 AM   Result Value Ref Range    Sodium 130 (L) 135 - 145 MMOL/L    Potassium 4.1 3.5 - 5.1 MMOL/L    Chloride 93 (L) 99 - 110 mMol/L    CO2 23 21 - 32 MMOL/L    BUN 21 6 - 23 MG/DL    Creatinine 1.5 (H) 0.6 - 1.1 MG/DL    Est, Glom Filt Rate 35 (L) >60 mL/min/1.73m2    Glucose 96 70 - 99 MG/DL    Calcium 8.8 8.3 - 10.6 MG/DL    Albumin 3.4 3.4 - 5.0 GM/DL    Total Protein 5.5 (L) 6.4 - 8.2 GM/DL    Total Bilirubin 0.4 0.0 - 1.0 MG/DL    ALT 10 10 - 40 U/L    AST 20 15 - 37 IU/L    Alkaline Phosphatase 120 40 - 128 IU/L    Anion Gap 14 4 - 16   Magnesium    Collection Time: 02/15/23  6:26 AM   Result Value Ref Range    Magnesium 2.0 1.8 - 2.4 mg/dl   Phosphorus    Collection Time: 02/15/23  6:26 AM   Result Value Ref Range    Phosphorus 4.8 2.5 - 4.9 MG/DL   CBC with Auto Differential    Collection Time: 02/15/23  6:26 AM   Result Value Ref Range    WBC 8.2 4.0 - 10.5 K/CU MM    RBC 3.08 (L) 4.2 - 5.4 M/CU MM    Hemoglobin 8.3 (L) 12.5 - 16.0 GM/DL    Hematocrit 25.3 (L) 37 - 47 %    MCV 82.1 78 - 100 FL    MCH 26.9 (L) 27 - 31 PG    MCHC 32.8 32.0 - 36.0 %    RDW 13.9 11.7 - 14.9 %    Platelets 475 327 - 386 K/CU MM    MPV 8.7 7.5 - 11.1 FL    Differential Type AUTOMATED DIFFERENTIAL     Segs Relative 80.0 (H) 36 - 66 %    Lymphocytes % 11.5 (L) 24 - 44 %    Monocytes % 5.8 (H) 0 - 4 %    Eosinophils % 0.7 0 - 3 %    Basophils % 0.7 0 - 1 %    Segs Absolute 6.5 K/CU MM    Lymphocytes Absolute 0.9 K/CU MM    Monocytes Absolute 0.5 K/CU MM    Eosinophils Absolute 0.1 K/CU MM    Basophils Absolute 0.1 K/CU MM    Nucleated RBC % 0.0 %    Total Nucleated RBC 0.0 K/CU MM    Total Immature Neutrophil 0.11 K/CU MM    Immature Neutrophil % 1.3 (H) 0 - 0.43 %     CULTURE results: Invalid input(s): BLOOD CULTURE,  URINE CULTURE, SURGICAL CULTURE    Diagnosis:  Patient Active Problem List   Diagnosis    PAF (paroxysmal atrial fibrillation) ([facility])    Essential hypertension    Mixed hyperlipidemia    VHD (valvular heart disease)    Abnormal EKG    Acute blood loss anemia    Uncontrolled pain    Gait disturbance    Pneumonia due to infectious organism    SSS (sick sinus syndrome) ([facility])    S/P placement of cardiac pacemaker    Tachycardia-bradycardia ([address]    Fall at home, subsequent encounter    Acute intracranial hemorrhage ([facility])    Hyponatremia    Compression fracture of L1 lumbar vertebra ([facility])    Intraparenchymal hematoma of brain    COVID-19    CAD (coronary artery disease)    AMS (altered mental status)    Altered mental status    Concussion with no loss of consciousness    Chest pain    Right upper quadrant pain    Acute cholecystitis Postoperative abscess    Drug-induced encephalopathy       Active Problems  Principal Problem:    Chest pain  Active Problems:    Right upper quadrant pain    Acute cholecystitis    Postoperative abscess    Drug-induced encephalopathy    PAF (paroxysmal atrial fibrillation) ([facility])  Resolved Problems:    * No resolved hospital problems. *    Electronically signed by: Electronically signed by BIRD Barker CNP on 2/15/2023 at 7:40 PM

## 2023-02-16 NOTE — PROGRESS NOTES
General Surgery- [facility] Day: 18    Chief Complaint on Admission: acute cholecystitis  Late entry    Subjective:     Drains being flushed. Appetite slightly improving  Denies F/C. Denies abdominal pain. ROS:  Review of Systems  Negative except as above    Allergies  Patient has no known allergies. Diagnosis Date    Anxiety     Arthritis     knees    Atrial fibrillation (HCC)     CAD (coronary artery disease)     Sees Dr. Redd Mccullough    COVID-19 2021    Diabetes mellitus ([address]     H/O cardiac catheterization 2018    severe 3 vessel disease, refer to CV surgery for CABG and MAZE    H/O cardiovascular stress test 2018    EF47%  fixed perfusion defect ant wall, pt in afib    H/O echocardiogram 2018    EF55% mod MR    H/O echocardiogram 2018    EF 50-55%, Mild : AR, MR & TR.    H/O echocardiogram 2020    EF 55%, Breast artifact noted. H/O three vessel coronary artery bypass 2018    Dr. Germania Boogie    History of Holter monitoring 10/23/2018    Multiple PAF episodes noted. Tachy-Dinesh episodes noted. History of nuclear stress test 2020    EF 55%, Breast artifact. Hyperlipidemia     Hypertension     Memory loss     on Aricept    Missing teeth, acquired     Pneumonia     pneumococcal pneumonia twice at end of     Risk for falls     uses walker    TIA (transient ischemic attack)     family doctors said she has had mini-strokes    Urinary leakage     UTI (urinary tract infection)     recent --just stopped antibiotic last week as of 18    Wears glasses        Objective:     Vitals:    23 0350   BP: (!) 128/55   Pulse: 66   Resp: 21   Temp: 98.5 °F (36.9 °C)   SpO2:        TEMPERATURE:  Current -Temp: 98.5 °F (36.9 °C); Max - Temp  Av.6 °F (37 °C)  Min: 98.4 °F (36.9 °C)  Max: 98.8 °F (37.1 °C)    No intake/output data recorded. I/O last 3 completed shifts:   In: 600 [P.O.:600]  Out: 3820 [Urine:3500; Drains:385]      Physical Exam:  Physical Exam  Vitals reviewed. Constitutional:       Comments: Answering questions appropriately     HENT:      Head: Normocephalic and atraumatic. Eyes:      General:         Right eye: No discharge. Left eye: No discharge. Cardiovascular:      Rate and Rhythm: Normal rate. Abdominal:      Palpations: Abdomen is soft. Tenderness: There is no abdominal tenderness. Musculoskeletal:         General: No swelling. Skin:     General: Skin is warm. Coloration: Skin is not jaundiced. Neurological:      Mental Status: She is alert. IR drain x 2 - one consistent with light s/s and other is becoming more bilious.      Scheduled Meds:   furosemide  20 mg IntraVENous BID    guaiFENesin  300 mg Oral 4 times per day    sulfamethoxazole-trimethoprim (BACTRIM) IVPB  2 mg/kg (Adjusted) IntraVENous Q8H    piperacillin-tazobactam  3,375 mg IntraVENous Q8H    sodium chloride flush  5-40 mL IntraVENous BID    thiamine  100 mg IntraVENous Daily    melatonin  3 mg Oral Nightly    heparin (porcine)  5,000 Units SubCUTAneous 3 times per day    memantine  5 mg Oral BID    rosuvastatin  10 mg Oral QPM    lidocaine PF  5 mL IntraDERmal Once    sodium chloride flush  5-40 mL IntraVENous 2 times per day    pantoprazole  40 mg IntraVENous Daily    polyethylene glycol  17 g Oral Daily    sodium chloride flush  5-40 mL IntraVENous 2 times per day    docusate sodium  100 mg Oral BID    donepezil  10 mg Oral Nightly    metoprolol tartrate  25 mg Oral BID     ContinuousInfusions:   sodium chloride 12.5 mL/hr at 02/04/23 0324    sodium chloride Stopped (02/08/23 1146)     PRN Meds:HYDROcodone-acetaminophen, HYDROmorphone **OR** HYDROmorphone, sodium chloride flush, sodium chloride, sodium chloride flush, sodium chloride, ondansetron **OR** ondansetron, acetaminophen **OR** acetaminophen, ipratropium      Labs/Imaging Results:   Lab Results   Component Value Date    WBC 8.2 02/15/2023    HGB 8.3 (L) 02/15/2023 HCT 25.3 (L) 02/15/2023    MCV 82.1 02/15/2023     02/15/2023     Lab Results   Component Value Date     (L) 02/16/2023    K 3.8 02/16/2023    CL 94 (L) 02/16/2023    CO2 25 02/16/2023    BUN 22 02/16/2023    CREATININE 1.5 (H) 02/16/2023    GLUCOSE 87 02/16/2023    CALCIUM 8.9 02/16/2023    PROT 5.8 (L) 02/16/2023    LABALBU 3.5 02/16/2023    BILITOT 0.3 02/16/2023    ALKPHOS 124 02/16/2023    AST 19 02/16/2023    ALT 10 02/16/2023    LABGLOM 35 (L) 02/16/2023    GFRAA >60 07/12/2022     CT head:  STUDY MILDLY DEGRADED BY MOTION ARTIFACT   No noncontrast CT evidence of acute intracranial pathology. Advanced cortical atrophy and white matter microvascular degenerative   changes, heavy atherosclerotic calcification distal internal carotid and   vertebral arteries of uncertain hemodynamic significance. CT A/P:    1. Large post cholecystectomy abscess which extends from the gallbladder   fossa to the subhepatic region and medial and posterior to the liver. The   largest axial dimension is 9 x 11.3 cm and the largest coronal dimension is   8.4 x 10 cm. A 2nd collection can be seen measuring 5.9 x 5.7 cm posteriorly   which may communicate with the larger collection. 2. Constipation and stool impaction in the rectum. 3. Degenerated calcified fibroids. No other acute abnormality. Findings discussed with Dr. Yogesh West on 02/09/2023 at 3:20 p.m. Assessment:       75 y/o F s/p lap pedro on 1/31/23    Plan:     - [facility] scan yesterday was negative for bile leak  - IR drain is still bilious. Will continue drains at [address] [place] [address] for discharge from surgical standpoint once medically ready  - Needs PT/OT  - Case discussed with Dr. Nickie Gonzalez; no need for ERCP given HIDA was negative and drain output is decreasing. Continue drain for management at this time.       Electronically signed by BIRD Vallejo CNP on 2/16/2023 at 10:15 AM

## 2023-02-17 ENCOUNTER — APPOINTMENT (OUTPATIENT)
Dept: GENERAL RADIOLOGY | Age: 78
DRG: 853 | End: 2023-02-17
Payer: MEDICARE

## 2023-02-17 LAB
ALBUMIN SERPL-MCNC: 3.4 GM/DL (ref 3.4–5)
ALP BLD-CCNC: 118 IU/L (ref 40–128)
ALT SERPL-CCNC: 10 U/L (ref 10–40)
ANION GAP SERPL CALCULATED.3IONS-SCNC: 12 MMOL/L (ref 4–16)
AST SERPL-CCNC: 18 IU/L (ref 15–37)
BILIRUB SERPL-MCNC: 0.3 MG/DL (ref 0–1)
BUN SERPL-MCNC: 19 MG/DL (ref 6–23)
CALCIUM SERPL-MCNC: 8.6 MG/DL (ref 8.3–10.6)
CHLORIDE BLD-SCNC: 90 MMOL/L (ref 99–110)
CO2: 25 MMOL/L (ref 21–32)
CREAT SERPL-MCNC: 1.3 MG/DL (ref 0.6–1.1)
CRP SERPL HS-MCNC: 26 MG/L
CULTURE: NORMAL
CULTURE: NORMAL
GFR SERPL CREATININE-BSD FRML MDRD: 42 ML/MIN/1.73M2
GLUCOSE SERPL-MCNC: 90 MG/DL (ref 70–99)
Lab: NORMAL
Lab: NORMAL
POTASSIUM SERPL-SCNC: 3.8 MMOL/L (ref 3.5–5.1)
POTASSIUM, UR: 14.7 MMOL/L (ref 22–119)
SODIUM BLD-SCNC: 127 MMOL/L (ref 135–145)
SPECIMEN: NORMAL
SPECIMEN: NORMAL
TOTAL PROTEIN: 5.4 GM/DL (ref 6.4–8.2)

## 2023-02-17 PROCEDURE — 94761 N-INVAS EAR/PLS OXIMETRY MLT: CPT

## 2023-02-17 PROCEDURE — 99232 SBSQ HOSP IP/OBS MODERATE 35: CPT | Performed by: NURSE PRACTITIONER

## 2023-02-17 PROCEDURE — 6360000002 HC RX W HCPCS: Performed by: INTERNAL MEDICINE

## 2023-02-17 PROCEDURE — 97112 NEUROMUSCULAR REEDUCATION: CPT

## 2023-02-17 PROCEDURE — 2580000003 HC RX 258: Performed by: NURSE PRACTITIONER

## 2023-02-17 PROCEDURE — 6370000000 HC RX 637 (ALT 250 FOR IP): Performed by: PHYSICIAN ASSISTANT

## 2023-02-17 PROCEDURE — 36592 COLLECT BLOOD FROM PICC: CPT

## 2023-02-17 PROCEDURE — 92610 EVALUATE SWALLOWING FUNCTION: CPT

## 2023-02-17 PROCEDURE — 2500000003 HC RX 250 WO HCPCS: Performed by: NURSE PRACTITIONER

## 2023-02-17 PROCEDURE — 6370000000 HC RX 637 (ALT 250 FOR IP): Performed by: SURGERY

## 2023-02-17 PROCEDURE — 92526 ORAL FUNCTION THERAPY: CPT

## 2023-02-17 PROCEDURE — 80053 COMPREHEN METABOLIC PANEL: CPT

## 2023-02-17 PROCEDURE — 6370000000 HC RX 637 (ALT 250 FOR IP): Performed by: NURSE PRACTITIONER

## 2023-02-17 PROCEDURE — 2580000003 HC RX 258: Performed by: SURGERY

## 2023-02-17 PROCEDURE — 2140000000 HC CCU INTERMEDIATE R&B

## 2023-02-17 PROCEDURE — 86140 C-REACTIVE PROTEIN: CPT

## 2023-02-17 PROCEDURE — C9113 INJ PANTOPRAZOLE SODIUM, VIA: HCPCS | Performed by: STUDENT IN AN ORGANIZED HEALTH CARE EDUCATION/TRAINING PROGRAM

## 2023-02-17 PROCEDURE — 97530 THERAPEUTIC ACTIVITIES: CPT

## 2023-02-17 PROCEDURE — 6360000002 HC RX W HCPCS: Performed by: STUDENT IN AN ORGANIZED HEALTH CARE EDUCATION/TRAINING PROGRAM

## 2023-02-17 PROCEDURE — 6360000002 HC RX W HCPCS: Performed by: NURSE PRACTITIONER

## 2023-02-17 PROCEDURE — 84133 ASSAY OF URINE POTASSIUM: CPT

## 2023-02-17 PROCEDURE — 6370000000 HC RX 637 (ALT 250 FOR IP): Performed by: INTERNAL MEDICINE

## 2023-02-17 PROCEDURE — 97535 SELF CARE MNGMENT TRAINING: CPT

## 2023-02-17 PROCEDURE — 74230 X-RAY XM SWLNG FUNCJ C+: CPT

## 2023-02-17 PROCEDURE — 2580000003 HC RX 258: Performed by: INTERNAL MEDICINE

## 2023-02-17 PROCEDURE — 92611 MOTION FLUOROSCOPY/SWALLOW: CPT

## 2023-02-17 RX ADMIN — SULFAMETHOXAZOLE AND TRIMETHOPRIM 1 TABLET: 400; 80 TABLET ORAL at 08:40

## 2023-02-17 RX ADMIN — SODIUM CHLORIDE, PRESERVATIVE FREE 10 ML: 5 INJECTION INTRAVENOUS at 08:42

## 2023-02-17 RX ADMIN — MEMANTINE HYDROCHLORIDE 5 MG: 5 TABLET ORAL at 21:26

## 2023-02-17 RX ADMIN — HEPARIN SODIUM 5000 UNITS: 5000 INJECTION INTRAVENOUS; SUBCUTANEOUS at 04:06

## 2023-02-17 RX ADMIN — GUAIFENESIN 300 MG: 100 SOLUTION ORAL at 06:06

## 2023-02-17 RX ADMIN — SODIUM CHLORIDE, PRESERVATIVE FREE 10 ML: 5 INJECTION INTRAVENOUS at 21:26

## 2023-02-17 RX ADMIN — ROSUVASTATIN CALCIUM 10 MG: 5 TABLET, COATED ORAL at 18:32

## 2023-02-17 RX ADMIN — HEPARIN SODIUM 5000 UNITS: 5000 INJECTION INTRAVENOUS; SUBCUTANEOUS at 12:38

## 2023-02-17 RX ADMIN — SODIUM CHLORIDE, PRESERVATIVE FREE 10 ML: 5 INJECTION INTRAVENOUS at 08:43

## 2023-02-17 RX ADMIN — METOPROLOL TARTRATE 25 MG: 50 TABLET, FILM COATED ORAL at 08:40

## 2023-02-17 RX ADMIN — PANTOPRAZOLE SODIUM 40 MG: 40 INJECTION, POWDER, LYOPHILIZED, FOR SOLUTION INTRAVENOUS at 08:41

## 2023-02-17 RX ADMIN — MEMANTINE HYDROCHLORIDE 5 MG: 5 TABLET ORAL at 08:40

## 2023-02-17 RX ADMIN — SULFAMETHOXAZOLE AND TRIMETHOPRIM 1 TABLET: 400; 80 TABLET ORAL at 21:25

## 2023-02-17 RX ADMIN — METOPROLOL TARTRATE 25 MG: 50 TABLET, FILM COATED ORAL at 21:25

## 2023-02-17 RX ADMIN — Medication 3 MG: at 21:25

## 2023-02-17 RX ADMIN — DONEPEZIL HYDROCHLORIDE 10 MG: 10 TABLET ORAL at 21:25

## 2023-02-17 RX ADMIN — FUROSEMIDE 20 MG: 10 INJECTION, SOLUTION INTRAMUSCULAR; INTRAVENOUS at 08:41

## 2023-02-17 RX ADMIN — HEPARIN SODIUM 5000 UNITS: 5000 INJECTION INTRAVENOUS; SUBCUTANEOUS at 21:25

## 2023-02-17 RX ADMIN — GUAIFENESIN 300 MG: 100 SOLUTION ORAL at 18:32

## 2023-02-17 RX ADMIN — PIPERACILLIN AND TAZOBACTAM 3375 MG: 3; .375 INJECTION, POWDER, LYOPHILIZED, FOR SOLUTION INTRAVENOUS at 04:11

## 2023-02-17 RX ADMIN — PIPERACILLIN AND TAZOBACTAM 3375 MG: 3; .375 INJECTION, POWDER, LYOPHILIZED, FOR SOLUTION INTRAVENOUS at 12:42

## 2023-02-17 RX ADMIN — THIAMINE HYDROCHLORIDE 100 MG: 100 INJECTION, SOLUTION INTRAMUSCULAR; INTRAVENOUS at 08:41

## 2023-02-17 RX ADMIN — GUAIFENESIN 300 MG: 100 SOLUTION ORAL at 12:38

## 2023-02-17 ASSESSMENT — ENCOUNTER SYMPTOMS
WHEEZING: 0
STRIDOR: 0
VOMITING: 0
BACK PAIN: 0
CHEST TIGHTNESS: 0
DIARRHEA: 0
EYE PAIN: 0
NAUSEA: 0
COUGH: 0
SHORTNESS OF BREATH: 0
CHOKING: 0
BLOOD IN STOOL: 0
ABDOMINAL DISTENTION: 0
ABDOMINAL PAIN: 0
CONSTIPATION: 0

## 2023-02-17 ASSESSMENT — PAIN SCALES - GENERAL: PAINLEVEL_OUTOF10: 0

## 2023-02-17 NOTE — CARE COORDINATION
CM in to see Pt to follow up on discharge planning. Pt family present. Discharge plan remains [facility]. CM call to [facility], they remain able to accept  Pt when medically ready. Discharging RN-  At discharge Pt will be going to [facility]. Please call report to [phone]. Fax discharge summary/AVS to [phone]. Arrange transport with [address] [place] [address].

## 2023-02-17 NOTE — PROGRESS NOTES
Infectious Disease Progress Note  2023   Patient Name: Marla Lange : 1945   Impression  Sepsis Secondary to ESBL E.coli Postop Intra-Abdominal Abscess and:  ESBL E.coli Complicated UTI:  MRSA Screen Positive:  Acute Metabolic Encephalopathy: Resolved  Afebrile with no leukocytosis, CRP and Pct on DWT. Patient appears improved. CrCl 36  /-BC 0/2 NGTD  -BC 0/2-NGTD  -MRSA Screen Positive (de-colonized)  -Urine culture: ESBL E.coli  -S/p per Dr. Donnelly Chance: Adam Morrison. DX:  acute on chronic cholecystitis. Hydrops. -CT A&P W IV Contrast: 1. Large post cholecystectomy abscess which extends from the gallbladder   fossa to the subhepatic region and medial and posterior to the liver. The   largest axial dimension is 9 x 11.3 cm and the largest coronal dimension is   8.4 x 10 cm. A 2nd collection can be seen measuring 5.9 x 5.7 cm posteriorly   which may communicate with the larger collection. 2. Constipation and stool impaction in the rectum. 3. Degenerated calcified fibroids. No other acute abnormality. Findings discussed with Dr. Alana Cardoza on 2023 at 3:20 p.m.    -S/p per IR: CT Drainage of abscess:  removed 9.5 cc old bloody fluid. AVRIL intact.  Cultures: ESBL E.coli  [facility]:  Dr. Otoniel Funes onboard  DMII:  CAD and VHD s/p CABG with MVR 2018/ HTN/ HLD/ PFO Closure/ PAF with PPM 2018:  Dr. Jayesh Torres onboard for EP  Dr. Oniel Doyle onboard for cardiology      S/p TIA, Wheel Chair Bound:  Multi-morbidity: per PMHx:  anxiety     Plan:  Continue IV Zosyn 3,375 mg q8h (end date 23) assuming AVRIL drains will be removed in general surgery office at follow up  Continue po Bactrim 1 SS bid -renal adjusted (covers ESBL E.coli) end date 2023  Plan to treat with current therapy x 7 days past removal of AVRIL drains, plan for drains intact at DC  Trend CRP and Pct, ordered  May use PICC intact for IV ABX access  Follow up with ID in 1 week please  Weekly labs drawn on Monday during the course of treatment  CBC with differential, CMP, ESR, CRP  Fax results to Attn: Isael Gr Infectious Diseases Staff  # [phone]   OK from ID standpoint to DC when ready    Ongoing Antimicrobial Therapy  Zosyn 2/2-4, 10-  Bactrim 2/10-? Completed Antimicrobial Therapy  Vancomycin 2/2-6  Meropenem 2/4-10  History:? Interval history noted. Chief complaint: sepsis secondary to postop choley intra-abdominal abscess. Denies n/v/d/f or untoward effects of antibiotics  Physical Exam:  Vital Signs: /64   Pulse 62   Temp 97.4 °F (36.3 °C) (Oral)   Resp (!) 33   Ht 5' 5\" (1.651 m)   Wt 165 lb 12.6 oz (75.2 kg)   SpO2 96%   BMI 27.59 kg/m²     Gen: resting quietly, no distress  Wounds: C/D/I abdominal incisions well approximated. AVRIL x 2 from right-side of abdomen draining dark red/brown fluid. Chest: no distress and CTA. Good air movement. Room air. Heart: PPM Capture rate 60 and no MRG. Abd: soft, non-distended, RUQ tenderness to light palpation, no hepatomegaly. Normoactive bowel sounds. Ext: no clubbing, cyanosis, or edema  External Catheter Site: without erythema or tenderness draining clear yellow urine  PICC: intact right basilic vein  Neuro: Mental status intact. CN 2-12 intact and no focal sensory or motor deficits     Radiologic / Imaging / TESTING  1/30/2023 XR Chest Portable:  Impression   1. Low lung volumes with left basilar atelectasis. 1/30/2023 CT Chest W Contrast:  Impression   1. Bibasilar atelectasis. 2. Cardiomegaly with enlarged central pulmonary arteries likely due to   pulmonary arterial hypertension. 3. Distended gallbladder. This could be just due to a nonfasting state. However, I do raise the possibility of acute cholecystitis. 4. Fibroid uterus. 1/30/2023 CT Abdomen Pelvis W IV Contrast:  Impression   1. Bibasilar atelectasis. 2. Cardiomegaly with enlarged central pulmonary arteries likely due to   pulmonary arterial hypertension. 3. Distended gallbladder. This could be just due to a nonfasting state. However, I do raise the possibility of acute cholecystitis. 4. Fibroid uterus. 1/30/2023 US Abdomen Limited:  Impression   Nonspecific gallbladder distension without shadowing calculus. However, when   correlated to the CT scan findings are suspicious for acute cholecystitis. Correlate with nuclear medicine hepatic biliary scan. 1/30/2023 NM Hepatobiliary:  Impression   The gallbladder is not visualized by a 3 hours post injection. The patient   refused further imaging (4 hour post-injection is a standard endpoint). This   can be seen with acute or chronic cholecystitis. 2/6/2023 XR Chest Portable:  Impression   Low lung volumes with likely bibasilar atelectasis versus developing   infection. 2/2/2023 XR Chest Portable:  Impression   Low lung volumes with bibasilar opacities which may represent atelectasis or   pneumonia. 2/2/2023 XR Chest Portable:  Impression   1. Right upper extremity PICC tip overlies the superior cavoatrial junction   level. 2. Stable cardiomegaly. 3. Low lung volumes with bibasilar atelectasis or infiltrate, greater left   than right. Pneumonia is a consideration. 4. Probable small volume left pleural effusion. 5. Stable sequela from open-heart surgery and left-sided pacemaker. 2/5/2023 XR Chest Portable:  Impression   1. Congestive heart failure is most likely given the radiographic findings;   pneumonia is also a consideration in areas of consolidation with pleural   effusion. 2. Calcific atherosclerosis aorta. 3. Cardiomegaly. 2/7/2023 XR Chest Portable;  Impression   Slight interval improvement in pulmonary edema. 2/9/2023 CT Head WO Contrast:  Impression   STUDY MILDLY DEGRADED BY MOTION ARTIFACT   No noncontrast CT evidence of acute intracranial pathology.        Advanced cortical atrophy and white matter microvascular degenerative   changes, heavy atherosclerotic calcification distal internal carotid and   vertebral arteries of uncertain hemodynamic significance. ?  2/9/2023 CT Abdomen Pelvis W IV Contrast:  Impression   1. Large post cholecystectomy abscess which extends from the gallbladder   fossa to the subhepatic region and medial and posterior to the liver. The   largest axial dimension is 9 x 11.3 cm and the largest coronal dimension is   8.4 x 10 cm. A 2nd collection can be seen measuring 5.9 x 5.7 cm posteriorly   which may communicate with the larger collection. 2. Constipation and stool impaction in the rectum. 3. Degenerated calcified fibroids. No other acute abnormality. Findings discussed with Dr. Nafisa Julian on 02/09/2023 at 3:20 p.m. RECOMMENDATIONS:   Percutaneous abscess drainage. 2/9/2023 CT Drainage Hematoma/Seroma/Fluid Collection:  Impression   Successful CT guided intra-abdominal fluid drainage as described above. Approximately a total of 9.5 cc of old bloody fluid was removed. The catheter was left to AVRIL suction bulb drainage. 2/10/2023 VL Dup Lower Extremity Venous Left:  Impression   No evidence of DVT in the left lower extremity. 2/13/20263 XR Chest Portable:  Impression   Pacemaker. Cardiomegaly with mild vascular congestion. Suspected   atelectasis or infiltrate in the left lung base. Left pleural effusion. Follow up to resolution is suggested. 2/15/2023 NM Hepatobiliary:  Impression   No evidence of active biliary leak. 2/16/2023 XR Chest Portable:  Impression   Pacemaker. Atelectasis or infiltrate in the left lung base. Small effusion. Low lung volumes during the exam.  Follow up to resolution is suggested.       Labs:    Recent Results (from the past 24 hour(s))   Comprehensive Metabolic Panel    Collection Time: 02/17/23  3:30 AM   Result Value Ref Range    Sodium 127 (L) 135 - 145 MMOL/L    Potassium 3.8 3.5 - 5.1 MMOL/L    Chloride 90 (L) 99 - 110 mMol/L    CO2 25 21 - 32 MMOL/L    BUN 19 6 - 23 MG/DL    Creatinine 1.3 (H) 0.6 - 1.1 MG/DL    Est, Glom Filt Rate 42 (L) >60 mL/min/1.73m2    Glucose 90 70 - 99 MG/DL    Calcium 8.6 8.3 - 10.6 MG/DL    Albumin 3.4 3.4 - 5.0 GM/DL    Total Protein 5.4 (L) 6.4 - 8.2 GM/DL    Total Bilirubin 0.3 0.0 - 1.0 MG/DL    ALT 10 10 - 40 U/L    AST 18 15 - 37 IU/L    Alkaline Phosphatase 118 40 - 128 IU/L    Anion Gap 12 4 - 16   C-Reactive Protein    Collection Time: 02/17/23  3:30 AM   Result Value Ref Range    CRP High Sensitivity 26.0 (H) <5.0 mg/L     CULTURE results: Invalid input(s): BLOOD CULTURE,  URINE CULTURE, SURGICAL CULTURE    Diagnosis:  Patient Active Problem List   Diagnosis    PAF (paroxysmal atrial fibrillation) ([facility])    Essential hypertension    Mixed hyperlipidemia    VHD (valvular heart disease)    Abnormal EKG    Acute blood loss anemia    Uncontrolled pain    Gait disturbance    Pneumonia due to infectious organism    SSS (sick sinus syndrome) ([facility])    S/P placement of cardiac pacemaker    Tachycardia-bradycardia ([address]    Fall at home, subsequent encounter    Acute intracranial hemorrhage ([facility])    Hyponatremia    Compression fracture of L1 lumbar vertebra ([facility])    Intraparenchymal hematoma of brain    COVID-19    CAD (coronary artery disease)    AMS (altered mental status)    Altered mental status    Concussion with no loss of consciousness    Chest pain    Right upper quadrant pain    Acute cholecystitis    Postoperative abscess    Drug-induced encephalopathy       Active Problems  Principal Problem:    Chest pain  Active Problems:    Right upper quadrant pain    Acute cholecystitis    Postoperative abscess    Drug-induced encephalopathy    PAF (paroxysmal atrial fibrillation) ([facility])  Resolved Problems:    * No resolved hospital problems. *    Electronically signed by: Electronically signed by Merari Savage.  BIRD Mcconnell CNP on 2/17/2023 at 11:36 AM

## 2023-02-17 NOTE — PROGRESS NOTES
Physical Therapy  Name: Gerald Almodovar MRN: [id number] :   1945   Date:  2023   Admission Date: 2023 Room:  [address] [place] [address]   Restrictions/Precautions:        General Precautions, Fall Risk, Dysphagia precautions     Communication with other providers:   [facility] with [facility] Mayen to provide necessary skilled services for transfers safety    Subjective:  Patient states:  Patient is agreeable for rehab  Pain:   Location, Type, Intensity (0/10 to 10/10):  abdominal pain with mobility     Objective:    Observation:  Patient is supine in bed, depends soiled with BM and urine. Purwick in place, however no urine was suctioned into purwick container. RN notified. Patient has x2 AVRIL drains in Rt abdomen     Treatment, including education/measures:    Transfers with line management of Tele Monitor, Purwick  Rolling: Patient needs Min-Mod A x 2 to roll ea direction. Patient needs manual assistance to guide LE and UE during rolling sequence. Patient cues for isometric hold in side lying during occupational clean up   Supine to sit :Mod A x 2. Min A x 1 for BLE movement off EOB. Mod A x 2 for trunk rotation to the side and to rise to midline   Seated balance: Patient initially needs Mod A to combat retropulsion, patient needs manual cues for shoulders to shift over hips. With cues for BUE support and SAURAV set up, patient improves to CGA-Min A. Sit to supine :Max A x 2 for BLE onto bed and for trunk guidance to side lying   Scooting :Seated scooting, Max A x 2 to scoot to edge of bed and retro into bed. Supine scooting Max A x 2  Sit to stand :From EOB, partial stand Max A x 2 with from elevated EOB. She tolerates standing ~30\". She demo's forward flexed posture. Max A x 2 for patient to improve posture with manual cues and verbal cues    Stand to sit : to EOB Min A x 2 for safe descent     Neuro-hilario: Patient needs cues for seated and standing balance COM shift over SAURAV.  She improves with cues with sitting, and only partially improves with standing. Safety  Patient left safely in the bed, with call light/phone in reach with alarm applied. Gait belt and mask were used for transfers and gait. Assessment / Impression:     Patient's tolerance of treatment:  Good   Adverse Reaction: none  Significant change in status and impact:  improves tolerance with upright activities  Barriers to improvement:  motivation, fatigue, strength    Plan for Next Session:    Will cont to work towards pt's goals per patient tolerance  Time in:  1450  Time out:  1528  Timed treatment minutes:  38  Total treatment time:  45  Previously filed items:  Social/Functional History  Lives With: Family  Type of Home: Condo  Home Layout: One level  Active : No  Short Term Goals  Time Frame for Short Term Goals: 1 week  Short Term Goal 1: pt to complete all bed mobility mod A x1  Short Term Goal 2: pt to sit EOB 10 minutes CGA with no LOB throughout  Short Term Goal 3: pt to complete stand pivot transfer with LRAD mod A x1  Short Term Goal 4: pt to propel manual WC 25' with min A       Electronically signed by:     Cherelle Brody PTA  2/17/2023, 3:33 PM

## 2023-02-17 NOTE — PROGRESS NOTES
Nephrology Progress Note  2/17/2023 9:22 AM        Subjective:   Admit Date: 1/30/2023  PCP: Kirsten Riley MD    Interval History: Seen earlier today  Alert and awake but little fatigued    Diet: Unsure about oral intake    ROS: No overt shortness of breath or any distress  Urine output recorded only 320 cc, unsure if there is accurate but possible with ongoing diuretics  No fever, acceptable blood pressure    Data:     Current meds:    sulfamethoxazole-trimethoprim  1 tablet Oral 2 times per day    [Held by provider] furosemide  20 mg IntraVENous BID    guaiFENesin  300 mg Oral 4 times per day    piperacillin-tazobactam  3,375 mg IntraVENous Q8H    sodium chloride flush  5-40 mL IntraVENous BID    thiamine  100 mg IntraVENous Daily    melatonin  3 mg Oral Nightly    heparin (porcine)  5,000 Units SubCUTAneous 3 times per day    memantine  5 mg Oral BID    rosuvastatin  10 mg Oral QPM    lidocaine PF  5 mL IntraDERmal Once    sodium chloride flush  5-40 mL IntraVENous 2 times per day    pantoprazole  40 mg IntraVENous Daily    polyethylene glycol  17 g Oral Daily    sodium chloride flush  5-40 mL IntraVENous 2 times per day    docusate sodium  100 mg Oral BID    donepezil  10 mg Oral Nightly    metoprolol tartrate  25 mg Oral BID      sodium chloride 12.5 mL/hr at 02/04/23 0324    sodium chloride Stopped (02/08/23 1146)         I/O last 3 completed shifts:   In: 830 [P.O.:830]  Out: 1275 [Urine:1150; Drains:125]    CBC:   Recent Labs     02/15/23  0626   WBC 8.2   HGB 8.3*             Recent Labs     02/15/23  0626 02/16/23  0716 02/17/23  0330   * 133* 127*   K 4.1 3.8 3.8   CL 93* 94* 90*   CO2 23 25 25   BUN 21 22 19   CREATININE 1.5* 1.5* 1.3*   GLUCOSE 96 87 90       Lab Results   Component Value Date    CALCIUM 8.6 02/17/2023    PHOS 4.8 02/15/2023       Objective:     Vitals: /64   Pulse 62   Temp 97.4 °F (36.3 °C) (Oral)   Resp (!) 33   Ht 5' 5\" (1.651 m)   Wt 165 lb 12.6 oz (75.2 kg)   SpO2 96%   BMI 27.59 kg/m² ,    General appearance: Thin, fatigued, but alert and oriented  HEENT: At least 2+ conjunctival pallor  Neck: Supple  Lungs: Still look like some crackles but limited exam  Heart: Seemed regular rate and rhythm, well-healed incision previous sternotomy, chest wall implanted device cardiac of course  Abdomen: Soft, minimally tender, AVRIL drain  Extremities: Positive ankle and pedal edema      Problem List :         Impression :     Stage III acute kidney injury-urine output dropped-could be from delayed plasma capillary refill from diuretics  Sodium also dropped-I would hold the diuretics for now  Recent acute decompensated heart failure as well as gallbladder infection, still has drain  Underlying atherosclerotic cardiovascular disease  Also for sepsis of suspected    Recommendation/Plan  :     Hold diuretics today-chest x-ray today-x-ray chest x-ray was done yesterday look better-we will watch her clinically and biochemically-watch for iatrogenic nosocomial complication-p.o. food and fluid      Julián Acevedo MD MD

## 2023-02-17 NOTE — PROGRESS NOTES
Speech Language Pathology  Facility/Department: [facility] 3N   CLINICAL BEDSIDE SWALLOW EVALUATION    NAME: Darryle Ahmadi  : 1945  MRN: [id number]    ADMISSION DATE: 2023  ADMITTING DIAGNOSIS: has PAF (paroxysmal atrial fibrillation) ([facility] [address] Essential hypertension; Mixed hyperlipidemia; VHD (valvular heart disease); Abnormal EKG; Acute blood loss anemia; Uncontrolled pain; Gait disturbance; Pneumonia due to infectious organism; SSS (sick sinus syndrome) ([facility] [address] S/P placement of cardiac pacemaker; Tachycardia-bradycardia ([facility] [address] Fall at home, subsequent encounter; Acute intracranial hemorrhage ([facility] [address] Hyponatremia; Compression fracture of L1 lumbar vertebra ([facility] [address] Intraparenchymal hematoma of brain; COVID-19; CAD (coronary artery disease); AMS (altered mental status); Altered mental status; Concussion with no loss of consciousness; Chest pain; Right upper quadrant pain; Acute cholecystitis; Postoperative abscess; and Drug-induced encephalopathy on their problem list.      Impressions: Darryle Ahmadi was seen for a bedside swallowing re-evaluation d/t worsening chest x-ray and suspected aspiration. Pt was alert and cooperative throughout assessment. Relevant medical hx includes CAD, COVID-19, hypertension, TIA and pneumonia. Pt's sister at bedside reports she has had limited appetite had has had increasing difficulty with solid foods, requiring a liquid wash with meals. For today's assessment pt was positioned upright in bed and presented with a clear vocal/high pitch vocal quality and weak/congested cough. Oral mechanism examination was [facility] with no focal orofacial weakness. PO trials of puree, soft/regular solids and thin liquids by cup/straw sips were given. Oral holding, anterior pocketing, slow oral A-P transit with adequate oral clearance was observed with trials of puree and solids. Suspect mild-moderate pharyngeal dysphagia characterized by delayed swallow initiation and reduced laryngeal elevation. Pt demonstrated a delayed weak cough with thin liquids by cup sips. Clear vocal quality and 0 overt s/s of aspiration were observed with trials of puree, soft/regular solids and thin liquids by straw sips. Recommend modified barium swallow study to rule out aspiration and determine least restrictive diet level. SLP paged physician for order and will plan to complete this date. ONSET DATE: this admission     Date of Eval: 2/17/2023  Evaluating Therapist: CRISSY Anaya    Current Diet level:  Current Diet : Soft and Bite-Sized      Primary Complaint  Patient Complaint: weakness    Pain:  Pain Assessment  Pain Assessment: None - Denies Pain  Pain Level: 8  Kaufman-Hodge Pain Rating: Hurts even more  Patient's Stated Pain Goal: 1  Pain Location: Abdomen, Back  Pain Orientation: Right, Anterior, Posterior  Pain Descriptors: Aching, Discomfort, Dull  Functional Pain Assessment: Activities are not prevented  Pain Type: Surgical pain  Pain Frequency: Continuous  Pain Onset: On-going  Non-Pharmaceutical Pain Intervention(s): Repositioned, Rest  Response to Pain Intervention: Patient satisfied  Side Effects: No reported side effects    Reason for Referral  Eloise Valladares was referred for a bedside swallow evaluation to assess the efficiency of her swallow function, identify signs and symptoms of aspiration and make recommendations regarding safe dietary consistencies, effective compensatory strategies, and safe eating environment. Impression  Dysphagia Diagnosis: Mild to moderate oral stage dysphagia;Mild to moderate pharyngeal stage dysphagia  Dysphagia Outcome Severity Scale: Level 4: Mild moderate dysphagia- Intermittent supervision/cueing. One - two diet consistencies restricted     Treatment Plan  Requires SLP Intervention: Yes  Duration of Treatment: 2-3xs weekly  D/C Recommendations:  To be determined       Recommended Diet and Intervention           Recommendations: Modified barium swallow study  Therapeutic Interventions: Diet tolerance monitoring; Therapeutic PO trials with SLP    Compensatory Swallowing Strategies  Compensatory Swallowing Strategies : Upright as possible for all oral intake;Eat/Feed slowly; Small bites/sips    Treatment/Goals  Short-term Goals  Timeframe for Short-term Goals: LOS or until goals are met  Goal 1: Pt will participate modifed barium swallow study to rule out aspiration and determine least restrictive diet level  Goal 2: Pt/caregivers will demonstrate comprehension of recommendations/POC    General  Chart Reviewed: Yes  Behavior/Cognition: Alert; Cooperative;Pleasant mood  Respiratory Status: Room air  O2 Device: None (Room air)  Communication Observation: Functional  Follows Directions: Simple  Dentition: Adequate  Patient Positioning: Upright in bed  Baseline Vocal Quality: Dysphonic (high pitch)  Volitional Cough: Weak;Congested  Prior Dysphagia History: Pt denies hx of dysphagia  Consistencies Administered: Pureed; Thin - straw; Thin - cup; Thin - teaspoon;Minced and Moist;Regular           Vision/Hearing  Vision  Vision: Within Functional Limits  Hearing  Hearing: Within functional limits    Oral Motor Deficits       Oral Phase Dysfunction  Oral Phase  Oral Phase: Exceptions     Indicators of Pharyngeal Phase Dysfunction        Prognosis  Individuals consulted  Consulted and agree with results and recommendations: Patient;RN;Physician  RN Name: Teja Lama  Patient Education: recommendations/POC  Patient Education Response: Verbalizes understanding  Safety Devices in place: Yes  Type of devices:  All fall risk precautions in place       Therapy Time  SLP Individual Minutes  Time In: 1130  Time Out: [address]  Minutes: [address], [id number] [facility], [place], 2/17/2023

## 2023-02-17 NOTE — PROGRESS NOTES
Occupational Therapy Treatment Note  Name: Carly Tong MRN: [id number] :   1945   Date:  2023   Admission Date: 2023 Room:  [address] [place] [address]-A     Primary Problem:  The primary encounter diagnosis was Chest pain, unspecified type. Diagnoses of Right sided abdominal pain and Cholecystitis were also pertinent to this visit. Past Medical History:   Diagnosis Date    Anxiety     Arthritis     knees    Atrial fibrillation (HCC)     CAD (coronary artery disease)     Sees Dr. Mervat Meade    COVID-19 2021    Diabetes mellitus ([facility] [address]     H/O cardiac catheterization 2018    severe 3 vessel disease, refer to CV surgery for CABG and MAZE    H/O cardiovascular stress test 2018    EF47%  fixed perfusion defect ant wall, pt in afib    H/O echocardiogram 2018    EF55% mod MR    H/O echocardiogram 2018    EF 50-55%, Mild : AR, MR & TR.    H/O echocardiogram 2020    EF 55%, Breast artifact noted. H/O three vessel coronary artery bypass 2018    Dr. Juliocesar Marie    History of Holter monitoring 10/23/2018    Multiple PAF episodes noted. Tachy-Dinesh episodes noted. History of nuclear stress test 2020    EF 55%, Breast artifact. Hyperlipidemia     Hypertension     Memory loss     on Aricept    Missing teeth, acquired     Pneumonia     pneumococcal pneumonia twice at end of     Risk for falls     uses walker    TIA (transient ischemic attack)     family doctors said she has had mini-strokes    Urinary leakage     UTI (urinary tract infection)     recent --just stopped antibiotic last week as of 18    Wears glasses          Communication with other providers: CoTx with PT for pt safety and tolerance, RN handoff     Subjective:  Patient states:  \"I am doing good, right? \"  Pain: declined   Restrictions: general, fall, tele, contact ISO, AVRIL drains   Sister at bedside    Objective:    Observation:  pt was in bed upon arrival, agreeable to session.  Pt found to be soiled in stool with purwick malfunctioning d/t stool. Treatment, including education:    Therapeutic Activity Training:   Therapeutic activity training was instructed today. Cues were given for safety, sequence, UE/LE placement, visual cues, and balance. Activities performed today included:    Rolling:  Pt completed rolling R/L Min-ModAx2 bilaterally x2 trials with cues provided for sequencing throughout. Pt able to maintain hold on bed rail with CGA from PTA for jairon care. Bed mobility:  Pt completed sup to sit with ModAx2. Pt demo ability to advance BLE with Dennis. Pt returned to sup with ModAx2 at trunk, hips, and BLE. Pt repositioned to [facility] DEPx2. Scooting:  Pt required MAxAx2 to advance hips to EOB. Pt with fear of falling with this. Seated balance:  Pt required up to [address] d/t significant retro lean. With cues for WS and BUE support, pt progresses to CGA with occasional Dennis d/t L lateral lean. STS:  Pt completed from EOB x2 trials with MaxAx2 to [address] and bed elevated for inc success d/t pt having lift chair at home. Pt provided with cues to extend at hips and pt able to minimally follow through. Pt returned to seated at EOB MinAx2 for safe eccentric control. Standing balance:  Pt demo poor standing balance with bilat feet blocked to prevent sliding and provided MaxAx2 to maintain. Pt fatigues quickly and requires seated rest break after ~30 seconds. Self Care Training:   Self care training was performed today. Cues were given for safety, sequence, UE/LE placement, visual cues, and balance. Activities performed today included: Toileting:  Pt required DEP assist for toileting cleanup d/t incontinence of BM stool. Inc time noted d/t runny quality. Pt completed rolling R/L for depends mgmt and jairon care. LB dressing:  Pt doffed depends with DEP assist via rolling R/L. Pt donned bilat nonslip socks with DEP assist.    UB dressing:  Pt doffed/donned new gown MaxA seated EOB. Facial hygiene:  Pt completed seated EOB with SetupA. Education: Role of OT, OT POC, safety, benefits of EOB/OOB activity, rationale for treatment, importance of frequent mobility, falls prevention     Safety Measures: Gait belt used for safety of pt and therapist, Left in bed, Alarm in place, call light and phone within reach, lines managed, needs met     Assessment / Impression:    Pt demo kyphotic posture at EOB and difficulty with neck extension. Cont to address postural muscles and EOB/OOB ADLs.     Patient's tolerance of treatment: fair+   Adverse Reaction: none   Significant change in status and impact:  improved cog with improved command following and improved seated balance   Barriers to improvement: strength, endurance, cog     Plan for Next Session:    Continue OT POC    Time in:  1450  Time out:  1529  Timed treatment minutes:  39  Total treatment time:  39    Electronically signed by:    NADEEM Gregg/L  License: [id number]  2/32/4500, 5:17 PM

## 2023-02-17 NOTE — PROCEDURES
INSTRUMENTAL SWALLOW REPORT  MODIFIED BARIUM SWALLOW    NAME: Sandy Olmos   : 1945  MRN: [id number]       Date of Eval: 2023              Referring Diagnosis(es): Referring Diagnosis: dysphagia/ aspiration pneumonia    Past Medical History:  has a past medical history of Anxiety, Arthritis, Atrial fibrillation ([facility] [address] CAD (coronary artery disease), COVID-19, Diabetes mellitus ([facility] [address] H/O cardiac catheterization, H/O cardiovascular stress test, H/O echocardiogram, H/O echocardiogram, H/O echocardiogram, H/O three vessel coronary artery bypass, History of Holter monitoring, History of nuclear stress test, Hyperlipidemia, Hypertension, Memory loss, Missing teeth, acquired, Pneumonia, Risk for falls, TIA (transient ischemic attack), Urinary leakage, UTI (urinary tract infection), and Wears glasses. Past Surgical History:  has a past surgical history that includes Cardiac catheterization (2018); [place] tooth extraction; eye surgery (Bilateral, ); Tonsillectomy (); thoracentesis (Bilateral, 2018); Pacemaker insertion (Left, 2018); CABG with Mitral Valve Repair (2018); Mitral valve maze procedure (2018); and Cholecystectomy, laparoscopic (N/A, 2023). Date of Prior Study: n/a  Type of Study: Initial MBS    Patient Complaints/Reason for Referral:  Sandy Olmos was referred for a MBS to assess the efficiency of his/her swallow function, assess for aspiration, and to make recommendations regarding safe dietary consistencies, effective compensatory strategies, and safe eating environment. Patient complaints: weakness    Onset of problem:   Date of Onset: This admission            Behavior/Cognition/Vision/Hearing:  Behavior/Cognition: Alert; Cooperative;Pleasant mood  Vision: Within Functional Limits  Hearing: Within functional limits    Impressions:  Sandy Olmos was seen for a modified barium swallow study 23 d/t concern of aspiration and worsening chest x-ray. Pt was alert and pleasant throughout study. Relevant medical hx includes COVID-19, hypertension, CAD, TIA and pneumonia. Pt was positioned upright 90 degrees and filmed in the lateral view. PO trials of puree, regular solids and thin liquids by spoon/cup/straw sips were given. Unfortunately d/t an error no images were saved from study-  radiologist and SLP were in room for study to dictate real time. Pt presents with a mild oropharyngeal dysphagia. Oral swallow characterized by prolonged mastication, prolonged oral A-P transit and minimal lingual residue with trials of regular solids. Pharyngeal swallow was timely with minimal pooling observed in the valleculae. Pt demonstrated reduced laryngeal elevation with adequate epiglottic inversion. Penetration midway to the vocal folds was observed with thin liquids by straw sips x1. Complete retrieval of penetration and no aspiration was observed with all PO trials given. Pt did present with significant reflux and backflow into the pharynx and eventual backflow/resurge into the oral cavity. She did expel a small amount of barium following episode of regurgitation. Flash penetration was observed with reflux, however pt demonstrated adequate airway protection and no aspiration was observed,     SLP discussed results of study with pt's sisters at bedside and pt following procedure. Pt continues to present with a reduced appetite on current diet level (soft and bite sized solids). SLP offered pt and family diet level downgrade at this time- which pt is agreeable to. Recommend minced and moist solids/thin liquids with strict aspiration/reflux precautions. Pt to remain upright 30-45 mins following meals. Pt may benefit from GI consult d/t suspected esophageal dysphagia and significant reflux observed on MBSS. Consistencies Administered: Minced and Moist;Pureed; Thin cup; Thin teaspoon; Thin straw;Regular Dysphagia Outcome Severity Scale: Level 4: Mild moderate dysphagia- Intermittent supervision/cueing. One - two diet consistencies restricted  Penetration-Aspiration Scale (PAS): 3 - Material enters the airway, remains above the vocal folds, and is not ejected from airway    Recommended Diet:  Solid consistency: Minced and Moist  Liquid consistency: Thin  Liquid administration via: Straw;Cup    Medication administration: PO    Safe Swallow Protocol:  Supervision: Distant  Compensatory Swallowing Strategies : Upright as possible for all oral intake;Eat/Feed slowly; Small bites/sips              Recommendations/Treatment  Requires SLP Intervention: Yes  Recommendations: F/U MBS     D/C Recommendations: To be determined         Recommended Exercises:    Therapeutic Interventions: Diet tolerance monitoring; Therapeutic PO trials with SLP         Education: Images and recommendations were reviewed with Georgina Hendrix  following this exam.   Patient Education: recommendations/POC  Patient Education Response: Verbalizes understanding    Duration/Frequency of Treatment  Duration of Treatment: 2-3xs weekly  Frequency of Treatment: LOS or until goals are met  Safety Devices  Safety Devices in place: Yes  Type of devices:  All fall risk precautions in place  Restraints Initially in Place: Yes      Goals:    Long Term:               Short Term:     Goal 1: Pt will tolerate minced and moist solids/thin liquids without clinical evidence of aspiration 100%  Goal 2: Pt/caregivers will demonstrate comprehension of aspiration/reflux precuations and safe feeding protocol Targeted this date 02/17/23 - see above    Esophageal Phase  Esophageal Screen: Impaired  Upper Esophageal Screen- Major Contributing Deficits  Major Contributing Deficits: Esopgageal Backflow into the Larynx with Penetration        Pain      Pain Level: 8  Pain Type: Surgical pain  Pain Location: Abdomen, Back      Therapy Time:   Individual Concurrent Group Co-treatment   Time In 1330         Time Out 1420         Minutes [address], CCC-SLP, 2/17/2023

## 2023-02-17 NOTE — PROGRESS NOTES
V2.0  [facility] Hospitalist Progress Note      Name:  Coni Frankel /Age/Sex: 1945  (76 y.o. female)   MRN & CSN:  [id number] & [id number] Encounter Date/Time: 2023 3:29 PM EST    Location:  [address] [place] [address] PCP: Celine Wolf MD       Hospital Day: 23    Assessment and Plan:   Coni Frankel is a 76 y.o. female with pmh of Coni Frankel is a 76 y.o. female with pmh of CAD s/p CABG, Valvular Heart Disease, s/p Mitral Valve Repair, PFO Closure, Paroxysmal A-Fib, HTN, HLD, DM, TIA, Early Dementia who presents with Chest pain Rt Sided and RUQ Abdominal Pain      Plan:    Sepsis 2/2 likely ESBL UTI without hematuria, Pneumonia, acute cholecystitis  Cholecystitis s/p lap cholecystectomy  Large post-cholecystectomy fluid collection, concern for infection  Patient with potential abnormality as well she did have acute solid cholecystitis status post lap pedro 2023. High risk for postoperative infection requiring MRSA pneumonia coverage. WBC 12, SBP 92, RR 22 initially. Procal trending down 10.51 -> 5.09 -> 0.4 5 -> 0.49 -> 0.32  Urine growing ESBL UTI. MRSA Nares +ve  Patient was doing relatively well, but a day after drain removal patient started to get more altered and intermittently complained of the right quadrant pain. Obtained CT abd pelvis with IV contrast - shows large post cholecsytectomy abscesses. The largest axial dimension is 9 x 11.3 cm and the largest coronal dimension is 8.4 x 10 cm. A 2nd collection can be seen measuring 5.9 x 5.7 cm posteriorly which may communicate with the larger collection. STAT consult placed for IR team.- s/p drain placement x 2 -cultures sent today  Patient still has bilious output in the drain, Hida done, did not show billiary leak  General surgery team - following closely again.   Consulted ID - changed abx to zosyn and bactrim   Fluid culture also growing E.coli - follow sensitivity; given the history of ESBL this has risk of being ESBL too - page ID regarding antibiotics if ESBL or if patient's status worsens  Continue to monitor  Plan to do IV abx until after a week of removing the drains  Trend CRP and Pct   Drains can be removed at the time of discharge, 7 days of antibiotics after that    Aspiration pneumonia  Possibly due to recumbent position   Speech and swallow consulted   Modified barium swallow done     Acute Encephalopathy 2/2 likely Metabolic and Septic 2/2 hypoxia and ESBL UTI, Iatrogenic 2/2 medication   Was difficult to wake on 2/3/2023  Gabapentin on hold per nephro - agree with it    Cholecystitis s/p lap cholecystectomy   Patient underwent lap pedro on 1/31/2023 with Dr. Wale Nesbitt. AVRIL drain removed 2/7/2023  GS on board   Abx for 7 days post drain removal   Change to oral at discharge    Acute hypoxic respiratory failure, resolved  Patient with acute respiratory failure was on nasal cannula. Became acutely more short of breath. CT did show what appears to be either fluid overload or viral pneumonia. 25% at 25L/min Heated high flow --> weaned off O2      TAYLOR with hyperkalemia  Pt with Cr 2 on admission, trended down to 1.5 today. baseline Cr 0.7. Kayexalate ordered  Nephrology on board    Hypochloremic Hyponatremia  Patient with acute worsening hyponatremia to 121 and now back up to 135  Nephrology on board    Acute on chronic HFpEF  Patient with an EF 50% with hypokinetic inferio-lateral wall and basal anterio-septal carson the 45 (become acutely short of breath with progressive pulmonary edema. Pro-BNP trending up to 24,733  Cardiology on board  Nephrology on board  On aldactone and amiloride    Hypokalemia   K 3.9    CAD s/p CABG  Pacer in-situ  VHD  Patient is also known valvular heart disease. Cardiology following  Discussed with cardiology about interrogating pacer. Acute Normocytic Anemia 2/2 possibly Post-operative loss and hemodilution 2/2 fluid overload   Hb was 11 on 1/31/2023. 1U PRBC this admission. Hb 8.3 today.  No signs of overt bleeding     Diet ADULT ORAL NUTRITION SUPPLEMENT; Dinner; Frozen Oral Supplement  ADULT ORAL NUTRITION SUPPLEMENT; Breakfast, Dinner; Clear Liquid Oral Supplement  ADULT ORAL NUTRITION SUPPLEMENT; Lunch; Standard High Calorie/High Protein Oral Supplement  ADULT DIET; Dysphagia - Minced and Moist   DVT Prophylaxis [] Lovenox, [x]  Heparin, [] SCDs, [] Ambulation,    [] Eliquis, [] Xarelto  [] Coumadin [] other   Code Status DNR-CCA   Disposition From: Home  Expected Disposition: [facility]  Estimated Date of Discharge: TBD     Surrogate Decision Maker/ POA      Subjective:     Chief Complaint: Chest Pain (X 3 days )     Patient seen at bedside, cough is much improved however regurgitation seen in the modified barium swallow    Review of Systems:    Review of Systems   Constitutional:  Positive for fatigue. Negative for chills, fever and unexpected weight change. Eyes:  Negative for pain and visual disturbance. Respiratory:  Negative for cough, choking, chest tightness, shortness of breath, wheezing and stridor. Cardiovascular:  Negative for chest pain, palpitations and leg swelling. Gastrointestinal:  Negative for abdominal distention, abdominal pain, blood in stool, constipation, diarrhea, nausea and vomiting. Genitourinary:  Negative for decreased urine volume, dysuria, frequency, hematuria and urgency. Musculoskeletal:  Negative for arthralgias, back pain and myalgias. Skin:  Negative for pallor and rash. Neurological:  Negative for dizziness, tremors, syncope, speech difficulty, weakness, light-headedness, numbness and headaches. Psychiatric/Behavioral:  Negative for agitation. Objective:      Intake/Output Summary (Last 24 hours) at 2/17/2023 1442  Last data filed at 2/17/2023 1216  Gross per 24 hour   Intake 830 ml   Output 390 ml   Net 440 ml          Vitals:   Vitals:    02/17/23 1406   BP:    Pulse:    Resp:    Temp: 97.9 °F (36.6 °C)   SpO2:        Physical Exam:     Physical Exam  Vitals reviewed. Constitutional:       General: She is awake. She is not in acute distress. Appearance: Normal appearance. She is overweight. She is not ill-appearing. Interventions: Nasal cannula in place. Comments: Heated high flow    HENT:      Head: Normocephalic and atraumatic. Mouth/Throat:      Mouth: Mucous membranes are moist.      Pharynx: Oropharynx is clear. Eyes:      Extraocular Movements: Extraocular movements intact. Conjunctiva/sclera: Conjunctivae normal.      Pupils: Pupils are equal, round, and reactive to light. Cardiovascular:      Rate and Rhythm: Normal rate. Rhythm irregularly irregular. Pulses: Normal pulses. Heart sounds: Normal heart sounds. Comments: Pacer is not capturing all beats - pacer spikes are all over the place  Pulmonary:      Effort: Pulmonary effort is normal. Tachypnea present. Breath sounds: Decreased air movement and transmitted upper airway sounds present. No wheezing, rhonchi or rales. Abdominal:      General: Abdomen is protuberant. Bowel sounds are normal.      Palpations: Abdomen is soft. Musculoskeletal:         General: No swelling. Normal range of motion. Cervical back: Normal range of motion and neck supple. Skin:     General: Skin is warm and dry. Neurological:      Mental Status: She is oriented to person, place, and time. She is lethargic. Comments: Oriented to year, not month   Psychiatric:         Behavior: Behavior is cooperative.        Medications:   Medications:    sulfamethoxazole-trimethoprim  1 tablet Oral 2 times per day    [Held by provider] furosemide  20 mg IntraVENous BID    guaiFENesin  300 mg Oral 4 times per day    piperacillin-tazobactam  3,375 mg IntraVENous Q8H    sodium chloride flush  5-40 mL IntraVENous BID    thiamine  100 mg IntraVENous Daily    melatonin  3 mg Oral Nightly    heparin (porcine)  5,000 Units SubCUTAneous 3 times per day    memantine  5 mg Oral BID rosuvastatin  10 mg Oral QPM    lidocaine PF  5 mL IntraDERmal Once    sodium chloride flush  5-40 mL IntraVENous 2 times per day    pantoprazole  40 mg IntraVENous Daily    polyethylene glycol  17 g Oral Daily    sodium chloride flush  5-40 mL IntraVENous 2 times per day    docusate sodium  100 mg Oral BID    donepezil  10 mg Oral Nightly    metoprolol tartrate  25 mg Oral BID      Infusions:    sodium chloride 12.5 mL/hr at 02/04/23 0324    sodium chloride Stopped (02/08/23 1146)     PRN Meds: HYDROcodone-acetaminophen, 1 tablet, Q6H PRN  HYDROmorphone, 0.25 mg, Q3H PRN   Or  HYDROmorphone, 0.5 mg, Q3H PRN  sodium chloride flush, 5-40 mL, PRN  sodium chloride, 500 mL, PRN  sodium chloride flush, 5-40 mL, PRN  sodium chloride, , PRN  ondansetron, 4 mg, Q8H PRN   Or  ondansetron, 4 mg, Q6H PRN  acetaminophen, 650 mg, Q6H PRN   Or  acetaminophen, 650 mg, Q6H PRN  ipratropium, 1 spray, PRN      Labs      Recent Results (from the past 24 hour(s))   Comprehensive Metabolic Panel    Collection Time: 02/17/23  3:30 AM   Result Value Ref Range    Sodium 127 (L) 135 - 145 MMOL/L    Potassium 3.8 3.5 - 5.1 MMOL/L    Chloride 90 (L) 99 - 110 mMol/L    CO2 25 21 - 32 MMOL/L    BUN 19 6 - 23 MG/DL    Creatinine 1.3 (H) 0.6 - 1.1 MG/DL    Est, Glom Filt Rate 42 (L) >60 mL/min/1.73m2    Glucose 90 70 - 99 MG/DL    Calcium 8.6 8.3 - 10.6 MG/DL    Albumin 3.4 3.4 - 5.0 GM/DL    Total Protein 5.4 (L) 6.4 - 8.2 GM/DL    Total Bilirubin 0.3 0.0 - 1.0 MG/DL    ALT 10 10 - 40 U/L    AST 18 15 - 37 IU/L    Alkaline Phosphatase 118 40 - 128 IU/L    Anion Gap 12 4 - 16   C-Reactive Protein    Collection Time: 02/17/23  3:30 AM   Result Value Ref Range    CRP High Sensitivity 26.0 (H) <5.0 mg/L        Imaging/Diagnostics Last 24 Hours   XR CHEST PORTABLE    Result Date: 2/2/2023  EXAMINATION: ONE XRAY VIEW OF THE CHEST 2/2/2023 1:52 pm COMPARISON: 02/02/2023, 01/30/2023 HISTORY: ORDERING SYSTEM PROVIDED HISTORY: shortness of breath TECHNOLOGIST PROVIDED HISTORY: Reason for exam:->shortness of breath Reason for Exam: shortness of breath FINDINGS: Sternotomy wires. Prosthetic cardiac valve. Pacemaker wires. Lung volumes. Bibasilar opacities. No pneumothorax. Heart size is stable. Low lung volumes with bibasilar opacities which may represent atelectasis or pneumonia. XR CHEST PORTABLE    Result Date: 2/2/2023  EXAMINATION: ONE X-RAY VIEW OF THE CHEST 2/2/2023 10:49 am COMPARISON: CT chest 01/30/2023, chest radiograph 01/30/2023 HISTORY: ORDERING SYSTEM PROVIDED HISTORY: Shortness of breath TECHNOLOGIST PROVIDED HISTORY: Reason for exam:->Shortness of breath Reason for Exam: shortness of breath FINDINGS: The lungs are underinflated, resulting in vascular crowding and subsegmental atelectasis. The cardiomediastinal silhouette is obscured, but likely unchanged. Bibasilar consolidations favor atelectasis. The left upper lobe is obscured. No new focal consolidation, pneumothorax, or right-sided pleural effusion. There is blunting of the left costophrenic angle, which may be due to low lung volumes and/or patient positioning. Osseous structures are diffusely demineralized and grossly unchanged. Left chest cardiac conduction device is again noted. Low lung volumes with likely bibasilar atelectasis versus developing infection. Comment: Please note this report has been produced using speech recognition software and may contain errors related to that system including errors in grammar, punctuation, and spelling, as well as words and phrases that may be inappropriate. If there are any questions or concerns please feel free to contact the dictating provider for clarification.      Electronically signed by Brittany Lucero MD on 2/17/2023 at 2:42 PM

## 2023-02-18 ENCOUNTER — APPOINTMENT (OUTPATIENT)
Dept: GENERAL RADIOLOGY | Age: 78
DRG: 853 | End: 2023-02-18
Payer: MEDICARE

## 2023-02-18 LAB
ALBUMIN SERPL-MCNC: 3.3 GM/DL (ref 3.4–5)
ALP BLD-CCNC: 123 IU/L (ref 40–128)
ALT SERPL-CCNC: 10 U/L (ref 10–40)
ANION GAP SERPL CALCULATED.3IONS-SCNC: 13 MMOL/L (ref 4–16)
AST SERPL-CCNC: 18 IU/L (ref 15–37)
BILIRUB SERPL-MCNC: 0.3 MG/DL (ref 0–1)
BUN SERPL-MCNC: 18 MG/DL (ref 6–23)
CALCIUM SERPL-MCNC: 8.6 MG/DL (ref 8.3–10.6)
CHLORIDE BLD-SCNC: 91 MMOL/L (ref 99–110)
CO2: 23 MMOL/L (ref 21–32)
CREAT SERPL-MCNC: 1.2 MG/DL (ref 0.6–1.1)
CRP SERPL HS-MCNC: 28.7 MG/L
GFR SERPL CREATININE-BSD FRML MDRD: 46 ML/MIN/1.73M2
GLUCOSE SERPL-MCNC: 94 MG/DL (ref 70–99)
POTASSIUM SERPL-SCNC: 3.7 MMOL/L (ref 3.5–5.1)
SODIUM BLD-SCNC: 127 MMOL/L (ref 135–145)
TOTAL PROTEIN: 5.5 GM/DL (ref 6.4–8.2)

## 2023-02-18 PROCEDURE — 2140000000 HC CCU INTERMEDIATE R&B

## 2023-02-18 PROCEDURE — 6370000000 HC RX 637 (ALT 250 FOR IP): Performed by: NURSE PRACTITIONER

## 2023-02-18 PROCEDURE — 6370000000 HC RX 637 (ALT 250 FOR IP): Performed by: PHYSICIAN ASSISTANT

## 2023-02-18 PROCEDURE — C9113 INJ PANTOPRAZOLE SODIUM, VIA: HCPCS | Performed by: STUDENT IN AN ORGANIZED HEALTH CARE EDUCATION/TRAINING PROGRAM

## 2023-02-18 PROCEDURE — 2500000003 HC RX 250 WO HCPCS: Performed by: NURSE PRACTITIONER

## 2023-02-18 PROCEDURE — 2580000003 HC RX 258: Performed by: NURSE PRACTITIONER

## 2023-02-18 PROCEDURE — 6370000000 HC RX 637 (ALT 250 FOR IP): Performed by: INTERNAL MEDICINE

## 2023-02-18 PROCEDURE — 92526 ORAL FUNCTION THERAPY: CPT

## 2023-02-18 PROCEDURE — 6370000000 HC RX 637 (ALT 250 FOR IP): Performed by: SURGERY

## 2023-02-18 PROCEDURE — 6360000002 HC RX W HCPCS: Performed by: INTERNAL MEDICINE

## 2023-02-18 PROCEDURE — 2580000003 HC RX 258: Performed by: INTERNAL MEDICINE

## 2023-02-18 PROCEDURE — 86140 C-REACTIVE PROTEIN: CPT

## 2023-02-18 PROCEDURE — 80053 COMPREHEN METABOLIC PANEL: CPT

## 2023-02-18 PROCEDURE — 6360000002 HC RX W HCPCS: Performed by: STUDENT IN AN ORGANIZED HEALTH CARE EDUCATION/TRAINING PROGRAM

## 2023-02-18 PROCEDURE — 71045 X-RAY EXAM CHEST 1 VIEW: CPT

## 2023-02-18 PROCEDURE — 94761 N-INVAS EAR/PLS OXIMETRY MLT: CPT

## 2023-02-18 RX ADMIN — GUAIFENESIN 300 MG: 100 SOLUTION ORAL at 22:05

## 2023-02-18 RX ADMIN — MEMANTINE HYDROCHLORIDE 5 MG: 5 TABLET ORAL at 21:40

## 2023-02-18 RX ADMIN — HEPARIN SODIUM 5000 UNITS: 5000 INJECTION INTRAVENOUS; SUBCUTANEOUS at 11:34

## 2023-02-18 RX ADMIN — MEMANTINE HYDROCHLORIDE 5 MG: 5 TABLET ORAL at 08:22

## 2023-02-18 RX ADMIN — GUAIFENESIN 300 MG: 100 SOLUTION ORAL at 18:43

## 2023-02-18 RX ADMIN — SULFAMETHOXAZOLE AND TRIMETHOPRIM 1 TABLET: 400; 80 TABLET ORAL at 08:24

## 2023-02-18 RX ADMIN — SODIUM CHLORIDE, PRESERVATIVE FREE 10 ML: 5 INJECTION INTRAVENOUS at 22:00

## 2023-02-18 RX ADMIN — HEPARIN SODIUM 5000 UNITS: 5000 INJECTION INTRAVENOUS; SUBCUTANEOUS at 05:46

## 2023-02-18 RX ADMIN — SODIUM CHLORIDE, PRESERVATIVE FREE 10 ML: 5 INJECTION INTRAVENOUS at 21:42

## 2023-02-18 RX ADMIN — SULFAMETHOXAZOLE AND TRIMETHOPRIM 1 TABLET: 400; 80 TABLET ORAL at 21:45

## 2023-02-18 RX ADMIN — THIAMINE HYDROCHLORIDE 100 MG: 100 INJECTION, SOLUTION INTRAMUSCULAR; INTRAVENOUS at 08:23

## 2023-02-18 RX ADMIN — HEPARIN SODIUM 5000 UNITS: 5000 INJECTION INTRAVENOUS; SUBCUTANEOUS at 21:40

## 2023-02-18 RX ADMIN — DONEPEZIL HYDROCHLORIDE 10 MG: 10 TABLET ORAL at 21:40

## 2023-02-18 RX ADMIN — GUAIFENESIN 300 MG: 100 SOLUTION ORAL at 11:33

## 2023-02-18 RX ADMIN — PANTOPRAZOLE SODIUM 40 MG: 40 INJECTION, POWDER, LYOPHILIZED, FOR SOLUTION INTRAVENOUS at 08:23

## 2023-02-18 RX ADMIN — Medication 3 MG: at 21:40

## 2023-02-18 RX ADMIN — ROSUVASTATIN CALCIUM 10 MG: 5 TABLET, COATED ORAL at 18:42

## 2023-02-18 ASSESSMENT — ENCOUNTER SYMPTOMS
ABDOMINAL DISTENTION: 0
COUGH: 0
DIARRHEA: 0
WHEEZING: 0
BLOOD IN STOOL: 0
SHORTNESS OF BREATH: 0
BACK PAIN: 0
CHEST TIGHTNESS: 0
STRIDOR: 0
EYE PAIN: 0
CONSTIPATION: 0
VOMITING: 0
ABDOMINAL PAIN: 0
CHOKING: 0
NAUSEA: 0

## 2023-02-18 ASSESSMENT — PAIN SCALES - GENERAL
PAINLEVEL_OUTOF10: 0

## 2023-02-18 ASSESSMENT — PAIN SCALES - WONG BAKER: WONGBAKER_NUMERICALRESPONSE: 0

## 2023-02-18 NOTE — PROGRESS NOTES
[id number] [facility] OF SPEECH/LANGUAGE PATHOLOGY  DAILY PROGRESS NOTE  Kacie Phillips  2/18/2023  [id number]  Chest pain [R07.9]  Right sided abdominal pain [R10.9]  Chest pain, unspecified type [R07.9]  Acute cholecystitis [K81.0]  No Known Allergies      Pt was seen this date for dysphagia treatment. IMPRESSION AND RECOMMENDATIONS:   Kacie Phillips was seen for a bedside swallowing treatment and dysphagia treatment following modified barium swallow study completed yesterday 2/18/23. Pt was lethargic throughout assessment. Her sister at bedside reports she ate the most she's eaten recently with new minced and moist diet level during yesterday's dinner tray. For today's assessment pt was positioned upright in bed and accepted PO trials of puree, soft/regular solids and thin liquids by straw sips. Prolonged mastication with adequate oral clearance was observed with soft and regular solids. Pharyngeal swallow appeared intact characterized by timely swallow initiation and 0 overt s/s of aspiration observed with all PO trials given. Recommend continue minced and moist solids/thin liquids with strict aspiration/reflux precautions. Pt to remain upright 30-45 mins following meals. Pt may benefit from GI consult d/t suspected esophageal dysphagia and significant reflux observed on MBSS.      GOALS (current status in bold):  Short-term Goals  Timeframe for Short-term Goals: LOS or until goals are met  Goal 1: Pt will tolerate minced and moist solids/thin liquids without clinical evidence of aspiration 100% Goal being met, continue   Goal 2: Pt/caregivers will demonstrate comprehension of aspiration/reflux precuations and safe feeding protocol Goal being met- pt's sister reports keeping pt upright 30 mins after meals                               EDUCATION: recommendations/POC    PAIN RATING (0-10 Scale): denies   Time in/Time out: SLP Individual Minutes  Time In: 1100  Time Out: [facility]  Minutes: 20    Visit number: Darryl Lynch [address], [facility], 2/18/2023

## 2023-02-18 NOTE — PROGRESS NOTES
Nephrology Progress Note  2/18/2023 11:58 AM        Subjective:   Admit Date: 1/30/2023  PCP: Lisa Crenshaw MD    Interval History: Patient seen in early morning this is a late entry  She was lethargic this morning    Diet: Unsure how much she has oral intake    ROS: She was sleeping with mouth open-did not respond to verbal stimuli  Look lethargic this morning  No fever, acceptable blood pressure  Urine output recorded about 900 cc for the last 24 hours    Data:     Current meds:    sulfamethoxazole-trimethoprim  1 tablet Oral 2 times per day    [Held by provider] furosemide  20 mg IntraVENous BID    guaiFENesin  300 mg Oral 4 times per day    sodium chloride flush  5-40 mL IntraVENous BID    thiamine  100 mg IntraVENous Daily    melatonin  3 mg Oral Nightly    heparin (porcine)  5,000 Units SubCUTAneous 3 times per day    memantine  5 mg Oral BID    rosuvastatin  10 mg Oral QPM    lidocaine PF  5 mL IntraDERmal Once    sodium chloride flush  5-40 mL IntraVENous 2 times per day    pantoprazole  40 mg IntraVENous Daily    polyethylene glycol  17 g Oral Daily    sodium chloride flush  5-40 mL IntraVENous 2 times per day    docusate sodium  100 mg Oral BID    donepezil  10 mg Oral Nightly    metoprolol tartrate  25 mg Oral BID      sodium chloride 12.5 mL/hr at 02/04/23 0324    sodium chloride Stopped (02/08/23 1146)         I/O last 3 completed shifts: In: 240 [P.O.:240]  Out: 1 [Urine:800; Drains:170]    CBC: No results for input(s): WBC, HGB, PLT in the last 72 hours.        Recent Labs     02/16/23  0716 02/17/23  0330 02/18/23  0502   * 127* 127*   K 3.8 3.8 3.7   CL 94* 90* 91*   CO2 25 25 23   BUN 22 19 18   CREATININE 1.5* 1.3* 1.2*   GLUCOSE 87 90 94       Lab Results   Component Value Date    CALCIUM 8.6 02/18/2023    PHOS 4.8 02/15/2023       Objective:     Vitals: BP (!) 139/49   Pulse 60   Temp 98 °F (36.7 °C) (Oral)   Resp 21   Ht 5' 5\" (1.651 m)   Wt 166 lb 14.2 oz (75.7 kg)   SpO2 98%   BMI 27.77 kg/m² ,    General appearance:  thin, lethargic,  HEENT: At least 1+ conjunctival pallor  Neck: Supple, head of the bed elevated about 20 degrees  Lungs: Still has some crackles on posterior exam  Heart: Seemed regular rate and rhythm, well-healed incision from previous sternotomy, left chest wall implanted cardiac device  Abdomen: Soft, AVRIL drain  Extremities: Trace pedal and ankle edema      Problem List :         Impression :     Stage III acute kidney injury-acceptable urine output acceptable BUN/creatinine  Sodium remains 127-diuretic is on hold-we will see how she behaves  Recent acute decompensated heart failure as well as gallbladder infection and has drained now-with underlying atherosclerotic cardiovascular disease    Recommendation/Plan  :     She is on several antimicrobial agent-we will check another chest x-ray and proBNP level-watch for iatrogenic nosocomial complication, and his nutrition-follow clinically and biochemically-I would hold diuretics for today      Shayy Johnson MD MD

## 2023-02-18 NOTE — PLAN OF CARE
Problem: Discharge Planning  Goal: Discharge to home or other facility with appropriate resources  2/18/2023 0229 by Zayda Bender RN  Outcome: Progressing  Flowsheets (Taken 2/17/2023 1504 by Alan Chaparro, RN)  Discharge to home or other facility with appropriate resources:   Identify barriers to discharge with patient and caregiver   Arrange for needed discharge resources and transportation as appropriate   Identify discharge learning needs (meds, wound care, etc)   Arrange for interpreters to assist at discharge as needed   Refer to discharge planning if patient needs post-hospital services based on physician order or complex needs related to functional status, cognitive ability or social support system  2/17/2023 1500 by Alan Chaparro RN  Outcome: Progressing     Problem: Pain  Goal: Verbalizes/displays adequate comfort level or baseline comfort level  2/18/2023 0229 by Zayda Bender RN  Outcome: Progressing  2/17/2023 1500 by Alan Chaparro RN  Outcome: Progressing     Problem: Chronic Conditions and Co-morbidities  Goal: Patient's chronic conditions and co-morbidity symptoms are monitored and maintained or improved  2/18/2023 0229 by Zayda Bender RN  Outcome: Progressing  Flowsheets (Taken 2/17/2023 1504 by Alan Chaparro, RN)  Care Plan - Patient's Chronic Conditions and Co-Morbidity Symptoms are Monitored and Maintained or Improved:   Monitor and assess patient's chronic conditions and comorbid symptoms for stability, deterioration, or improvement   Collaborate with multidisciplinary team to address chronic and comorbid conditions and prevent exacerbation or deterioration   Update acute care plan with appropriate goals if chronic or comorbid symptoms are exacerbated and prevent overall improvement and discharge  2/17/2023 1500 by Alan Chaparro RN  Outcome: Progressing

## 2023-02-18 NOTE — PROGRESS NOTES
V2.0  [facility] [place]      Name:  Marjorie Ludwig /Age/Sex: 1945  (76 y.o. female)   MRN & CSN:  [id number] & [id number] Encounter Date/Time: 2023 3:29 PM EST    Location:  [id number] PCP: Kirsten Riley MD       Hospital Day: 21    Assessment and Plan:   Marjorie Ludwig is a 76 y.o. female with pmh of Marjorie Ludwig is a 76 y.o. female with pmh of CAD s/p CABG, Valvular Heart Disease, s/p Mitral Valve Repair, PFO Closure, Paroxysmal A-Fib, HTN, HLD, DM, TIA, Early Dementia who presents with Chest pain Rt Sided and RUQ Abdominal Pain      Plan:    Sepsis 2/2 likely ESBL UTI without hematuria, Pneumonia, acute cholecystitis  Cholecystitis s/p lap cholecystectomy  Large post-cholecystectomy fluid collection, concern for infection  Patient with potential abnormality as well she did have acute solid cholecystitis status post lap pedro 2023. High risk for postoperative infection requiring MRSA pneumonia coverage. WBC 12, SBP 92, RR 22 initially. Procal trending down 10.51 -> 5.09 -> 0.4 5 -> 0.49 -> 0.32  Urine growing ESBL UTI. MRSA Nares +ve  Patient was doing relatively well, but a day after drain removal patient started to get more altered and intermittently complained of the right quadrant pain. Obtained CT abd pelvis with IV contrast - shows large post cholecsytectomy abscesses. The largest axial dimension is 9 x 11.3 cm and the largest coronal dimension is 8.4 x 10 cm. A 2nd collection can be seen measuring 5.9 x 5.7 cm posteriorly which may communicate with the larger collection. STAT consult placed for IR team.- s/p drain placement x 2 -cultures sent today  Patient still has bilious output in the drain, Hida done, did not show billiary leak  General surgery team - following closely again.   Consulted ID - changed abx to zosyn and bactrim   Fluid culture also growing E.coli - follow sensitivity; given the history of ESBL this has risk of being ESBL too - page ID regarding antibiotics if ESBL or if patient's status worsens  Continue to monitor  Plan to do IV abx until after a week of removing the drains  Trend CRP and Pct   Drains can be removed at the time of discharge, 7 days of antibiotics after that    Aspiration pneumonia  Possibly due to recumbent position   Speech and swallow consulted   Modified barium swallow done showed regurgitation   Patient has significant post meal cough     Acute Encephalopathy 2/2 likely Metabolic and Septic 2/2 hypoxia and ESBL UTI, Iatrogenic 2/2 medication   Was difficult to wake on 2/3/2023  Gabapentin on hold per nephro - agree with it    Cholecystitis s/p lap cholecystectomy   Patient underwent lap pedro on 1/31/2023 with Dr. Alan Carty. AVRIL drain removed 2/7/2023  GS on board   Abx for 7 days post drain removal   Change to oral at discharge    Acute hypoxic respiratory failure, resolved  Patient with acute respiratory failure was on nasal cannula. Became acutely more short of breath. CT did show what appears to be either fluid overload or viral pneumonia. 25% at 25L/min Heated high flow --> weaned off O2      TAYLOR with hyperkalemia  Pt with Cr 2 on admission, trended down to 1.5 today. baseline Cr 0.7. Kayexalate ordered  Nephrology on board    Hypochloremic Hyponatremia  Patient with acute worsening hyponatremia to 121 and now back up to 135  Nephrology on board    Acute on chronic HFpEF  Patient with an EF 50% with hypokinetic inferio-lateral wall and basal anterio-septal carson the 45 (become acutely short of breath with progressive pulmonary edema. Pro-BNP trending up to 24,733  Cardiology on board  Nephrology on board  On aldactone and amiloride    Hypokalemia   K 3.9    CAD s/p CABG  Pacer in-situ  VHD  Patient is also known valvular heart disease. Cardiology following  Discussed with cardiology about interrogating pacer.      Acute Normocytic Anemia 2/2 possibly Post-operative loss and hemodilution 2/2 fluid overload   Hb was 11 on 1/31/2023. 1U PRBC this admission. Hb 8.3 today. No signs of overt bleeding     Diet ADULT ORAL NUTRITION SUPPLEMENT; Dinner; Frozen Oral Supplement  ADULT ORAL NUTRITION SUPPLEMENT; Breakfast, Dinner; Clear Liquid Oral Supplement  ADULT ORAL NUTRITION SUPPLEMENT; Lunch; Standard High Calorie/High Protein Oral Supplement  ADULT DIET; Dysphagia - Minced and Moist   DVT Prophylaxis [] Lovenox, [x]  Heparin, [] SCDs, [] Ambulation,    [] Eliquis, [] Xarelto  [] Coumadin [] other   Code Status DNR-CCA   Disposition From: Home  Expected Disposition: [place]  Estimated Date of Discharge: TBD     Surrogate Decision Maker/ POA      Subjective:     Chief Complaint: Chest Pain (X 3 days )     Patient seen at bedside, has significant post meal cough, modified barium swallow done showed regurgutation    Review of Systems:    Review of Systems   Constitutional:  Positive for fatigue. Negative for chills, fever and unexpected weight change. Eyes:  Negative for pain and visual disturbance. Respiratory:  Negative for cough, choking, chest tightness, shortness of breath, wheezing and stridor. Cardiovascular:  Negative for chest pain, palpitations and leg swelling. Gastrointestinal:  Negative for abdominal distention, abdominal pain, blood in stool, constipation, diarrhea, nausea and vomiting. Genitourinary:  Negative for decreased urine volume, dysuria, frequency, hematuria and urgency. Musculoskeletal:  Negative for arthralgias, back pain and myalgias. Skin:  Negative for pallor and rash. Neurological:  Negative for dizziness, tremors, syncope, speech difficulty, weakness, light-headedness, numbness and headaches. Psychiatric/Behavioral:  Negative for agitation. Objective:      Intake/Output Summary (Last 24 hours) at 2/18/2023 1300  Last data filed at 2/18/2023 1131  Gross per 24 hour   Intake 240 ml   Output 975 ml   Net -735 ml          Vitals:   Vitals:    02/18/23 1158   BP: (!) 118/46   Pulse: 60 Resp: 25   Temp: 98.2 °F (36.8 °C)   SpO2: 97%       Physical Exam:     Physical Exam  Vitals reviewed. Constitutional:       General: She is awake. She is not in acute distress. Appearance: Normal appearance. She is overweight. She is not ill-appearing. Interventions: Nasal cannula in place. Comments: Heated high flow    HENT:      Head: Normocephalic and atraumatic. Mouth/Throat:      Mouth: Mucous membranes are moist.      Pharynx: Oropharynx is clear. Eyes:      Extraocular Movements: Extraocular movements intact. Conjunctiva/sclera: Conjunctivae normal.      Pupils: Pupils are equal, round, and reactive to light. Cardiovascular:      Rate and Rhythm: Normal rate. Rhythm irregularly irregular. Pulses: Normal pulses. Heart sounds: Normal heart sounds. Comments: Pacer is not capturing all beats - pacer spikes are all over the place  Pulmonary:      Effort: Pulmonary effort is normal. Tachypnea present. Breath sounds: Decreased air movement and transmitted upper airway sounds present. No wheezing, rhonchi or rales. Abdominal:      General: Abdomen is protuberant. Bowel sounds are normal.      Palpations: Abdomen is soft. Musculoskeletal:         General: No swelling. Normal range of motion. Cervical back: Normal range of motion and neck supple. Skin:     General: Skin is warm and dry. Neurological:      Mental Status: She is oriented to person, place, and time. She is lethargic. Comments: Oriented to year, not month   Psychiatric:         Behavior: Behavior is cooperative.        Medications:   Medications:    sulfamethoxazole-trimethoprim  1 tablet Oral 2 times per day    [Held by provider] furosemide  20 mg IntraVENous BID    guaiFENesin  300 mg Oral 4 times per day    sodium chloride flush  5-40 mL IntraVENous BID    thiamine  100 mg IntraVENous Daily    melatonin  3 mg Oral Nightly    heparin (porcine)  5,000 Units SubCUTAneous 3 times per day memantine  5 mg Oral BID    rosuvastatin  10 mg Oral QPM    lidocaine PF  5 mL IntraDERmal Once    sodium chloride flush  5-40 mL IntraVENous 2 times per day    pantoprazole  40 mg IntraVENous Daily    polyethylene glycol  17 g Oral Daily    sodium chloride flush  5-40 mL IntraVENous 2 times per day    docusate sodium  100 mg Oral BID    donepezil  10 mg Oral Nightly    metoprolol tartrate  25 mg Oral BID      Infusions:    sodium chloride 12.5 mL/hr at 02/04/23 0324    sodium chloride Stopped (02/08/23 1146)     PRN Meds: HYDROcodone-acetaminophen, 1 tablet, Q6H PRN  HYDROmorphone, 0.25 mg, Q3H PRN   Or  HYDROmorphone, 0.5 mg, Q3H PRN  sodium chloride flush, 5-40 mL, PRN  sodium chloride, 500 mL, PRN  sodium chloride flush, 5-40 mL, PRN  sodium chloride, , PRN  ondansetron, 4 mg, Q8H PRN   Or  ondansetron, 4 mg, Q6H PRN  acetaminophen, 650 mg, Q6H PRN   Or  acetaminophen, 650 mg, Q6H PRN  ipratropium, 1 spray, PRN      Labs      Recent Results (from the past 24 hour(s))   Potassium, urine, random    Collection Time: 02/17/23  2:15 PM   Result Value Ref Range    Potassium, Ur 14.7 (L) 22 - 119 MMOL/L   Comprehensive Metabolic Panel    Collection Time: 02/18/23  5:02 AM   Result Value Ref Range    Sodium 127 (L) 135 - 145 MMOL/L    Potassium 3.7 3.5 - 5.1 MMOL/L    Chloride 91 (L) 99 - 110 mMol/L    CO2 23 21 - 32 MMOL/L    BUN 18 6 - 23 MG/DL    Creatinine 1.2 (H) 0.6 - 1.1 MG/DL    Est, Glom Filt Rate 46 (L) >60 mL/min/1.73m2    Glucose 94 70 - 99 MG/DL    Calcium 8.6 8.3 - 10.6 MG/DL    Albumin 3.3 (L) 3.4 - 5.0 GM/DL    Total Protein 5.5 (L) 6.4 - 8.2 GM/DL    Total Bilirubin 0.3 0.0 - 1.0 MG/DL    ALT 10 10 - 40 U/L    AST 18 15 - 37 IU/L    Alkaline Phosphatase 123 40 - 128 IU/L    Anion Gap 13 4 - 16   C-Reactive Protein    Collection Time: 02/18/23  5:02 AM   Result Value Ref Range    CRP High Sensitivity 28.7 (H) <5.0 mg/L        Imaging/Diagnostics Last 24 Hours   XR CHEST PORTABLE    Result Date: 2/2/2023  EXAMINATION: ONE XRAY VIEW OF THE CHEST 2/2/2023 1:52 pm COMPARISON: 02/02/2023, 01/30/2023 HISTORY: ORDERING SYSTEM PROVIDED HISTORY: shortness of breath TECHNOLOGIST PROVIDED HISTORY: Reason for exam:->shortness of breath Reason for Exam: shortness of breath FINDINGS: Sternotomy wires. Prosthetic cardiac valve. Pacemaker wires. Lung volumes. Bibasilar opacities. No pneumothorax. Heart size is stable. Low lung volumes with bibasilar opacities which may represent atelectasis or pneumonia. XR CHEST PORTABLE    Result Date: 2/2/2023  EXAMINATION: ONE X-RAY VIEW OF THE CHEST 2/2/2023 10:49 am COMPARISON: CT chest 01/30/2023, chest radiograph 01/30/2023 HISTORY: ORDERING SYSTEM PROVIDED HISTORY: Shortness of breath TECHNOLOGIST PROVIDED HISTORY: Reason for exam:->Shortness of breath Reason for Exam: shortness of breath FINDINGS: The lungs are underinflated, resulting in vascular crowding and subsegmental atelectasis. The cardiomediastinal silhouette is obscured, but likely unchanged. Bibasilar consolidations favor atelectasis. The left upper lobe is obscured. No new focal consolidation, pneumothorax, or right-sided pleural effusion. There is blunting of the left costophrenic angle, which may be due to low lung volumes and/or patient positioning. Osseous structures are diffusely demineralized and grossly unchanged. Left chest cardiac conduction device is again noted. Low lung volumes with likely bibasilar atelectasis versus developing infection. Comment: Please note this report has been produced using speech recognition software and may contain errors related to that system including errors in grammar, punctuation, and spelling, as well as words and phrases that may be inappropriate. If there are any questions or concerns please feel free to contact the dictating provider for clarification.      Electronically signed by Yamil Bhat MD on 2/18/2023 at 1:00 PM

## 2023-02-18 NOTE — PLAN OF CARE
Problem: Discharge Planning  Goal: Discharge to home or other facility with appropriate resources  2/18/2023 0729 by Pete Villalobos RN  Outcome: Progressing  2/18/2023 0229 by Xi Bush RN  Outcome: Progressing  Flowsheets (Taken 2/17/2023 1504 by Marie Schmitz, RN)  Discharge to home or other facility with appropriate resources:   Identify barriers to discharge with patient and caregiver   Arrange for needed discharge resources and transportation as appropriate   Identify discharge learning needs (meds, wound care, etc)   Arrange for interpreters to assist at discharge as needed   Refer to discharge planning if patient needs post-hospital services based on physician order or complex needs related to functional status, cognitive ability or social support system     Problem: Pain  Goal: Verbalizes/displays adequate comfort level or baseline comfort level  2/18/2023 0729 by Pete Villalobos RN  Outcome: Progressing  2/18/2023 0229 by Xi Bush RN  Outcome: Progressing     Problem: Chronic Conditions and Co-morbidities  Goal: Patient's chronic conditions and co-morbidity symptoms are monitored and maintained or improved  2/18/2023 0729 by Pete Villalobos RN  Outcome: Progressing  2/18/2023 0229 by Xi Bush RN  Outcome: Progressing  Flowsheets (Taken 2/17/2023 1504 by Marie Schmitz, RN)  Care Plan - Patient's Chronic Conditions and Co-Morbidity Symptoms are Monitored and Maintained or Improved:   Monitor and assess patient's chronic conditions and comorbid symptoms for stability, deterioration, or improvement   Collaborate with multidisciplinary team to address chronic and comorbid conditions and prevent exacerbation or deterioration   Update acute care plan with appropriate goals if chronic or comorbid symptoms are exacerbated and prevent overall improvement and discharge     Problem: Skin/Tissue Integrity  Goal: Absence of new skin breakdown  Description: 1.   Monitor for areas of redness and/or skin breakdown  2. Assess vascular access sites hourly  3. Every 4-6 hours minimum:  Change oxygen saturation probe site  4. Every 4-6 hours:  If on nasal continuous positive airway pressure, respiratory therapy assess nares and determine need for appliance change or resting period.   2/18/2023 0729 by Ginette Deleon RN  Outcome: Progressing  2/18/2023 0229 by Harpal Smith, RN  Outcome: Progressing     Problem: Safety - Adult  Goal: Free from fall injury  2/18/2023 0729 by Ginette Deleon RN  Outcome: Progressing  2/18/2023 0229 by Harpal Smith, RN  Outcome: Progressing     Problem: Nutrition Deficit:  Goal: Optimize nutritional status  2/18/2023 0729 by Ginette Deleon RN  Outcome: Progressing  2/18/2023 0229 by Harpal Smith, RN  Outcome: Progressing

## 2023-02-19 LAB
ALBUMIN SERPL-MCNC: 3.6 GM/DL (ref 3.4–5)
ALP BLD-CCNC: 133 IU/L (ref 40–128)
ALT SERPL-CCNC: 11 U/L (ref 10–40)
ANION GAP SERPL CALCULATED.3IONS-SCNC: 12 MMOL/L (ref 4–16)
AST SERPL-CCNC: 18 IU/L (ref 15–37)
BASOPHILS ABSOLUTE: 0 K/CU MM
BASOPHILS RELATIVE PERCENT: 0.6 % (ref 0–1)
BILIRUB SERPL-MCNC: 0.3 MG/DL (ref 0–1)
BUN SERPL-MCNC: 17 MG/DL (ref 6–23)
CALCIUM SERPL-MCNC: 8.9 MG/DL (ref 8.3–10.6)
CHLORIDE BLD-SCNC: 93 MMOL/L (ref 99–110)
CO2: 24 MMOL/L (ref 21–32)
CREAT SERPL-MCNC: 1.1 MG/DL (ref 0.6–1.1)
DIFFERENTIAL TYPE: ABNORMAL
EOSINOPHILS ABSOLUTE: 0.1 K/CU MM
EOSINOPHILS RELATIVE PERCENT: 1 % (ref 0–3)
GFR SERPL CREATININE-BSD FRML MDRD: 51 ML/MIN/1.73M2
GLUCOSE SERPL-MCNC: 91 MG/DL (ref 70–99)
HCT VFR BLD CALC: 23.6 % (ref 37–47)
HEMOGLOBIN: 7.9 GM/DL (ref 12.5–16)
IMMATURE NEUTROPHIL %: 1 % (ref 0–0.43)
LYMPHOCYTES ABSOLUTE: 1.1 K/CU MM
LYMPHOCYTES RELATIVE PERCENT: 21 % (ref 24–44)
MAGNESIUM: 2 MG/DL (ref 1.8–2.4)
MCH RBC QN AUTO: 27.1 PG (ref 27–31)
MCHC RBC AUTO-ENTMCNC: 33.5 % (ref 32–36)
MCV RBC AUTO: 81.1 FL (ref 78–100)
MONOCYTES ABSOLUTE: 0.3 K/CU MM
MONOCYTES RELATIVE PERCENT: 6.5 % (ref 0–4)
NUCLEATED RBC %: 0 %
PDW BLD-RTO: 14 % (ref 11.7–14.9)
PHOSPHORUS: 3.7 MG/DL (ref 2.5–4.9)
PLATELET # BLD: 275 K/CU MM (ref 140–440)
PMV BLD AUTO: 8.5 FL (ref 7.5–11.1)
POTASSIUM SERPL-SCNC: 3.6 MMOL/L (ref 3.5–5.1)
PRO-BNP: 2377 PG/ML
RBC # BLD: 2.91 M/CU MM (ref 4.2–5.4)
SEGMENTED NEUTROPHILS ABSOLUTE COUNT: 3.7 K/CU MM
SEGMENTED NEUTROPHILS RELATIVE PERCENT: 69.9 % (ref 36–66)
SODIUM BLD-SCNC: 129 MMOL/L (ref 135–145)
TOTAL IMMATURE NEUTOROPHIL: 0.05 K/CU MM
TOTAL NUCLEATED RBC: 0 K/CU MM
TOTAL PROTEIN: 5.8 GM/DL (ref 6.4–8.2)
TOTAL RETICULOCYTE COUNT: 0.12 K/CU MM
WBC # BLD: 5.3 K/CU MM (ref 4–10.5)

## 2023-02-19 PROCEDURE — 6360000002 HC RX W HCPCS: Performed by: INTERNAL MEDICINE

## 2023-02-19 PROCEDURE — 6370000000 HC RX 637 (ALT 250 FOR IP): Performed by: INTERNAL MEDICINE

## 2023-02-19 PROCEDURE — 36592 COLLECT BLOOD FROM PICC: CPT

## 2023-02-19 PROCEDURE — 94761 N-INVAS EAR/PLS OXIMETRY MLT: CPT

## 2023-02-19 PROCEDURE — 83735 ASSAY OF MAGNESIUM: CPT

## 2023-02-19 PROCEDURE — 94150 VITAL CAPACITY TEST: CPT

## 2023-02-19 PROCEDURE — 94664 DEMO&/EVAL PT USE INHALER: CPT

## 2023-02-19 PROCEDURE — 2500000003 HC RX 250 WO HCPCS: Performed by: NURSE PRACTITIONER

## 2023-02-19 PROCEDURE — 6370000000 HC RX 637 (ALT 250 FOR IP): Performed by: NURSE PRACTITIONER

## 2023-02-19 PROCEDURE — 85025 COMPLETE CBC W/AUTO DIFF WBC: CPT

## 2023-02-19 PROCEDURE — 83880 ASSAY OF NATRIURETIC PEPTIDE: CPT

## 2023-02-19 PROCEDURE — 6370000000 HC RX 637 (ALT 250 FOR IP): Performed by: SURGERY

## 2023-02-19 PROCEDURE — 84100 ASSAY OF PHOSPHORUS: CPT

## 2023-02-19 PROCEDURE — C9113 INJ PANTOPRAZOLE SODIUM, VIA: HCPCS | Performed by: STUDENT IN AN ORGANIZED HEALTH CARE EDUCATION/TRAINING PROGRAM

## 2023-02-19 PROCEDURE — 6370000000 HC RX 637 (ALT 250 FOR IP): Performed by: PHYSICIAN ASSISTANT

## 2023-02-19 PROCEDURE — 2140000000 HC CCU INTERMEDIATE R&B

## 2023-02-19 PROCEDURE — 2580000003 HC RX 258: Performed by: INTERNAL MEDICINE

## 2023-02-19 PROCEDURE — 2580000003 HC RX 258: Performed by: NURSE PRACTITIONER

## 2023-02-19 PROCEDURE — 80053 COMPREHEN METABOLIC PANEL: CPT

## 2023-02-19 PROCEDURE — 6360000002 HC RX W HCPCS: Performed by: STUDENT IN AN ORGANIZED HEALTH CARE EDUCATION/TRAINING PROGRAM

## 2023-02-19 RX ORDER — POTASSIUM CHLORIDE 20 MEQ/1
20 TABLET, EXTENDED RELEASE ORAL 2 TIMES DAILY WITH MEALS
Status: COMPLETED | OUTPATIENT
Start: 2023-02-19 | End: 2023-02-20

## 2023-02-19 RX ADMIN — METOPROLOL TARTRATE 25 MG: 50 TABLET, FILM COATED ORAL at 21:34

## 2023-02-19 RX ADMIN — GUAIFENESIN 300 MG: 100 SOLUTION ORAL at 04:09

## 2023-02-19 RX ADMIN — THIAMINE HYDROCHLORIDE 100 MG: 100 INJECTION, SOLUTION INTRAMUSCULAR; INTRAVENOUS at 08:50

## 2023-02-19 RX ADMIN — SODIUM CHLORIDE, PRESERVATIVE FREE 10 ML: 5 INJECTION INTRAVENOUS at 08:50

## 2023-02-19 RX ADMIN — SULFAMETHOXAZOLE AND TRIMETHOPRIM 1 TABLET: 400; 80 TABLET ORAL at 08:49

## 2023-02-19 RX ADMIN — SODIUM CHLORIDE, PRESERVATIVE FREE 10 ML: 5 INJECTION INTRAVENOUS at 21:38

## 2023-02-19 RX ADMIN — DOCUSATE SODIUM 100 MG: 100 CAPSULE, LIQUID FILLED ORAL at 21:38

## 2023-02-19 RX ADMIN — GUAIFENESIN 300 MG: 100 SOLUTION ORAL at 22:12

## 2023-02-19 RX ADMIN — HEPARIN SODIUM 5000 UNITS: 5000 INJECTION INTRAVENOUS; SUBCUTANEOUS at 12:00

## 2023-02-19 RX ADMIN — GUAIFENESIN 300 MG: 100 SOLUTION ORAL at 11:59

## 2023-02-19 RX ADMIN — ROSUVASTATIN CALCIUM 10 MG: 5 TABLET, COATED ORAL at 17:38

## 2023-02-19 RX ADMIN — GUAIFENESIN 300 MG: 100 SOLUTION ORAL at 17:38

## 2023-02-19 RX ADMIN — MEMANTINE HYDROCHLORIDE 5 MG: 5 TABLET ORAL at 08:49

## 2023-02-19 RX ADMIN — MEMANTINE HYDROCHLORIDE 5 MG: 5 TABLET ORAL at 21:34

## 2023-02-19 RX ADMIN — POTASSIUM CHLORIDE 20 MEQ: 1500 TABLET, EXTENDED RELEASE ORAL at 17:38

## 2023-02-19 RX ADMIN — SULFAMETHOXAZOLE AND TRIMETHOPRIM 1 TABLET: 400; 80 TABLET ORAL at 21:38

## 2023-02-19 RX ADMIN — METOPROLOL TARTRATE 25 MG: 50 TABLET, FILM COATED ORAL at 08:50

## 2023-02-19 RX ADMIN — HEPARIN SODIUM 5000 UNITS: 5000 INJECTION INTRAVENOUS; SUBCUTANEOUS at 04:08

## 2023-02-19 RX ADMIN — POTASSIUM CHLORIDE 20 MEQ: 1500 TABLET, EXTENDED RELEASE ORAL at 12:00

## 2023-02-19 RX ADMIN — Medication 3 MG: at 21:33

## 2023-02-19 RX ADMIN — SODIUM CHLORIDE, PRESERVATIVE FREE 10 ML: 5 INJECTION INTRAVENOUS at 21:33

## 2023-02-19 RX ADMIN — POLYETHYLENE GLYCOL (3350) 17 G: 17 POWDER, FOR SOLUTION ORAL at 08:49

## 2023-02-19 RX ADMIN — PANTOPRAZOLE SODIUM 40 MG: 40 INJECTION, POWDER, LYOPHILIZED, FOR SOLUTION INTRAVENOUS at 08:50

## 2023-02-19 RX ADMIN — DOCUSATE SODIUM 100 MG: 100 CAPSULE, LIQUID FILLED ORAL at 08:49

## 2023-02-19 RX ADMIN — DONEPEZIL HYDROCHLORIDE 10 MG: 10 TABLET ORAL at 21:33

## 2023-02-19 RX ADMIN — HEPARIN SODIUM 5000 UNITS: 5000 INJECTION INTRAVENOUS; SUBCUTANEOUS at 21:33

## 2023-02-19 RX ADMIN — SODIUM CHLORIDE, PRESERVATIVE FREE 10 ML: 5 INJECTION INTRAVENOUS at 08:49

## 2023-02-19 ASSESSMENT — ENCOUNTER SYMPTOMS
BLOOD IN STOOL: 0
VOMITING: 0
STRIDOR: 0
CONSTIPATION: 0
CHOKING: 0
NAUSEA: 0
SHORTNESS OF BREATH: 0
COUGH: 0
DIARRHEA: 0
WHEEZING: 0
ABDOMINAL DISTENTION: 0
BACK PAIN: 0
CHEST TIGHTNESS: 0
EYE PAIN: 0
ABDOMINAL PAIN: 0

## 2023-02-19 NOTE — PROGRESS NOTES
Nephrology Progress Note  2/19/2023 9:33 AM        Subjective:   Admit Date: 1/30/2023  PCP: Niranjan Costello MD    Interval History: Patient seen earlier today, this is a late entry  She is more awake and interactive this morning    Diet: Reported eating some    ROS: No overt distress or shortness of breath  Urine output recorded 1.1 L for the last 24 hours and this is without diuretics  No fever and acceptable blood pressure    Data:     Current meds:    sulfamethoxazole-trimethoprim  1 tablet Oral 2 times per day    [Held by provider] furosemide  20 mg IntraVENous BID    guaiFENesin  300 mg Oral 4 times per day    sodium chloride flush  5-40 mL IntraVENous BID    thiamine  100 mg IntraVENous Daily    melatonin  3 mg Oral Nightly    heparin (porcine)  5,000 Units SubCUTAneous 3 times per day    memantine  5 mg Oral BID    rosuvastatin  10 mg Oral QPM    lidocaine PF  5 mL IntraDERmal Once    sodium chloride flush  5-40 mL IntraVENous 2 times per day    pantoprazole  40 mg IntraVENous Daily    polyethylene glycol  17 g Oral Daily    sodium chloride flush  5-40 mL IntraVENous 2 times per day    docusate sodium  100 mg Oral BID    donepezil  10 mg Oral Nightly    metoprolol tartrate  25 mg Oral BID      sodium chloride 12.5 mL/hr at 02/04/23 0324    sodium chloride Stopped (02/08/23 1146)         I/O last 3 completed shifts:   In: 400 [P.O.:360; I.V.:40]  Out: 1670 [Urine:1500; Drains:170]    CBC:   Recent Labs     02/19/23  0634   WBC 5.3   HGB 7.9*             Recent Labs     02/17/23  0330 02/18/23  0502 02/19/23  0634   * 127* 129*   K 3.8 3.7 3.6   CL 90* 91* 93*   CO2 25 23 24   BUN 19 18 17   CREATININE 1.3* 1.2* 1.1   GLUCOSE 90 94 91       Lab Results   Component Value Date    CALCIUM 8.9 02/19/2023    PHOS 3.7 02/19/2023       Objective:     Vitals: BP (!) 147/58   Pulse 61   Temp 97.8 °F (36.6 °C) (Oral)   Resp 19   Ht 5' 5\" (1.651 m)   Wt 166 lb 14.2 oz (75.7 kg)   SpO2 97%   BMI 27.77 kg/m² ,    General appearance: Thin, alert and awake and more interactive  HEENT: Positive conjunctival pallor no scleral icterus  Neck: Seems supple  Lungs: Still has adventitious breath sound  Heart: Seems regular rate and rhythm, well-healed incision from previous sternotomy, left chest wall implanted cardiac device  Abdomen: Soft, AVRIL drain x2  Extremities: Trace ankle edema      Problem List :         Impression :     Stage III acute kidney disease-creatinine better good urine output  Hyponatremia also improving without diuretics  Recent acute decompensated heart failure and fluid overload, complicated by cholecystitis and surgery -as well as underlying atherosclerotic cardiovascular disease and ischemia    Recommendation/Plan  :     I will not restart diuretics now-p.o. food and fluid-chest x-ray looked better yesterday-enhance nutrition-from kidney standpoint she can be discharged-2 doses of oral potassium chloride to get it close to 4-follow clinically, she remains at high risk for iatrogenic nosocomial complication      Iron Duran MD MD

## 2023-02-19 NOTE — CONSULTS
Consult completed. White lumen does not draw blood and is harder to flush but this is due to positioning. The pink lumen draws blood and flushes. Lab draw. [place], primary RN notified.

## 2023-02-19 NOTE — PROGRESS NOTES
PICC line was not drawing blood after flushes both lumen. Informed lab personnel and changed specimen collection to lab collect.

## 2023-02-19 NOTE — PROGRESS NOTES
V2.0  [facility] Hospitalist Progress Note      Name:  Earl Moreno /Age/Sex: 1945  (76 y.o. female)   MRN & CSN:  [id number] & [id number] Encounter Date/Time: 2023 3:29 PM EST    Location:  [address] [place] [address] PCP: Jignesh Hewitt MD       Hospital Day: 21    Assessment and Plan:   Earl Moreno is a 76 y.o. female with pmh of Earl Moreno is a 76 y.o. female with pmh of CAD s/p CABG, Valvular Heart Disease, s/p Mitral Valve Repair, PFO Closure, Paroxysmal A-Fib, HTN, HLD, DM, TIA, Early Dementia who presents with Chest pain Rt Sided and RUQ Abdominal Pain      Plan:    Sepsis 2/2 likely ESBL UTI without hematuria, Pneumonia, acute cholecystitis  Cholecystitis s/p lap cholecystectomy  Large post-cholecystectomy fluid collection, concern for infection  Patient with potential abnormality as well she did have acute solid cholecystitis status post lap pedro 2023. High risk for postoperative infection requiring MRSA pneumonia coverage. WBC 12, SBP 92, RR 22 initially. Procal trending down 10.51 -> 5.09 -> 0.4 5 -> 0.49 -> 0.32  Urine growing ESBL UTI. MRSA Nares +ve  Patient was doing relatively well, but a day after drain removal patient started to get more altered and intermittently complained of the right quadrant pain. Obtained CT abd pelvis with IV contrast - shows large post cholecsytectomy abscesses. The largest axial dimension is 9 x 11.3 cm and the largest coronal dimension is 8.4 x 10 cm. A 2nd collection can be seen measuring 5.9 x 5.7 cm posteriorly which may communicate with the larger collection. - s/p drain placement x 2 -cultures sent today  Patient still has bilious output in the drain, Hida done, did not show billiary leak  General surgery team - following closely again.   Consulted ID - changed abx to zosyn and bactrim   Fluid culture also growing E.coli - follow sensitivity; given the history of ESBL this has risk of being ESBL too - page ID regarding antibiotics if ESBL or if patient's status worsens  Continue to monitor  Plan to do IV abx until after a week of removing the drains  Trend CRP and Pct   Drains can be removed at the time of discharge, 7 days of antibiotics after that    Aspiration pneumonia  Possibly due to recumbent position   Speech and swallow consulted   Modified barium swallow done showed regurgitation   Patient has significant post meal cough     Acute Encephalopathy 2/2 likely Metabolic and Septic 2/2 hypoxia and ESBL UTI, Iatrogenic 2/2 medication   Was difficult to wake on 2/3/2023  Gabapentin on hold per nephro - agree with it    Cholecystitis s/p lap cholecystectomy   Patient underwent lap pedro on 1/31/2023 with Dr. Kandy Ruth. AVRIL drain removed 2/7/2023  GS on board   Abx for 7 days post drain removal   Change to oral at discharge    Acute hypoxic respiratory failure, resolved  Patient with acute respiratory failure was on nasal cannula. Became acutely more short of breath. CT did show what appears to be either fluid overload or viral pneumonia. 25% at 25L/min Heated high flow --> weaned off O2      TAYLOR with hyperkalemia  Pt with Cr 2 on admission, trended down to 1.5 today. baseline Cr 0.7. Kayexalate ordered  Nephrology on board    Hypochloremic Hyponatremia  Patient with acute worsening hyponatremia to 121 and now back up to 135  Nephrology on board    Acute on chronic HFpEF  Patient with an EF 50% with hypokinetic inferio-lateral wall and basal anterio-septal carson the 45 (become acutely short of breath with progressive pulmonary edema. Pro-BNP trending up to 24,733  Cardiology on board  Nephrology on board  On aldactone and amiloride    Hypokalemia   K 3.9    CAD s/p CABG  Pacer in-situ  VHD  Patient is also known valvular heart disease. Cardiology following  Discussed with cardiology about interrogating pacer. Acute Normocytic Anemia 2/2 possibly Post-operative loss and hemodilution 2/2 fluid overload   Hb was 11 on 1/31/2023. 1U PRBC this admission.  Hb 8.3 today. No signs of overt bleeding     Diet ADULT ORAL NUTRITION SUPPLEMENT; Dinner; Frozen Oral Supplement  ADULT ORAL NUTRITION SUPPLEMENT; Breakfast, Dinner; Clear Liquid Oral Supplement  ADULT ORAL NUTRITION SUPPLEMENT; Lunch; Standard High Calorie/High Protein Oral Supplement  ADULT DIET; Dysphagia - Minced and Moist; Mildly Thick (Nectar)   DVT Prophylaxis [] Lovenox, [x]  Heparin, [] SCDs, [] Ambulation,    [] Eliquis, [] Xarelto  [] Coumadin [] other   Code Status DNR-CCA   Disposition From: Home  Expected Disposition: [place]  Estimated Date of Discharge: TBD     Surrogate Decision Maker/ POA      Subjective:     Chief Complaint: Chest Pain (X 3 days )     Patient seen at bedside, she had a different sister at bedside, she has clinically improved, anticipate d/c tomorrow once the arya drains have been removed    Review of Systems:    Review of Systems   Constitutional:  Positive for fatigue. Negative for chills, fever and unexpected weight change. Eyes:  Negative for pain and visual disturbance. Respiratory:  Negative for cough, choking, chest tightness, shortness of breath, wheezing and stridor. Cardiovascular:  Negative for chest pain, palpitations and leg swelling. Gastrointestinal:  Negative for abdominal distention, abdominal pain, blood in stool, constipation, diarrhea, nausea and vomiting. Genitourinary:  Negative for decreased urine volume, dysuria, frequency, hematuria and urgency. Musculoskeletal:  Negative for arthralgias, back pain and myalgias. Skin:  Negative for pallor and rash. Neurological:  Negative for dizziness, tremors, syncope, speech difficulty, weakness, light-headedness, numbness and headaches. Psychiatric/Behavioral:  Negative for agitation. Objective:      Intake/Output Summary (Last 24 hours) at 2/19/2023 1633  Last data filed at 2/19/2023 2629  Gross per 24 hour   Intake 240 ml   Output 1190 ml   Net -950 ml          Vitals:   Vitals:    02/19/23 1221   BP: 137/89   Pulse:    Resp:    Temp: 97.8 °F (36.6 °C)   SpO2: 96%       Physical Exam:     Physical Exam  Vitals reviewed. Constitutional:       General: She is awake. She is not in acute distress. Appearance: Normal appearance. She is overweight. She is not ill-appearing. Interventions: Nasal cannula in place. Comments: Heated high flow    HENT:      Head: Normocephalic and atraumatic. Mouth/Throat:      Mouth: Mucous membranes are moist.      Pharynx: Oropharynx is clear. Eyes:      Extraocular Movements: Extraocular movements intact. Conjunctiva/sclera: Conjunctivae normal.      Pupils: Pupils are equal, round, and reactive to light. Cardiovascular:      Rate and Rhythm: Normal rate. Rhythm irregularly irregular. Pulses: Normal pulses. Heart sounds: Normal heart sounds. Comments: Pacer is not capturing all beats - pacer spikes are all over the place  Pulmonary:      Effort: Pulmonary effort is normal. Tachypnea present. Breath sounds: Decreased air movement and transmitted upper airway sounds present. No wheezing, rhonchi or rales. Abdominal:      General: Abdomen is protuberant. Bowel sounds are normal.      Palpations: Abdomen is soft. Musculoskeletal:         General: No swelling. Normal range of motion. Cervical back: Normal range of motion and neck supple. Skin:     General: Skin is warm and dry. Neurological:      Mental Status: She is oriented to person, place, and time. She is lethargic. Comments: Oriented to year, not month   Psychiatric:         Behavior: Behavior is cooperative.        Medications:   Medications:    potassium chloride  20 mEq Oral BID WC    sulfamethoxazole-trimethoprim  1 tablet Oral 2 times per day    [Held by provider] furosemide  20 mg IntraVENous BID    guaiFENesin  300 mg Oral 4 times per day    sodium chloride flush  5-40 mL IntraVENous BID    thiamine  100 mg IntraVENous Daily    melatonin  3 mg Oral Nightly heparin (porcine)  5,000 Units SubCUTAneous 3 times per day    memantine  5 mg Oral BID    rosuvastatin  10 mg Oral QPM    lidocaine PF  5 mL IntraDERmal Once    sodium chloride flush  5-40 mL IntraVENous 2 times per day    pantoprazole  40 mg IntraVENous Daily    polyethylene glycol  17 g Oral Daily    sodium chloride flush  5-40 mL IntraVENous 2 times per day    docusate sodium  100 mg Oral BID    donepezil  10 mg Oral Nightly    metoprolol tartrate  25 mg Oral BID      Infusions:    sodium chloride 12.5 mL/hr at 02/04/23 0324    sodium chloride Stopped (02/08/23 1146)     PRN Meds: HYDROcodone-acetaminophen, 1 tablet, Q6H PRN  HYDROmorphone, 0.25 mg, Q3H PRN   Or  HYDROmorphone, 0.5 mg, Q3H PRN  sodium chloride flush, 5-40 mL, PRN  sodium chloride, 500 mL, PRN  sodium chloride flush, 5-40 mL, PRN  sodium chloride, , PRN  ondansetron, 4 mg, Q8H PRN   Or  ondansetron, 4 mg, Q6H PRN  acetaminophen, 650 mg, Q6H PRN   Or  acetaminophen, 650 mg, Q6H PRN  ipratropium, 1 spray, PRN      Labs      Recent Results (from the past 24 hour(s))   Brain Natriuretic Peptide    Collection Time: 02/19/23  6:34 AM   Result Value Ref Range    Pro-BNP 2,377 (H) <300 PG/ML   Comprehensive Metabolic Panel    Collection Time: 02/19/23  6:34 AM   Result Value Ref Range    Sodium 129 (L) 135 - 145 MMOL/L    Potassium 3.6 3.5 - 5.1 MMOL/L    Chloride 93 (L) 99 - 110 mMol/L    CO2 24 21 - 32 MMOL/L    BUN 17 6 - 23 MG/DL    Creatinine 1.1 0.6 - 1.1 MG/DL    Est, Glom Filt Rate 51 (L) >60 mL/min/1.73m2    Glucose 91 70 - 99 MG/DL    Calcium 8.9 8.3 - 10.6 MG/DL    Albumin 3.6 3.4 - 5.0 GM/DL    Total Protein 5.8 (L) 6.4 - 8.2 GM/DL    Total Bilirubin 0.3 0.0 - 1.0 MG/DL    ALT 11 10 - 40 U/L    AST 18 15 - 37 IU/L    Alkaline Phosphatase 133 (H) 40 - 128 IU/L    Anion Gap 12 4 - 16   Magnesium    Collection Time: 02/19/23  6:34 AM   Result Value Ref Range    Magnesium 2.0 1.8 - 2.4 mg/dl   Phosphorus    Collection Time: 02/19/23  6:34 AM Result Value Ref Range    Phosphorus 3.7 2.5 - 4.9 MG/DL   CBC with Auto Differential    Collection Time: 02/19/23  6:34 AM   Result Value Ref Range    WBC 5.3 4.0 - 10.5 K/CU MM    RBC 2.91 (L) 4.2 - 5.4 M/CU MM    Hemoglobin 7.9 (L) 12.5 - 16.0 GM/DL    Hematocrit 23.6 (L) 37 - 47 %    MCV 81.1 78 - 100 FL    MCH 27.1 27 - 31 PG    MCHC 33.5 32.0 - 36.0 %    RDW 14.0 11.7 - 14.9 %    Platelets 232 097 - 355 K/CU MM    MPV 8.5 7.5 - 11.1 FL    Differential Type AUTOMATED DIFFERENTIAL     Segs Relative 69.9 (H) 36 - 66 %    Lymphocytes % 21.0 (L) 24 - 44 %    Monocytes % 6.5 (H) 0 - 4 %    Eosinophils % 1.0 0 - 3 %    Basophils % 0.6 0 - 1 %    Segs Absolute 3.7 K/CU MM    Lymphocytes Absolute 1.1 K/CU MM    Monocytes Absolute 0.3 K/CU MM    Eosinophils Absolute 0.1 K/CU MM    Basophils Absolute 0.0 K/CU MM    Nucleated RBC % 0.0 %    Total Nucleated RBC 0.0 K/CU MM    TRC 0.1167 K/CU MM    Total Immature Neutrophil 0.05 K/CU MM    Immature Neutrophil % 1.0 (H) 0 - 0.43 %        Imaging/Diagnostics Last 24 Hours   XR CHEST PORTABLE    Result Date: 2/2/2023  EXAMINATION: ONE XRAY VIEW OF THE CHEST 2/2/2023 1:52 pm COMPARISON: 02/02/2023, 01/30/2023 HISTORY: ORDERING SYSTEM PROVIDED HISTORY: shortness of breath TECHNOLOGIST PROVIDED HISTORY: Reason for exam:->shortness of breath Reason for Exam: shortness of breath FINDINGS: Sternotomy wires. Prosthetic cardiac valve. Pacemaker wires. Lung volumes. Bibasilar opacities. No pneumothorax. Heart size is stable. Low lung volumes with bibasilar opacities which may represent atelectasis or pneumonia.      XR CHEST PORTABLE    Result Date: 2/2/2023  EXAMINATION: ONE X-RAY VIEW OF THE CHEST 2/2/2023 10:49 am COMPARISON: CT chest 01/30/2023, chest radiograph 01/30/2023 HISTORY: ORDERING SYSTEM PROVIDED HISTORY: Shortness of breath TECHNOLOGIST PROVIDED HISTORY: Reason for exam:->Shortness of breath Reason for Exam: shortness of breath FINDINGS: The lungs are underinflated, resulting in vascular crowding and subsegmental atelectasis. The cardiomediastinal silhouette is obscured, but likely unchanged. Bibasilar consolidations favor atelectasis. The left upper lobe is obscured. No new focal consolidation, pneumothorax, or right-sided pleural effusion. There is blunting of the left costophrenic angle, which may be due to low lung volumes and/or patient positioning. Osseous structures are diffusely demineralized and grossly unchanged. Left chest cardiac conduction device is again noted. Low lung volumes with likely bibasilar atelectasis versus developing infection. Comment: Please note this report has been produced using speech recognition software and may contain errors related to that system including errors in grammar, punctuation, and spelling, as well as words and phrases that may be inappropriate. If there are any questions or concerns please feel free to contact the dictating provider for clarification.      Electronically signed by Bart Solo MD on 2/19/2023 at 4:33 PM

## 2023-02-19 NOTE — PROGRESS NOTES
Patient instructed and educated on [employer]. Patient able to do 500 ml. Vital capacity. Patient's goal is 2000ml.  Electronically signed by Vivia Hatchet, RCP on 2/19/2023 at 12:01 PM

## 2023-02-20 VITALS
BODY MASS INDEX: 27.81 KG/M2 | SYSTOLIC BLOOD PRESSURE: 126 MMHG | WEIGHT: 166.89 LBS | OXYGEN SATURATION: 100 % | RESPIRATION RATE: 17 BRPM | HEART RATE: 61 BPM | DIASTOLIC BLOOD PRESSURE: 85 MMHG | TEMPERATURE: 98 F | HEIGHT: 65 IN

## 2023-02-20 PROCEDURE — 6360000002 HC RX W HCPCS: Performed by: INTERNAL MEDICINE

## 2023-02-20 PROCEDURE — 6360000002 HC RX W HCPCS: Performed by: STUDENT IN AN ORGANIZED HEALTH CARE EDUCATION/TRAINING PROGRAM

## 2023-02-20 PROCEDURE — 99232 SBSQ HOSP IP/OBS MODERATE 35: CPT | Performed by: NURSE PRACTITIONER

## 2023-02-20 PROCEDURE — 2580000003 HC RX 258: Performed by: INTERNAL MEDICINE

## 2023-02-20 PROCEDURE — C9113 INJ PANTOPRAZOLE SODIUM, VIA: HCPCS | Performed by: STUDENT IN AN ORGANIZED HEALTH CARE EDUCATION/TRAINING PROGRAM

## 2023-02-20 PROCEDURE — 36592 COLLECT BLOOD FROM PICC: CPT

## 2023-02-20 PROCEDURE — 2580000003 HC RX 258: Performed by: STUDENT IN AN ORGANIZED HEALTH CARE EDUCATION/TRAINING PROGRAM

## 2023-02-20 PROCEDURE — 6370000000 HC RX 637 (ALT 250 FOR IP): Performed by: NURSE PRACTITIONER

## 2023-02-20 PROCEDURE — 6370000000 HC RX 637 (ALT 250 FOR IP): Performed by: PHYSICIAN ASSISTANT

## 2023-02-20 PROCEDURE — 97110 THERAPEUTIC EXERCISES: CPT

## 2023-02-20 PROCEDURE — 6370000000 HC RX 637 (ALT 250 FOR IP): Performed by: INTERNAL MEDICINE

## 2023-02-20 PROCEDURE — 92526 ORAL FUNCTION THERAPY: CPT

## 2023-02-20 PROCEDURE — 2500000003 HC RX 250 WO HCPCS: Performed by: NURSE PRACTITIONER

## 2023-02-20 PROCEDURE — 80053 COMPREHEN METABOLIC PANEL: CPT

## 2023-02-20 PROCEDURE — 6370000000 HC RX 637 (ALT 250 FOR IP): Performed by: SURGERY

## 2023-02-20 RX ORDER — SULFAMETHOXAZOLE AND TRIMETHOPRIM 400; 80 MG/1; MG/1
1 TABLET ORAL EVERY 12 HOURS SCHEDULED
Qty: 8 TABLET | Refills: 0 | Status: SHIPPED | OUTPATIENT
Start: 2023-02-20 | End: 2023-02-24

## 2023-02-20 RX ORDER — PIPERACILLIN SODIUM, TAZOBACTAM SODIUM 3; .375 G/15ML; G/15ML
3375 INJECTION, POWDER, LYOPHILIZED, FOR SOLUTION INTRAVENOUS EVERY 6 HOURS
Qty: 16 EACH | Refills: 0 | Status: SHIPPED | OUTPATIENT
Start: 2023-02-20 | End: 2023-02-24

## 2023-02-20 RX ORDER — ONDANSETRON 4 MG/1
4 TABLET, ORALLY DISINTEGRATING ORAL EVERY 8 HOURS PRN
Qty: 20 TABLET | Refills: 0 | Status: SHIPPED | OUTPATIENT
Start: 2023-02-20

## 2023-02-20 RX ORDER — GUAIFENESIN 200 MG/10ML
300 LIQUID ORAL 3 TIMES DAILY PRN
Qty: 355 ML | Refills: 0 | Status: SHIPPED | OUTPATIENT
Start: 2023-02-20 | End: 2023-02-20 | Stop reason: SDUPTHER

## 2023-02-20 RX ORDER — GUAIFENESIN 200 MG/10ML
300 LIQUID ORAL 3 TIMES DAILY PRN
Qty: 355 ML | Refills: 0 | Status: SHIPPED | OUTPATIENT
Start: 2023-02-20

## 2023-02-20 RX ORDER — THIAMINE HYDROCHLORIDE 100 MG/ML
100 INJECTION, SOLUTION INTRAMUSCULAR; INTRAVENOUS DAILY
Qty: 1 EACH | Refills: 0 | Status: SHIPPED | OUTPATIENT
Start: 2023-02-21

## 2023-02-20 RX ORDER — PIPERACILLIN SODIUM, TAZOBACTAM SODIUM 3; .375 G/15ML; G/15ML
3375 INJECTION, POWDER, LYOPHILIZED, FOR SOLUTION INTRAVENOUS EVERY 6 HOURS
Qty: 16 EACH | Refills: 0 | Status: SHIPPED | OUTPATIENT
Start: 2023-02-20 | End: 2023-02-20 | Stop reason: SDUPTHER

## 2023-02-20 RX ADMIN — MEMANTINE HYDROCHLORIDE 5 MG: 5 TABLET ORAL at 09:15

## 2023-02-20 RX ADMIN — POTASSIUM CHLORIDE 20 MEQ: 1500 TABLET, EXTENDED RELEASE ORAL at 09:16

## 2023-02-20 RX ADMIN — PANTOPRAZOLE SODIUM 40 MG: 40 INJECTION, POWDER, LYOPHILIZED, FOR SOLUTION INTRAVENOUS at 09:15

## 2023-02-20 RX ADMIN — SULFAMETHOXAZOLE AND TRIMETHOPRIM 1 TABLET: 400; 80 TABLET ORAL at 09:16

## 2023-02-20 RX ADMIN — DOCUSATE SODIUM 100 MG: 100 CAPSULE, LIQUID FILLED ORAL at 09:16

## 2023-02-20 RX ADMIN — SODIUM CHLORIDE, PRESERVATIVE FREE 10 ML: 5 INJECTION INTRAVENOUS at 09:16

## 2023-02-20 RX ADMIN — GUAIFENESIN 300 MG: 100 SOLUTION ORAL at 05:11

## 2023-02-20 RX ADMIN — GUAIFENESIN 300 MG: 100 SOLUTION ORAL at 13:30

## 2023-02-20 RX ADMIN — THIAMINE HYDROCHLORIDE 100 MG: 100 INJECTION, SOLUTION INTRAMUSCULAR; INTRAVENOUS at 09:15

## 2023-02-20 RX ADMIN — HEPARIN SODIUM 5000 UNITS: 5000 INJECTION INTRAVENOUS; SUBCUTANEOUS at 05:11

## 2023-02-20 RX ADMIN — PIPERACILLIN AND TAZOBACTAM 3375 MG: 3; .375 INJECTION, POWDER, LYOPHILIZED, FOR SOLUTION INTRAVENOUS at 13:39

## 2023-02-20 RX ADMIN — METOPROLOL TARTRATE 25 MG: 50 TABLET, FILM COATED ORAL at 09:15

## 2023-02-20 RX ADMIN — HEPARIN SODIUM 5000 UNITS: 5000 INJECTION INTRAVENOUS; SUBCUTANEOUS at 13:30

## 2023-02-20 RX ADMIN — POLYETHYLENE GLYCOL (3350) 17 G: 17 POWDER, FOR SOLUTION ORAL at 09:15

## 2023-02-20 NOTE — PROGRESS NOTES
Occupational Therapy  . Occupational Therapy Treatment Note    Name: Duog Miles MRN: [id number] :   1945   Date:  2023   Admission Date: 2023 Room:  3128/3128-A     Primary Problem:   The primary encounter diagnosis was Chest pain, unspecified type. Diagnoses of Right sided abdominal pain and Cholecystitis were also pertinent to this visit. Restrictions/Precautions:          General Precautions; Fall Risk; [id number] [address]    Communication with other providers: Per chart review, patient is appropriate for therapeutic intervention. Nurse Theo Hairston. Notified Nurse Jorge Souza that pt's lunch had arrived, per whiteboard note pt is a total feed and has dietary precautions listed. Subjective:  Patient states:  Pt agreeable to OT Tx sessoin. Pain:   Location, Type, Intensity (0/10 to 10/10):  0/10, denies pain    Objective:    Observation: [facility], able to identify current year c cues. Actively participates c singular focus on one task at a time. Treatment, including education:  Therapeutic Exercise:  Cues were given for technique, safety, recruitment, and rationale. Cues were verbal and/or tactile. Pt Sup + vc's for technique to perform BUE AROM exercises to include: shoulder flex/extension, internal/external rotation, chest presses, bicep curls, rowing, and supination/pronation x10 reps each and intermittent Min A to SBA for BLE hip ab/adduction, knee flexion / extension and B ankle pumps x5-10 reps each. Pt educated for rationale for therapeutic intervention and performance of ROM exercises, including to perform as  instructed and as able outside of Tx session per tolerance. Pt verbalized understanding. All therapeutic intervention performed c emphasis on therapeutic exercises to inc strength, endurance and act tolerance and in preparation  for inc Indep c ADL tasks, func transfers / mobility.      Safety  Patient safely in bed + alarm set end of session, with call light/phone in reach, and nursing aware. Pt positioned into upright position at end of Tx session in anticipation of total feed lunch. Sister and patient both verbalized understanding for sitting upright 30 minutes s/p eating per whiteboard notes. Notified nurse tech of lunch arrial.    Assessment / Impression:    Patient's tolerance of treatment: Well  Adverse Reaction: None  Significant change in status and impact: Active participation, appears more alert and engaged  Barriers to improvement: None noted      Plan for Next Session:    Continue per OT POC c plan to address sitting balance at EOB and potential transfer training.     Time in:  1157  Time out:  1211  Timed treatment minutes:  14  Total treatment time:  14      Electronically signed by:    ESTEFANY Brower  2/20/2023, 1:00 PM    Goals:  Time frame for goals: 2 weeks     Pt will complete grooming tasks with SetupA at EOB   Pt will complete toileting tasks with MaxA using BSC  Pt will complete UB dressing tasks with ModA seated EOB   Pt will complete LB dressing tasks with MaxA seated EOB   Pt will complete UB bathing tasks with ModA seated and AE PRN   Pt will complete LB bathing tasks with ModA seated and AE PRN   Pt will complete therapeutic exercise/activity to increase independence in ADL/IADL function  Pt will practice functional transfers and mobility with AD for increased safety and independence

## 2023-02-20 NOTE — PROGRESS NOTES
Infectious Disease Progress Note  2023   Patient Name: Gerald Almodovar : 1945   Impression  Sepsis Secondary to ESBL E.coli Postop Intra-Abdominal Abscess and:  ESBL E.coli Complicated UTI:  MRSA Screen Positive:  Acute Metabolic Encephalopathy: Resolved  Afebrile with no leukocytosis, CRP and Pct on DWT. Patient appears improved. CrCl 36  /-BC 0/2 NGTD  -BC 0/2-NGTD  -MRSA Screen Positive (de-colonized)  -Urine culture: ESBL E.coli  -S/p per Dr. Yarelis Torres: Marcus Slater. DX:  acute on chronic cholecystitis. Hydrops. -CT A&P W IV Contrast: 1. Large post cholecystectomy abscess which extends from the gallbladder   fossa to the subhepatic region and medial and posterior to the liver. The   largest axial dimension is 9 x 11.3 cm and the largest coronal dimension is   8.4 x 10 cm. A 2nd collection can be seen measuring 5.9 x 5.7 cm posteriorly   which may communicate with the larger collection. 2. Constipation and stool impaction in the rectum. 3. Degenerated calcified fibroids. No other acute abnormality. Findings discussed with Dr. Ethel Helms on 2023 at 3:20 p.m.    -S/p per IR: CT Drainage of abscess:  removed 9.5 cc old bloody fluid. AVRIL intact.  Cultures: ESBL E.coli  TA[facility]:  Dr. Gideon Mary onboard  DMII:  CAD and VHD s/p CABG with MVR 2018/ HTN/ HLD/ PFO Closure/ PAF with PPM 2018:  Dr. Rashard Welsh onboard for EP  Dr. Elizabeth Barr onboard for cardiology      S/p TIA, Wheel Chair Bound:  Multi-morbidity: per PMHx:  anxiety     Plan:  Continue IV Zosyn 3,375 mg q8h (end date 23) assuming AVRIL drains will be removed in general surgery office at follow up  Continue po Bactrim 1 SS bid -renal adjusted (covers ESBL E.coli) end date 2023  Trend CRP and Pct, ordered  May use PICC intact for IV ABX access  Follow up with ID in 1 week please  Weekly labs drawn on Monday during the course of treatment  CBC with differential, CMP, ESR, CRP  Fax results to Attn: [facility]  # [phone]   OK from ID standpoint to DC when ready    Ongoing Antimicrobial Therapy  Zosyn 2/2-4, 10-  Bactrim 2/10-? Completed Antimicrobial Therapy  Vancomycin 2/2-6  Meropenem 2/4-10  History:? Interval history noted. Chief complaint: sepsis secondary to postop choley intra-abdominal abscess. Denies n/v/d/f or untoward effects of antibiotics  Physical Exam:  Vital Signs: /85   Pulse 61   Temp 98 °F (36.7 °C) (Oral)   Resp 17   Ht 5' 5\" (1.651 m)   Wt 166 lb 14.2 oz (75.7 kg)   SpO2 100%   BMI 27.77 kg/m²     Gen: very alert, smiling, pleasant, oriented x3  Wounds: C/D/I abdominal incisions well approximated. AVRIL x 2 from right-side of abdomen draining dark red/brown fluid. Chest: no distress and CTA. Good air movement. Room air. Heart: PPM Capture rate 60 and no MRG. Abd: soft, non-distended, RUQ tenderness to light palpation, no hepatomegaly. Normoactive bowel sounds. Ext: no clubbing, cyanosis, or edema  External Catheter Site: without erythema or tenderness draining clear yellow urine  PICC: intact right basilic vein  Neuro: Mental status intact. CN 2-12 intact and no focal sensory or motor deficits     Radiologic / Imaging / TESTING  1/30/2023 XR Chest Portable:  Impression   1. Low lung volumes with left basilar atelectasis. 1/30/2023 CT Chest W Contrast:  Impression   1. Bibasilar atelectasis. 2. Cardiomegaly with enlarged central pulmonary arteries likely due to   pulmonary arterial hypertension. 3. Distended gallbladder. This could be just due to a nonfasting state. However, I do raise the possibility of acute cholecystitis. 4. Fibroid uterus. 1/30/2023 CT Abdomen Pelvis W IV Contrast:  Impression   1. Bibasilar atelectasis. 2. Cardiomegaly with enlarged central pulmonary arteries likely due to   pulmonary arterial hypertension. 3. Distended gallbladder. This could be just due to a nonfasting state.    However, I do raise the possibility of acute cholecystitis. 4. Fibroid uterus. 1/30/2023 US Abdomen Limited:  Impression   Nonspecific gallbladder distension without shadowing calculus. However, when   correlated to the CT scan findings are suspicious for acute cholecystitis. Correlate with nuclear medicine hepatic biliary scan. 1/30/2023 NM Hepatobiliary:  Impression   The gallbladder is not visualized by a 3 hours post injection. The patient   refused further imaging (4 hour post-injection is a standard endpoint). This   can be seen with acute or chronic cholecystitis. 2/6/2023 XR Chest Portable:  Impression   Low lung volumes with likely bibasilar atelectasis versus developing   infection. 2/2/2023 XR Chest Portable:  Impression   Low lung volumes with bibasilar opacities which may represent atelectasis or   pneumonia. 2/2/2023 XR Chest Portable:  Impression   1. Right upper extremity PICC tip overlies the superior cavoatrial junction   level. 2. Stable cardiomegaly. 3. Low lung volumes with bibasilar atelectasis or infiltrate, greater left   than right. Pneumonia is a consideration. 4. Probable small volume left pleural effusion. 5. Stable sequela from open-heart surgery and left-sided pacemaker. 2/5/2023 XR Chest Portable:  Impression   1. Congestive heart failure is most likely given the radiographic findings;   pneumonia is also a consideration in areas of consolidation with pleural   effusion. 2. Calcific atherosclerosis aorta. 3. Cardiomegaly. 2/7/2023 XR Chest Portable;  Impression   Slight interval improvement in pulmonary edema. 2/9/2023 CT Head WO Contrast:  Impression   STUDY MILDLY DEGRADED BY MOTION ARTIFACT   No noncontrast CT evidence of acute intracranial pathology. Advanced cortical atrophy and white matter microvascular degenerative   changes, heavy atherosclerotic calcification distal internal carotid and   vertebral arteries of uncertain hemodynamic significance. ?  2/9/2023 CT Abdomen Pelvis W IV Contrast:  Impression   1. Large post cholecystectomy abscess which extends from the gallbladder   fossa to the subhepatic region and medial and posterior to the liver. The   largest axial dimension is 9 x 11.3 cm and the largest coronal dimension is   8.4 x 10 cm. A 2nd collection can be seen measuring 5.9 x 5.7 cm posteriorly   which may communicate with the larger collection. 2. Constipation and stool impaction in the rectum. 3. Degenerated calcified fibroids. No other acute abnormality. Findings discussed with Dr. Richard Larsen on 02/09/2023 at 3:20 p.m. RECOMMENDATIONS:   Percutaneous abscess drainage. 2/9/2023 CT Drainage Hematoma/Seroma/Fluid Collection:  Impression   Successful CT guided intra-abdominal fluid drainage as described above. Approximately a total of 9.5 cc of old bloody fluid was removed. The catheter was left to AVRIL suction bulb drainage. 2/10/2023 VL Dup Lower Extremity Venous Left:  Impression   No evidence of DVT in the left lower extremity. 2/13/20263 XR Chest Portable:  Impression   Pacemaker. Cardiomegaly with mild vascular congestion. Suspected   atelectasis or infiltrate in the left lung base. Left pleural effusion. Follow up to resolution is suggested. 2/15/2023 NM Hepatobiliary:  Impression   No evidence of active biliary leak. 2/16/2023 XR Chest Portable:  Impression   Pacemaker. Atelectasis or infiltrate in the left lung base. Small effusion. Low lung volumes during the exam.  Follow up to resolution is suggested. 2/18/2023 XR Chest Portable:  Impression   Limited evaluation of the left retrocardiac space. Otherwise, no focal   consolidation or overt pulmonary edema. Labs:    No results found for this or any previous visit (from the past 24 hour(s)).     CULTURE results: Invalid input(s): BLOOD CULTURE,  URINE CULTURE, SURGICAL CULTURE    Diagnosis:  Patient Active Problem List Diagnosis    PAF (paroxysmal atrial fibrillation) ([facility])    Essential hypertension    Mixed hyperlipidemia    VHD (valvular heart disease)    Abnormal EKG    Acute blood loss anemia    Uncontrolled pain    Gait disturbance    Pneumonia due to infectious organism    SSS (sick sinus syndrome) ([facility])    S/P placement of cardiac pacemaker    Tachycardia-bradycardia ([address]    Fall at home, subsequent encounter    Acute intracranial hemorrhage ([address]    Hyponatremia    Compression fracture of L1 lumbar vertebra ([facility])    Intraparenchymal hematoma of brain    COVID-19    CAD (coronary artery disease)    AMS (altered mental status)    Altered mental status    Concussion with no loss of consciousness    Chest pain    Right upper quadrant pain    Acute cholecystitis    Postoperative abscess    Drug-induced encephalopathy       Active Problems  Principal Problem:    Chest pain  Active Problems:    Right upper quadrant pain    Acute cholecystitis    Postoperative abscess    Drug-induced encephalopathy    PAF (paroxysmal atrial fibrillation) ([address]  Resolved Problems:    * No resolved hospital problems. *    Electronically signed by: Electronically signed by Jennifer .  BIRD Mcconnell CNP on 2/20/2023 at 4:21 PM

## 2023-02-20 NOTE — PROGRESS NOTES
[id number] [facility] SPEECH/LANGUAGE PATHOLOGY  DAILY PROGRESS NOTE  Eloise Valladares  2/20/2023  [id number]  Chest pain [R07.9]  Right sided abdominal pain [R10.9]  Chest pain, unspecified type [R07.9]  Acute cholecystitis [K81.0]  No Known Allergies      Pt was seen this date for dysphagia treatment. IMPRESSION AND RECOMMENDATIONS:   Eloise Valladares was seen for a bedside swallowing treatment and dysphagia treatment. Per chart review, pt with increased intake since new new minced and moist diet level. Pt's sister at bedside. Education provided to pt's sister and re-educated pt re: results of MBS on 2/17, recommendations, aspiration precautions, and reflux precautions. Pt and sister verbalized understanding. Recommend continue minced and moist solids/thin liquids with strict aspiration/reflux precautions. Pt to remain upright 30-45 mins following meals. Pt may benefit from GI consult d/t suspected esophageal dysphagia and significant reflux observed on MBSS.      GOALS (current status in bold):  Short-term Goals  Timeframe for Short-term Goals: LOS or until goals are met  Goal 1: Pt will tolerate minced and moist solids/thin liquids without clinical evidence of aspiration 100% Goal being met, continue   Goal 2: Pt/caregivers will demonstrate comprehension of aspiration/reflux precuations and safe feeding protocol Goal being met- pt's sister reports keeping pt upright 30 mins after meals                               EDUCATION: recommendations/POC    PAIN RATING (0-10 Scale): denies   Time in/Time out: SLP Individual Minutes  Time In: 1400  Time Out: [address]  Minutes: 12    Visit number: [address], Ricki Thibodeaux , 2/20/2023

## 2023-02-20 NOTE — PROGRESS NOTES
Nephrology Progress Note  2/20/2023 9:40 AM        Subjective:   Admit Date: 1/30/2023  PCP: Deondre Frazier MD    Interval History:  patient seen in early morning, this is a late entry   she is much more alert awake and interactive this morning    Diet:  reported eating better    ROS:   no overt shortness of breath, she is not on supplemental oxygen   no acute distress and she was oriented   her urine output recorded 2.2 L for the last 24 hours   no fever and acceptable blood pressure although variable number likely inaccurate      Data:     Current meds:    sulfamethoxazole-trimethoprim  1 tablet Oral 2 times per day    [Held by provider] furosemide  20 mg IntraVENous BID    guaiFENesin  300 mg Oral 4 times per day    sodium chloride flush  5-40 mL IntraVENous BID    thiamine  100 mg IntraVENous Daily    melatonin  3 mg Oral Nightly    heparin (porcine)  5,000 Units SubCUTAneous 3 times per day    memantine  5 mg Oral BID    rosuvastatin  10 mg Oral QPM    lidocaine PF  5 mL IntraDERmal Once    sodium chloride flush  5-40 mL IntraVENous 2 times per day    pantoprazole  40 mg IntraVENous Daily    polyethylene glycol  17 g Oral Daily    sodium chloride flush  5-40 mL IntraVENous 2 times per day    docusate sodium  100 mg Oral BID    donepezil  10 mg Oral Nightly    metoprolol tartrate  25 mg Oral BID      sodium chloride 12.5 mL/hr at 02/04/23 0324    sodium chloride Stopped (02/08/23 1146)         I/O last 3 completed shifts: In: 340 [P.O.:300;  I.V.:40]  Out: 2225 [Urine:2000; Drains:225]    CBC:   Recent Labs     02/19/23  0634   WBC 5.3   HGB 7.9*             Recent Labs     02/18/23  0502 02/19/23  0634   * 129*   K 3.7 3.6   CL 91* 93*   CO2 23 24   BUN 18 17   CREATININE 1.2* 1.1   GLUCOSE 94 91       Lab Results   Component Value Date    CALCIUM 8.9 02/19/2023    PHOS 3.7 02/19/2023       Objective:     Vitals: BP (!) 145/62   Pulse 61   Temp 98.3 °F (36.8 °C) (Oral)   Resp 22   Ht 5' 5\" (1.651 m)   Wt 166 lb 14.2 oz (75.7 kg)   SpO2 95%   BMI 27.77 kg/m² ,    General appearance:   seen, alert, awake and oriented without any distress  HEENT:   she has now no conjunctival pallor or scleral icterus this morning  Neck:   supple  Lungs:   coarse breath sounds although is only anterior exam  Heart:    regular rate and rhythm, left chest wall implanted cardiac device, well-healed incision from previous sternotomy  Abdomen:  soft, nontender on superficial palpation, AVRIL drain  Extremities:   trace leg edema      Problem List :         Impression :      stage III acute kidney  disease- recovering with good urine output- she lost significant amount of muscle mass   sepsis/cholecystitis improving with drain   sodium is all also improving- she has underlying atherosclerotic cardiovascular disease and recent decompensated CHF but seems compensated now    Recommendation/Plan  :      okay to discharge from kidney standpoint- low-salt, good diet- she will need a CMP, magnesium, phosphorus, CBC with differential weekly x3- follow-up with me in 3 to 4 weeks- either face-to-face or virtual visit- I am assuming she will go to a rehab- discharged without diuretics- she still with antimicrobial agent      Elyse Dick MD MD

## 2023-02-20 NOTE — DISCHARGE SUMMARY
V2.0  Discharge Summary    Name:  Cait Cotter /Age/Sex: 1945 (18 y.o. female)   Admit Date: 2023  Discharge Date: 23    MRN & CSN:  [id number] & [id number] Encounter Date and Time 23 1:14 PM EST    Attending:  Yarelis Thomas MD Discharging Provider: Yarelis Thomas MD       Hospital Course:     Brief HPI: Cait Cotter is a 76 y.o. female with pmh of Cait Cotter is a 76 y.o. female with pmh of CAD s/p CABG, Valvular Heart Disease, s/p Mitral Valve Repair, PFO Closure, Paroxysmal A-Fib, HTN, HLD, DM, TIA, Early Dementia who presents with Chest pain Rt Sided and RUQ Abdominal Pain    Brief Problem Based Course:     Sepsis 2/2 likely ESBL UTI without hematuria, Pneumonia, acute cholecystitis  Cholecystitis s/p lap cholecystectomy  Large post-cholecystectomy fluid collection, concern for infection  Patient with potential abnormality as well she did have acute solid cholecystitis status post lap pedro 2023. High risk for postoperative infection requiring MRSA pneumonia coverage. WBC 12, SBP 92, RR 22 initially. Procal trending down 10.51 -> 5.09 -> 0.4 5 -> 0.49 -> 0.32  Urine growing ESBL UTI. MRSA Nares +ve  Patient was doing relatively well, but a day after drain removal patient started to get more altered and intermittently complained of the right quadrant pain. Obtained CT abd pelvis with IV contrast - shows large post cholecsytectomy abscesses. The largest axial dimension is 9 x 11.3 cm and the largest coronal dimension is 8.4 x 10 cm. A 2nd collection can be seen measuring 5.9 x 5.7 cm posteriorly which may communicate with the larger collection. - s/p drain placement x 2 -cultures sent today  Patient had minimal drainage from the arya drains, removed upon discharge   Consulted ID - changed abx to zosyn and bactrim   Fluid culture also growing E.coli - follow sensitivity; given the history of ESBL this has risk of being ESBL too - page ID regarding antibiotics if ESBL or if patient's status worsens     Aspiration pneumonia  Possibly due to recumbent position   Modified barium swallow done showed regurgitation   Patient has significant post meal cough      Acute Encephalopathy 2/2 likely Metabolic and Septic 2/2 hypoxia and ESBL UTI, Iatrogenic 2/2 medication-improved  Was difficult to wake on 2/3/2023  Gabapentin on hold per nephro - agree with it     Cholecystitis s/p lap cholecystectomy   Patient underwent lap pedro on 1/31/2023 with Dr. Natasha Rocha. Acute hypoxic respiratory failure, resolved on room air  Patient with acute respiratory failure was on nasal cannula. Became acutely more short of breath. CT did show what appears to be either fluid overload or viral pneumonia. 25% at 25L/min Heated high flow --> weaned off O2        TAYLOR with hyperkalemia  Pt with Cr 2 on admission, trended down to 1.5 today. baseline Cr 0.7. Kayexalate ordered     Hypochloremic Hyponatremia  Patient with acute worsening hyponatremia to 121 and now back up to 135     Acute on chronic HFpEF  Patient with an EF 50% with hypokinetic inferio-lateral wall and basal anterio-septal carson the 45 (become acutely short of breath with progressive pulmonary edema. Pro-BNP trending up to 24,733  On aldactone and amiloride     CAD s/p CABG  Pacer in-situ  VHD  Patient is also known valvular heart disease. Cardiology following  Discussed with cardiology about interrogating pacer. Acute Normocytic Anemia 2/2 possibly Post-operative loss and hemodilution 2/2 fluid overload   Hb was 11 on 1/31/2023. 1U PRBC this admission. Hb 8.3 today. No signs of overt bleeding       The patient expressed appropriate understanding of, and agreement with the discharge recommendations, medications, and plan.      Consults this admission:  IP CONSULT TO CARDIOLOGY  IP CONSULT TO ELECTROPHYSIOLOGY  IP CONSULT TO GI  IP CONSULT TO GENERAL SURGERY  IP CONSULT TO NEPHROLOGY  IP CONSULT TO PHARMACY  IP CONSULT TO CRITICAL CARE  PHARMACY CONSULT FOR RENAL DOSING  IP CONSULT TO INFECTIOUS DISEASES  IP CONSULT TO INTERVENTIONAL RADIOLOGY  IP CONSULT TO IV TEAM  IP CONSULT TO IV TEAM    Discharge Diagnosis:   Chest pain    Discharge Instruction:   Follow up appointments:   Primary care physician: Niranjan Costello MD within 2 weeks  Diet: regular diet   Activity: activity as tolerated  Disposition: Discharged to:   []Home, [][facility], [x]SNF, []Acute Rehab, []Hospice   Condition on discharge: Stable  Labs and Tests to be Followed up as an outpatient by PCP or Specialist:     Discharge Medications:        Medication List        START taking these medications      guaiFENesin 100 MG/5ML liquid  Commonly known as: ROBITUSSIN  Take 15 mLs by mouth 3 times daily as needed for Cough     melatonin 3 MG Tabs tablet     ondansetron 4 MG disintegrating tablet  Commonly known as: ZOFRAN-ODT  Take 1 tablet by mouth every 8 hours as needed for Nausea or Vomiting     piperacillin-tazobactam 3.375 (3-0.375) g injection  Commonly known as: ZOSYN  Infuse 3,375 mg intravenously in the morning and 3,375 mg at noon and 3,375 mg in the evening and 3,375 mg before bedtime. Do all this for 4 days.      sulfamethoxazole-trimethoprim 400-80 MG per tablet  Commonly known as: BACTRIM;SEPTRA  Take 1 tablet by mouth every 12 hours for 8 doses     thiamine 100 MG/ML injection  Commonly known as: B-1  Infuse 1 mL intravenously daily  Start taking on: February 21, 2023            Tree Mendoza taking these medications      aspirin 81 MG EC tablet  Take 1 tablet by mouth daily     docusate 100 MG Caps  Commonly known as: COLACE, DULCOLAX  Take 100 mg by mouth 2 times daily     donepezil 10 MG tablet  Commonly known as: ARICEPT     furosemide 20 MG tablet  Commonly known as: LASIX  Take 1 tablet by mouth daily     ipratropium 0.03 % nasal spray  Commonly known as: ATROVENT     memantine 10 MG tablet  Commonly known as: NAMENDA     metoprolol tartrate 50 MG tablet  Commonly known as: LOPRESSOR  Take 0.5 tablets by mouth 2 times daily     omeprazole 40 MG delayed release capsule  Commonly known as: PRILOSEC     oxybutynin 5 MG tablet  Commonly known as: DITROPAN     rosuvastatin 40 MG tablet  Commonly known as: CRESTOR  Take 1 tablet by mouth every evening            STOP taking these medications      acetaminophen 500 MG tablet  Commonly known as: APAP Extra Strength     Acetaminophen PM  MG tablet  Generic drug: diphenhydrAMINE-APAP (sleep)     apixaban 5 MG Tabs tablet  Commonly known as: ELIQUIS     bisacodyl 5 MG EC tablet  Commonly known as: DULCOLAX     CALCIUM CARBONATE PO     citalopram 40 MG tablet  Commonly known as: CELEXA     gabapentin 600 MG tablet  Commonly known as: NEURONTIN     potassium chloride 20 MEQ packet  Commonly known as: KLOR-CON     senna 8.6 MG tablet  Commonly known as: SENOKOT     vitamin D3 25 MCG (1000 UT) Tabs tablet  Commonly known as: CHOLECALCIFEROL               Where to Get Your Medications        These medications were sent to [facility] [id number] [facility], [address] [facility] [phone]  [facility] [address] [place] [id number]      Phone: [phone]   guaiFENesin 100 MG/5ML liquid  ondansetron 4 MG disintegrating tablet  piperacillin-tazobactam 3.375 (3-0.375) g injection  sulfamethoxazole-trimethoprim 400-80 MG per tablet  thiamine 100 MG/ML injection        Objective Findings at Discharge:   /85   Pulse 61   Temp 98 °F (36.7 °C) (Oral)   Resp 17   Ht 5' 5\" (1.651 m)   Wt 166 lb 14.2 oz (75.7 kg)   SpO2 100%   BMI 27.77 kg/m²       Physical Exam:   Physical Exam  Vitals reviewed. Constitutional:       General: She is awake. She is not in acute distress. Appearance: Normal appearance. She is overweight. She is not ill-appearing. Interventions: Nasal cannula in place. Comments: Heated high flow    HENT:      Head: Normocephalic and atraumatic.       Mouth/Throat:      Mouth: Mucous membranes are moist. Pharynx: Oropharynx is clear. Eyes:      Extraocular Movements: Extraocular movements intact. Conjunctiva/sclera: Conjunctivae normal.      Pupils: Pupils are equal, round, and reactive to light. Cardiovascular:      Rate and Rhythm: Normal rate. Rhythm irregularly irregular. Pulses: Normal pulses. Heart sounds: Normal heart sounds. Comments: Pacer is not capturing all beats - pacer spikes are all over the place  Pulmonary:      Effort: Pulmonary effort is normal.   Abdominal:      General: Abdomen is protuberant. Bowel sounds are normal.      Palpations: Abdomen is soft. Musculoskeletal:         General: No swelling. Normal range of motion. Cervical back: Normal range of motion and neck supple. Skin:     General: Skin is warm and dry. Neurological:      Mental Status: She is oriented to person, place, and time. She is lethargic. Comments: Oriented to year, not month   Psychiatric:         Behavior: Behavior is cooperative. Labs and Imaging   NM HEPATOBILIARY    Result Date: 2/15/2023  EXAMINATION: NUCLEAR MEDICINE HEPATOBILIARY SCINTIGRAPHY (HIDA SCAN). 2/15/2023 2:54 pm TECHNIQUE: Approximately 4.9 mCi Tc-99m Mebrofenin (Choletec) was administered IV. Then, dynamic images of the abdomen were obtained in the anterior projection for 60 min(s). COMPARISON: Hepatic biliary scan 01/30/2023 HISTORY: ORDERING SYSTEM PROVIDED HISTORY: rule out bile leak TECHNOLOGIST PROVIDED HISTORY: Reason for exam:->rule out bile leak Reason for Exam: bile leak FINDINGS: Prompt, homogenous uptake by the liver is noted with normal appearance of radiotracer excretion into the biliary system. Clearance of blood pool activity appears appropriate. The gallbladder has been removed. Normal visualization of the bowel. No abnormal accumulation of radiotracer to suggest active biliary leak. No evidence of active biliary leak.      XR CHEST PORTABLE    Result Date: 2/18/2023  EXAMINATION: ONE XRAY VIEW OF THE CHEST 2/18/2023 2:44 pm COMPARISON: 02/16/2023. HISTORY: ORDERING SYSTEM PROVIDED HISTORY: Follow-up on pulmonary edema TECHNOLOGIST PROVIDED HISTORY: Reason for exam:->Follow-up on pulmonary edema Reason for Exam: Follow-up on pulmonary edema Additional signs and symptoms: Follow-up on pulmonary edema Relevant Medical/Surgical History: Follow-up on pulmonary edema FINDINGS: Similar-appearing overlying lines, tubes, and hardware. Similar appearance of the cardiac silhouette. Low lung volumes. Limited evaluation of the left retrocardiac space. Otherwise, no focal consolidation or overt pulmonary edema. Right costophrenic angle is sharp. No evidence of pneumothorax. No acute osseous abnormalities. Limited evaluation of the left retrocardiac space. Otherwise, no focal consolidation or overt pulmonary edema. XR CHEST PORTABLE    Result Date: 2/17/2023  EXAMINATION: ONE XRAY VIEW OF THE CHEST 2/16/2023 3:55 pm COMPARISON: 02/13/2023 HISTORY: ORDERING SYSTEM PROVIDED HISTORY: aspiration TECHNOLOGIST PROVIDED HISTORY: Reason for exam:->aspiration Reason for Exam: aspiration Initial evaluation FINDINGS: The patient has had a median sternotomy. Monitor wires overlie the chest. Patient has left-sided pacemaker. The trachea is midline. Cardiac silhouette is unremarkable. Right lung is clear. There is minimal atelectasis or infiltrate in the left lung base. There are small bore catheters in the right upper abdomen. Pacemaker. Atelectasis or infiltrate in the left lung base. Small effusion. Low lung volumes during the exam.  Follow up to resolution is suggested.      XR CHEST PORTABLE    Result Date: 2/17/2023  EXAMINATION: ONE XRAY VIEW OF THE CHEST 2/13/2023 2:59 pm COMPARISON: 02/07/2023 HISTORY: ORDERING SYSTEM PROVIDED HISTORY: Follow-up on pulmonary edema TECHNOLOGIST PROVIDED HISTORY: Reason for exam:->Follow-up on pulmonary edema Reason for Exam: Follow-up on pulmonary edema Additional signs and symptoms: na Relevant Medical/Surgical History: diabetes, cad, hypertension Initial evaluation FINDINGS: Monitor wires overlie the chest. The patient has had a median sternotomy. The patient has a pacemaker. The trachea is midline. The cardiac silhouette is stable. There is atelectasis or infiltrate in the left lung base with a left pleural effusion. There is mild vascular congestion. Pacemaker. Cardiomegaly with mild vascular congestion. Suspected atelectasis or infiltrate in the left lung base. Left pleural effusion. Follow up to resolution is suggested. FL MODIFIED BARIUM SWALLOW W VIDEO    Result Date: 2/17/2023  EXAMINATION: MODIFIED BARIUM SWALLOW WAS PERFORMED IN CONJUNCTION WITH SPEECH PATHOLOGY SERVICES 02/17/2023. TECHNIQUE: Under fluoroscopic evaluation cineradiography/videoradiography recordings were performed in conjunction with the speech-language pathologist (SLP). Various liquid, solid and/or semi-solid barium preparations were used to assess swallowing function. FLUOROSCOPY DOSE AND TYPE OR TIME AND EXPOSURES: 1 image, 1.5 minutes and 27.275 mGy. COMPARISON: None HISTORY: ORDERING SYSTEM PROVIDED HISTORY: silent aspiration TECHNOLOGIST PROVIDED HISTORY: Reason for exam:->silent aspiration Reason for Exam: silent aspiration Additional signs and symptoms: silent aspiration Relevant Medical/Surgical History: silent aspiration FINDINGS: Oral phase of swallowing was grossly within normal limits. No evidence of laryngeal penetration or aspiration during antegrade swallows with any of the consistencies. However, there was full column gastroesophageal reflux noted. There was laryngeal penetration seen from the reflux. Swallowing mechanism grossly within normal limits with laryngeal penetration seen from full column gastroesophageal reflux. Please see separate speech pathology report for full discussion of findings and recommendations.        CBC:   Recent Labs 02/19/23  0634   WBC 5.3   HGB 7.9*        BMP:    Recent Labs     02/18/23  0502 02/19/23  0634   * 129*   K 3.7 3.6   CL 91* 93*   CO2 23 24   BUN 18 17   CREATININE 1.2* 1.1   GLUCOSE 94 91     Hepatic:   Recent Labs     02/18/23  0502 02/19/23  0634   AST 18 18   ALT 10 11   BILITOT 0.3 0.3   ALKPHOS 123 133*     Lipids:   Lab Results   Component Value Date/Time    CHOL 103 01/31/2023 02:39 AM    HDL 51 01/31/2023 02:39 AM    TRIG 95 01/31/2023 02:39 AM     Hemoglobin A1C:   Lab Results   Component Value Date/Time    LABA1C 5.6 07/09/2022 04:03 AM     TSH: No results found for: TSH  Troponin:   Lab Results   Component Value Date/Time    TROPONINT <0.010 01/31/2023 02:39 AM    TROPONINT <0.010 01/30/2023 10:00 PM    TROPONINT <0.010 01/30/2023 11:15 AM     Lactic Acid: No results for input(s): LACTA in the last 72 hours.   BNP:   Recent Labs     02/19/23  0634   PROBNP 2,377*     UA:  Lab Results   Component Value Date/Time    NITRU NEGATIVE 02/13/2023 03:26 PM    COLORU YELLOW 02/13/2023 03:26 PM    WBCUA 6 02/13/2023 03:26 PM    RBCUA 1 02/13/2023 03:26 PM    MUCUS RARE 02/02/2023 12:00 PM    TRICHOMONAS NONE SEEN 02/13/2023 03:26 PM    YEAST RARE 02/11/2023 11:10 AM    BACTERIA NEGATIVE 02/13/2023 03:26 PM    CLARITYU CLEAR 02/13/2023 03:26 PM    Jayson Gains <1.005 02/13/2023 03:26 PM    LEUKOCYTESUR TRACE 02/13/2023 03:26 PM    UROBILINOGEN 0.2 02/13/2023 03:26 PM    BILIRUBINUR NEGATIVE 02/13/2023 03:26 PM    BLOODU TRACE 02/13/2023 03:26 PM    KETUA NEGATIVE 02/13/2023 03:26 PM     Urine Cultures: No results found for: LABURIN  Blood Cultures: No results found for: BC  No results found for: BLOODCULT2  Organism: No results found for: ORG    Time Spent Discharging patient 35 minutes    Electronically signed by Davon Zhou MD on 2/20/2023 at 1:14 PM

## 2023-02-20 NOTE — PLAN OF CARE
Problem: Discharge Planning  Goal: Discharge to home or other facility with appropriate resources  2/20/2023 0932 by Gurvinder Felipe RN  Outcome: Progressing  2/19/2023 2131 by Mateo Urbina RN  Outcome: Progressing  Flowsheets (Taken 2/19/2023 0859 by Gurvinder Felipe RN)  Discharge to home or other facility with appropriate resources: Identify barriers to discharge with patient and caregiver     Problem: Pain  Goal: Verbalizes/displays adequate comfort level or baseline comfort level  2/20/2023 0932 by Gurvinder Felipe RN  Outcome: Progressing  2/19/2023 2131 by Mateo Urbina RN  Outcome: Progressing     Problem: Chronic Conditions and Co-morbidities  Goal: Patient's chronic conditions and co-morbidity symptoms are monitored and maintained or improved  2/20/2023 0932 by Gurvinder Felipe RN  Outcome: Progressing  2/19/2023 2131 by Mateo Urbina RN  Outcome: Progressing  [address] (Taken 2/19/2023 0859 by Gurvinder Felipe RN)  Care Plan - Patient's Chronic Conditions and Co-Morbidity Symptoms are Monitored and Maintained or Improved: Monitor and assess patient's chronic conditions and comorbid symptoms for stability, deterioration, or improvement     Problem: Skin/Tissue Integrity  Goal: Absence of new skin breakdown  Description: 1. Monitor for areas of redness and/or skin breakdown  2. Assess vascular access sites hourly  3. Every 4-6 hours minimum:  Change oxygen saturation probe site  4. Every 4-6 hours:  If on nasal continuous positive airway pressure, respiratory therapy assess nares and determine need for appliance change or resting period.   2/20/2023 0932 by Gurvinder Felipe RN  Outcome: Progressing  2/19/2023 2131 by Mateo Urbina RN  Outcome: Progressing     Problem: Safety - Adult  Goal: Free from fall injury  2/20/2023 0932 by Gurvinder Felipe RN  Outcome: Progressing  2/19/2023 2131 by Mateo Urbina RN  Outcome: Progressing     Problem: Nutrition Deficit:  Goal: Optimize nutritional status  2/20/2023 0932 by Edith Moya RN  Outcome: Progressing  2/19/2023 2131 by Ilir Weaver RN  Outcome: Progressing

## 2023-02-20 NOTE — PLAN OF CARE
Problem: Discharge Planning  Goal: Discharge to home or other facility with appropriate resources  Outcome: Progressing  Flowsheets (Taken 2/19/2023 0859 by Edith Moya, RN)  Discharge to home or other facility with appropriate resources: Identify barriers to discharge with patient and caregiver     Problem: Pain  Goal: Verbalizes/displays adequate comfort level or baseline comfort level  Outcome: Progressing     Problem: Chronic Conditions and Co-morbidities  Goal: Patient's chronic conditions and co-morbidity symptoms are monitored and maintained or improved  Outcome: Progressing  Flowsheets (Taken 2/19/2023 0859 by Edith Moya RN)  Care Plan - Patient's Chronic Conditions and Co-Morbidity Symptoms are Monitored and Maintained or Improved: Monitor and assess patient's chronic conditions and comorbid symptoms for stability, deterioration, or improvement     Problem: Skin/Tissue Integrity  Goal: Absence of new skin breakdown  Description: 1. Monitor for areas of redness and/or skin breakdown  2. Assess vascular access sites hourly  3. Every 4-6 hours minimum:  Change oxygen saturation probe site  4. Every 4-6 hours:  If on nasal continuous positive airway pressure, respiratory therapy assess nares and determine need for appliance change or resting period.   Outcome: Progressing     Problem: Safety - Adult  Goal: Free from fall injury  Outcome: Progressing     Problem: Nutrition Deficit:  Goal: Optimize nutritional status  Outcome: Progressing

## 2023-02-21 ENCOUNTER — HOSPITAL ENCOUNTER (OUTPATIENT)
Age: 78
Setting detail: SPECIMEN
Discharge: HOME | End: 2023-02-21

## 2023-02-21 LAB
ALBUMIN SERPL-MCNC: 3.8 GM/DL (ref 3.4–5)
ALP BLD-CCNC: 158 IU/L (ref 40–128)
ALT SERPL-CCNC: 12 U/L (ref 10–40)
ANION GAP SERPL CALCULATED.3IONS-SCNC: 14 MMOL/L (ref 4–16)
AST SERPL-CCNC: 21 IU/L (ref 15–37)
BASOPHILS ABSOLUTE: 0.1 K/CU MM
BASOPHILS RELATIVE PERCENT: 1.6 % (ref 0–1)
BILIRUB SERPL-MCNC: 0.4 MG/DL (ref 0–1)
BUN SERPL-MCNC: 13 MG/DL (ref 6–23)
CALCIUM SERPL-MCNC: 9.3 MG/DL (ref 8.3–10.6)
CHLORIDE BLD-SCNC: 96 MMOL/L (ref 99–110)
CO2: 21 MMOL/L (ref 21–32)
CREAT SERPL-MCNC: 1.1 MG/DL (ref 0.6–1.1)
CRP SERPL HS-MCNC: 18.2 MG/L
DIFFERENTIAL TYPE: ABNORMAL
EOSINOPHILS ABSOLUTE: 0.1 K/CU MM
EOSINOPHILS RELATIVE PERCENT: 2.2 % (ref 0–3)
ERYTHROCYTE SEDIMENTATION RATE: 56 MM/HR (ref 0–30)
GFR SERPL CREATININE-BSD FRML MDRD: 51 ML/MIN/1.73M2
GLUCOSE SERPL-MCNC: 82 MG/DL (ref 70–99)
HCT VFR BLD CALC: 26.6 % (ref 37–47)
HEMOGLOBIN: 8.4 GM/DL (ref 12.5–16)
IMMATURE NEUTROPHIL %: 0.7 % (ref 0–0.43)
LYMPHOCYTES ABSOLUTE: 0.9 K/CU MM
LYMPHOCYTES RELATIVE PERCENT: 20.9 % (ref 24–44)
MCH RBC QN AUTO: 26.7 PG (ref 27–31)
MCHC RBC AUTO-ENTMCNC: 31.6 % (ref 32–36)
MCV RBC AUTO: 84.4 FL (ref 78–100)
MONOCYTES ABSOLUTE: 0.4 K/CU MM
MONOCYTES RELATIVE PERCENT: 8.3 % (ref 0–4)
NUCLEATED RBC %: 0 %
PDW BLD-RTO: 14.6 % (ref 11.7–14.9)
PLATELET # BLD: 250 K/CU MM (ref 140–440)
PMV BLD AUTO: 8.7 FL (ref 7.5–11.1)
POTASSIUM SERPL-SCNC: 5 MMOL/L (ref 3.5–5.1)
RBC # BLD: 3.15 M/CU MM (ref 4.2–5.4)
SEGMENTED NEUTROPHILS ABSOLUTE COUNT: 3 K/CU MM
SEGMENTED NEUTROPHILS RELATIVE PERCENT: 66.3 % (ref 36–66)
SODIUM BLD-SCNC: 131 MMOL/L (ref 135–145)
TOTAL IMMATURE NEUTOROPHIL: 0.03 K/CU MM
TOTAL NUCLEATED RBC: 0 K/CU MM
TOTAL PROTEIN: 6.1 GM/DL (ref 6.4–8.2)
WBC # BLD: 4.5 K/CU MM (ref 4–10.5)

## 2023-02-21 PROCEDURE — 85652 RBC SED RATE AUTOMATED: CPT

## 2023-02-21 PROCEDURE — 80053 COMPREHEN METABOLIC PANEL: CPT

## 2023-02-21 PROCEDURE — 86140 C-REACTIVE PROTEIN: CPT

## 2023-02-21 PROCEDURE — 85025 COMPLETE CBC W/AUTO DIFF WBC: CPT

## 2023-02-21 PROCEDURE — 36415 COLL VENOUS BLD VENIPUNCTURE: CPT

## 2023-02-21 PROCEDURE — 86480 TB TEST CELL IMMUN MEASURE: CPT

## 2023-02-28 ENCOUNTER — HOSPITAL ENCOUNTER (OUTPATIENT)
Age: 78
Setting detail: SPECIMEN
Discharge: HOME | End: 2023-02-28

## 2023-02-28 LAB
ALBUMIN SERPL-MCNC: 4 GM/DL (ref 3.4–5)
ALP BLD-CCNC: 172 IU/L (ref 40–128)
ALT SERPL-CCNC: 14 U/L (ref 10–40)
ANION GAP SERPL CALCULATED.3IONS-SCNC: 16 MMOL/L (ref 4–16)
AST SERPL-CCNC: 28 IU/L (ref 15–37)
BASOPHILS ABSOLUTE: 0.1 K/CU MM
BASOPHILS RELATIVE PERCENT: 1.3 % (ref 0–1)
BILIRUB SERPL-MCNC: 0.4 MG/DL (ref 0–1)
BUN SERPL-MCNC: 10 MG/DL (ref 6–23)
CALCIUM SERPL-MCNC: 8.9 MG/DL (ref 8.3–10.6)
CHLORIDE BLD-SCNC: 95 MMOL/L (ref 99–110)
CO2: 22 MMOL/L (ref 21–32)
CREAT SERPL-MCNC: 1 MG/DL (ref 0.6–1.1)
CRP SERPL HS-MCNC: 8.5 MG/L
DIFFERENTIAL TYPE: ABNORMAL
EOSINOPHILS ABSOLUTE: 0.2 K/CU MM
EOSINOPHILS RELATIVE PERCENT: 3.9 % (ref 0–3)
ERYTHROCYTE SEDIMENTATION RATE: 32 MM/HR (ref 0–30)
GFR SERPL CREATININE-BSD FRML MDRD: 57 ML/MIN/1.73M2
GLUCOSE SERPL-MCNC: 97 MG/DL (ref 70–99)
HCT VFR BLD CALC: 28.3 % (ref 37–47)
HEMOGLOBIN: 9.2 GM/DL (ref 12.5–16)
IMMATURE NEUTROPHIL %: 0.8 % (ref 0–0.43)
LYMPHOCYTES ABSOLUTE: 1 K/CU MM
LYMPHOCYTES RELATIVE PERCENT: 19.5 % (ref 24–44)
MCH RBC QN AUTO: 27.1 PG (ref 27–31)
MCHC RBC AUTO-ENTMCNC: 32.5 % (ref 32–36)
MCV RBC AUTO: 83.5 FL (ref 78–100)
MONOCYTES ABSOLUTE: 0.4 K/CU MM
MONOCYTES RELATIVE PERCENT: 6.9 % (ref 0–4)
NUCLEATED RBC %: 0 %
PDW BLD-RTO: 13.8 % (ref 11.7–14.9)
PLATELET # BLD: 201 K/CU MM (ref 140–440)
PMV BLD AUTO: 8.7 FL (ref 7.5–11.1)
POTASSIUM SERPL-SCNC: 4.3 MMOL/L (ref 3.5–5.1)
RBC # BLD: 3.39 M/CU MM (ref 4.2–5.4)
SEGMENTED NEUTROPHILS ABSOLUTE COUNT: 3.5 K/CU MM
SEGMENTED NEUTROPHILS RELATIVE PERCENT: 67.6 % (ref 36–66)
SODIUM BLD-SCNC: 133 MMOL/L (ref 135–145)
TOTAL IMMATURE NEUTOROPHIL: 0.04 K/CU MM
TOTAL NUCLEATED RBC: 0 K/CU MM
TOTAL PROTEIN: 6.3 GM/DL (ref 6.4–8.2)
WBC # BLD: 5.2 K/CU MM (ref 4–10.5)

## 2023-02-28 PROCEDURE — 80053 COMPREHEN METABOLIC PANEL: CPT

## 2023-02-28 PROCEDURE — 86140 C-REACTIVE PROTEIN: CPT

## 2023-02-28 PROCEDURE — 85025 COMPLETE CBC W/AUTO DIFF WBC: CPT

## 2023-02-28 PROCEDURE — 85652 RBC SED RATE AUTOMATED: CPT

## 2023-02-28 PROCEDURE — 36415 COLL VENOUS BLD VENIPUNCTURE: CPT

## 2023-03-02 ENCOUNTER — SCHEDULED TELEPHONE ENCOUNTER (OUTPATIENT)
Dept: INFECTIOUS DISEASES | Age: 78
End: 2023-03-02

## 2023-03-02 DIAGNOSIS — Z09 HOSPITAL DISCHARGE FOLLOW-UP: Primary | ICD-10-CM

## 2023-03-02 DIAGNOSIS — T81.49XA POSTOPERATIVE ABSCESS: ICD-10-CM

## 2023-03-02 NOTE — ASSESSMENT & PLAN NOTE
AVRIL drain remains in place. Abx were completed on 2/24/2023. Khushi Torres reports that the abdomen is benign. No need for additional abx.

## 2023-03-02 NOTE — PROGRESS NOTES
Bonnie Croft is a 76 y.o. female evaluated via telephone on 3/2/2023 for Follow-Up from [facility] f/u Sepsis secondary to ESBL E.coli)  . Assessment & Plan   1. Hospital discharge follow-up  2. Postoperative abscess  Assessment & Plan:  YRN drain remains in place. Abx were completed on 2/24/2023. Sivakumar Salazar reports that the abdomen is benign. No need for additional abx. Return if symptoms worsen or fail to improve. Subjective     She has a medical history of type 2 diabetes mellitus, coronary artery disease status postcoronary bypass graft with mitral valve replacement, PFO closure, paroxysmal atrial fibrillation with PPM, wheelchair-bound. She was initially seen during her 1/30/2023 admission where she was admitted for right-sided chest wall pain and was eventually diagnosed with acute cholecystitis. She underwent a laparoscopic cholecystectomy on 2/77/8665 that was complicated by postoperative cholecystectomy abscess requiring an IR drain placement on 2/9/2023. Urine culture was positive for ESBL E. coli. There was a concern for meropenem induced neurotoxicity, hence, meropenem was discontinued in favor of Zosyn and Bactrim. She had an IR drain still present when she was discharged. Zosyn and Bactrim prescribed to end on 2/24/2023. YRN drain was still in place when she was discharged on 2/20/2023. Labs drawn on 2/28/2023 were reviewed. Renal function is within normal limits for her. Alkaline phosphatase is elevated no leukocytosis no sed rate or CRP is present Yrn drain remains in place. Her nurse Shoaib Espino states that there is barely any drainage. Review of Systems      Objective   Patient-Reported Vitals  No data recorded       Total Time: minutes: 11-20 minutes     Bonnie Croft was evaluated through a synchronous (real-time) audio only encounter. Patient identification was verified at the start of the visit.  She (or guardian if applicable) is aware that this is a billable service, which includes applicable co-pays. This visit was conducted with patient's (and/or legal guardian's) verbal consent. She has not had a related appointment within my department in the past 7 days or scheduled within the next 24 hours. The patient was located at [facility] . The provider was located at [facility] ([place] [address]): [address] [place] [address],  [address] [facility].      Mini Becker MD

## 2023-03-03 ENCOUNTER — HOSPITAL ENCOUNTER (OUTPATIENT)
Age: 78
Setting detail: SPECIMEN
Discharge: HOME | End: 2023-03-03

## 2023-03-03 LAB
ALBUMIN SERPL-MCNC: 3.9 GM/DL (ref 3.4–5)
ALP BLD-CCNC: 180 IU/L (ref 40–129)
ALT SERPL-CCNC: 11 U/L (ref 10–40)
ANION GAP SERPL CALCULATED.3IONS-SCNC: 12 MMOL/L (ref 4–16)
AST SERPL-CCNC: 19 IU/L (ref 15–37)
BILIRUB SERPL-MCNC: 0.4 MG/DL (ref 0–1)
BUN SERPL-MCNC: 8 MG/DL (ref 6–23)
CALCIUM SERPL-MCNC: 9.1 MG/DL (ref 8.3–10.6)
CHLORIDE BLD-SCNC: 92 MMOL/L (ref 99–110)
CO2: 28 MMOL/L (ref 21–32)
CREAT SERPL-MCNC: 0.9 MG/DL (ref 0.6–1.1)
GFR SERPL CREATININE-BSD FRML MDRD: >60 ML/MIN/1.73M2
GLUCOSE SERPL-MCNC: 104 MG/DL (ref 70–99)
HCT VFR BLD CALC: 28.1 % (ref 37–47)
HEMOGLOBIN: 8.9 GM/DL (ref 12.5–16)
MCH RBC QN AUTO: 26.7 PG (ref 27–31)
MCHC RBC AUTO-ENTMCNC: 31.7 % (ref 32–36)
MCV RBC AUTO: 84.4 FL (ref 78–100)
PDW BLD-RTO: 13.8 % (ref 11.7–14.9)
PLATELET # BLD: 198 K/CU MM (ref 140–440)
PMV BLD AUTO: 8.6 FL (ref 7.5–11.1)
POTASSIUM SERPL-SCNC: 3.8 MMOL/L (ref 3.5–5.1)
RBC # BLD: 3.33 M/CU MM (ref 4.2–5.4)
SODIUM BLD-SCNC: 132 MMOL/L (ref 135–145)
TOTAL PROTEIN: 6.1 GM/DL (ref 6.4–8.2)
WBC # BLD: 6.1 K/CU MM (ref 4–10.5)

## 2023-03-03 PROCEDURE — 36415 COLL VENOUS BLD VENIPUNCTURE: CPT

## 2023-03-03 PROCEDURE — 85027 COMPLETE CBC AUTOMATED: CPT

## 2023-03-03 PROCEDURE — 80053 COMPREHEN METABOLIC PANEL: CPT

## 2023-03-07 ENCOUNTER — OFFICE VISIT (OUTPATIENT)
Dept: BARIATRICS/WEIGHT MGMT | Age: 78
End: 2023-03-07

## 2023-03-07 VITALS
SYSTOLIC BLOOD PRESSURE: 122 MMHG | OXYGEN SATURATION: 98 % | DIASTOLIC BLOOD PRESSURE: 74 MMHG | BODY MASS INDEX: 27.66 KG/M2 | WEIGHT: 166 LBS | HEIGHT: 65 IN | HEART RATE: 63 BPM

## 2023-03-07 DIAGNOSIS — Z90.49 S/P LAPAROSCOPIC CHOLECYSTECTOMY: Primary | ICD-10-CM

## 2023-03-07 NOTE — PROGRESS NOTES
Post-Operative Clinic Note    Chief Complaint   Patient presents with    Post-Op Check     1st po lap pedro  [facility] 1/31/23         SUBJECTIVE:  Patient is here today for a post-operative visit. Patient is s/p lap pedro on 1/31/23. Overall doing well. No more drainage from drains. Poor appetite. Denies F/C. Minimal to no abdominal pain. At [facility]. Past Surgical History:   Procedure Laterality Date    CABG WITH MITRAL VALVE REPAIR  07/09/2018    CABG X 3 [place]>LAD, SVG>CX M1, SVG>M2, MVR repair w/#28 CG ring, PFO closure, MAZE    CARDIAC CATHETERIZATION  06/25/2018    CHOLECYSTECTOMY, LAPAROSCOPIC N/A 1/31/2023    CHOLECYSTECTOMY LAPAROSCOPIC performed by Edvin Rush MD at [address] Bilateral 2018    Cataracts    MITRAL VALVE MAZE PROCEDURE  07/19/2018    Dr. Silverman Sayres Left 12/11/2018    [employer] Tow XT DR LANA SureScan     THORACENTESIS Bilateral 07/13/2018    IR Dr. Jean Marie Benavides, right 56, left 900 all s/s    TONSILLECTOMY  1940's    WISDOM TOOTH EXTRACTION       Past Medical History:   Diagnosis Date    Anxiety     Arthritis     knees    Atrial fibrillation (HCC)     CAD (coronary artery disease)     Sees Dr. Tee Carver    COVID-19 09/19/2021    Diabetes mellitus ([facility] [address]     H/O cardiac catheterization 06/25/2018    severe 3 vessel disease, refer to CV surgery for CABG and MAZE    H/O cardiovascular stress test 06/06/2018    EF47%  fixed perfusion defect ant wall, pt in afib    H/O echocardiogram 06/06/2018    EF55% mod MR    H/O echocardiogram 08/28/2018    EF 50-55%, Mild : AR, MR & TR.    H/O echocardiogram 03/13/2020    EF 55%, Breast artifact noted. H/O three vessel coronary artery bypass 07/09/2018    Dr. Pennie Engle    History of Holter monitoring 10/23/2018    Multiple PAF episodes noted. Tachy-Dinesh episodes noted. History of nuclear stress test 03/13/2020    EF 55%, Breast artifact.     Hyperlipidemia     Hypertension     Memory loss on Aricept    Missing teeth, acquired     Pneumonia     pneumococcal pneumonia twice at end of 2017    Risk for falls     uses walker    TIA (transient ischemic attack)     family doctors said she has had mini-strokes    Urinary leakage     UTI (urinary tract infection)     recent --just stopped antibiotic last week as of 16    Wears glasses      Family History   Problem Relation Age of Onset    Stroke Mother     Heart Disease Father     Early Death Father     Breast Cancer Sister     Parkinsonism Brother     Heart Disease Sister     Other Sister         fibromyalgia    Diabetes Sister     Early Death Brother          at birth     Social History     Socioeconomic History    Marital status: Single     Spouse name: Not on file    Number of children: Not on file    Years of education: Not on file    Highest education level: Not on file   Occupational History    Not on file   Tobacco Use    Smoking status: Never    Smokeless tobacco: Never   Vaping Use    Vaping Use: Never used   Substance and Sexual Activity    Alcohol use: No    Drug use: No    Sexual activity: Not on file   Other Topics Concern    Not on file   Social History Narrative    Not on file     Social Determinants of Health     Financial Resource Strain: Not on file   Food Insecurity: Not on file   Transportation Needs: Not on file   Physical Activity: Not on file   Stress: Not on file   Social Connections: Not on file   Intimate Partner Violence: Not on file   Housing Stability: Not on file       OBJECTIVE:  Physical Exam  Vitals reviewed. HENT:      Head: Normocephalic and atraumatic. Right Ear: External ear normal.      Left Ear: External ear normal.      Nose: Nose normal.   Eyes:      General:         Right eye: No discharge. Left eye: No discharge. Extraocular Movements: Extraocular movements intact. Cardiovascular:      Rate and Rhythm: Normal rate.    Pulmonary:      Effort: Pulmonary effort is normal.   Abdominal: Palpations: Abdomen is soft. Tenderness: There is no abdominal tenderness. There is no guarding or rebound. Comments: +IR drain x 2, no output. Musculoskeletal:         General: No swelling. Skin:     General: Skin is warm. Coloration: Skin is not jaundiced. Neurological:      General: No focal deficit present. Mental Status: She is alert. Pathology report reveals:   Final Pathologic Diagnosis:   Gallbladder, cholecystectomy:   -  Chronic active cholecystitis with focal mucosal erosion. ASSESSMENT:      1. S/P laparoscopic cholecystectomy        PLAN:  1. Reviewed pathology report  2. Diet - as tolerated. No restrictions. 3. Activity - as tolerated. 4. Follow up in 4 weeks. 5. Removed AVRIL drain x 2. Replace dressings daily or as needed until drain openings close. No orders of the defined types were placed in this encounter. No orders of the defined types were placed in this encounter. Follow Up: No follow-ups on file.     Marc Lanier MD

## 2023-03-10 ENCOUNTER — HOSPITAL ENCOUNTER (OUTPATIENT)
Age: 78
Setting detail: SPECIMEN
Discharge: HOME | End: 2023-03-10

## 2023-03-10 LAB
ALBUMIN SERPL-MCNC: 3.5 GM/DL (ref 3.4–5)
ALP BLD-CCNC: 173 IU/L (ref 40–128)
ALT SERPL-CCNC: 15 U/L (ref 10–40)
ANION GAP SERPL CALCULATED.3IONS-SCNC: 10 MMOL/L (ref 4–16)
AST SERPL-CCNC: 25 IU/L (ref 15–37)
BILIRUB SERPL-MCNC: 0.4 MG/DL (ref 0–1)
BUN SERPL-MCNC: 14 MG/DL (ref 6–23)
CALCIUM SERPL-MCNC: 8.9 MG/DL (ref 8.3–10.6)
CHLORIDE BLD-SCNC: 92 MMOL/L (ref 99–110)
CO2: 30 MMOL/L (ref 21–32)
CREAT SERPL-MCNC: 0.9 MG/DL (ref 0.6–1.1)
GFR SERPL CREATININE-BSD FRML MDRD: >60 ML/MIN/1.73M2
GLUCOSE SERPL-MCNC: 104 MG/DL (ref 70–99)
HCT VFR BLD CALC: 26.1 % (ref 37–47)
HEMOGLOBIN: 8.3 GM/DL (ref 12.5–16)
MCH RBC QN AUTO: 26.7 PG (ref 27–31)
MCHC RBC AUTO-ENTMCNC: 31.8 % (ref 32–36)
MCV RBC AUTO: 83.9 FL (ref 78–100)
PDW BLD-RTO: 13.9 % (ref 11.7–14.9)
PLATELET # BLD: 234 K/CU MM (ref 140–440)
PMV BLD AUTO: 9.1 FL (ref 7.5–11.1)
POTASSIUM SERPL-SCNC: 4.3 MMOL/L (ref 3.5–5.1)
RBC # BLD: 3.11 M/CU MM (ref 4.2–5.4)
SODIUM BLD-SCNC: 132 MMOL/L (ref 135–145)
TOTAL PROTEIN: 5.5 GM/DL (ref 6.4–8.2)
WBC # BLD: 8.3 K/CU MM (ref 4–10.5)

## 2023-03-10 PROCEDURE — 36415 COLL VENOUS BLD VENIPUNCTURE: CPT

## 2023-03-10 PROCEDURE — 80053 COMPREHEN METABOLIC PANEL: CPT

## 2023-03-10 PROCEDURE — 85027 COMPLETE CBC AUTOMATED: CPT

## 2023-03-17 ENCOUNTER — HOSPITAL ENCOUNTER (OUTPATIENT)
Age: 78
Setting detail: SPECIMEN
Discharge: HOME | End: 2023-03-17

## 2023-03-17 LAB
ALBUMIN SERPL-MCNC: 3.4 GM/DL (ref 3.4–5)
ALP BLD-CCNC: 174 IU/L (ref 40–128)
ALT SERPL-CCNC: 25 U/L (ref 10–40)
ANION GAP SERPL CALCULATED.3IONS-SCNC: 12 MMOL/L (ref 4–16)
AST SERPL-CCNC: 31 IU/L (ref 15–37)
BILIRUB SERPL-MCNC: 0.4 MG/DL (ref 0–1)
BUN SERPL-MCNC: 15 MG/DL (ref 6–23)
CALCIUM SERPL-MCNC: 8.8 MG/DL (ref 8.3–10.6)
CHLORIDE BLD-SCNC: 95 MMOL/L (ref 99–110)
CO2: 28 MMOL/L (ref 21–32)
CREAT SERPL-MCNC: 0.9 MG/DL (ref 0.6–1.1)
GFR SERPL CREATININE-BSD FRML MDRD: >60 ML/MIN/1.73M2
GLUCOSE SERPL-MCNC: 99 MG/DL (ref 70–99)
HCT VFR BLD CALC: 27.8 % (ref 37–47)
HEMOGLOBIN: 8.7 GM/DL (ref 12.5–16)
MCH RBC QN AUTO: 26.1 PG (ref 27–31)
MCHC RBC AUTO-ENTMCNC: 31.3 % (ref 32–36)
MCV RBC AUTO: 83.5 FL (ref 78–100)
PDW BLD-RTO: 13.5 % (ref 11.7–14.9)
PLATELET # BLD: 330 K/CU MM (ref 140–440)
PMV BLD AUTO: 9.1 FL (ref 7.5–11.1)
POTASSIUM SERPL-SCNC: 4.1 MMOL/L (ref 3.5–5.1)
RBC # BLD: 3.33 M/CU MM (ref 4.2–5.4)
SODIUM BLD-SCNC: 135 MMOL/L (ref 135–145)
TOTAL PROTEIN: 5.7 GM/DL (ref 6.4–8.2)
WBC # BLD: 7.4 K/CU MM (ref 4–10.5)

## 2023-03-17 PROCEDURE — 36415 COLL VENOUS BLD VENIPUNCTURE: CPT

## 2023-03-17 PROCEDURE — 85027 COMPLETE CBC AUTOMATED: CPT

## 2023-03-17 PROCEDURE — 80053 COMPREHEN METABOLIC PANEL: CPT

## 2023-03-24 ENCOUNTER — HOSPITAL ENCOUNTER (OUTPATIENT)
Age: 78
Setting detail: SPECIMEN
Discharge: HOME | End: 2023-03-24

## 2023-03-24 LAB
ALBUMIN SERPL-MCNC: 3.6 GM/DL (ref 3.4–5)
ALP BLD-CCNC: 170 IU/L (ref 40–128)
ALT SERPL-CCNC: 24 U/L (ref 10–40)
ANION GAP SERPL CALCULATED.3IONS-SCNC: 11 MMOL/L (ref 4–16)
AST SERPL-CCNC: 33 IU/L (ref 15–37)
BILIRUB SERPL-MCNC: 0.3 MG/DL (ref 0–1)
BUN SERPL-MCNC: 15 MG/DL (ref 6–23)
CALCIUM SERPL-MCNC: 9.1 MG/DL (ref 8.3–10.6)
CHLORIDE BLD-SCNC: 98 MMOL/L (ref 99–110)
CO2: 29 MMOL/L (ref 21–32)
CREAT SERPL-MCNC: 0.8 MG/DL (ref 0.6–1.1)
GFR SERPL CREATININE-BSD FRML MDRD: >60 ML/MIN/1.73M2
GLUCOSE SERPL-MCNC: 113 MG/DL (ref 70–99)
HCT VFR BLD CALC: 29.9 % (ref 37–47)
HEMOGLOBIN: 9.4 GM/DL (ref 12.5–16)
MCH RBC QN AUTO: 26.3 PG (ref 27–31)
MCHC RBC AUTO-ENTMCNC: 31.4 % (ref 32–36)
MCV RBC AUTO: 83.5 FL (ref 78–100)
PDW BLD-RTO: 13.9 % (ref 11.7–14.9)
PLATELET # BLD: 263 K/CU MM (ref 140–440)
PMV BLD AUTO: 9.1 FL (ref 7.5–11.1)
POTASSIUM SERPL-SCNC: 4.2 MMOL/L (ref 3.5–5.1)
RBC # BLD: 3.58 M/CU MM (ref 4.2–5.4)
SODIUM BLD-SCNC: 138 MMOL/L (ref 135–145)
TOTAL PROTEIN: 6 GM/DL (ref 6.4–8.2)
WBC # BLD: 6.9 K/CU MM (ref 4–10.5)

## 2023-03-24 PROCEDURE — 80053 COMPREHEN METABOLIC PANEL: CPT

## 2023-03-24 PROCEDURE — 85027 COMPLETE CBC AUTOMATED: CPT

## 2023-03-24 PROCEDURE — 36415 COLL VENOUS BLD VENIPUNCTURE: CPT

## 2023-04-01 PROBLEM — Z09 HOSPITAL DISCHARGE FOLLOW-UP: Status: RESOLVED | Noted: 2023-03-02 | Resolved: 2023-04-01

## 2023-05-22 ENCOUNTER — PROCEDURE VISIT (OUTPATIENT)
Dept: CARDIOLOGY CLINIC | Age: 78
End: 2023-05-22

## 2023-05-22 DIAGNOSIS — I49.5 SINUS NODE DYSFUNCTION (HCC): ICD-10-CM

## 2023-05-22 DIAGNOSIS — Z95.0 CARDIAC PACEMAKER IN SITU: Primary | ICD-10-CM

## 2023-05-22 DIAGNOSIS — I44.0 AV BLOCK, 1ST DEGREE: ICD-10-CM

## 2023-08-28 ENCOUNTER — PROCEDURE VISIT (OUTPATIENT)
Dept: CARDIOLOGY CLINIC | Age: 78
End: 2023-08-28

## 2023-08-28 DIAGNOSIS — I49.5 SINUS NODE DYSFUNCTION (HCC): ICD-10-CM

## 2023-08-28 DIAGNOSIS — I44.0 AV BLOCK, 1ST DEGREE: ICD-10-CM

## 2023-08-28 DIAGNOSIS — Z95.0 CARDIAC PACEMAKER IN SITU: Primary | ICD-10-CM

## (undated) DEVICE — GLOVE SURG SZ 7 L12IN FNGR THK79MIL GRN LTX FREE

## (undated) DEVICE — ADHESIVE SKIN CLSR 0.7ML TOP DERMBND ADV

## (undated) DEVICE — PACK SURG LAP CHOLE

## (undated) DEVICE — TUBING INSUFFLATOR HEAT HI FLO SET PNEUMOCLEAR

## (undated) DEVICE — GLOVE SURG SZ 75 L12IN FNGR THK79MIL GRN LTX FREE

## (undated) DEVICE — SET TBNG DISP TIP FOR AHTO

## (undated) DEVICE — LAPAROSCOPIC TROCAR SLEEVE/SINGLE USE: Brand: KII® OPTICAL ACCESS SYSTEM

## (undated) DEVICE — RESERVOIR,SUCTION,100CC,SILICONE: Brand: MEDLINE

## (undated) DEVICE — SURGICEL ENDOSCP APPL

## (undated) DEVICE — GLOVE SURG SZ 65 L12IN FNGR THK79MIL GRN LTX FREE

## (undated) DEVICE — SYRINGE 20ML LL S/C 50

## (undated) DEVICE — GLOVE SURG SZ 65 CRM LTX FREE POLYISOPRENE POLYMER BEAD ANTI

## (undated) DEVICE — Device

## (undated) DEVICE — TISSUE RETRIEVAL SYSTEM: Brand: INZII RETRIEVAL SYSTEM

## (undated) DEVICE — TROCAR: Brand: KII® SLEEVE

## (undated) DEVICE — AGENT HEMSTAT 3GM OXIDIZED REGENERATED CELOS ABSRB FOR CONT (ORDER MULTIPLES OF 5EA)

## (undated) DEVICE — TROCAR: Brand: KII FIOS FIRST ENTRY

## (undated) DEVICE — SUTURE VCRL SZ 4-0 L18IN ABSRB UD L19MM PS-2 3/8 CIR PRIM J496H

## (undated) DEVICE — SUTURE SZ 0 27IN 5/8 CIR UR-6  TAPER PT VIOLET ABSRB VICRYL J603H

## (undated) DEVICE — GOWN,SIRUS,POLYRNF,BRTHSLV,XLN/XL,20/CS: Brand: MEDLINE

## (undated) DEVICE — SUTURE ETHLN SZ 10-0 L8IN NONABSORBABLE BLK CSM-6 L4.3MM 9006G

## (undated) DEVICE — SPONGE DRN W4XL4IN RAYON/POLYESTER 6 PLY NONWOVEN PRECUT 2 PER PK

## (undated) DEVICE — SUTURE ETHLN SZ 3-0 L30IN NONABSORBABLE BLK FS-1 L24MM 3/8 669H

## (undated) DEVICE — NEEDLE HYPO 20GA L1.5IN YEL POLYPR HUB S STL REG BVL STR

## (undated) DEVICE — DRAIN SURG 15FR SIL RND CHN W/ TRCR FULL FLUT DBL WRP TRAD

## (undated) DEVICE — SCISSORS ENDOSCP DIA5MM CRV MPLR CAUT W/ RATCH HNDL

## (undated) DEVICE — GLOVE ORANGE PI 7   MSG9070

## (undated) DEVICE — NEEDLE INSUF L120MM DIA2MM DISP FOR PNEUMOPERI ENDOPATH

## (undated) DEVICE — SUTURE MCRYL SZ 4-0 L18IN ABSRB UD L19MM PS-2 3/8 CIR PRIM Y496G

## (undated) DEVICE — SYRINGE MED 50ML LUERLOCK TIP

## (undated) DEVICE — SYRINGE MED 20ML STD CLR PLAS LUERLOCK TIP N CTRL DISP